# Patient Record
Sex: FEMALE | Race: BLACK OR AFRICAN AMERICAN | NOT HISPANIC OR LATINO
[De-identification: names, ages, dates, MRNs, and addresses within clinical notes are randomized per-mention and may not be internally consistent; named-entity substitution may affect disease eponyms.]

---

## 2017-02-14 ENCOUNTER — APPOINTMENT (OUTPATIENT)
Dept: CARDIOLOGY | Facility: CLINIC | Age: 42
End: 2017-02-14

## 2017-02-14 VITALS
BODY MASS INDEX: 41.98 KG/M2 | WEIGHT: 277 LBS | HEIGHT: 68 IN | SYSTOLIC BLOOD PRESSURE: 148 MMHG | DIASTOLIC BLOOD PRESSURE: 80 MMHG

## 2017-02-14 DIAGNOSIS — Z82.49 FAMILY HISTORY OF ISCHEMIC HEART DISEASE AND OTHER DISEASES OF THE CIRCULATORY SYSTEM: ICD-10-CM

## 2017-02-14 DIAGNOSIS — O24.410 GESTATIONAL DIABETES MELLITUS IN PREGNANCY, DIET CONTROLLED: ICD-10-CM

## 2017-02-15 PROBLEM — Z82.49 FAMILY HISTORY OF CARDIAC DISORDER: Status: ACTIVE | Noted: 2017-02-15

## 2017-02-15 PROBLEM — Z82.49 FAMILY HISTORY OF HYPERTENSION: Status: ACTIVE | Noted: 2017-02-15

## 2017-03-02 ENCOUNTER — OUTPATIENT (OUTPATIENT)
Dept: OUTPATIENT SERVICES | Facility: HOSPITAL | Age: 42
LOS: 1 days | Discharge: HOME | End: 2017-03-02

## 2017-03-02 ENCOUNTER — APPOINTMENT (OUTPATIENT)
Dept: OBGYN | Facility: CLINIC | Age: 42
End: 2017-03-02

## 2017-03-02 DIAGNOSIS — Z86.79 PERSONAL HISTORY OF OTHER DISEASES OF THE CIRCULATORY SYSTEM: ICD-10-CM

## 2017-03-02 DIAGNOSIS — Z86.32 PERSONAL HISTORY OF GESTATIONAL DIABETES: ICD-10-CM

## 2017-03-02 DIAGNOSIS — D44.6 NEOPLASM OF UNCERTAIN BEHAVIOR OF CAROTID BODY: ICD-10-CM

## 2017-03-02 RX ORDER — CHLORHEXIDINE GLUCONATE 4 %
1000 LIQUID (ML) TOPICAL
Refills: 0 | Status: ACTIVE | COMMUNITY

## 2017-03-02 RX ORDER — MULTIVITAMIN
TABLET ORAL
Refills: 0 | Status: ACTIVE | COMMUNITY

## 2017-03-02 RX ORDER — ADHESIVE TAPE 3"X 2.3 YD
50 MCG TAPE, NON-MEDICATED TOPICAL
Refills: 0 | Status: ACTIVE | COMMUNITY

## 2017-03-07 ENCOUNTER — OUTPATIENT (OUTPATIENT)
Dept: OUTPATIENT SERVICES | Facility: HOSPITAL | Age: 42
LOS: 1 days | Discharge: HOME | End: 2017-03-07

## 2017-03-07 ENCOUNTER — RESULT REVIEW (OUTPATIENT)
Age: 42
End: 2017-03-07

## 2017-03-10 ENCOUNTER — RESULT REVIEW (OUTPATIENT)
Age: 42
End: 2017-03-10

## 2017-03-13 LAB — HPV E6+E7 MRNA CVX QL NAA+PROBE: NOT DETECTED

## 2017-03-16 ENCOUNTER — APPOINTMENT (OUTPATIENT)
Dept: SURGERY | Facility: CLINIC | Age: 42
End: 2017-03-16

## 2017-03-21 ENCOUNTER — APPOINTMENT (OUTPATIENT)
Dept: CARDIOLOGY | Facility: CLINIC | Age: 42
End: 2017-03-21

## 2017-03-28 ENCOUNTER — APPOINTMENT (OUTPATIENT)
Dept: CARDIOLOGY | Facility: CLINIC | Age: 42
End: 2017-03-28

## 2017-03-28 VITALS — BODY MASS INDEX: 43.58 KG/M2 | SYSTOLIC BLOOD PRESSURE: 132 MMHG | WEIGHT: 270 LBS | DIASTOLIC BLOOD PRESSURE: 86 MMHG

## 2017-04-05 ENCOUNTER — RECORD ABSTRACTING (OUTPATIENT)
Age: 42
End: 2017-04-05

## 2017-04-05 DIAGNOSIS — Z80.2 FAMILY HISTORY OF MALIGNANT NEOPLASM OF OTHER RESPIRATORY AND INTRATHORACIC ORGANS: ICD-10-CM

## 2017-04-05 DIAGNOSIS — Z87.39 PERSONAL HISTORY OF OTHER DISEASES OF THE MUSCULOSKELETAL SYSTEM AND CONNECTIVE TISSUE: ICD-10-CM

## 2017-04-19 ENCOUNTER — TRANSCRIPTION ENCOUNTER (OUTPATIENT)
Age: 42
End: 2017-04-19

## 2017-04-27 ENCOUNTER — APPOINTMENT (OUTPATIENT)
Dept: SURGERY | Facility: CLINIC | Age: 42
End: 2017-04-27

## 2017-04-27 VITALS — WEIGHT: 275 LBS | HEIGHT: 66 IN | BODY MASS INDEX: 44.2 KG/M2

## 2017-06-01 ENCOUNTER — APPOINTMENT (OUTPATIENT)
Dept: SURGERY | Facility: CLINIC | Age: 42
End: 2017-06-01

## 2017-06-21 ENCOUNTER — APPOINTMENT (OUTPATIENT)
Dept: SURGERY | Facility: CLINIC | Age: 42
End: 2017-06-21

## 2017-06-21 VITALS — HEIGHT: 66 IN | WEIGHT: 277 LBS | BODY MASS INDEX: 44.52 KG/M2

## 2017-06-27 DIAGNOSIS — R16.2 HEPATOMEGALY WITH SPLENOMEGALY, NOT ELSEWHERE CLASSIFIED: ICD-10-CM

## 2017-08-03 ENCOUNTER — APPOINTMENT (OUTPATIENT)
Dept: SURGERY | Facility: CLINIC | Age: 42
End: 2017-08-03
Payer: COMMERCIAL

## 2017-08-03 VITALS — HEIGHT: 66 IN | WEIGHT: 277 LBS | BODY MASS INDEX: 44.52 KG/M2

## 2017-08-03 PROCEDURE — 99212 OFFICE O/P EST SF 10 MIN: CPT

## 2017-09-08 ENCOUNTER — OTHER (OUTPATIENT)
Age: 42
End: 2017-09-08

## 2017-09-26 ENCOUNTER — OUTPATIENT (OUTPATIENT)
Dept: OUTPATIENT SERVICES | Facility: HOSPITAL | Age: 42
LOS: 1 days | Discharge: HOME | End: 2017-09-26

## 2017-09-26 DIAGNOSIS — E66.01 MORBID (SEVERE) OBESITY DUE TO EXCESS CALORIES: ICD-10-CM

## 2017-09-26 DIAGNOSIS — K56.609 UNSPECIFIED INTESTINAL OBSTRUCTION, UNSPECIFIED AS TO PARTIAL VERSUS COMPLETE OBSTRUCTION: ICD-10-CM

## 2017-09-26 DIAGNOSIS — Z01.818 ENCOUNTER FOR OTHER PREPROCEDURAL EXAMINATION: ICD-10-CM

## 2017-09-26 DIAGNOSIS — G47.30 SLEEP APNEA, UNSPECIFIED: ICD-10-CM

## 2017-09-26 DIAGNOSIS — Z98.84 BARIATRIC SURGERY STATUS: ICD-10-CM

## 2017-10-04 ENCOUNTER — APPOINTMENT (OUTPATIENT)
Dept: SURGERY | Facility: CLINIC | Age: 42
End: 2017-10-04
Payer: COMMERCIAL

## 2017-10-04 VITALS
BODY MASS INDEX: 44.13 KG/M2 | WEIGHT: 274.6 LBS | HEIGHT: 66 IN | DIASTOLIC BLOOD PRESSURE: 78 MMHG | SYSTOLIC BLOOD PRESSURE: 136 MMHG

## 2017-10-04 DIAGNOSIS — K42.9 UMBILICAL HERNIA W/OUT OBSTRUCTION OR GANGRENE: ICD-10-CM

## 2017-10-04 DIAGNOSIS — Z86.19 PERSONAL HISTORY OF OTHER INFECTIOUS AND PARASITIC DISEASES: ICD-10-CM

## 2017-10-04 DIAGNOSIS — Z87.19 PERSONAL HISTORY OF OTHER DISEASES OF THE DIGESTIVE SYSTEM: ICD-10-CM

## 2017-10-04 PROCEDURE — 99215 OFFICE O/P EST HI 40 MIN: CPT

## 2017-10-09 PROBLEM — K42.9 UMBILICAL HERNIA WITHOUT OBSTRUCTION AND WITHOUT GANGRENE: Status: RESOLVED | Noted: 2017-03-02 | Resolved: 2017-10-09

## 2017-10-10 ENCOUNTER — INPATIENT (INPATIENT)
Facility: HOSPITAL | Age: 42
LOS: 1 days | Discharge: HOME | End: 2017-10-12
Attending: SURGERY

## 2017-10-10 DIAGNOSIS — K56.609 UNSPECIFIED INTESTINAL OBSTRUCTION, UNSPECIFIED AS TO PARTIAL VERSUS COMPLETE OBSTRUCTION: ICD-10-CM

## 2017-10-10 DIAGNOSIS — Z98.84 BARIATRIC SURGERY STATUS: ICD-10-CM

## 2017-10-10 DIAGNOSIS — G47.30 SLEEP APNEA, UNSPECIFIED: ICD-10-CM

## 2017-10-13 ENCOUNTER — OTHER (OUTPATIENT)
Age: 42
End: 2017-10-13

## 2017-10-17 DIAGNOSIS — E66.01 MORBID (SEVERE) OBESITY DUE TO EXCESS CALORIES: ICD-10-CM

## 2017-10-17 DIAGNOSIS — K21.9 GASTRO-ESOPHAGEAL REFLUX DISEASE WITHOUT ESOPHAGITIS: ICD-10-CM

## 2017-10-17 DIAGNOSIS — G47.33 OBSTRUCTIVE SLEEP APNEA (ADULT) (PEDIATRIC): ICD-10-CM

## 2017-10-17 DIAGNOSIS — Z72.0 TOBACCO USE: ICD-10-CM

## 2017-10-17 DIAGNOSIS — M19.90 UNSPECIFIED OSTEOARTHRITIS, UNSPECIFIED SITE: ICD-10-CM

## 2017-10-17 DIAGNOSIS — K44.9 DIAPHRAGMATIC HERNIA WITHOUT OBSTRUCTION OR GANGRENE: ICD-10-CM

## 2017-10-17 DIAGNOSIS — D64.9 ANEMIA, UNSPECIFIED: ICD-10-CM

## 2017-10-18 ENCOUNTER — INPATIENT (INPATIENT)
Facility: HOSPITAL | Age: 42
LOS: 34 days | Discharge: NURSING, REHAB OR LONG-TERM CARE | End: 2017-11-22
Attending: SURGERY

## 2017-10-18 ENCOUNTER — APPOINTMENT (OUTPATIENT)
Dept: SURGERY | Facility: CLINIC | Age: 42
End: 2017-10-18

## 2017-10-18 DIAGNOSIS — K56.609 UNSPECIFIED INTESTINAL OBSTRUCTION, UNSPECIFIED AS TO PARTIAL VERSUS COMPLETE OBSTRUCTION: ICD-10-CM

## 2017-10-18 DIAGNOSIS — Z98.84 BARIATRIC SURGERY STATUS: ICD-10-CM

## 2017-10-18 DIAGNOSIS — G47.30 SLEEP APNEA, UNSPECIFIED: ICD-10-CM

## 2017-10-20 ENCOUNTER — APPOINTMENT (OUTPATIENT)
Dept: SURGERY | Facility: CLINIC | Age: 42
End: 2017-10-20

## 2017-11-22 ENCOUNTER — INPATIENT (INPATIENT)
Facility: HOSPITAL | Age: 42
LOS: 15 days | Discharge: HOME HEALTH | End: 2017-12-08
Attending: PHYSICAL MEDICINE & REHABILITATION

## 2017-11-22 DIAGNOSIS — Z98.84 BARIATRIC SURGERY STATUS: ICD-10-CM

## 2017-11-22 DIAGNOSIS — G47.30 SLEEP APNEA, UNSPECIFIED: ICD-10-CM

## 2017-11-22 DIAGNOSIS — K56.609 UNSPECIFIED INTESTINAL OBSTRUCTION, UNSPECIFIED AS TO PARTIAL VERSUS COMPLETE OBSTRUCTION: ICD-10-CM

## 2017-11-27 ENCOUNTER — TRANSCRIPTION ENCOUNTER (OUTPATIENT)
Age: 42
End: 2017-11-27

## 2017-11-28 DIAGNOSIS — M19.90 UNSPECIFIED OSTEOARTHRITIS, UNSPECIFIED SITE: ICD-10-CM

## 2017-11-28 DIAGNOSIS — G47.33 OBSTRUCTIVE SLEEP APNEA (ADULT) (PEDIATRIC): ICD-10-CM

## 2017-11-28 DIAGNOSIS — J96.01 ACUTE RESPIRATORY FAILURE WITH HYPOXIA: ICD-10-CM

## 2017-11-28 DIAGNOSIS — R65.21 SEVERE SEPSIS WITH SEPTIC SHOCK: ICD-10-CM

## 2017-11-28 DIAGNOSIS — N17.0 ACUTE KIDNEY FAILURE WITH TUBULAR NECROSIS: ICD-10-CM

## 2017-11-28 DIAGNOSIS — E87.6 HYPOKALEMIA: ICD-10-CM

## 2017-11-28 DIAGNOSIS — K56.50 INTESTINAL ADHESIONS [BANDS], UNSPECIFIED AS TO PARTIAL VERSUS COMPLETE OBSTRUCTION: ICD-10-CM

## 2017-11-28 DIAGNOSIS — K21.9 GASTRO-ESOPHAGEAL REFLUX DISEASE WITHOUT ESOPHAGITIS: ICD-10-CM

## 2017-11-28 DIAGNOSIS — E87.2 ACIDOSIS: ICD-10-CM

## 2017-11-28 DIAGNOSIS — I21.A1 MYOCARDIAL INFARCTION TYPE 2: ICD-10-CM

## 2017-11-28 DIAGNOSIS — M62.82 RHABDOMYOLYSIS: ICD-10-CM

## 2017-11-28 DIAGNOSIS — I48.91 UNSPECIFIED ATRIAL FIBRILLATION: ICD-10-CM

## 2017-11-28 DIAGNOSIS — E87.5 HYPERKALEMIA: ICD-10-CM

## 2017-11-28 DIAGNOSIS — I96 GANGRENE, NOT ELSEWHERE CLASSIFIED: ICD-10-CM

## 2017-11-28 DIAGNOSIS — N17.9 ACUTE KIDNEY FAILURE, UNSPECIFIED: ICD-10-CM

## 2017-11-28 DIAGNOSIS — K95.89 OTHER COMPLICATIONS OF OTHER BARIATRIC PROCEDURE: ICD-10-CM

## 2017-11-28 DIAGNOSIS — D64.9 ANEMIA, UNSPECIFIED: ICD-10-CM

## 2017-11-28 DIAGNOSIS — I42.9 CARDIOMYOPATHY, UNSPECIFIED: ICD-10-CM

## 2017-11-28 DIAGNOSIS — D69.6 THROMBOCYTOPENIA, UNSPECIFIED: ICD-10-CM

## 2017-11-28 DIAGNOSIS — R57.0 CARDIOGENIC SHOCK: ICD-10-CM

## 2017-11-28 DIAGNOSIS — R00.1 BRADYCARDIA, UNSPECIFIED: ICD-10-CM
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
## 2017-11-28 DIAGNOSIS — E66.01 MORBID (SEVERE) OBESITY DUE TO EXCESS CALORIES: ICD-10-CM

## 2017-11-28 DIAGNOSIS — K44.9 DIAPHRAGMATIC HERNIA WITHOUT OBSTRUCTION OR GANGRENE: ICD-10-CM

## 2017-11-28 DIAGNOSIS — E83.39 OTHER DISORDERS OF PHOSPHORUS METABOLISM: ICD-10-CM

## 2017-11-28 DIAGNOSIS — J69.0 PNEUMONITIS DUE TO INHALATION OF FOOD AND VOMIT: ICD-10-CM

## 2017-11-28 DIAGNOSIS — A41.9 SEPSIS, UNSPECIFIED ORGANISM: ICD-10-CM

## 2017-11-28 DIAGNOSIS — R00.0 TACHYCARDIA, UNSPECIFIED: ICD-10-CM

## 2017-11-30 ENCOUNTER — APPOINTMENT (OUTPATIENT)
Dept: PLASTIC SURGERY | Facility: AMBULATORY SURGERY CENTER | Age: 42
End: 2017-11-30

## 2017-12-03 ENCOUNTER — MOBILE ON CALL (OUTPATIENT)
Age: 42
End: 2017-12-03

## 2017-12-11 ENCOUNTER — OTHER (OUTPATIENT)
Age: 42
End: 2017-12-11

## 2017-12-12 ENCOUNTER — APPOINTMENT (OUTPATIENT)
Dept: PLASTIC SURGERY | Facility: CLINIC | Age: 42
End: 2017-12-12
Payer: COMMERCIAL

## 2017-12-12 VITALS — BODY MASS INDEX: 32.58 KG/M2 | HEIGHT: 68 IN | WEIGHT: 215 LBS

## 2017-12-12 PROCEDURE — 99212 OFFICE O/P EST SF 10 MIN: CPT

## 2017-12-13 DIAGNOSIS — R53.81 OTHER MALAISE: ICD-10-CM

## 2017-12-13 DIAGNOSIS — Z98.84 BARIATRIC SURGERY STATUS: ICD-10-CM

## 2017-12-13 DIAGNOSIS — D64.9 ANEMIA, UNSPECIFIED: ICD-10-CM

## 2017-12-13 DIAGNOSIS — R13.10 DYSPHAGIA, UNSPECIFIED: ICD-10-CM

## 2017-12-13 DIAGNOSIS — M19.90 UNSPECIFIED OSTEOARTHRITIS, UNSPECIFIED SITE: ICD-10-CM

## 2017-12-13 DIAGNOSIS — I96 GANGRENE, NOT ELSEWHERE CLASSIFIED: ICD-10-CM

## 2017-12-13 DIAGNOSIS — B96.20 UNSPECIFIED ESCHERICHIA COLI [E. COLI] AS THE CAUSE OF DISEASES CLASSIFIED ELSEWHERE: ICD-10-CM

## 2017-12-13 DIAGNOSIS — Z48.815 ENCOUNTER FOR SURGICAL AFTERCARE FOLLOWING SURGERY ON THE DIGESTIVE SYSTEM: ICD-10-CM

## 2017-12-13 DIAGNOSIS — G47.33 OBSTRUCTIVE SLEEP APNEA (ADULT) (PEDIATRIC): ICD-10-CM

## 2017-12-13 DIAGNOSIS — E66.9 OBESITY, UNSPECIFIED: ICD-10-CM

## 2017-12-13 DIAGNOSIS — I10 ESSENTIAL (PRIMARY) HYPERTENSION: ICD-10-CM

## 2017-12-13 DIAGNOSIS — E55.9 VITAMIN D DEFICIENCY, UNSPECIFIED: ICD-10-CM

## 2017-12-13 DIAGNOSIS — E66.01 MORBID (SEVERE) OBESITY DUE TO EXCESS CALORIES: ICD-10-CM

## 2017-12-13 DIAGNOSIS — B49 UNSPECIFIED MYCOSIS: ICD-10-CM

## 2017-12-13 DIAGNOSIS — K21.9 GASTRO-ESOPHAGEAL REFLUX DISEASE WITHOUT ESOPHAGITIS: ICD-10-CM

## 2017-12-20 ENCOUNTER — OUTPATIENT (OUTPATIENT)
Dept: OUTPATIENT SERVICES | Facility: HOSPITAL | Age: 42
LOS: 1 days | Discharge: HOME | End: 2017-12-20

## 2017-12-20 ENCOUNTER — APPOINTMENT (OUTPATIENT)
Dept: PLASTIC SURGERY | Facility: CLINIC | Age: 42
End: 2017-12-20
Payer: COMMERCIAL

## 2017-12-20 DIAGNOSIS — K56.609 UNSPECIFIED INTESTINAL OBSTRUCTION, UNSPECIFIED AS TO PARTIAL VERSUS COMPLETE OBSTRUCTION: ICD-10-CM

## 2017-12-20 DIAGNOSIS — Z98.84 BARIATRIC SURGERY STATUS: ICD-10-CM

## 2017-12-20 DIAGNOSIS — I96 GANGRENE, NOT ELSEWHERE CLASSIFIED: ICD-10-CM

## 2017-12-20 DIAGNOSIS — G47.30 SLEEP APNEA, UNSPECIFIED: ICD-10-CM

## 2017-12-20 PROCEDURE — 99212 OFFICE O/P EST SF 10 MIN: CPT

## 2017-12-22 ENCOUNTER — APPOINTMENT (OUTPATIENT)
Dept: PLASTIC SURGERY | Facility: CLINIC | Age: 42
End: 2017-12-22
Payer: COMMERCIAL

## 2017-12-22 ENCOUNTER — APPOINTMENT (OUTPATIENT)
Dept: SURGERY | Facility: CLINIC | Age: 42
End: 2017-12-22

## 2017-12-22 PROCEDURE — 99212 OFFICE O/P EST SF 10 MIN: CPT

## 2017-12-27 ENCOUNTER — OTHER (OUTPATIENT)
Age: 42
End: 2017-12-27

## 2017-12-29 ENCOUNTER — APPOINTMENT (OUTPATIENT)
Dept: PLASTIC SURGERY | Facility: CLINIC | Age: 42
End: 2017-12-29
Payer: COMMERCIAL

## 2017-12-29 PROCEDURE — 99212 OFFICE O/P EST SF 10 MIN: CPT | Mod: 24

## 2018-01-03 ENCOUNTER — OUTPATIENT (OUTPATIENT)
Dept: OUTPATIENT SERVICES | Facility: HOSPITAL | Age: 43
LOS: 1 days | Discharge: HOME | End: 2018-01-03

## 2018-01-03 DIAGNOSIS — K56.609 UNSPECIFIED INTESTINAL OBSTRUCTION, UNSPECIFIED AS TO PARTIAL VERSUS COMPLETE OBSTRUCTION: ICD-10-CM

## 2018-01-03 DIAGNOSIS — Z01.818 ENCOUNTER FOR OTHER PREPROCEDURAL EXAMINATION: ICD-10-CM

## 2018-01-03 DIAGNOSIS — Z98.84 BARIATRIC SURGERY STATUS: ICD-10-CM

## 2018-01-03 DIAGNOSIS — G47.30 SLEEP APNEA, UNSPECIFIED: ICD-10-CM

## 2018-01-05 ENCOUNTER — APPOINTMENT (OUTPATIENT)
Dept: SURGERY | Facility: CLINIC | Age: 43
End: 2018-01-05

## 2018-01-05 DIAGNOSIS — I10 ESSENTIAL (PRIMARY) HYPERTENSION: ICD-10-CM

## 2018-01-05 DIAGNOSIS — G47.30 SLEEP APNEA, UNSPECIFIED: ICD-10-CM

## 2018-01-05 DIAGNOSIS — E66.9 OBESITY, UNSPECIFIED: ICD-10-CM

## 2018-01-09 ENCOUNTER — APPOINTMENT (OUTPATIENT)
Dept: PLASTIC SURGERY | Facility: CLINIC | Age: 43
End: 2018-01-09
Payer: COMMERCIAL

## 2018-01-09 PROCEDURE — 99212 OFFICE O/P EST SF 10 MIN: CPT

## 2018-01-09 RX ORDER — AMOXICILLIN AND CLAVULANATE POTASSIUM 875; 125 MG/1; MG/1
875-125 TABLET, COATED ORAL
Qty: 14 | Refills: 0 | Status: DISCONTINUED | COMMUNITY
Start: 2017-12-20 | End: 2018-01-09

## 2018-01-16 ENCOUNTER — APPOINTMENT (OUTPATIENT)
Dept: CARDIOLOGY | Facility: CLINIC | Age: 43
End: 2018-01-16

## 2018-01-16 VITALS
HEART RATE: 116 BPM | DIASTOLIC BLOOD PRESSURE: 90 MMHG | WEIGHT: 218 LBS | SYSTOLIC BLOOD PRESSURE: 130 MMHG | HEIGHT: 68 IN | BODY MASS INDEX: 33.04 KG/M2

## 2018-01-16 RX ORDER — HYDROCODONE BITARTRATE AND ACETAMINOPHEN 5; 325 MG/1; MG/1
5-325 TABLET ORAL
Qty: 15 | Refills: 0 | Status: DISCONTINUED | COMMUNITY
Start: 2017-10-04 | End: 2018-01-16

## 2018-01-16 RX ORDER — MAGNESIUM OXIDE 500 MG
500 TABLET ORAL
Refills: 0 | Status: DISCONTINUED | COMMUNITY
End: 2018-01-16

## 2018-01-16 RX ORDER — DICYCLOMINE HYDROCHLORIDE 10 MG/1
10 CAPSULE ORAL
Refills: 0 | Status: DISCONTINUED | COMMUNITY
End: 2018-01-16

## 2018-01-16 RX ORDER — ZOLPIDEM TARTRATE 5 MG/1
TABLET, FILM COATED ORAL
Refills: 0 | Status: DISCONTINUED | COMMUNITY
End: 2018-01-16

## 2018-01-17 ENCOUNTER — RX RENEWAL (OUTPATIENT)
Age: 43
End: 2018-01-17

## 2018-01-17 ENCOUNTER — APPOINTMENT (OUTPATIENT)
Dept: PLASTIC SURGERY | Facility: AMBULATORY SURGERY CENTER | Age: 43
End: 2018-01-17
Payer: COMMERCIAL

## 2018-01-17 ENCOUNTER — OUTPATIENT (OUTPATIENT)
Dept: OUTPATIENT SERVICES | Facility: HOSPITAL | Age: 43
LOS: 1 days | Discharge: HOME | End: 2018-01-17

## 2018-01-17 ENCOUNTER — MEDICATION RENEWAL (OUTPATIENT)
Age: 43
End: 2018-01-17

## 2018-01-17 DIAGNOSIS — K56.609 UNSPECIFIED INTESTINAL OBSTRUCTION, UNSPECIFIED AS TO PARTIAL VERSUS COMPLETE OBSTRUCTION: ICD-10-CM

## 2018-01-17 DIAGNOSIS — Z98.84 BARIATRIC SURGERY STATUS: ICD-10-CM

## 2018-01-17 DIAGNOSIS — G47.30 SLEEP APNEA, UNSPECIFIED: ICD-10-CM

## 2018-01-17 PROCEDURE — 25920 AMPUTATE HAND AT WRIST: CPT | Mod: LT

## 2018-01-17 PROCEDURE — 26952 AMPUTATION OF FINGER/THUMB: CPT | Mod: 59,F9

## 2018-01-18 ENCOUNTER — MESSAGE (OUTPATIENT)
Age: 43
End: 2018-01-18

## 2018-01-23 ENCOUNTER — APPOINTMENT (OUTPATIENT)
Dept: PLASTIC SURGERY | Facility: CLINIC | Age: 43
End: 2018-01-23
Payer: COMMERCIAL

## 2018-01-23 PROCEDURE — 99024 POSTOP FOLLOW-UP VISIT: CPT

## 2018-01-25 DIAGNOSIS — I96 GANGRENE, NOT ELSEWHERE CLASSIFIED: ICD-10-CM

## 2018-01-25 DIAGNOSIS — I99.8 OTHER DISORDER OF CIRCULATORY SYSTEM: ICD-10-CM

## 2018-01-30 ENCOUNTER — APPOINTMENT (OUTPATIENT)
Dept: PLASTIC SURGERY | Facility: CLINIC | Age: 43
End: 2018-01-30
Payer: COMMERCIAL

## 2018-01-30 PROCEDURE — 99024 POSTOP FOLLOW-UP VISIT: CPT

## 2018-02-05 ENCOUNTER — RX RENEWAL (OUTPATIENT)
Age: 43
End: 2018-02-05

## 2018-02-05 ENCOUNTER — OTHER (OUTPATIENT)
Age: 43
End: 2018-02-05

## 2018-02-06 ENCOUNTER — APPOINTMENT (OUTPATIENT)
Dept: PLASTIC SURGERY | Facility: CLINIC | Age: 43
End: 2018-02-06
Payer: COMMERCIAL

## 2018-02-06 PROCEDURE — 99024 POSTOP FOLLOW-UP VISIT: CPT

## 2018-02-15 ENCOUNTER — INPATIENT (INPATIENT)
Facility: HOSPITAL | Age: 43
LOS: 22 days | Discharge: HOME HEALTH | End: 2018-03-10
Attending: HOSPITALIST

## 2018-02-15 VITALS
TEMPERATURE: 97 F | HEART RATE: 101 BPM | SYSTOLIC BLOOD PRESSURE: 130 MMHG | DIASTOLIC BLOOD PRESSURE: 80 MMHG | RESPIRATION RATE: 20 BRPM

## 2018-02-15 DIAGNOSIS — Z98.84 BARIATRIC SURGERY STATUS: Chronic | ICD-10-CM

## 2018-02-15 DIAGNOSIS — Z98.890 OTHER SPECIFIED POSTPROCEDURAL STATES: Chronic | ICD-10-CM

## 2018-02-15 DIAGNOSIS — S68.119A COMPLETE TRAUMATIC METACARPOPHALANGEAL AMPUTATION OF UNSPECIFIED FINGER, INITIAL ENCOUNTER: Chronic | ICD-10-CM

## 2018-02-15 LAB
ALBUMIN SERPL ELPH-MCNC: 3.1 G/DL — SIGNIFICANT CHANGE UP (ref 3–5.5)
ALP SERPL-CCNC: 46 U/L — SIGNIFICANT CHANGE UP (ref 30–115)
ALT FLD-CCNC: 14 U/L — SIGNIFICANT CHANGE UP (ref 0–41)
ANION GAP SERPL CALC-SCNC: 9 MMOL/L — SIGNIFICANT CHANGE UP (ref 7–14)
APTT BLD: 27.9 SEC — SIGNIFICANT CHANGE UP (ref 27–39.2)
AST SERPL-CCNC: 15 U/L — SIGNIFICANT CHANGE UP (ref 0–41)
BASOPHILS # BLD AUTO: 0.03 K/UL — SIGNIFICANT CHANGE UP (ref 0–0.2)
BASOPHILS NFR BLD AUTO: 0.3 % — SIGNIFICANT CHANGE UP (ref 0–1)
BILIRUB SERPL-MCNC: 0.5 MG/DL — SIGNIFICANT CHANGE UP (ref 0.2–1.2)
BLD GP AB SCN SERPL QL: SIGNIFICANT CHANGE UP
BUN SERPL-MCNC: 8 MG/DL — LOW (ref 10–20)
CALCIUM SERPL-MCNC: 9.2 MG/DL — SIGNIFICANT CHANGE UP (ref 8.5–10.1)
CHLORIDE SERPL-SCNC: 103 MMOL/L — SIGNIFICANT CHANGE UP (ref 98–110)
CO2 SERPL-SCNC: 23 MMOL/L — SIGNIFICANT CHANGE UP (ref 17–32)
CREAT SERPL-MCNC: 1 MG/DL — SIGNIFICANT CHANGE UP (ref 0.7–1.5)
EOSINOPHIL # BLD AUTO: 0.25 K/UL — SIGNIFICANT CHANGE UP (ref 0–0.7)
EOSINOPHIL NFR BLD AUTO: 2.8 % — SIGNIFICANT CHANGE UP (ref 0–8)
GLUCOSE SERPL-MCNC: 138 MG/DL — HIGH (ref 70–110)
HCT VFR BLD CALC: 36 % — LOW (ref 37–47)
HGB BLD-MCNC: 11.3 G/DL — LOW (ref 14–18)
IMM GRANULOCYTES NFR BLD AUTO: 0.3 % — SIGNIFICANT CHANGE UP (ref 0.1–0.3)
INR BLD: 1.09 RATIO — SIGNIFICANT CHANGE UP (ref 0.65–1.3)
LYMPHOCYTES # BLD AUTO: 1.68 K/UL — SIGNIFICANT CHANGE UP (ref 1.2–3.4)
LYMPHOCYTES # BLD AUTO: 19 % — LOW (ref 20.5–51.1)
MCHC RBC-ENTMCNC: 25.3 PG — LOW (ref 27–31)
MCHC RBC-ENTMCNC: 31.4 G/DL — LOW (ref 32–37)
MCV RBC AUTO: 80.5 FL — LOW (ref 81–91)
MONOCYTES # BLD AUTO: 0.5 K/UL — SIGNIFICANT CHANGE UP (ref 0.1–0.6)
MONOCYTES NFR BLD AUTO: 5.6 % — SIGNIFICANT CHANGE UP (ref 1.7–9.3)
NEUTROPHILS # BLD AUTO: 6.36 K/UL — SIGNIFICANT CHANGE UP (ref 1.4–6.5)
NEUTROPHILS NFR BLD AUTO: 72 % — SIGNIFICANT CHANGE UP (ref 42.2–75.2)
NRBC # BLD: 0 /100 WBCS — SIGNIFICANT CHANGE UP (ref 0–0)
PLATELET # BLD AUTO: 334 K/UL — SIGNIFICANT CHANGE UP (ref 130–400)
POTASSIUM SERPL-MCNC: 4.5 MMOL/L — SIGNIFICANT CHANGE UP (ref 3.5–5)
POTASSIUM SERPL-SCNC: 4.5 MMOL/L — SIGNIFICANT CHANGE UP (ref 3.5–5)
PROT SERPL-MCNC: 6.4 G/DL — SIGNIFICANT CHANGE UP (ref 6–8)
PROTHROM AB SERPL-ACNC: 11.8 SEC — SIGNIFICANT CHANGE UP (ref 9.95–12.87)
RBC # BLD: 4.47 M/UL — SIGNIFICANT CHANGE UP (ref 4.2–5.4)
RBC # FLD: 14.3 % — SIGNIFICANT CHANGE UP (ref 11.5–14.5)
SODIUM SERPL-SCNC: 135 MMOL/L — SIGNIFICANT CHANGE UP (ref 135–146)
TYPE + AB SCN PNL BLD: SIGNIFICANT CHANGE UP
WBC # BLD: 8.85 K/UL — SIGNIFICANT CHANGE UP (ref 4.8–10.8)
WBC # FLD AUTO: 8.85 K/UL — SIGNIFICANT CHANGE UP (ref 4.8–10.8)

## 2018-02-15 RX ORDER — GABAPENTIN 400 MG/1
300 CAPSULE ORAL DAILY
Qty: 0 | Refills: 0 | Status: DISCONTINUED | OUTPATIENT
Start: 2018-02-15 | End: 2018-02-16

## 2018-02-15 RX ORDER — ENOXAPARIN SODIUM 100 MG/ML
40 INJECTION SUBCUTANEOUS EVERY 24 HOURS
Qty: 0 | Refills: 0 | Status: DISCONTINUED | OUTPATIENT
Start: 2018-02-15 | End: 2018-02-16

## 2018-02-15 RX ORDER — PREGABALIN 225 MG/1
100 CAPSULE ORAL DAILY
Qty: 0 | Refills: 0 | Status: DISCONTINUED | OUTPATIENT
Start: 2018-02-15 | End: 2018-02-16

## 2018-02-15 NOTE — ED ADULT NURSE NOTE - GENITOURINARY WDL
Pt advised my office last that he is transfering his care to [facility]. Will RF for 1 month only   Bladder non-tender and non-distended. Urine clear yellow.

## 2018-02-15 NOTE — ED PROVIDER NOTE - CONSTITUTIONAL, MLM
normal... Well appearing, well nourished, awake, alert, oriented to person, place, time/situation, in no apparent distress.

## 2018-02-15 NOTE — H&P ADULT - NSHPPHYSICALEXAM_GEN_ALL_CORE
T(F): 97.8 (02-15-18 @ 13:34), Max: 97.8 (02-15-18 @ 13:34)  HR: 98 (02-15-18 @ 13:34) (98 - 101)  BP: 137/78 (02-15-18 @ 13:34) (130/80 - 141/78)  RR: 18 (02-15-18 @ 13:34) (18 - 20)  SpO2: 98% (02-15-18 @ 13:34) (98% - 98%)    PHYSICAL EXAM:    GENERAL: NAD, well-developed  HEAD:  Atraumatic, Normocephalic  EYES: EOMI, PERRLA, conjunctiva and sclera clear  NECK: Supple, No JVD  CHEST/LUNG: wheezes b/l lung fields  HEART: Regular rate and rhythm; No murmurs, rubs, or gallops  ABDOMEN: Soft, Nontender, Nondistended; Bowel sounds present  EXTREMITIES:  2+ Peripheral Pulses, b/l finger amputations, and feet wrapped in kerlex with foot ulcers  PSYCH: AAOx3  NEUROLOGY: non-focal  SKIN: As above

## 2018-02-15 NOTE — ED ADULT NURSE NOTE - OBJECTIVE STATEMENT
As per patient she is scheduled for surgery tomorrow. Pt states she had gastric bypass Oct 2017 with reaction to vasopressors. Pt now presents to ER with bilateral gangrene to bilateral feet. Pt has amputated left hand and right hand amputated fingers prior. Pt alert and orientedx3, VSS and afebrile. Safety maintained and hourly rounding performed. Will continue with plan of care.

## 2018-02-15 NOTE — H&P ADULT - HISTORY OF PRESENT ILLNESS
43 YO F with PMH of HTN, Obesity s/p gastric bypass surgery complicated by septic shock and vasopressor induced necrosis of fingers and toes presents to the ER for admission for b/l foot amputation. As per the patient her b/l feet ulcers are being taken care of by Podiatry by wound care. She was told by the Podiatrist to go the ER for the amputation. Pt. already has amputations of fingers of both hands.   At baseline pt. reports that she can walk without any exertional chest pain/sob, but it is limited by the pain upon walking with foot ulcers. Pt. denies any fevers, chills, nausea, vomiting, chest pain,   lagunas, palpitations, headaches. 41 YO F with PMH of HTN, Obesity s/p gastric bypass surgery complicated by septic shock, ARDS, SBO, ATN with CVVH, stress cardiomyopathy and vasopressor induced necrosis of fingers and toes in October 2017 presents to the ER for admission for b/l foot amputation. As per the patient her b/l feet ulcers are being taken care of by Podiatry by wound care. She was told by the Podiatrist to go the ER for the amputation. Pt. already has amputations of fingers of both hands.   At baseline pt. reports that she can walk without any exertional chest pain/sob, but it is limited by the pain upon walking with foot ulcers. Pt. denies any fevers, chills, nausea, vomiting, chest pain,   lagunas, palpitations, headaches.

## 2018-02-15 NOTE — ED ADULT NURSE NOTE - PMH
Cardiomyopathy    Gangrene of finger of left hand    Gangrene of finger of right hand    Gangrene of lower extremity    HTN (hypertension)    Osteoarthritis    Sleep apnea

## 2018-02-15 NOTE — ED PROVIDER NOTE - OBJECTIVE STATEMENT
43 yo F with hx of HTN, gastric bypass, s/p bilateral hand phalanges amputation, presents for evaluation of pre-op/medical clearance for bilateral foot amputation secondary to bilateral foot gangrene. NO fever, no chills, no chest pain, no abdominal pain, no headache, no nausea, no vomiting, no diarrhea. Patient was sent from podiatry for pre-op labs and medical admission. Patient has both feet wrapped with xeroform. Patient denies any other symptoms.

## 2018-02-15 NOTE — H&P ADULT - ASSESSMENT
41 YO F with PMH of HTN, Obesity s/p gastric bypass surgery complicated by septic shock and vasopressor induced necrosis of fingers and toes presents to the ER for admission for b/l foot amputation.    A & P:    # B/L Feet gangrene/ulcers:   - plan for OR as per Podiatry Tomorrow (as per ER)  - Type and Screen, Cross match,   - Keep NPO past midnight  - No current signs of infection  - Podiatry f/u    # Pre-op Risk Assessment:  - c/w Toprol, hold Lovenox am dose (ppx)  - according to Revised Cardiac Risk Index pt. is at low risk for intermediate risk surgery    # HTN:  - monitor BP, c/w Toprol    # DVT ppx:  - Lovenox  - hold lovenox tonight 41 YO F with PMH of HTN, PANDA, Obesity s/p gastric bypass surgery complicated by septic shock, ARDS, SBO, ATN with CVVH, stress cardiomyopathy and vasopressor induced necrosis of fingers and toes in October 2017 presents to the ER for admission for b/l foot amputation.    A & P:    # B/L Feet gangrene/ulcers:   - plan for OR as per Podiatry Tomorrow (as per ER)  - Type and Screen, Cross match,   - Keep NPO past midnight  - No current signs of infection  - Podiatry f/u    # Pre-op Risk Assessment:  - c/w Toprol, hold Lovenox am dose (ppx)  - according to Revised Cardiac Risk Index pt. is at low risk for intermediate risk surgery  - h/o Stress cardiomyopathy due to sepsis, ARDS [facility]    # HTN:  - monitor BP, c/w Toprol    # DVT ppx:  - Lovenox  - hold lovenox tonight

## 2018-02-15 NOTE — H&P ADULT - PMH
Cardiomyopathy    Gangrene of finger of left hand    Gangrene of finger of right hand    Gangrene of lower extremity    HTN (hypertension)    Osteoarthritis    Sleep apnea Cardiomyopathy    Gangrene of finger of left hand    Gangrene of finger of right hand    Gangrene of lower extremity    HTN (hypertension)    Osteoarthritis    Sleep apnea    Small bowel obstruction    Takotsubo cardiomyopathy

## 2018-02-15 NOTE — H&P ADULT - ATTENDING COMMENTS
41yo F with Past Medical History of HTN, PANDA, Obesity status post gastric bypass surgery complicated by septic shock/ATN with CVVH/ARDS, SBO, and stress cardiomyopathy and vasopressor induced necrosis of fingers and toes in October 2017 presents to the ER for admission for bilateral foot amputation.  1) Status post septic shock with necrosis of the fingers/toes: status post bilateral TMA.  Pt appears hypothermic following surgery.  Apply warming blanket to the patient.  Following podiatry for continued wound care  2) HTN: continue Toprol XL  3) Obstructive Sleep Apnea: continue BIPAP at night  4) GI/DVT prophylaxis

## 2018-02-15 NOTE — PROGRESS NOTE ADULT - SUBJECTIVE AND OBJECTIVE BOX
Podiatry Pre-Operative Note   JIMMY CR is a 42y year old Female patient presenting to the operating room for surgical management of the bilateral  feet. The patient has failed all conservative measures and requires surgical intervention at this time.    PAST MEDICAL & SURGICAL HISTORY:  Takotsubo cardiomyopathy  Small bowel obstruction  Osteoarthritis  Cardiomyopathy  Sleep apnea  HTN (hypertension)  Gangrene of lower extremity  Gangrene of finger of right hand  Gangrene of finger of left hand  H/O exploratory laparotomy  Amputation finger  History of cholecystectomy  H/O gastric bypass    Medications:   cyanocobalamin 100 MICROGram(s) Oral daily  enoxaparin Injectable 40 milliGRAM(s) SubCutaneous every 24 hours  gabapentin 300 milliGRAM(s) Oral daily    Allergies:  No Known Allergies    Consent [Pending]    H&P [Done]    Medical Clearance [Requested]    EKG [Done]  < from: 12 Lead ECG (02.15.18 @ 10:30) >  Ventricular Rate 88 BPM  Atrial Rate 88 BPM  P-R Interval 160 ms  QRS Duration 76 ms  Q-T Interval 338 ms  QTC Calculation(Bezet) 408 ms  P Axis 44 degrees  R Axis 29 degrees  T Axis -1 degrees  Diagnosis Line Normal sinus rhythm  Nonspecific T wave abnormality  Abnormal ECG  Confirmed by Obed Obando ([id number]) on 2/15/2018 1:58:21 PM  < end of copied text >    CXR [Done]  < from: Xray Chest 1 View AP/PA (02.15.18 @ 13:02) >  EXAM:  XR CHEST FRONTAL 1V        PROCEDURE DATE:  02/15/2018    INTERPRETATION:  Clinical History / Reason for exam: Medical evaluation  Comparison : Chest radiograph 1/3/2018.  Technique/Positioning: Satisfactory.  Findings:  Support devices: None.  Cardiac/mediastinum/hilum: Unremarkable.  Lung parenchyma/Pleura: Within normal limits.  Skeleton/soft tissues: Unremarkable.  Impression:    No radiographic evidence of acute cardiopulmonary disease.  MONSE SMITH M.D., ATTENDING RADIOLOGIST  This document has been electronically signed. Feb 15 2018  3:20PM  < end of copied text >    UCG [Ordered]    T&S [Done]  Type + Screen (02.15.18 @ 11:51)  Type + Screen: AB POS                        11.3   8.85  )-----------( 334      ( 15 Feb 2018 10:20 )             36.0     02-15    135  |  103  |  8<L>  ----------------------------<  138<H>  4.5   |  23  |  1.0    Ca    9.2      15 Feb 2018 10:20    TPro  6.4  /  Alb  3.1  /  TBili  0.5  /  DBili  x   /  AST  15  /  ALT  14  /  AlkPhos  46  02-15    PT/INR - ( 15 Feb 2018 10:20 )   PT: 11.80 sec;   INR: 1.09 ratio         PTT - ( 15 Feb 2018 10:20 )  PTT:27.9 sec    CAPILLARY BLOOD GLUCOSE    T(C): 37.1 (02-15-18 @ 16:30), Max: 37.1 (02-15-18 @ 16:30)  HR: 99 (02-15-18 @ 16:30) (98 - 101)  BP: 100/55 (02-15-18 @ 16:30) (100/55 - 141/78)  RR: 18 (02-15-18 @ 16:30) (18 - 20)  SpO2: 98% (02-15-18 @ 13:34) (98% - 98%)    Surgeon: Dr. Mohinder Salazar DPM  Assistant (s): [facility] Residents  Pre Operative Diagnosis: Bilateral feet dry gangrene   Planned Procedure: Bilateral proximal transmetatarsal amputations    The patient has been educated on the above procedure, with all risks and benefits described. No guarantees were given or implied for the aforementioned procedure.  The above assistant(s) have introduced themselves to the patient. The patient consents to their participation in the procedure listed above.     Patient requires medical stratification for operative procedure.

## 2018-02-15 NOTE — CONSULT NOTE ADULT - ASSESSMENT
Assesment:  1. Dry gangrene B/L feet to the level of the metatarsal bases    Plan:  Chart reviewed and Patient evaluated  pt scheduled for sx with  (Director of Service) @ 3pm on 2/16/18 for B/L proximal TMA's  ID/Vasc consults requested  MEDICAL CLEARANCE w/ stratification requested  Pt will be NPO @ midnight  B/L FXR ordered

## 2018-02-15 NOTE — ED PROVIDER NOTE - MUSCULOSKELETAL NEGATIVE STATEMENT, MLM
+ bilateral foot gangrene, + bilateral hands s/p phalangeal amputation, no back pain, no gout, no musculoskeletal pain, no neck pain, and no weakness.

## 2018-02-15 NOTE — H&P ADULT - PSH
Amputation finger    H/O gastric bypass    History of cholecystectomy Amputation finger    H/O exploratory laparotomy    H/O gastric bypass    History of cholecystectomy

## 2018-02-15 NOTE — ED PROVIDER NOTE - PROGRESS NOTE DETAILS
Spoke to Dr. Aguilar, Podiatry, aware of patient in the ED, will come evaluate patient Per pre-admission note patient is to be on bed rest with full weight bed, vascular surgical consult is Dr. Taveras, diet is regular, no IV abx at this time,  PVR was done prior to admission. Podiatry spectra [id number], Tx plan preop medical clearance with surgical debridement of necrotic tissue. I personally evaluated the patient. I reviewed the Resident’s note (as assigned above), and agree with the findings and plan except as documented in my note.   41 y/o F s/p recent gastric bypass, shortly afterwards she had complications leading to her requiring amputation of several digits, and now requiring amputation of several toes due to gangrene due to vascular insufficiency. Sent in by podiatry for amputations. Labs sent, will admit.

## 2018-02-15 NOTE — ED PROVIDER NOTE - MUSCULOSKELETAL, MLM
full ROM, bilateral UE s/p phalangeal amputation, + distal pulses, bilateral LE full ROM, + PT and DP, gangrenous bilateral foot full ROM, bilateral UE s/p phalangeal amputation, + distal pulses, bilateral LE full ROM, + PT and DP, dry gangrenous necrotic bilateral feet

## 2018-02-15 NOTE — H&P ADULT - NSHPLABSRESULTS_GEN_ALL_CORE
11.3   8.85  )-----------( 334      ( 15 Feb 2018 10:20 )             36.0       02-15    135  |  103  |  8<L>  ----------------------------<  138<H>  4.5   |  23  |  1.0    Ca    9.2      15 Feb 2018 10:20    TPro  6.4  /  Alb  3.1  /  TBili  0.5  /  DBili  x   /  AST  15  /  ALT  14  /  AlkPhos  46  02-15            PT/INR - ( 15 Feb 2018 10:20 )   PT: 11.80 sec;   INR: 1.09 ratio         PTT - ( 15 Feb 2018 10:20 )  PTT:27.9 sec

## 2018-02-16 ENCOUNTER — RESULT REVIEW (OUTPATIENT)
Age: 43
End: 2018-02-16

## 2018-02-16 LAB
ALBUMIN SERPL ELPH-MCNC: 2.9 G/DL — LOW (ref 3–5.5)
ALP SERPL-CCNC: 41 U/L — SIGNIFICANT CHANGE UP (ref 30–115)
ALT FLD-CCNC: 8 U/L — SIGNIFICANT CHANGE UP (ref 0–41)
ANION GAP SERPL CALC-SCNC: 7 MMOL/L — SIGNIFICANT CHANGE UP (ref 7–14)
AST SERPL-CCNC: 16 U/L — SIGNIFICANT CHANGE UP (ref 0–41)
BILIRUB SERPL-MCNC: 0.4 MG/DL — SIGNIFICANT CHANGE UP (ref 0.2–1.2)
BUN SERPL-MCNC: 7 MG/DL — LOW (ref 10–20)
CALCIUM SERPL-MCNC: 9 MG/DL — SIGNIFICANT CHANGE UP (ref 8.5–10.1)
CHLORIDE SERPL-SCNC: 108 MMOL/L — SIGNIFICANT CHANGE UP (ref 98–110)
CO2 SERPL-SCNC: 23 MMOL/L — SIGNIFICANT CHANGE UP (ref 17–32)
CREAT SERPL-MCNC: 1.1 MG/DL — SIGNIFICANT CHANGE UP (ref 0.7–1.5)
GLUCOSE SERPL-MCNC: 79 MG/DL — SIGNIFICANT CHANGE UP (ref 70–110)
HCG UR QL: NEGATIVE — SIGNIFICANT CHANGE UP
HCT VFR BLD CALC: 33.9 % — LOW (ref 37–47)
HCT VFR BLD CALC: 35 % — LOW (ref 37–47)
HGB BLD-MCNC: 10.4 G/DL — LOW (ref 14–18)
HGB BLD-MCNC: 11 G/DL — LOW (ref 14–18)
MCHC RBC-ENTMCNC: 25.1 PG — LOW (ref 27–31)
MCHC RBC-ENTMCNC: 25.1 PG — LOW (ref 27–31)
MCHC RBC-ENTMCNC: 30.7 G/DL — LOW (ref 32–37)
MCHC RBC-ENTMCNC: 31.4 G/DL — LOW (ref 32–37)
MCV RBC AUTO: 79.7 FL — LOW (ref 81–91)
MCV RBC AUTO: 81.7 FL — SIGNIFICANT CHANGE UP (ref 81–91)
NRBC # BLD: 0 /100 WBCS — SIGNIFICANT CHANGE UP (ref 0–0)
NRBC # BLD: 0 /100 WBCS — SIGNIFICANT CHANGE UP (ref 0–0)
PLATELET # BLD AUTO: 312 K/UL — SIGNIFICANT CHANGE UP (ref 130–400)
PLATELET # BLD AUTO: 326 K/UL — SIGNIFICANT CHANGE UP (ref 130–400)
POTASSIUM SERPL-MCNC: 4.1 MMOL/L — SIGNIFICANT CHANGE UP (ref 3.5–5)
POTASSIUM SERPL-SCNC: 4.1 MMOL/L — SIGNIFICANT CHANGE UP (ref 3.5–5)
PROT SERPL-MCNC: 5.8 G/DL — LOW (ref 6–8)
RBC # BLD: 4.15 M/UL — LOW (ref 4.2–5.4)
RBC # BLD: 4.39 M/UL — SIGNIFICANT CHANGE UP (ref 4.2–5.4)
RBC # FLD: 14.2 % — SIGNIFICANT CHANGE UP (ref 11.5–14.5)
RBC # FLD: 14.3 % — SIGNIFICANT CHANGE UP (ref 11.5–14.5)
SODIUM SERPL-SCNC: 138 MMOL/L — SIGNIFICANT CHANGE UP (ref 135–146)
WBC # BLD: 10.3 K/UL — SIGNIFICANT CHANGE UP (ref 4.8–10.8)
WBC # BLD: 7.18 K/UL — SIGNIFICANT CHANGE UP (ref 4.8–10.8)
WBC # FLD AUTO: 10.3 K/UL — SIGNIFICANT CHANGE UP (ref 4.8–10.8)
WBC # FLD AUTO: 7.18 K/UL — SIGNIFICANT CHANGE UP (ref 4.8–10.8)

## 2018-02-16 RX ORDER — SODIUM CHLORIDE 9 MG/ML
1000 INJECTION, SOLUTION INTRAVENOUS
Qty: 0 | Refills: 0 | Status: DISCONTINUED | OUTPATIENT
Start: 2018-02-16 | End: 2018-02-16

## 2018-02-16 RX ORDER — ENOXAPARIN SODIUM 100 MG/ML
40 INJECTION SUBCUTANEOUS EVERY 24 HOURS
Qty: 0 | Refills: 0 | Status: DISCONTINUED | OUTPATIENT
Start: 2018-02-16 | End: 2018-02-21

## 2018-02-16 RX ORDER — OXYCODONE HYDROCHLORIDE 5 MG/1
10 TABLET ORAL EVERY 4 HOURS
Qty: 0 | Refills: 0 | Status: DISCONTINUED | OUTPATIENT
Start: 2018-02-16 | End: 2018-02-21

## 2018-02-16 RX ORDER — GABAPENTIN 400 MG/1
300 CAPSULE ORAL DAILY
Qty: 0 | Refills: 0 | Status: DISCONTINUED | OUTPATIENT
Start: 2018-02-16 | End: 2018-02-19

## 2018-02-16 RX ORDER — MORPHINE SULFATE 50 MG/1
2 CAPSULE, EXTENDED RELEASE ORAL EVERY 6 HOURS
Qty: 0 | Refills: 0 | Status: DISCONTINUED | OUTPATIENT
Start: 2018-02-16 | End: 2018-02-16

## 2018-02-16 RX ORDER — SODIUM HYPOCHLORITE 0.125 %
1 SOLUTION, NON-ORAL MISCELLANEOUS THREE TIMES A DAY
Qty: 0 | Refills: 0 | Status: DISCONTINUED | OUTPATIENT
Start: 2018-02-16 | End: 2018-02-21

## 2018-02-16 RX ORDER — ACETAMINOPHEN 500 MG
650 TABLET ORAL EVERY 6 HOURS
Qty: 0 | Refills: 0 | Status: DISCONTINUED | OUTPATIENT
Start: 2018-02-16 | End: 2018-02-21

## 2018-02-16 RX ORDER — MORPHINE SULFATE 50 MG/1
4 CAPSULE, EXTENDED RELEASE ORAL EVERY 4 HOURS
Qty: 0 | Refills: 0 | Status: DISCONTINUED | OUTPATIENT
Start: 2018-02-16 | End: 2018-02-21

## 2018-02-16 RX ORDER — PREGABALIN 225 MG/1
100 CAPSULE ORAL DAILY
Qty: 0 | Refills: 0 | Status: DISCONTINUED | OUTPATIENT
Start: 2018-02-16 | End: 2018-02-18

## 2018-02-16 RX ORDER — ONDANSETRON 8 MG/1
4 TABLET, FILM COATED ORAL ONCE
Qty: 0 | Refills: 0 | Status: COMPLETED | OUTPATIENT
Start: 2018-02-16 | End: 2018-02-16

## 2018-02-16 RX ADMIN — SODIUM CHLORIDE 100 MILLILITER(S): 9 INJECTION, SOLUTION INTRAVENOUS at 18:28

## 2018-02-16 RX ADMIN — OXYCODONE HYDROCHLORIDE 10 MILLIGRAM(S): 5 TABLET ORAL at 21:00

## 2018-02-16 RX ADMIN — MORPHINE SULFATE 4 MILLIGRAM(S): 50 CAPSULE, EXTENDED RELEASE ORAL at 23:27

## 2018-02-16 RX ADMIN — MORPHINE SULFATE 2 MILLIGRAM(S): 50 CAPSULE, EXTENDED RELEASE ORAL at 19:50

## 2018-02-16 NOTE — PROGRESS NOTE ADULT - SUBJECTIVE AND OBJECTIVE BOX
SUBJECTIVE:    Patient is a 42y old Female who presents with a chief complaint of b/l foot pain, gangrene (15 Feb 2018 14:24).    Currently admitted to medicine with the primary diagnosis of bilateral foot gangrene scheduled for bilateral proximal transmetatarsal amputation.     Today is hospital day 1d. This morning she is resting comfortably in bed and reports no new issues or overnight events. Pt denies any chest pain, SOB, nausea, vomiting, fevers, or chills.     PAST MEDICAL & SURGICAL HISTORY  Takotsubo cardiomyopathy  Small bowel obstruction  Osteoarthritis  Cardiomyopathy  Sleep apnea  HTN (hypertension)  Gangrene of lower extremity  Gangrene of finger of right hand  Gangrene of finger of left hand  H/O exploratory laparotomy  Amputation finger  History of cholecystectomy  H/O gastric bypass    SOCIAL HISTORY:  Negative for smoking/alcohol/drug use.     ALLERGIES:  No Known Allergies    MEDICATIONS:  STANDING MEDICATIONS  cyanocobalamin 100 MICROGram(s) Oral daily  enoxaparin Injectable 40 milliGRAM(s) SubCutaneous every 24 hours  gabapentin 300 milliGRAM(s) Oral daily    PRN MEDICATIONS    VITALS:   T(F): 97.1  HR: 88  BP: 125/59  RR: 18  SpO2: 98%    LABS:                        11.0   7.18  )-----------( 312      ( 16 Feb 2018 07:18 )             35.0     02-16    138  |  108  |  7<L>  ----------------------------<  79  4.1   |  23  |  1.1    Ca    9.0      16 Feb 2018 07:18    TPro  5.8<L>  /  Alb  2.9<L>  /  TBili  0.4  /  DBili  x   /  AST  16  /  ALT  8   /  AlkPhos  41  02-16    PT/INR - ( 15 Feb 2018 10:20 )   PT: 11.80 sec;   INR: 1.09 ratio         PTT - ( 15 Feb 2018 10:20 )  PTT:27.9 sec      RADIOLOGY:    Xray Foot (02/15) - There is dorsal soft tissue density with edematous changes. No evidence of  radiopaque foreign body. No acute osseous destructive findings. Mild hallux  valgus deformity.     PHYSICAL EXAM:  GEN: No acute distress  LUNGS: Clear to auscultation bilaterally   HEART: S1/S2 present. RRR.   ABD: Soft, non-tender, non-distended. Bowel sounds present  EXT: Bilateral hand digit amputation.   NEURO: AAOX3

## 2018-02-16 NOTE — DIETITIAN INITIAL EVALUATION ADULT. - ENERGY NEEDS
1661-1827kcal (MSJ x 1.0-1.1)  protein 65-79g (1-1.2g/kg IBW)  fluid 1661-1827ml or per LIP 1655-1820kcal (MSJ x 1.0-1.1)  protein 65-79g (1-1.2g/kg IBW)  fluid 1655-1820ml or per LIP

## 2018-02-16 NOTE — PROGRESS NOTE ADULT - SUBJECTIVE AND OBJECTIVE BOX
Podiatry Pre-Op Note Addendum    Clearance from H&P "Revised Cardiac Risk Index pt. is at low risk for intermediate risk surgery."    Pregnancy Profile, Urine (02.16.18 @ 04:24)  Pregnancy Profile, Urine: Negative: There is potential for a false-negative result for very early pregnancies  or with gestational age 5-7 weeks or above. The quantitative measurement  of serum hCG provides the most sensitive and accurate assessment of  pregnancy status.    Pt to OR with Podiatry today for: Bilateral proximal transmetatarsal amputations.

## 2018-02-16 NOTE — DIETITIAN INITIAL EVALUATION ADULT. - OTHER INFO
p/w b/l foot pain, gangrene, b/l proximal transmetatarsal amputation pending p/w b/l foot pain, gangrene, b/l proximal transmetatarsal amputation pending. Gastric bypass in 10/2017.

## 2018-02-16 NOTE — PROGRESS NOTE ADULT - ASSESSMENT
Assessment:   43 YO F with PMH of HTN, PANDA, Obesity s/p gastric bypass surgery complicated by septic shock, ARDS, SBO, ATN with CVVH, stress cardiomyopathy and vasopressor induced necrosis of fingers and toes in October 2017 presents to the ER for admission for b/l foot amputation.    1.  B/L Feet gangrene/ulcers:    - Pt scheduled for bilateral proximal metatarsal amputation.    - Pain control post sx.    - Podiatry f/u.       2. HTN:  - monitor BP, c/w Toprol    3. DVT ppx:  - Sq Lovenox post surgery.

## 2018-02-16 NOTE — CHART NOTE - NSCHARTNOTEFT_GEN_A_CORE
PACU ANESTHESIA ADMISSION NOTE      Procedure:   Post op diagnosis:      ___x_  Intubated  TV:______       Rate: ______      FiO2: ______    _x___  Patent Airway    _x___  Full return of protective reflexes    _x___  Full recovery from anesthesia / back to baseline status    Vitals:  T(C): 36.2 (02-16-18 @ 11:28), Max: 37.3 (02-15-18 @ 19:36)  HR: 88 (02-16-18 @ 11:28) (88 - 107)  BP: 125/59 (02-16-18 @ 11:28) (108/64 - 125/59)  RR: 18 (02-16-18 @ 11:28) (18 - 18)  SpO2: --    Mental Status:  _x___ Awake   _____ Alert   _____ Drowsy   _____ Sedated    Nausea/Vomiting:  _x___  NO       ______Yes,   See Post - Op Orders         Pain Scale (0-10):  __0___    Treatment: _x___ None    ____ See Post - Op/PCA Orders    Post - Operative Fluids:   __x__ Oral   ____ See Post - Op Orders    Plan: Discharge:   ___Home       ___x__Floor     _____Critical Care    _____  Other:_________________    Comments:  No anesthesia issues or complications noted.  Discharge when criteria met.

## 2018-02-16 NOTE — BRIEF OPERATIVE NOTE - PROCEDURE
<<-----Click on this checkbox to enter Procedure Amputation of foot or hand  02/16/2018    Active  VERA

## 2018-02-16 NOTE — PROGRESS NOTE ADULT - SUBJECTIVE AND OBJECTIVE BOX
POST OP Check    JIMMY CR  [id number]                          10.4   10.30 )-----------( 326      ( 16 Feb 2018 19:07 )             33.9     02-16    138  |  108  |  7<L>  ----------------------------<  79  4.1   |  23  |  1.1    Ca    9.0      16 Feb 2018 07:18    TPro  5.8<L>  /  Alb  2.9<L>  /  TBili  0.4  /  DBili  x   /  AST  16  /  ALT  8   /  AlkPhos  41  02-16    Vital Signs Last 24 Hrs  T(C): 36.8 (16 Feb 2018 19:30), Max: 36.8 (16 Feb 2018 19:30)  T(F): 98.2 (16 Feb 2018 19:30), Max: 98.2 (16 Feb 2018 19:30)  HR: 102 (16 Feb 2018 19:30) (71 - 102)  BP: 138/88 (16 Feb 2018 19:30) (94/55 - 150/92)  BP(mean): --  RR: 19 (16 Feb 2018 19:30) (12 - 20)  SpO2: 99% (16 Feb 2018 18:46) (96% - 100%)    Patient seen at bedside NAD, AAOx3/resting comfortably with minimal pain. Patient accompanied by  at bedside. Patient tolerated procedure well without incident.   Patient denies nausea, vomiting, chills, fever, SOB. But patient did relates that she had some nausea in the PACU with dry heaving but has since resolved.    Procedure: Bilateral proximal TMA  Surgeon: Dr. Marie LITTLE Focused:  No strikethrough is appreciated on surgical dressings.  Dressings were dry, clean, and intact.  No neuromuscular deficits appreciated.

## 2018-02-17 RX ORDER — GABAPENTIN 400 MG/1
300 CAPSULE ORAL ONCE
Qty: 0 | Refills: 0 | Status: COMPLETED | OUTPATIENT
Start: 2018-02-17 | End: 2018-02-17

## 2018-02-17 RX ORDER — METOPROLOL TARTRATE 50 MG
50 TABLET ORAL AT BEDTIME
Qty: 0 | Refills: 0 | Status: DISCONTINUED | OUTPATIENT
Start: 2018-02-17 | End: 2018-02-21

## 2018-02-17 RX ORDER — LIDOCAINE 4 G/100G
1 CREAM TOPICAL DAILY
Qty: 0 | Refills: 0 | Status: DISCONTINUED | OUTPATIENT
Start: 2018-02-17 | End: 2018-02-21

## 2018-02-17 RX ADMIN — Medication 1 APPLICATION(S): at 09:34

## 2018-02-17 RX ADMIN — MORPHINE SULFATE 4 MILLIGRAM(S): 50 CAPSULE, EXTENDED RELEASE ORAL at 07:56

## 2018-02-17 RX ADMIN — OXYCODONE HYDROCHLORIDE 10 MILLIGRAM(S): 5 TABLET ORAL at 06:36

## 2018-02-17 RX ADMIN — MORPHINE SULFATE 4 MILLIGRAM(S): 50 CAPSULE, EXTENDED RELEASE ORAL at 22:40

## 2018-02-17 RX ADMIN — GABAPENTIN 300 MILLIGRAM(S): 400 CAPSULE ORAL at 22:43

## 2018-02-17 RX ADMIN — OXYCODONE HYDROCHLORIDE 10 MILLIGRAM(S): 5 TABLET ORAL at 02:40

## 2018-02-17 RX ADMIN — LIDOCAINE 1 APPLICATION(S): 4 CREAM TOPICAL at 10:38

## 2018-02-17 RX ADMIN — Medication 50 MILLIGRAM(S): at 23:22

## 2018-02-17 RX ADMIN — GABAPENTIN 300 MILLIGRAM(S): 400 CAPSULE ORAL at 12:22

## 2018-02-17 RX ADMIN — MORPHINE SULFATE 4 MILLIGRAM(S): 50 CAPSULE, EXTENDED RELEASE ORAL at 08:45

## 2018-02-17 RX ADMIN — MORPHINE SULFATE 4 MILLIGRAM(S): 50 CAPSULE, EXTENDED RELEASE ORAL at 23:00

## 2018-02-17 RX ADMIN — MORPHINE SULFATE 4 MILLIGRAM(S): 50 CAPSULE, EXTENDED RELEASE ORAL at 06:36

## 2018-02-17 RX ADMIN — MORPHINE SULFATE 2 MILLIGRAM(S): 50 CAPSULE, EXTENDED RELEASE ORAL at 06:36

## 2018-02-17 RX ADMIN — MORPHINE SULFATE 4 MILLIGRAM(S): 50 CAPSULE, EXTENDED RELEASE ORAL at 04:28

## 2018-02-17 RX ADMIN — MORPHINE SULFATE 4 MILLIGRAM(S): 50 CAPSULE, EXTENDED RELEASE ORAL at 06:35

## 2018-02-17 NOTE — PROGRESS NOTE ADULT - ASSESSMENT
Assessment:   41 YO F with PMH of HTN, PANDA, Obesity s/p gastric bypass surgery complicated by septic shock, ARDS, SBO, ATN with CVVH, stress cardiomyopathy and vasopressor induced necrosis of fingers and toes in October 2017 presents to the ER for admission for b/l foot amputation.    1.  B/L Feet gangrene/ulcers:    - s/p bilateral proximal metatarsal amputation. POD#1.    - c/w current pain management regiment.    - Podiatry f/u.       2. HTN:  - monitor BP, c/w Toprol    3. DVT ppx:  - Sq Lovenox    Full Code  Dispo - home

## 2018-02-17 NOTE — PROGRESS NOTE ADULT - SUBJECTIVE AND OBJECTIVE BOX
SUBJECTIVE:    Patient is a 42y old Female who presents with a chief complaint of b/l foot pain, gangrene (15 Feb 2018 14:24)    Currently admitted to medicine with the primary diagnosis of Gangrene of foot s/p bilateral proxima transmetatarsal amputation, POD#1.      Today is hospital day 2d. This morning she is resting comfortably in bed and reports no new issues or overnight events.     PAST MEDICAL & SURGICAL HISTORY  Takotsubo cardiomyopathy  Small bowel obstruction  Osteoarthritis  Cardiomyopathy  Sleep apnea  HTN (hypertension)  Gangrene of lower extremity  Gangrene of finger of right hand  Gangrene of finger of left hand  H/O exploratory laparotomy  Amputation finger  History of cholecystectomy  H/O gastric bypass    SOCIAL HISTORY:  Negative for smoking/alcohol/drug use.     ALLERGIES:  No Known Allergies    MEDICATIONS:  STANDING MEDICATIONS  cyanocobalamin 100 MICROGram(s) Oral daily  Dakins Solution - 1/4 Strength 1 Application(s) Topical three times a day  enoxaparin Injectable 40 milliGRAM(s) SubCutaneous every 24 hours  gabapentin 300 milliGRAM(s) Oral daily  lidocaine 2% Gel 1 Application(s) Topical daily  ondansetron Injectable 4 milliGRAM(s) IV Push once    PRN MEDICATIONS  acetaminophen   Tablet. 650 milliGRAM(s) Oral every 6 hours PRN  morphine  - Injectable 4 milliGRAM(s) IV Push every 4 hours PRN  oxyCODONE    IR 10 milliGRAM(s) Oral every 4 hours PRN    VITALS:   T(F): 96.8  HR: 92  BP: 111/70  RR: 18  SpO2: 99%    LABS:                        10.4   10.30 )-----------( 326      ( 16 Feb 2018 19:07 )             33.9     02-16    138  |  108  |  7<L>  ----------------------------<  79  4.1   |  23  |  1.1    Ca    9.0      16 Feb 2018 07:18    TPro  5.8<L>  /  Alb  2.9<L>  /  TBili  0.4  /  DBili  x   /  AST  16  /  ALT  8   /  AlkPhos  41  02-16        RADIOLOGY:    Xray Foot (02/15) - There is dorsal soft tissue density with edematous changes. No evidence of  radiopaque foreign body. No acute osseous destructive findings. Mild hallux  valgus deformity.     PHYSICAL EXAM:  GEN: No acute distress  LUNGS: Clear to auscultation bilaterally   HEART: S1/S2 present. RRR.   ABD: Soft, non-tender, non-distended. Bowel sounds present  EXT: Bilateral proximal metatarsal amputation. Dressing is clean, dry, and intact.   NEURO: AAOX3

## 2018-02-17 NOTE — PROGRESS NOTE ADULT - ASSESSMENT
43yo F with Past Medical History of HTN, PANDA, Obesity status post gastric bypass surgery complicated by septic shock/ATN with CVVH/ARDS, SBO, and stress cardiomyopathy and vasopressor induced necrosis of fingers and toes in October 2017 presents to [facility] for bilateral foot amputation.    1) Status post septic shock with necrosis of the fingers/toes: status post bilateral TMA.  Temperatures normalized following surgery.  Her case was discussed with Podiatry-the pt may need an additional debridement prior to discharge.  Continue Oxycodone for pain, and Morphine IV for breakthrough pain  2) HTN: continue Toprol XL  3) Obstructive Sleep Apnea: BIPAP at night  4) GI/DVT prophylaxis.

## 2018-02-17 NOTE — PROGRESS NOTE ADULT - SUBJECTIVE AND OBJECTIVE BOX
JIMMY CR [id number]    Hospitalist Note  41yo F with Past Medical History of HTN, PANDA, Obesity status post gastric bypass surgery complicated by septic shock/ATN with CVVH/ARDS, SBO, and stress cardiomyopathy and vasopressor induced necrosis of fingers and toes in October 2017 presents to [facility] for bilateral foot amputation.      Overnight events/Updates: no new complaints    Vital Signs Last 24 Hrs  T(C): 36.1 (17 Feb 2018 07:52), Max: 36.8 (16 Feb 2018 19:30)  T(F): 96.9 (17 Feb 2018 07:52), Max: 98.2 (16 Feb 2018 19:30)  HR: 95 (17 Feb 2018 07:52) (71 - 109)  BP: 114/64 (17 Feb 2018 07:52) (94/55 - 150/92)  BP(mean): --  RR: 18 (17 Feb 2018 07:52) (12 - 20)  SpO2: 99% (16 Feb 2018 18:46) (96% - 100%)    Physical Examination:  General: AAO x 3  HEENT: PERRLA, EOMI  CV= S1 & S2 appreciated  Lungs=CTA BL  Abdominal Examination= + BS  Extremity Examination= No fingers.  Dressings to the BL LE    ROS: No chest pain, no shortness of breath.  All other systems reviewed and are within normal limits except for the complaints in the HPI.    MEDICATIONS  (STANDING):  cyanocobalamin 100 MICROGram(s) Oral daily  Dakins Solution - 1/4 Strength 1 Application(s) Topical three times a day  enoxaparin Injectable 40 milliGRAM(s) SubCutaneous every 24 hours  gabapentin 300 milliGRAM(s) Oral daily  lidocaine 2% Gel 1 Application(s) Topical daily  ondansetron Injectable 4 milliGRAM(s) IV Push once    MEDICATIONS  (PRN):  acetaminophen   Tablet. 650 milliGRAM(s) Oral every 6 hours PRN Mild Pain (1 - 3)  morphine  - Injectable 4 milliGRAM(s) IV Push every 4 hours PRN Severe Pain (7 - 10)  oxyCODONE    IR 10 milliGRAM(s) Oral every 4 hours PRN Moderate Pain (4 - 6)                            10.4   10.30 )-----------( 326      ( 16 Feb 2018 19:07 )             33.9     02-16    138  |  108  |  7<L>  ----------------------------<  79  4.1   |  23  |  1.1    Ca    9.0      16 Feb 2018 07:18    TPro  5.8<L>  /  Alb  2.9<L>  /  TBili  0.4  /  DBili  x   /  AST  16  /  ALT  8   /  AlkPhos  41  02-16      Case discussed with housesta & family  SANJAY Tyson [id number]

## 2018-02-17 NOTE — PROGRESS NOTE ADULT - SUBJECTIVE AND OBJECTIVE BOX
10.4   10.30 )-----------( 326      ( 16 Feb 2018 19:07 )             33.9   ICU Vital Signs Last 24 Hrs  T(C): 36.1 (17 Feb 2018 07:52), Max: 36.8 (16 Feb 2018 19:30)  T(F): 96.9 (17 Feb 2018 07:52), Max: 98.2 (16 Feb 2018 19:30)  HR: 95 (17 Feb 2018 07:52) (71 - 109)  BP: 114/64 (17 Feb 2018 07:52) (94/55 - 150/92)  BP(mean): --  ABP: --  ABP(mean): --  RR: 18 (17 Feb 2018 07:52) (12 - 20)  SpO2: 99% (16 Feb 2018 18:46) (96% - 100%)  patient is post op day one S/P bilateral proximal TMA, alert pain controlled with morphine.Dressings are dry and intact to both feet. surgical sites bilateral evaluated free of gangrenous tissue exposed metatarsals noted bilateral,no signs of infection noted bilateral.bilateral redressing done bedside using lidocaine gel and hydrogel with dakins solution and kerlix. patient informed of the procedure and plans for staging future debridement and plastic surgical consultation for definitive closure. plan for bilateral debridement and conturing TMA this week.

## 2018-02-18 RX ORDER — LIDOCAINE HCL 20 MG/ML
40 VIAL (ML) INJECTION ONCE
Qty: 0 | Refills: 0 | Status: COMPLETED | OUTPATIENT
Start: 2018-02-18 | End: 2018-02-18

## 2018-02-18 RX ORDER — LIDOCAINE HCL 20 MG/ML
20 VIAL (ML) INJECTION ONCE
Qty: 0 | Refills: 0 | Status: COMPLETED | OUTPATIENT
Start: 2018-02-18 | End: 2018-02-18

## 2018-02-18 RX ORDER — LIDOCAINE 4 G/100G
1 CREAM TOPICAL ONCE
Qty: 0 | Refills: 0 | Status: COMPLETED | OUTPATIENT
Start: 2018-02-18 | End: 2018-02-18

## 2018-02-18 RX ORDER — LIDOCAINE HCL 20 MG/ML
20 VIAL (ML) INJECTION DAILY
Qty: 0 | Refills: 0 | Status: DISCONTINUED | OUTPATIENT
Start: 2018-02-18 | End: 2018-02-18

## 2018-02-18 RX ORDER — PREGABALIN 225 MG/1
1000 CAPSULE ORAL ONCE
Qty: 0 | Refills: 0 | Status: COMPLETED | OUTPATIENT
Start: 2018-02-18 | End: 2018-02-18

## 2018-02-18 RX ADMIN — OXYCODONE HYDROCHLORIDE 10 MILLIGRAM(S): 5 TABLET ORAL at 11:43

## 2018-02-18 RX ADMIN — MORPHINE SULFATE 4 MILLIGRAM(S): 50 CAPSULE, EXTENDED RELEASE ORAL at 21:51

## 2018-02-18 RX ADMIN — OXYCODONE HYDROCHLORIDE 10 MILLIGRAM(S): 5 TABLET ORAL at 05:43

## 2018-02-18 RX ADMIN — OXYCODONE HYDROCHLORIDE 10 MILLIGRAM(S): 5 TABLET ORAL at 14:41

## 2018-02-18 RX ADMIN — OXYCODONE HYDROCHLORIDE 10 MILLIGRAM(S): 5 TABLET ORAL at 06:15

## 2018-02-18 RX ADMIN — ENOXAPARIN SODIUM 40 MILLIGRAM(S): 100 INJECTION SUBCUTANEOUS at 13:26

## 2018-02-18 RX ADMIN — Medication 20 MILLILITER(S): at 08:28

## 2018-02-18 RX ADMIN — MORPHINE SULFATE 4 MILLIGRAM(S): 50 CAPSULE, EXTENDED RELEASE ORAL at 10:25

## 2018-02-18 RX ADMIN — MORPHINE SULFATE 4 MILLIGRAM(S): 50 CAPSULE, EXTENDED RELEASE ORAL at 08:27

## 2018-02-18 RX ADMIN — Medication 1 APPLICATION(S): at 13:23

## 2018-02-18 RX ADMIN — Medication 40 MILLILITER(S): at 13:22

## 2018-02-18 RX ADMIN — MORPHINE SULFATE 4 MILLIGRAM(S): 50 CAPSULE, EXTENDED RELEASE ORAL at 22:04

## 2018-02-18 RX ADMIN — LIDOCAINE 1 APPLICATION(S): 4 CREAM TOPICAL at 13:21

## 2018-02-18 RX ADMIN — LIDOCAINE 1 APPLICATION(S): 4 CREAM TOPICAL at 11:38

## 2018-02-18 RX ADMIN — Medication 50 MILLIGRAM(S): at 21:53

## 2018-02-18 NOTE — PROGRESS NOTE ADULT - SUBJECTIVE AND OBJECTIVE BOX
JIMMY CR [id number]    Hospitalist Note  41yo F with Past Medical History of HTN, PANDA, Obesity status post gastric bypass surgery complicated by septic shock/ATN with CVVH/ARDS, SBO, and stress cardiomyopathy and vasopressor induced necrosis of fingers and toes in October 2017 presents to [facility] for bilateral foot amputation.    Overnight events/Updates: stats post bilateral foot amputations.  The pt complains of significant discomfort following dressing changes this morning.  Podiatry has tentatively planned wound flap with vascular surgery for later this week.    Vital Signs Last 24 Hrs  T(C): 36.6 (18 Feb 2018 07:39), Max: 37.2 (17 Feb 2018 16:00)  T(F): 97.9 (18 Feb 2018 07:39), Max: 99 (17 Feb 2018 16:00)  HR: 97 (18 Feb 2018 07:39) (92 - 97)  BP: 110/63 (18 Feb 2018 07:39) (110/63 - 112/64)  BP(mean): --  RR: 18 (18 Feb 2018 07:39) (18 - 18)  SpO2: --    Physical Examination:  General: AAO x 3  HEENT: PERRLA, EOMI  CV= S1 & S2 appreciated  Lungs=CTA BL  Abdominal Examination= + BS, Soft NT/ND  Extremity Examination= dressing to the bilateral feet  amputation of digits to the BL UE    ROS: persistent pain.  No CP or SOB  All other systems reviewed and are within normal limits except for the complaints in the HPI.    MEDICATIONS  (STANDING):  cyanocobalamin 100 MICROGram(s) Oral daily  Dakins Solution - 1/4 Strength 1 Application(s) Topical three times a day  enoxaparin Injectable 40 milliGRAM(s) SubCutaneous every 24 hours  gabapentin 300 milliGRAM(s) Oral daily  lidocaine 2% Gel 1 Application(s) Topical daily  metoprolol succinate ER 50 milliGRAM(s) Oral at bedtime  ondansetron Injectable 4 milliGRAM(s) IV Push once    MEDICATIONS  (PRN):  acetaminophen   Tablet. 650 milliGRAM(s) Oral every 6 hours PRN Mild Pain (1 - 3)  morphine  - Injectable 4 milliGRAM(s) IV Push every 4 hours PRN Severe Pain (7 - 10)  oxyCODONE    IR 10 milliGRAM(s) Oral every 4 hours PRN Moderate Pain (4 - 6)                            10.4   10.30 )-----------( 326      ( 16 Feb 2018 19:07 )             33.9             Case discussed with housestaff & family  SANJAY Tyson [id number]

## 2018-02-18 NOTE — PROGRESS NOTE ADULT - SUBJECTIVE AND OBJECTIVE BOX
10.4   10.30 )-----------( 326      ( 16 Feb 2018 19:07 )             33.9   Vital Signs Last 24 Hrs  T(C): 36.6 (18 Feb 2018 07:39), Max: 37.2 (17 Feb 2018 16:00)  T(F): 97.9 (18 Feb 2018 07:39), Max: 99 (17 Feb 2018 16:00)  HR: 97 (18 Feb 2018 07:39) (92 - 97)  BP: 110/63 (18 Feb 2018 07:39) (110/63 - 112/64)  BP(mean): --  RR: 18 (18 Feb 2018 07:39) (18 - 18)  SpO2: --patient is postop day two S/P proximal TMA bilateral,pain controlled with morphine and topical lidocaine gel with dressing changes currently Q24.Surgical sites remain free of gangrene exposed mets noted ,there are no signs of local infection noted bilateral.plan for serial debridement bilateral this week and vascular surgical follow up ,continue nonweight bearing status with bedside commode.Patient may transfer to [facility] with assisstance.

## 2018-02-18 NOTE — PROGRESS NOTE ADULT - ASSESSMENT
43yo F with Past Medical History of HTN, PANDA, Obesity status post gastric bypass surgery complicated by septic shock/ATN with CVVH/ARDS, SBO, and stress cardiomyopathy and vasopressor induced necrosis of fingers and toes in October 2017 presents to [facility] for bilateral foot amputation.    1) Status post septic shock with necrosis of the fingers/toes: status post bilateral TMA.  Case discussed with podiatry.  Pt planned for serial debridement with creation of flap for later this week.  Follow-up with Vascular Sx.  Continue Oxycodone for pain, and Morphine IV for breakthrough pain   2) HTN: continue Toprol XL  3) Obstructive Sleep Apnea: BIPAP at night  4) GI/DVT prophylaxis.

## 2018-02-19 RX ORDER — LIDOCAINE HCL 20 MG/ML
40 VIAL (ML) INJECTION ONCE
Qty: 0 | Refills: 0 | Status: DISCONTINUED | OUTPATIENT
Start: 2018-02-19 | End: 2018-02-21

## 2018-02-19 RX ORDER — GABAPENTIN 400 MG/1
300 CAPSULE ORAL AT BEDTIME
Qty: 0 | Refills: 0 | Status: DISCONTINUED | OUTPATIENT
Start: 2018-02-19 | End: 2018-02-21

## 2018-02-19 RX ORDER — LIDOCAINE 4 G/100G
1 CREAM TOPICAL DAILY
Qty: 0 | Refills: 0 | Status: DISCONTINUED | OUTPATIENT
Start: 2018-02-19 | End: 2018-02-21

## 2018-02-19 RX ADMIN — MORPHINE SULFATE 4 MILLIGRAM(S): 50 CAPSULE, EXTENDED RELEASE ORAL at 06:16

## 2018-02-19 RX ADMIN — Medication 50 MILLIGRAM(S): at 22:32

## 2018-02-19 RX ADMIN — MORPHINE SULFATE 4 MILLIGRAM(S): 50 CAPSULE, EXTENDED RELEASE ORAL at 22:33

## 2018-02-19 RX ADMIN — LIDOCAINE 1 APPLICATION(S): 4 CREAM TOPICAL at 13:09

## 2018-02-19 RX ADMIN — ENOXAPARIN SODIUM 40 MILLIGRAM(S): 100 INJECTION SUBCUTANEOUS at 13:15

## 2018-02-19 RX ADMIN — MORPHINE SULFATE 4 MILLIGRAM(S): 50 CAPSULE, EXTENDED RELEASE ORAL at 22:48

## 2018-02-19 RX ADMIN — MORPHINE SULFATE 4 MILLIGRAM(S): 50 CAPSULE, EXTENDED RELEASE ORAL at 06:14

## 2018-02-19 RX ADMIN — Medication 1 APPLICATION(S): at 13:09

## 2018-02-19 RX ADMIN — LIDOCAINE 1 APPLICATION(S): 4 CREAM TOPICAL at 13:10

## 2018-02-19 RX ADMIN — GABAPENTIN 300 MILLIGRAM(S): 400 CAPSULE ORAL at 22:33

## 2018-02-19 NOTE — PROGRESS NOTE ADULT - SUBJECTIVE AND OBJECTIVE BOX
JIMMY CR [id number]    Hospitalist Note  43yo F with Past Medical History of HTN, PANDA, Obesity status post gastric bypass surgery complicated by septic shock/ATN with CVVH/ARDS, SBO, and stress cardiomyopathy and vasopressor induced necrosis of fingers and toes in October 2017 presents to [facility] for bilateral foot amputation.    Overnight events/Updates: stats post bilateral foot amputations.  Her pain control has improved since surgery.  The pt is scheduled to undergo repeat debridement, tentatively scheduled for 2/21/18.    Vital Signs Last 24 Hrs  T(C): 36.4 (19 Feb 2018 07:53), Max: 37.6 (18 Feb 2018 16:00)  T(F): 97.5 (19 Feb 2018 07:53), Max: 99.6 (18 Feb 2018 16:00)  HR: 88 (19 Feb 2018 07:53) (88 - 99)  BP: 125/77 (19 Feb 2018 07:53) (114/72 - 125/77)  BP(mean): --  RR: 18 (19 Feb 2018 07:53) (18 - 18)  SpO2: --    Physical Examination:  General: AAO x 3  HEENT: PERRLA, EOMI  CV= S1 & S2 appreciated  Lungs=CTA BL  Abdominal Examination= + BS, Soft NT/ND  Extremity Examination= dressing to the bilateral feet  amputation of digits to the BL UE    ROS: improved pain control.  No CP or SOB  All other systems reviewed and are within normal limits except for the complaints in the HPI.    MEDICATIONS  (STANDING):  Dakins Solution - 1/4 Strength 1 Application(s) Topical three times a day  enoxaparin Injectable 40 milliGRAM(s) SubCutaneous every 24 hours  gabapentin 300 milliGRAM(s) Oral daily  lidocaine 1% Injectable 40 milliLiter(s) Local Injection once  lidocaine 2% Gel 1 Application(s) Topical daily  lidocaine 2% Gel 1 Application(s) Topical daily  metoprolol succinate ER 50 milliGRAM(s) Oral at bedtime  ondansetron Injectable 4 milliGRAM(s) IV Push once    MEDICATIONS  (PRN):  acetaminophen   Tablet. 650 milliGRAM(s) Oral every 6 hours PRN Mild Pain (1 - 3)  morphine  - Injectable 4 milliGRAM(s) IV Push every 4 hours PRN Severe Pain (7 - 10)  oxyCODONE    IR 10 milliGRAM(s) Oral every 4 hours PRN Moderate Pain (4 - 6)                Case discussed with housestakennedy & family  SANJAY Tyson [id number]

## 2018-02-19 NOTE — PROGRESS NOTE ADULT - ASSESSMENT
Assessment:   43 YO F with PMH of HTN, PANDA, Obesity s/p gastric bypass surgery complicated by septic shock, ARDS, SBO, ATN with CVVH, stress cardiomyopathy and vasopressor induced necrosis of fingers and toes in October 2017 presented to the ER for admission for b/l foot amputation.    1.  B/L Feet gangrene/ulcers:    - s/p bilateral proximal metatarsal amputation. POD#3.    - c/w current pain management regimen   - Podiatry f/u, recs reviewed for possible intervention 2/21      2. HTN:  - monitor BP, c/w Toprol    3. DVT ppx:  - Sq Lovenox    Full Code  Dispo - home

## 2018-02-19 NOTE — PROGRESS NOTE ADULT - ASSESSMENT
43yo F with Past Medical History of HTN, PANDA, Obesity status post gastric bypass surgery complicated by septic shock/ATN with CVVH/ARDS, SBO, and stress cardiomyopathy and vasopressor induced necrosis of fingers and toes in October 2017 presents to [facility] for bilateral foot amputation.    1) Status post septic shock with necrosis of the fingers/toes: status post bilateral TMA.  Case discussed with podiatry.  Repeat debridement scheduled for 2/21/18.  Continue Oxycodone for pain, and Morphine IV with dressing changes.  2) HTN: continue Toprol XL  3) Obstructive Sleep Apnea: BIPAP at night  4) GI/DVT prophylaxis.

## 2018-02-19 NOTE — PROGRESS NOTE ADULT - SUBJECTIVE AND OBJECTIVE BOX
Vital Signs Last 24 Hrs  T(C): 37 (19 Feb 2018 00:00), Max: 37.6 (18 Feb 2018 16:00)  T(F): 98.6 (19 Feb 2018 00:00), Max: 99.6 (18 Feb 2018 16:00)  HR: 92 (19 Feb 2018 00:00) (92 - 99)  BP: 114/72 (19 Feb 2018 00:00) (110/63 - 116/69)  BP(mean): --  RR: 18 (19 Feb 2018 00:00) (18 - 18)  SpO2: --postop evaluation S/P bilateral TMA. Patient tolerating dressing changes better today,surgical sites bilateral show granulating tissue exposed metatarsals . plan for excisional debridement and remodeling of amp sites bilateral this week.

## 2018-02-19 NOTE — PROGRESS NOTE ADULT - SUBJECTIVE AND OBJECTIVE BOX
SUBJECTIVE:    Patient is a 42y old  Female who presents with a chief complaint of b/l foot pain, gangrene (15 Feb 2018 14:24)    Currently admitted to medicine with the primary diagnosis of Gangrene of foot     Today is hospital day 4. This morning she is resting comfortably in bed and reports no new issues or overnight events. She is s/p b/l foot amputation with podiatry, and is planned for possible further surgical intervention on 2/21.    PAST MEDICAL & SURGICAL HISTORY  PAST MEDICAL & SURGICAL HISTORY:  Takotsubo cardiomyopathy  Small bowel obstruction  Osteoarthritis  Cardiomyopathy  Sleep apnea  HTN (hypertension)  Gangrene of lower extremity  Gangrene of finger of right hand  Gangrene of finger of left hand  H/O exploratory laparotomy  Amputation finger  History of cholecystectomy  H/O gastric bypass    SOCIAL HISTORY:    ALLERGIES:  No Known Allergies    MEDICATIONS:  STANDING MEDICATIONS  Dakins Solution - 1/4 Strength 1 Application(s) Topical three times a day  enoxaparin Injectable 40 milliGRAM(s) SubCutaneous every 24 hours  gabapentin 300 milliGRAM(s) Oral daily  lidocaine 1% Injectable 40 milliLiter(s) Local Injection once  lidocaine 2% Gel 1 Application(s) Topical daily  lidocaine 2% Gel 1 Application(s) Topical daily  metoprolol succinate ER 50 milliGRAM(s) Oral at bedtime  ondansetron Injectable 4 milliGRAM(s) IV Push once    PRN MEDICATIONS  acetaminophen   Tablet. 650 milliGRAM(s) Oral every 6 hours PRN  morphine  - Injectable 4 milliGRAM(s) IV Push every 4 hours PRN  oxyCODONE    IR 10 milliGRAM(s) Oral every 4 hours PRN    VITALS:   T(F): 98.7  HR: 88  BP: 105/55  RR: 18  SpO2: --    LABS:        RADIOLOGY:    PHYSICAL EXAM:  GEN: No acute distress  HEENT:   LUNGS: Clear to auscultation bilaterally   HEART: S1/S2 present. RRR.   ABD: Soft, non-tender, non-distended. Bowel sounds present  EXT: dressings b/l LE intact  NEURO: awake, alert, appears at baseline

## 2018-02-20 LAB
ANION GAP SERPL CALC-SCNC: 7 MMOL/L — SIGNIFICANT CHANGE UP (ref 7–14)
APTT BLD: 26.3 SEC — LOW (ref 27–39.2)
BLD GP AB SCN SERPL QL: SIGNIFICANT CHANGE UP
BUN SERPL-MCNC: 6 MG/DL — LOW (ref 10–20)
CALCIUM SERPL-MCNC: 9 MG/DL — SIGNIFICANT CHANGE UP (ref 8.5–10.1)
CHLORIDE SERPL-SCNC: 103 MMOL/L — SIGNIFICANT CHANGE UP (ref 98–110)
CO2 SERPL-SCNC: 28 MMOL/L — SIGNIFICANT CHANGE UP (ref 17–32)
CREAT SERPL-MCNC: 0.9 MG/DL — SIGNIFICANT CHANGE UP (ref 0.7–1.5)
GLUCOSE SERPL-MCNC: 88 MG/DL — SIGNIFICANT CHANGE UP (ref 70–110)
HCT VFR BLD CALC: 29.5 % — LOW (ref 37–47)
HGB BLD-MCNC: 9.4 G/DL — LOW (ref 14–18)
INR BLD: 1.15 RATIO — SIGNIFICANT CHANGE UP (ref 0.65–1.3)
MCHC RBC-ENTMCNC: 25.4 PG — LOW (ref 27–31)
MCHC RBC-ENTMCNC: 31.9 G/DL — LOW (ref 32–37)
MCV RBC AUTO: 79.7 FL — LOW (ref 81–91)
NRBC # BLD: 0 /100 WBCS — SIGNIFICANT CHANGE UP (ref 0–0)
PLATELET # BLD AUTO: 278 K/UL — SIGNIFICANT CHANGE UP (ref 130–400)
POTASSIUM SERPL-MCNC: 4.1 MMOL/L — SIGNIFICANT CHANGE UP (ref 3.5–5)
POTASSIUM SERPL-SCNC: 4.1 MMOL/L — SIGNIFICANT CHANGE UP (ref 3.5–5)
PROTHROM AB SERPL-ACNC: 12.5 SEC — SIGNIFICANT CHANGE UP (ref 9.95–12.87)
RBC # BLD: 3.7 M/UL — LOW (ref 4.2–5.4)
RBC # FLD: 13.8 % — SIGNIFICANT CHANGE UP (ref 11.5–14.5)
SODIUM SERPL-SCNC: 138 MMOL/L — SIGNIFICANT CHANGE UP (ref 135–146)
TYPE + AB SCN PNL BLD: SIGNIFICANT CHANGE UP
WBC # BLD: 7.33 K/UL — SIGNIFICANT CHANGE UP (ref 4.8–10.8)
WBC # FLD AUTO: 7.33 K/UL — SIGNIFICANT CHANGE UP (ref 4.8–10.8)

## 2018-02-20 RX ORDER — POLYETHYLENE GLYCOL 3350 17 G/17G
17 POWDER, FOR SOLUTION ORAL ONCE
Qty: 0 | Refills: 0 | Status: COMPLETED | OUTPATIENT
Start: 2018-02-20 | End: 2018-02-20

## 2018-02-20 RX ADMIN — MORPHINE SULFATE 4 MILLIGRAM(S): 50 CAPSULE, EXTENDED RELEASE ORAL at 14:26

## 2018-02-20 RX ADMIN — MORPHINE SULFATE 4 MILLIGRAM(S): 50 CAPSULE, EXTENDED RELEASE ORAL at 15:43

## 2018-02-20 RX ADMIN — Medication 50 MILLIGRAM(S): at 22:17

## 2018-02-20 RX ADMIN — MORPHINE SULFATE 4 MILLIGRAM(S): 50 CAPSULE, EXTENDED RELEASE ORAL at 06:10

## 2018-02-20 RX ADMIN — MORPHINE SULFATE 4 MILLIGRAM(S): 50 CAPSULE, EXTENDED RELEASE ORAL at 07:00

## 2018-02-20 RX ADMIN — POLYETHYLENE GLYCOL 3350 17 GRAM(S): 17 POWDER, FOR SOLUTION ORAL at 17:58

## 2018-02-20 RX ADMIN — GABAPENTIN 300 MILLIGRAM(S): 400 CAPSULE ORAL at 22:17

## 2018-02-20 RX ADMIN — ENOXAPARIN SODIUM 40 MILLIGRAM(S): 100 INJECTION SUBCUTANEOUS at 14:27

## 2018-02-20 RX ADMIN — MORPHINE SULFATE 4 MILLIGRAM(S): 50 CAPSULE, EXTENDED RELEASE ORAL at 22:17

## 2018-02-20 RX ADMIN — MORPHINE SULFATE 4 MILLIGRAM(S): 50 CAPSULE, EXTENDED RELEASE ORAL at 22:45

## 2018-02-20 RX ADMIN — Medication 1 APPLICATION(S): at 07:45

## 2018-02-20 NOTE — PROGRESS NOTE ADULT - SUBJECTIVE AND OBJECTIVE BOX
JIMMY CR    Patient is a 42y old  Female who presents with a chief complaint of b/l foot pain, gangrene (15 Feb 2018 14:24)    HPI: 41 YO F with PMH of HTN, Obesity s/p gastric bypass surgery complicated by septic shock, ARDS, SBO, ATN with CVVH, stress cardiomyopathy and vasopressor induced necrosis of fingers and toes in October 2017 presents to the ER for admission for b/l foot amputation. As per the patient her b/l feet ulcers are being taken care of by Podiatry by wound care. She was told by the Podiatrist to go the ER for the amputation. Pt. already has amputations of fingers of both hands.   S/p b/l foot amputation.    INTERVAL HPI/OVERNIGHT EVENTS: no events, pt states pain is tolerable. Did not have BM since last Friday.    ROS: All ROS negative    PHYSICAL EXAM:  T(C): 36.5, Max: 36.5 (02-20-18 @ 07:22)  HR: 97 (87 - 97)  BP: 108/70 (108/70 - 124/80)  RR: 18 (16 - 18)  SpO2: --    GENERAL: NAD  NECK: Supple, No JVD  CHEST/LUNG: No rales, rhonchi, wheezing  HEART: Regular rate and rhythm; No murmurs  GI/ABDOMEN: Soft, Nontender, Nondistended; Bowel sounds present  EXTREMITIES: S/p b/l hand amputation. Dressings on foot b/l clean, no signs of infection. No calf tenderness b/l.  SKIN: No rashes or lesions  NERVOUS SYSTEM:  Alert & Oriented X3, no focal deficit     Consultant(s) Notes Reviewed by me.   LABS:                        9.4    7.33  )-----------( 278      ( 20 Feb 2018 05:48 )             29.5     02-20    138  |  103  |  6<L>  ----------------------------<  88  4.1   |  28  |  0.9    Ca    9.0      20 Feb 2018 05:48      PT/INR - ( 20 Feb 2018 05:48 )   PT: 12.50 sec;   INR: 1.15 ratio         PTT - ( 20 Feb 2018 05:48 )  PTT:26.3 sec    MEDICATIONS  (STANDING):  Dakins Solution - 1/4 Strength 1 Application(s) Topical three times a day  enoxaparin Injectable 40 milliGRAM(s) SubCutaneous every 24 hours  gabapentin 300 milliGRAM(s) Oral at bedtime  lidocaine 1% Injectable 40 milliLiter(s) Local Injection once  lidocaine 2% Gel 1 Application(s) Topical daily  lidocaine 2% Gel 1 Application(s) Topical daily  metoprolol succinate ER 50 milliGRAM(s) Oral at bedtime  ondansetron Injectable 4 milliGRAM(s) IV Push once    MEDICATIONS  (PRN):  acetaminophen   Tablet. 650 milliGRAM(s) Oral every 6 hours PRN Mild Pain (1 - 3)  morphine  - Injectable 4 milliGRAM(s) IV Push every 4 hours PRN Severe Pain (7 - 10)  oxyCODONE    IR 10 milliGRAM(s) Oral every 4 hours PRN Moderate Pain (4 - 6)

## 2018-02-20 NOTE — PROGRESS NOTE ADULT - ASSESSMENT
Assessment:   43 YO F with PMH of HTN, PANDA, Obesity s/p gastric bypass surgery complicated by septic shock, ARDS, SBO, ATN with CVVH, stress cardiomyopathy and vasopressor induced necrosis of fingers and toes in October 2017 presented to the ER for admission for b/l foot amputation.    1.  B/L Feet gangrene/ulcers:    - s/p bilateral proximal metatarsal amputation. POD#4.    - c/w current pain management regimen   - Podiatry f/u, recs reviewed for possible intervention 2/21      2. HTN:  - monitor BP, c/w Toprol    3. DVT ppx:  - Sq Lovenox    Full Code  Dispo - home

## 2018-02-20 NOTE — PRE-ANESTHESIA EVALUATION ADULT - NSPROPOSEDPROCEDFT_GEN_ALL_CORE
Bilateral Proximal Transmetatarsal Amputation
amputation
excisional debridement of soft tissue and bone using versajet, bilateral feet
Bilateral Transmetatarsal Amputations

## 2018-02-20 NOTE — PROGRESS NOTE ADULT - SUBJECTIVE AND OBJECTIVE BOX
SUBJECTIVE:    Patient is a 42y old  Female who presents with a chief complaint of b/l foot pain, gangrene (15 Feb 2018 14:24)    Currently admitted to medicine with the primary diagnosis of Gangrene of foot     Today is hospital day 4. This morning she is resting comfortably in bed and reports no new issues or overnight events. She is s/p b/l foot amputation with podiatry, and is planned for possible further surgical intervention on 2/21 for debridement.    PAST MEDICAL & SURGICAL HISTORY  PAST MEDICAL & SURGICAL HISTORY:  Takotsubo cardiomyopathy  Small bowel obstruction  Osteoarthritis  Cardiomyopathy  Sleep apnea  HTN (hypertension)  Gangrene of lower extremity  Gangrene of finger of right hand  Gangrene of finger of left hand  H/O exploratory laparotomy  Amputation finger  History of cholecystectomy  H/O gastric bypass    SOCIAL HISTORY:    ALLERGIES:  No Known Allergies    MEDICATIONS:  STANDING MEDICATIONS  Dakins Solution - 1/4 Strength 1 Application(s) Topical three times a day  enoxaparin Injectable 40 milliGRAM(s) SubCutaneous every 24 hours  gabapentin 300 milliGRAM(s) Oral daily  lidocaine 1% Injectable 40 milliLiter(s) Local Injection once  lidocaine 2% Gel 1 Application(s) Topical daily  lidocaine 2% Gel 1 Application(s) Topical daily  metoprolol succinate ER 50 milliGRAM(s) Oral at bedtime  ondansetron Injectable 4 milliGRAM(s) IV Push once    PRN MEDICATIONS  acetaminophen   Tablet. 650 milliGRAM(s) Oral every 6 hours PRN  morphine  - Injectable 4 milliGRAM(s) IV Push every 4 hours PRN  oxyCODONE    IR 10 milliGRAM(s) Oral every 4 hours PRN    VITALS:   Vital Signs Last 24 Hrs  T(C): 37.1 (20 Feb 2018 17:45), Max: 37.1 (20 Feb 2018 16:00)  T(F): 98.8 (20 Feb 2018 16:00), Max: 98.8 (20 Feb 2018 16:00)  HR: 97 (20 Feb 2018 17:45) (87 - 97)  BP: 115/86 (20 Feb 2018 17:45) (108/70 - 124/80)  BP(mean): --  RR: 18 (20 Feb 2018 17:45) (16 - 18)  SpO2: --    LABS:  02-20    138  |  103  |  6<L>  ----------------------------<  88  4.1   |  28  |  0.9    Ca    9.0      20 Feb 2018 05:48       9.4    7.33  )-----------( 278      ( 20 Feb 2018 05:48 )             29.5       RADIOLOGY:                         PHYSICAL EXAM:  GEN: No acute distress  HEENT: NC/AT  LUNGS: Clear to auscultation bilaterally   HEART: S1/S2 present. RRR.   ABD: Soft, non-tender, non-distended. Bowel sounds present, obese abdomen  EXT: dressings b/l LE intact  NEURO: awake, alert, appears at baseline

## 2018-02-20 NOTE — CHART NOTE - NSCHARTNOTEFT_GEN_A_CORE
Registered Dietitian Follow-Up     Patient Profile Reviewed                           Yes [x]   No []     Nutrition History Previously Obtained        Yes [x]  No []       Pertinent Subjective Information: Pt. reports tolerating regular diet well. S/p bariatric surgery- gastric bypass (10/2017) Pt. reports eating small meals but very frequently. She's only able to finish ~25% of trays at once, but saves food for later and snacks on it throughout the day. Pt. reports not being able to drink any protein supplements (d/t GI discomfort they cause) but makes sure to eat enough protein.       Pertinent Medical Interventions: s/p bilateral proximal metatarsal amputation, podiatry following- debridement pending (scheduled for 2/21)     Diet order: Regular. Low sodium.     Anthropometrics:  - Ht.  - Wt. 98.1 kg bed scale weight not tared taken by RD on 2/20.  - %wt change  - BMI  - IBW     Pertinent Lab Data: (2/20) RBC 3.7, Hg 9.4, Hct 29.5, BUN 6     Pertinent Meds: Zofran, Metoprolol     Physical Findings:  - Appearance: alert & oriented   - GI function: denies symptoms  - Tubes:  - Oral/Mouth cavity: denies symptoms  - Skin: surgical incision (guerda 19)     Nutrition Requirements  Weight Used:     Estimated Energy Needs    Continue [x]  Adjust [] 1655-1820kcal (MSJ x 1.0-1.1)  Adjusted Energy Recommendations:   kcal/day        Estimated Protein Needs    Continue [x]  Adjust [] protein 65-79g (1-1.2g/kg IBW)  Adjusted Protein Recommendations:   gm/day        Estimated Fluid Needs        Continue [x]  Adjust [] 1655-1820ml or per LIP  Adjusted Fluid Recommendations:   mL/day     Nutrient Intake: at baseline        [] Previous Nutrition Diagnosis: Inadequate oral intake            [] Ongoing          [x] Resolved  pt. eating at baseline    [x] No active nutrition diagnosis identified at this time     Nutrition Diagnostic #1  Problem:  Etiology:  Statement:     Nutrition Diagnostic #2  Problem:  Etiology:  Statement:     Nutrition Intervention      Goal/Expected Outcome:     Indicator/Monitoring:

## 2018-02-20 NOTE — PROGRESS NOTE ADULT - ASSESSMENT
A/p:  1. B/L foot gangrene after vasopressors.  - s/p TMA  - will follow up with podiatry for further plan for debridement and closure, tentatively tomorrow  - not on ABx  - dressing change by podiatry  - pain controlled with Morphine and Lidocaine    2. Acute blood loss anemia post op.  - Continue to trend HB  - Iron supplement  - will send anemia w/u    3. HTN  - cont home meds    4. PANDA - cont CPAP    5. constipation  - will give miralax  - bedside commode    Prophylactic measures  DVT ppx with Lovenox 40 mg QD   GI ppx not indicated  Heart healthy diet  Full code    Dispo - pending clinical course

## 2018-02-20 NOTE — PROGRESS NOTE ADULT - SUBJECTIVE AND OBJECTIVE BOX
patient evaluated bedside with resident for bilateral dressing change.patient tolerated dressing changes with pain meds.surgical sites show fibrotic tissue and exposed bone requiring remodeling. dressing changed bilateral,consent for procedure read and signed all questions answered. plan for OR debridement bilateral tomorrow 2/21/18.                      9.4    7.33  )-----------( 278      ( 20 Feb 2018 05:48 )             29.5   Vital Signs Last 24 Hrs  T(C): 37.1 (20 Feb 2018 17:45), Max: 37.1 (20 Feb 2018 16:00)  T(F): 98.8 (20 Feb 2018 16:00), Max: 98.8 (20 Feb 2018 16:00)  HR: 97 (20 Feb 2018 17:45) (87 - 97)  BP: 115/86 (20 Feb 2018 17:45) (108/70 - 124/80)  BP(mean): --  RR: 18 (20 Feb 2018 17:45) (16 - 18)  SpO2: --

## 2018-02-21 ENCOUNTER — RESULT REVIEW (OUTPATIENT)
Age: 43
End: 2018-02-21

## 2018-02-21 LAB
ANION GAP SERPL CALC-SCNC: 6 MMOL/L — LOW (ref 7–14)
BUN SERPL-MCNC: 9 MG/DL — LOW (ref 10–20)
CALCIUM SERPL-MCNC: 8.8 MG/DL — SIGNIFICANT CHANGE UP (ref 8.5–10.1)
CHLORIDE SERPL-SCNC: 102 MMOL/L — SIGNIFICANT CHANGE UP (ref 98–110)
CO2 SERPL-SCNC: 28 MMOL/L — SIGNIFICANT CHANGE UP (ref 17–32)
CREAT SERPL-MCNC: 0.9 MG/DL — SIGNIFICANT CHANGE UP (ref 0.7–1.5)
GLUCOSE SERPL-MCNC: 80 MG/DL — SIGNIFICANT CHANGE UP (ref 70–110)
HCT VFR BLD CALC: 31.3 % — LOW (ref 37–47)
HGB BLD-MCNC: 9.8 G/DL — LOW (ref 14–18)
MCHC RBC-ENTMCNC: 25.2 PG — LOW (ref 27–31)
MCHC RBC-ENTMCNC: 31.3 G/DL — LOW (ref 32–37)
MCV RBC AUTO: 80.5 FL — LOW (ref 81–91)
NRBC # BLD: 0 /100 WBCS — SIGNIFICANT CHANGE UP (ref 0–0)
PLATELET # BLD AUTO: 310 K/UL — SIGNIFICANT CHANGE UP (ref 130–400)
POTASSIUM SERPL-MCNC: 4.4 MMOL/L — SIGNIFICANT CHANGE UP (ref 3.5–5)
POTASSIUM SERPL-SCNC: 4.4 MMOL/L — SIGNIFICANT CHANGE UP (ref 3.5–5)
RBC # BLD: 3.89 M/UL — LOW (ref 4.2–5.4)
RBC # FLD: 13.8 % — SIGNIFICANT CHANGE UP (ref 11.5–14.5)
SODIUM SERPL-SCNC: 136 MMOL/L — SIGNIFICANT CHANGE UP (ref 135–146)
WBC # BLD: 7.81 K/UL — SIGNIFICANT CHANGE UP (ref 4.8–10.8)
WBC # FLD AUTO: 7.81 K/UL — SIGNIFICANT CHANGE UP (ref 4.8–10.8)

## 2018-02-21 RX ORDER — LIDOCAINE 4 G/100G
1 CREAM TOPICAL DAILY
Qty: 0 | Refills: 0 | Status: DISCONTINUED | OUTPATIENT
Start: 2018-02-21 | End: 2018-03-10

## 2018-02-21 RX ORDER — MORPHINE SULFATE 50 MG/1
4 CAPSULE, EXTENDED RELEASE ORAL EVERY 4 HOURS
Qty: 0 | Refills: 0 | Status: DISCONTINUED | OUTPATIENT
Start: 2018-02-21 | End: 2018-02-28

## 2018-02-21 RX ORDER — METOPROLOL TARTRATE 50 MG
50 TABLET ORAL AT BEDTIME
Qty: 0 | Refills: 0 | Status: DISCONTINUED | OUTPATIENT
Start: 2018-02-21 | End: 2018-03-10

## 2018-02-21 RX ORDER — ACETAMINOPHEN 500 MG
650 TABLET ORAL EVERY 6 HOURS
Qty: 0 | Refills: 0 | Status: DISCONTINUED | OUTPATIENT
Start: 2018-02-21 | End: 2018-02-23

## 2018-02-21 RX ORDER — GABAPENTIN 400 MG/1
300 CAPSULE ORAL AT BEDTIME
Qty: 0 | Refills: 0 | Status: DISCONTINUED | OUTPATIENT
Start: 2018-02-21 | End: 2018-03-10

## 2018-02-21 RX ORDER — LIDOCAINE HCL 20 MG/ML
40 VIAL (ML) INJECTION ONCE
Qty: 0 | Refills: 0 | Status: DISCONTINUED | OUTPATIENT
Start: 2018-02-21 | End: 2018-03-10

## 2018-02-21 RX ORDER — ENOXAPARIN SODIUM 100 MG/ML
40 INJECTION SUBCUTANEOUS EVERY 24 HOURS
Qty: 0 | Refills: 0 | Status: DISCONTINUED | OUTPATIENT
Start: 2018-02-21 | End: 2018-03-10

## 2018-02-21 RX ORDER — SODIUM CHLORIDE 9 MG/ML
1000 INJECTION, SOLUTION INTRAVENOUS
Qty: 0 | Refills: 0 | Status: DISCONTINUED | OUTPATIENT
Start: 2018-02-21 | End: 2018-02-22

## 2018-02-21 RX ORDER — HYDROMORPHONE HYDROCHLORIDE 2 MG/ML
0.5 INJECTION INTRAMUSCULAR; INTRAVENOUS; SUBCUTANEOUS
Qty: 0 | Refills: 0 | Status: DISCONTINUED | OUTPATIENT
Start: 2018-02-21 | End: 2018-02-22

## 2018-02-21 RX ORDER — ONDANSETRON 8 MG/1
4 TABLET, FILM COATED ORAL ONCE
Qty: 0 | Refills: 0 | Status: DISCONTINUED | OUTPATIENT
Start: 2018-02-21 | End: 2018-02-22

## 2018-02-21 RX ORDER — OXYCODONE HYDROCHLORIDE 5 MG/1
10 TABLET ORAL EVERY 4 HOURS
Qty: 0 | Refills: 0 | Status: DISCONTINUED | OUTPATIENT
Start: 2018-02-21 | End: 2018-02-23

## 2018-02-21 RX ADMIN — MORPHINE SULFATE 4 MILLIGRAM(S): 50 CAPSULE, EXTENDED RELEASE ORAL at 21:18

## 2018-02-21 RX ADMIN — GABAPENTIN 300 MILLIGRAM(S): 400 CAPSULE ORAL at 21:18

## 2018-02-21 RX ADMIN — OXYCODONE HYDROCHLORIDE 10 MILLIGRAM(S): 5 TABLET ORAL at 11:40

## 2018-02-21 RX ADMIN — MORPHINE SULFATE 4 MILLIGRAM(S): 50 CAPSULE, EXTENDED RELEASE ORAL at 22:48

## 2018-02-21 RX ADMIN — HYDROMORPHONE HYDROCHLORIDE 0.5 MILLIGRAM(S): 2 INJECTION INTRAMUSCULAR; INTRAVENOUS; SUBCUTANEOUS at 19:14

## 2018-02-21 RX ADMIN — Medication 50 MILLIGRAM(S): at 21:18

## 2018-02-21 RX ADMIN — OXYCODONE HYDROCHLORIDE 10 MILLIGRAM(S): 5 TABLET ORAL at 23:07

## 2018-02-21 RX ADMIN — SODIUM CHLORIDE 75 MILLILITER(S): 9 INJECTION, SOLUTION INTRAVENOUS at 18:08

## 2018-02-21 RX ADMIN — HYDROMORPHONE HYDROCHLORIDE 0.5 MILLIGRAM(S): 2 INJECTION INTRAMUSCULAR; INTRAVENOUS; SUBCUTANEOUS at 18:37

## 2018-02-21 RX ADMIN — OXYCODONE HYDROCHLORIDE 10 MILLIGRAM(S): 5 TABLET ORAL at 12:30

## 2018-02-21 NOTE — BRIEF OPERATIVE NOTE - PROCEDURE
<<-----Click on this checkbox to enter Procedure Debridement, bone, foot  02/21/2018  revisional debridement of open TMA bilateral bone and soft tissue.  Active  Mohinder Salazar

## 2018-02-21 NOTE — PROGRESS NOTE ADULT - ASSESSMENT
A/p:  1. B/L foot gangrene after vasopressors.  - s/p TMA, pt is going for debridement today by podiatry  - will f/u with podiatry if will need plastic consult  - not on ABx  - dressing change by podiatry  - pain controlled with Morphine and Lidocaine    2. Acute blood loss anemia post op.  - Continue to trend HB, for now stable  - Iron supplement    3. HTN  - cont home meds    4. PANDA - cont CPAP    5. constipation  - continue miralax  - bedside commode    Prophylactic measures  DVT ppx with Lovenox 40 mg QD   GI ppx not indicated  Heart healthy diet  Full code    Dispo - pending clinical course

## 2018-02-21 NOTE — BRIEF OPERATIVE NOTE - OPERATION/FINDINGS
nonviable necrotic tissue and bone
consistent with exposed bone requiring rmodeling of metatarsal parabola bilateral,soft tissue debridement using versajet and application of NPWT vac intraoperatively bilateral.
consistent with gangrene demarcated to level of tarsometatarsal area bilateral, extending into heel right foot.

## 2018-02-21 NOTE — BRIEF OPERATIVE NOTE - PROCEDURE
<<-----Click on this checkbox to enter Procedure Debridement, bone, foot  02/21/2018  revisional debridement of open TMA bilateral bone and soft tissue.  Active  JSOTTILE

## 2018-02-21 NOTE — PROGRESS NOTE ADULT - SUBJECTIVE AND OBJECTIVE BOX
JIMMY CR    Patient is a 42y old  Female who presents with a chief complaint of b/l foot pain, gangrene (15 Feb 2018 14:24)    HPI:  HPI: 43 YO F with PMH of HTN, Obesity s/p gastric bypass surgery complicated by septic shock, ARDS, SBO, ATN with CVVH, stress cardiomyopathy and vasopressor induced necrosis of fingers and toes in October 2017 presents to the ER for admission for b/l foot amputation. As per the patient her b/l feet ulcers are being taken care of by Podiatry by wound care. She was told by the Podiatrist to go the ER for the amputation. Pt already has amputations of fingers of both hands (prior admissions).   S/p b/l foot TMA on this admission.      INTERVAL HPI/OVERNIGHT EVENTS: no events, pain is controlled on current regimen. Had small BM yesterday. No new complaints    ROS: All ROS negative except pain at postop site    PHYSICAL EXAM:  T(C): 36.4, Max: 37.1 (02-20-18 @ 16:00)  HR: 88 (88 - 990)  BP: 119/79 (115/86 - 119/79)  RR: 17 (17 - 18)  SpO2: 95% (95% - 95%)    GENERAL: NAD  NECK: Supple, No JVD  CHEST/LUNG: No rales, rhonchi, wheezing  HEART: Regular rate and rhythm; No murmurs  GI/ABDOMEN: Soft, Nontender, Nondistended; Bowel sounds present  EXTREMITIES: S/p b/l hand amputation. Dressings on foot b/l clean, no signs of infection. No calf tenderness b/l.  SKIN: No rashes or lesions  NERVOUS SYSTEM:  Alert & Oriented X3, no focal deficit     LABS:                        9.8    7.81  )-----------( 310      ( 21 Feb 2018 05:13 )             31.3     02-21    136  |  102  |  9<L>  ----------------------------<  80  4.4   |  28  |  0.9    Ca    8.8      21 Feb 2018 05:13    MEDICATIONS  (STANDING):  Dakins Solution - 1/4 Strength 1 Application(s) Topical three times a day  enoxaparin Injectable 40 milliGRAM(s) SubCutaneous every 24 hours  gabapentin 300 milliGRAM(s) Oral at bedtime  lidocaine 1% Injectable 40 milliLiter(s) Local Injection once  lidocaine 2% Gel 1 Application(s) Topical daily  lidocaine 2% Gel 1 Application(s) Topical daily  metoprolol succinate ER 50 milliGRAM(s) Oral at bedtime    MEDICATIONS  (PRN):  acetaminophen   Tablet. 650 milliGRAM(s) Oral every 6 hours PRN Mild Pain (1 - 3)  morphine  - Injectable 4 milliGRAM(s) IV Push every 4 hours PRN Severe Pain (7 - 10)  oxyCODONE    IR 10 milliGRAM(s) Oral every 4 hours PRN Moderate Pain (4 - 6)

## 2018-02-21 NOTE — CHART NOTE - NSCHARTNOTEFT_GEN_A_CORE
PACU ANESTHESIA ADMISSION NOTE      Procedure: Debridement, bone, foot: revisional debridement of open TMA bilateral bone and soft tissue.  Amputation of foot or hand    Post op diagnosis:  Gangrene of foot      ____  Intubated  TV:______       Rate: ______      FiO2: ______    _x___  Patent Airway    _x___  Full return of protective reflexes    __  Full recovery from anesthesia / back to baseline status    Vitals:  T:97.6  HR: 102  BP: 122/78  RR: 13  SpO2:100%    Mental Status:  _x___ Awake   _____ Alert   _____ Drowsy   _____ Sedated    Nausea/Vomiting:  _x___  NO       ______Yes,   See Post - Op Orders         Pain Scale (0-10):  __0___    Treatment: _x___ None    ____ See Post - Op/PCA Orders    Post - Operative Fluids:   __x__ Oral   ____ See Post - Op Orders    Plan: Discharge:   ___Home       ___x__Floor     _____Critical Care    _____  Other:_________________    Comments: Spontaneously breathing with O2 at 3LM via Nasal Cannular  No anesthesia issues or complications noted.  Discharge to floor when criteria met.

## 2018-02-21 NOTE — PRE-ANESTHESIA EVALUATION ADULT - NSATTENDATTESTRD_GEN_ALL_CORE
The patient has been re-examined and I agree with the above assessment or I updated with my findings.

## 2018-02-21 NOTE — PROGRESS NOTE ADULT - SUBJECTIVE AND OBJECTIVE BOX
SUBJECTIVE:    Patient is a 42y old  Female who presents with a chief complaint of b/l foot pain, gangrene (15 Feb 2018 14:24)    Currently admitted to medicine with the primary diagnosis of Gangrene of foot     Today is hospital day 5. This morning she is resting comfortably in bed and reports no new issues or overnight events. She is s/p b/l foot amputation with podiatry, and is planned for debridement of b/l foot amputations today with podiatry.    PAST MEDICAL & SURGICAL HISTORY  PAST MEDICAL & SURGICAL HISTORY:  Takotsubo cardiomyopathy  Small bowel obstruction  Osteoarthritis  Cardiomyopathy  Sleep apnea  HTN (hypertension)  Gangrene of lower extremity  Gangrene of finger of right hand  Gangrene of finger of left hand  H/O exploratory laparotomy  Amputation finger  History of cholecystectomy  H/O gastric bypass    SOCIAL HISTORY:    ALLERGIES:  No Known Allergies    MEDICATIONS:  STANDING MEDICATIONS  Dakins Solution - 1/4 Strength 1 Application(s) Topical three times a day  enoxaparin Injectable 40 milliGRAM(s) SubCutaneous every 24 hours  gabapentin 300 milliGRAM(s) Oral daily  lidocaine 1% Injectable 40 milliLiter(s) Local Injection once  lidocaine 2% Gel 1 Application(s) Topical daily  lidocaine 2% Gel 1 Application(s) Topical daily  metoprolol succinate ER 50 milliGRAM(s) Oral at bedtime  ondansetron Injectable 4 milliGRAM(s) IV Push once    PRN MEDICATIONS  acetaminophen   Tablet. 650 milliGRAM(s) Oral every 6 hours PRN  morphine  - Injectable 4 milliGRAM(s) IV Push every 4 hours PRN  oxyCODONE    IR 10 milliGRAM(s) Oral every 4 hours PRN    VITALS:   Vital Signs Last 24 Hrs  T(C): 36.4 (21 Feb 2018 10:29), Max: 37.1 (20 Feb 2018 17:45)  T(F): 97.5 (21 Feb 2018 10:29), Max: 97.5 (21 Feb 2018 08:33)  HR: 88 (21 Feb 2018 10:29) (88 - 990)  BP: 119/79 (21 Feb 2018 10:29) (115/86 - 119/79)  BP(mean): --  RR: 17 (21 Feb 2018 10:29) (17 - 18)  SpO2: 95% (21 Feb 2018 10:29) (95% - 95%)    LABS:  02-21    136  |  102  |  9<L>  ----------------------------<  80  4.4   |  28  |  0.9    Ca    8.8      21 Feb 2018 05:13                          9.8    7.81  )-----------( 310      ( 21 Feb 2018 05:13 )             31.3         RADIOLOGY:                         PHYSICAL EXAM:  GEN: No acute distress  HEENT: NC/AT  LUNGS: Clear to auscultation bilaterally   HEART: S1/S2 present. RRR.   ABD: Soft, non-tender, non-distended. Bowel sounds present, obese abdomen  EXT: dressings b/l LE intact  NEURO: awake, alert, appears at baseline

## 2018-02-21 NOTE — PROGRESS NOTE ADULT - SUBJECTIVE AND OBJECTIVE BOX
Patient is a 42y old  Female who presents with a chief complaint of b/l foot pain, gangrene (15 Feb 2018 14:24)      INTERVAL HPI/OVERNIGHT EVENTS:   Pt is scheduled for Excisional debridement of soft tissue and bone using Versajet bilateral feet with Dr. Salazar at 4:00pm on 2/21/18. Patient is aware of procedure and is NPO since midnight.    MEDICATIONS  (STANDING):  Dakins Solution - 1/4 Strength 1 Application(s) Topical three times a day  enoxaparin Injectable 40 milliGRAM(s) SubCutaneous every 24 hours  gabapentin 300 milliGRAM(s) Oral at bedtime  lidocaine 1% Injectable 40 milliLiter(s) Local Injection once  lidocaine 2% Gel 1 Application(s) Topical daily  lidocaine 2% Gel 1 Application(s) Topical daily  metoprolol succinate ER 50 milliGRAM(s) Oral at bedtime  ondansetron Injectable 4 milliGRAM(s) IV Push once    MEDICATIONS  (PRN):  acetaminophen   Tablet. 650 milliGRAM(s) Oral every 6 hours PRN Mild Pain (1 - 3)  morphine  - Injectable 4 milliGRAM(s) IV Push every 4 hours PRN Severe Pain (7 - 10)  oxyCODONE    IR 10 milliGRAM(s) Oral every 4 hours PRN Moderate Pain (4 - 6)    GANGRENE OF FOOT  ^GANGRENE OF FOOT  No h/o HF  Unknown h/o HF  H/o or current diagnosis of HF- ACEI/ARB contraindication unknown  No pertinent family history in first degree relatives  Handoff  MEWS Score  Takotsubo cardiomyopathy  Small bowel obstruction  Osteoarthritis  Cardiomyopathy  Sleep apnea  HTN (hypertension)  Gangrene of lower extremity  Gangrene of finger of right hand  Gangrene of finger of left hand  Gangrene of foot  Gangrene of foot  Gangrene of foot  Amputation of foot or hand  H/O exploratory laparotomy  Amputation finger  History of cholecystectomy  H/O gastric bypass  B/L FOOT PAIN/ DR SALAZAR REF/C ESBL-EC 2017 URINE  67    Allergies    No Known Allergies    Intolerances        Vital Signs Last 24 Hrs  T(C): 37.1 (20 Feb 2018 17:45), Max: 37.1 (20 Feb 2018 16:00)  T(F): 98.8 (20 Feb 2018 16:00), Max: 98.8 (20 Feb 2018 16:00)  HR: 97 (20 Feb 2018 17:45) (97 - 97)  BP: 115/86 (20 Feb 2018 17:45) (108/70 - 115/86)  BP(mean): --  RR: 18 (20 Feb 2018 17:45) (18 - 18)  SpO2: --    LABS:                        9.4    7.33  )-----------( 278      ( 20 Feb 2018 05:48 )             29.5     02-20    138  |  103  |  6<L>  ----------------------------<  88  4.1   |  28  |  0.9    Ca    9.0      20 Feb 2018 05:48      PT/INR - ( 20 Feb 2018 05:48 )   PT: 12.50 sec;   INR: 1.15 ratio         PTT - ( 20 Feb 2018 05:48 )  PTT:26.3 sec  CAPILLARY BLOOD GLUCOSE          Type and screen: Done  Upreg: Negative       Plan:   To OR today at 4pm with Dr. Salazar for  Excisional debridement of soft tissue and bone using Versajet bilateral feet  CXR and EKG available  NPO @ [facility]  Low Medical clearance since documented in chart.  Consent signed and in chart.  Procedure was explained to patient in detail. All alternatives, risks and complications were discussed. All questions answered.

## 2018-02-21 NOTE — PROGRESS NOTE ADULT - ASSESSMENT
Assessment:   41 YO F with PMH of HTN, PANDA, Obesity s/p gastric bypass surgery complicated by septic shock, ARDS, SBO, ATN with CVVH, stress cardiomyopathy and vasopressor induced necrosis of fingers and toes in October 2017 presented to the ER for admission for b/l foot amputation.    1.  B/L Feet gangrene/ulcers:    - s/p bilateral proximal metatarsal amputation. POD#5.    - c/w current pain management regimen   - Podiatry f/u post procedure for further intervention, if warranted      2. HTN:  - monitor BP, c/w Toprol    3. DVT ppx:  - Sq Lovenox    Full Code  Dispo - home

## 2018-02-21 NOTE — PRE-ANESTHESIA EVALUATION ADULT - NSDENTALSD_ENT_ALL_CORE
appears normal and intact

## 2018-02-21 NOTE — PRE-ANESTHESIA EVALUATION ADULT - NSANTHRISKNONERD_GEN_ALL_CORE
No risk alerts present

## 2018-02-21 NOTE — PRE-ANESTHESIA EVALUATION ADULT - NSANTHADDINFOFT_GEN_ALL_CORE
Patient don't want spinal anesthesia and don't want to be intubated. likes to have IV sedation and LA. Discussed about possibility of GA with intubation if needed. Agreed to start with Iv sedation

## 2018-02-22 LAB — SURGICAL PATHOLOGY STUDY: SIGNIFICANT CHANGE UP

## 2018-02-22 RX ORDER — HYDROMORPHONE HYDROCHLORIDE 2 MG/ML
0.5 INJECTION INTRAMUSCULAR; INTRAVENOUS; SUBCUTANEOUS ONCE
Qty: 0 | Refills: 0 | Status: DISCONTINUED | OUTPATIENT
Start: 2018-02-23 | End: 2018-02-23

## 2018-02-22 RX ORDER — SENNA PLUS 8.6 MG/1
5 TABLET ORAL THREE TIMES A DAY
Qty: 0 | Refills: 0 | Status: DISCONTINUED | OUTPATIENT
Start: 2018-02-22 | End: 2018-03-10

## 2018-02-22 RX ORDER — LIDOCAINE 4 G/100G
1 CREAM TOPICAL ONCE
Qty: 0 | Refills: 0 | Status: DISCONTINUED | OUTPATIENT
Start: 2018-02-22 | End: 2018-03-10

## 2018-02-22 RX ORDER — LIDOCAINE HCL 20 MG/ML
40 VIAL (ML) INJECTION ONCE
Qty: 0 | Refills: 0 | Status: DISCONTINUED | OUTPATIENT
Start: 2018-02-22 | End: 2018-03-10

## 2018-02-22 RX ADMIN — Medication 50 MILLIGRAM(S): at 21:52

## 2018-02-22 RX ADMIN — HYDROMORPHONE HYDROCHLORIDE 0.5 MILLIGRAM(S): 2 INJECTION INTRAMUSCULAR; INTRAVENOUS; SUBCUTANEOUS at 00:35

## 2018-02-22 RX ADMIN — MORPHINE SULFATE 4 MILLIGRAM(S): 50 CAPSULE, EXTENDED RELEASE ORAL at 11:25

## 2018-02-22 RX ADMIN — OXYCODONE HYDROCHLORIDE 10 MILLIGRAM(S): 5 TABLET ORAL at 00:30

## 2018-02-22 RX ADMIN — ENOXAPARIN SODIUM 40 MILLIGRAM(S): 100 INJECTION SUBCUTANEOUS at 07:00

## 2018-02-22 RX ADMIN — MORPHINE SULFATE 4 MILLIGRAM(S): 50 CAPSULE, EXTENDED RELEASE ORAL at 23:47

## 2018-02-22 RX ADMIN — OXYCODONE HYDROCHLORIDE 10 MILLIGRAM(S): 5 TABLET ORAL at 11:17

## 2018-02-22 RX ADMIN — OXYCODONE HYDROCHLORIDE 10 MILLIGRAM(S): 5 TABLET ORAL at 10:00

## 2018-02-22 RX ADMIN — MORPHINE SULFATE 4 MILLIGRAM(S): 50 CAPSULE, EXTENDED RELEASE ORAL at 18:18

## 2018-02-22 RX ADMIN — MORPHINE SULFATE 4 MILLIGRAM(S): 50 CAPSULE, EXTENDED RELEASE ORAL at 06:39

## 2018-02-22 RX ADMIN — OXYCODONE HYDROCHLORIDE 10 MILLIGRAM(S): 5 TABLET ORAL at 20:39

## 2018-02-22 RX ADMIN — MORPHINE SULFATE 4 MILLIGRAM(S): 50 CAPSULE, EXTENDED RELEASE ORAL at 01:43

## 2018-02-22 RX ADMIN — MORPHINE SULFATE 4 MILLIGRAM(S): 50 CAPSULE, EXTENDED RELEASE ORAL at 16:05

## 2018-02-22 RX ADMIN — MORPHINE SULFATE 4 MILLIGRAM(S): 50 CAPSULE, EXTENDED RELEASE ORAL at 06:53

## 2018-02-22 RX ADMIN — MORPHINE SULFATE 4 MILLIGRAM(S): 50 CAPSULE, EXTENDED RELEASE ORAL at 14:26

## 2018-02-22 RX ADMIN — HYDROMORPHONE HYDROCHLORIDE 0.5 MILLIGRAM(S): 2 INJECTION INTRAMUSCULAR; INTRAVENOUS; SUBCUTANEOUS at 01:00

## 2018-02-22 RX ADMIN — OXYCODONE HYDROCHLORIDE 10 MILLIGRAM(S): 5 TABLET ORAL at 04:20

## 2018-02-22 RX ADMIN — GABAPENTIN 300 MILLIGRAM(S): 400 CAPSULE ORAL at 21:52

## 2018-02-22 RX ADMIN — OXYCODONE HYDROCHLORIDE 10 MILLIGRAM(S): 5 TABLET ORAL at 21:00

## 2018-02-22 RX ADMIN — OXYCODONE HYDROCHLORIDE 10 MILLIGRAM(S): 5 TABLET ORAL at 16:56

## 2018-02-22 RX ADMIN — OXYCODONE HYDROCHLORIDE 10 MILLIGRAM(S): 5 TABLET ORAL at 16:02

## 2018-02-22 RX ADMIN — OXYCODONE HYDROCHLORIDE 10 MILLIGRAM(S): 5 TABLET ORAL at 03:52

## 2018-02-22 RX ADMIN — MORPHINE SULFATE 4 MILLIGRAM(S): 50 CAPSULE, EXTENDED RELEASE ORAL at 13:27

## 2018-02-22 RX ADMIN — MORPHINE SULFATE 4 MILLIGRAM(S): 50 CAPSULE, EXTENDED RELEASE ORAL at 22:24

## 2018-02-22 NOTE — PROGRESS NOTE ADULT - ASSESSMENT
A/p:  1. B/L foot gangrene after vasopressors.  - s/p TMA, debridement done yesterday by podiatry  - wound vac change by podiatry tomorrow  - will f/u with podiatry if will need plastic consult  - not on ABx  - pain controlled with Morphine and Lidocaine  - f/u post op Cx    2. Acute blood loss anemia post op.  - Continue to trend HB, for now stable  - Iron supplement    3. HTN  - cont home meds    4. PANDA - cont CPAP    5. constipation  - continue miralax  - bedside commode    Prophylactic measures  DVT ppx with Lovenox 40 mg QD   GI ppx not indicated  Heart healthy diet  Full code    Dispo - pending clinical course

## 2018-02-22 NOTE — PROGRESS NOTE ADULT - ASSESSMENT
Assessment:   43 YO F with PMH of HTN, PANDA, Obesity s/p gastric bypass surgery complicated by septic shock, ARDS, SBO, ATN with CVVH, stress cardiomyopathy and vasopressor induced necrosis of fingers and toes in October 2017 presented to the ER for admission for b/l foot amputation.    1.  B/L Feet gangrene/ulcers:    - s/p bilateral proximal metatarsal amputation. POD#6.    - s/p debridement on 2/21   - c/w current pain management regimen   - Podiatry f/u for further intervention       2. HTN:  - monitor BP, c/w Toprol    3. DVT ppx:  - Sq Lovenox    Full Code  Dispo - home

## 2018-02-22 NOTE — PROGRESS NOTE ADULT - SUBJECTIVE AND OBJECTIVE BOX
9.8    7.81  )-----------( 310      ( 21 Feb 2018 05:13 )             31.3   Vital Signs Last 24 Hrs  T(C): 35.9 (22 Feb 2018 00:18), Max: 36.4 (21 Feb 2018 08:33)  T(F): 96.7 (22 Feb 2018 00:18), Max: 97.6 (21 Feb 2018 18:08)  HR: 74 (22 Feb 2018 00:18) (74 - 106)  BP: 100/58 (22 Feb 2018 00:18) (100/58 - 133/76)  BP(mean): --  RR: 16 (22 Feb 2018 00:18) (10 - 22)  SpO2: 98% (21 Feb 2018 20:45) (95% - 100%)patient evaluated bedside this AM ,in no distress. she is postop day #1,for proximal foot debridement of bone and soft tissue to level of midfoot. currently VAC in place running at 125mm/hg continuous. plan for VAC change tomorrow 2/23/18.

## 2018-02-22 NOTE — PROGRESS NOTE ADULT - SUBJECTIVE AND OBJECTIVE BOX
SUBJECTIVE:    Patient is a 42y old  Female who presents with a chief complaint of b/l foot pain, gangrene (15 Feb 2018 14:24)    Currently admitted to medicine with the primary diagnosis of Gangrene of foot     Today is hospital day 6. This morning she is in severe pain that responds to pain medication. She is s/p b/l foot amputation with podiatry, debridement of tissue on 2/21.    PAST MEDICAL & SURGICAL HISTORY  PAST MEDICAL & SURGICAL HISTORY:  Takotsubo cardiomyopathy  Small bowel obstruction  Osteoarthritis  Cardiomyopathy  Sleep apnea  HTN (hypertension)  Gangrene of lower extremity  Gangrene of finger of right hand  Gangrene of finger of left hand  H/O exploratory laparotomy  Amputation finger  History of cholecystectomy  H/O gastric bypass    SOCIAL HISTORY:    ALLERGIES:  No Known Allergies    MEDICATIONS:  STANDING MEDICATIONS  Dakins Solution - 1/4 Strength 1 Application(s) Topical three times a day  enoxaparin Injectable 40 milliGRAM(s) SubCutaneous every 24 hours  gabapentin 300 milliGRAM(s) Oral daily  lidocaine 1% Injectable 40 milliLiter(s) Local Injection once  lidocaine 2% Gel 1 Application(s) Topical daily  lidocaine 2% Gel 1 Application(s) Topical daily  metoprolol succinate ER 50 milliGRAM(s) Oral at bedtime  ondansetron Injectable 4 milliGRAM(s) IV Push once    PRN MEDICATIONS  acetaminophen   Tablet. 650 milliGRAM(s) Oral every 6 hours PRN  morphine  - Injectable 4 milliGRAM(s) IV Push every 4 hours PRN  oxyCODONE    IR 10 milliGRAM(s) Oral every 4 hours PRN    VITALS:   Vital Signs Last 24 Hrs  T(C): 36.4 (21 Feb 2018 10:29), Max: 37.1 (20 Feb 2018 17:45)  T(F): 97.5 (21 Feb 2018 10:29), Max: 97.5 (21 Feb 2018 08:33)  HR: 88 (21 Feb 2018 10:29) (88 - 990)  BP: 119/79 (21 Feb 2018 10:29) (115/86 - 119/79)  BP(mean): --  RR: 17 (21 Feb 2018 10:29) (17 - 18)  SpO2: 95% (21 Feb 2018 10:29) (95% - 95%)    LABS:  02-21    136  |  102  |  9<L>  ----------------------------<  80  4.4   |  28  |  0.9    Ca    8.8      21 Feb 2018 05:13                          9.8    7.81  )-----------( 310      ( 21 Feb 2018 05:13 )             31.3       RADIOLOGY:                         PHYSICAL EXAM:  GEN: mildly distressed due to pain  HEENT: NC/AT  LUNGS: Clear to auscultation bilaterally   HEART: S1/S2 present. RRR.   ABD: Soft, non-tender, non-distended. Bowel sounds present, obese abdomen  EXT: wound vacs in place as per podiatry  NEURO: awake, alert, appears at baseline

## 2018-02-22 NOTE — PROGRESS NOTE ADULT - SUBJECTIVE AND OBJECTIVE BOX
JIMMY CR    Patient is a 42y old  Female who presents with a chief complaint of b/l foot pain, gangrene (15 Feb 2018 14:24)    HPI:  HPI: 43 YO F with PMH of HTN, Obesity s/p gastric bypass surgery complicated by septic shock, ARDS, SBO, ATN with CVVH, stress cardiomyopathy and vasopressor induced necrosis of fingers and toes in October 2017 presents to the ER for admission for b/l foot amputation. As per the patient her b/l feet ulcers are being taken care of by Podiatry by wound care. She was told by the Podiatrist to go the ER for the amputation. Pt already has amputations of fingers of both hands (prior admissions).   S/p b/l foot TMA on this admission.    INTERVAL HPI/OVERNIGHT EVENTS: Pt had debridement of bone and soft tissue b/l feet by podiatry yesterday, tolerated procedure well. Pain is tolerable on pain meds. no other complaints. Did not have BM today.    PHYSICAL EXAM:  T(C): 36, Max: 36.9 (02-22-18 @ 04:18)  HR: 66 (66 - 106)  BP: 100/59 (100/58 - 133/76)  RR: 18 (10 - 22)  SpO2: 98% (95% - 100%)    GENERAL: NAD  NECK: Supple, No JVD  CHEST/LUNG: No rales, rhonchi, wheezing  HEART: Regular rate and rhythm; No murmurs  GI/ABDOMEN: Soft, Nontender, Nondistended; Bowel sounds present  EXTREMITIES: S/p b/l hand amputation. Dressings on foot b/l clean, wound vac in place b/l, no signs of infection of adjacent area . No calf tenderness b/l.  SKIN: No rashes or lesions  NERVOUS SYSTEM:  Alert & Oriented X3, no focal deficit     Consultant(s) Notes Reviewed by me.   Care Discussed with Consultants/Other Providers     LABS:   no labs today.    MEDICATIONS  (STANDING):  enoxaparin Injectable 40 milliGRAM(s) SubCutaneous every 24 hours  gabapentin 300 milliGRAM(s) Oral at bedtime  lidocaine 1% Injectable 40 milliLiter(s) Local Injection once  lidocaine 2% Gel 1 Application(s) Topical daily  metoprolol succinate ER 50 milliGRAM(s) Oral at bedtime    MEDICATIONS  (PRN):  acetaminophen   Tablet. 650 milliGRAM(s) Oral every 6 hours PRN Mild Pain (1 - 3)  morphine  - Injectable 4 milliGRAM(s) IV Push every 4 hours PRN Severe Pain (7 - 10)  oxyCODONE    IR 10 milliGRAM(s) Oral every 4 hours PRN Moderate Pain (4 - 6)

## 2018-02-22 NOTE — PROGRESS NOTE ADULT - SUBJECTIVE AND OBJECTIVE BOX
PROGRESS NOTE   Patient is a 42y old  Female who presents with a chief complaint of b/l foot pain, gangrene (15 Feb 2018 14:24)      HPI:  41 YO F with PMH of HTN, Obesity s/p gastric bypass surgery complicated by septic shock, ARDS, SBO, ATN with CVVH, stress cardiomyopathy and vasopressor induced necrosis of fingers and toes in October 2017 presents to the ER for admission for b/l foot amputation. As per the patient her b/l feet ulcers are being taken care of by Podiatry by wound care. She was told by the Podiatrist to go the ER for the amputation. Pt. already has amputations of fingers of both hands.   At baseline pt. reports that she can walk without any exertional chest pain/sob, but it is limited by the pain upon walking with foot ulcers. Pt. denies any fevers, chills, nausea, vomiting, chest pain,   lagunas, palpitations, headaches. (15 Feb 2018 14:24)      Vital Signs Last 24 Hrs  T(C): 36.2 (22 Feb 2018 16:00), Max: 36.9 (22 Feb 2018 04:18)  T(F): 97.1 (22 Feb 2018 16:00), Max: 98.4 (22 Feb 2018 04:18)  HR: 99 (22 Feb 2018 16:00) (66 - 105)  BP: 114/76 (22 Feb 2018 16:00) (100/58 - 118/77)  BP(mean): --  RR: 16 (22 Feb 2018 16:00) (14 - 18)  SpO2: 98% (21 Feb 2018 20:45) (98% - 98%)                          9.8    7.81  )-----------( 310      ( 21 Feb 2018 05:13 )             31.3               02-21    136  |  102  |  9<L>  ----------------------------<  80  4.4   |  28  |  0.9    Ca    8.8      21 Feb 2018 05:13      Pt wound vac hold seal @ 125mmHg continuous. Please continue 4mg of morphine q4h PRN and oxycodone 10mg q4h PRN for breakthrough pain.  Recommend Pain Mgmt consult for pain control and discussed with Medicine resident.  Pt to receive 0.5mg of Dilaudid instead of 4mg of morphine at 6am tomorrow (2/23/18) for bandage change and has been verbally discussed with nursing staff and written in orders.

## 2018-02-23 LAB
ANION GAP SERPL CALC-SCNC: 3 MMOL/L — LOW (ref 7–14)
BUN SERPL-MCNC: 8 MG/DL — LOW (ref 10–20)
CALCIUM SERPL-MCNC: 8.5 MG/DL — SIGNIFICANT CHANGE UP (ref 8.5–10.1)
CHLORIDE SERPL-SCNC: 101 MMOL/L — SIGNIFICANT CHANGE UP (ref 98–110)
CO2 SERPL-SCNC: 34 MMOL/L — HIGH (ref 17–32)
CREAT SERPL-MCNC: 1.1 MG/DL — SIGNIFICANT CHANGE UP (ref 0.7–1.5)
GLUCOSE SERPL-MCNC: 100 MG/DL — SIGNIFICANT CHANGE UP (ref 70–110)
POTASSIUM SERPL-MCNC: 4.3 MMOL/L — SIGNIFICANT CHANGE UP (ref 3.5–5)
POTASSIUM SERPL-SCNC: 4.3 MMOL/L — SIGNIFICANT CHANGE UP (ref 3.5–5)
SODIUM SERPL-SCNC: 138 MMOL/L — SIGNIFICANT CHANGE UP (ref 135–146)

## 2018-02-23 RX ORDER — ACETAMINOPHEN 500 MG
650 TABLET ORAL EVERY 6 HOURS
Qty: 0 | Refills: 0 | Status: DISCONTINUED | OUTPATIENT
Start: 2018-02-23 | End: 2018-03-10

## 2018-02-23 RX ORDER — MORPHINE SULFATE 50 MG/1
15 CAPSULE, EXTENDED RELEASE ORAL EVERY 12 HOURS
Qty: 0 | Refills: 0 | Status: DISCONTINUED | OUTPATIENT
Start: 2018-02-23 | End: 2018-02-24

## 2018-02-23 RX ADMIN — MORPHINE SULFATE 4 MILLIGRAM(S): 50 CAPSULE, EXTENDED RELEASE ORAL at 22:52

## 2018-02-23 RX ADMIN — MORPHINE SULFATE 4 MILLIGRAM(S): 50 CAPSULE, EXTENDED RELEASE ORAL at 18:56

## 2018-02-23 RX ADMIN — Medication 50 MILLIGRAM(S): at 21:35

## 2018-02-23 RX ADMIN — MORPHINE SULFATE 4 MILLIGRAM(S): 50 CAPSULE, EXTENDED RELEASE ORAL at 12:48

## 2018-02-23 RX ADMIN — MORPHINE SULFATE 15 MILLIGRAM(S): 50 CAPSULE, EXTENDED RELEASE ORAL at 22:05

## 2018-02-23 RX ADMIN — MORPHINE SULFATE 4 MILLIGRAM(S): 50 CAPSULE, EXTENDED RELEASE ORAL at 18:38

## 2018-02-23 RX ADMIN — OXYCODONE HYDROCHLORIDE 10 MILLIGRAM(S): 5 TABLET ORAL at 13:29

## 2018-02-23 RX ADMIN — LIDOCAINE 1 APPLICATION(S): 4 CREAM TOPICAL at 12:48

## 2018-02-23 RX ADMIN — OXYCODONE HYDROCHLORIDE 10 MILLIGRAM(S): 5 TABLET ORAL at 12:50

## 2018-02-23 RX ADMIN — MORPHINE SULFATE 4 MILLIGRAM(S): 50 CAPSULE, EXTENDED RELEASE ORAL at 03:07

## 2018-02-23 RX ADMIN — MORPHINE SULFATE 4 MILLIGRAM(S): 50 CAPSULE, EXTENDED RELEASE ORAL at 14:45

## 2018-02-23 RX ADMIN — HYDROMORPHONE HYDROCHLORIDE 0.5 MILLIGRAM(S): 2 INJECTION INTRAMUSCULAR; INTRAVENOUS; SUBCUTANEOUS at 06:03

## 2018-02-23 RX ADMIN — HYDROMORPHONE HYDROCHLORIDE 0.5 MILLIGRAM(S): 2 INJECTION INTRAMUSCULAR; INTRAVENOUS; SUBCUTANEOUS at 07:00

## 2018-02-23 RX ADMIN — MORPHINE SULFATE 15 MILLIGRAM(S): 50 CAPSULE, EXTENDED RELEASE ORAL at 21:37

## 2018-02-23 RX ADMIN — SENNA PLUS 5 MILLILITER(S): 8.6 TABLET ORAL at 19:00

## 2018-02-23 RX ADMIN — Medication 650 MILLIGRAM(S): at 16:08

## 2018-02-23 RX ADMIN — MORPHINE SULFATE 4 MILLIGRAM(S): 50 CAPSULE, EXTENDED RELEASE ORAL at 09:30

## 2018-02-23 RX ADMIN — ENOXAPARIN SODIUM 40 MILLIGRAM(S): 100 INJECTION SUBCUTANEOUS at 06:03

## 2018-02-23 RX ADMIN — MORPHINE SULFATE 4 MILLIGRAM(S): 50 CAPSULE, EXTENDED RELEASE ORAL at 04:56

## 2018-02-23 RX ADMIN — GABAPENTIN 300 MILLIGRAM(S): 400 CAPSULE ORAL at 21:35

## 2018-02-23 RX ADMIN — MORPHINE SULFATE 4 MILLIGRAM(S): 50 CAPSULE, EXTENDED RELEASE ORAL at 23:30

## 2018-02-23 RX ADMIN — MORPHINE SULFATE 4 MILLIGRAM(S): 50 CAPSULE, EXTENDED RELEASE ORAL at 16:00

## 2018-02-23 RX ADMIN — OXYCODONE HYDROCHLORIDE 10 MILLIGRAM(S): 5 TABLET ORAL at 01:44

## 2018-02-23 NOTE — PROGRESS NOTE ADULT - SUBJECTIVE AND OBJECTIVE BOX
JIMMY CR    Patient is a 42y old  Female who presents with a chief complaint of b/l foot pain, gangrene (15 Feb 2018 14:24)    HPI:  HPI: 41 YO F with PMH of HTN, Obesity s/p gastric bypass surgery complicated by septic shock, ARDS, SBO, ATN with CVVH, stress cardiomyopathy and vasopressor induced necrosis of fingers and toes in October 2017 presents to the ER for admission for b/l foot amputation. As per the patient her b/l feet ulcers are being taken care of by Podiatry by wound care. She was told by the Podiatrist to go the ER for the amputation. Pt already has amputations of fingers of both hands (prior admissions).   S/p b/l foot TMA on this admission. Pt had debridement of bone and soft tissue b/l feet by podiatry on 02/21.    INTERVAL HPI/OVERNIGHT EVENTS: no events, pain is controlled on current pain meds except the time the dressing changed    PHYSICAL EXAM:  T(C): 38, Max: 38 (02-23-18 @ 09:31)  HR: 111 (95 - 111)  BP: 125/71 (114/76 - 125/71)  RR: 16 (16 - 16)  SpO2: --    GENERAL: NAD  NECK: Supple, No JVD  CHEST/LUNG: No rales, rhonchi, wheezing  HEART: Regular rate and rhythm; No murmurs  GI/ABDOMEN: Soft, Nontender, Nondistended; Bowel sounds present  EXTREMITIES: S/p b/l hand amputation. Dressings on foot b/l clean, wound vac in place b/l, no signs of infection of adjacent area . No calf tenderness b/l.  SKIN: No rashes or lesions  NERVOUS SYSTEM:  Alert & Oriented X3, no focal deficit     LABS: no new labs today    MEDICATIONS  (STANDING):  enoxaparin Injectable 40 milliGRAM(s) SubCutaneous every 24 hours  gabapentin 300 milliGRAM(s) Oral at bedtime  lidocaine 1% Injectable 40 milliLiter(s) Local Injection once  lidocaine 1% Injectable 40 milliLiter(s) Local Injection once  lidocaine 2% Gel 1 Application(s) Topical daily  lidocaine 2% Gel 1 Application(s) Topical once  metoprolol succinate ER 50 milliGRAM(s) Oral at bedtime    MEDICATIONS  (PRN):  acetaminophen   Tablet. 650 milliGRAM(s) Oral every 6 hours PRN Mild Pain (1 - 3)  morphine  - Injectable 4 milliGRAM(s) IV Push every 4 hours PRN Severe Pain (7 - 10)  oxyCODONE    IR 10 milliGRAM(s) Oral every 4 hours PRN Moderate Pain (4 - 6)  senna Syrup 5 milliLiter(s) Oral three times a day PRN Constipation

## 2018-02-23 NOTE — PROGRESS NOTE ADULT - ASSESSMENT
Assessment:   43 YO F with PMH of HTN, PANDA, Obesity s/p gastric bypass surgery complicated by septic shock, ARDS, SBO, ATN with CVVH, stress cardiomyopathy and vasopressor induced necrosis of fingers and toes in October 2017 presented to the ER for admission for b/l foot amputation.    1.  B/L Feet gangrene/ulcers:    - s/p bilateral proximal metatarsal amputation. POD#7.    - s/p debridement on 2/21   - c/w current pain management regimen of ms contin BID and morphine prn for breakthrough   - will consider pain consult if pain still not well controlled on new regimen in AM   - Podiatry f/u for further intervention       2. HTN:  - monitor BP, c/w Toprol    3. DVT ppx:  - Sq Lovenox    Full Code  Dispo - home

## 2018-02-23 NOTE — PROGRESS NOTE ADULT - SUBJECTIVE AND OBJECTIVE BOX
SUBJECTIVE:    Patient is a 42y old  Female who presents with a chief complaint of b/l foot pain, gangrene (15 Feb 2018 14:24)    Currently admitted to medicine with the primary diagnosis of Gangrene of foot     Today is hospital day 7. This morning she is in severe pain that responds to pain medication. She is s/p b/l foot amputation with podiatry, debridement of tissue on 2/21. She was uncomfortable with pain overnight, but is now better controlled after wound vac was changed by podiatry team.    PAST MEDICAL & SURGICAL HISTORY  PAST MEDICAL & SURGICAL HISTORY:  Takotsubo cardiomyopathy  Small bowel obstruction  Osteoarthritis  Cardiomyopathy  Sleep apnea  HTN (hypertension)  Gangrene of lower extremity  Gangrene of finger of right hand  Gangrene of finger of left hand  H/O exploratory laparotomy  Amputation finger  History of cholecystectomy  H/O gastric bypass    SOCIAL HISTORY:    ALLERGIES:  No Known Allergies    MEDICATIONS:  STANDING MEDICATIONS  Dakins Solution - 1/4 Strength 1 Application(s) Topical three times a day  enoxaparin Injectable 40 milliGRAM(s) SubCutaneous every 24 hours  gabapentin 300 milliGRAM(s) Oral daily  lidocaine 1% Injectable 40 milliLiter(s) Local Injection once  lidocaine 2% Gel 1 Application(s) Topical daily  lidocaine 2% Gel 1 Application(s) Topical daily  metoprolol succinate ER 50 milliGRAM(s) Oral at bedtime  ondansetron Injectable 4 milliGRAM(s) IV Push once    PRN MEDICATIONS  acetaminophen   Tablet. 650 milliGRAM(s) Oral every 6 hours PRN  morphine  - Injectable 4 milliGRAM(s) IV Push every 4 hours PRN  ms contin 15mg Oral twice daily    VITALS:   Vital Signs Last 24 Hrs  T(C): 38.4 (23 Feb 2018 16:54), Max: 38.7 (23 Feb 2018 16:18)  T(F): 101.1 (23 Feb 2018 16:54), Max: 101.7 (23 Feb 2018 16:18)  HR: 117 (23 Feb 2018 16:18) (95 - 117)  BP: 112/60 (23 Feb 2018 16:18) (107/55 - 125/71)  BP(mean): --  RR: 16 (23 Feb 2018 15:28) (16 - 16)  SpO2: --    LABS:        RADIOLOGY:                         PHYSICAL EXAM:  GEN: mildly distressed due to pain, better than yesterday  HEENT: NC/AT  LUNGS: Clear to auscultation bilaterally   HEART: S1/S2 present. RRR.   ABD: Soft, non-tender, non-distended. Bowel sounds present, obese abdomen  EXT: wound vacs in place as per podiatry, changed this AM  NEURO: awake, alert, appears at baseline

## 2018-02-23 NOTE — PROGRESS NOTE ADULT - ASSESSMENT
A/p:  1. B/L foot gangrene after vasopressors.  - wound vac change by podiatry  - will f/u with podiatry if will need plastic consult  - not on ABx  - pain controlled with Morphine and Lidocaine  - f/u post op Cx, pending    2. Acute blood loss anemia post op.  - Continue to trend HB,  - Iron supplement    3. HTN  - cont home meds    4. PANDA - cont CPAP    5. constipation  - continue miralax  - bedside commode    Prophylactic measures  DVT ppx with Lovenox 40 mg QD   GI ppx not indicated  Heart healthy diet  Full code    Dispo - pending clinical course A/p:  1. B/L foot gangrene after vasopressors.  - wound vac change by podiatry  - will f/u with podiatry if will need plastic consult  - not on ABx  - will add Ms Contin 15 mg BID, continue Morphine 4 mg Q4hrs PRN and topical Lidocaine  - f/u post op Cx, pending    2. Acute blood loss anemia post op.  - Continue to trend HB,  - Iron supplement    3. HTN  - cont home meds    4. PANDA - cont CPAP    5. constipation  - continue miralax  - bedside commode    Prophylactic measures  DVT ppx with Lovenox 40 mg QD   GI ppx not indicated  Heart healthy diet  Full code    Dispo - pending clinical course

## 2018-02-24 LAB
ANION GAP SERPL CALC-SCNC: 6 MMOL/L — LOW (ref 7–14)
APPEARANCE UR: CLEAR — SIGNIFICANT CHANGE UP
BILIRUB UR-MCNC: NEGATIVE — SIGNIFICANT CHANGE UP
BUN SERPL-MCNC: 8 MG/DL — LOW (ref 10–20)
CALCIUM SERPL-MCNC: 8.8 MG/DL — SIGNIFICANT CHANGE UP (ref 8.5–10.1)
CHLORIDE SERPL-SCNC: 102 MMOL/L — SIGNIFICANT CHANGE UP (ref 98–110)
CO2 SERPL-SCNC: 29 MMOL/L — SIGNIFICANT CHANGE UP (ref 17–32)
COLOR SPEC: YELLOW — SIGNIFICANT CHANGE UP
CREAT SERPL-MCNC: 1 MG/DL — SIGNIFICANT CHANGE UP (ref 0.7–1.5)
DIFF PNL FLD: NEGATIVE — SIGNIFICANT CHANGE UP
GLUCOSE SERPL-MCNC: 94 MG/DL — SIGNIFICANT CHANGE UP (ref 70–110)
GLUCOSE UR QL: NEGATIVE MG/DL — SIGNIFICANT CHANGE UP
HCT VFR BLD CALC: 22.9 % — LOW (ref 37–47)
HGB BLD-MCNC: 7.3 G/DL — CRITICAL LOW (ref 14–18)
KETONES UR-MCNC: NEGATIVE — SIGNIFICANT CHANGE UP
LEUKOCYTE ESTERASE UR-ACNC: NEGATIVE — SIGNIFICANT CHANGE UP
MCHC RBC-ENTMCNC: 25.5 PG — LOW (ref 27–31)
MCHC RBC-ENTMCNC: 31.9 G/DL — LOW (ref 32–37)
MCV RBC AUTO: 80.1 FL — LOW (ref 81–91)
NITRITE UR-MCNC: NEGATIVE — SIGNIFICANT CHANGE UP
NRBC # BLD: 0 /100 WBCS — SIGNIFICANT CHANGE UP (ref 0–0)
PH UR: 6.5 — SIGNIFICANT CHANGE UP (ref 5–8)
PLATELET # BLD AUTO: 319 K/UL — SIGNIFICANT CHANGE UP (ref 130–400)
POTASSIUM SERPL-MCNC: 4.2 MMOL/L — SIGNIFICANT CHANGE UP (ref 3.5–5)
POTASSIUM SERPL-SCNC: 4.2 MMOL/L — SIGNIFICANT CHANGE UP (ref 3.5–5)
PROT UR-MCNC: NEGATIVE MG/DL — SIGNIFICANT CHANGE UP
RBC # BLD: 2.86 M/UL — LOW (ref 4.2–5.4)
RBC # FLD: 13.8 % — SIGNIFICANT CHANGE UP (ref 11.5–14.5)
SODIUM SERPL-SCNC: 137 MMOL/L — SIGNIFICANT CHANGE UP (ref 135–146)
SP GR SPEC: 1.02 — SIGNIFICANT CHANGE UP (ref 1.01–1.03)
UROBILINOGEN FLD QL: 1 MG/DL (ref 0.2–0.2)
WBC # BLD: 11.29 K/UL — HIGH (ref 4.8–10.8)
WBC # FLD AUTO: 11.29 K/UL — HIGH (ref 4.8–10.8)

## 2018-02-24 RX ORDER — MINERAL OIL
133 OIL (ML) MISCELLANEOUS ONCE
Qty: 0 | Refills: 0 | Status: COMPLETED | OUTPATIENT
Start: 2018-02-24 | End: 2018-02-24

## 2018-02-24 RX ORDER — OXYCODONE AND ACETAMINOPHEN 5; 325 MG/1; MG/1
2 TABLET ORAL EVERY 6 HOURS
Qty: 0 | Refills: 0 | Status: DISCONTINUED | OUTPATIENT
Start: 2018-02-24 | End: 2018-03-03

## 2018-02-24 RX ORDER — AMPICILLIN SODIUM AND SULBACTAM SODIUM 250; 125 MG/ML; MG/ML
1.5 INJECTION, POWDER, FOR SUSPENSION INTRAMUSCULAR; INTRAVENOUS ONCE
Qty: 0 | Refills: 0 | Status: COMPLETED | OUTPATIENT
Start: 2018-02-24 | End: 2018-02-24

## 2018-02-24 RX ORDER — AMPICILLIN SODIUM AND SULBACTAM SODIUM 250; 125 MG/ML; MG/ML
1.5 INJECTION, POWDER, FOR SUSPENSION INTRAMUSCULAR; INTRAVENOUS EVERY 6 HOURS
Qty: 0 | Refills: 0 | Status: DISCONTINUED | OUTPATIENT
Start: 2018-02-24 | End: 2018-02-27

## 2018-02-24 RX ORDER — AMPICILLIN SODIUM AND SULBACTAM SODIUM 250; 125 MG/ML; MG/ML
INJECTION, POWDER, FOR SUSPENSION INTRAMUSCULAR; INTRAVENOUS
Qty: 0 | Refills: 0 | Status: DISCONTINUED | OUTPATIENT
Start: 2018-02-24 | End: 2018-02-27

## 2018-02-24 RX ADMIN — Medication 650 MILLIGRAM(S): at 14:59

## 2018-02-24 RX ADMIN — AMPICILLIN SODIUM AND SULBACTAM SODIUM 100 GRAM(S): 250; 125 INJECTION, POWDER, FOR SUSPENSION INTRAMUSCULAR; INTRAVENOUS at 15:28

## 2018-02-24 RX ADMIN — ENOXAPARIN SODIUM 40 MILLIGRAM(S): 100 INJECTION SUBCUTANEOUS at 11:09

## 2018-02-24 RX ADMIN — MORPHINE SULFATE 4 MILLIGRAM(S): 50 CAPSULE, EXTENDED RELEASE ORAL at 13:12

## 2018-02-24 RX ADMIN — Medication 650 MILLIGRAM(S): at 02:27

## 2018-02-24 RX ADMIN — MORPHINE SULFATE 4 MILLIGRAM(S): 50 CAPSULE, EXTENDED RELEASE ORAL at 11:56

## 2018-02-24 RX ADMIN — MORPHINE SULFATE 4 MILLIGRAM(S): 50 CAPSULE, EXTENDED RELEASE ORAL at 03:44

## 2018-02-24 RX ADMIN — MORPHINE SULFATE 4 MILLIGRAM(S): 50 CAPSULE, EXTENDED RELEASE ORAL at 09:51

## 2018-02-24 RX ADMIN — OXYCODONE AND ACETAMINOPHEN 2 TABLET(S): 5; 325 TABLET ORAL at 19:44

## 2018-02-24 RX ADMIN — MORPHINE SULFATE 4 MILLIGRAM(S): 50 CAPSULE, EXTENDED RELEASE ORAL at 07:49

## 2018-02-24 RX ADMIN — MORPHINE SULFATE 4 MILLIGRAM(S): 50 CAPSULE, EXTENDED RELEASE ORAL at 16:04

## 2018-02-24 RX ADMIN — GABAPENTIN 300 MILLIGRAM(S): 400 CAPSULE ORAL at 21:28

## 2018-02-24 RX ADMIN — Medication 50 MILLIGRAM(S): at 21:28

## 2018-02-24 RX ADMIN — AMPICILLIN SODIUM AND SULBACTAM SODIUM 100 GRAM(S): 250; 125 INJECTION, POWDER, FOR SUSPENSION INTRAMUSCULAR; INTRAVENOUS at 17:53

## 2018-02-24 RX ADMIN — MORPHINE SULFATE 4 MILLIGRAM(S): 50 CAPSULE, EXTENDED RELEASE ORAL at 16:46

## 2018-02-24 RX ADMIN — OXYCODONE AND ACETAMINOPHEN 2 TABLET(S): 5; 325 TABLET ORAL at 20:20

## 2018-02-24 NOTE — PROGRESS NOTE ADULT - SUBJECTIVE AND OBJECTIVE BOX
SUBJECTIVE:    Patient is a 42y old  Female who presents with a chief complaint of b/l foot pain, gangrene (15 Feb 2018 14:24)    Currently admitted to medicine with the primary diagnosis of Gangrene of foot     Today is hospital day 8. She is s/p b/l foot amputation with podiatry, debridement of tissue on 2/21. Her pain is better controlled today. She is planned for next wound vac change on 2/26 per podiatry recs.    PAST MEDICAL & SURGICAL HISTORY  PAST MEDICAL & SURGICAL HISTORY:  Takotsubo cardiomyopathy  Small bowel obstruction  Osteoarthritis  Cardiomyopathy  Sleep apnea  HTN (hypertension)  Gangrene of lower extremity  Gangrene of finger of right hand  Gangrene of finger of left hand  H/O exploratory laparotomy  Amputation finger  History of cholecystectomy  H/O gastric bypass    SOCIAL HISTORY:    ALLERGIES:  No Known Allergies    MEDICATIONS:  STANDING MEDICATIONS  Dakins Solution - 1/4 Strength 1 Application(s) Topical three times a day  enoxaparin Injectable 40 milliGRAM(s) SubCutaneous every 24 hours  gabapentin 300 milliGRAM(s) Oral daily  lidocaine 1% Injectable 40 milliLiter(s) Local Injection once  lidocaine 2% Gel 1 Application(s) Topical daily  lidocaine 2% Gel 1 Application(s) Topical daily  metoprolol succinate ER 50 milliGRAM(s) Oral at bedtime  ondansetron Injectable 4 milliGRAM(s) IV Push once    PRN MEDICATIONS  acetaminophen   Tablet. 650 milliGRAM(s) Oral every 6 hours PRN  morphine  - Injectable 4 milliGRAM(s) IV Push every 4 hours PRN  ms contin 15mg Oral twice daily    VITALS:   Vital Signs Last 24 Hrs  T(C): 36.9 (24 Feb 2018 07:55), Max: 38.7 (23 Feb 2018 16:18)  T(F): 98.5 (24 Feb 2018 07:55), Max: 101.7 (23 Feb 2018 16:18)  HR: 84 (24 Feb 2018 07:55) (84 - 117)  BP: 103/56 (24 Feb 2018 07:55) (103/56 - 115/63)  BP(mean): --  RR: 16 (24 Feb 2018 07:55) (16 - 16)  SpO2: --  LABS:  02-24    137  |  102  |  8<L>  ----------------------------<  94  4.2   |  29  |  1.0    Ca    8.8      24 Feb 2018 07:39                7.3    11.29 )-----------( 319      ( 24 Feb 2018 07:39 )             22.9       RADIOLOGY:                                    PHYSICAL EXAM:  GEN: no acute distress  HEENT: NC/AT  LUNGS: Clear to auscultation bilaterally   HEART: S1/S2 present. RRR.   ABD: Soft, non-tender, non-distended. Bowel sounds present, obese abdomen  EXT: wound vacs in place as per podiatry, stable  NEURO: awake, alert, appears at baseline

## 2018-02-24 NOTE — PROGRESS NOTE ADULT - ASSESSMENT
Assessment:   43 YO F with PMH of HTN, PANDA, Obesity s/p gastric bypass surgery complicated by septic shock, ARDS, SBO, ATN with CVVH, stress cardiomyopathy and vasopressor induced necrosis of fingers and toes in October 2017 presented to the ER for admission for b/l foot amputation.    1.  B/L Feet gangrene/ulcers:    - s/p bilateral proximal metatarsal amputation. POD#8.    - s/p debridement on 2/21   - c/w current pain management regimen of ms contin BID and morphine prn for breakthrough   - will consider pain consult if pain not improving with current measures   - Podiatry f/u for further intervention       2. HTN:  - monitor BP, c/w Toprol    3. DVT ppx:  - Sq Lovenox    Full Code  Dispo - home

## 2018-02-24 NOTE — PROGRESS NOTE ADULT - ATTENDING COMMENTS
43 YO F with PMH of HTN, PANDA, Obesity s/p gastric bypass surgery complicated by septic shock, ARDS, SBO, ATN with CVVH, stress cardiomyopathy and vasopressor induced necrosis of fingers and toes in October 2017 presented to the ER for admission for b/l foot amputation.    Pt keep spiking fever.    WBC Count: 11.29 K/uL (02-24)  WBC Count: 7.81 K/uL (02-21)  WBC Count: 7.33 K/uL (02-20)    Culture - Surgical Swab (02.22.18 @ 09:44)    Specimen Source: .Surgical Swab left foot    Culture Results:   Numerous Escherichia coli  Numerous Staphylococcus aureus  Moderate Enterococcus faecalis  Numerous Streptococcus agalactiae (Group B)      A/p:  1. Fever  - will send UA, BCx  - will start on Unasyn for now  - f/u final Cx    2. B/L foot gangrene after vasopressors.  - wound vac change by podiatry  - will f/u with podiatry if will need plastic consult  - not on ABx  - continue Ms Contin 15 mg BID, continue Morphine 4 mg Q4hrs PRN and topical Lidocaine    2. Acute blood loss anemia post op.  - Continue to trend HB,  - Iron supplement    3. HTN  - cont home meds    4. PANDA - cont CPAP    5. constipation  - continue miralax  - bedside commode    Prophylactic measures  DVT ppx with Lovenox 40 mg QD   GI ppx not indicated  Heart healthy diet  Full code    Dispo - pending clinical course

## 2018-02-24 NOTE — PROVIDER CONTACT NOTE (OTHER) - SITUATION
MD kirkland aware of pt heartrate 111 over night, pt trending elevated heartrate. Pt not symptomatic at this time. No intervention ordered. Will continue monitoring

## 2018-02-24 NOTE — PROGRESS NOTE ADULT - SUBJECTIVE AND OBJECTIVE BOX
PROGRESS NOTE   Patient is a 42y old  Female who presents with a chief complaint of b/l foot pain, gangrene (15 Feb 2018 14:24)      HPI:  43 YO F with PMH of HTN, Obesity s/p gastric bypass surgery complicated by septic shock, ARDS, SBO, ATN with CVVH, stress cardiomyopathy and vasopressor induced necrosis of fingers and toes in October 2017 presents to the ER for admission for b/l foot amputation. As per the patient her b/l feet ulcers are being taken care of by Podiatry by wound care. She was told by the Podiatrist to go the ER for the amputation. Pt. already has amputations of fingers of both hands.   At baseline pt. reports that she can walk without any exertional chest pain/sob, but it is limited by the pain upon walking with foot ulcers. Pt. denies any fevers, chills, nausea, vomiting, chest pain,   lagunas, palpitations, headaches. (15 Feb 2018 14:24)      Vital Signs Last 24 Hrs  T(C): 36.9 (24 Feb 2018 07:55), Max: 38.7 (23 Feb 2018 16:18)  T(F): 98.5 (24 Feb 2018 07:55), Max: 101.7 (23 Feb 2018 16:18)  HR: 84 (24 Feb 2018 07:55) (84 - 117)  BP: 103/56 (24 Feb 2018 07:55) (103/56 - 115/63)  BP(mean): --  RR: 16 (24 Feb 2018 07:55) (16 - 16)  SpO2: --                          7.3    11.29 )-----------( 319      ( 24 Feb 2018 07:39 )             22.9               02-24    137  |  102  |  8<L>  ----------------------------<  94  4.2   |  29  |  1.0    Ca    8.8      24 Feb 2018 07:39        PHYSICAL EXAM  GEN: JIMMY CR is a pleasant well-nourished, well developed 42y Female in no acute distress, alert awake, and oriented to person, place and time.     Patient wound vac holding seal at 125mmHg continuous.  Please recall podiatry for any problems pertaining to wound vac  Next wound vac dressing change: 2/26/18

## 2018-02-25 LAB
ANION GAP SERPL CALC-SCNC: 5 MMOL/L — LOW (ref 7–14)
BUN SERPL-MCNC: 9 MG/DL — LOW (ref 10–20)
CALCIUM SERPL-MCNC: 8.7 MG/DL — SIGNIFICANT CHANGE UP (ref 8.5–10.1)
CHLORIDE SERPL-SCNC: 100 MMOL/L — SIGNIFICANT CHANGE UP (ref 98–110)
CO2 SERPL-SCNC: 30 MMOL/L — SIGNIFICANT CHANGE UP (ref 17–32)
CREAT SERPL-MCNC: 1 MG/DL — SIGNIFICANT CHANGE UP (ref 0.7–1.5)
GLUCOSE SERPL-MCNC: 95 MG/DL — SIGNIFICANT CHANGE UP (ref 70–110)
HCT VFR BLD CALC: 24.2 % — LOW (ref 37–47)
HGB BLD-MCNC: 7.7 G/DL — LOW (ref 14–18)
MCHC RBC-ENTMCNC: 25.2 PG — LOW (ref 27–31)
MCHC RBC-ENTMCNC: 31.8 G/DL — LOW (ref 32–37)
MCV RBC AUTO: 79.3 FL — LOW (ref 81–91)
NRBC # BLD: 0 /100 WBCS — SIGNIFICANT CHANGE UP (ref 0–0)
PLATELET # BLD AUTO: 323 K/UL — SIGNIFICANT CHANGE UP (ref 130–400)
POTASSIUM SERPL-MCNC: 4 MMOL/L — SIGNIFICANT CHANGE UP (ref 3.5–5)
POTASSIUM SERPL-SCNC: 4 MMOL/L — SIGNIFICANT CHANGE UP (ref 3.5–5)
RBC # BLD: 3.05 M/UL — LOW (ref 4.2–5.4)
RBC # FLD: 13.7 % — SIGNIFICANT CHANGE UP (ref 11.5–14.5)
SODIUM SERPL-SCNC: 135 MMOL/L — SIGNIFICANT CHANGE UP (ref 135–146)
WBC # BLD: 9.53 K/UL — SIGNIFICANT CHANGE UP (ref 4.8–10.8)
WBC # FLD AUTO: 9.53 K/UL — SIGNIFICANT CHANGE UP (ref 4.8–10.8)

## 2018-02-25 RX ORDER — POLYETHYLENE GLYCOL 3350 17 G/17G
17 POWDER, FOR SOLUTION ORAL THREE TIMES A DAY
Qty: 0 | Refills: 0 | Status: COMPLETED | OUTPATIENT
Start: 2018-02-25 | End: 2018-02-25

## 2018-02-25 RX ORDER — LIDOCAINE 4 G/100G
1 CREAM TOPICAL ONCE
Qty: 0 | Refills: 0 | Status: DISCONTINUED | OUTPATIENT
Start: 2018-02-25 | End: 2018-03-10

## 2018-02-25 RX ORDER — LIDOCAINE HCL 20 MG/ML
60 VIAL (ML) INJECTION ONCE
Qty: 0 | Refills: 0 | Status: DISCONTINUED | OUTPATIENT
Start: 2018-02-25 | End: 2018-03-10

## 2018-02-25 RX ADMIN — AMPICILLIN SODIUM AND SULBACTAM SODIUM 100 GRAM(S): 250; 125 INJECTION, POWDER, FOR SUSPENSION INTRAMUSCULAR; INTRAVENOUS at 06:36

## 2018-02-25 RX ADMIN — Medication 10 MILLIGRAM(S): at 11:06

## 2018-02-25 RX ADMIN — OXYCODONE AND ACETAMINOPHEN 2 TABLET(S): 5; 325 TABLET ORAL at 01:00

## 2018-02-25 RX ADMIN — GABAPENTIN 300 MILLIGRAM(S): 400 CAPSULE ORAL at 22:06

## 2018-02-25 RX ADMIN — OXYCODONE AND ACETAMINOPHEN 2 TABLET(S): 5; 325 TABLET ORAL at 06:37

## 2018-02-25 RX ADMIN — OXYCODONE AND ACETAMINOPHEN 2 TABLET(S): 5; 325 TABLET ORAL at 08:36

## 2018-02-25 RX ADMIN — OXYCODONE AND ACETAMINOPHEN 2 TABLET(S): 5; 325 TABLET ORAL at 12:11

## 2018-02-25 RX ADMIN — Medication 133 MILLILITER(S): at 06:43

## 2018-02-25 RX ADMIN — OXYCODONE AND ACETAMINOPHEN 2 TABLET(S): 5; 325 TABLET ORAL at 13:04

## 2018-02-25 RX ADMIN — ENOXAPARIN SODIUM 40 MILLIGRAM(S): 100 INJECTION SUBCUTANEOUS at 08:41

## 2018-02-25 RX ADMIN — Medication 50 MILLIGRAM(S): at 22:06

## 2018-02-25 RX ADMIN — MORPHINE SULFATE 4 MILLIGRAM(S): 50 CAPSULE, EXTENDED RELEASE ORAL at 03:40

## 2018-02-25 RX ADMIN — OXYCODONE AND ACETAMINOPHEN 2 TABLET(S): 5; 325 TABLET ORAL at 18:05

## 2018-02-25 RX ADMIN — OXYCODONE AND ACETAMINOPHEN 2 TABLET(S): 5; 325 TABLET ORAL at 00:19

## 2018-02-25 RX ADMIN — AMPICILLIN SODIUM AND SULBACTAM SODIUM 100 GRAM(S): 250; 125 INJECTION, POWDER, FOR SUSPENSION INTRAMUSCULAR; INTRAVENOUS at 18:04

## 2018-02-25 RX ADMIN — AMPICILLIN SODIUM AND SULBACTAM SODIUM 100 GRAM(S): 250; 125 INJECTION, POWDER, FOR SUSPENSION INTRAMUSCULAR; INTRAVENOUS at 00:18

## 2018-02-25 RX ADMIN — AMPICILLIN SODIUM AND SULBACTAM SODIUM 100 GRAM(S): 250; 125 INJECTION, POWDER, FOR SUSPENSION INTRAMUSCULAR; INTRAVENOUS at 12:10

## 2018-02-25 RX ADMIN — OXYCODONE AND ACETAMINOPHEN 2 TABLET(S): 5; 325 TABLET ORAL at 19:27

## 2018-02-25 RX ADMIN — MORPHINE SULFATE 4 MILLIGRAM(S): 50 CAPSULE, EXTENDED RELEASE ORAL at 03:55

## 2018-02-25 NOTE — PROGRESS NOTE ADULT - SUBJECTIVE AND OBJECTIVE BOX
PROGRESS NOTE   Patient is a 42y old  Female who presents with a chief complaint of b/l foot pain, gangrene (15 Feb 2018 14:24)      HPI:  43 YO F with PMH of HTN, Obesity s/p gastric bypass surgery complicated by septic shock, ARDS, SBO, ATN with CVVH, stress cardiomyopathy and vasopressor induced necrosis of fingers and toes in October 2017 presents to the ER for admission for b/l foot amputation. As per the patient her b/l feet ulcers are being taken care of by Podiatry by wound care. She was told by the Podiatrist to go the ER for the amputation. Pt. already has amputations of fingers of both hands.   At baseline pt. reports that she can walk without any exertional chest pain/sob, but it is limited by the pain upon walking with foot ulcers. Pt. denies any fevers, chills, nausea, vomiting, chest pain,   lagunas, palpitations, headaches. (15 Feb 2018 14:24)      Vital Signs Last 24 Hrs  T(C): 37 (24 Feb 2018 16:00), Max: 37 (24 Feb 2018 16:00)  T(F): 98.6 (24 Feb 2018 16:00), Max: 98.6 (24 Feb 2018 16:00)  HR: 104 (24 Feb 2018 16:00) (84 - 104)  BP: 103/63 (24 Feb 2018 16:00) (103/56 - 103/63)  BP(mean): --  RR: 16 (24 Feb 2018 16:00) (16 - 16)  SpO2: --                          7.3    11.29 )-----------( 319      ( 24 Feb 2018 07:39 )             22.9               02-24    137  |  102  |  8<L>  ----------------------------<  94  4.2   |  29  |  1.0    Ca    8.8      24 Feb 2018 07:39       Patient wound vac holding seal at 125mmHg continuous with little to no leak.   Please recall podiatry for any problems pertaining to wound vac  Next wound vac dressing change: 2/28/2018

## 2018-02-25 NOTE — PROGRESS NOTE ADULT - ASSESSMENT
A/p:  1. Fever, improved  - f/u final BCx, Wound Cx  - continue Unasyn for now    2. B/L foot gangrene after vasopressors.  - wound vac change by podiatry  - will f/u with podiatry if will need plastic consult  - not on ABx  - continue Percocet 2 tab Q 6 hrs and Morphine 4 mg Q4hrs PRN and topical Lidocaine    2. Acute blood loss anemia post op.  - Continue to trend HB,  - Iron supplement    3. HTN  - cont home meds    4. PANDA - cont CPAP    5. constipation  - enema and Dulcolax suppository     Prophylactic measures  DVT ppx with Lovenox 40 mg QD   GI ppx not indicated  Heart healthy diet  Full code    Dispo - pending clinical course .

## 2018-02-25 NOTE — PROGRESS NOTE ADULT - SUBJECTIVE AND OBJECTIVE BOX
JIMMY CR    43 YO F with PMH of HTN, PANDA, Obesity s/p gastric bypass surgery complicated by septic shock, ARDS, SBO, ATN with CVVH, stress cardiomyopathy and vasopressor induced necrosis of fingers and toes in 2017 presented to the ER for admission for b/l foot amputation.  S/p TMA and debridement.   Pt was spiking fever started on ABx on .   Afebrile > 24 hrs  NO new complaints, pain better controlled on Percocet with Morphine, still did not have BM, but refused Laxatives before.    PHYSICAL EXAM:  T(C): 36.4, Max: 37 (- @ 00:00)  HR: 89 (89 - 115)  BP: 101/59 (101/59 - 121/60)  RR: 18 (18 - 18)  SpO2: --    GENERAL: NAD  NECK: Supple, No JVD  CHEST/LUNG: No rales, rhonchi, wheezing  HEART: Regular rate and rhythm; No murmurs  GI/ABDOMEN: Soft, Nontender, Nondistended; Bowel sounds present  EXTREMITIES: s/p b/l hands amputation, feet dressing clean, wound vac in place. No calf tenderness b/l.  SKIN: No rashes or lesions  NERVOUS SYSTEM:  Alert & Oriented X3, no focal deficit     LABS:                        7.7    9.53  )-----------( 323      ( 2018 08:31 )             24.2         135  |  100  |  9<L>  ----------------------------<  95  4.0   |  30  |  1.0    Ca    8.7      2018 08:31    Urinalysis Basic - ( 2018 20:03 )    Color: Yellow / Appearance: Clear / S.025 / pH: x  Gluc: x / Ketone: Negative  / Bili: Negative / Urobili: 1.0 mg/dL   Blood: x / Protein: Negative mg/dL / Nitrite: Negative   Leuk Esterase: Negative / RBC: x / WBC x   Sq Epi: x / Non Sq Epi: x / Bacteria: x    MEDICATIONS  (STANDING):  ampicillin/sulbactam  IVPB      ampicillin/sulbactam  IVPB 1.5 Gram(s) IV Intermittent every 6 hours  bisacodyl Suppository 10 milliGRAM(s) Rectal daily  enoxaparin Injectable 40 milliGRAM(s) SubCutaneous every 24 hours  gabapentin 300 milliGRAM(s) Oral at bedtime  lidocaine 1% Injectable 40 milliLiter(s) Local Injection once  lidocaine 1% Injectable 40 milliLiter(s) Local Injection once  lidocaine 2% Gel 1 Application(s) Topical daily  lidocaine 2% Gel 1 Application(s) Topical once  metoprolol succinate ER 50 milliGRAM(s) Oral at bedtime  oxyCODONE    5 mG/acetaminophen 325 mG 2 Tablet(s) Oral every 6 hours  polyethylene glycol 3350 17 Gram(s) Oral three times a day    MEDICATIONS  (PRN):  acetaminophen   Tablet 650 milliGRAM(s) Oral every 6 hours PRN For Temp greater than 38.5 C (101.3 F)  morphine  - Injectable 4 milliGRAM(s) IV Push every 4 hours PRN Severe Pain (7 - 10)  senna Syrup 5 milliLiter(s) Oral three times a day PRN Constipation

## 2018-02-26 DIAGNOSIS — I10 ESSENTIAL (PRIMARY) HYPERTENSION: ICD-10-CM

## 2018-02-26 DIAGNOSIS — I96 GANGRENE, NOT ELSEWHERE CLASSIFIED: ICD-10-CM

## 2018-02-26 RX ORDER — LIDOCAINE HCL 20 MG/ML
40 VIAL (ML) INJECTION ONCE
Qty: 0 | Refills: 0 | Status: DISCONTINUED | OUTPATIENT
Start: 2018-02-26 | End: 2018-03-10

## 2018-02-26 RX ORDER — HYDROMORPHONE HYDROCHLORIDE 2 MG/ML
1 INJECTION INTRAMUSCULAR; INTRAVENOUS; SUBCUTANEOUS ONCE
Qty: 0 | Refills: 0 | Status: DISCONTINUED | OUTPATIENT
Start: 2018-02-26 | End: 2018-02-26

## 2018-02-26 RX ADMIN — AMPICILLIN SODIUM AND SULBACTAM SODIUM 100 GRAM(S): 250; 125 INJECTION, POWDER, FOR SUSPENSION INTRAMUSCULAR; INTRAVENOUS at 23:09

## 2018-02-26 RX ADMIN — AMPICILLIN SODIUM AND SULBACTAM SODIUM 100 GRAM(S): 250; 125 INJECTION, POWDER, FOR SUSPENSION INTRAMUSCULAR; INTRAVENOUS at 18:24

## 2018-02-26 RX ADMIN — MORPHINE SULFATE 4 MILLIGRAM(S): 50 CAPSULE, EXTENDED RELEASE ORAL at 16:16

## 2018-02-26 RX ADMIN — OXYCODONE AND ACETAMINOPHEN 2 TABLET(S): 5; 325 TABLET ORAL at 12:23

## 2018-02-26 RX ADMIN — OXYCODONE AND ACETAMINOPHEN 2 TABLET(S): 5; 325 TABLET ORAL at 23:08

## 2018-02-26 RX ADMIN — GABAPENTIN 300 MILLIGRAM(S): 400 CAPSULE ORAL at 21:40

## 2018-02-26 RX ADMIN — OXYCODONE AND ACETAMINOPHEN 2 TABLET(S): 5; 325 TABLET ORAL at 06:04

## 2018-02-26 RX ADMIN — MORPHINE SULFATE 4 MILLIGRAM(S): 50 CAPSULE, EXTENDED RELEASE ORAL at 06:19

## 2018-02-26 RX ADMIN — OXYCODONE AND ACETAMINOPHEN 2 TABLET(S): 5; 325 TABLET ORAL at 18:24

## 2018-02-26 RX ADMIN — MORPHINE SULFATE 4 MILLIGRAM(S): 50 CAPSULE, EXTENDED RELEASE ORAL at 16:31

## 2018-02-26 RX ADMIN — AMPICILLIN SODIUM AND SULBACTAM SODIUM 100 GRAM(S): 250; 125 INJECTION, POWDER, FOR SUSPENSION INTRAMUSCULAR; INTRAVENOUS at 11:53

## 2018-02-26 RX ADMIN — MORPHINE SULFATE 4 MILLIGRAM(S): 50 CAPSULE, EXTENDED RELEASE ORAL at 05:30

## 2018-02-26 RX ADMIN — AMPICILLIN SODIUM AND SULBACTAM SODIUM 100 GRAM(S): 250; 125 INJECTION, POWDER, FOR SUSPENSION INTRAMUSCULAR; INTRAVENOUS at 06:06

## 2018-02-26 RX ADMIN — OXYCODONE AND ACETAMINOPHEN 2 TABLET(S): 5; 325 TABLET ORAL at 18:54

## 2018-02-26 RX ADMIN — ENOXAPARIN SODIUM 40 MILLIGRAM(S): 100 INJECTION SUBCUTANEOUS at 06:06

## 2018-02-26 RX ADMIN — Medication 50 MILLIGRAM(S): at 21:40

## 2018-02-26 RX ADMIN — OXYCODONE AND ACETAMINOPHEN 2 TABLET(S): 5; 325 TABLET ORAL at 00:38

## 2018-02-26 RX ADMIN — OXYCODONE AND ACETAMINOPHEN 2 TABLET(S): 5; 325 TABLET ORAL at 11:53

## 2018-02-26 RX ADMIN — OXYCODONE AND ACETAMINOPHEN 2 TABLET(S): 5; 325 TABLET ORAL at 00:49

## 2018-02-26 RX ADMIN — AMPICILLIN SODIUM AND SULBACTAM SODIUM 100 GRAM(S): 250; 125 INJECTION, POWDER, FOR SUSPENSION INTRAMUSCULAR; INTRAVENOUS at 00:38

## 2018-02-26 NOTE — PROGRESS NOTE ADULT - SUBJECTIVE AND OBJECTIVE BOX
PODIATRY PROGRESS NOTE   [id number] JIMMY CR is a pleasant well-nourished, well developed 42y Female in no acute distress, alert awake, and oriented to person, place and time.   Patient is a 42y old  Female who presents with a chief complaint of b/l foot pain, gangrene (15 Feb 2018 14:24)    HPI:  43 YO F with PMH of HTN, Obesity s/p gastric bypass surgery complicated by septic shock, ARDS, SBO, ATN with CVVH, stress cardiomyopathy and vasopressor induced necrosis of fingers and toes in October 2017 presents to the ER for admission for b/l foot amputation. As per the patient her b/l feet ulcers are being taken care of by Podiatry by wound care. She was told by the Podiatrist to go the ER for the amputation. Pt. already has amputations of fingers of both hands.   At baseline pt. reports that she can walk without any exertional chest pain/sob, but it is limited by the pain upon walking with foot ulcers. Pt. denies any fevers, chills, nausea, vomiting, chest pain,   lagunas, palpitations, headaches. (15 Feb 2018 14:24)    Patient is being followed by Podiatry Service for management of: bilateral open choparts amputation sites.    Vital Signs Last 24 Hrs  T(C): 36.8 (26 Feb 2018 07:40), Max: 37.1 (26 Feb 2018 00:00)  T(F): 98.2 (26 Feb 2018 07:40), Max: 98.7 (26 Feb 2018 00:00)  HR: 96 (26 Feb 2018 07:40) (88 - 96)  BP: 113/67 (26 Feb 2018 07:40) (101/59 - 113/67)  BP(mean): --  RR: 18 (26 Feb 2018 07:40) (18 - 18)  SpO2: --                    7.7    9.53  )-----------( 323      ( 25 Feb 2018 08:31 )             24.2           02-25  135  |  100  |  9<L>  ----------------------------<  95  4.0   |  30  |  1.0    Ca    8.7      25 Feb 2018 08:31    A:  Bilateral chopart amputation sites open with out clinical signs of infection.     P:  Wound Care Orders: Lidocaine jelly soaked adaptic/ wound gel/ Dakins/DSD/Kerlix  Podiatry to follow up in q24h for continued evaluation and management.  Requesting Plastics consult for evaluation of soft tissue coverage.     Attending updated and added to note for review.   02-26-18 @ 09:35

## 2018-02-26 NOTE — PROGRESS NOTE ADULT - SUBJECTIVE AND OBJECTIVE BOX
PODIATRY PROGRESS NOTE   [id number] JIMMY CR is a pleasant well-nourished, well developed 42y Female in no acute distress, alert awake, and oriented to person, place and time.   Patient is a 42y old  Female who presents with a chief complaint of b/l foot pain, gangrene (15 Feb 2018 14:24)    HPI:  43 YO F with PMH of HTN, Obesity s/p gastric bypass surgery complicated by septic shock, ARDS, SBO, ATN with CVVH, stress cardiomyopathy and vasopressor induced necrosis of fingers and toes in October 2017 presents to the ER for admission for b/l foot amputation. As per the patient her b/l feet ulcers are being taken care of by Podiatry by wound care. She was told by the Podiatrist to go the ER for the amputation. Pt. already has amputations of fingers of both hands.   At baseline pt. reports that she can walk without any exertional chest pain/sob, but it is limited by the pain upon walking with foot ulcers. Pt. denies any fevers, chills, nausea, vomiting, chest pain,   lagunas, palpitations, headaches. (15 Feb 2018 14:24)    Patient is being followed by Podiatry Service for management of: Bilateral chopart amputations.    Utility of visit was an inter-disciplinary clinical evaluation of the bilateral chopart amputation sites with Dr. Mohinder Salazar and Dr. Parag Lind IV. During this visit extensive discussion regarding the future surgical course for remodeling and eventual soft tissue coverage was discussed with the teams and the patient.     Vital Signs Last 24 Hrs  T(C): 36.8 (26 Feb 2018 15:45), Max: 37.1 (26 Feb 2018 00:00)  T(F): 98.3 (26 Feb 2018 15:45), Max: 98.7 (26 Feb 2018 00:00)  HR: 86 (26 Feb 2018 15:45) (86 - 96)  BP: 103/64 (26 Feb 2018 15:45) (103/64 - 113/67)  BP(mean): --  RR: 18 (26 Feb 2018 15:45) (18 - 18)  SpO2: --                      7.7    9.53  )-----------( 323      ( 25 Feb 2018 08:31 )             24.2                 02-25  135  |  100  |  9<L>  ----------------------------<  95  4.0   |  30  |  1.0    Ca    8.7      25 Feb 2018 08:31    A:  Bilateral Chopart Amputation without clinical signs of infection     P:  Wound Care Orders: Wound VAC applied -125mm Hg to bilateral Chopart Amputation sites without complaint or complication.  Wound VAC to be taken down 2/28/2018 for evaluation.   Dr. Mohinder Salazar and Dr. Parag Lind IV were present for the bedside evaluation of the bilateral chopart sites.   All questions were answered to patients satisfaction that this conversation was preliminary meeting to determine the appropriate course of treatment.   A definitive meeting will occur at a later date during this admission to determine the course of treatment which is dependant upon the clinical progression of the bilateral chopart sites.    Podiatry to follow up in q24h for continued evaluation and management.    Attendings updated and added to note for their review.   02-26-18 @ 17:31

## 2018-02-26 NOTE — PROGRESS NOTE ADULT - ATTENDING COMMENTS
bedside evaluation of patient with dr Lind,discussed plans for future management. agree with Npwt vac application bilateral. will continue to monitor closely.

## 2018-02-26 NOTE — PROGRESS NOTE ADULT - ATTENDING COMMENTS
Evaluated patient bedside with resident,awaiting plastic surgical evaluation. will discuss with plastics. dressing applied bilateral. adaptic and Dakins wet to dry.

## 2018-02-26 NOTE — CONSULT NOTE ADULT - ATTENDING COMMENTS
Patient seen/examined with Podiatry team and Dr Carmen.  Highly complex lower extremity reconstructive situation.  Pt with proximal TMA of both feet--exposed cuneiform and cuboid on right and exposed 1st metatarsal on left.  More substantial soft tissue defect on plantar surface of right foot.  I will discuss complex case w Nella Hernández and Nitin.  Potential family meeting with  in 48 hours.  Definitive plan pending--options include bilateral BKA as well as a variety of free flap options.
evaluated patient at bedside ,patient is preop for tomorrow,awaiting medical clearance,preop BHCG,and type and screen. consent to be read and signed.

## 2018-02-26 NOTE — PROGRESS NOTE ADULT - ATTENDING COMMENTS
43 YO F with PMH of HTN, PANDA, Obesity s/p gastric bypass surgery complicated by septic shock, ARDS, SBO, ATN with CVVH, stress cardiomyopathy and vasopressor induced necrosis of fingers and toes in October 2017 presented to the ER for admission for b/l foot amputation.  S/p TMA and debridement.  Pt was spiking fever started on ABx on 02/24.   Afebrile > 48 hrs  NO new complaints, pain controlled on Percocet with Morphine. Pt had BM yesterday.     Vitals stable, afebrile, not tachycardic.    GENERAL: NAD  NECK: Supple, No JVD  CHEST/LUNG: No rales, rhonchi, wheezing  HEART: Regular rate and rhythm; No murmurs  GI/ABDOMEN: Soft, Nontender, Nondistended; Bowel sounds present  EXTREMITIES: s/p b/l hands amputation, feet dressing clean, wound vac in place. No calf tenderness b/l.  SKIN: No rashes or lesions  NERVOUS SYSTEM:  Alert & Oriented X3, no focal deficit     A/p:  1. Fever, improved  - Wound Cx positive for ESBL E. Coli, MSSA, E. faecalis, as per podiatry wound margins looks noninfected, but given Fever and Leucocytosis will continue ABx for now.    2. B/L foot gangrene after vasopressors.  - wound vac change by podiatry  - plastic consult  - continue Percocet 2 tab Q 6 hrs and Morphine 4 mg Q4hrs PRN and topical Lidocaine    2. Acute blood loss anemia post op.  - stable Hb  - Iron supplement    3. HTN  - cont home meds    4. PANDA - cont CPAP    5. constipation  - resolved  - enema and Dulcolax suppository PRN    Prophylactic measures  DVT ppx with Lovenox 40 mg QD   GI ppx not indicated  Heart healthy diet  Full code    Dispo - pending clinical course .

## 2018-02-26 NOTE — CONSULT NOTE ADULT - EXTREMITIES COMMENTS
Highly complex lower extremity reconstructive situation.  Pt with proximal TMA of both feet--exposed cuneiform and cuboid on right and exposed 1st metatarsal on left.  More substantial soft tissue defect on plantar surface of right foot.

## 2018-02-26 NOTE — PROGRESS NOTE ADULT - SUBJECTIVE AND OBJECTIVE BOX
41 YO F with PMH of HTN, PANDA, Obesity s/p gastric bypass surgery complicated by septic shock, ARDS, SBO, ATN with CVVH, stress cardiomyopathy and vasopressor induced necrosis of fingers and toes in 2017 presented to the ER for admission for b/l foot amputation.  S/p TMA and debridement.   Pt was spiking fever started on ABx on .   Afebrile > 24 hrs        PAST MEDICAL & SURGICAL HISTORY:  Takotsubo cardiomyopathy  Small bowel obstruction  Osteoarthritis  Cardiomyopathy  Sleep apnea  HTN (hypertension)  Gangrene of lower extremity  Gangrene of finger of right hand  Gangrene of finger of left hand  H/O exploratory laparotomy  Amputation finger  History of cholecystectomy  H/O gastric bypass      MEDICATIONS  (STANDING):  ampicillin/sulbactam  IVPB      ampicillin/sulbactam  IVPB 1.5 Gram(s) IV Intermittent every 6 hours  bisacodyl Suppository 10 milliGRAM(s) Rectal daily  enoxaparin Injectable 40 milliGRAM(s) SubCutaneous every 24 hours  gabapentin 300 milliGRAM(s) Oral at bedtime  lidocaine 1% Injectable 40 milliLiter(s) Local Injection once  lidocaine 1% Injectable 40 milliLiter(s) Local Injection once  lidocaine 1% Injectable 60 milliLiter(s) Local Injection once  lidocaine 2% Gel 1 Application(s) Topical once  lidocaine 2% Gel 1 Application(s) Topical once  lidocaine 2% Gel 1 Application(s) Topical daily  lidocaine 2% Gel 1 Application(s) Topical once  metoprolol succinate ER 50 milliGRAM(s) Oral at bedtime  oxyCODONE    5 mG/acetaminophen 325 mG 2 Tablet(s) Oral every 6 hours    MEDICATIONS  (PRN):  acetaminophen   Tablet 650 milliGRAM(s) Oral every 6 hours PRN For Temp greater than 38.5 C (101.3 F)  morphine  - Injectable 4 milliGRAM(s) IV Push every 4 hours PRN Severe Pain (7 - 10)  senna Syrup 5 milliLiter(s) Oral three times a day PRN Constipation      Overnight events:    Vital Signs Last 24 Hrs  T(C): 36.8 (2018 07:40), Max: 37.1 (2018 00:00)  T(F): 98.2 (2018 07:40), Max: 98.7 (2018 00:00)  HR: 96 (2018 07:40) (88 - 96)  BP: 113/67 (2018 07:40) (101/59 - 113/67)  BP(mean): --  RR: 18 (2018 07:40) (18 - 18)  SpO2: --  CAPILLARY BLOOD GLUCOSE        I&O's Summary    2018 07:01  -  2018 07:00  --------------------------------------------------------  IN: 390 mL / OUT: 800 mL / NET: -410 mL        Physical Exam:    -     General :     -      HEENT:    -      Cardiac:    -      Pulm:    -      GI:    -      Musculoskeletal:    -      Neuro:        Labs:                        7.7    9.53  )-----------( 323      ( 2018 08:31 )             24.2             02-    135  |  100  |  9<L>  ----------------------------<  95  4.0   |  30  |  1.0    Ca    8.7      2018 08:31                      Urinalysis Basic - ( 2018 20:03 )    Color: Yellow / Appearance: Clear / S.025 / pH: x  Gluc: x / Ketone: Negative  / Bili: Negative / Urobili: 1.0 mg/dL   Blood: x / Protein: Negative mg/dL / Nitrite: Negative   Leuk Esterase: Negative / RBC: x / WBC x   Sq Epi: x / Non Sq Epi: x / Bacteria: x        Culture - Blood (collected 2018 17:49)  Source: .Blood None  Preliminary Report (2018 04:00):    No growth to date.        Imaging:    ECG:    T(C): 36.8 (18 @ 07:40), Max: 37.1 (18 @ 00:00)  HR: 96 (18 @ 07:40) (88 - 96)  BP: 113/67 (18 @ 07:40) (101/59 - 113/67)  RR: 18 (18 @ 07:40) (18 - 18)  SpO2: --

## 2018-02-27 LAB
ANION GAP SERPL CALC-SCNC: 7 MMOL/L — SIGNIFICANT CHANGE UP (ref 7–14)
BASOPHILS # BLD AUTO: 0.03 K/UL — SIGNIFICANT CHANGE UP (ref 0–0.2)
BASOPHILS NFR BLD AUTO: 0.4 % — SIGNIFICANT CHANGE UP (ref 0–1)
BUN SERPL-MCNC: 7 MG/DL — LOW (ref 10–20)
CALCIUM SERPL-MCNC: 9 MG/DL — SIGNIFICANT CHANGE UP (ref 8.5–10.1)
CHLORIDE SERPL-SCNC: 102 MMOL/L — SIGNIFICANT CHANGE UP (ref 98–110)
CO2 SERPL-SCNC: 28 MMOL/L — SIGNIFICANT CHANGE UP (ref 17–32)
CREAT SERPL-MCNC: 1 MG/DL — SIGNIFICANT CHANGE UP (ref 0.7–1.5)
EOSINOPHIL # BLD AUTO: 0.41 K/UL — SIGNIFICANT CHANGE UP (ref 0–0.7)
EOSINOPHIL NFR BLD AUTO: 5.1 % — SIGNIFICANT CHANGE UP (ref 0–8)
GLUCOSE SERPL-MCNC: 90 MG/DL — SIGNIFICANT CHANGE UP (ref 70–110)
HCT VFR BLD CALC: 23.9 % — LOW (ref 37–47)
HGB BLD-MCNC: 7.4 G/DL — CRITICAL LOW (ref 14–18)
IMM GRANULOCYTES NFR BLD AUTO: 1 % — HIGH (ref 0.1–0.3)
LYMPHOCYTES # BLD AUTO: 2.01 K/UL — SIGNIFICANT CHANGE UP (ref 1.2–3.4)
LYMPHOCYTES # BLD AUTO: 24.8 % — SIGNIFICANT CHANGE UP (ref 20.5–51.1)
MCHC RBC-ENTMCNC: 24.5 PG — LOW (ref 27–31)
MCHC RBC-ENTMCNC: 31 G/DL — LOW (ref 32–37)
MCV RBC AUTO: 79.1 FL — LOW (ref 81–91)
MONOCYTES # BLD AUTO: 0.64 K/UL — HIGH (ref 0.1–0.6)
MONOCYTES NFR BLD AUTO: 7.9 % — SIGNIFICANT CHANGE UP (ref 1.7–9.3)
NEUTROPHILS # BLD AUTO: 4.94 K/UL — SIGNIFICANT CHANGE UP (ref 1.4–6.5)
NEUTROPHILS NFR BLD AUTO: 60.8 % — SIGNIFICANT CHANGE UP (ref 42.2–75.2)
NRBC # BLD: 0 /100 WBCS — SIGNIFICANT CHANGE UP (ref 0–0)
PLATELET # BLD AUTO: 406 K/UL — HIGH (ref 130–400)
POTASSIUM SERPL-MCNC: 4.3 MMOL/L — SIGNIFICANT CHANGE UP (ref 3.5–5)
POTASSIUM SERPL-SCNC: 4.3 MMOL/L — SIGNIFICANT CHANGE UP (ref 3.5–5)
RBC # BLD: 3.02 M/UL — LOW (ref 4.2–5.4)
RBC # FLD: 13.6 % — SIGNIFICANT CHANGE UP (ref 11.5–14.5)
SODIUM SERPL-SCNC: 137 MMOL/L — SIGNIFICANT CHANGE UP (ref 135–146)
TSH SERPL-MCNC: 0.55 UIU/ML — SIGNIFICANT CHANGE UP (ref 0.27–4.2)
WBC # BLD: 8.11 K/UL — SIGNIFICANT CHANGE UP (ref 4.8–10.8)
WBC # FLD AUTO: 8.11 K/UL — SIGNIFICANT CHANGE UP (ref 4.8–10.8)

## 2018-02-27 RX ORDER — LIDOCAINE HCL 20 MG/ML
60 VIAL (ML) INJECTION ONCE
Qty: 0 | Refills: 0 | Status: DISCONTINUED | OUTPATIENT
Start: 2018-02-27 | End: 2018-03-10

## 2018-02-27 RX ORDER — LIDOCAINE 4 G/100G
60 CREAM TOPICAL ONCE
Qty: 0 | Refills: 0 | Status: DISCONTINUED | OUTPATIENT
Start: 2018-02-27 | End: 2018-03-10

## 2018-02-27 RX ADMIN — OXYCODONE AND ACETAMINOPHEN 2 TABLET(S): 5; 325 TABLET ORAL at 23:40

## 2018-02-27 RX ADMIN — Medication 50 MILLIGRAM(S): at 21:52

## 2018-02-27 RX ADMIN — OXYCODONE AND ACETAMINOPHEN 2 TABLET(S): 5; 325 TABLET ORAL at 04:48

## 2018-02-27 RX ADMIN — OXYCODONE AND ACETAMINOPHEN 2 TABLET(S): 5; 325 TABLET ORAL at 05:24

## 2018-02-27 RX ADMIN — SENNA PLUS 5 MILLILITER(S): 8.6 TABLET ORAL at 15:39

## 2018-02-27 RX ADMIN — MORPHINE SULFATE 4 MILLIGRAM(S): 50 CAPSULE, EXTENDED RELEASE ORAL at 03:20

## 2018-02-27 RX ADMIN — MORPHINE SULFATE 4 MILLIGRAM(S): 50 CAPSULE, EXTENDED RELEASE ORAL at 02:49

## 2018-02-27 RX ADMIN — OXYCODONE AND ACETAMINOPHEN 2 TABLET(S): 5; 325 TABLET ORAL at 05:25

## 2018-02-27 RX ADMIN — GABAPENTIN 300 MILLIGRAM(S): 400 CAPSULE ORAL at 21:52

## 2018-02-27 RX ADMIN — OXYCODONE AND ACETAMINOPHEN 2 TABLET(S): 5; 325 TABLET ORAL at 12:15

## 2018-02-27 RX ADMIN — OXYCODONE AND ACETAMINOPHEN 2 TABLET(S): 5; 325 TABLET ORAL at 17:43

## 2018-02-27 RX ADMIN — OXYCODONE AND ACETAMINOPHEN 2 TABLET(S): 5; 325 TABLET ORAL at 12:45

## 2018-02-27 RX ADMIN — Medication 1 TABLET(S): at 17:48

## 2018-02-27 RX ADMIN — AMPICILLIN SODIUM AND SULBACTAM SODIUM 100 GRAM(S): 250; 125 INJECTION, POWDER, FOR SUSPENSION INTRAMUSCULAR; INTRAVENOUS at 05:24

## 2018-02-27 RX ADMIN — OXYCODONE AND ACETAMINOPHEN 2 TABLET(S): 5; 325 TABLET ORAL at 18:13

## 2018-02-27 RX ADMIN — ENOXAPARIN SODIUM 40 MILLIGRAM(S): 100 INJECTION SUBCUTANEOUS at 08:03

## 2018-02-27 NOTE — PROGRESS NOTE ADULT - SUBJECTIVE AND OBJECTIVE BOX
41 YO F with PMH of HTN, PANDA, Obesity s/p gastric bypass surgery complicated by septic shock, ARDS, SBO, ATN with CVVH, stress cardiomyopathy and vasopressor induced necrosis of fingers and toes in 2017 presented to the ER for admission for b/l foot amputation.  S/p TMA and debridement.   Pt was spiking fever started on ABx on .   Afebrile > 24 hrs        PAST MEDICAL & SURGICAL HISTORY:  Takotsubo cardiomyopathy  Small bowel obstruction  Osteoarthritis  Cardiomyopathy  Sleep apnea  HTN (hypertension)  Gangrene of lower extremity  Gangrene of finger of right hand  Gangrene of finger of left hand  H/O exploratory laparotomy  Amputation finger  History of cholecystectomy  H/O gastric bypass      MEDICATIONS  (STANDING):  ampicillin/sulbactam  IVPB      ampicillin/sulbactam  IVPB 1.5 Gram(s) IV Intermittent every 6 hours  bisacodyl Suppository 10 milliGRAM(s) Rectal daily  enoxaparin Injectable 40 milliGRAM(s) SubCutaneous every 24 hours  gabapentin 300 milliGRAM(s) Oral at bedtime  lidocaine 1% Injectable 40 milliLiter(s) Local Injection once  lidocaine 1% Injectable 40 milliLiter(s) Local Injection once  lidocaine 1% Injectable 60 milliLiter(s) Local Injection once  lidocaine 2% Gel 1 Application(s) Topical once  lidocaine 2% Gel 1 Application(s) Topical once  lidocaine 2% Gel 1 Application(s) Topical daily  lidocaine 2% Gel 1 Application(s) Topical once  metoprolol succinate ER 50 milliGRAM(s) Oral at bedtime  oxyCODONE    5 mG/acetaminophen 325 mG 2 Tablet(s) Oral every 6 hours    MEDICATIONS  (PRN):  acetaminophen   Tablet 650 milliGRAM(s) Oral every 6 hours PRN For Temp greater than 38.5 C (101.3 F)  morphine  - Injectable 4 milliGRAM(s) IV Push every 4 hours PRN Severe Pain (7 - 10)  senna Syrup 5 milliLiter(s) Oral three times a day PRN Constipation      Overnight events:    Vital Signs Last 24 Hrs  T(C): 36.8 (2018 07:40), Max: 37.1 (2018 00:00)  T(F): 98.2 (2018 07:40), Max: 98.7 (2018 00:00)  HR: 96 (2018 07:40) (88 - 96)  BP: 113/67 (2018 07:40) (101/59 - 113/67)  BP(mean): --  RR: 18 (2018 07:40) (18 - 18)  SpO2: --  CAPILLARY BLOOD GLUCOSE        I&O's Summary    2018 07:01  -  2018 07:00  --------------------------------------------------------  IN: 390 mL / OUT: 800 mL / NET: -410 mL        Physical Exam:    -     General :     -      HEENT:    -      Cardiac:    -      Pulm:    -      GI:    -      Musculoskeletal:    -      Neuro:        Labs:                        7.7    9.53  )-----------( 323      ( 2018 08:31 )             24.2             02-    135  |  100  |  9<L>  ----------------------------<  95  4.0   |  30  |  1.0    Ca    8.7      2018 08:31                      Urinalysis Basic - ( 2018 20:03 )    Color: Yellow / Appearance: Clear / S.025 / pH: x  Gluc: x / Ketone: Negative  / Bili: Negative / Urobili: 1.0 mg/dL   Blood: x / Protein: Negative mg/dL / Nitrite: Negative   Leuk Esterase: Negative / RBC: x / WBC x   Sq Epi: x / Non Sq Epi: x / Bacteria: x        Culture - Blood (collected 2018 17:49)  Source: .Blood None  Preliminary Report (2018 04:00):    No growth to date.        Imaging:    ECG:    T(C): 36.8 (18 @ 07:40), Max: 37.1 (18 @ 00:00)  HR: 96 (18 @ 07:40) (88 - 96)  BP: 113/67 (18 @ 07:40) (101/59 - 113/67)  RR: 18 (18 @ 07:40) (18 - 18)  SpO2: -- 43 YO F with PMH of HTN, PANDA, Obesity s/p gastric bypass surgery complicated by septic shock, ARDS, SBO, ATN with CVVH, stress cardiomyopathy and vasopressor induced necrosis of fingers and toes in 2017 presented to the ER for admission for b/l foot amputation.  S/p TMA and debridement.   Pt was spiking fever started on ABx on .   Afebrile > 24 hrs        PAST MEDICAL & SURGICAL HISTORY:  Takotsubo cardiomyopathy  Small bowel obstruction  Osteoarthritis  Cardiomyopathy  Sleep apnea  HTN (hypertension)  Gangrene of lower extremity  Gangrene of finger of right hand  Gangrene of finger of left hand  H/O exploratory laparotomy  Amputation finger  History of cholecystectomy  H/O gastric bypass      MEDICATIONS  (STANDING):  ampicillin/sulbactam  IVPB      ampicillin/sulbactam  IVPB 1.5 Gram(s) IV Intermittent every 6 hours  bisacodyl Suppository 10 milliGRAM(s) Rectal daily  enoxaparin Injectable 40 milliGRAM(s) SubCutaneous every 24 hours  gabapentin 300 milliGRAM(s) Oral at bedtime  lidocaine 1% Injectable 40 milliLiter(s) Local Injection once  lidocaine 1% Injectable 40 milliLiter(s) Local Injection once  lidocaine 1% Injectable 60 milliLiter(s) Local Injection once  lidocaine 2% Gel 1 Application(s) Topical once  lidocaine 2% Gel 1 Application(s) Topical once  lidocaine 2% Gel 1 Application(s) Topical daily  lidocaine 2% Gel 1 Application(s) Topical once  metoprolol succinate ER 50 milliGRAM(s) Oral at bedtime  oxyCODONE    5 mG/acetaminophen 325 mG 2 Tablet(s) Oral every 6 hours    MEDICATIONS  (PRN):  acetaminophen   Tablet 650 milliGRAM(s) Oral every 6 hours PRN For Temp greater than 38.5 C (101.3 F)  morphine  - Injectable 4 milliGRAM(s) IV Push every 4 hours PRN Severe Pain (7 - 10)  senna Syrup 5 milliLiter(s) Oral three times a day PRN Constipation      Overnight events:    Vital Signs Last 24 Hrs  T(C): 36.8 (2018 07:40), Max: 37.1 (2018 00:00)  T(F): 98.2 (2018 07:40), Max: 98.7 (2018 00:00)  HR: 96 (2018 07:40) (88 - 96)  BP: 113/67 (2018 07:40) (101/59 - 113/67)  BP(mean): --  RR: 18 (2018 07:40) (18 - 18)  SpO2: --  CAPILLARY BLOOD GLUCOSE        I&O's Summary    2018 07:01  -  2018 07:00  --------------------------------------------------------  IN: 390 mL / OUT: 800 mL / NET: -410 mL        Physical Exam:    -     General : NAD    -      HEENT: NECK SOFT NO MASS     -      Cardiac: S1 S2 NO MURMUR     -      Pulm: GOOD AIR ENTRY     -      GI: ABD SOFT NON TENDER     -      Musculoskeletal: wnl     -      Neuro: aaox3         Labs:                        7.7    9.53  )-----------( 323      ( 2018 08:31 )             24.2             02-    135  |  100  |  9<L>  ----------------------------<  95  4.0   |  30  |  1.0    Ca    8.7      2018 08:31                      Urinalysis Basic - ( 2018 20:03 )    Color: Yellow / Appearance: Clear / S.025 / pH: x  Gluc: x / Ketone: Negative  / Bili: Negative / Urobili: 1.0 mg/dL   Blood: x / Protein: Negative mg/dL / Nitrite: Negative   Leuk Esterase: Negative / RBC: x / WBC x   Sq Epi: x / Non Sq Epi: x / Bacteria: x        Culture - Blood (collected 2018 17:49)  Source: .Blood None  Preliminary Report (2018 04:00):    No growth to date.        Imaging:    ECG:    T(C): 36.8 (18 @ 07:40), Max: 37.1 (18 @ 00:00)  HR: 96 (18 @ 07:40) (88 - 96)  BP: 113/67 (18 @ 07:40) (101/59 - 113/67)  RR: 18 (18 @ 07:40) ( - )  SpO2: --

## 2018-02-27 NOTE — PROGRESS NOTE ADULT - ASSESSMENT
43 YO F with PMH of HTN, PANDA, Obesity s/p gastric bypass surgery complicated by septic shock, ARDS, SBO, ATN with CVVH, stress cardiomyopathy and vasopressor induced necrosis of fingers and toes in October 2017 presented to the ER for admission for b/l foot amputation.  S/p TMA and debridement.   Pt was spiking fever started on ABx on 02/24.   Afebrile > 24 hrs 43 YO F with PMH of HTN, PANDA, Obesity s/p gastric bypass surgery complicated by septic shock, ARDS, SBO, ATN with CVVH, stress cardiomyopathy and vasopressor induced necrosis of fingers and toes in October 2017 presented to the ER for admission for b/l foot amputation.  S/p TMA and debridement.  Pt was spiking fever started on ABx on 02/24.   Afebrile > 24 hrs

## 2018-02-27 NOTE — CHART NOTE - NSCHARTNOTEFT_GEN_A_CORE
Registered Dietitian Follow-Up     Patient Profile Reviewed                           Yes [x]   No []     Nutrition History Previously Obtained        Yes [x]  No []       Pertinent Subjective Information: Pt. reports decreased PO intake over past week. Pt. with h/o bariatric surgery-eating small frequent meals at baseline. pt. previously on DASH/TLC diet (found it very unappetizing) , requested diet change to regular-honored.       Pertinent Medical Interventions: s/p b/l TMA, podiatry following, plastic surgery following- highly complex lower extremity reconstructive situation, on abx-afebrile >24hrs, vac removed, constipation-resolving (mineral oil enema 2/24),      Diet order: regular (25-75% documented in EMR)      Anthropometrics:  - Ht.  - Wt. no new weights documented   - %wt change  - BMI  - IBW     Pertinent Lab Data: (2/27) RBC 3.02, Hg 7.4, Hct 23.9, BUN 7     Pertinent Meds: Dulcolax, Senna      Physical Findings:  - Appearance: alert & oriented  - GI function: constipation, last BM 2/25  - Tubes:  - Oral/Mouth cavity: reports difficulty swallowing (sensation of "something stuck down in her esophagus") started today   - Skin: surgical incision (BS 18)     Nutrition Requirements  Weight Used:     Estimated Energy Needs    Continue [x]  Adjust [] 1655-1820kcal  Adjusted Energy Recommendations:   kcal/day        Estimated Protein Needs    Continue [x]  Adjust [] 65-79g  Adjusted Protein Recommendations:   gm/day        Estimated Fluid Needs        Continue [x]  Adjust [] 1655-1820ml  Adjusted Fluid Recommendations:   mL/day     Nutrient Intake: current diet meets estimated needs            Nutrition Diagnostic #1  Problem:  Inadequate oral intake  Etiology: decreased appetite, not fond of hospital food provisions   Statement: pt. reports of decreased PO intake as compared to baseline (after bariatric surgery)       Nutrition Intervention: meals and snacks, medical food supplement, coordination of care      Rec: Ensure clear BID, order MVI, consider SLP eval     Goal/Expected Outcome: In 3 days pt. to increase intake to her baseline      Indicator/Monitoring: energy intake, body composition, nutrition focused physical findings

## 2018-02-27 NOTE — PROGRESS NOTE ADULT - ATTENDING COMMENTS
43 YO F with PMH of HTN, PANDA, Obesity s/p gastric bypass surgery complicated by septic shock, ARDS, SBO, ATN with CVVH, stress cardiomyopathy and vasopressor induced necrosis of fingers and toes in October 2017 presented to the ER for admission for b/l foot amputation.  S/p TMA and debridement.    NO new complaints, pain controlled on Percocet with Morphine. Pt had BM.      Vitals stable, afebrile, not tachycardic.    GENERAL: NAD  NECK: Supple, No JVD  CHEST/LUNG: No rales, rhonchi, wheezing  HEART: Regular rate and rhythm; No murmurs  GI/ABDOMEN: Soft, Nontender, Nondistended; Bowel sounds present  EXTREMITIES: s/p b/l hands amputation, feet dressing clean, wound vac in place. No calf tenderness b/l.  SKIN: No rashes or lesions  NERVOUS SYSTEM:  Alert & Oriented X3, no focal deficit     A/p:  1. Fever, improved  - Wound Cx positive for ESBL E. Coli, MSSA, E. faecalis, as per podiatry wound margins looks noninfected, WBC normal, afebrile, will dc ABx.     2. B/L foot gangrene after vasopressors.  - as per plastic - complex case, wound vac replaced yesterday, decision flap vs BKA not made yet, will follow up with pod and plastic  - continue Percocet 2 tab Q 6 hrs and Morphine 4 mg Q4hrs PRN and topical Lidocaine    2. Acute blood loss anemia post op.  - stable Hb  - Iron supplement    3. HTN  - cont home meds    4. PANDA - cont CPAP    5. constipation  - resolved  - bowel regimen continue    Prophylactic measures  DVT ppx with Lovenox 40 mg QD   GI ppx not indicated  Heart healthy diet  Full code    Dispo - pending clinical course . 43 YO F with PMH of HTN, PANDA, Obesity s/p gastric bypass surgery complicated by septic shock, ARDS, SBO, ATN with CVVH, stress cardiomyopathy and vasopressor induced necrosis of fingers and toes in October 2017 presented to the ER for admission for b/l foot amputation.  S/p TMA and debridement.    NO new complaints, pain controlled on Percocet with Morphine. Pt had BM.      Vitals stable, afebrile, not tachycardic.    GENERAL: NAD  NECK: Supple, No JVD  CHEST/LUNG: No rales, rhonchi, wheezing  HEART: Regular rate and rhythm; No murmurs  GI/ABDOMEN: Soft, Nontender, Nondistended; Bowel sounds present  EXTREMITIES: s/p b/l hands amputation, feet dressing clean, wound vac in place. No calf tenderness b/l.  SKIN: No rashes or lesions  NERVOUS SYSTEM:  Alert & Oriented X3, no focal deficit     A/p:  1. Fever, improved  - Wound Cx positive for ESBL E. Coli, MSSA, E. faecalis, as per podiatry wound margins looks noninfected, WBC normal, afebrile, will dc ABx.     2. B/L foot gangrene after vasopressors.  - as per plastic - complex case, wound vac replaced yesterday, decision flap vs BKA not made yet, will follow up with pod and plastic  - continue Percocet 2 tab Q 6 hrs and Morphine 4 mg Q4hrs PRN and topical Lidocaine    2. Acute anemia, multifactorial likely  - will send anemia work up.   - Hb dropped significantly, but stable  - Iron supplement    3. HTN  - cont home meds    4. PANDA - cont CPAP    5. constipation  - resolved  - bowel regimen continue    Prophylactic measures  DVT ppx with Lovenox 40 mg QD   GI ppx not indicated  Heart healthy diet  Full code    Dispo - pending clinical course .

## 2018-02-28 DIAGNOSIS — Z01.419 ENCOUNTER FOR GYNECOLOGICAL EXAMINATION (GENERAL) (ROUTINE) WITHOUT ABNORMAL FINDINGS: ICD-10-CM

## 2018-02-28 LAB
ANION GAP SERPL CALC-SCNC: 5 MMOL/L — LOW (ref 7–14)
BASOPHILS # BLD AUTO: 0.04 K/UL — SIGNIFICANT CHANGE UP (ref 0–0.2)
BASOPHILS NFR BLD AUTO: 0.4 % — SIGNIFICANT CHANGE UP (ref 0–1)
BUN SERPL-MCNC: 6 MG/DL — LOW (ref 10–20)
CALCIUM SERPL-MCNC: 9.1 MG/DL — SIGNIFICANT CHANGE UP (ref 8.5–10.1)
CHLORIDE SERPL-SCNC: 101 MMOL/L — SIGNIFICANT CHANGE UP (ref 98–110)
CO2 SERPL-SCNC: 28 MMOL/L — SIGNIFICANT CHANGE UP (ref 17–32)
CREAT SERPL-MCNC: 0.9 MG/DL — SIGNIFICANT CHANGE UP (ref 0.7–1.5)
EOSINOPHIL # BLD AUTO: 0.37 K/UL — SIGNIFICANT CHANGE UP (ref 0–0.7)
EOSINOPHIL NFR BLD AUTO: 4.1 % — SIGNIFICANT CHANGE UP (ref 0–8)
GLUCOSE SERPL-MCNC: 93 MG/DL — SIGNIFICANT CHANGE UP (ref 70–110)
HCT VFR BLD CALC: 27.3 % — LOW (ref 37–47)
HGB BLD-MCNC: 8.5 G/DL — LOW (ref 14–18)
IMM GRANULOCYTES NFR BLD AUTO: 1.3 % — HIGH (ref 0.1–0.3)
IRON SATN MFR SERPL: 22 UG/DL — LOW (ref 35–150)
IRON SATN MFR SERPL: 8 % — LOW (ref 15–50)
LYMPHOCYTES # BLD AUTO: 2.12 K/UL — SIGNIFICANT CHANGE UP (ref 1.2–3.4)
LYMPHOCYTES # BLD AUTO: 23.5 % — SIGNIFICANT CHANGE UP (ref 20.5–51.1)
MCHC RBC-ENTMCNC: 24.5 PG — LOW (ref 27–31)
MCHC RBC-ENTMCNC: 31.1 G/DL — LOW (ref 32–37)
MCV RBC AUTO: 78.7 FL — LOW (ref 81–91)
MONOCYTES # BLD AUTO: 0.58 K/UL — SIGNIFICANT CHANGE UP (ref 0.1–0.6)
MONOCYTES NFR BLD AUTO: 6.4 % — SIGNIFICANT CHANGE UP (ref 1.7–9.3)
NEUTROPHILS # BLD AUTO: 5.78 K/UL — SIGNIFICANT CHANGE UP (ref 1.4–6.5)
NEUTROPHILS NFR BLD AUTO: 64.3 % — SIGNIFICANT CHANGE UP (ref 42.2–75.2)
NRBC # BLD: 0 /100 WBCS — SIGNIFICANT CHANGE UP (ref 0–0)
PLATELET # BLD AUTO: 469 K/UL — HIGH (ref 130–400)
POTASSIUM SERPL-MCNC: 4.2 MMOL/L — SIGNIFICANT CHANGE UP (ref 3.5–5)
POTASSIUM SERPL-SCNC: 4.2 MMOL/L — SIGNIFICANT CHANGE UP (ref 3.5–5)
RBC # BLD: 3.47 M/UL — LOW (ref 4.2–5.4)
RBC # BLD: 3.47 M/UL — LOW (ref 4.2–5.4)
RBC # FLD: 13.7 % — SIGNIFICANT CHANGE UP (ref 11.5–14.5)
RETICS #: 77.7 K/UL — SIGNIFICANT CHANGE UP (ref 25–125)
RETICS/RBC NFR: 2.2 % — HIGH (ref 0.5–1.5)
SODIUM SERPL-SCNC: 134 MMOL/L — LOW (ref 135–146)
SURGICAL PATHOLOGY STUDY: SIGNIFICANT CHANGE UP
TIBC SERPL-MCNC: 306 UG/ML — SIGNIFICANT CHANGE UP (ref 260–445)
WBC # BLD: 9.01 K/UL — SIGNIFICANT CHANGE UP (ref 4.8–10.8)
WBC # FLD AUTO: 9.01 K/UL — SIGNIFICANT CHANGE UP (ref 4.8–10.8)

## 2018-02-28 RX ORDER — FERROUS SULFATE 325(65) MG
325 TABLET ORAL
Qty: 0 | Refills: 0 | Status: DISCONTINUED | OUTPATIENT
Start: 2018-02-28 | End: 2018-03-10

## 2018-02-28 RX ADMIN — OXYCODONE AND ACETAMINOPHEN 2 TABLET(S): 5; 325 TABLET ORAL at 07:21

## 2018-02-28 RX ADMIN — Medication 325 MILLIGRAM(S): at 15:20

## 2018-02-28 RX ADMIN — SENNA PLUS 5 MILLILITER(S): 8.6 TABLET ORAL at 15:22

## 2018-02-28 RX ADMIN — Medication 1 TABLET(S): at 15:20

## 2018-02-28 RX ADMIN — OXYCODONE AND ACETAMINOPHEN 2 TABLET(S): 5; 325 TABLET ORAL at 04:59

## 2018-02-28 RX ADMIN — Medication 325 MILLIGRAM(S): at 21:03

## 2018-02-28 RX ADMIN — OXYCODONE AND ACETAMINOPHEN 2 TABLET(S): 5; 325 TABLET ORAL at 04:25

## 2018-02-28 RX ADMIN — MORPHINE SULFATE 4 MILLIGRAM(S): 50 CAPSULE, EXTENDED RELEASE ORAL at 06:02

## 2018-02-28 RX ADMIN — OXYCODONE AND ACETAMINOPHEN 2 TABLET(S): 5; 325 TABLET ORAL at 18:46

## 2018-02-28 RX ADMIN — ENOXAPARIN SODIUM 40 MILLIGRAM(S): 100 INJECTION SUBCUTANEOUS at 12:18

## 2018-02-28 RX ADMIN — OXYCODONE AND ACETAMINOPHEN 2 TABLET(S): 5; 325 TABLET ORAL at 12:58

## 2018-02-28 RX ADMIN — MORPHINE SULFATE 4 MILLIGRAM(S): 50 CAPSULE, EXTENDED RELEASE ORAL at 07:21

## 2018-02-28 RX ADMIN — Medication 50 MILLIGRAM(S): at 21:02

## 2018-02-28 RX ADMIN — OXYCODONE AND ACETAMINOPHEN 2 TABLET(S): 5; 325 TABLET ORAL at 12:17

## 2018-02-28 RX ADMIN — OXYCODONE AND ACETAMINOPHEN 2 TABLET(S): 5; 325 TABLET ORAL at 18:04

## 2018-02-28 RX ADMIN — GABAPENTIN 300 MILLIGRAM(S): 400 CAPSULE ORAL at 21:02

## 2018-02-28 NOTE — PROGRESS NOTE ADULT - SUBJECTIVE AND OBJECTIVE BOX
Podiatry Follow Up:  Patient discussed with Attending Dr. Mohinder Salazar.  Podiatry team to remove NPWT Wound VAC on 3/2/2018 on AM rounds.   Please hold AM pain medication for 3/1/2018 as NPWT Wound VAC will not be removed at that time.   Podiatry to follow up q24h

## 2018-02-28 NOTE — PROGRESS NOTE ADULT - ASSESSMENT
41 YO F with PMH of HTN, PANDA, Obesity s/p gastric bypass surgery complicated by septic shock, ARDS, SBO, ATN with CVVH, stress cardiomyopathy and vasopressor induced necrosis of fingers and toes in October 2017 presented to the ER for admission for b/l foot amputation.  S/p TMA and debridement.  Pt was spiking fever started on ABx on 02/24.   Afebrile > 24 hrs

## 2018-02-28 NOTE — PROGRESS NOTE ADULT - ATTENDING COMMENTS
I had a family meeting this morning from approx 7:40am to 8:00am with Dr. Carmen, the patient and her .  We broadly discussed two treatment pathways and the risks and time course expected to be associated with each.    Regarding soft tissue coverage of the remaining tissue in the lower extremities, I spoke to the challenge given the very proximal nature of the TMA ([place] level) and the requirement of free flap coverage for each foot.  We discussed the risk of failure, need for possible emergency postop surgery for salvage, prolonged bedrest postoperatively for leg elevation, as well as the risk for bleeding, infection, flap failure, and possible need for more proximal amputation.  Dr. Valdez addressed the limited functional nature of her remaining soft tissue of her feet with ambulation and the likely lifelong requirement for walker/cane.  we also discussed prolonged anesthesia time (greater than ten hours) and the risks associated therein.    Regarding the pathway of proceeding to a more proximal level of amputation (bilateral BKA), Dr. Carmen and I discussed the expected time course to treatment completion,  expected ability for eventual ambulation without cane or walker.  We discsused the shorter recoverh time and shorter anesthetic time, with possibility of doing case under spinal.    The goal of this morning's meeting was to broadly and comprehensively describe the two different treatment options.    All the patient's and patient's 's questions were answered.  In my opinion at this point the patient will likely have a better long-term functional outcome with bilateral BKA and prostheses.    Will discuss again with patient (+/- ) in 24-48 hours.    Next VAC dressing change in 48 hours.

## 2018-02-28 NOTE — PROGRESS NOTE ADULT - ASSESSMENT
1. B/L foot gangrene, s/p TMA and soft tissue and bone debridement.  - BKA vs. flap, will follow podiatry and plastic plan.   - continue Percocet 2 tab Q 6 hrs and Morphine 4 mg Q4hrs PRN and topical Lidocaine    2. Acute anemia, multifactorial, ZAHIRA, hypoproductive BM, RPI 0.7, very low  - will increase iron supplement.     3. HTN  - cont home meds    4. PANDA - cont CPAP    Prophylactic measures  DVT ppx with Lovenox 40 mg QD   GI ppx not indicated  Regular diet  Full code    Dispo - pending clinical course.

## 2018-02-28 NOTE — PROGRESS NOTE ADULT - SUBJECTIVE AND OBJECTIVE BOX
41 YO F with PMH of HTN, PANDA, Obesity s/p gastric bypass surgery complicated by septic shock, ARDS, SBO, ATN with CVVH, stress cardiomyopathy and vasopressor induced necrosis of fingers and toes in October 2017 presented to the ER for admission for b/l foot amputation.  S/p TMA and debridement on this admission.    Currently wound vac in place.  Plastic and podiatry discussed the options (BKA vs wound coverage with a flap) with the pt and her .     No new complaints today. Pain controlled.    T(C): 37.2 (02-28-18 @ 00:00), Max: 37.2 (02-28-18 @ 00:00)  HR: 91 (02-28-18 @ 00:00) (91 - 92)  BP: 119/72 (02-28-18 @ 00:00) (119/72 - 127/75)  RR: 18 (02-28-18 @ 00:00) (18 - 18)    GENERAL: NAD  NECK: Supple, No JVD  CHEST/LUNG: No rales, rhonchi, wheezing  HEART: Regular rate and rhythm; No murmurs  GI/ABDOMEN: Soft, Nontender, Nondistended; Bowel sounds present  EXTREMITIES: s/p b/l hands amputation, feet dressing clean, wound vac in place. No calf tenderness b/l.  SKIN: No rashes or lesions  NERVOUS SYSTEM:  Alert & Oriented X3, no focal deficit                           8.5    9.01  )-----------( 469      ( 28 Feb 2018 08:15 )             27.3   02-28    134<L>  |  101  |  6<L>  ----------------------------<  93  4.2   |  28  |  0.9    Ca    9.1      28 Feb 2018 08:15      Reticulocyte Count in AM (02.28.18 @ 08:15)    RBC Count: 3.47 M/uL    Reticulocyte Percent: 2.2 %    Absolute Reticulocytes: 77.7 K/uL    Iron with Total Binding Capacity in AM (02.28.18 @ 08:15)    % Saturation, Iron: 8 %    Iron - Total Binding Capacity.: 306 ug/mL    Iron Total, Serum: 22 ug/dL

## 2018-02-28 NOTE — PROGRESS NOTE ADULT - SUBJECTIVE AND OBJECTIVE BOX
43 YO F with PMH of HTN, PANDA, Obesity s/p gastric bypass surgery complicated by septic shock, ARDS, SBO, ATN with CVVH, stress cardiomyopathy and vasopressor induced necrosis of fingers and toes in 2017 presented to the ER for admission for b/l foot amputation.  S/p TMA and debridement.   Pt was spiking fever started on ABx on .   Afebrile > 24 hrs  Case and long term plan of care discussed between family, plastic surgery and podiatry. BKA suggested and discussed. will touch base with patients family in 24-48 hours.       PAST MEDICAL & SURGICAL HISTORY:  Takotsubo cardiomyopathy  Small bowel obstruction  Osteoarthritis  Cardiomyopathy  Sleep apnea  HTN (hypertension)  Gangrene of lower extremity  Gangrene of finger of right hand  Gangrene of finger of left hand  H/O exploratory laparotomy  Amputation finger  History of cholecystectomy  H/O gastric bypass      MEDICATIONS  (STANDING):  ampicillin/sulbactam  IVPB      ampicillin/sulbactam  IVPB 1.5 Gram(s) IV Intermittent every 6 hours  bisacodyl Suppository 10 milliGRAM(s) Rectal daily  enoxaparin Injectable 40 milliGRAM(s) SubCutaneous every 24 hours  gabapentin 300 milliGRAM(s) Oral at bedtime  lidocaine 1% Injectable 40 milliLiter(s) Local Injection once  lidocaine 1% Injectable 40 milliLiter(s) Local Injection once  lidocaine 1% Injectable 60 milliLiter(s) Local Injection once  lidocaine 2% Gel 1 Application(s) Topical once  lidocaine 2% Gel 1 Application(s) Topical once  lidocaine 2% Gel 1 Application(s) Topical daily  lidocaine 2% Gel 1 Application(s) Topical once  metoprolol succinate ER 50 milliGRAM(s) Oral at bedtime  oxyCODONE    5 mG/acetaminophen 325 mG 2 Tablet(s) Oral every 6 hours    MEDICATIONS  (PRN):  acetaminophen   Tablet 650 milliGRAM(s) Oral every 6 hours PRN For Temp greater than 38.5 C (101.3 F)  morphine  - Injectable 4 milliGRAM(s) IV Push every 4 hours PRN Severe Pain (7 - 10)  senna Syrup 5 milliLiter(s) Oral three times a day PRN Constipation      Overnight events:    Vital Signs Last 24 Hrs  T(C): 37.2 (2018 00:00), Max: 37.2 (2018 00:00)  T(F): 98.9 (2018 00:00), Max: 98.9 (2018 00:00)  HR: 91 (2018 00:00) (91 - 92)  BP: 119/72 (2018 00:00) (119/72 - 127/75)  BP(mean): --  RR: 18 (2018 00:00) (18 - 18)  SpO2: --        I&O's Summary    2018 07:01  -  2018 07:00  --------------------------------------------------------  IN: 390 mL / OUT: 800 mL / NET: -410 mL        Physical Exam:    -     General : NAD    -      HEENT: NECK SOFT NO MASS     -      Cardiac: S1 S2 NO MURMUR     -      Pulm: GOOD AIR ENTRY     -      GI: ABD SOFT NON TENDER     -      Musculoskeletal: wnl    -      Neuro: aaox3         Labs:                          8.5    9.01  )-----------( 469      ( 2018 08:15 )             27.3     02-28    134<L>  |  101  |  6<L>  ----------------------------<  93  4.2   |  28  |  0.9    Ca    9.1      2018 08:15                            7.7    9.53  )-----------( 323      ( 2018 08:31 )             24.2             02-25    135  |  100  |  9<L>  ----------------------------<  95  4.0   |  30  |  1.0    Ca    8.7      2018 08:31                      Urinalysis Basic - ( 2018 20:03 )    Color: Yellow / Appearance: Clear / S.025 / pH: x  Gluc: x / Ketone: Negative  / Bili: Negative / Urobili: 1.0 mg/dL   Blood: x / Protein: Negative mg/dL / Nitrite: Negative   Leuk Esterase: Negative / RBC: x / WBC x   Sq Epi: x / Non Sq Epi: x / Bacteria: x        Culture - Blood (collected 2018 17:49)  Source: .Blood None  Preliminary Report (2018 04:00):    No growth to date.        Imaging:    ECG:    T(C): 36.8 (18 @ 07:40), Max: 37.1 (18 @ 00:00)  HR: 96 (18 @ 07:40) (88 - 96)  BP: 113/67 (18 @ 07:40) (101/59 - 113/67)  RR: 18 (18 @ 07:40) (18 - 18)  SpO2: --

## 2018-02-28 NOTE — PROGRESS NOTE ADULT - SUBJECTIVE AND OBJECTIVE BOX
7.4    8.11  )-----------( 406      ( 27 Feb 2018 05:55 )             23.9   Vital Signs Last 24 Hrs  T(C): 37.2 (28 Feb 2018 00:00), Max: 37.2 (28 Feb 2018 00:00)  T(F): 98.9 (28 Feb 2018 00:00), Max: 98.9 (28 Feb 2018 00:00)  HR: 91 (28 Feb 2018 00:00) (91 - 92)  BP: 119/72 (28 Feb 2018 00:00) (119/72 - 127/75)  BP(mean): --  RR: 18 (28 Feb 2018 00:00) (18 - 18)  SpO2: -- Patient evaluated bedside present patient's  and dr gray. Discussed all options to family for future surgery and discussed possible out comes including complications and quality of life and ambulating stability. patient and  will deliberate  and then will decide on plan for future surgical management. next VAC change scheduled for 3/2/18.

## 2018-03-01 LAB
ANION GAP SERPL CALC-SCNC: 7 MMOL/L — SIGNIFICANT CHANGE UP (ref 7–14)
BASOPHILS # BLD AUTO: 0.04 K/UL — SIGNIFICANT CHANGE UP (ref 0–0.2)
BASOPHILS NFR BLD AUTO: 0.4 % — SIGNIFICANT CHANGE UP (ref 0–1)
BUN SERPL-MCNC: 8 MG/DL — LOW (ref 10–20)
CALCIUM SERPL-MCNC: 9.3 MG/DL — SIGNIFICANT CHANGE UP (ref 8.5–10.1)
CHLORIDE SERPL-SCNC: 103 MMOL/L — SIGNIFICANT CHANGE UP (ref 98–110)
CO2 SERPL-SCNC: 29 MMOL/L — SIGNIFICANT CHANGE UP (ref 17–32)
CREAT SERPL-MCNC: 1.1 MG/DL — SIGNIFICANT CHANGE UP (ref 0.7–1.5)
EOSINOPHIL # BLD AUTO: 0.37 K/UL — SIGNIFICANT CHANGE UP (ref 0–0.7)
EOSINOPHIL NFR BLD AUTO: 3.4 % — SIGNIFICANT CHANGE UP (ref 0–8)
FERRITIN SERPL-MCNC: 89 NG/ML — SIGNIFICANT CHANGE UP (ref 15–150)
GLUCOSE SERPL-MCNC: 101 MG/DL — SIGNIFICANT CHANGE UP (ref 70–110)
HCT VFR BLD CALC: 24.7 % — LOW (ref 37–47)
HGB BLD-MCNC: 7.7 G/DL — LOW (ref 14–18)
IMM GRANULOCYTES NFR BLD AUTO: 1.5 % — HIGH (ref 0.1–0.3)
LYMPHOCYTES # BLD AUTO: 2.21 K/UL — SIGNIFICANT CHANGE UP (ref 1.2–3.4)
LYMPHOCYTES # BLD AUTO: 20.6 % — SIGNIFICANT CHANGE UP (ref 20.5–51.1)
MCHC RBC-ENTMCNC: 24.5 PG — LOW (ref 27–31)
MCHC RBC-ENTMCNC: 31.2 G/DL — LOW (ref 32–37)
MCV RBC AUTO: 78.7 FL — LOW (ref 81–91)
MONOCYTES # BLD AUTO: 0.74 K/UL — HIGH (ref 0.1–0.6)
MONOCYTES NFR BLD AUTO: 6.9 % — SIGNIFICANT CHANGE UP (ref 1.7–9.3)
NEUTROPHILS # BLD AUTO: 7.21 K/UL — HIGH (ref 1.4–6.5)
NEUTROPHILS NFR BLD AUTO: 67.2 % — SIGNIFICANT CHANGE UP (ref 42.2–75.2)
NRBC # BLD: 0 /100 WBCS — SIGNIFICANT CHANGE UP (ref 0–0)
PLATELET # BLD AUTO: 449 K/UL — HIGH (ref 130–400)
POTASSIUM SERPL-MCNC: 4.1 MMOL/L — SIGNIFICANT CHANGE UP (ref 3.5–5)
POTASSIUM SERPL-SCNC: 4.1 MMOL/L — SIGNIFICANT CHANGE UP (ref 3.5–5)
RBC # BLD: 3.14 M/UL — LOW (ref 4.2–5.4)
RBC # FLD: 13.9 % — SIGNIFICANT CHANGE UP (ref 11.5–14.5)
SODIUM SERPL-SCNC: 139 MMOL/L — SIGNIFICANT CHANGE UP (ref 135–146)
VIT B12 SERPL-MCNC: 977 PG/ML — SIGNIFICANT CHANGE UP (ref 232–1245)
WBC # BLD: 10.73 K/UL — SIGNIFICANT CHANGE UP (ref 4.8–10.8)
WBC # FLD AUTO: 10.73 K/UL — SIGNIFICANT CHANGE UP (ref 4.8–10.8)

## 2018-03-01 RX ADMIN — Medication 325 MILLIGRAM(S): at 12:06

## 2018-03-01 RX ADMIN — ENOXAPARIN SODIUM 40 MILLIGRAM(S): 100 INJECTION SUBCUTANEOUS at 05:29

## 2018-03-01 RX ADMIN — OXYCODONE AND ACETAMINOPHEN 2 TABLET(S): 5; 325 TABLET ORAL at 13:37

## 2018-03-01 RX ADMIN — OXYCODONE AND ACETAMINOPHEN 2 TABLET(S): 5; 325 TABLET ORAL at 00:35

## 2018-03-01 RX ADMIN — OXYCODONE AND ACETAMINOPHEN 2 TABLET(S): 5; 325 TABLET ORAL at 05:29

## 2018-03-01 RX ADMIN — Medication 325 MILLIGRAM(S): at 10:43

## 2018-03-01 RX ADMIN — Medication 10 MILLIGRAM(S): at 12:06

## 2018-03-01 RX ADMIN — Medication 50 MILLIGRAM(S): at 23:09

## 2018-03-01 RX ADMIN — OXYCODONE AND ACETAMINOPHEN 2 TABLET(S): 5; 325 TABLET ORAL at 12:06

## 2018-03-01 RX ADMIN — OXYCODONE AND ACETAMINOPHEN 2 TABLET(S): 5; 325 TABLET ORAL at 23:08

## 2018-03-01 RX ADMIN — OXYCODONE AND ACETAMINOPHEN 2 TABLET(S): 5; 325 TABLET ORAL at 18:20

## 2018-03-01 RX ADMIN — Medication 1 TABLET(S): at 12:05

## 2018-03-01 RX ADMIN — Medication 325 MILLIGRAM(S): at 18:20

## 2018-03-01 RX ADMIN — GABAPENTIN 300 MILLIGRAM(S): 400 CAPSULE ORAL at 23:09

## 2018-03-01 NOTE — PROGRESS NOTE ADULT - ASSESSMENT
43 YO F with PMH of HTN, PANDA, Obesity s/p gastric bypass surgery complicated by septic shock, ARDS, SBO, ATN with CVVH, stress cardiomyopathy and vasopressor induced necrosis of fingers and toes in October 2017 presented to the ER for admission for b/l foot amputation.  S/p TMA and debridement.  Pt was spiking fever started on ABx on 02/24.   Afebrile > 24 hrs

## 2018-03-01 NOTE — PROGRESS NOTE ADULT - PROBLEM SELECTOR PLAN 2
wound vac removed by podiatry.   Patients  to have meeting with Plastics within 48 hours to discuss plan of care and options.   Continue antibiotic regimen.
wound vac removed by podiatry. follow with Plastics for reconsult as to plan of what needs to be done next.   Continue antibiotic regimen.
Case and long term plan of care discussed between family, plastic surgery and podiatry. ANDRIY suggested and discussed. will touch base with patients family in 24-48 hours.
Case and long term plan of care discussed between family, plastic surgery and podiatry. BKA suggested and discussed. will touch base with patients family in 24-48 hours.  Podiatry to change wound vac tomorrow.   Prosthetic team to visit patient tomorrow and discuss case with patient.

## 2018-03-01 NOTE — PROGRESS NOTE ADULT - SUBJECTIVE AND OBJECTIVE BOX
Podiatry Follow Up  Pt seen bedside NAD. Discussed with patient VAC change and wound evaluation will occur 3/2/2018 on AM rounds.  Pt has standing pain medication orders for 6:00AM for pain control.  NPWT VAC running at -125mm Hg continuous with little to no leak.     A:  Bilateral Chopart Amputation with NPWT in place to bilateral sites    P:  Pt to be seen and assessed beside on 3/2/2018 AM rounds with Attending Dr. Mohinder Salazar and Podiatry resident.   Podiatry to follow up q24h

## 2018-03-01 NOTE — PROGRESS NOTE ADULT - SUBJECTIVE AND OBJECTIVE BOX
Patient is a 42y old  Female who presents with a chief complaint of b/l foot pain, gangrene (15 Feb 2018 14:24)      PAST MEDICAL & SURGICAL HISTORY:  Takotsubo cardiomyopathy  Small bowel obstruction  Osteoarthritis  Cardiomyopathy  Sleep apnea  HTN (hypertension)  Gangrene of lower extremity  Gangrene of finger of right hand  Gangrene of finger of left hand  H/O exploratory laparotomy  Amputation finger  History of cholecystectomy  H/O gastric bypass      MEDICATIONS  (STANDING):  bisacodyl Suppository 10 milliGRAM(s) Rectal daily  enoxaparin Injectable 40 milliGRAM(s) SubCutaneous every 24 hours  ferrous    sulfate 325 milliGRAM(s) Oral three times a day with meals  gabapentin 300 milliGRAM(s) Oral at bedtime  lidocaine 1% Injectable 60 milliLiter(s) Local Injection once  lidocaine 1% Injectable 60 milliLiter(s) Local Injection once  lidocaine 1% Injectable 40 milliLiter(s) Local Injection once  lidocaine 1% Injectable 40 milliLiter(s) Local Injection once  lidocaine 2% Gel 1 Application(s) Topical daily  lidocaine 2% Gel 1 Application(s) Topical once  lidocaine 2% Gel 1 Application(s) Topical once  lidocaine 2% Gel 1 Application(s) Topical once  lidocaine 2% Injectable 40 milliLiter(s) Local Injection once  lidocaine 2% Viscous 60 milliLiter(s) Mucosal once  metoprolol succinate ER 50 milliGRAM(s) Oral at bedtime  multivitamin 1 Tablet(s) Oral daily  oxyCODONE    5 mG/acetaminophen 325 mG 2 Tablet(s) Oral every 6 hours    MEDICATIONS  (PRN):  acetaminophen   Tablet 650 milliGRAM(s) Oral every 6 hours PRN For Temp greater than 38.5 C (101.3 F)  senna Syrup 5 milliLiter(s) Oral three times a day PRN Constipation      Overnight events:    Vital Signs Last 24 Hrs  T(C): 36.7 (01 Mar 2018 07:36), Max: 37.2 (28 Feb 2018 23:52)  T(F): 98 (01 Mar 2018 07:36), Max: 98.9 (28 Feb 2018 23:52)  HR: 86 (01 Mar 2018 07:36) (86 - 105)  BP: 105/60 (01 Mar 2018 07:36) (105/60 - 116/75)  BP(mean): --  RR: 18 (01 Mar 2018 07:36) (18 - 18)  SpO2: --  CAPILLARY BLOOD GLUCOSE        I&O's Summary    28 Feb 2018 07:01  -  01 Mar 2018 07:00  --------------------------------------------------------  IN: 300 mL / OUT: 700 mL / NET: -400 mL        Physical Exam:    -     General : NAD     -      HEENT: NECK SOFT NO MASS     -      Cardiac: S1S2 NO MURMUR     -      Pulm: GOOD BILATERAL AIR ENTRY     -      GI: ABD SOFT NON TENDER     -      Musculoskeletal:    -      Neuro: AAOX3         Labs:                        7.7    10.73 )-----------( 449      ( 01 Mar 2018 07:51 )             24.7             03-01    139  |  103  |  8<L>  ----------------------------<  101  4.1   |  29  |  1.1    Ca    9.3      01 Mar 2018 07:51                            Imaging:    ECG:    T(C): 36.7 (03-01-18 @ 07:36), Max: 37.2 (02-28-18 @ 23:52)  HR: 86 (03-01-18 @ 07:36) (86 - 105)  BP: 105/60 (03-01-18 @ 07:36) (105/60 - 116/75)  RR: 18 (03-01-18 @ 07:36) (18 - 18)  SpO2: -- Patient is a 42y old  Female who presents with a chief complaint of b/l foot pain, gangrene (15 Feb 2018 14:24)      PAST MEDICAL & SURGICAL HISTORY:  Takotsubo cardiomyopathy  Small bowel obstruction  Osteoarthritis  Cardiomyopathy  Sleep apnea  HTN (hypertension)  Gangrene of lower extremity  Gangrene of finger of right hand  Gangrene of finger of left hand  H/O exploratory laparotomy  Amputation finger  History of cholecystectomy  H/O gastric bypass      MEDICATIONS  (STANDING):  bisacodyl Suppository 10 milliGRAM(s) Rectal daily  enoxaparin Injectable 40 milliGRAM(s) SubCutaneous every 24 hours  ferrous    sulfate 325 milliGRAM(s) Oral three times a day with meals  gabapentin 300 milliGRAM(s) Oral at bedtime  lidocaine 1% Injectable 60 milliLiter(s) Local Injection once  lidocaine 1% Injectable 60 milliLiter(s) Local Injection once  lidocaine 1% Injectable 40 milliLiter(s) Local Injection once  lidocaine 1% Injectable 40 milliLiter(s) Local Injection once  lidocaine 2% Gel 1 Application(s) Topical daily  lidocaine 2% Gel 1 Application(s) Topical once  lidocaine 2% Gel 1 Application(s) Topical once  lidocaine 2% Gel 1 Application(s) Topical once  lidocaine 2% Injectable 40 milliLiter(s) Local Injection once  lidocaine 2% Viscous 60 milliLiter(s) Mucosal once  metoprolol succinate ER 50 milliGRAM(s) Oral at bedtime  multivitamin 1 Tablet(s) Oral daily  oxyCODONE    5 mG/acetaminophen 325 mG 2 Tablet(s) Oral every 6 hours    MEDICATIONS  (PRN):  acetaminophen   Tablet 650 milliGRAM(s) Oral every 6 hours PRN For Temp greater than 38.5 C (101.3 F)  senna Syrup 5 milliLiter(s) Oral three times a day PRN Constipation      Overnight events:    Vital Signs Last 24 Hrs  T(C): 36.7 (01 Mar 2018 07:36), Max: 37.2 (28 Feb 2018 23:52)  T(F): 98 (01 Mar 2018 07:36), Max: 98.9 (28 Feb 2018 23:52)  HR: 86 (01 Mar 2018 07:36) (86 - 105)  BP: 105/60 (01 Mar 2018 07:36) (105/60 - 116/75)  BP(mean): --  RR: 18 (01 Mar 2018 07:36) (18 - 18)  SpO2: --  CAPILLARY BLOOD GLUCOSE        I&O's Summary    28 Feb 2018 07:01  -  01 Mar 2018 07:00  --------------------------------------------------------  IN: 300 mL / OUT: 700 mL / NET: -400 mL        Physical Exam:    -     General : NAD     -      HEENT: NECK SOFT NO MASS     -      Cardiac: S1S2 NO MURMUR     -      Pulm: GOOD BILATERAL AIR ENTRY     -      GI: ABD SOFT NON TENDER     -      Musculoskeletal: bilateral lower extremity wound vac.     -      Neuro: AAOX3         Labs:                        7.7    10.73 )-----------( 449      ( 01 Mar 2018 07:51 )             24.7             03-01    139  |  103  |  8<L>  ----------------------------<  101  4.1   |  29  |  1.1    Ca    9.3      01 Mar 2018 07:51                            Imaging:    ECG:    T(C): 36.7 (03-01-18 @ 07:36), Max: 37.2 (02-28-18 @ 23:52)  HR: 86 (03-01-18 @ 07:36) (86 - 105)  BP: 105/60 (03-01-18 @ 07:36) (105/60 - 116/75)  RR: 18 (03-01-18 @ 07:36) (18 - 18)  SpO2: --

## 2018-03-01 NOTE — PROGRESS NOTE ADULT - SUBJECTIVE AND OBJECTIVE BOX
After the family meeting yesterday Mr Chow called my to discuss getting more information re lower extremity prosthetics.  I discussed with [facility] Prosthetist (Warren Diane) and put him in touch w Mr Chow.  My understanding is that Mr Chow and Mr Weaver are going to coordinate a visit to the hospital with Pascale to educate her and discuss lower extremity prosthetics today or tomorrow.  Mr and Mrs Chow are still thinking about which way they wish to proceed next.  In my opinion they will have a better long term functional outcome and less morbidity with bilateral BKA and prosthetics.

## 2018-03-01 NOTE — PROGRESS NOTE ADULT - PROBLEM SELECTOR PROBLEM 2
Gangrene of lower extremity

## 2018-03-01 NOTE — PROGRESS NOTE ADULT - PROBLEM SELECTOR PLAN 1
stable at this time. follow with Rx regimen and diet modification.

## 2018-03-01 NOTE — PROGRESS NOTE ADULT - ATTENDING COMMENTS
1. B/L foot gangrene, s/p TMA and soft tissue and bone debridement.  - BKA vs. flap, will follow podiatry and plastic plan.   - continue Percocet 2 tab Q 6 hrs and Morphine 4 mg Q4hrs PRN and topical Lidocaine    2. Acute anemia, multifactorial, ZAHIRA, hypo productive anemia, RPI 0.7, very low  - continue iron supplement.     3. HTN  - cont home meds    4. PANDA - cont CPAP    Prophylactic measures  DVT ppx with Lovenox 40 mg QD   GI ppx not indicated  Regular diet  Full code    Dispo - pending clinical course.

## 2018-03-02 LAB
ANION GAP SERPL CALC-SCNC: 5 MMOL/L — LOW (ref 7–14)
BASOPHILS # BLD AUTO: 0.04 K/UL — SIGNIFICANT CHANGE UP (ref 0–0.2)
BASOPHILS NFR BLD AUTO: 0.4 % — SIGNIFICANT CHANGE UP (ref 0–1)
BUN SERPL-MCNC: 9 MG/DL — LOW (ref 10–20)
CALCIUM SERPL-MCNC: 9 MG/DL — SIGNIFICANT CHANGE UP (ref 8.5–10.1)
CHLORIDE SERPL-SCNC: 104 MMOL/L — SIGNIFICANT CHANGE UP (ref 98–110)
CO2 SERPL-SCNC: 26 MMOL/L — SIGNIFICANT CHANGE UP (ref 17–32)
CREAT SERPL-MCNC: 1.1 MG/DL — SIGNIFICANT CHANGE UP (ref 0.7–1.5)
CULTURE RESULTS: SIGNIFICANT CHANGE UP
EOSINOPHIL # BLD AUTO: 0.31 K/UL — SIGNIFICANT CHANGE UP (ref 0–0.7)
EOSINOPHIL NFR BLD AUTO: 3.3 % — SIGNIFICANT CHANGE UP (ref 0–8)
GLUCOSE SERPL-MCNC: 92 MG/DL — SIGNIFICANT CHANGE UP (ref 70–110)
HCT VFR BLD CALC: 26.6 % — LOW (ref 37–47)
HGB BLD-MCNC: 8.2 G/DL — LOW (ref 12–16)
IMM GRANULOCYTES NFR BLD AUTO: 1.4 % — HIGH (ref 0.1–0.3)
LYMPHOCYTES # BLD AUTO: 2.21 K/UL — SIGNIFICANT CHANGE UP (ref 1.2–3.4)
LYMPHOCYTES # BLD AUTO: 23.7 % — SIGNIFICANT CHANGE UP (ref 20.5–51.1)
MCHC RBC-ENTMCNC: 24.2 PG — LOW (ref 27–31)
MCHC RBC-ENTMCNC: 30.8 G/DL — LOW (ref 32–37)
MCV RBC AUTO: 78.5 FL — LOW (ref 81–99)
MONOCYTES # BLD AUTO: 0.71 K/UL — HIGH (ref 0.1–0.6)
MONOCYTES NFR BLD AUTO: 7.6 % — SIGNIFICANT CHANGE UP (ref 1.7–9.3)
NEUTROPHILS # BLD AUTO: 5.94 K/UL — SIGNIFICANT CHANGE UP (ref 1.4–6.5)
NEUTROPHILS NFR BLD AUTO: 63.6 % — SIGNIFICANT CHANGE UP (ref 42.2–75.2)
NRBC # BLD: 0 /100 WBCS — SIGNIFICANT CHANGE UP (ref 0–0)
PLATELET # BLD AUTO: 506 K/UL — HIGH (ref 130–400)
POTASSIUM SERPL-MCNC: 3.9 MMOL/L — SIGNIFICANT CHANGE UP (ref 3.5–5)
POTASSIUM SERPL-SCNC: 3.9 MMOL/L — SIGNIFICANT CHANGE UP (ref 3.5–5)
RBC # BLD: 3.39 M/UL — LOW (ref 4.2–5.4)
RBC # FLD: 14.1 % — SIGNIFICANT CHANGE UP (ref 11.5–14.5)
SODIUM SERPL-SCNC: 135 MMOL/L — SIGNIFICANT CHANGE UP (ref 135–146)
SPECIMEN SOURCE: SIGNIFICANT CHANGE UP
WBC # BLD: 9.34 K/UL — SIGNIFICANT CHANGE UP (ref 4.8–10.8)
WBC # FLD AUTO: 9.34 K/UL — SIGNIFICANT CHANGE UP (ref 4.8–10.8)

## 2018-03-02 RX ORDER — HYDROMORPHONE HYDROCHLORIDE 2 MG/ML
1 INJECTION INTRAMUSCULAR; INTRAVENOUS; SUBCUTANEOUS ONCE
Qty: 0 | Refills: 0 | Status: DISCONTINUED | OUTPATIENT
Start: 2018-03-02 | End: 2018-03-02

## 2018-03-02 RX ORDER — MORPHINE SULFATE 50 MG/1
4 CAPSULE, EXTENDED RELEASE ORAL EVERY 4 HOURS
Qty: 0 | Refills: 0 | Status: DISCONTINUED | OUTPATIENT
Start: 2018-03-02 | End: 2018-03-09

## 2018-03-02 RX ORDER — MORPHINE SULFATE 50 MG/1
4 CAPSULE, EXTENDED RELEASE ORAL ONCE
Qty: 0 | Refills: 0 | Status: DISCONTINUED | OUTPATIENT
Start: 2018-03-02 | End: 2018-03-02

## 2018-03-02 RX ADMIN — LIDOCAINE 1 APPLICATION(S): 4 CREAM TOPICAL at 14:07

## 2018-03-02 RX ADMIN — OXYCODONE AND ACETAMINOPHEN 2 TABLET(S): 5; 325 TABLET ORAL at 14:01

## 2018-03-02 RX ADMIN — MORPHINE SULFATE 4 MILLIGRAM(S): 50 CAPSULE, EXTENDED RELEASE ORAL at 06:20

## 2018-03-02 RX ADMIN — OXYCODONE AND ACETAMINOPHEN 2 TABLET(S): 5; 325 TABLET ORAL at 17:30

## 2018-03-02 RX ADMIN — Medication 10 MILLIGRAM(S): at 14:05

## 2018-03-02 RX ADMIN — Medication 325 MILLIGRAM(S): at 17:29

## 2018-03-02 RX ADMIN — MORPHINE SULFATE 4 MILLIGRAM(S): 50 CAPSULE, EXTENDED RELEASE ORAL at 14:13

## 2018-03-02 RX ADMIN — Medication 50 MILLIGRAM(S): at 23:16

## 2018-03-02 RX ADMIN — MORPHINE SULFATE 4 MILLIGRAM(S): 50 CAPSULE, EXTENDED RELEASE ORAL at 07:50

## 2018-03-02 RX ADMIN — Medication 325 MILLIGRAM(S): at 14:06

## 2018-03-02 RX ADMIN — OXYCODONE AND ACETAMINOPHEN 2 TABLET(S): 5; 325 TABLET ORAL at 17:29

## 2018-03-02 RX ADMIN — OXYCODONE AND ACETAMINOPHEN 2 TABLET(S): 5; 325 TABLET ORAL at 23:16

## 2018-03-02 RX ADMIN — GABAPENTIN 300 MILLIGRAM(S): 400 CAPSULE ORAL at 23:16

## 2018-03-02 RX ADMIN — ENOXAPARIN SODIUM 40 MILLIGRAM(S): 100 INJECTION SUBCUTANEOUS at 06:20

## 2018-03-02 RX ADMIN — OXYCODONE AND ACETAMINOPHEN 2 TABLET(S): 5; 325 TABLET ORAL at 05:04

## 2018-03-02 RX ADMIN — Medication 325 MILLIGRAM(S): at 08:08

## 2018-03-02 RX ADMIN — MORPHINE SULFATE 4 MILLIGRAM(S): 50 CAPSULE, EXTENDED RELEASE ORAL at 11:41

## 2018-03-02 RX ADMIN — Medication 1 TABLET(S): at 14:06

## 2018-03-02 NOTE — PROGRESS NOTE ADULT - ATTENDING COMMENTS
Together with the Podiatry team (Dr. Salazar), I examined bilateral foot open wounds.    Healthy bed of granulation tissue present on each foot.  No gross areas necrotic tissue  Right foot: Exposed cuneiforms on R foot, open heel wound with exposed soft tissue evolving viability  Left foot: Exposed MT transected bone    Recommendation  -continue NPWT bilateral open foot wounds; dressing per Podiatry  -patient and  pending discussion with Prosthetist  -patient still thinking about her surgical options  -plan d/w Podiatry team

## 2018-03-02 NOTE — PROGRESS NOTE ADULT - SUBJECTIVE AND OBJECTIVE BOX
Podiatry Follow up  Pt seen bedside for inter-disciplinary meeting to discuss coordination of care.  Dr. Alana Newman and Dr. Mohinder Salazar present to assess and discuss with patient.     A:  Bilateral chopart amputation site with exposed bone    P:  Pt requested hiatus from VAC therapy for 1 day due to discomfort.   Maintain dressing: Xeroform/DSD/Dakins/ABD/Kerlix q24h CDI  Podiatry to follow up q24h on AM rounds 3/3/2018 with Dr. Mohinder Salazar ~8:00/9:00AM  Please administer pain medication 7:30AM for pain control during evaluation and application of Wound VAC.   Podiatry to apply wound VAC on 3/3/2018 and maintain until Monday 3/5/2018

## 2018-03-02 NOTE — CHART NOTE - NSCHARTNOTEFT_GEN_A_CORE
Registered Dietitian Follow-Up     Patient Profile Reviewed                           Yes [x]   No []     Nutrition History Previously Obtained        Yes [x]  No []       Pertinent Subjective Information: Pt. reports variable PO intake, but  less than her baseline after bariatric surgery, still not fond of hospital food provisions, admits to  bringing food from home occasionally. Pt. tried Ensure clear and didn't like it, willing to try Ensure enlive- provided one today. C/o sensation of "something stuck down in her esophagus" at previous follow up- now resolved.      Pertinent Medical Interventions: reviewing options for prosthetics after b/l foot amputation, acute blood loss anemia-stable Hg, iron supplement, HTN- on home meds, Constipation- enema and Dulcolax suppository PRN, fever improved     Diet order: regular     Anthropometrics:  - Ht.  - Wt. no new weights documented   - %wt change  - BMI  - IBW     Pertinent Lab Data: (3/2) RBC 3.39, Hg 8.2, Hct 26.6, BUN 9      Pertinent Meds: Senna, Dulcolax, MVI, Ferrous sulfate     Physical Findings:  - Appearance: alert & oriented   - GI function: denies symptoms  - Tubes:  - Oral/Mouth cavity: denies symptoms   - Skin: wound (BS18)     Nutrition Requirements  Weight Used:     Estimated Energy Needs    Continue [x]  Adjust [] 1655-1820kcal  Adjusted Energy Recommendations:   kcal/day        Estimated Protein Needs    Continue [x]  Adjust [] 65-79g  Adjusted Protein Recommendations:   gm/day        Estimated Fluid Needs        Continue [x]  Adjust [] 1655-1820ml   Adjusted Fluid Recommendations:   mL/day     Nutrient Intake: variable- 10-50%         [] Previous Nutrition Diagnosis: Inadequate oral intake            [x] Ongoing          [] Resolved       Nutrition Intervention: meals and snacks, medical food supplement      Rec: Ensure enlive (vanilla) BID     Goal/Expected Outcome: In 4 days pt. to increase intake to baseline, 50-75% supplement intake       Indicator/Monitoring: energy intake, body composition, nutrition focused physical findings.

## 2018-03-02 NOTE — PROGRESS NOTE ADULT - ATTENDING COMMENTS
Photos reviewed of bilateral foot wounds from this mornings dressing change.  Pt did not wish to have wounds evaluated by plastic surgery this morning at the time of rounds.  She agrees to have VAC replaced by Podiatry later in day.  I think this is appropriate.  Still awaiting feedback from patient and family re meeting with prosthetician.

## 2018-03-02 NOTE — PROGRESS NOTE ADULT - ASSESSMENT
41yo F s/p gastric bypass complicated by SBO and septic shock/ARDS and vasopressor induced LE necrosis presents with worsening necrosis of bilateral LE    1. Fever, improved  - Wound Cx positive for ESBL E. Coli, MSSA, E. faecalis, as per podiatry wound margins looks noninfected, pt not on abx    2. B/L foot gangrene after vasopressors.  - wound vac change by podiatry  - pt is having a meeting with podiatry and plastics and prosthetician today to decide on next course and plan  - continue Percocet 2 tab Q 6 hrs and Morphine 4 mg Q4hrs PRN and topical Lidocaine    2. Acute blood loss anemia post op.  - stable Hb  - Iron supplement    3. HTN  - cont home meds    4. PANDA - cont CPAP    5. constipation  - resolved  - enema and Dulcolax suppository PRN

## 2018-03-02 NOTE — PROGRESS NOTE ADULT - SUBJECTIVE AND OBJECTIVE BOX
Podiatry Follow Up    Pt seen bedside with Podiatry service and Attending Dr. Mohinder Salazar.   Pt was accompanied by spouse during evaluation and discussion.   Pt endorses pain during Wound VAC doffing. Pt denies F/N/V/C/D/SOB.     A:  Bilateral Chopart amputation sites with granular tissue and exposed bones     P:  Wound care: Lidocaine jelly/Wound Gel/Xeroform/Dakins DSD/Kerlix  Awaiting Plastics team follow up for plan.  Podiatry to follow up q24h   Attending updated.

## 2018-03-02 NOTE — PROGRESS NOTE ADULT - SUBJECTIVE AND OBJECTIVE BOX
SUBJECTIVE:    Patient is a 42y old Female who presents with a chief complaint of b/l foot pain, gangrene (15 Feb 2018 14:24)    Currently admitted to medicine with the primary diagnosis of Gangrene of foot     Today is hospital day 15d. This morning she is resting comfortably in bed and reports no new issues or overnight events.     PAST MEDICAL & SURGICAL HISTORY  Takotsubo cardiomyopathy  Small bowel obstruction  Osteoarthritis  Cardiomyopathy  Sleep apnea  HTN (hypertension)  Gangrene of lower extremity  Gangrene of finger of right hand  Gangrene of finger of left hand  H/O exploratory laparotomy  Amputation finger  History of cholecystectomy  H/O gastric bypass    SOCIAL HISTORY:    ALLERGIES:  No Known Allergies    MEDICATIONS:  STANDING MEDICATIONS  bisacodyl Suppository 10 milliGRAM(s) Rectal daily  enoxaparin Injectable 40 milliGRAM(s) SubCutaneous every 24 hours  ferrous    sulfate 325 milliGRAM(s) Oral three times a day with meals  gabapentin 300 milliGRAM(s) Oral at bedtime  lidocaine 1% Injectable 60 milliLiter(s) Local Injection once  lidocaine 1% Injectable 60 milliLiter(s) Local Injection once  lidocaine 1% Injectable 40 milliLiter(s) Local Injection once  lidocaine 1% Injectable 40 milliLiter(s) Local Injection once  lidocaine 2% Gel 1 Application(s) Topical daily  lidocaine 2% Gel 1 Application(s) Topical once  lidocaine 2% Gel 1 Application(s) Topical once  lidocaine 2% Gel 1 Application(s) Topical once  lidocaine 2% Injectable 40 milliLiter(s) Local Injection once  lidocaine 2% Viscous 60 milliLiter(s) Mucosal once  metoprolol succinate ER 50 milliGRAM(s) Oral at bedtime  multivitamin 1 Tablet(s) Oral daily  oxyCODONE    5 mG/acetaminophen 325 mG 2 Tablet(s) Oral every 6 hours    PRN MEDICATIONS  acetaminophen   Tablet 650 milliGRAM(s) Oral every 6 hours PRN  senna Syrup 5 milliLiter(s) Oral three times a day PRN    VITALS:   T(F): 98.2  HR: 100  BP: 116/74  RR: 18  SpO2: --    LABS:                        8.2    9.34  )-----------( 506      ( 02 Mar 2018 07:07 )             26.6     03-02    135  |  104  |  9<L>  ----------------------------<  92  3.9   |  26  |  1.1    Ca    9.0      02 Mar 2018 07:07      RADIOLOGY:    < from: Xray Foot AP + Lateral + Oblique, Bilat (02.21.18 @ 22:39) >  Right:    Interval resection of the metatarsal bases and the medial cuneiform, with   postsurgical changes including soft tissue swelling and postsurgical   periosteal reaction. Wound VAC is noted at the dorsal foot region.    Left:    Interval resection of the first through the fourth metatarsal bases with   residual small fifth metatarsal base don't, with postsurgical changes   including soft tissue swelling and a wound VAC at the stump region.    < end of copied text >      PHYSICAL EXAM:  GEN: No acute distress  LUNGS: Clear to auscultation bilaterally   HEART: S1/S2 present. RRR.   ABD: Soft, non-tender, non-distended. Bowel sounds present  EXT: s/p bilateral upper extremity digits amputation, bilateral LE wounds wrapped  NEURO: AAOX3    HOME MEDICATIONS:  Home Medications:  gabapentin 300 mg oral capsule (02-15-18)  Metoprolol Succinate ER 50 mg oral tablet, extended release (02-15-18)  Multiple Vitamins oral tablet (02-15-18)  Vitamin B-12 100 mcg oral tablet (02-15-18)  Vitamin D3 50,000 intl units oral capsule (02-15-18)

## 2018-03-02 NOTE — PROGRESS NOTE ADULT - ATTENDING COMMENTS
Patient seen and examined independently. I agree with the resident's note, physical exam, and plan except as below.  # s/p pressor necrosis since last October  # wound vac on LE taken out - will be re-applied in AM   # pain control prior to wound vac adjustment  # awaiting on plan from plastics/podiatry  clinically stable

## 2018-03-03 LAB
ANION GAP SERPL CALC-SCNC: 7 MMOL/L — SIGNIFICANT CHANGE UP (ref 7–14)
APTT BLD: 28.4 SEC — SIGNIFICANT CHANGE UP (ref 27–39.2)
BASOPHILS # BLD AUTO: 0.03 K/UL — SIGNIFICANT CHANGE UP (ref 0–0.2)
BASOPHILS NFR BLD AUTO: 0.3 % — SIGNIFICANT CHANGE UP (ref 0–1)
BLD GP AB SCN SERPL QL: SIGNIFICANT CHANGE UP
BUN SERPL-MCNC: 8 MG/DL — LOW (ref 10–20)
CALCIUM SERPL-MCNC: 9 MG/DL — SIGNIFICANT CHANGE UP (ref 8.5–10.1)
CHLORIDE SERPL-SCNC: 103 MMOL/L — SIGNIFICANT CHANGE UP (ref 98–110)
CO2 SERPL-SCNC: 26 MMOL/L — SIGNIFICANT CHANGE UP (ref 17–32)
CREAT SERPL-MCNC: 1 MG/DL — SIGNIFICANT CHANGE UP (ref 0.7–1.5)
EOSINOPHIL # BLD AUTO: 0.37 K/UL — SIGNIFICANT CHANGE UP (ref 0–0.7)
EOSINOPHIL NFR BLD AUTO: 3.9 % — SIGNIFICANT CHANGE UP (ref 0–8)
GLUCOSE SERPL-MCNC: 81 MG/DL — SIGNIFICANT CHANGE UP (ref 70–110)
HCT VFR BLD CALC: 25.4 % — LOW (ref 37–47)
HGB BLD-MCNC: 8 G/DL — LOW (ref 12–16)
IMM GRANULOCYTES NFR BLD AUTO: 1.5 % — HIGH (ref 0.1–0.3)
INR BLD: 1.16 RATIO — SIGNIFICANT CHANGE UP (ref 0.65–1.3)
LYMPHOCYTES # BLD AUTO: 2.02 K/UL — SIGNIFICANT CHANGE UP (ref 1.2–3.4)
LYMPHOCYTES # BLD AUTO: 21.3 % — SIGNIFICANT CHANGE UP (ref 20.5–51.1)
MCHC RBC-ENTMCNC: 24.9 PG — LOW (ref 27–31)
MCHC RBC-ENTMCNC: 31.5 G/DL — LOW (ref 32–37)
MCV RBC AUTO: 79.1 FL — LOW (ref 81–99)
MONOCYTES # BLD AUTO: 0.7 K/UL — HIGH (ref 0.1–0.6)
MONOCYTES NFR BLD AUTO: 7.4 % — SIGNIFICANT CHANGE UP (ref 1.7–9.3)
NEUTROPHILS # BLD AUTO: 6.24 K/UL — SIGNIFICANT CHANGE UP (ref 1.4–6.5)
NEUTROPHILS NFR BLD AUTO: 65.6 % — SIGNIFICANT CHANGE UP (ref 42.2–75.2)
NRBC # BLD: 0 /100 WBCS — SIGNIFICANT CHANGE UP (ref 0–0)
PLATELET # BLD AUTO: 475 K/UL — HIGH (ref 130–400)
POTASSIUM SERPL-MCNC: 4.2 MMOL/L — SIGNIFICANT CHANGE UP (ref 3.5–5)
POTASSIUM SERPL-SCNC: 4.2 MMOL/L — SIGNIFICANT CHANGE UP (ref 3.5–5)
PROTHROM AB SERPL-ACNC: 12.6 SEC — SIGNIFICANT CHANGE UP (ref 9.95–12.87)
RBC # BLD: 3.21 M/UL — LOW (ref 4.2–5.4)
RBC # FLD: 14.2 % — SIGNIFICANT CHANGE UP (ref 11.5–14.5)
SODIUM SERPL-SCNC: 136 MMOL/L — SIGNIFICANT CHANGE UP (ref 135–146)
TYPE + AB SCN PNL BLD: SIGNIFICANT CHANGE UP
WBC # BLD: 9.5 K/UL — SIGNIFICANT CHANGE UP (ref 4.8–10.8)
WBC # FLD AUTO: 9.5 K/UL — SIGNIFICANT CHANGE UP (ref 4.8–10.8)

## 2018-03-03 RX ORDER — OXYCODONE AND ACETAMINOPHEN 5; 325 MG/1; MG/1
2 TABLET ORAL EVERY 6 HOURS
Qty: 0 | Refills: 0 | Status: DISCONTINUED | OUTPATIENT
Start: 2018-03-03 | End: 2018-03-10

## 2018-03-03 RX ADMIN — MORPHINE SULFATE 4 MILLIGRAM(S): 50 CAPSULE, EXTENDED RELEASE ORAL at 15:52

## 2018-03-03 RX ADMIN — MORPHINE SULFATE 4 MILLIGRAM(S): 50 CAPSULE, EXTENDED RELEASE ORAL at 16:10

## 2018-03-03 RX ADMIN — ENOXAPARIN SODIUM 40 MILLIGRAM(S): 100 INJECTION SUBCUTANEOUS at 07:37

## 2018-03-03 RX ADMIN — OXYCODONE AND ACETAMINOPHEN 2 TABLET(S): 5; 325 TABLET ORAL at 17:55

## 2018-03-03 RX ADMIN — OXYCODONE AND ACETAMINOPHEN 2 TABLET(S): 5; 325 TABLET ORAL at 18:25

## 2018-03-03 RX ADMIN — OXYCODONE AND ACETAMINOPHEN 2 TABLET(S): 5; 325 TABLET ORAL at 12:15

## 2018-03-03 RX ADMIN — MORPHINE SULFATE 4 MILLIGRAM(S): 50 CAPSULE, EXTENDED RELEASE ORAL at 07:38

## 2018-03-03 RX ADMIN — Medication 325 MILLIGRAM(S): at 11:45

## 2018-03-03 RX ADMIN — MORPHINE SULFATE 4 MILLIGRAM(S): 50 CAPSULE, EXTENDED RELEASE ORAL at 20:37

## 2018-03-03 RX ADMIN — OXYCODONE AND ACETAMINOPHEN 2 TABLET(S): 5; 325 TABLET ORAL at 23:19

## 2018-03-03 RX ADMIN — MORPHINE SULFATE 4 MILLIGRAM(S): 50 CAPSULE, EXTENDED RELEASE ORAL at 21:07

## 2018-03-03 RX ADMIN — OXYCODONE AND ACETAMINOPHEN 2 TABLET(S): 5; 325 TABLET ORAL at 05:35

## 2018-03-03 RX ADMIN — MORPHINE SULFATE 4 MILLIGRAM(S): 50 CAPSULE, EXTENDED RELEASE ORAL at 07:55

## 2018-03-03 RX ADMIN — Medication 325 MILLIGRAM(S): at 17:55

## 2018-03-03 RX ADMIN — Medication 325 MILLIGRAM(S): at 07:38

## 2018-03-03 RX ADMIN — OXYCODONE AND ACETAMINOPHEN 2 TABLET(S): 5; 325 TABLET ORAL at 11:45

## 2018-03-03 RX ADMIN — Medication 50 MILLIGRAM(S): at 22:55

## 2018-03-03 RX ADMIN — LIDOCAINE 1 APPLICATION(S): 4 CREAM TOPICAL at 11:44

## 2018-03-03 RX ADMIN — Medication 1 TABLET(S): at 11:45

## 2018-03-03 RX ADMIN — GABAPENTIN 300 MILLIGRAM(S): 400 CAPSULE ORAL at 22:55

## 2018-03-03 RX ADMIN — SENNA PLUS 5 MILLILITER(S): 8.6 TABLET ORAL at 12:03

## 2018-03-03 NOTE — PROGRESS NOTE ADULT - ASSESSMENT
43yo F with Past Medical History of HTN, PANDA, Obesity status post gastric bypass surgery complicated by septic shock/ATN with CVVH/ARDS, SBO, and stress cardiomyopathy and vasopressor induced necrosis of fingers and toes in October 2017 presents to [facility] for bilateral foot amputation.    1) Status post septic shock with necrosis of the fingers/toes: status post bilateral forefoot amputations.  Podiatry and Plastic Surgery recommendations were reviewed.  Continue NPWT to the LE.  Continue Oxycodone for pain, and Morphine IV with dressing changes.  2) HTN: continue Toprol XL  3) Obstructive Sleep Apnea: BIPAP at night  4) GI/DVT prophylaxis.

## 2018-03-03 NOTE — PROGRESS NOTE ADULT - SUBJECTIVE AND OBJECTIVE BOX
JIMMY CR [id number]    Hospitalist Note  43yo F with Past Medical History of HTN, PANDA, Obesity status post gastric bypass surgery complicated by septic shock/ATN with CVVH/ARDS, SBO, and stress cardiomyopathy and vasopressor induced necrosis of fingers and toes in October 2017 presents to [facility] for bilateral foot amputation.    Overnight events/Updates: stats post bilateral forefoot amputations.  Podiatry applied bilateral NPWT to the feet this morning.  We are currently awaiting treatment decision by podiatry and plastic surgery to guide further management.     Vital Signs Last 24 Hrs  T(C): 37.1 (03 Mar 2018 07:38), Max: 37.1 (03 Mar 2018 07:38)  T(F): 98.7 (03 Mar 2018 07:38), Max: 98.7 (03 Mar 2018 07:38)  HR: 84 (03 Mar 2018 07:38) (84 - 106)  BP: 124/75 (03 Mar 2018 07:38) (115/77 - 124/75)  BP(mean): --  RR: 18 (03 Mar 2018 07:38) (18 - 18)  SpO2: --    Physical Examination:  General: AAO x 3  HEENT: PERRLA, EOMI  CV= S1 & S2 appreciated  Lungs=CTA BL  Abdominal Examination= + BS, Soft, NT/ND  Extremity Examination= bilateral vac dressings. Bilateral finger amputations.    ROS: No chest pain, no shortness of breath.  All other systems reviewed and are within normal limits except for the complaints in the HPI.    MEDICATIONS  (STANDING):  bisacodyl Suppository 10 milliGRAM(s) Rectal daily  enoxaparin Injectable 40 milliGRAM(s) SubCutaneous every 24 hours  ferrous    sulfate 325 milliGRAM(s) Oral three times a day with meals  gabapentin 300 milliGRAM(s) Oral at bedtime  lidocaine 1% Injectable 60 milliLiter(s) Local Injection once  lidocaine 1% Injectable 60 milliLiter(s) Local Injection once  lidocaine 1% Injectable 40 milliLiter(s) Local Injection once  lidocaine 1% Injectable 40 milliLiter(s) Local Injection once  lidocaine 2% Gel 1 Application(s) Topical daily  lidocaine 2% Gel 1 Application(s) Topical once  lidocaine 2% Gel 1 Application(s) Topical once  lidocaine 2% Gel 1 Application(s) Topical once  lidocaine 2% Injectable 40 milliLiter(s) Local Injection once  lidocaine 2% Viscous 60 milliLiter(s) Mucosal once  metoprolol succinate ER 50 milliGRAM(s) Oral at bedtime  multivitamin 1 Tablet(s) Oral daily  oxyCODONE    5 mG/acetaminophen 325 mG 2 Tablet(s) Oral every 6 hours    MEDICATIONS  (PRN):  acetaminophen   Tablet 650 milliGRAM(s) Oral every 6 hours PRN For Temp greater than 38.5 C (101.3 F)  morphine  - Injectable 4 milliGRAM(s) IV Push every 4 hours PRN Severe Pain (7 - 10)  senna Syrup 5 milliLiter(s) Oral three times a day PRN Constipation                            8.0    9.50  )-----------( 475      ( 03 Mar 2018 06:35 )             25.4     03-03    136  |  103  |  8<L>  ----------------------------<  81  4.2   |  26  |  1.0    Ca    9.0      03 Mar 2018 06:35        Case discussed with housestaff & family  SANJAY Tyson [phone]

## 2018-03-03 NOTE — PROGRESS NOTE ADULT - SUBJECTIVE AND OBJECTIVE BOX
Patient evaluated bedside this AM,patient medicated for re-application of NPWT VAC. vac applied bilateral to run at 125mm/hg continuous. will leave on for 72hrs.will discuss future plans for surgical intervention this week with team and family.                      8.0    9.50  )-----------( 475      ( 03 Mar 2018 06:35 )             25.4   Vital Signs Last 24 Hrs  T(C): 36.3 (03 Mar 2018 00:08), Max: 36.6 (02 Mar 2018 16:00)  T(F): 97.3 (03 Mar 2018 00:08), Max: 97.9 (02 Mar 2018 16:00)  HR: 106 (03 Mar 2018 00:08) (98 - 106)  BP: 119/80 (03 Mar 2018 00:08) (115/77 - 119/80)  BP(mean): --  RR: 18 (03 Mar 2018 00:08) (18 - 18)  SpO2: --

## 2018-03-04 LAB
ANION GAP SERPL CALC-SCNC: 9 MMOL/L — SIGNIFICANT CHANGE UP (ref 7–14)
BASOPHILS # BLD AUTO: 0.04 K/UL — SIGNIFICANT CHANGE UP (ref 0–0.2)
BASOPHILS NFR BLD AUTO: 0.5 % — SIGNIFICANT CHANGE UP (ref 0–1)
BUN SERPL-MCNC: 9 MG/DL — LOW (ref 10–20)
CALCIUM SERPL-MCNC: 9.4 MG/DL — SIGNIFICANT CHANGE UP (ref 8.5–10.1)
CHLORIDE SERPL-SCNC: 104 MMOL/L — SIGNIFICANT CHANGE UP (ref 98–110)
CO2 SERPL-SCNC: 26 MMOL/L — SIGNIFICANT CHANGE UP (ref 17–32)
CREAT SERPL-MCNC: 1 MG/DL — SIGNIFICANT CHANGE UP (ref 0.7–1.5)
EOSINOPHIL # BLD AUTO: 0.4 K/UL — SIGNIFICANT CHANGE UP (ref 0–0.7)
EOSINOPHIL NFR BLD AUTO: 4.5 % — SIGNIFICANT CHANGE UP (ref 0–8)
GLUCOSE SERPL-MCNC: 94 MG/DL — SIGNIFICANT CHANGE UP (ref 70–110)
HCT VFR BLD CALC: 26.4 % — LOW (ref 37–47)
HGB BLD-MCNC: 8.1 G/DL — LOW (ref 12–16)
IMM GRANULOCYTES NFR BLD AUTO: 1.7 % — HIGH (ref 0.1–0.3)
LYMPHOCYTES # BLD AUTO: 2.03 K/UL — SIGNIFICANT CHANGE UP (ref 1.2–3.4)
LYMPHOCYTES # BLD AUTO: 22.9 % — SIGNIFICANT CHANGE UP (ref 20.5–51.1)
MCHC RBC-ENTMCNC: 24.3 PG — LOW (ref 27–31)
MCHC RBC-ENTMCNC: 30.7 G/DL — LOW (ref 32–37)
MCV RBC AUTO: 79 FL — LOW (ref 81–99)
MONOCYTES # BLD AUTO: 0.73 K/UL — HIGH (ref 0.1–0.6)
MONOCYTES NFR BLD AUTO: 8.2 % — SIGNIFICANT CHANGE UP (ref 1.7–9.3)
NEUTROPHILS # BLD AUTO: 5.51 K/UL — SIGNIFICANT CHANGE UP (ref 1.4–6.5)
NEUTROPHILS NFR BLD AUTO: 62.2 % — SIGNIFICANT CHANGE UP (ref 42.2–75.2)
NRBC # BLD: 0 /100 WBCS — SIGNIFICANT CHANGE UP (ref 0–0)
PLATELET # BLD AUTO: 486 K/UL — HIGH (ref 130–400)
POTASSIUM SERPL-MCNC: 4.1 MMOL/L — SIGNIFICANT CHANGE UP (ref 3.5–5)
POTASSIUM SERPL-SCNC: 4.1 MMOL/L — SIGNIFICANT CHANGE UP (ref 3.5–5)
RBC # BLD: 3.34 M/UL — LOW (ref 4.2–5.4)
RBC # FLD: 14.5 % — SIGNIFICANT CHANGE UP (ref 11.5–14.5)
SODIUM SERPL-SCNC: 139 MMOL/L — SIGNIFICANT CHANGE UP (ref 135–146)
WBC # BLD: 8.86 K/UL — SIGNIFICANT CHANGE UP (ref 4.8–10.8)
WBC # FLD AUTO: 8.86 K/UL — SIGNIFICANT CHANGE UP (ref 4.8–10.8)

## 2018-03-04 RX ORDER — LIDOCAINE HCL 20 MG/ML
60 VIAL (ML) INJECTION ONCE
Qty: 0 | Refills: 0 | Status: DISCONTINUED | OUTPATIENT
Start: 2018-03-04 | End: 2018-03-10

## 2018-03-04 RX ORDER — LIDOCAINE 4 G/100G
1 CREAM TOPICAL ONCE
Qty: 0 | Refills: 0 | Status: DISCONTINUED | OUTPATIENT
Start: 2018-03-04 | End: 2018-03-10

## 2018-03-04 RX ORDER — MINERAL OIL
133 OIL (ML) MISCELLANEOUS ONCE
Qty: 0 | Refills: 0 | Status: COMPLETED | OUTPATIENT
Start: 2018-03-04 | End: 2018-03-04

## 2018-03-04 RX ORDER — LIDOCAINE 4 G/100G
200 CREAM TOPICAL ONCE
Qty: 0 | Refills: 0 | Status: DISCONTINUED | OUTPATIENT
Start: 2018-03-04 | End: 2018-03-10

## 2018-03-04 RX ADMIN — OXYCODONE AND ACETAMINOPHEN 2 TABLET(S): 5; 325 TABLET ORAL at 19:01

## 2018-03-04 RX ADMIN — OXYCODONE AND ACETAMINOPHEN 2 TABLET(S): 5; 325 TABLET ORAL at 07:26

## 2018-03-04 RX ADMIN — GABAPENTIN 300 MILLIGRAM(S): 400 CAPSULE ORAL at 21:58

## 2018-03-04 RX ADMIN — Medication 325 MILLIGRAM(S): at 09:20

## 2018-03-04 RX ADMIN — Medication 1 TABLET(S): at 11:46

## 2018-03-04 RX ADMIN — OXYCODONE AND ACETAMINOPHEN 2 TABLET(S): 5; 325 TABLET ORAL at 06:31

## 2018-03-04 RX ADMIN — Medication 50 MILLIGRAM(S): at 21:58

## 2018-03-04 RX ADMIN — Medication 325 MILLIGRAM(S): at 18:31

## 2018-03-04 RX ADMIN — ENOXAPARIN SODIUM 40 MILLIGRAM(S): 100 INJECTION SUBCUTANEOUS at 11:46

## 2018-03-04 RX ADMIN — OXYCODONE AND ACETAMINOPHEN 2 TABLET(S): 5; 325 TABLET ORAL at 06:34

## 2018-03-04 RX ADMIN — Medication 325 MILLIGRAM(S): at 12:44

## 2018-03-04 RX ADMIN — OXYCODONE AND ACETAMINOPHEN 2 TABLET(S): 5; 325 TABLET ORAL at 18:31

## 2018-03-04 NOTE — PROGRESS NOTE ADULT - ASSESSMENT
43yo F s/p gastric bypass complicated by SBO and septic shock/ARDS and vasopressor induced LE necrosis presents with worsening necrosis of bilateral LE    1. B/L foot gangrene after vasopressors.  - wound vac change by podiatry  - awaiting plan by podiatry  - continue Percocet 2 tab Q 6 hrs and Morphine 4 mg Q4hrs PRN and topical Lidocaine    2. Acute blood loss anemia post op.  - stable Hb  - Iron supplement    3. HTN  - cont home meds    4. PANDA - cont CPAP    5. constipation  - resolved  - enema and Dulcolax suppository PRN

## 2018-03-04 NOTE — PROGRESS NOTE ADULT - SUBJECTIVE AND OBJECTIVE BOX
Podiatry Follow Up  Pt seen bedside NAD AAOx3 VSS. Pt denies F/N/V/C/D/SOB. Pt states VAC has had no alarms since placement 3/3/2018 by Podiatry team.   VAC running at -125mm Hg with little to no leak. ~50 ccs of sero-sanguinous discharge in cannister.     Vital Signs Last 24 Hrs  T(C): 36.3 (04 Mar 2018 00:00), Max: 36.7 (03 Mar 2018 16:00)  T(F): 97.4 (04 Mar 2018 00:00), Max: 98 (03 Mar 2018 16:00)  HR: 92 (04 Mar 2018 00:00) (92 - 104)  BP: 111/69 (04 Mar 2018 00:00) (110/74 - 111/69)  RR: 18 (04 Mar 2018 00:00) (18 - 18)                            8.0    9.50  )-----------( 475      ( 03 Mar 2018 06:35 )             25.4       A:  Bilateral -125mm Hg VAC dressings intact     P:  Podiatry to round q24h to evaluate progression.   Maintain VAC @ -125 mm Hg

## 2018-03-04 NOTE — PROGRESS NOTE ADULT - SUBJECTIVE AND OBJECTIVE BOX
JIMMY CR [id number]    Hospitalist Note  41yo F with Past Medical History of HTN, PANDA, Obesity status post gastric bypass surgery complicated by septic shock/ATN with CVVH/ARDS, SBO, and stress cardiomyopathy and vasopressor induced necrosis of fingers and toes in October 2017 presents to [facility] for bilateral foot amputation.    Overnight events/Updates: stats post bilateral forefoot amputations.  Podiatry recommended continuing bilateral NPWT.  Pending podiatry and plastic sx decision re: primary closure.    Vital Signs Last 24 Hrs  T(C): 36.7 (04 Mar 2018 07:30), Max: 36.7 (03 Mar 2018 16:00)  T(F): 98.1 (04 Mar 2018 07:30), Max: 98.1 (04 Mar 2018 07:30)  HR: 78 (04 Mar 2018 07:30) (78 - 104)  BP: 118/70 (04 Mar 2018 07:30) (110/74 - 118/70)  BP(mean): --  RR: 18 (04 Mar 2018 07:30) (18 - 18)  SpO2: --    Physical Examination:  General: AAO x 3  HEENT: PERRLA, EOMI  CV= S1 & S2 appreciated  Lungs=CTA BL  Abdominal Examination= + BS, Soft, NT/ND  Extremity Examination= bilateral vac dressings. Bilateral finger amputations.    ROS: No chest pain, no shortness of breath.  All other systems reviewed and are within normal limits except for the complaints in the HPI.    MEDICATIONS  (STANDING):  bisacodyl Suppository 10 milliGRAM(s) Rectal daily  enoxaparin Injectable 40 milliGRAM(s) SubCutaneous every 24 hours  ferrous    sulfate 325 milliGRAM(s) Oral three times a day with meals  gabapentin 300 milliGRAM(s) Oral at bedtime  lidocaine 1% Injectable 60 milliLiter(s) Local Injection once  lidocaine 1% Injectable 60 milliLiter(s) Local Injection once  lidocaine 1% Injectable 40 milliLiter(s) Local Injection once  lidocaine 1% Injectable 40 milliLiter(s) Local Injection once  lidocaine 2% Gel 1 Application(s) Topical daily  lidocaine 2% Gel 1 Application(s) Topical once  lidocaine 2% Gel 1 Application(s) Topical once  lidocaine 2% Gel 1 Application(s) Topical once  lidocaine 2% Gel 1 Application(s) Topical once  lidocaine 2% Injectable 60 milliLiter(s) Local Injection once  lidocaine 2% Injectable 40 milliLiter(s) Local Injection once  lidocaine 2% Viscous 60 milliLiter(s) Mucosal once  lidocaine 2% Viscous 200 milliLiter(s) Mucosal once  metoprolol succinate ER 50 milliGRAM(s) Oral at bedtime  multivitamin 1 Tablet(s) Oral daily    MEDICATIONS  (PRN):  acetaminophen   Tablet 650 milliGRAM(s) Oral every 6 hours PRN For Temp greater than 38.5 C (101.3 F)  morphine  - Injectable 4 milliGRAM(s) IV Push every 4 hours PRN Severe Pain (7 - 10)  oxyCODONE    5 mG/acetaminophen 325 mG 2 Tablet(s) Oral every 6 hours PRN Moderate Pain (4 - 6)  senna Syrup 5 milliLiter(s) Oral three times a day PRN Constipation                            8.1    8.86  )-----------( 486      ( 04 Mar 2018 07:33 )             26.4     03-04    139  |  104  |  9<L>  ----------------------------<  94  4.1   |  26  |  1.0    Ca    9.4      04 Mar 2018 07:33        Case discussed with housestaff & family  SANJAY Tyson [phone]

## 2018-03-04 NOTE — PROGRESS NOTE ADULT - SUBJECTIVE AND OBJECTIVE BOX
SUBJECTIVE:    Patient is a 42y old Female who presents with a chief complaint of b/l foot pain, gangrene (15 Feb 2018 14:24)    Currently admitted to medicine with the primary diagnosis of Gangrene of foot     Today is hospital day 17d. This morning she is resting comfortably in bed and reports no new issues or overnight events.     PAST MEDICAL & SURGICAL HISTORY  Takotsubo cardiomyopathy  Small bowel obstruction  Osteoarthritis  Cardiomyopathy  Sleep apnea  HTN (hypertension)  Gangrene of lower extremity  Gangrene of finger of right hand  Gangrene of finger of left hand  H/O exploratory laparotomy  Amputation finger  History of cholecystectomy  H/O gastric bypass    SOCIAL HISTORY:    ALLERGIES:  No Known Allergies    MEDICATIONS:  STANDING MEDICATIONS  bisacodyl Suppository 10 milliGRAM(s) Rectal daily  enoxaparin Injectable 40 milliGRAM(s) SubCutaneous every 24 hours  ferrous    sulfate 325 milliGRAM(s) Oral three times a day with meals  gabapentin 300 milliGRAM(s) Oral at bedtime  lidocaine 1% Injectable 60 milliLiter(s) Local Injection once  lidocaine 1% Injectable 60 milliLiter(s) Local Injection once  lidocaine 1% Injectable 40 milliLiter(s) Local Injection once  lidocaine 1% Injectable 40 milliLiter(s) Local Injection once  lidocaine 2% Gel 1 Application(s) Topical daily  lidocaine 2% Gel 1 Application(s) Topical once  lidocaine 2% Gel 1 Application(s) Topical once  lidocaine 2% Gel 1 Application(s) Topical once  lidocaine 2% Gel 1 Application(s) Topical once  lidocaine 2% Injectable 60 milliLiter(s) Local Injection once  lidocaine 2% Injectable 40 milliLiter(s) Local Injection once  lidocaine 2% Viscous 60 milliLiter(s) Mucosal once  lidocaine 2% Viscous 200 milliLiter(s) Mucosal once  metoprolol succinate ER 50 milliGRAM(s) Oral at bedtime  multivitamin 1 Tablet(s) Oral daily    PRN MEDICATIONS  acetaminophen   Tablet 650 milliGRAM(s) Oral every 6 hours PRN  morphine  - Injectable 4 milliGRAM(s) IV Push every 4 hours PRN  oxyCODONE    5 mG/acetaminophen 325 mG 2 Tablet(s) Oral every 6 hours PRN  senna Syrup 5 milliLiter(s) Oral three times a day PRN    VITALS:   T(F): 98.1  HR: 78  BP: 118/70  RR: 18  SpO2: --    LABS:                        8.1    8.86  )-----------( 486      ( 04 Mar 2018 07:33 )             26.4     03-04    139  |  104  |  9<L>  ----------------------------<  94  4.1   |  26  |  1.0    Ca    9.4      04 Mar 2018 07:33      PT/INR - ( 03 Mar 2018 06:35 )   PT: 12.60 sec;   INR: 1.16 ratio         PTT - ( 03 Mar 2018 06:35 )  PTT:28.4 sec      PHYSICAL EXAM:  GEN: No acute distress  LUNGS: Clear to auscultation bilaterally   HEART: S1/S2 present. RRR.   ABD: Soft, non-tender, non-distended. Bowel sounds present  EXT: wound vac on b/l lower extremity, did not examine 2/2 pt request 2/2 pain  NEURO: AAOX3    HOME MEDICATIONS:  Home Medications:  gabapentin 300 mg oral capsule (02-15-18)  Metoprolol Succinate ER 50 mg oral tablet, extended release (02-15-18)  Multiple Vitamins oral tablet (02-15-18)  Vitamin B-12 100 mcg oral tablet (02-15-18)  Vitamin D3 50,000 intl units oral capsule (02-15-18)

## 2018-03-04 NOTE — PROGRESS NOTE ADULT - ASSESSMENT
41yo F with Past Medical History of HTN, PANDA, Obesity status post gastric bypass surgery complicated by septic shock/ATN with CVVH/ARDS, SBO, and stress cardiomyopathy and vasopressor induced necrosis of fingers and toes in October 2017 presents to [facility] for bilateral foot amputation.    1) Status post septic shock with necrosis of the fingers/toes: status post bilateral forefoot amputations.   Continue NPWT to the LE as per podiatry. Continue Oxycodone for pain, and Morphine IV with dressing changes.  Awaiting Plastic Sx and Podiatry to determine plan for wound closure.  2) HTN: continue Toprol XL  3) Obstructive Sleep Apnea: BIPAP at night  4) GI/DVT prophylaxis.

## 2018-03-05 LAB
ANION GAP SERPL CALC-SCNC: 8 MMOL/L — SIGNIFICANT CHANGE UP (ref 7–14)
BASOPHILS # BLD AUTO: 0.05 K/UL — SIGNIFICANT CHANGE UP (ref 0–0.2)
BASOPHILS NFR BLD AUTO: 0.6 % — SIGNIFICANT CHANGE UP (ref 0–1)
BUN SERPL-MCNC: 6 MG/DL — LOW (ref 10–20)
CALCIUM SERPL-MCNC: 9.2 MG/DL — SIGNIFICANT CHANGE UP (ref 8.5–10.1)
CHLORIDE SERPL-SCNC: 104 MMOL/L — SIGNIFICANT CHANGE UP (ref 98–110)
CO2 SERPL-SCNC: 27 MMOL/L — SIGNIFICANT CHANGE UP (ref 17–32)
CREAT SERPL-MCNC: 1 MG/DL — SIGNIFICANT CHANGE UP (ref 0.7–1.5)
EOSINOPHIL # BLD AUTO: 0.35 K/UL — SIGNIFICANT CHANGE UP (ref 0–0.7)
EOSINOPHIL NFR BLD AUTO: 4.3 % — SIGNIFICANT CHANGE UP (ref 0–8)
GLUCOSE SERPL-MCNC: 83 MG/DL — SIGNIFICANT CHANGE UP (ref 70–110)
HCT VFR BLD CALC: 26.4 % — LOW (ref 37–47)
HGB BLD-MCNC: 8.3 G/DL — LOW (ref 12–16)
IMM GRANULOCYTES NFR BLD AUTO: 1.2 % — HIGH (ref 0.1–0.3)
LYMPHOCYTES # BLD AUTO: 1.91 K/UL — SIGNIFICANT CHANGE UP (ref 1.2–3.4)
LYMPHOCYTES # BLD AUTO: 23.3 % — SIGNIFICANT CHANGE UP (ref 20.5–51.1)
MCHC RBC-ENTMCNC: 24.6 PG — LOW (ref 27–31)
MCHC RBC-ENTMCNC: 31.4 G/DL — LOW (ref 32–37)
MCV RBC AUTO: 78.1 FL — LOW (ref 81–99)
MONOCYTES # BLD AUTO: 0.64 K/UL — HIGH (ref 0.1–0.6)
MONOCYTES NFR BLD AUTO: 7.8 % — SIGNIFICANT CHANGE UP (ref 1.7–9.3)
NEUTROPHILS # BLD AUTO: 5.15 K/UL — SIGNIFICANT CHANGE UP (ref 1.4–6.5)
NEUTROPHILS NFR BLD AUTO: 62.8 % — SIGNIFICANT CHANGE UP (ref 42.2–75.2)
NRBC # BLD: 0 /100 WBCS — SIGNIFICANT CHANGE UP (ref 0–0)
PLATELET # BLD AUTO: 499 K/UL — HIGH (ref 130–400)
POTASSIUM SERPL-MCNC: 4.3 MMOL/L — SIGNIFICANT CHANGE UP (ref 3.5–5)
POTASSIUM SERPL-SCNC: 4.3 MMOL/L — SIGNIFICANT CHANGE UP (ref 3.5–5)
RBC # BLD: 3.38 M/UL — LOW (ref 4.2–5.4)
RBC # FLD: 14.4 % — SIGNIFICANT CHANGE UP (ref 11.5–14.5)
SODIUM SERPL-SCNC: 139 MMOL/L — SIGNIFICANT CHANGE UP (ref 135–146)
WBC # BLD: 8.2 K/UL — SIGNIFICANT CHANGE UP (ref 4.8–10.8)
WBC # FLD AUTO: 8.2 K/UL — SIGNIFICANT CHANGE UP (ref 4.8–10.8)

## 2018-03-05 RX ADMIN — Medication 325 MILLIGRAM(S): at 10:46

## 2018-03-05 RX ADMIN — ENOXAPARIN SODIUM 40 MILLIGRAM(S): 100 INJECTION SUBCUTANEOUS at 13:25

## 2018-03-05 RX ADMIN — LIDOCAINE 1 APPLICATION(S): 4 CREAM TOPICAL at 11:52

## 2018-03-05 RX ADMIN — Medication 325 MILLIGRAM(S): at 15:12

## 2018-03-05 RX ADMIN — Medication 1 TABLET(S): at 13:25

## 2018-03-05 RX ADMIN — OXYCODONE AND ACETAMINOPHEN 2 TABLET(S): 5; 325 TABLET ORAL at 07:52

## 2018-03-05 RX ADMIN — Medication 325 MILLIGRAM(S): at 13:25

## 2018-03-05 RX ADMIN — MORPHINE SULFATE 4 MILLIGRAM(S): 50 CAPSULE, EXTENDED RELEASE ORAL at 19:49

## 2018-03-05 RX ADMIN — Medication 50 MILLIGRAM(S): at 21:46

## 2018-03-05 RX ADMIN — MORPHINE SULFATE 4 MILLIGRAM(S): 50 CAPSULE, EXTENDED RELEASE ORAL at 19:28

## 2018-03-05 RX ADMIN — GABAPENTIN 300 MILLIGRAM(S): 400 CAPSULE ORAL at 21:46

## 2018-03-05 RX ADMIN — OXYCODONE AND ACETAMINOPHEN 2 TABLET(S): 5; 325 TABLET ORAL at 10:46

## 2018-03-05 NOTE — CHART NOTE - NSCHARTNOTEFT_GEN_A_CORE
Registered Dietitian Follow-Up     Patient Profile Reviewed                           Yes [x]   No []     Nutrition History Previously Obtained        Yes [x]  No []       Pertinent Subjective Information: Pt. reports no changes since last follow up, still not very fond of hospital food- reports  bringing meals occasionally- able to finish home cooked foods. Pt. tried Ensure enlive and tolerated it much better than Ensure clear. Intake remains variable, still not back to baseline, possibly related to food selection/personal preferences.      Pertinent Medical Interventions: s/p bilateral foot amputation, podiatry- continuing bilateral NPWT- awaiting further plan, acute blood loss anemia- stable Hb 8.1- didn't receive blood, asymptomatic, iron supplement, constipation- resolved- enema suppository      Diet order: regular     Anthropometrics:  - Ht.  - Wt. no new weights documented   - %wt change  - BMI  - IBW     Pertinent Lab Data: (3/5) RBC 3.38, Hg 8.3, Hct 26.4, BUN 6     Pertinent Meds: Ferrous sulfate, MVI, Senna      Physical Findings:  - Appearance: alert & oriented   - GI function: denies symptoms, last BM 3/4  - Tubes:  - Oral/Mouth cavity: denies symptoms   - Skin: intact (BS 18)     Nutrition Requirements  Weight Used:     Estimated Energy Needs    Continue [x]  Adjust [] 1655-1820kcal   Adjusted Energy Recommendations:   kcal/day        Estimated Protein Needs    Continue [x]  Adjust [] 65-79g  Adjusted Protein Recommendations:   gm/day        Estimated Fluid Needs        Continue [x]  Adjust [] 1655-1820ml  Adjusted Fluid Recommendations:   mL/day     Nutrient Intake: remains variable, up to 50%         [] Previous Nutrition Diagnosis: Inadequate oral intake            [x] Ongoing          [] Resolved    Ensure enlive recommended at previous follow up has not been ordered- will contact the resident to order the supplement      Nutrition Intervention: meals and snacks, medical food supplement      Goal/Expected Outcome: in 3 days pt. to consume >50% at meal times, 100% supplement     Indicator/Monitoring: energy intake, body composition, nutrition focused physical findings

## 2018-03-05 NOTE — PROGRESS NOTE ADULT - SUBJECTIVE AND OBJECTIVE BOX
Podiatry follow up    Called by SUNNY due to wound VAC container @ 350ml.  Risk of losing seal on wound VAC when changing canister.   Due to this will leave VAC intact @ -125 mmHg.  Podiatry to breakdown VAC on WEDNESDAY AM rounds and eval.    A:  Wound VAC running with little to no leak.   350ml fluid in VAC container    P:  VAC to be taken down 3/7/2018 with podiatry team.  Please pre-medicate patient 6:00AM 3/7/2018 in anticipation fo wound evaluation.

## 2018-03-05 NOTE — PROGRESS NOTE ADULT - ASSESSMENT
43yo F s/p gastric bypass complicated by SBO and septic shock/ARDS and vasopressor induced LE necrosis presents with worsening necrosis of bilateral LE    1. B/L foot gangrene 2/2 vasopressor toxicity  - pain control adequate  - wound vac change by podiatry  - awaiting plan by podiatry  - continue Percocet 2 tab Q 6 hrs and Morphine 4 mg Q4hrs PRN and topical Lidocaine    2. Acute blood loss anemia post op.  - stable Hb 8.1, did not receive blood  - asymptomatic  - trend CBC  - Iron supplement    3. HTN  - BP wnl, not on meds currently    4. PANDA - cont CPAP    5. constipation  - resolved  - enema and Dulcolax suppository PRN 41yo F s/p gastric bypass complicated by SBO and septic shock/ARDS and vasopressor induced LE necrosis presents with worsening necrosis of bilateral LE s/p amputation below metatarsals    1. B/L foot gangrene 2/2 vasopressor toxicity  - pain control adequate  - wound vac change by podiatry  - plan by podiatry is to keep dressing today, change it tomorrow, remove vac tuesday or wednesday  - pln from plastic surgery is to do tissue flap graft vs BKS, still being considered by pt and her family, awaiting their agreement to a plan.  - continue Percocet 2 tab Q 6 hrs and Morphine 4 mg Q4hrs PRN and topical Lidocaine    2. Acute blood loss anemia post op.  - stable Hb 8.1, did not receive blood  - asymptomatic  - trend CBC  - Iron supplement    3. HTN  - BP wnl, not on meds currently    4. PANDA - cont CPAP    5. constipation  - resolved  - enema and Dulcolax suppository PRN

## 2018-03-05 NOTE — PROGRESS NOTE ADULT - SUBJECTIVE AND OBJECTIVE BOX
Podiatry follow up:  Pt seen bedside NAD. Wound VAC running -125 mm Hg continuous. Dressings CDI.     A:  Bilateral Chopart amputations with Wound VAC running at -125mm Hg    P:  Maintain VAC @ -125 mm Hg  Podiatry to f/u q24h to evaluate.  VAC to be taken down Tuesday or Wednesday.

## 2018-03-05 NOTE — PROGRESS NOTE ADULT - SUBJECTIVE AND OBJECTIVE BOX
SUBJECTIVE:    Patient is a 42y old Female who presents with a chief complaint of b/l foot pain, gangrene (15 Feb 2018 14:24)    Currently admitted to medicine with the primary diagnosis of gangrene of bilateral feet.    41yo F with Past Medical History of HTN, PANDA, Obesity status post gastric bypass surgery complicated by septic shock/ATN with CVVH/ARDS, SBO, and stress cardiomyopathy and vasopressor induced necrosis of fingers and toes in October 2017 presents to [facility] for bilateral foot amputation.    Stats post bilateral forefoot amputations.  Podiatry recommended continuing bilateral NPWT. She is on VAC at 125 mmHg, will keep until tuesday or wednesday  Today is hospital day 18d. This morning she is resting comfortably in bed and reports no new issues or overnight events.     PAST MEDICAL & SURGICAL HISTORY  Takotsubo cardiomyopathy  Small bowel obstruction  Osteoarthritis  Cardiomyopathy  Sleep apnea  HTN (hypertension)  Gangrene of lower extremity  Gangrene of finger of right hand  Gangrene of finger of left hand  H/O exploratory laparotomy  Amputation finger  History of cholecystectomy  H/O gastric bypass    SOCIAL HISTORY:  Negative for smoking/alcohol/drug use.     ALLERGIES:  No Known Allergies    MEDICATIONS:  STANDING MEDICATIONS  bisacodyl Suppository 10 milliGRAM(s) Rectal daily  enoxaparin Injectable 40 milliGRAM(s) SubCutaneous every 24 hours  ferrous    sulfate 325 milliGRAM(s) Oral three times a day with meals  gabapentin 300 milliGRAM(s) Oral at bedtime  lidocaine 1% Injectable 60 milliLiter(s) Local Injection once  lidocaine 1% Injectable 60 milliLiter(s) Local Injection once  lidocaine 1% Injectable 40 milliLiter(s) Local Injection once  lidocaine 1% Injectable 40 milliLiter(s) Local Injection once  lidocaine 2% Gel 1 Application(s) Topical daily  lidocaine 2% Gel 1 Application(s) Topical once  lidocaine 2% Gel 1 Application(s) Topical once  lidocaine 2% Gel 1 Application(s) Topical once  lidocaine 2% Gel 1 Application(s) Topical once  lidocaine 2% Injectable 60 milliLiter(s) Local Injection once  lidocaine 2% Injectable 40 milliLiter(s) Local Injection once  lidocaine 2% Viscous 60 milliLiter(s) Mucosal once  lidocaine 2% Viscous 200 milliLiter(s) Mucosal once  metoprolol succinate ER 50 milliGRAM(s) Oral at bedtime  multivitamin 1 Tablet(s) Oral daily    PRN MEDICATIONS  acetaminophen   Tablet 650 milliGRAM(s) Oral every 6 hours PRN  morphine  - Injectable 4 milliGRAM(s) IV Push every 4 hours PRN  oxyCODONE    5 mG/acetaminophen 325 mG 2 Tablet(s) Oral every 6 hours PRN  senna Syrup 5 milliLiter(s) Oral three times a day PRN    VITALS:   T(F): 98  HR: 92  BP: 124/76  RR: 18  SpO2: --    LABS:                        8.1    8.86  )-----------( 486      ( 04 Mar 2018 07:33 )             26.4     03-04    139  |  104  |  9<L>  ----------------------------<  94  4.1   |  26  |  1.0    Ca    9.4      04 Mar 2018 07:33          Hemoglobin Trend:  Hemoglobin: 8.1 g/dL (03-04 @ 07:33)  Hemoglobin: 8.0 g/dL (03-03 @ 06:35)            RADIOLOGY:    PHYSICAL EXAM:  GEN: No acute distress  LUNGS: Clear to auscultation bilaterally   HEART: S1/S2 present. RRR.   ABD: Soft, non-tender, non-distended. Bowel sounds present  EXT: wound vac and dressing CDI  NEURO: AAOX3 SUBJECTIVE:    Patient is a 42y old Female who presents with a chief complaint of b/l foot pain, gangrene (15 Feb 2018 14:24)    Currently admitted to medicine with the primary diagnosis of gangrene of bilateral feet.    41yo F with Past Medical History of HTN, PANDA, Obesity status post gastric bypass surgery complicated by septic shock/ATN with CVVH/ARDS, SBO, and stress cardiomyopathy and vasopressor induced necrosis of fingers and toes in October 2017 presents to [facility] for bilateral foot amputation.    Stats post bilateral forefoot amputations.  Podiatry recommended continuing bilateral NPWT. She is on VAC at 125 mmHg, will keep until tuesday or wednesday  Today is hospital day 18d. This morning she is resting comfortably in bed and reports no new issues or overnight events.     PAST MEDICAL & SURGICAL HISTORY  Takotsubo cardiomyopathy  Small bowel obstruction  Osteoarthritis  Cardiomyopathy  Sleep apnea  HTN (hypertension)  Gangrene of lower extremity  Gangrene of finger of right hand  Gangrene of finger of left hand  H/O exploratory laparotomy  Amputation finger  History of cholecystectomy  H/O gastric bypass    SOCIAL HISTORY:  Negative for smoking/alcohol/drug use.     ALLERGIES:  No Known Allergies    MEDICATIONS:  STANDING MEDICATIONS  bisacodyl Suppository 10 milliGRAM(s) Rectal daily  enoxaparin Injectable 40 milliGRAM(s) SubCutaneous every 24 hours  ferrous    sulfate 325 milliGRAM(s) Oral three times a day with meals  gabapentin 300 milliGRAM(s) Oral at bedtime  lidocaine 1% Injectable 60 milliLiter(s) Local Injection once  lidocaine 1% Injectable 60 milliLiter(s) Local Injection once  lidocaine 1% Injectable 40 milliLiter(s) Local Injection once  lidocaine 1% Injectable 40 milliLiter(s) Local Injection once  lidocaine 2% Gel 1 Application(s) Topical daily  lidocaine 2% Gel 1 Application(s) Topical once  lidocaine 2% Gel 1 Application(s) Topical once  lidocaine 2% Gel 1 Application(s) Topical once  lidocaine 2% Gel 1 Application(s) Topical once  lidocaine 2% Injectable 60 milliLiter(s) Local Injection once  lidocaine 2% Injectable 40 milliLiter(s) Local Injection once  lidocaine 2% Viscous 60 milliLiter(s) Mucosal once  lidocaine 2% Viscous 200 milliLiter(s) Mucosal once  metoprolol succinate ER 50 milliGRAM(s) Oral at bedtime  multivitamin 1 Tablet(s) Oral daily    PRN MEDICATIONS  acetaminophen   Tablet 650 milliGRAM(s) Oral every 6 hours PRN  morphine  - Injectable 4 milliGRAM(s) IV Push every 4 hours PRN  oxyCODONE    5 mG/acetaminophen 325 mG 2 Tablet(s) Oral every 6 hours PRN  senna Syrup 5 milliLiter(s) Oral three times a day PRN    VITALS:   T(F): 98  HR: 92  BP: 124/76  RR: 18  SpO2: --    LABS:                        8.1    8.86  )-----------( 486      ( 04 Mar 2018 07:33 )             26.4     03-04    139  |  104  |  9<L>  ----------------------------<  94  4.1   |  26  |  1.0    Ca    9.4      04 Mar 2018 07:33          Hemoglobin Trend:  Hemoglobin: 8.1 g/dL (03-04 @ 07:33)  Hemoglobin: 8.0 g/dL (03-03 @ 06:35)            RADIOLOGY:    PHYSICAL EXAM:  GEN: No acute distress  LUNGS: Clear to auscultation bilaterally   HEART: S1/S2 present. RRR.   ABD: Soft, non-tender, non-distended. Bowel sounds present  EXT: wound vac and dressing CDI, bilateral LL below tarsal amputation, UL digits amputated.  NEURO: AAOX3

## 2018-03-06 LAB
ANION GAP SERPL CALC-SCNC: 6 MMOL/L — LOW (ref 7–14)
BASOPHILS # BLD AUTO: 0.05 K/UL — SIGNIFICANT CHANGE UP (ref 0–0.2)
BASOPHILS NFR BLD AUTO: 0.5 % — SIGNIFICANT CHANGE UP (ref 0–1)
BUN SERPL-MCNC: 8 MG/DL — LOW (ref 10–20)
CALCIUM SERPL-MCNC: 9.5 MG/DL — SIGNIFICANT CHANGE UP (ref 8.5–10.1)
CHLORIDE SERPL-SCNC: 106 MMOL/L — SIGNIFICANT CHANGE UP (ref 98–110)
CO2 SERPL-SCNC: 27 MMOL/L — SIGNIFICANT CHANGE UP (ref 17–32)
CREAT SERPL-MCNC: 1 MG/DL — SIGNIFICANT CHANGE UP (ref 0.7–1.5)
EOSINOPHIL # BLD AUTO: 0.25 K/UL — SIGNIFICANT CHANGE UP (ref 0–0.7)
EOSINOPHIL NFR BLD AUTO: 2.7 % — SIGNIFICANT CHANGE UP (ref 0–8)
GLUCOSE SERPL-MCNC: 95 MG/DL — SIGNIFICANT CHANGE UP (ref 70–110)
HCT VFR BLD CALC: 28.1 % — LOW (ref 37–47)
HGB BLD-MCNC: 8.6 G/DL — LOW (ref 12–16)
IMM GRANULOCYTES NFR BLD AUTO: 1.1 % — HIGH (ref 0.1–0.3)
LYMPHOCYTES # BLD AUTO: 2.3 K/UL — SIGNIFICANT CHANGE UP (ref 1.2–3.4)
LYMPHOCYTES # BLD AUTO: 25.2 % — SIGNIFICANT CHANGE UP (ref 20.5–51.1)
MAGNESIUM SERPL-MCNC: 1.9 MG/DL — SIGNIFICANT CHANGE UP (ref 1.8–2.4)
MCHC RBC-ENTMCNC: 23.9 PG — LOW (ref 27–31)
MCHC RBC-ENTMCNC: 30.6 G/DL — LOW (ref 32–37)
MCV RBC AUTO: 78.1 FL — LOW (ref 81–99)
MONOCYTES # BLD AUTO: 0.72 K/UL — HIGH (ref 0.1–0.6)
MONOCYTES NFR BLD AUTO: 7.9 % — SIGNIFICANT CHANGE UP (ref 1.7–9.3)
NEUTROPHILS # BLD AUTO: 5.69 K/UL — SIGNIFICANT CHANGE UP (ref 1.4–6.5)
NEUTROPHILS NFR BLD AUTO: 62.6 % — SIGNIFICANT CHANGE UP (ref 42.2–75.2)
NRBC # BLD: 0 /100 WBCS — SIGNIFICANT CHANGE UP (ref 0–0)
PLATELET # BLD AUTO: 508 K/UL — HIGH (ref 130–400)
POTASSIUM SERPL-MCNC: 4.1 MMOL/L — SIGNIFICANT CHANGE UP (ref 3.5–5)
POTASSIUM SERPL-SCNC: 4.1 MMOL/L — SIGNIFICANT CHANGE UP (ref 3.5–5)
RBC # BLD: 3.6 M/UL — LOW (ref 4.2–5.4)
RBC # FLD: 14.6 % — HIGH (ref 11.5–14.5)
SODIUM SERPL-SCNC: 139 MMOL/L — SIGNIFICANT CHANGE UP (ref 135–146)
WBC # BLD: 9.11 K/UL — SIGNIFICANT CHANGE UP (ref 4.8–10.8)
WBC # FLD AUTO: 9.11 K/UL — SIGNIFICANT CHANGE UP (ref 4.8–10.8)

## 2018-03-06 RX ADMIN — Medication 1 TABLET(S): at 11:32

## 2018-03-06 RX ADMIN — ENOXAPARIN SODIUM 40 MILLIGRAM(S): 100 INJECTION SUBCUTANEOUS at 11:31

## 2018-03-06 RX ADMIN — Medication 325 MILLIGRAM(S): at 11:29

## 2018-03-06 RX ADMIN — Medication 325 MILLIGRAM(S): at 17:31

## 2018-03-06 RX ADMIN — Medication 50 MILLIGRAM(S): at 21:35

## 2018-03-06 RX ADMIN — OXYCODONE AND ACETAMINOPHEN 2 TABLET(S): 5; 325 TABLET ORAL at 21:35

## 2018-03-06 RX ADMIN — Medication 325 MILLIGRAM(S): at 11:30

## 2018-03-06 RX ADMIN — GABAPENTIN 300 MILLIGRAM(S): 400 CAPSULE ORAL at 21:35

## 2018-03-06 NOTE — PROGRESS NOTE ADULT - SUBJECTIVE AND OBJECTIVE BOX
PROGRESS NOTE   Patient is a 42y old  Female who presents with a chief complaint of b/l foot pain, gangrene (15 Feb 2018 14:24)      HPI:  41 YO F with PMH of HTN, Obesity s/p gastric bypass surgery complicated by septic shock, ARDS, SBO, ATN with CVVH, stress cardiomyopathy and vasopressor induced necrosis of fingers and toes in October 2017 presents to the ER for admission for b/l foot amputation. As per the patient her b/l feet ulcers are being taken care of by Podiatry by wound care. She was told by the Podiatrist to go the ER for the amputation. Pt. already has amputations of fingers of both hands.   At baseline pt. reports that she can walk without any exertional chest pain/sob, but it is limited by the pain upon walking with foot ulcers. Pt. denies any fevers, chills, nausea, vomiting, chest pain,   lagunas, palpitations, headaches. (15 Feb 2018 14:24)      Vital Signs Last 24 Hrs  T(C): 37.2 (06 Mar 2018 00:00), Max: 37.3 (05 Mar 2018 16:00)  T(F): 98.9 (06 Mar 2018 00:00), Max: 99.2 (05 Mar 2018 16:00)  HR: 107 (06 Mar 2018 00:00) (85 - 107)  BP: 124/82 (05 Mar 2018 16:00) (124/82 - 128/78)  BP(mean): --  RR: 18 (06 Mar 2018 00:00) (18 - 19)  SpO2: --                          8.3    8.20  )-----------( 499      ( 05 Mar 2018 05:57 )             26.4               03-05    139  |  104  |  6<L>  ----------------------------<  83  4.3   |  27  |  1.0    Ca    9.2      05 Mar 2018 05:57    PLAN:   1. Wound vac canister had approximately 400mL of serous drainage which was malodorous.   2. Wound vac canister was changed. After new canister was installed, vac was still running @ 125mmHg w/ no leaks @ this time  3. Wound vac will be changed on 3/7/18

## 2018-03-06 NOTE — PROGRESS NOTE ADULT - ASSESSMENT
43yo F s/p gastric bypass complicated by SBO and septic shock/ARDS and vasopressor induced LE necrosis presents with worsening necrosis of bilateral LE s/p amputation below metatarsals    1. B/L foot gangrene 2/2 vasopressor toxicity  - pain control adequate  - Podiatry team changed VAC canister yesterday. Wound vac will be changed on 3/7/18  - pln from plastic surgery is to do tissue flap graft vs BKS, still being considered by pt and her family, awaiting their agreement to a plan.  - continue Percocet 2 tab Q 6 hrs and Morphine 4 mg Q4hrs PRN and topical Lidocaine    2. Acute blood loss anemia post op.  - stable  - asymptomatic  - trend CBC  - Iron supplement    3. HTN  - BP wnl, not on meds currently    4. PANDA   - cont CPAP    5. constipation  - resolved  - enema and Dulcolax suppository PRN

## 2018-03-06 NOTE — PROGRESS NOTE ADULT - ASSESSMENT
41yo F s/p gastric bypass complicated by SBO and septic shock/ARDS and vasopressor induced LE necrosis presents with worsening necrosis of bilateral LE s/p amputation below metatarsals    1. B/L foot gangrene 2/2 vasopressor toxicity  - pain control adequate  - wound vac cnister removed by podiatry, dressing change lolis change by podiatry  - plan from plastic surgery is to do tissue flap graft vs BKS, still being considered by pt and her family, awaiting their agreement to a plan, contacted Dr Faustin today for f/u from plastic surgery.  - continue Percocet 2 tab Q 6 hrs and Morphine 4 mg Q4hrs PRN and topical Lidocaine    2. Acute blood loss anemia post op.  - stable Hb 8.6, did not receive blood  - asymptomatic  - trend CBC  - Iron supplements    #. HTN  - BP wnl, not on meds currently    4. PANDA - cont CPAP    5. constipation  - resolved  - enema and Dulcolax suppository PRN

## 2018-03-06 NOTE — PROGRESS NOTE ADULT - SUBJECTIVE AND OBJECTIVE BOX
SUBJECTIVE:    Patient is a 42y old Female who presents with a chief complaint of b/l foot pain, gangrene (15 Feb 2018 14:24)    Currently admitted to medicine with the primary diagnosis of Gangrene of bilateral feett.     Today is hospital day 19d. This morning she is resting comfortably in bed and reports no new issues or overnight events.     PAST MEDICAL & SURGICAL HISTORY  Takotsubo cardiomyopathy  Small bowel obstruction  Osteoarthritis  Cardiomyopathy  Sleep apnea  HTN (hypertension)  Gangrene of lower extremity  Gangrene of finger of right hand  Gangrene of finger of left hand  H/O exploratory laparotomy  Amputation finger  History of cholecystectomy  H/O gastric bypass    SOCIAL HISTORY:  Negative for smoking/alcohol/drug use.     ALLERGIES:  No Known Allergies    MEDICATIONS:  STANDING MEDICATIONS  bisacodyl Suppository 10 milliGRAM(s) Rectal daily  enoxaparin Injectable 40 milliGRAM(s) SubCutaneous every 24 hours  ferrous    sulfate 325 milliGRAM(s) Oral three times a day with meals  gabapentin 300 milliGRAM(s) Oral at bedtime  lidocaine 1% Injectable 60 milliLiter(s) Local Injection once  lidocaine 1% Injectable 60 milliLiter(s) Local Injection once  lidocaine 1% Injectable 40 milliLiter(s) Local Injection once  lidocaine 1% Injectable 40 milliLiter(s) Local Injection once  lidocaine 2% Gel 1 Application(s) Topical daily  lidocaine 2% Gel 1 Application(s) Topical once  lidocaine 2% Gel 1 Application(s) Topical once  lidocaine 2% Gel 1 Application(s) Topical once  lidocaine 2% Gel 1 Application(s) Topical once  lidocaine 2% Injectable 60 milliLiter(s) Local Injection once  lidocaine 2% Injectable 40 milliLiter(s) Local Injection once  lidocaine 2% Viscous 60 milliLiter(s) Mucosal once  lidocaine 2% Viscous 200 milliLiter(s) Mucosal once  metoprolol succinate ER 50 milliGRAM(s) Oral at bedtime  multivitamin 1 Tablet(s) Oral daily    PRN MEDICATIONS  acetaminophen   Tablet 650 milliGRAM(s) Oral every 6 hours PRN  morphine  - Injectable 4 milliGRAM(s) IV Push every 4 hours PRN  oxyCODONE    5 mG/acetaminophen 325 mG 2 Tablet(s) Oral every 6 hours PRN  senna Syrup 5 milliLiter(s) Oral three times a day PRN    VITALS:   T(F): 98.2  HR: 67  BP: 150/79  RR: 18  SpO2: --    LABS:                        8.6    9.11  )-----------( 508      ( 06 Mar 2018 05:51 )             28.1     03-06    139  |  106  |  8<L>  ----------------------------<  95  4.1   |  27  |  1.0    Ca    9.5      06 Mar 2018 05:51  Mg     1.9     03-06                    Hemoglobin Trend:  Hemoglobin: 8.6 g/dL (03-06 @ 05:51)  Hemoglobin: 8.3 g/dL (03-05 @ 05:57)  Hemoglobin: 8.1 g/dL (03-04 @ 07:33)    WBC Count: 9.11 K/uL (03-06 @ 05:51)  WBC Count: 8.20 K/uL (03-05 @ 05:57)  WBC Count: 8.86 K/uL (03-04 @ 07:33)  WBC Count: 9.50 K/uL (03-03 @ 06:35)  WBC Count: 9.34 K/uL (03-02 @ 07:07)      RADIOLOGY:  RADIOLOGY RESULTS:          PHYSICAL EXAM:  GEN: not in acute distress  LUNGS: CTA BL  HEART: RS1S2 no murmur   ABD: soft non tender  EXT: bilateral LL in vac dressing, cdi  NEURO: AAOx3

## 2018-03-06 NOTE — PROGRESS NOTE ADULT - SUBJECTIVE AND OBJECTIVE BOX
Podiatry follow up  Pt seen bedside c/o malodor in room due to full canister of VAC machine. During evening Podiatry team changed VAC canister.   Pt denies F/N/V/C/D/SOB.     A:  Wound VAC at -125mm Hg   ~20 cc drainage in canister    P:  VAC break down 3/7/2018 AM rounds   Please administer pain medication 6AM to facilitate wound evaluation.  Podiatry to follow up

## 2018-03-06 NOTE — PROGRESS NOTE ADULT - SUBJECTIVE AND OBJECTIVE BOX
SUBJECTIVE:    Patient is a 42y old Female who presents with a chief complaint of b/l foot pain, gangrene (15 Feb 2018 14:24)  Currently admitted to medicine with the primary diagnosis of gangrene of bilateral feet.  S/p  bilateral forefoot amputations.   This morning she is resting comfortably in bed and reports no new issues or overnight events.     PAST MEDICAL & SURGICAL HISTORY  Takotsubo cardiomyopathy  Small bowel obstruction  Osteoarthritis  Cardiomyopathy  Sleep apnea  HTN (hypertension)  Gangrene of lower extremity  Gangrene of finger of right hand  Gangrene of finger of left hand  H/O exploratory laparotomy  Amputation finger  History of cholecystectomy  H/O gastric bypass    SOCIAL HISTORY:  Negative for smoking/alcohol/drug use.     ALLERGIES:  No Known Allergies    MEDICATIONS:  MEDICATIONS  (STANDING):  bisacodyl Suppository 10 milliGRAM(s) Rectal daily  enoxaparin Injectable 40 milliGRAM(s) SubCutaneous every 24 hours  ferrous    sulfate 325 milliGRAM(s) Oral three times a day with meals  gabapentin 300 milliGRAM(s) Oral at bedtime  lidocaine 1% Injectable 60 milliLiter(s) Local Injection once  lidocaine 1% Injectable 60 milliLiter(s) Local Injection once  lidocaine 1% Injectable 40 milliLiter(s) Local Injection once  lidocaine 1% Injectable 40 milliLiter(s) Local Injection once  lidocaine 2% Gel 1 Application(s) Topical daily  lidocaine 2% Gel 1 Application(s) Topical once  lidocaine 2% Gel 1 Application(s) Topical once  lidocaine 2% Gel 1 Application(s) Topical once  lidocaine 2% Gel 1 Application(s) Topical once  lidocaine 2% Injectable 60 milliLiter(s) Local Injection once  lidocaine 2% Injectable 40 milliLiter(s) Local Injection once  lidocaine 2% Viscous 60 milliLiter(s) Mucosal once  lidocaine 2% Viscous 200 milliLiter(s) Mucosal once  metoprolol succinate ER 50 milliGRAM(s) Oral at bedtime  multivitamin 1 Tablet(s) Oral daily    MEDICATIONS  (PRN):  acetaminophen   Tablet 650 milliGRAM(s) Oral every 6 hours PRN For Temp greater than 38.5 C (101.3 F)  morphine  - Injectable 4 milliGRAM(s) IV Push every 4 hours PRN Severe Pain (7 - 10)  oxyCODONE    5 mG/acetaminophen 325 mG 2 Tablet(s) Oral every 6 hours PRN Moderate Pain (4 - 6)  senna Syrup 5 milliLiter(s) Oral three times a day PRN Constipation    VITALS:   Vital Signs Last 24 Hrs  T(C): 36.8  T(F): 98.2  HR: 67  BP: 150/79  BP(mean): --  RR: 18  SpO2: --  LABS:                                     8.6<L>  9.11  )-----------( 508<H>    ( 06 Mar 2018 05:51 )             28.1<L>                        8.3<L>  8.20  )-----------( 499<H>    ( 05 Mar 2018 05:57 )             26.4<L>  03-06    139  |  106  |  8<L>  ----------------------------<  95  4.1   |  27  |  1.0  03-05    139  |  104  |  6<L>  ----------------------------<  83  4.3   |  27  |  1.0    Ca    9.5      06 Mar 2018 05:51  Ca    9.2      05 Mar 2018 05:57  Mg     1.9     03-06            PHYSICAL EXAM:  GEN: No acute distress  LUNGS: Clear to auscultation bilaterally   HEART: S1/S2 present. RRR.   ABD: Soft, non-tender, non-distended. Bowel sounds present  EXT: wound vac and dressing CDI, bilateral LL below tarsal amputation, UL digits amputated.  NEURO: AAOX3

## 2018-03-07 LAB
ANION GAP SERPL CALC-SCNC: 3 MMOL/L — LOW (ref 7–14)
BASOPHILS # BLD AUTO: 0.03 K/UL — SIGNIFICANT CHANGE UP (ref 0–0.2)
BASOPHILS NFR BLD AUTO: 0.4 % — SIGNIFICANT CHANGE UP (ref 0–1)
BUN SERPL-MCNC: 7 MG/DL — LOW (ref 10–20)
CALCIUM SERPL-MCNC: 9.4 MG/DL — SIGNIFICANT CHANGE UP (ref 8.5–10.1)
CHLORIDE SERPL-SCNC: 105 MMOL/L — SIGNIFICANT CHANGE UP (ref 98–110)
CO2 SERPL-SCNC: 28 MMOL/L — SIGNIFICANT CHANGE UP (ref 17–32)
CREAT SERPL-MCNC: 1 MG/DL — SIGNIFICANT CHANGE UP (ref 0.7–1.5)
EOSINOPHIL # BLD AUTO: 0.22 K/UL — SIGNIFICANT CHANGE UP (ref 0–0.7)
EOSINOPHIL NFR BLD AUTO: 2.8 % — SIGNIFICANT CHANGE UP (ref 0–8)
GLUCOSE SERPL-MCNC: 93 MG/DL — SIGNIFICANT CHANGE UP (ref 70–110)
HCT VFR BLD CALC: 27.2 % — LOW (ref 37–47)
HGB BLD-MCNC: 8.5 G/DL — LOW (ref 12–16)
IMM GRANULOCYTES NFR BLD AUTO: 0.8 % — HIGH (ref 0.1–0.3)
LYMPHOCYTES # BLD AUTO: 1.98 K/UL — SIGNIFICANT CHANGE UP (ref 1.2–3.4)
LYMPHOCYTES # BLD AUTO: 24.8 % — SIGNIFICANT CHANGE UP (ref 20.5–51.1)
MCHC RBC-ENTMCNC: 24.4 PG — LOW (ref 27–31)
MCHC RBC-ENTMCNC: 31.3 G/DL — LOW (ref 32–37)
MCV RBC AUTO: 77.9 FL — LOW (ref 81–99)
MONOCYTES # BLD AUTO: 0.55 K/UL — SIGNIFICANT CHANGE UP (ref 0.1–0.6)
MONOCYTES NFR BLD AUTO: 6.9 % — SIGNIFICANT CHANGE UP (ref 1.7–9.3)
NEUTROPHILS # BLD AUTO: 5.14 K/UL — SIGNIFICANT CHANGE UP (ref 1.4–6.5)
NEUTROPHILS NFR BLD AUTO: 64.3 % — SIGNIFICANT CHANGE UP (ref 42.2–75.2)
NRBC # BLD: 0 /100 WBCS — SIGNIFICANT CHANGE UP (ref 0–0)
PLATELET # BLD AUTO: 474 K/UL — HIGH (ref 130–400)
POTASSIUM SERPL-MCNC: 4.1 MMOL/L — SIGNIFICANT CHANGE UP (ref 3.5–5)
POTASSIUM SERPL-SCNC: 4.1 MMOL/L — SIGNIFICANT CHANGE UP (ref 3.5–5)
RBC # BLD: 3.49 M/UL — LOW (ref 4.2–5.4)
RBC # FLD: 14.6 % — HIGH (ref 11.5–14.5)
SODIUM SERPL-SCNC: 136 MMOL/L — SIGNIFICANT CHANGE UP (ref 135–146)
WBC # BLD: 7.98 K/UL — SIGNIFICANT CHANGE UP (ref 4.8–10.8)
WBC # FLD AUTO: 7.98 K/UL — SIGNIFICANT CHANGE UP (ref 4.8–10.8)

## 2018-03-07 RX ORDER — LIDOCAINE 4 G/100G
200 CREAM TOPICAL ONCE
Qty: 0 | Refills: 0 | Status: COMPLETED | OUTPATIENT
Start: 2018-03-07 | End: 2018-03-07

## 2018-03-07 RX ORDER — LIDOCAINE HCL 20 MG/ML
30 VIAL (ML) INJECTION ONCE
Qty: 0 | Refills: 0 | Status: DISCONTINUED | OUTPATIENT
Start: 2018-03-07 | End: 2018-03-10

## 2018-03-07 RX ORDER — HYDROMORPHONE HYDROCHLORIDE 2 MG/ML
1 INJECTION INTRAMUSCULAR; INTRAVENOUS; SUBCUTANEOUS ONCE
Qty: 0 | Refills: 0 | Status: DISCONTINUED | OUTPATIENT
Start: 2018-03-08 | End: 2018-03-08

## 2018-03-07 RX ORDER — BUPIVACAINE HCL/PF 7.5 MG/ML
30 VIAL (ML) INJECTION ONCE
Qty: 0 | Refills: 0 | Status: DISCONTINUED | OUTPATIENT
Start: 2018-03-07 | End: 2018-03-10

## 2018-03-07 RX ORDER — LIDOCAINE 4 G/100G
100 CREAM TOPICAL ONCE
Qty: 0 | Refills: 0 | Status: DISCONTINUED | OUTPATIENT
Start: 2018-03-07 | End: 2018-03-10

## 2018-03-07 RX ORDER — LIDOCAINE 4 G/100G
1 CREAM TOPICAL ONCE
Qty: 0 | Refills: 0 | Status: DISCONTINUED | OUTPATIENT
Start: 2018-03-07 | End: 2018-03-10

## 2018-03-07 RX ADMIN — OXYCODONE AND ACETAMINOPHEN 2 TABLET(S): 5; 325 TABLET ORAL at 05:03

## 2018-03-07 RX ADMIN — Medication 1 TABLET(S): at 11:59

## 2018-03-07 RX ADMIN — MORPHINE SULFATE 4 MILLIGRAM(S): 50 CAPSULE, EXTENDED RELEASE ORAL at 06:00

## 2018-03-07 RX ADMIN — OXYCODONE AND ACETAMINOPHEN 2 TABLET(S): 5; 325 TABLET ORAL at 20:05

## 2018-03-07 RX ADMIN — Medication 325 MILLIGRAM(S): at 09:50

## 2018-03-07 RX ADMIN — ENOXAPARIN SODIUM 40 MILLIGRAM(S): 100 INJECTION SUBCUTANEOUS at 12:00

## 2018-03-07 RX ADMIN — GABAPENTIN 300 MILLIGRAM(S): 400 CAPSULE ORAL at 20:05

## 2018-03-07 RX ADMIN — Medication 325 MILLIGRAM(S): at 11:59

## 2018-03-07 RX ADMIN — Medication 50 MILLIGRAM(S): at 20:05

## 2018-03-07 RX ADMIN — Medication 325 MILLIGRAM(S): at 18:11

## 2018-03-07 NOTE — PROGRESS NOTE ADULT - SUBJECTIVE AND OBJECTIVE BOX
SUBJECTIVE:    Patient is a 42y old Female who presents with a chief complaint of b/l foot pain, gangrene (15 Feb 2018 14:24)    Currently admitted to medicine with the primary diagnosis of Gangrene of bilateral feet.     Today is hospital day 20d. This morning she is resting comfortably in bed and reports no new issues or overnight events.     PAST MEDICAL & SURGICAL HISTORY  Takotsubo cardiomyopathy  Small bowel obstruction  Osteoarthritis  Cardiomyopathy  Sleep apnea  HTN (hypertension)  Gangrene of lower extremity  Gangrene of finger of right hand  Gangrene of finger of left hand  H/O exploratory laparotomy  Amputation finger  History of cholecystectomy  H/O gastric bypass  hx of SBO    SOCIAL HISTORY:  Negative for smoking/alcohol/drug use.     ALLERGIES:  No Known Allergies    MEDICATIONS:  STANDING MEDICATIONS  bisacodyl Suppository 10 milliGRAM(s) Rectal daily  enoxaparin Injectable 40 milliGRAM(s) SubCutaneous every 24 hours  ferrous    sulfate 325 milliGRAM(s) Oral three times a day with meals  gabapentin 300 milliGRAM(s) Oral at bedtime  lidocaine 1% Injectable 60 milliLiter(s) Local Injection once  lidocaine 1% Injectable 60 milliLiter(s) Local Injection once  lidocaine 1% Injectable 40 milliLiter(s) Local Injection once  lidocaine 1% Injectable 40 milliLiter(s) Local Injection once  lidocaine 2% Gel 1 Application(s) Topical daily  lidocaine 2% Gel 1 Application(s) Topical once  lidocaine 2% Gel 1 Application(s) Topical once  lidocaine 2% Gel 1 Application(s) Topical once  lidocaine 2% Gel 1 Application(s) Topical once  lidocaine 2% Injectable 60 milliLiter(s) Local Injection once  lidocaine 2% Injectable 40 milliLiter(s) Local Injection once  lidocaine 2% Viscous 60 milliLiter(s) Mucosal once  lidocaine 2% Viscous 200 milliLiter(s) Mucosal once  metoprolol succinate ER 50 milliGRAM(s) Oral at bedtime  multivitamin 1 Tablet(s) Oral daily    PRN MEDICATIONS  acetaminophen   Tablet 650 milliGRAM(s) Oral every 6 hours PRN  morphine  - Injectable 4 milliGRAM(s) IV Push every 4 hours PRN  oxyCODONE    5 mG/acetaminophen 325 mG 2 Tablet(s) Oral every 6 hours PRN  senna Syrup 5 milliLiter(s) Oral three times a day PRN    VITALS:   T(F): 97.2  HR: 97  BP: 120/75  RR: 18  SpO2: --    LABS:                        8.5    7.98  )-----------( 474      ( 07 Mar 2018 08:17 )             27.2     03-07    136  |  105  |  7<L>  ----------------------------<  93  4.1   |  28  |  1.0    Ca    9.4      07 Mar 2018 08:17  Mg     1.9     03-06                    Hemoglobin Trend:  Hemoglobin: 8.5 g/dL (03-07 @ 08:17)  Hemoglobin: 8.6 g/dL (03-06 @ 05:51)  Hemoglobin: 8.3 g/dL (03-05 @ 05:57)    WBC Count: 7.98 K/uL (03-07 @ 08:17)  WBC Count: 9.11 K/uL (03-06 @ 05:51)  WBC Count: 8.20 K/uL (03-05 @ 05:57)  WBC Count: 8.86 K/uL (03-04 @ 07:33)  WBC Count: 9.50 K/uL (03-03 @ 06:35)      RADIOLOGY:  RADIOLOGY RESULTS:          PHYSICAL EXAM:  GEN: In pain from vac dressing change  LUNGS: CTABL  HEART: rs1s2  ABD: soft non tender  EXT: bilateral foot stumps in dressing  NEURO: AAO3 SUBJECTIVE:    Patient is a 42y old Female who presents with a chief complaint of b/l foot pain, gangrene (15 Feb 2018 14:24)    Currently admitted to medicine with the primary diagnosis of Gangrene of bilateral feet.  S/p  bilateral forefoot amputations.   Today is hospital day 20d. This morning she is resting comfortably in bed and reports no new issues or overnight events.     PAST MEDICAL & SURGICAL HISTORY  Takotsubo cardiomyopathy  Small bowel obstruction  Osteoarthritis  Cardiomyopathy  Sleep apnea  HTN (hypertension)  Gangrene of lower extremity  Gangrene of finger of right hand  Gangrene of finger of left hand  H/O exploratory laparotomy  Amputation finger  History of cholecystectomy  H/O gastric bypass  hx of SBO    SOCIAL HISTORY:  Negative for smoking/alcohol/drug use.     ALLERGIES:  No Known Allergies    MEDICATIONS:  STANDING MEDICATIONS  bisacodyl Suppository 10 milliGRAM(s) Rectal daily  enoxaparin Injectable 40 milliGRAM(s) SubCutaneous every 24 hours  ferrous    sulfate 325 milliGRAM(s) Oral three times a day with meals  gabapentin 300 milliGRAM(s) Oral at bedtime  lidocaine 1% Injectable 60 milliLiter(s) Local Injection once  lidocaine 1% Injectable 60 milliLiter(s) Local Injection once  lidocaine 1% Injectable 40 milliLiter(s) Local Injection once  lidocaine 1% Injectable 40 milliLiter(s) Local Injection once  lidocaine 2% Gel 1 Application(s) Topical daily  lidocaine 2% Gel 1 Application(s) Topical once  lidocaine 2% Gel 1 Application(s) Topical once  lidocaine 2% Gel 1 Application(s) Topical once  lidocaine 2% Gel 1 Application(s) Topical once  lidocaine 2% Injectable 60 milliLiter(s) Local Injection once  lidocaine 2% Injectable 40 milliLiter(s) Local Injection once  lidocaine 2% Viscous 60 milliLiter(s) Mucosal once  lidocaine 2% Viscous 200 milliLiter(s) Mucosal once  metoprolol succinate ER 50 milliGRAM(s) Oral at bedtime  multivitamin 1 Tablet(s) Oral daily    PRN MEDICATIONS  acetaminophen   Tablet 650 milliGRAM(s) Oral every 6 hours PRN  morphine  - Injectable 4 milliGRAM(s) IV Push every 4 hours PRN  oxyCODONE    5 mG/acetaminophen 325 mG 2 Tablet(s) Oral every 6 hours PRN  senna Syrup 5 milliLiter(s) Oral three times a day PRN    VITALS:   T(F): 97.2  HR: 97  BP: 120/75  RR: 18  SpO2: --    LABS:                        8.5    7.98  )-----------( 474      ( 07 Mar 2018 08:17 )             27.2     03-07    136  |  105  |  7<L>  ----------------------------<  93  4.1   |  28  |  1.0    Ca    9.4      07 Mar 2018 08:17  Mg     1.9     03-06                    Hemoglobin Trend:  Hemoglobin: 8.5 g/dL (03-07 @ 08:17)  Hemoglobin: 8.6 g/dL (03-06 @ 05:51)  Hemoglobin: 8.3 g/dL (03-05 @ 05:57)    WBC Count: 7.98 K/uL (03-07 @ 08:17)  WBC Count: 9.11 K/uL (03-06 @ 05:51)  WBC Count: 8.20 K/uL (03-05 @ 05:57)  WBC Count: 8.86 K/uL (03-04 @ 07:33)  WBC Count: 9.50 K/uL (03-03 @ 06:35)      RADIOLOGY:  RADIOLOGY RESULTS:          PHYSICAL EXAM:  GEN: In pain from vac dressing change  LUNGS: CTABL  HEART: rs1s2  ABD: soft non tender  EXT: bilateral foot stumps in dressing  NEURO: AAO3

## 2018-03-07 NOTE — CONSULT NOTE ADULT - CONSULT REASON
Bilateral complex foot wounds
bilateral feet gangrenes, pre-op vascular evaluation
dry gangrene B/L feet
Dressing change pain

## 2018-03-07 NOTE — PROGRESS NOTE ADULT - SUBJECTIVE AND OBJECTIVE BOX
Podiatry Follow Up:  Pt seen bedside with Attending Dr. Mohinder Salazar.   Wound VAC removed with great discomfort to patient at this time.   Hemostasis obtained with compression.     A:  Bilateral choparts amputation site open wit granulation tissue and exposed bone    P:  Maintain dressing CDI.   Wound care orders: Adaptic topical lidocaine jelly Dakins DSD Kerlix ACE q24h.  To discuss with Primary medical team the possibility of an Exparel popliteal block for pain management during VAC dressing changes.   Podiatry to follow up q24h

## 2018-03-07 NOTE — PROGRESS NOTE ADULT - ASSESSMENT
· Assessment		  41yo F s/p gastric bypass complicated by SBO and septic shock/ARDS and vasopressor induced LE necrosis presents with worsening necrosis of bilateral LE s/p amputation below metatarsals    1. B/L foot gangrene 2/2 vasopressor toxicity  - pain control adequate  - wound vac canister removed by podiatry, dressing change done today by podiatry, it was very painful so podiatry recommended local popliteal block daily with dressing changes, out consult for pain management to do Exparel (local liposomal bupivacaine).  - plan from plastic surgery is to do tissue flap graft vs BKS, still being considered by pt and her family, awaiting their agreement to a plan.  - continue Percocet 2 tab Q 6 hrs and Morphine 4 mg Q4hrs PRN and topical Lidocaine    2. Acute blood loss anemia post op.  - stable Hb 8.5, did not receive blood  - asymptomatic  - trend CBC  - Iron supplements    #. HTN  - BP wnl, not on meds currently    4. PANDA - cont CPAP    5. constipation  - resolved  - enema and Dulcolax suppository PRN · Assessment		  41yo F s/p gastric bypass complicated by SBO and septic shock/ARDS and vasopressor induced LE necrosis presents with worsening necrosis of bilateral LE s/p amputation below metatarsals    1. B/L foot gangrene 2/2 vasopressor toxicity  - pain control adequate  - wound vac canister removed by podiatry, dressing change done today by podiatry, it was very painful so podiatry recommended local popliteal block daily with dressing changes, consult for pain management to do Exparel (local liposomal bupivacaine).  - plan from plastic surgery is to do tissue flap graft vs BKS, still being considered by pt and her family, awaiting their agreement to a plan.  - continue Percocet 2 tab Q 6 hrs and Morphine 4 mg Q4hrs PRN and topical Lidocaine    2. Acute blood loss anemia post op.  - stable Hb 8.5, did not receive blood  - asymptomatic  - trend CBC  - Iron supplements    #. HTN  - BP wnl, not on meds currently    4. PANDA - cont CPAP    5. constipation  - resolved  - enema and Dulcolax suppository PRN

## 2018-03-07 NOTE — PROGRESS NOTE ADULT - ATTENDING COMMENTS
I visited with Mrs Chow today and had a conversation about her lower extremity wounds and our eventual plan.  She informed me that she had met with the LE prosthetist over the weekend.  At this junction, she is very much interested in pursuing LE salvage and to avoid BKA procedures.  Given her response to negative pressure therapy (quite good) I suggested she would maximally benefit from continued twice weekly VAC dressing changes prior to any definitive decision regarding her reconstruction.   have had several discussion with Dr Carmen and Dr. Knapp today re how to achieve this goal.    Depending on her response to negative pressure therapy there maybe a change in her reconstruction plan (which currently is likely bilateral free flaps).  We will have to consider the stability of the remaining osseus structures as a significant issue in considering our plan.  Explained to pt--preliminary questions answered.

## 2018-03-07 NOTE — CONSULT NOTE ADULT - SUBJECTIVE AND OBJECTIVE BOX
Chief Complaint:    HPI:  43 YO F with PMH of HTN, Obesity s/p gastric bypass surgery complicated by septic shock, ARDS, SBO, ATN with CVVH, stress cardiomyopathy and vasopressor induced necrosis of fingers and toes in October 2017 presents to the ER for admission for b/l foot amputation. Patient now with severe pain during dressing changes not controlled with oral/IV pain medications.      PAST MEDICAL & SURGICAL HISTORY:  Takotsubo cardiomyopathy  Small bowel obstruction  Osteoarthritis  Cardiomyopathy  Sleep apnea  HTN (hypertension)  Gangrene of lower extremity  Gangrene of finger of right hand  Gangrene of finger of left hand  H/O exploratory laparotomy  Amputation finger  History of cholecystectomy  H/O gastric bypass      FAMILY HISTORY:  No pertinent family history in first degree relatives      SOCIAL HISTORY:  [x] Denies Smoking, Alcohol, or Drug Use    Allergies    No Known Allergies    Intolerances        PAIN MEDICATIONS:  acetaminophen   Tablet 650 milliGRAM(s) Oral every 6 hours PRN  gabapentin 300 milliGRAM(s) Oral at bedtime  morphine  - Injectable 4 milliGRAM(s) IV Push every 4 hours PRN  oxyCODONE    5 mG/acetaminophen 325 mG 2 Tablet(s) Oral every 6 hours PRN    Heme:  enoxaparin Injectable 40 milliGRAM(s) SubCutaneous every 24 hours    Antibiotics:    Cardiovascular:  metoprolol succinate ER 50 milliGRAM(s) Oral at bedtime    GI:  bisacodyl Suppository 10 milliGRAM(s) Rectal daily  senna Syrup 5 milliLiter(s) Oral three times a day PRN    Endocrine:    All Other Medications:  BUpivacaine 0.5% Injectable 30 milliLiter(s) Local Injection once  ferrous    sulfate 325 milliGRAM(s) Oral three times a day with meals  lidocaine 1% Injectable 60 milliLiter(s) Local Injection once  lidocaine 1% Injectable 60 milliLiter(s) Local Injection once  lidocaine 1% Injectable 40 milliLiter(s) Local Injection once  lidocaine 1% Injectable 40 milliLiter(s) Local Injection once  lidocaine 2% Gel 1 Application(s) Topical daily  lidocaine 2% Gel 1 Application(s) Topical once  lidocaine 2% Gel 1 Application(s) Topical once  lidocaine 2% Gel 1 Application(s) Topical once  lidocaine 2% Gel 1 Application(s) Topical once  lidocaine 2% Gel 1 Application(s) Topical once  lidocaine 2% Injectable 60 milliLiter(s) Local Injection once  lidocaine 2% Injectable 30 milliLiter(s) Local Injection once  lidocaine 2% Injectable 40 milliLiter(s) Local Injection once  lidocaine 2% Viscous 60 milliLiter(s) Mucosal once  lidocaine 2% Viscous 100 milliLiter(s) Swish and Spit once  lidocaine 2% Viscous 200 milliLiter(s) Mucosal once  multivitamin 1 Tablet(s) Oral daily      REVIEW OF SYSTEMS:    CONSTITUTIONAL: No fever, weight loss, or fatigue  EYES: No eye pain, visual disturbances, or discharge  ENMT:  No difficulty hearing, tinnitus, vertigo; No sinus or throat pain  NECK: No pain or stiffness  BREASTS: No pain, masses, or nipple discharge  RESPIRATORY: No cough, wheezing, chills or hemoptysis; No shortness of breath  CARDIOVASCULAR: No chest pain, palpitations, dizziness, or leg swelling  GASTROINTESTINAL: No abdominal or epigastric pain. No nausea, vomiting, or hematemesis; No diarrhea or constipation. No melena or hematochezia.  GENITOURINARY: No dysuria, frequency, hematuria, or incontinence  NEUROLOGICAL: No headaches, memory loss, loss of strength, numbness, or tremors  SKIN: No itching, burning, rashes, or lesions   LYMPH NODES: No enlarged glands  ENDOCRINE: No heat or cold intolerance; No hair loss  MUSCULOSKELETAL: As per HPI  PSYCHIATRIC: No depression, anxiety, mood swings, or difficulty sleeping  HEME/LYMPH: No easy bruising, or bleeding gums  ALLERY AND IMMUNOLOGIC: No hives or eczema      Vital Signs Last 24 Hrs  T(C): 36.2 (07 Mar 2018 07:44), Max: 36.8 (06 Mar 2018 15:56)  T(F): 97.2 (07 Mar 2018 07:44), Max: 98.2 (06 Mar 2018 15:56)  HR: 97 (07 Mar 2018 07:44) (67 - 97)  BP: 120/75 (07 Mar 2018 07:44) (113/70 - 150/79)  BP(mean): --  RR: 18 (07 Mar 2018 07:44) (18 - 18)  SpO2: --    PAIN SCORE:   0      SCALE USED: (1-10 VNRS)             PHYSICAL EXAM:    GENERAL: NAD, well-groomed, well-developed  HEAD:  Atraumatic, Normocephalic  EYES: EOMI, PERRLA, conjunctiva and sclera clear  ENMT: No tonsillar erythema, exudates, or enlargement; Moist mucous membranes, Good dentition, No lesions  NECK: Supple, No JVD, Normal thyroid  NERVOUS SYSTEM:  Alert & Oriented X3, Good concentration; Motor Strength 5/5 B/L upper and lower extremities; DTRs 2+ intact and symmetric  CHEST/LUNG: Clear to percussion bilaterally; No rales, rhonchi, wheezing, or rubs  HEART: Regular rate and rhythm; No murmurs, rubs, or gallops  ABDOMEN: Soft, Nontender, Nondistended; Bowel sounds present  EXTREMITIES:  Bilateral upper and lower amputations.  Lower extremities dressed  LYMPH: No lymphadenopathy noted  SKIN: No rashes or lesions        LABS:                          8.5    7.98  )-----------( 474      ( 07 Mar 2018 08:17 )             27.2     03-07    136  |  105  |  7<L>  ----------------------------<  93  4.1   |  28  |  1.0    Ca    9.4      07 Mar 2018 08:17  Mg     1.9     03-06            RADIOLOGY:    Drug Screen:            [ ]  [facility]  Reviewed and Copied to Chart
S : 42y year old Female seen at bedside B/L dry gangrene     Chief Complaint : Patient is a 42y old  Female who presents with a chief complaint of b/l foot pain, gangrene (15 Feb 2018 14:24)    HPI : HPI:  43 YO F with PMH of HTN, Obesity s/p gastric bypass surgery complicated by septic shock, ARDS, SBO, ATN with CVVH, stress cardiomyopathy and vasopressor induced necrosis of fingers and toes in October 2017 presents to the ER for admission for b/l foot amputation. As per the patient her b/l feet ulcers are being taken care of by Podiatry by wound care. She was told by the Podiatrist to go the ER for the amputation. Pt. already has amputations of fingers of both hands.   At baseline pt. reports that she can walk without any exertional chest pain/sob, but it is limited by the pain upon walking with foot ulcers. Pt. denies any fevers, chills, nausea, vomiting, chest pain,   lagunas, palpitations, headaches. (15 Feb 2018 14:24)      Patient admits to  (-) Fevers, (-) Chills, (-) Nausea, (-) Vomiting, (-) Shortness of Breath      PMH: Takotsubo cardiomyopathy  Small bowel obstruction  Osteoarthritis  Cardiomyopathy  Sleep apnea  HTN (hypertension)  Gangrene of lower extremity  Gangrene of finger of right hand  Gangrene of finger of left hand    PSH:H/O exploratory laparotomy  Amputation finger  History of cholecystectomy  H/O gastric bypass      Allergies:No Known Allergies      Labs:                          11.3   8.85  )-----------( 334      ( 15 Feb 2018 10:20 )             36.0     WBC Trend  8.85 Date (02-15 @ 10:20)      Chem  02-15    135  |  103  |  8<L>  ----------------------------<  138<H>  4.5   |  23  |  1.0    Ca    9.2      15 Feb 2018 10:20    TPro  6.4  /  Alb  3.1  /  TBili  0.5  /  DBili  x   /  AST  15  /  ALT  14  /  AlkPhos  46  02-15          T(F): 97.8 (02-15-18 @ 13:34), Max: 97.8 (02-15-18 @ 13:34)  HR: 98 (02-15-18 @ 13:34) (98 - 101)  BP: 137/78 (02-15-18 @ 13:34) (130/80 - 141/78)  RR: 18 (02-15-18 @ 13:34) (18 - 20)  SpO2: 98% (02-15-18 @ 13:34) (98% - 98%)  Wt(kg): --    O:   Vasc: DP/PT pulses non-palpable B/L   Neuro: sensation diminished B/L  Derm: dry gangrene to the level of the metatarsal bases 1-5 B/L  Msk: muscular strength 3/5 in all respective quadrants B/L
VASCULAR SURGERY CONSULT NOTE    HPI:  Mrs. Chow is a 43 yo female with significant PMHx for morbid obesity, elected for bariatric Elin-en-Y gastric bypass 10/10/17, then re-admitted 10/17/17 due to complications of SBO and aspiration, which led to sepsis and hypotension needing intubation, admission to ICU, vasopressors, and then emergent ex-lap 10/19/17. At that time pt remained intubated for a long time, readmitted to ICU for ARDS and STEMI, DELMIS/ATN and CVVH, leading to digital ischemia and gangrene. Pt underwent excisional debridments and eventual hand phalangeal amputations. She now presents for bilateral foot amputations due to debilitating gangrenes.        PAST MEDICAL & SURGICAL HISTORY:  Osteoarthritis  Cardiomyopathy  Sleep apnea  HTN (hypertension)  Gangrene of lower extremity  Gangrene of finger of right hand  Gangrene of finger of left hand  History of cholecystectomy  H/O gastric bypass    No Known Allergies    Home Medications:  gabapentin 300 mg oral capsule: 1 cap(s) orally 3 times a day (15 Feb 2018 11:44)  Metoprolol Succinate ER 50 mg oral tablet, extended release: 1 tab(s) orally once a day (15 Feb 2018 11:44)  Multiple Vitamins oral tablet: 1 tab(s) orally once a day (15 Feb 2018 11:44)  Protonix 40 mg oral delayed release tablet: 1 tab(s) orally once a day (15 Feb 2018 11:44)  Vitamin B-12 100 mcg oral tablet: 1 tab(s) orally once a day (15 Feb 2018 11:44)  Vitamin D3 50,000 intl units oral capsule: 1 cap(s) orally once a month (15 Feb 2018 11:44)      REVIEW OF SYSTEMS:  GENERAL:                                         negative  SKIN/BREAST:                                  negative  OPTHALMOLOGIC:                          negative  ENMT:                                               negative  RESPIRATORY AND THORAX:        negative  CARDIOVASCULAR:                         negative  GASTROINTESTINAL:                       negative  MUSCULOSKELETAL:                       negative  NEUROLOGIC:                                   negative  PSYCHIATRIC:                                    negative  HEMATOLOGY/LYMPHATICS:         negative  ENDOCRINE:                                     negative  ALLERGIC/IMMUNOLOGIC:            negative      PHYSICAL EXAM  Vital Signs Last 24 Hrs  T(C): 36.6 (15 Feb 2018 13:34), Max: 36.6 (15 Feb 2018 13:34)  T(F): 97.8 (15 Feb 2018 13:34), Max: 97.8 (15 Feb 2018 13:34)  HR: 98 (15 Feb 2018 13:34) (98 - 101)  BP: 137/78 (15 Feb 2018 13:34) (130/80 - 141/78)  BP(mean): --  RR: 18 (15 Feb 2018 13:34) (18 - 20)  SpO2: 98% (15 Feb 2018 13:34) (98% - 98%)    Appearance: Normal	  HEENT:   Normal oral mucosa, PERRL, EOMI	  Neck: Supple,- JVD   Cardiovascular: Normal S1 S2, No JVD, No murmurs,   Respiratory: Lungs clear to auscultation/Decreased Breath Sounds/No Rales, Rhonchi, Wheezing	  Gastrointestinal:  Soft, Non-tender, + BS	  Skin: No rashes, No ecchymoses, No cyanosis  Extremities: : + bilateral foot gangrene, + bilateral hands s/p phalangeal amputation  Vascular: Peripheral pulses dopplerable  Neurologic: Non-focal  Psychiatry: A & O x 3, Mood & affect appropriate        MEDICATIONS:   MEDICATIONS  (STANDING):    MEDICATIONS  (PRN):      LAB/STUDIES:                        11.3   8.85  )-----------( 334      ( 15 Feb 2018 10:20 )             36.0     02-15    135  |  103  |  8<L>  ----------------------------<  138<H>  4.5   |  23  |  1.0    Ca    9.2      15 Feb 2018 10:20    TPro  6.4  /  Alb  3.1  /  TBili  0.5  /  DBili  x   /  AST  15  /  ALT  14  /  AlkPhos  46  02-15    PT/INR - ( 15 Feb 2018 10:20 )   PT: 11.80 sec;   INR: 1.09 ratio         PTT - ( 15 Feb 2018 10:20 )  PTT:27.9 sec  LIVER FUNCTIONS - ( 15 Feb 2018 10:20 )  Alb: 3.1 g/dL / Pro: 6.4 g/dL / ALK PHOS: 46 U/L / ALT: 14 U/L / AST: 15 U/L / GGT: x             IMAGING:  Art dplx: 10/23/17: normal flow    ASSESSMENT/PLAN:  Mrs. Chow is a 43 yo female with significant PMHx for morbid obesity, elected for bariatric Elin-en-Y gastric bypass 10/10/17, then re-admitted 10/17/17 due to complications of SBO and aspiration, which led to sepsis and hypotension needing intubation, admission to ICU, vasopressors, and then emergent ex-lap 10/19/17. At that time pt remained intubated for a long time, readmitted to ICU for ARDS and STEMI, DELMIS/ATN and CVVH, leading to digital ischemia and gangrene. Pt underwent excisional debridments and eventual hand phalangeal amputations. She now presents for bilateral foot amputations due to debilitating gangrenes.    - will d/w Dr. Taveras        SPECTRA: [id number]
41 y/o female with a complicated post op complication after laparoscopic surgery that develeped aspiration pneumomia and septic shock and required large doses of pressors to maintain BP. Now admitted for local wound care after bilateral TMA. Patient also has a hx of amputation of digits of the hand 3-4 weeks ago.     PHmx: obesity sbo gangrene all 4 extremitis sleep apnea, htn  PSHx: amputation digits, RNYGB exploratory laparoscopy b/l TMA    T(C): 36.8 (18 @ 15:45), Max: 37.1 (18 @ 00:00)  HR: 86 (18 @ 15:45) (86 - 96)  BP: 103/64 (18 @ 15:45) (103/64 - 113/67)  RR: 18 (18 @ 15:45) (18 - 18)  SpO2: --    ( @ 08:31)                      7.7  9.53 )-----------( 323                 24.2    Neutrophils = -- (--%)  Lymphocytes = -- (--%)  Eosinophils = -- (--%)  Basophils = -- (--%)  Monocytes = -- (--%)  Bands = --%        135  |  100  |  9<L>  ----------------------------<  95  4.0   |  30  |  1.0    Ca    8.7      2018 08:31            RVP:          Tox:         Urinalysis Basic - ( 2018 20:03 )    Color: Yellow / Appearance: Clear / S.025 / pH: x  Gluc: x / Ketone: Negative  / Bili: Negative / Urobili: 1.0 mg/dL   Blood: x / Protein: Negative mg/dL / Nitrite: Negative   Leuk Esterase: Negative / RBC: x / WBC x   Sq Epi: x / Non Sq Epi: x / Bacteria: x

## 2018-03-07 NOTE — CONSULT NOTE ADULT - ASSESSMENT
43 y/o female with bilateral lower extremity pain worst with dressing changes    1.  Serial Popiteal Blocks are not feasible.  Would recommend ankle blocks at least 30 minutes prior to dressing change with a combo of 2%lido and 0.5bupi.  Please dose medication to weight.   Can consider doing alternate day dressing changes to allow for more volume to each leg.    2.  Consider Ketamine and/or Dilaudid as alternative to morphine prior to dressing change.

## 2018-03-07 NOTE — PROGRESS NOTE ADULT - ATTENDING COMMENTS
Patient evaluated bedside this AM for VAC removal with residents . Gabapentin added to meds for pain management. plan to discuss with plastic surgical team for future surgical plan. Patient has been evaluated by prosthetist. dressing applied as above will follow progress.

## 2018-03-08 LAB
ANION GAP SERPL CALC-SCNC: 8 MMOL/L — SIGNIFICANT CHANGE UP (ref 7–14)
BASOPHILS # BLD AUTO: 0.02 K/UL — SIGNIFICANT CHANGE UP (ref 0–0.2)
BASOPHILS NFR BLD AUTO: 0.2 % — SIGNIFICANT CHANGE UP (ref 0–1)
BUN SERPL-MCNC: 8 MG/DL — LOW (ref 10–20)
CALCIUM SERPL-MCNC: 9.7 MG/DL — SIGNIFICANT CHANGE UP (ref 8.5–10.1)
CHLORIDE SERPL-SCNC: 105 MMOL/L — SIGNIFICANT CHANGE UP (ref 98–110)
CO2 SERPL-SCNC: 27 MMOL/L — SIGNIFICANT CHANGE UP (ref 17–32)
CREAT SERPL-MCNC: 1.1 MG/DL — SIGNIFICANT CHANGE UP (ref 0.7–1.5)
EOSINOPHIL # BLD AUTO: 0.22 K/UL — SIGNIFICANT CHANGE UP (ref 0–0.7)
EOSINOPHIL NFR BLD AUTO: 2.2 % — SIGNIFICANT CHANGE UP (ref 0–8)
GLUCOSE SERPL-MCNC: 112 MG/DL — HIGH (ref 70–110)
HCT VFR BLD CALC: 29.1 % — LOW (ref 37–47)
HGB BLD-MCNC: 9 G/DL — LOW (ref 12–16)
IMM GRANULOCYTES NFR BLD AUTO: 0.7 % — HIGH (ref 0.1–0.3)
LYMPHOCYTES # BLD AUTO: 1.93 K/UL — SIGNIFICANT CHANGE UP (ref 1.2–3.4)
LYMPHOCYTES # BLD AUTO: 18.9 % — LOW (ref 20.5–51.1)
MAGNESIUM SERPL-MCNC: 1.8 MG/DL — SIGNIFICANT CHANGE UP (ref 1.8–2.4)
MCHC RBC-ENTMCNC: 24.3 PG — LOW (ref 27–31)
MCHC RBC-ENTMCNC: 30.9 G/DL — LOW (ref 32–37)
MCV RBC AUTO: 78.6 FL — LOW (ref 81–99)
MONOCYTES # BLD AUTO: 0.8 K/UL — HIGH (ref 0.1–0.6)
MONOCYTES NFR BLD AUTO: 7.8 % — SIGNIFICANT CHANGE UP (ref 1.7–9.3)
NEUTROPHILS # BLD AUTO: 7.17 K/UL — HIGH (ref 1.4–6.5)
NEUTROPHILS NFR BLD AUTO: 70.2 % — SIGNIFICANT CHANGE UP (ref 42.2–75.2)
NRBC # BLD: 0 /100 WBCS — SIGNIFICANT CHANGE UP (ref 0–0)
PLATELET # BLD AUTO: 537 K/UL — HIGH (ref 130–400)
POTASSIUM SERPL-MCNC: 4.2 MMOL/L — SIGNIFICANT CHANGE UP (ref 3.5–5)
POTASSIUM SERPL-SCNC: 4.2 MMOL/L — SIGNIFICANT CHANGE UP (ref 3.5–5)
RBC # BLD: 3.7 M/UL — LOW (ref 4.2–5.4)
RBC # FLD: 14.6 % — HIGH (ref 11.5–14.5)
SODIUM SERPL-SCNC: 140 MMOL/L — SIGNIFICANT CHANGE UP (ref 135–146)
WBC # BLD: 10.21 K/UL — SIGNIFICANT CHANGE UP (ref 4.8–10.8)
WBC # FLD AUTO: 10.21 K/UL — SIGNIFICANT CHANGE UP (ref 4.8–10.8)

## 2018-03-08 RX ORDER — MORPHINE SULFATE 50 MG/1
15 CAPSULE, EXTENDED RELEASE ORAL ONCE
Qty: 0 | Refills: 0 | Status: DISCONTINUED | OUTPATIENT
Start: 2018-03-08 | End: 2018-03-08

## 2018-03-08 RX ADMIN — SENNA PLUS 5 MILLILITER(S): 8.6 TABLET ORAL at 17:03

## 2018-03-08 RX ADMIN — GABAPENTIN 300 MILLIGRAM(S): 400 CAPSULE ORAL at 22:39

## 2018-03-08 RX ADMIN — Medication 50 MILLIGRAM(S): at 22:39

## 2018-03-08 RX ADMIN — OXYCODONE AND ACETAMINOPHEN 2 TABLET(S): 5; 325 TABLET ORAL at 05:42

## 2018-03-08 RX ADMIN — MORPHINE SULFATE 4 MILLIGRAM(S): 50 CAPSULE, EXTENDED RELEASE ORAL at 06:01

## 2018-03-08 RX ADMIN — Medication 1 TABLET(S): at 13:28

## 2018-03-08 RX ADMIN — MORPHINE SULFATE 4 MILLIGRAM(S): 50 CAPSULE, EXTENDED RELEASE ORAL at 19:51

## 2018-03-08 RX ADMIN — Medication 325 MILLIGRAM(S): at 13:30

## 2018-03-08 RX ADMIN — OXYCODONE AND ACETAMINOPHEN 2 TABLET(S): 5; 325 TABLET ORAL at 23:00

## 2018-03-08 RX ADMIN — ENOXAPARIN SODIUM 40 MILLIGRAM(S): 100 INJECTION SUBCUTANEOUS at 13:29

## 2018-03-08 RX ADMIN — OXYCODONE AND ACETAMINOPHEN 2 TABLET(S): 5; 325 TABLET ORAL at 22:39

## 2018-03-08 RX ADMIN — Medication 325 MILLIGRAM(S): at 17:06

## 2018-03-08 RX ADMIN — Medication 325 MILLIGRAM(S): at 10:04

## 2018-03-08 NOTE — CHART NOTE - NSCHARTNOTEFT_GEN_A_CORE
Registered Dietitian Follow-Up (limited)    Pt. dozing off during visit, didn't want to be disturbed. Will attempt to see pt. tomorrow 3/9.

## 2018-03-08 NOTE — PROGRESS NOTE ADULT - ASSESSMENT
· Assessment		  43yo F s/p gastric bypass complicated by SBO and septic shock/ARDS and vasopressor induced LE necrosis presents with worsening necrosis of bilateral LE s/p amputation below metatarsals    1. B/L foot gangrene 2/2 vasopressor toxicity  - pain control adequate  - wound vac  - pt was seen by plastic surgery- Pt interested in pursuing LE salvage and to avoid BKA procedures. They suggest, she would maximally benefit from continued twice weekly VAC dressing changes prior to any definitive decision regarding her reconstruction.  - continue  pain meds.    2. Acute blood loss anemia post op.  - stable   - asymptomatic  - trend CBC  - Iron supplements    #. HTN  - stable  - ct metoprolol    4. PANDA   - cont CPAP    5. constipation  - resolved  - enema and Dulcolax suppository PRN

## 2018-03-08 NOTE — PROGRESS NOTE ADULT - SUBJECTIVE AND OBJECTIVE BOX
SUBJECTIVE:    Patient is a 42y old Female who presents with a chief complaint of b/l foot pain, gangrene (15 Feb 2018 14:24)  Currently admitted to medicine with the primary diagnosis of Gangrene of bilateral feet.  S/p  bilateral forefoot amputations.   This morning she is resting comfortably in bed and reports no new issues or overnight events.     PAST MEDICAL & SURGICAL HISTORY  Takotsubo cardiomyopathy  Small bowel obstruction  Osteoarthritis  Cardiomyopathy  Sleep apnea  HTN (hypertension)  Gangrene of lower extremity  Gangrene of finger of right hand  Gangrene of finger of left hand  H/O exploratory laparotomy  Amputation finger  History of cholecystectomy  H/O gastric bypass  hx of SBO    SOCIAL HISTORY:  Negative for smoking/alcohol/drug use.     ALLERGIES:  No Known Allergies    MEDICATIONS:  MEDICATIONS  (STANDING):  bisacodyl Suppository 10 milliGRAM(s) Rectal daily  BUpivacaine 0.5% Injectable 30 milliLiter(s) Local Injection once  enoxaparin Injectable 40 milliGRAM(s) SubCutaneous every 24 hours  ferrous    sulfate 325 milliGRAM(s) Oral three times a day with meals  gabapentin 300 milliGRAM(s) Oral at bedtime  lidocaine 1% Injectable 60 milliLiter(s) Local Injection once  lidocaine 1% Injectable 60 milliLiter(s) Local Injection once  lidocaine 1% Injectable 40 milliLiter(s) Local Injection once  lidocaine 1% Injectable 40 milliLiter(s) Local Injection once  lidocaine 2% Gel 1 Application(s) Topical daily  lidocaine 2% Gel 1 Application(s) Topical once  lidocaine 2% Gel 1 Application(s) Topical once  lidocaine 2% Gel 1 Application(s) Topical once  lidocaine 2% Gel 1 Application(s) Topical once  lidocaine 2% Gel 1 Application(s) Topical once  lidocaine 2% Injectable 60 milliLiter(s) Local Injection once  lidocaine 2% Injectable 30 milliLiter(s) Local Injection once  lidocaine 2% Injectable 40 milliLiter(s) Local Injection once  lidocaine 2% Viscous 60 milliLiter(s) Mucosal once  lidocaine 2% Viscous 100 milliLiter(s) Swish and Spit once  lidocaine 2% Viscous 200 milliLiter(s) Mucosal once  metoprolol succinate ER 50 milliGRAM(s) Oral at bedtime  multivitamin 1 Tablet(s) Oral daily    MEDICATIONS  (PRN):  acetaminophen   Tablet 650 milliGRAM(s) Oral every 6 hours PRN For Temp greater than 38.5 C (101.3 F)  morphine  - Injectable 4 milliGRAM(s) IV Push every 4 hours PRN Severe Pain (7 - 10)  oxyCODONE    5 mG/acetaminophen 325 mG 2 Tablet(s) Oral every 6 hours PRN Moderate Pain (4 - 6)  senna Syrup 5 milliLiter(s) Oral three times a day PRN Constipation    VITALS:   Vital Signs Last 24 Hrs  T(C): 36.3  T(F): 97.3  HR: 97  BP: 101/62  BP(mean): --  RR: 18  SpO2: --    PHYSICAL EXAM:  GEN: Awake alert, not in distress  LUNGS: Clear  HEART: S1S2+, no murmur  ABD: soft non tender  EXT:+ bilateral hands s/p phalangeal amputation. bilateral foot stumps in dressing  NEURO: AAO3    LABS:                                    9.0<L>  10.21 )-----------( 537<H>    ( 08 Mar 2018 07:15 )             29.1<L>                        8.5<L>  7.98  )-----------( 474<H>    ( 07 Mar 2018 08:17 )             27.2<L>  03-08    140  |  105  |  8<L>  ----------------------------<  112<H>  4.2   |  27  |  1.1  03-07    136  |  105  |  7<L>  ----------------------------<  93  4.1   |  28  |  1.0    Ca    9.7      08 Mar 2018 07:15  Ca    9.4      07 Mar 2018 08:17  Mg     1.8     03-08

## 2018-03-08 NOTE — PROGRESS NOTE ADULT - SUBJECTIVE AND OBJECTIVE BOX
SUBJECTIVE:    Patient is a 42y old Female who presents with a chief complaint of b/l foot pain, gangrene (15 Feb 2018 14:24)    Currently admitted to medicine with the primary diagnosis of Gangrene of foot     Today is hospital day 21d.     PAST MEDICAL & SURGICAL HISTORY  Takotsubo cardiomyopathy  Small bowel obstruction  Osteoarthritis  Cardiomyopathy  Sleep apnea  HTN (hypertension)  Gangrene of lower extremity  Gangrene of finger of right hand  Gangrene of finger of left hand  H/O exploratory laparotomy  Amputation finger  History of cholecystectomy  H/O gastric bypass    SOCIAL HISTORY:  Negative for smoking/alcohol/drug use.     ALLERGIES:  No Known Allergies    MEDICATIONS:  STANDING MEDICATIONS  bisacodyl Suppository 10 milliGRAM(s) Rectal daily  BUpivacaine 0.5% Injectable 30 milliLiter(s) Local Injection once  enoxaparin Injectable 40 milliGRAM(s) SubCutaneous every 24 hours  ferrous    sulfate 325 milliGRAM(s) Oral three times a day with meals  gabapentin 300 milliGRAM(s) Oral at bedtime  lidocaine 1% Injectable 60 milliLiter(s) Local Injection once  lidocaine 1% Injectable 60 milliLiter(s) Local Injection once  lidocaine 1% Injectable 40 milliLiter(s) Local Injection once  lidocaine 1% Injectable 40 milliLiter(s) Local Injection once  lidocaine 2% Gel 1 Application(s) Topical daily  lidocaine 2% Gel 1 Application(s) Topical once  lidocaine 2% Gel 1 Application(s) Topical once  lidocaine 2% Gel 1 Application(s) Topical once  lidocaine 2% Gel 1 Application(s) Topical once  lidocaine 2% Gel 1 Application(s) Topical once  lidocaine 2% Injectable 60 milliLiter(s) Local Injection once  lidocaine 2% Injectable 30 milliLiter(s) Local Injection once  lidocaine 2% Injectable 40 milliLiter(s) Local Injection once  lidocaine 2% Viscous 60 milliLiter(s) Mucosal once  lidocaine 2% Viscous 100 milliLiter(s) Swish and Spit once  lidocaine 2% Viscous 200 milliLiter(s) Mucosal once  metoprolol succinate ER 50 milliGRAM(s) Oral at bedtime  multivitamin 1 Tablet(s) Oral daily    PRN MEDICATIONS  acetaminophen   Tablet 650 milliGRAM(s) Oral every 6 hours PRN  morphine  - Injectable 4 milliGRAM(s) IV Push every 4 hours PRN  oxyCODONE    5 mG/acetaminophen 325 mG 2 Tablet(s) Oral every 6 hours PRN  senna Syrup 5 milliLiter(s) Oral three times a day PRN    VITALS:   T(F): 98.6  HR: 97  BP: 114/71  RR: 16  SpO2: --    LABS:                        9.0    10.21 )-----------( 537      ( 08 Mar 2018 07:15 )             29.1     03-08    140  |  105  |  8<L>  ----------------------------<  112<H>  4.2   |  27  |  1.1    Ca    9.7      08 Mar 2018 07:15  Mg     1.8     03-08                    Hemoglobin Trend:  Hemoglobin: 9.0 g/dL (03-08 @ 07:15)  Hemoglobin: 8.5 g/dL (03-07 @ 08:17)  Hemoglobin: 8.6 g/dL (03-06 @ 05:51)    WBC Count: 10.21 K/uL (03-08 @ 07:15)  WBC Count: 7.98 K/uL (03-07 @ 08:17)  WBC Count: 9.11 K/uL (03-06 @ 05:51)  WBC Count: 8.20 K/uL (03-05 @ 05:57)  WBC Count: 8.86 K/uL (03-04 @ 07:33)      RADIOLOGY:  RADIOLOGY RESULTS:          PHYSICAL EXAM:  GEN: not in distress  LUNGS: CTABL  HEART: rs1s2	  ABD: soft non tender not distended  EXT: bilateral LL in dressing, CDI  NEURO: AAO3

## 2018-03-08 NOTE — PROGRESS NOTE ADULT - ATTENDING COMMENTS
patient evaluated bedside with resident.Awaiting IV access for pain medication prior to dressing change. bilateral dressing changes to be done after IV access is achieved and medication given for pain.

## 2018-03-08 NOTE — PROGRESS NOTE ADULT - SUBJECTIVE AND OBJECTIVE BOX
PODIATRY PROGRESS NOTE    Patient seen at bedside. Patient refused bandage change at this time and that she would prefer to do the change later in the day. Plan to do bandage change around 5:00pm.

## 2018-03-08 NOTE — PROGRESS NOTE ADULT - ASSESSMENT
43yo F s/p gastric bypass complicated by SBO and septic shock/ARDS and vasopressor induced LE necrosis presents with worsening necrosis of bilateral LE s/p amputation below metatarsals    1. B/L foot gangrene 2/2 vasopressor toxicity  - pain control adequate  - plan is to get once weekly dressing with interim continuous bedside vac, dressing to be done in ambulatory clinic under sedation because of very painful nature of dressing change, then free tissue flap by plastic surgery  - coordinated with case management to provide bedside vac.  - f/u with podiatry and plastic as outpatient  - continue Percocet 2 tab Q 6 hrs and Morphine 4 mg Q4hrs PRN and topical Lidocaine    2. Acute blood loss anemia post op.  - stable Hb 9, did not receive blood  - asymptomatic  - trend CBC  - Iron supplements    #. HTN  - BP wnl, not on meds currently    4. PANDA - cont CPAP    5. constipation  - resolved  - enema and Dulcolax suppository PRN

## 2018-03-09 PROBLEM — I96 GANGRENE, NOT ELSEWHERE CLASSIFIED: Chronic | Status: ACTIVE | Noted: 2018-02-15

## 2018-03-09 PROBLEM — M19.90 UNSPECIFIED OSTEOARTHRITIS, UNSPECIFIED SITE: Chronic | Status: ACTIVE | Noted: 2018-02-15

## 2018-03-09 LAB
ANION GAP SERPL CALC-SCNC: 8 MMOL/L — SIGNIFICANT CHANGE UP (ref 7–14)
BASOPHILS # BLD AUTO: 0.04 K/UL — SIGNIFICANT CHANGE UP (ref 0–0.2)
BASOPHILS NFR BLD AUTO: 0.5 % — SIGNIFICANT CHANGE UP (ref 0–1)
BUN SERPL-MCNC: 8 MG/DL — LOW (ref 10–20)
CALCIUM SERPL-MCNC: 9.5 MG/DL — SIGNIFICANT CHANGE UP (ref 8.5–10.1)
CHLORIDE SERPL-SCNC: 104 MMOL/L — SIGNIFICANT CHANGE UP (ref 98–110)
CO2 SERPL-SCNC: 27 MMOL/L — SIGNIFICANT CHANGE UP (ref 17–32)
CREAT SERPL-MCNC: 1 MG/DL — SIGNIFICANT CHANGE UP (ref 0.7–1.5)
EOSINOPHIL # BLD AUTO: 0.3 K/UL — SIGNIFICANT CHANGE UP (ref 0–0.7)
EOSINOPHIL NFR BLD AUTO: 4.1 % — SIGNIFICANT CHANGE UP (ref 0–8)
GLUCOSE SERPL-MCNC: 88 MG/DL — SIGNIFICANT CHANGE UP (ref 70–110)
HCT VFR BLD CALC: 27 % — LOW (ref 37–47)
HGB BLD-MCNC: 8.3 G/DL — LOW (ref 12–16)
IMM GRANULOCYTES NFR BLD AUTO: 0.7 % — HIGH (ref 0.1–0.3)
LYMPHOCYTES # BLD AUTO: 2.4 K/UL — SIGNIFICANT CHANGE UP (ref 1.2–3.4)
LYMPHOCYTES # BLD AUTO: 32.6 % — SIGNIFICANT CHANGE UP (ref 20.5–51.1)
MCHC RBC-ENTMCNC: 24.1 PG — LOW (ref 27–31)
MCHC RBC-ENTMCNC: 30.7 G/DL — LOW (ref 32–37)
MCV RBC AUTO: 78.3 FL — LOW (ref 81–99)
MONOCYTES # BLD AUTO: 0.75 K/UL — HIGH (ref 0.1–0.6)
MONOCYTES NFR BLD AUTO: 10.2 % — HIGH (ref 1.7–9.3)
NEUTROPHILS # BLD AUTO: 3.83 K/UL — SIGNIFICANT CHANGE UP (ref 1.4–6.5)
NEUTROPHILS NFR BLD AUTO: 51.9 % — SIGNIFICANT CHANGE UP (ref 42.2–75.2)
NRBC # BLD: 0 /100 WBCS — SIGNIFICANT CHANGE UP (ref 0–0)
PLATELET # BLD AUTO: 442 K/UL — HIGH (ref 130–400)
POTASSIUM SERPL-MCNC: 4.2 MMOL/L — SIGNIFICANT CHANGE UP (ref 3.5–5)
POTASSIUM SERPL-SCNC: 4.2 MMOL/L — SIGNIFICANT CHANGE UP (ref 3.5–5)
RBC # BLD: 3.45 M/UL — LOW (ref 4.2–5.4)
RBC # FLD: 14.6 % — HIGH (ref 11.5–14.5)
SODIUM SERPL-SCNC: 139 MMOL/L — SIGNIFICANT CHANGE UP (ref 135–146)
WBC # BLD: 7.37 K/UL — SIGNIFICANT CHANGE UP (ref 4.8–10.8)
WBC # FLD AUTO: 7.37 K/UL — SIGNIFICANT CHANGE UP (ref 4.8–10.8)

## 2018-03-09 RX ADMIN — MORPHINE SULFATE 4 MILLIGRAM(S): 50 CAPSULE, EXTENDED RELEASE ORAL at 21:02

## 2018-03-09 RX ADMIN — Medication 50 MILLIGRAM(S): at 21:02

## 2018-03-09 RX ADMIN — Medication 325 MILLIGRAM(S): at 16:01

## 2018-03-09 RX ADMIN — Medication 1 TABLET(S): at 11:38

## 2018-03-09 RX ADMIN — Medication 325 MILLIGRAM(S): at 11:38

## 2018-03-09 RX ADMIN — ENOXAPARIN SODIUM 40 MILLIGRAM(S): 100 INJECTION SUBCUTANEOUS at 11:38

## 2018-03-09 RX ADMIN — Medication 325 MILLIGRAM(S): at 10:03

## 2018-03-09 RX ADMIN — GABAPENTIN 300 MILLIGRAM(S): 400 CAPSULE ORAL at 21:02

## 2018-03-09 RX ADMIN — OXYCODONE AND ACETAMINOPHEN 2 TABLET(S): 5; 325 TABLET ORAL at 14:40

## 2018-03-09 NOTE — PROGRESS NOTE ADULT - ASSESSMENT
· Assessment		  43yo F s/p gastric bypass complicated by SBO and septic shock/ARDS and vasopressor induced LE necrosis presents with worsening necrosis of bilateral LE s/p amputation below metatarsals    1. B/L foot gangrene 2/2 vasopressor toxicity  - pain control adequate  - wound vac  - pt was seen by plastic surgery- Pt interested in pursuing LE salvage and to avoid BKA procedures. They suggest, she would maximally benefit from continued twice weekly VAC dressing changes prior to any definitive decision regarding her reconstruction.  - continue  pain meds.  -awaiting on Podiatry to complete wound vac forms and for approval of home vac and outpt vac dressing changes - will need pain control prior to changes   follow up with CM team for dispo    2. Acute blood loss anemia post op.  - stable   - asymptomatic  - trend CBC  - Iron supplements    #. HTN  - stable  - ct metoprolol    4. PANDA   - cont CPAP    5. constipation  - resolved  - enema and Dulcolax suppository PRN

## 2018-03-09 NOTE — PROGRESS NOTE ADULT - SUBJECTIVE AND OBJECTIVE BOX
Plan Update    Pt seen bedside with Director Dr. Mohinder Salazar.   Home Wound VAC papers filled out and signed.     Tentative plan is weekly VAC dressing changes as O/P.  Plan for Podiatry 1st and 3rd Thursdays at [facility].  Plan for Plastics 2nd and 4th weeks at [facility].  Pending insurance authorization for this plan.     Pt requires PAST appointment as O/P when discharge to facilitate the [facility] Wound VAC changes.    P:   Maintain dressing CDI.   Podiatry to apply home wound VAC when available.  Once VAC applied patient is stable from Podiatry standpoint for discharge and follow up O/P.  PAST appointment needed for patient for the [facility] dressing changes.   Podiatry to follow up on PM rounds.

## 2018-03-09 NOTE — CHART NOTE - NSCHARTNOTEFT_GEN_A_CORE
Registered Dietitian Follow-Up     Patient Profile Reviewed                           Yes [x]   No []     Nutrition History Previously Obtained        Yes [x]  No []       Pertinent Subjective Information: Pt. reports variable PO intake (mostly 25%, occasionally 50%), food brought from home more often. Pt tried Ensure enlive and willing to drink one daily- order still pending, provided Beneprotein for pt. to try today, will contact the resident to order supplements.      Pertinent Medical Interventions: s/p bariatric surgery 10/2017, gangrene of lower extremity- s/p b/l foot amputations, pain control adequate, pending approval for home VAC changes, acute blood loss anemia- asymptomatic, stable, HTN stable, constipation- enema and dulcolax suppository      Diet order: regular      Anthropometrics:  - Ht.  - Wt. no new weights documented   - %wt change  - BMI  - IBW     Pertinent Lab Data: (3/9) RBC 3.45, Hg 8.3, Hct 27.0, BUN 8      Pertinent Meds: Ferrous sulfate, MVI, Senna      Physical Findings:  - Appearance: alert & oriented  - GI function: constipation, last BM 3/2  - Tubes:  - Oral/Mouth cavity: denies symptoms   - Skin: scar, surgical incision (BS 19)     Nutrition Requirements  Weight Used:     Estimated Energy Needs    Continue [x]  Adjust [] 1655-1820kcal  Adjusted Energy Recommendations:   kcal/day        Estimated Protein Needs    Continue [x]  Adjust [] 65-79g  Adjusted Protein Recommendations:   gm/day        Estimated Fluid Needs        Continue [x]  Adjust [] 1ml/kcal  Adjusted Fluid Recommendations:   mL/day     Nutrient Intake: variable, mostly 25-50%         [] Previous Nutrition Diagnosis: Inadequate oral intake            [x] Ongoing          [] Resolved    Supplement order still pending- will contact the resident to order supplement.    Rec: continue current diet: regular, order Ensure enlive daily, beneprotein BID       Nutrition Intervention: meals and snacks, medical food supplement       Goal/Expected Outcome: In 4 days pt. to consume >50% PO and supplement      Indicator/Monitoring: energy intake, body composition, nutrition focused physical findings

## 2018-03-09 NOTE — PROGRESS NOTE ADULT - NSHPATTENDINGPLANDISCUSS_GEN_ALL_CORE
residents, nursing, , patient
podiatry team
House staff
Podiatry team; PRS-Dr. Lind
patient and resident and .
patient and residents.
residents, nursing, , patient
podiatry team
House staff
patient and residents.
residents, nursing, , patient

## 2018-03-09 NOTE — PROGRESS NOTE ADULT - SUBJECTIVE AND OBJECTIVE BOX
PROGRESS NOTE   Patient is a 42y old  Female who presents with a chief complaint of b/l foot pain, gangrene (15 Feb 2018 14:24)      HPI:  43 YO F with PMH of HTN, Obesity s/p gastric bypass surgery complicated by septic shock, ARDS, SBO, ATN with CVVH, stress cardiomyopathy and vasopressor induced necrosis of fingers and toes in October 2017 presents to the ER for admission for b/l foot amputation. As per the patient her b/l feet ulcers are being taken care of by Podiatry by wound care. She was told by the Podiatrist to go the ER for the amputation. Pt. already has amputations of fingers of both hands.   At baseline pt. reports that she can walk without any exertional chest pain/sob, but it is limited by the pain upon walking with foot ulcers. Pt. denies any fevers, chills, nausea, vomiting, chest pain,   lagunas, palpitations, headaches. (15 Feb 2018 14:24)      Vital Signs Last 24 Hrs  T(C): 36.4 (09 Mar 2018 00:00), Max: 37.2 (08 Mar 2018 22:43)  T(F): 97.6 (09 Mar 2018 00:00), Max: 98.9 (08 Mar 2018 22:43)  HR: 99 (09 Mar 2018 00:00) (97 - 103)  BP: 95/65 (09 Mar 2018 00:00) (95/65 - 114/71)  BP(mean): --  RR: 18 (09 Mar 2018 00:00) (16 - 18)  SpO2: --                          9.0    10.21 )-----------( 537      ( 08 Mar 2018 07:15 )             29.1               03-08    140  |  105  |  8<L>  ----------------------------<  112<H>  4.2   |  27  |  1.1    Ca    9.7      08 Mar 2018 07:15  Mg     1.8     03-08      Assessment:  1. s/p B/L [place] open 2/21/18   2. wound bases are 95% granular & 5% fibrotic    Plan:  1. Pt given 4mg morphine prior to dressing almeida  2. Dressing changed using Adaptic/Dakins/MTD/Kerlix/ACE  3. Attending () will decide on wound care as pt may be d/c home in the next 7 days  4. Plan to be discussed w/ all attendings

## 2018-03-09 NOTE — PROGRESS NOTE ADULT - ATTENDING COMMENTS
evaluation of patient bedside dressing intact bilateral foot.plan to apply NPWT VAC for home use,then patient to be D/C and will plan to perform VAC changes at [facility] weekly under IV sedation. Patient and support staff informed of future plan,vascular surgery agrees with plan and follow up care. NPWT VAC papers signed .

## 2018-03-09 NOTE — PROGRESS NOTE ADULT - SUBJECTIVE AND OBJECTIVE BOX
SUBJECTIVE:    Patient is a 42y old Female who presents with a chief complaint of b/l foot pain, gangrene (15 Feb 2018 14:24)    Currently admitted to medicine with the primary diagnosis of Gangrene of foot     Today is hospital day 22d.     PAST MEDICAL & SURGICAL HISTORY  Takotsubo cardiomyopathy  Small bowel obstruction  Osteoarthritis  Cardiomyopathy  Sleep apnea  HTN (hypertension)  Gangrene of lower extremity  Gangrene of finger of right hand  Gangrene of finger of left hand  H/O exploratory laparotomy  Amputation finger  History of cholecystectomy  H/O gastric bypass    SOCIAL HISTORY:  Negative for smoking/alcohol/drug use.     ALLERGIES:  No Known Allergies    MEDICATIONS:  STANDING MEDICATIONS  bisacodyl Suppository 10 milliGRAM(s) Rectal daily  BUpivacaine 0.5% Injectable 30 milliLiter(s) Local Injection once  enoxaparin Injectable 40 milliGRAM(s) SubCutaneous every 24 hours  ferrous    sulfate 325 milliGRAM(s) Oral three times a day with meals  gabapentin 300 milliGRAM(s) Oral at bedtime  lidocaine 1% Injectable 60 milliLiter(s) Local Injection once  lidocaine 1% Injectable 60 milliLiter(s) Local Injection once  lidocaine 1% Injectable 40 milliLiter(s) Local Injection once  lidocaine 1% Injectable 40 milliLiter(s) Local Injection once  lidocaine 2% Gel 1 Application(s) Topical daily  lidocaine 2% Gel 1 Application(s) Topical once  lidocaine 2% Gel 1 Application(s) Topical once  lidocaine 2% Gel 1 Application(s) Topical once  lidocaine 2% Gel 1 Application(s) Topical once  lidocaine 2% Gel 1 Application(s) Topical once  lidocaine 2% Injectable 60 milliLiter(s) Local Injection once  lidocaine 2% Injectable 30 milliLiter(s) Local Injection once  lidocaine 2% Injectable 40 milliLiter(s) Local Injection once  lidocaine 2% Viscous 60 milliLiter(s) Mucosal once  lidocaine 2% Viscous 100 milliLiter(s) Swish and Spit once  lidocaine 2% Viscous 200 milliLiter(s) Mucosal once  metoprolol succinate ER 50 milliGRAM(s) Oral at bedtime  multivitamin 1 Tablet(s) Oral daily    PRN MEDICATIONS  acetaminophen   Tablet 650 milliGRAM(s) Oral every 6 hours PRN  morphine  - Injectable 4 milliGRAM(s) IV Push every 4 hours PRN  oxyCODONE    5 mG/acetaminophen 325 mG 2 Tablet(s) Oral every 6 hours PRN  senna Syrup 5 milliLiter(s) Oral three times a day PRN    VITALS:   T(F): 97.4  HR: 99  BP: 120/56  RR: 18  SpO2: --    LABS:                        8.3    7.37  )-----------( 442      ( 09 Mar 2018 04:28 )             27.0     03-09    139  |  104  |  8<L>  ----------------------------<  88  4.2   |  27  |  1.0    Ca    9.5      09 Mar 2018 04:28  Mg     1.8     03-08                    Hemoglobin Trend:  Hemoglobin: 8.3 g/dL (03-09 @ 04:28)  Hemoglobin: 9.0 g/dL (03-08 @ 07:15)  Hemoglobin: 8.5 g/dL (03-07 @ 08:17)    WBC Count: 7.37 K/uL (03-09 @ 04:28)  WBC Count: 10.21 K/uL (03-08 @ 07:15)  WBC Count: 7.98 K/uL (03-07 @ 08:17)  WBC Count: 9.11 K/uL (03-06 @ 05:51)  WBC Count: 8.20 K/uL (03-05 @ 05:57)      RADIOLOGY:  RADIOLOGY RESULTS:          PHYSICAL EXAM:  GEN: not in distress  LUNGS: CTABL  HEART: rs1s2	  ABD: soft non tender not distended  EXT: bilateral LL in dressing, CDI  NEURO: AAO3

## 2018-03-09 NOTE — PROGRESS NOTE ADULT - ASSESSMENT
43yo F s/p gastric bypass complicated by SBO and septic shock/ARDS and vasopressor induced LE necrosis presents with worsening necrosis of bilateral LE s/p amputation below metatarsals    1. B/L foot gangrene 2/2 vasopressor toxicity  - pain control adequate  - plan is to get once weekly dressing with interim continuous bedside vac, dressing to be done in ambulatory clinic under sedation because of very painful nature of dressing change, then free tissue flap by plastic surgery, scripts sent for bedside vac, pending OR to place them when they arrive  - coordinated with case management to provide bedside vac.  - f/u with podiatry and plastic as outpatient  - continue Percocet 2 tab Q 6 hrs and Morphine 4 mg Q4hrs PRN and topical Lidocaine    2. Acute blood loss anemia post op.  - stable Hb 8.3, did not receive blood  - asymptomatic  - trend CBC  - Iron supplements    #. HTN  - BP wnl, not on meds currently    4. PANDA - cont CPAP    5. constipation  - resolved  - enema and Dulcolax suppository PRN

## 2018-03-09 NOTE — PROGRESS NOTE ADULT - SUBJECTIVE AND OBJECTIVE BOX
HOSPITALIST ATTENDING NOTE    JIMMY CR  42y Female  [id number]    INTERVAL HPI/OVERNIGHT EVENTS: no complaints, no pain when vac is not being changed    T(C): 36.2 (03-09-18 @ 08:00), Max: 37.2 (03-08-18 @ 22:43)  HR: 79 (03-09-18 @ 08:00) (79 - 103)  BP: 112/61 (03-09-18 @ 08:00) (95/65 - 114/71)  RR: 18 (03-09-18 @ 08:00) (16 - 18)  SpO2: --  Wt(kg): --            Consultant(s) Notes Reviewed:  [x ] YES  [ ] NO  Care Discussed with Consultants/Other Providers/ Housestaff [ x] YES  [ ] NO      PHYSICAL EXAM:  GEN: not in distress  LUNGS: CTABL  HEART: s1s2	reg rr  ABD: soft non tender not distended  EXT: bilateral LL in dressing, CDI multiple amputations  NEURO: AAO3    LABS:                        8.3    7.37  )-----------( 442      ( 09 Mar 2018 04:28 )             27.0     03-09    139  |  104  |  8<L>  ----------------------------<  88  4.2   |  27  |  1.0    Ca    9.5      09 Mar 2018 04:28  Mg     1.8     03-08            RADIOLOGY & ADDITIONAL TESTS:    Imaging or report Personally Reviewed:  [ ] YES  [ ] NO    Case discussed with resident    Care discussed with pt/family      HEALTH ISSUES - PROBLEM Dx:  Gangrene of lower extremity: Gangrene of lower extremity  HTN (hypertension): HTN (hypertension)

## 2018-03-10 ENCOUNTER — TRANSCRIPTION ENCOUNTER (OUTPATIENT)
Age: 43
End: 2018-03-10

## 2018-03-10 VITALS
RESPIRATION RATE: 20 BRPM | SYSTOLIC BLOOD PRESSURE: 106 MMHG | DIASTOLIC BLOOD PRESSURE: 63 MMHG | HEART RATE: 95 BPM | TEMPERATURE: 97 F

## 2018-03-10 LAB
ANION GAP SERPL CALC-SCNC: 8 MMOL/L — SIGNIFICANT CHANGE UP (ref 7–14)
BASOPHILS # BLD AUTO: 0.03 K/UL — SIGNIFICANT CHANGE UP (ref 0–0.2)
BASOPHILS NFR BLD AUTO: 0.3 % — SIGNIFICANT CHANGE UP (ref 0–1)
BUN SERPL-MCNC: 10 MG/DL — SIGNIFICANT CHANGE UP (ref 10–20)
CALCIUM SERPL-MCNC: 9.5 MG/DL — SIGNIFICANT CHANGE UP (ref 8.5–10.1)
CHLORIDE SERPL-SCNC: 105 MMOL/L — SIGNIFICANT CHANGE UP (ref 98–110)
CO2 SERPL-SCNC: 26 MMOL/L — SIGNIFICANT CHANGE UP (ref 17–32)
CREAT SERPL-MCNC: 1 MG/DL — SIGNIFICANT CHANGE UP (ref 0.7–1.5)
EOSINOPHIL # BLD AUTO: 0.29 K/UL — SIGNIFICANT CHANGE UP (ref 0–0.7)
EOSINOPHIL NFR BLD AUTO: 2.9 % — SIGNIFICANT CHANGE UP (ref 0–8)
GLUCOSE SERPL-MCNC: 95 MG/DL — SIGNIFICANT CHANGE UP (ref 70–110)
HCT VFR BLD CALC: 28.7 % — LOW (ref 37–47)
HGB BLD-MCNC: 8.7 G/DL — LOW (ref 12–16)
IMM GRANULOCYTES NFR BLD AUTO: 0.5 % — HIGH (ref 0.1–0.3)
LYMPHOCYTES # BLD AUTO: 2.64 K/UL — SIGNIFICANT CHANGE UP (ref 1.2–3.4)
LYMPHOCYTES # BLD AUTO: 26 % — SIGNIFICANT CHANGE UP (ref 20.5–51.1)
MCHC RBC-ENTMCNC: 23.8 PG — LOW (ref 27–31)
MCHC RBC-ENTMCNC: 30.3 G/DL — LOW (ref 32–37)
MCV RBC AUTO: 78.6 FL — LOW (ref 81–99)
MONOCYTES # BLD AUTO: 0.71 K/UL — HIGH (ref 0.1–0.6)
MONOCYTES NFR BLD AUTO: 7 % — SIGNIFICANT CHANGE UP (ref 1.7–9.3)
NEUTROPHILS # BLD AUTO: 6.45 K/UL — SIGNIFICANT CHANGE UP (ref 1.4–6.5)
NEUTROPHILS NFR BLD AUTO: 63.3 % — SIGNIFICANT CHANGE UP (ref 42.2–75.2)
NRBC # BLD: 0 /100 WBCS — SIGNIFICANT CHANGE UP (ref 0–0)
PLATELET # BLD AUTO: 488 K/UL — HIGH (ref 130–400)
POTASSIUM SERPL-MCNC: 4.2 MMOL/L — SIGNIFICANT CHANGE UP (ref 3.5–5)
POTASSIUM SERPL-SCNC: 4.2 MMOL/L — SIGNIFICANT CHANGE UP (ref 3.5–5)
RBC # BLD: 3.65 M/UL — LOW (ref 4.2–5.4)
RBC # FLD: 14.5 % — SIGNIFICANT CHANGE UP (ref 11.5–14.5)
SODIUM SERPL-SCNC: 139 MMOL/L — SIGNIFICANT CHANGE UP (ref 135–146)
WBC # BLD: 10.17 K/UL — SIGNIFICANT CHANGE UP (ref 4.8–10.8)
WBC # FLD AUTO: 10.17 K/UL — SIGNIFICANT CHANGE UP (ref 4.8–10.8)

## 2018-03-10 RX ORDER — SENNA PLUS 8.6 MG/1
5 TABLET ORAL
Qty: 0 | Refills: 0 | COMMUNITY
Start: 2018-03-10

## 2018-03-10 RX ORDER — ACETAMINOPHEN 500 MG
2 TABLET ORAL
Qty: 0 | Refills: 0 | COMMUNITY
Start: 2018-03-10

## 2018-03-10 RX ORDER — FERROUS SULFATE 325(65) MG
1 TABLET ORAL
Qty: 90 | Refills: 0 | OUTPATIENT
Start: 2018-03-10 | End: 2018-04-08

## 2018-03-10 RX ORDER — TRAMADOL HYDROCHLORIDE 50 MG/1
50 TABLET ORAL ONCE
Qty: 0 | Refills: 0 | Status: DISCONTINUED | OUTPATIENT
Start: 2018-03-10 | End: 2018-03-10

## 2018-03-10 RX ADMIN — TRAMADOL HYDROCHLORIDE 50 MILLIGRAM(S): 50 TABLET ORAL at 10:12

## 2018-03-10 RX ADMIN — OXYCODONE AND ACETAMINOPHEN 2 TABLET(S): 5; 325 TABLET ORAL at 05:53

## 2018-03-10 RX ADMIN — TRAMADOL HYDROCHLORIDE 50 MILLIGRAM(S): 50 TABLET ORAL at 10:11

## 2018-03-10 RX ADMIN — OXYCODONE AND ACETAMINOPHEN 2 TABLET(S): 5; 325 TABLET ORAL at 00:56

## 2018-03-10 RX ADMIN — Medication 325 MILLIGRAM(S): at 08:44

## 2018-03-10 NOTE — PROGRESS NOTE ADULT - SUBJECTIVE AND OBJECTIVE BOX
HOSPITALIST ATTENDING NOTE    JIMMY CR  42y Female  [id number]    INTERVAL HPI/OVERNIGHT EVENTS: no complaints - home vac setup    T(C): 36.1 (03-10-18 @ 08:00), Max: 36.4 (03-10-18 @ 00:00)  HR: 95 (03-10-18 @ 08:00) (95 - 116)  BP: 106/63 (03-10-18 @ 08:00) (106/63 - 126/79)  RR: 20 (03-10-18 @ 08:00) (18 - 20)  SpO2: --  Wt(kg): --    03-09-18 @ 07:01  -  03-10-18 @ 07:00  --------------------------------------------------------  IN: 0 mL / OUT: 325 mL / NET: -325 mL      Consultant(s) Notes Reviewed:  [x ] YES  [ ] NO  Care Discussed with Consultants/Other Providers/ Housestaff [ x] YES  [ ] NO    LABS:  PHYSICAL EXAM:  GEN: not in distress  LUNGS: CTABL  HEART: s1s2	reg rr  ABD: soft non tender not distended  EXT: bilateral LL in dressing, CDI multiple amputations vac in place  NEURO: AAO3                        8.7    10.17 )-----------( 488      ( 10 Mar 2018 05:51 )             28.7     03-10    139  |  105  |  10  ----------------------------<  95  4.2   |  26  |  1.0    Ca    9.5      10 Mar 2018 05:51            RADIOLOGY & ADDITIONAL TESTS:    Imaging or report Personally Reviewed:  [ ] YES  [ ] NO    Case discussed with resident    Care discussed with pt/family      HEALTH ISSUES - PROBLEM Dx:  Gangrene of lower extremity: Gangrene of lower extremity  HTN (hypertension): HTN (hypertension)

## 2018-03-10 NOTE — DISCHARGE NOTE ADULT - PROVIDER TOKENS
TOKELHAM:'[id number]' TOKEN:'[id number]',TOKEN:'[id number]',TOKEN:'[id number]'

## 2018-03-10 NOTE — DISCHARGE NOTE ADULT - CARE PROVIDERS DIRECT ADDRESSES
,DirectAddress_Unknown ,DirectAddress_Unknown,[email or web address],DirectAddress_Unknown

## 2018-03-10 NOTE — PROGRESS NOTE ADULT - PROVIDER SPECIALTY LIST ADULT
Hospitalist
Internal Medicine
Plastic Surgery
Podiatry
Hospitalist
Internal Medicine
Internal Medicine
Podiatry
Internal Medicine
Internal Medicine
Hospitalist
Internal Medicine
Internal Medicine

## 2018-03-10 NOTE — DISCHARGE NOTE ADULT - SECONDARY DIAGNOSIS.
Hypertension, unspecified type Iron deficiency anemia secondary to inadequate dietary iron intake Stress-induced cardiomyopathy

## 2018-03-10 NOTE — DISCHARGE NOTE ADULT - PLAN OF CARE
surgical therapy VAC changes as scheduled with podiatry, plastic surgery, possible free tissue flap after VAC treatment medical therapy c/w home meds f/u with PMD c/w iron supplement, f.u with PMD c/w BB, f/u with PMD

## 2018-03-10 NOTE — DISCHARGE NOTE ADULT - HOSPITAL COURSE
43 YO F with PMH of HTN, PANDA, Obesity s/p gastric bypass surgery complicated by septic shock, ARDS, SBO, ATN with CVVH, stress cardiomyopathy and vasopressor induced necrosis of fingers and toes in October 2017 presented to the ER for admission for b/l foot amputation. S/p TMA and debridement. Pt was spiking fever started on ABx on 02/24. Afebrile > 48 hrs so atbx stopped. Pt had VAC placement post op to speed up healing, which showed good granulation tissue formation, so will have a period of OP VAC dressing before reconstructive flaps can be placed on the surgical site.

## 2018-03-10 NOTE — DISCHARGE NOTE ADULT - PATIENT PORTAL LINK FT
You can access the [facility] Patient Portal, offered by [facility], by registering with the following website: [email or web address]

## 2018-03-10 NOTE — DISCHARGE NOTE ADULT - MEDICATION SUMMARY - MEDICATIONS TO TAKE
I will START or STAY ON the medications listed below when I get home from the hospital:    acetaminophen 325 mg oral tablet  -- 2 tab(s) by mouth every 6 hours, As needed, For Temp greater than 38.5 C (101.3 F)  -- Indication: For pain    oxyCODONE-acetaminophen 5 mg-325 mg oral tablet  -- 2 tab(s) by mouth every 6 hours, As needed, Moderate Pain (4 - 6)  -- Indication: For Stump pain    gabapentin 300 mg oral capsule  -- 1 cap(s) by mouth once a day  -- Indication: For Stump pain    Metoprolol Succinate ER 50 mg oral tablet, extended release  -- 1 tab(s) by mouth once a day  -- Indication: For Heart failure    FeroSul 325 mg (65 mg elemental iron) oral tablet  -- 1 tab(s) by mouth 3 times a day  -- Indication: For Anemia    bisacodyl 10 mg rectal suppository  -- 1 suppository(ies) rectally once a day, As Needed  -- Indication: For Constipation    senna 8.8 mg/5 mL oral syrup  -- 5 milliliter(s) by mouth 3 times a day, As needed, Constipation  -- Indication: For Constipation    Multiple Vitamins oral tablet  -- 1 tab(s) by mouth once a day  -- Indication: For Supplement    Vitamin B-12 100 mcg oral tablet  -- 1 tab(s) by mouth once a day  -- Indication: For Supplement    Vitamin D3 50,000 intl units oral capsule  -- 1 cap(s) by mouth once a month  -- Indication: For Supplement

## 2018-03-10 NOTE — DISCHARGE NOTE ADULT - CARE PROVIDER_API CALL
Parag Lind), Plastic Surgery; Surgery of the Hand  [address] [place] [address]  [address] [place] [address]  Phone: [phone]  Fax: [phone] Parag Lind), Plastic Surgery; Surgery of the Hand  [address]  [address]  [place] [address]  Phone: [phone]  Fax: [phone]    Mohinder Salazar (ROCIO), Podiatric Medicine and Surgery  [address] [place] [address]  Phone: [phone]  Fax: [phone]    Kenisha Childs), Internal Medicine  [address] [place] [address]  Phone: [phone]  Fax: [phone]

## 2018-03-10 NOTE — PROGRESS NOTE ADULT - ASSESSMENT
· Assessment		  43yo F s/p gastric bypass complicated by SBO and septic shock/ARDS and vasopressor induced LE necrosis presents with worsening necrosis of bilateral LE s/p amputation below metatarsals    1. B/L foot gangrene 2/2 vasopressor toxicity  - pain control adequate -rx for percocet 2tabs q8 prn #60 given  - wound vac  - pt was seen by plastic surgery- Pt interested in pursuing LE salvage and to avoid BKA procedures. They suggest, she would maximally benefit from continued twice weekly VAC dressing changes prior to any definitive decision regarding her reconstruction.  - continue  pain meds.  -pt to follow at ambulatory center with podiatry dr jaeger and plastics dr gray for further management  follow up with CM team for dispo    2. Acute blood loss anemia post op.  - stable   - asymptomatic  - trend CBC  - Iron supplements    #. HTN  - stable  - ct metoprolol    4. PANDA   - cont CPAP    5. constipation  - resolved  - enema and Dulcolax suppository PRN    stable for dc with home care services - pt counselled, meds reviewed, time spent 35 mins

## 2018-03-10 NOTE — DISCHARGE NOTE ADULT - CARE PLAN
Principal Discharge DX:	Gangrene of lower extremity  Goal:	surgical therapy  Assessment and plan of treatment:	VAC changes as scheduled with podiatry, plastic surgery, possible free tissue flap after VAC treatment  Secondary Diagnosis:	Hypertension, unspecified type  Goal:	medical therapy  Assessment and plan of treatment:	c/w home meds f/u with PMD  Secondary Diagnosis:	Iron deficiency anemia secondary to inadequate dietary iron intake  Goal:	medical therapy  Assessment and plan of treatment:	c/w iron supplement, f.u with PMD  Secondary Diagnosis:	Stress-induced cardiomyopathy  Goal:	medical therapy  Assessment and plan of treatment:	c/w BB, f/u with PMD

## 2018-03-12 ENCOUNTER — OUTPATIENT (OUTPATIENT)
Dept: OUTPATIENT SERVICES | Facility: HOSPITAL | Age: 43
LOS: 1 days | Discharge: HOME | End: 2018-03-12

## 2018-03-12 VITALS
OXYGEN SATURATION: 100 % | DIASTOLIC BLOOD PRESSURE: 75 MMHG | HEART RATE: 108 BPM | TEMPERATURE: 99 F | HEIGHT: 68 IN | RESPIRATION RATE: 16 BRPM | SYSTOLIC BLOOD PRESSURE: 119 MMHG | WEIGHT: 207.23 LBS
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
## 2018-03-12 DIAGNOSIS — Z01.818 ENCOUNTER FOR OTHER PREPROCEDURAL EXAMINATION: ICD-10-CM

## 2018-03-12 DIAGNOSIS — I96 GANGRENE, NOT ELSEWHERE CLASSIFIED: ICD-10-CM

## 2018-03-12 DIAGNOSIS — S68.119A COMPLETE TRAUMATIC METACARPOPHALANGEAL AMPUTATION OF UNSPECIFIED FINGER, INITIAL ENCOUNTER: Chronic | ICD-10-CM

## 2018-03-12 DIAGNOSIS — Z98.84 BARIATRIC SURGERY STATUS: Chronic | ICD-10-CM

## 2018-03-12 DIAGNOSIS — L97.519 NON-PRESSURE CHRONIC ULCER OF OTHER PART OF RIGHT FOOT WITH UNSPECIFIED SEVERITY: ICD-10-CM

## 2018-03-12 DIAGNOSIS — Z98.890 OTHER SPECIFIED POSTPROCEDURAL STATES: Chronic | ICD-10-CM

## 2018-03-12 DIAGNOSIS — M19.90 UNSPECIFIED OSTEOARTHRITIS, UNSPECIFIED SITE: ICD-10-CM

## 2018-03-12 DIAGNOSIS — D62 ACUTE POSTHEMORRHAGIC ANEMIA: ICD-10-CM

## 2018-03-12 DIAGNOSIS — L97.529 NON-PRESSURE CHRONIC ULCER OF OTHER PART OF LEFT FOOT WITH UNSPECIFIED SEVERITY: ICD-10-CM

## 2018-03-12 DIAGNOSIS — R50.9 FEVER, UNSPECIFIED: ICD-10-CM

## 2018-03-12 DIAGNOSIS — E66.9 OBESITY, UNSPECIFIED: ICD-10-CM

## 2018-03-12 DIAGNOSIS — K59.00 CONSTIPATION, UNSPECIFIED: ICD-10-CM

## 2018-03-12 DIAGNOSIS — I10 ESSENTIAL (PRIMARY) HYPERTENSION: ICD-10-CM

## 2018-03-12 DIAGNOSIS — G47.33 OBSTRUCTIVE SLEEP APNEA (ADULT) (PEDIATRIC): ICD-10-CM

## 2018-03-12 DIAGNOSIS — Z90.49 ACQUIRED ABSENCE OF OTHER SPECIFIED PARTS OF DIGESTIVE TRACT: ICD-10-CM

## 2018-03-12 DIAGNOSIS — I25.2 OLD MYOCARDIAL INFARCTION: ICD-10-CM

## 2018-03-12 DIAGNOSIS — Z98.84 BARIATRIC SURGERY STATUS: ICD-10-CM

## 2018-03-12 DIAGNOSIS — T38.895A ADVERSE EFFECT OF OTHER HORMONES AND SYNTHETIC SUBSTITUTES, INITIAL ENCOUNTER: ICD-10-CM

## 2018-03-12 DIAGNOSIS — Z89.022 ACQUIRED ABSENCE OF LEFT FINGER(S): ICD-10-CM

## 2018-03-12 DIAGNOSIS — Z86.19 PERSONAL HISTORY OF OTHER INFECTIOUS AND PARASITIC DISEASES: ICD-10-CM

## 2018-03-12 DIAGNOSIS — I51.81 TAKOTSUBO SYNDROME: ICD-10-CM

## 2018-03-12 DIAGNOSIS — Z89.021 ACQUIRED ABSENCE OF RIGHT FINGER(S): ICD-10-CM

## 2018-03-12 LAB
HCT VFR BLD CALC: 30.1 % — LOW (ref 37–47)
HGB BLD-MCNC: 9.1 G/DL — LOW (ref 12–16)
MCHC RBC-ENTMCNC: 23.5 PG — LOW (ref 27–31)
MCHC RBC-ENTMCNC: 30.2 G/DL — LOW (ref 32–37)
MCV RBC AUTO: 77.6 FL — LOW (ref 81–99)
NRBC # BLD: 0 /100 WBCS — SIGNIFICANT CHANGE UP (ref 0–0)
PLATELET # BLD AUTO: 526 K/UL — HIGH (ref 130–400)
RBC # BLD: 3.88 M/UL — LOW (ref 4.2–5.4)
RBC # FLD: 14.6 % — HIGH (ref 11.5–14.5)
WBC # BLD: 7.95 K/UL — SIGNIFICANT CHANGE UP (ref 4.8–10.8)
WBC # FLD AUTO: 7.95 K/UL — SIGNIFICANT CHANGE UP (ref 4.8–10.8)

## 2018-03-12 NOTE — H&P PST ADULT - EXTREMITIES COMMENTS
DSD pressure dressings with vac both lower extremity stumps  amputation of left hand and fingers right hand

## 2018-03-12 NOTE — H&P PST ADULT - FAMILY HISTORY
Mother  Still living? Yes, Estimated age: Age Unknown  Family history of cancer, Age at diagnosis: Age Unknown     Father  Still living? Yes, Estimated age: Age Unknown  Family history of heart disease, Age at diagnosis: Age Unknown

## 2018-03-12 NOTE — H&P PST ADULT - ATTENDING COMMENTS
Patient states no changes since last h&p.  Patient denies any changes in Medical condition other than having to have vac. changes and have been started on Amoxil oral a few days ago.

## 2018-03-12 NOTE — H&P PST ADULT - PMH
ARDS survivor    Cardiomyopathy    Gangrene of finger of left hand    Gangrene of finger of right hand    Gangrene of lower extremity    HTN (hypertension)    Osteoarthritis    Sepsis    Sleep apnea    Small bowel obstruction    Takotsubo cardiomyopathy

## 2018-03-12 NOTE — H&P PST ADULT - BMI (KG/M2)
Refill Authorization Note     is requesting a refill authorization.    Brief assessment and rational for refill: APPROVE: prr  Name of medication: LEVOTHYROXINE 0.088MG (88MCG) TAB  How patient will take medication: t1t po daily   Amount/Quantity of medication ordered: 90d            Refills Authorized: Yes  If authorized number of refills: 2        Medication Therapy Plan: Last tsh wnl.  approve 9 mo   Comments:   Lab Results   Component Value Date    TSH 1.180 08/04/2017      
31.5

## 2018-03-12 NOTE — H&P PST ADULT - REASON FOR ADMISSION
"dr. Valdez and Dr. Lind are going to change the vac dressing every week for four weeks" "Dr. Valdez and Dr. Lind are going to change the vac dressing every week for four weeks"

## 2018-03-12 NOTE — H&P PST ADULT - HISTORY OF PRESENT ILLNESS
PT CURRENTLY DENIES CHEST PAIN, PALPITATIONS, DYSURIA RECENT ILLNESS  PT IS WHEELCHAIR BOUND   DSD PULL VAC ON BILATERAL LOWER LIMBS    ECHO DONE 10/28/2017 ON CHART PT CURRENTLY DENIES CHEST PAIN, PALPITATIONS, DYSURIA RECENT ILLNESS  PT IS WHEELCHAIR BOUND   DSD PULL VAC ON BILATERAL LOWER LIMBS    ECHO DONE 10/28/2017 ON CHART    AS PER THE PT, THIS IS A COMPLETE MEDICAL AND SURGICAL HX, INCLUDING MEDICATIONS PRESCRIBED AND OVER THE COUNTER

## 2018-03-13 DIAGNOSIS — D50.8 OTHER IRON DEFICIENCY ANEMIAS: ICD-10-CM

## 2018-03-13 DIAGNOSIS — M87.9 OSTEONECROSIS, UNSPECIFIED: ICD-10-CM

## 2018-03-15 ENCOUNTER — OUTPATIENT (OUTPATIENT)
Dept: OUTPATIENT SERVICES | Facility: HOSPITAL | Age: 43
LOS: 1 days | Discharge: HOME | End: 2018-03-15

## 2018-03-15 VITALS
WEIGHT: 207.23 LBS | OXYGEN SATURATION: 96 % | HEART RATE: 76 BPM | SYSTOLIC BLOOD PRESSURE: 127 MMHG | HEIGHT: 68 IN | TEMPERATURE: 99 F | DIASTOLIC BLOOD PRESSURE: 90 MMHG | RESPIRATION RATE: 20 BRPM

## 2018-03-15 VITALS
DIASTOLIC BLOOD PRESSURE: 75 MMHG | SYSTOLIC BLOOD PRESSURE: 123 MMHG | OXYGEN SATURATION: 98 % | RESPIRATION RATE: 16 BRPM | HEART RATE: 116 BPM | TEMPERATURE: 98 F

## 2018-03-15 DIAGNOSIS — Z98.84 BARIATRIC SURGERY STATUS: Chronic | ICD-10-CM

## 2018-03-15 DIAGNOSIS — Z98.890 OTHER SPECIFIED POSTPROCEDURAL STATES: Chronic | ICD-10-CM

## 2018-03-15 DIAGNOSIS — S68.119A COMPLETE TRAUMATIC METACARPOPHALANGEAL AMPUTATION OF UNSPECIFIED FINGER, INITIAL ENCOUNTER: Chronic | ICD-10-CM

## 2018-03-15 RX ORDER — OXYCODONE AND ACETAMINOPHEN 5; 325 MG/1; MG/1
1 TABLET ORAL ONCE
Qty: 0 | Refills: 0 | Status: DISCONTINUED | OUTPATIENT
Start: 2018-03-15 | End: 2018-03-15

## 2018-03-15 RX ORDER — SODIUM CHLORIDE 9 MG/ML
1000 INJECTION, SOLUTION INTRAVENOUS
Qty: 0 | Refills: 0 | Status: DISCONTINUED | OUTPATIENT
Start: 2018-03-15 | End: 2018-03-30

## 2018-03-15 RX ORDER — ONDANSETRON 8 MG/1
4 TABLET, FILM COATED ORAL ONCE
Qty: 0 | Refills: 0 | Status: COMPLETED | OUTPATIENT
Start: 2018-03-15 | End: 2018-03-15

## 2018-03-15 RX ORDER — MORPHINE SULFATE 50 MG/1
2 CAPSULE, EXTENDED RELEASE ORAL
Qty: 0 | Refills: 0 | Status: DISCONTINUED | OUTPATIENT
Start: 2018-03-15 | End: 2018-03-15

## 2018-03-15 RX ORDER — PANTOPRAZOLE SODIUM 20 MG/1
1 TABLET, DELAYED RELEASE ORAL
Qty: 0 | Refills: 0 | COMMUNITY

## 2018-03-15 RX ORDER — GABAPENTIN 400 MG/1
1 CAPSULE ORAL
Qty: 0 | Refills: 0 | COMMUNITY

## 2018-03-15 RX ORDER — KETOROLAC TROMETHAMINE 30 MG/ML
30 SYRINGE (ML) INJECTION ONCE
Qty: 0 | Refills: 0 | Status: DISCONTINUED | OUTPATIENT
Start: 2018-03-15 | End: 2018-03-15

## 2018-03-15 RX ADMIN — MORPHINE SULFATE 2 MILLIGRAM(S): 50 CAPSULE, EXTENDED RELEASE ORAL at 11:15

## 2018-03-15 RX ADMIN — ONDANSETRON 4 MILLIGRAM(S): 8 TABLET, FILM COATED ORAL at 11:19

## 2018-03-15 RX ADMIN — SODIUM CHLORIDE 100 MILLILITER(S): 9 INJECTION, SOLUTION INTRAVENOUS at 10:40

## 2018-03-15 RX ADMIN — MORPHINE SULFATE 2 MILLIGRAM(S): 50 CAPSULE, EXTENDED RELEASE ORAL at 10:35

## 2018-03-15 RX ADMIN — OXYCODONE AND ACETAMINOPHEN 1 TABLET(S): 5; 325 TABLET ORAL at 11:49

## 2018-03-15 RX ADMIN — OXYCODONE AND ACETAMINOPHEN 1 TABLET(S): 5; 325 TABLET ORAL at 12:07

## 2018-03-15 NOTE — BRIEF OPERATIVE NOTE - POST-OP DX
Gangrene of left foot  03/15/2018    Mohinder Brown  Gangrene of right foot  03/15/2018  bilateral open TMA  Active  Mohinder Salazar

## 2018-03-15 NOTE — BRIEF OPERATIVE NOTE - PROCEDURE
<<-----Click on this checkbox to enter Procedure Debridement and dressing of wound  03/15/2018  excisional debridement of bone and soft tissue bilateral foot with NPWT VAC placement.  Active  JSOTTILE

## 2018-03-15 NOTE — ASU DISCHARGE PLAN (ADULT/PEDIATRIC). - NOTIFY
Numbness, color, or temperature change to extremity/Bleeding that does not stop/Pain not relieved by Medications/Fever greater than 101

## 2018-03-15 NOTE — BRIEF OPERATIVE NOTE - PRE-OP DX
Gangrene of left foot  03/15/2018  bilateral gangrene ,open TMA amputation  Active  Mohinder Salazar  Gangrene of right foot  03/15/2018  bilateral open TMA  Active  Mohinder Salazar

## 2018-03-15 NOTE — ASU DISCHARGE PLAN (ADULT/PEDIATRIC). - ASU FOLLOWUP
[facility]:  Ambulatory Surgery [facility]... [facility]:  [place] for Ambulatory Surgery...

## 2018-03-15 NOTE — CHART NOTE - NSCHARTNOTEFT_GEN_A_CORE
PACU ANESTHESIA ADMISSION NOTE      Procedure: Debridement and dressing of wound: excisional debridement of bone and soft tissue bilateral foot with NPWT VAC placement.    Post op diagnosis:  Gangrene of right foot  Gangrene of left foot      ____  Intubated  TV:______       Rate: ______      FiO2: ______    _x___  Patent Airway    _x___  Full return of protective reflexes    _x___  Full recovery from anesthesia / back to baseline status    Vitals:  BP:              122/72   HR: 97  RR: 17  Temp:  O2Sat:  98    Mental Status:  _x___ Awake   _____ Alert   _____ Drowsy   _____ Sedated    Nausea/Vomiting:  _x___  NO       ______Yes,   See Post - Op Orders         Pain Scale (0-10):  __0___    Treatment: _x___ None    ____ See Post - Op/PCA Orders    Post - Operative Fluids:   __x__ Oral   ____ See Post - Op Orders    Plan: Discharge:   _x___Home       _____Floor     _____Critical Care    _____  Other:_________________    Comments:  Intraoperative course uneventful.  No anesthesia issues or complications noted.  Discharge when criteria met.

## 2018-03-20 ENCOUNTER — APPOINTMENT (OUTPATIENT)
Dept: PLASTIC SURGERY | Facility: CLINIC | Age: 43
End: 2018-03-20

## 2018-03-23 ENCOUNTER — RESULT REVIEW (OUTPATIENT)
Age: 43
End: 2018-03-23

## 2018-03-23 ENCOUNTER — OUTPATIENT (OUTPATIENT)
Dept: OUTPATIENT SERVICES | Facility: HOSPITAL | Age: 43
LOS: 1 days | Discharge: HOME | End: 2018-03-23

## 2018-03-23 VITALS — HEART RATE: 85 BPM | DIASTOLIC BLOOD PRESSURE: 68 MMHG | RESPIRATION RATE: 15 BRPM | SYSTOLIC BLOOD PRESSURE: 115 MMHG

## 2018-03-23 VITALS
HEIGHT: 68 IN | SYSTOLIC BLOOD PRESSURE: 123 MMHG | RESPIRATION RATE: 18 BRPM | HEART RATE: 96 BPM | WEIGHT: 207.23 LBS | TEMPERATURE: 98 F | DIASTOLIC BLOOD PRESSURE: 83 MMHG

## 2018-03-23 DIAGNOSIS — S68.119A COMPLETE TRAUMATIC METACARPOPHALANGEAL AMPUTATION OF UNSPECIFIED FINGER, INITIAL ENCOUNTER: Chronic | ICD-10-CM

## 2018-03-23 DIAGNOSIS — Z98.890 OTHER SPECIFIED POSTPROCEDURAL STATES: Chronic | ICD-10-CM

## 2018-03-23 DIAGNOSIS — Z89.431 ACQUIRED ABSENCE OF RIGHT FOOT: ICD-10-CM

## 2018-03-23 DIAGNOSIS — Z89.432 ACQUIRED ABSENCE OF LEFT FOOT: ICD-10-CM

## 2018-03-23 DIAGNOSIS — Z98.84 BARIATRIC SURGERY STATUS: Chronic | ICD-10-CM

## 2018-03-23 DIAGNOSIS — I96 GANGRENE, NOT ELSEWHERE CLASSIFIED: ICD-10-CM

## 2018-03-23 RX ORDER — MORPHINE SULFATE 50 MG/1
2 CAPSULE, EXTENDED RELEASE ORAL
Qty: 0 | Refills: 0 | Status: DISCONTINUED | OUTPATIENT
Start: 2018-03-23 | End: 2018-03-23

## 2018-03-23 RX ORDER — OXYCODONE AND ACETAMINOPHEN 5; 325 MG/1; MG/1
1 TABLET ORAL EVERY 4 HOURS
Qty: 0 | Refills: 0 | Status: DISCONTINUED | OUTPATIENT
Start: 2018-03-23 | End: 2018-03-23

## 2018-03-23 RX ORDER — MORPHINE SULFATE 50 MG/1
1 CAPSULE, EXTENDED RELEASE ORAL
Qty: 0 | Refills: 0 | Status: DISCONTINUED | OUTPATIENT
Start: 2018-03-23 | End: 2018-03-23

## 2018-03-23 RX ORDER — SODIUM CHLORIDE 9 MG/ML
1000 INJECTION, SOLUTION INTRAVENOUS
Qty: 0 | Refills: 0 | Status: DISCONTINUED | OUTPATIENT
Start: 2018-03-23 | End: 2018-04-07

## 2018-03-23 RX ADMIN — OXYCODONE AND ACETAMINOPHEN 1 TABLET(S): 5; 325 TABLET ORAL at 14:30

## 2018-03-23 RX ADMIN — MORPHINE SULFATE 2 MILLIGRAM(S): 50 CAPSULE, EXTENDED RELEASE ORAL at 13:23

## 2018-03-23 RX ADMIN — SODIUM CHLORIDE 75 MILLILITER(S): 9 INJECTION, SOLUTION INTRAVENOUS at 15:18

## 2018-03-23 RX ADMIN — MORPHINE SULFATE 2 MILLIGRAM(S): 50 CAPSULE, EXTENDED RELEASE ORAL at 13:03

## 2018-03-23 NOTE — BRIEF OPERATIVE NOTE - PROCEDURE
<<-----Click on this checkbox to enter Procedure Debridement of amputation site (stump/flap)  03/23/2018  debridement of soft tissue and bone left foot and soft tissue right foot using versajet and NPWT vac placement bilateral  Active  JSOTTILE

## 2018-03-23 NOTE — ASU DISCHARGE PLAN (ADULT/PEDIATRIC). - NOTIFY
Unable to Urinate/Fever greater than 101/Inability to Tolerate Liquids or Foods/Pain not relieved by Medications/Persistent Nausea and Vomiting/Numbness, tingling/Bleeding that does not stop/Swelling that continues

## 2018-03-23 NOTE — BRIEF OPERATIVE NOTE - PROCEDURE
<<-----Click on this checkbox to enter Procedure Debridement  03/23/2018  Excisional debridement of soft tissue and bone bilateral feet  Active  TCONTRERAS

## 2018-03-27 ENCOUNTER — INPATIENT (INPATIENT)
Facility: HOSPITAL | Age: 43
LOS: 8 days | Discharge: HOME HEALTH | End: 2018-04-05
Attending: INTERNAL MEDICINE

## 2018-03-27 VITALS
SYSTOLIC BLOOD PRESSURE: 132 MMHG | RESPIRATION RATE: 20 BRPM | TEMPERATURE: 98 F | DIASTOLIC BLOOD PRESSURE: 83 MMHG | HEART RATE: 78 BPM

## 2018-03-27 DIAGNOSIS — S68.119A COMPLETE TRAUMATIC METACARPOPHALANGEAL AMPUTATION OF UNSPECIFIED FINGER, INITIAL ENCOUNTER: Chronic | ICD-10-CM

## 2018-03-27 DIAGNOSIS — I96 GANGRENE, NOT ELSEWHERE CLASSIFIED: ICD-10-CM

## 2018-03-27 DIAGNOSIS — I10 ESSENTIAL (PRIMARY) HYPERTENSION: ICD-10-CM

## 2018-03-27 DIAGNOSIS — Z98.84 BARIATRIC SURGERY STATUS: Chronic | ICD-10-CM

## 2018-03-27 DIAGNOSIS — Z98.890 OTHER SPECIFIED POSTPROCEDURAL STATES: Chronic | ICD-10-CM

## 2018-03-27 DIAGNOSIS — Z89.9 ACQUIRED ABSENCE OF LIMB, UNSPECIFIED: Chronic | ICD-10-CM

## 2018-03-27 LAB
ALBUMIN SERPL ELPH-MCNC: 3.4 G/DL — LOW (ref 3.5–5.2)
ALP SERPL-CCNC: 68 U/L — SIGNIFICANT CHANGE UP (ref 30–115)
ALT FLD-CCNC: 11 U/L — SIGNIFICANT CHANGE UP (ref 0–41)
ANION GAP SERPL CALC-SCNC: 12 MMOL/L — SIGNIFICANT CHANGE UP (ref 7–14)
APTT BLD: 21.8 SEC — CRITICAL LOW (ref 27–39.2)
AST SERPL-CCNC: 31 U/L — SIGNIFICANT CHANGE UP (ref 0–41)
BASOPHILS # BLD AUTO: 0.05 K/UL — SIGNIFICANT CHANGE UP (ref 0–0.2)
BASOPHILS NFR BLD AUTO: 0.5 % — SIGNIFICANT CHANGE UP (ref 0–1)
BILIRUB SERPL-MCNC: 0.3 MG/DL — SIGNIFICANT CHANGE UP (ref 0.2–1.2)
BLD GP AB SCN SERPL QL: SIGNIFICANT CHANGE UP
BUN SERPL-MCNC: 8 MG/DL — LOW (ref 10–20)
CALCIUM SERPL-MCNC: 9 MG/DL — SIGNIFICANT CHANGE UP (ref 8.5–10.1)
CHLORIDE SERPL-SCNC: 101 MMOL/L — SIGNIFICANT CHANGE UP (ref 98–110)
CO2 SERPL-SCNC: 26 MMOL/L — SIGNIFICANT CHANGE UP (ref 17–32)
CREAT SERPL-MCNC: 0.9 MG/DL — SIGNIFICANT CHANGE UP (ref 0.7–1.5)
EOSINOPHIL # BLD AUTO: 0.26 K/UL — SIGNIFICANT CHANGE UP (ref 0–0.7)
EOSINOPHIL NFR BLD AUTO: 2.4 % — SIGNIFICANT CHANGE UP (ref 0–8)
GLUCOSE SERPL-MCNC: 98 MG/DL — SIGNIFICANT CHANGE UP (ref 70–99)
HCT VFR BLD CALC: 24.5 % — LOW (ref 37–47)
HGB BLD-MCNC: 7.4 G/DL — CRITICAL LOW (ref 12–16)
IMM GRANULOCYTES NFR BLD AUTO: 8 % — HIGH (ref 0.1–0.3)
INR BLD: 1.07 RATIO — SIGNIFICANT CHANGE UP (ref 0.65–1.3)
LYMPHOCYTES # BLD AUTO: 2.27 K/UL — SIGNIFICANT CHANGE UP (ref 1.2–3.4)
LYMPHOCYTES # BLD AUTO: 20.8 % — SIGNIFICANT CHANGE UP (ref 20.5–51.1)
MCHC RBC-ENTMCNC: 23.1 PG — LOW (ref 27–31)
MCHC RBC-ENTMCNC: 30.2 G/DL — LOW (ref 32–37)
MCV RBC AUTO: 76.3 FL — LOW (ref 81–99)
MONOCYTES # BLD AUTO: 0.9 K/UL — HIGH (ref 0.1–0.6)
MONOCYTES NFR BLD AUTO: 8.2 % — SIGNIFICANT CHANGE UP (ref 1.7–9.3)
NEUTROPHILS # BLD AUTO: 6.57 K/UL — HIGH (ref 1.4–6.5)
NEUTROPHILS NFR BLD AUTO: 60.1 % — SIGNIFICANT CHANGE UP (ref 42.2–75.2)
NRBC # BLD: 2 /100 WBCS — HIGH (ref 0–0)
PLATELET # BLD AUTO: 519 K/UL — HIGH (ref 130–400)
POTASSIUM SERPL-MCNC: 5.3 MMOL/L — HIGH (ref 3.5–5)
POTASSIUM SERPL-SCNC: 5.3 MMOL/L — HIGH (ref 3.5–5)
PROT SERPL-MCNC: 6.9 G/DL — SIGNIFICANT CHANGE UP (ref 6–8)
PROTHROM AB SERPL-ACNC: 11.6 SEC — SIGNIFICANT CHANGE UP (ref 9.95–12.87)
RBC # BLD: 3.21 M/UL — LOW (ref 4.2–5.4)
RBC # FLD: 15.3 % — HIGH (ref 11.5–14.5)
SODIUM SERPL-SCNC: 139 MMOL/L — SIGNIFICANT CHANGE UP (ref 135–146)
SURGICAL PATHOLOGY STUDY: SIGNIFICANT CHANGE UP
TYPE + AB SCN PNL BLD: SIGNIFICANT CHANGE UP
WBC # BLD: 10.92 K/UL — HIGH (ref 4.8–10.8)
WBC # FLD AUTO: 10.92 K/UL — HIGH (ref 4.8–10.8)

## 2018-03-27 RX ORDER — FUROSEMIDE 40 MG
40 TABLET ORAL ONCE
Qty: 0 | Refills: 0 | Status: COMPLETED | OUTPATIENT
Start: 2018-03-27 | End: 2018-03-27

## 2018-03-27 RX ORDER — PREGABALIN 225 MG/1
100 CAPSULE ORAL DAILY
Qty: 0 | Refills: 0 | Status: DISCONTINUED | OUTPATIENT
Start: 2018-03-27 | End: 2018-03-27

## 2018-03-27 RX ORDER — ONDANSETRON 8 MG/1
4 TABLET, FILM COATED ORAL EVERY 6 HOURS
Qty: 0 | Refills: 0 | Status: DISCONTINUED | OUTPATIENT
Start: 2018-03-27 | End: 2018-03-28

## 2018-03-27 RX ORDER — METOPROLOL TARTRATE 50 MG
50 TABLET ORAL DAILY
Qty: 0 | Refills: 0 | Status: DISCONTINUED | OUTPATIENT
Start: 2018-03-27 | End: 2018-03-28

## 2018-03-27 RX ORDER — SENNA PLUS 8.6 MG/1
1 TABLET ORAL
Qty: 0 | Refills: 0 | Status: DISCONTINUED | OUTPATIENT
Start: 2018-03-27 | End: 2018-03-28

## 2018-03-27 RX ORDER — GABAPENTIN 400 MG/1
300 CAPSULE ORAL DAILY
Qty: 0 | Refills: 0 | Status: DISCONTINUED | OUTPATIENT
Start: 2018-03-27 | End: 2018-03-28

## 2018-03-27 RX ORDER — FERROUS SULFATE 325(65) MG
325 TABLET ORAL THREE TIMES A DAY
Qty: 0 | Refills: 0 | Status: DISCONTINUED | OUTPATIENT
Start: 2018-03-27 | End: 2018-03-28

## 2018-03-27 RX ORDER — LIDOCAINE HCL 20 MG/ML
90 VIAL (ML) INJECTION ONCE
Qty: 0 | Refills: 0 | Status: COMPLETED | OUTPATIENT
Start: 2018-03-27 | End: 2018-03-27

## 2018-03-27 RX ORDER — LIDOCAINE 4 G/100G
100 CREAM TOPICAL ONCE
Qty: 0 | Refills: 0 | Status: COMPLETED | OUTPATIENT
Start: 2018-03-27 | End: 2018-03-27

## 2018-03-27 RX ORDER — MORPHINE SULFATE 50 MG/1
6 CAPSULE, EXTENDED RELEASE ORAL ONCE
Qty: 0 | Refills: 0 | Status: DISCONTINUED | OUTPATIENT
Start: 2018-03-27 | End: 2018-03-27

## 2018-03-27 RX ORDER — ACETAMINOPHEN 500 MG
650 TABLET ORAL EVERY 6 HOURS
Qty: 0 | Refills: 0 | Status: DISCONTINUED | OUTPATIENT
Start: 2018-03-27 | End: 2018-03-28

## 2018-03-27 RX ORDER — CHOLECALCIFEROL (VITAMIN D3) 125 MCG
1000 CAPSULE ORAL DAILY
Qty: 0 | Refills: 0 | Status: DISCONTINUED | OUTPATIENT
Start: 2018-03-27 | End: 2018-03-28

## 2018-03-27 RX ORDER — HEPARIN SODIUM 5000 [USP'U]/ML
5000 INJECTION INTRAVENOUS; SUBCUTANEOUS EVERY 8 HOURS
Qty: 0 | Refills: 0 | Status: DISCONTINUED | OUTPATIENT
Start: 2018-03-27 | End: 2018-03-28

## 2018-03-27 RX ORDER — MORPHINE SULFATE 50 MG/1
2 CAPSULE, EXTENDED RELEASE ORAL EVERY 6 HOURS
Qty: 0 | Refills: 0 | Status: DISCONTINUED | OUTPATIENT
Start: 2018-03-27 | End: 2018-03-28

## 2018-03-27 RX ORDER — OXYCODONE AND ACETAMINOPHEN 5; 325 MG/1; MG/1
2 TABLET ORAL EVERY 6 HOURS
Qty: 0 | Refills: 0 | Status: DISCONTINUED | OUTPATIENT
Start: 2018-03-27 | End: 2018-03-28

## 2018-03-27 RX ADMIN — Medication 1000 UNIT(S): at 19:27

## 2018-03-27 RX ADMIN — Medication 40 MILLIGRAM(S): at 23:04

## 2018-03-27 RX ADMIN — MORPHINE SULFATE 6 MILLIGRAM(S): 50 CAPSULE, EXTENDED RELEASE ORAL at 11:42

## 2018-03-27 RX ADMIN — HEPARIN SODIUM 5000 UNIT(S): 5000 INJECTION INTRAVENOUS; SUBCUTANEOUS at 23:04

## 2018-03-27 RX ADMIN — LIDOCAINE 100 MILLILITER(S): 4 CREAM TOPICAL at 11:59

## 2018-03-27 RX ADMIN — Medication 325 MILLIGRAM(S): at 23:04

## 2018-03-27 RX ADMIN — Medication 90 MILLILITER(S): at 11:59

## 2018-03-27 RX ADMIN — GABAPENTIN 300 MILLIGRAM(S): 400 CAPSULE ORAL at 19:27

## 2018-03-27 RX ADMIN — Medication 50 MILLIGRAM(S): at 19:27

## 2018-03-27 NOTE — H&P ADULT - ASSESSMENT
42yF w/ amputation of left hand, right hand digits, and bilateral toes as a complication of vasopressor support from prior post-op complication sent to [facility] ED by her podiatrist Marie due to clogged wound vacs for her bilateral toe amputation wounds. Pt found to have microcytic anemia (bLine 8.5, now 7.4) on admission CBC.    #Microcytic Anemia  -c/w FeSO4  -monitor CBC  -3/27: tx 2U pRBC    #Thrombocytosis  -monitor  -bLine 500k     #Toe Amputation Wounds  -f/u pods    #Dispo: Home  #Code: Full  #Diet: Low Salt  #DVTPpx: heparin SQ  #GIPpx: not indicated  #Activity: as tolerated 42yF w/ cardiomyopathy, amputation of left hand, right hand digits, and bilateral toes as a complication of vasopressor support from prior post-op complication sent to [facility] ED by her podiatrist Marie due to clogged wound vacs for her bilateral toe amputation wounds. Pt found to have microcytic anemia (bLine 8.5, now 7.4) on admission CBC.    #Microcytic Anemia  -c/w FeSO4, home vitamin supplements, bowel regimen  -monitor CBC  -3/27: tx 2U pRBC    #Thrombocytosis  -monitor  -bLine 500k     #Toe Amputation Wounds  -c/w pain control (percocet, morphine IV, gabapentin)  -f/u pods    #CHF  -c/w metoprolol    #Dispo: Home  #Code: Full  #Diet: Low Salt  #DVTPpx: heparin SQ  #GIPpx: not indicated  #Activity: as tolerated 42yF w/ cardiomyopathy, amputation of left hand, right hand digits, and bilateral toes as a complication of vasopressor support from prior post-op complication sent to [facility] ED by her podiatrist Marie due to clogged wound vacs for her bilateral toe amputation wounds. Pt found to have microcytic anemia (bLine 8.5, now 7.4) on admission CBC.    #Microcytic Anemia  -c/w FeSO4, home vitamin supplements, bowel regimen  -monitor CBC  -3/27: tx 2U pRBC    #Thrombocytosis  -monitor  -bLine 400-500k (1/2018)    #Toe Amputation Wounds  -c/w pain control (percocet, morphine IV, gabapentin)  -f/u pods    #CHF  -c/w metoprolol    #Dispo: Home  #Code: Full  #Diet: Low Salt  #DVTPpx: heparin SQ  #GIPpx: not indicated  #Activity: OOBC 42yF w/ Takotsubo cardiomyopathy, OA, amputation of left hand, right hand digits, and bilateral toes as a complication of vasopressor support from prior post-op complication sent to [facility] ED by her podiatrist Marie due to clogged wound vacs for her bilateral toe amputation wounds. Pt found to have microcytic anemia (bLine 8.5, now 7.4) on admission CBC.    #Microcytic Anemia  -c/w FeSO4, home vitamin supplements, bowel regimen  -monitor CBC  -3/27: tx 2U pRBC    #Thrombocytosis  -monitor  -bLine 400-500k (1/2018)    #Toe Amputation Wounds  -c/w pain control (percocet, morphine IV, gabapentin)  -f/u pods    #CHF  -c/w metoprolol    #Dispo: Home  #Code: Full  #Diet: Low Salt  #DVTPpx: heparin SQ  #GIPpx: not indicated  #Activity: OOBC 42yF w/ Takotsubo cardiomyopathy, OA, amputation of left hand, right hand digits, and bilateral toes as a complication of vasopressor support from prior post-op complication sent to [facility] ED by her podiatrist Marie due to clogged wound vacs for her bilateral toe amputation wounds. Pt found to have microcytic anemia (bLine 8.5, now 7.4) on admission CBC.    #Microcytic Anemia  -c/w FeSO4, home vitamin supplements, bowel regimen  -monitor CBC  -3/27: tx 3U pRBC by pods, give furosemide IVP w/ pRBC 2/2 Takotsubo cardiomyopathy    #Toe Amputation Wounds  -c/w pain control (percocet, morphine IV, gabapentin)  -f/u pods    #Takotsuba Cardiomyopathy  -c/w metoprolol  -consider TTE if pt will be hospitalized for period of time (none found in EMR)    #Thrombocytosis  -monitor  -bLine 400-500k (1/2018)    #Dispo: Home  #Code: Full  #Diet: Low Salt  #DVTPpx: heparin SQ  #GIPpx: not indicated  #Activity: OOBC

## 2018-03-27 NOTE — ED PROVIDER NOTE - NS ED ROS FT
General: no fevers, chills, nausea, vomiting  Eyes:  No visual changes  ENMT:  No hearing changes  Cardiac:  No chest pain, SOB or edema  Respiratory:  No cough or respiratory distress. No history of asthma  GI:  No nausea, vomiting, or abdominal pain.  :  No dysuria, frequency or burning.  MS:  No back pain. +allodynia to le wounds  Neuro:  No headache or weakness.  No LOC.  Skin:  No skin rash.   Endocrine: No history of thyroid disease or diabetes.

## 2018-03-27 NOTE — ED PROVIDER NOTE - PROGRESS NOTE DETAILS
pt evaluated, podiatry aware podiatry at bedside, says they will call with recs once discussing with team members lab results discussed w patient, patient consented to blood transfusion spoke to dr. beaulieu, hospitalist, aware of admission

## 2018-03-27 NOTE — H&P ADULT - NSHPLABSRESULTS_GEN_ALL_CORE
7.4    10.92 )-----------( 519      ( 27 Mar 2018 10:49 )             24.5     CBC Full  -  ( 27 Mar 2018 10:49 )  WBC Count : 10.92 K/uL  Hemoglobin : 7.4 g/dL  Hematocrit : 24.5 %  Platelet Count - Automated : 519 K/uL  Mean Cell Volume : 76.3 fL  Mean Cell Hemoglobin : 23.1 pg  Mean Cell Hemoglobin Concentration : 30.2 g/dL  Auto Neutrophil # : 6.57 K/uL  Auto Lymphocyte # : 2.27 K/uL  Auto Monocyte # : 0.90 K/uL  Auto Eosinophil # : 0.26 K/uL  Auto Basophil # : 0.05 K/uL  Auto Neutrophil % : 60.1 %  Auto Lymphocyte % : 20.8 %  Auto Monocyte % : 8.2 %  Auto Eosinophil % : 2.4 %  Auto Basophil % : 0.5 %    03-27    139  |  101  |  8<L>  ----------------------------<  98  5.3<H>   |  26  |  0.9    Ca    9.0      27 Mar 2018 10:49    TPro  6.9  /  Alb  3.4<L>  /  TBili  0.3  /  DBili  x   /  AST  31  /  ALT  11  /  AlkPhos  68  03-27    PT/INR - ( 27 Mar 2018 10:49 )   PT: 11.60 sec;   INR: 1.07 ratio    PTT - ( 27 Mar 2018 10:49 )  PTT:21.8 sec

## 2018-03-27 NOTE — ED PROVIDER NOTE - OBJECTIVE STATEMENT
41yo F pmhx distal extremity necrosis requiring amputations about 1.5 months ago currently with b/l le wound vacs in place presents with CC of wound vac malfunction. pt states that the wound vac reports that it is clogged, states she tried home trouble shooting with company prior to speaking to dr dove her podiatrist, who directed her to the ED. pt reports that the wounds are painful to the touch at baseline, but denies any changes to the wound specifically in regards to temperature and pain. pt denies fevers, chills, nausea, vomiting. pt states that she feels at her baseline.

## 2018-03-27 NOTE — ED ADULT NURSE NOTE - OBJECTIVE STATEMENT
patient here for b/l wound check. patient has foul swelling wounds to b/l feet . wound vacs stopped functioning yesterday - patient here for wound vac change . a febrile at home

## 2018-03-27 NOTE — CONSULT NOTE ADULT - SUBJECTIVE AND OBJECTIVE BOX
PROGRESS NOTE   Patient is a 42y old  Female who presents with a chief complaint of Bilateral foot wound Vac dressing,anemia need Blood transfusion (27 Mar 2018 16:11)      HPI:  42yF w/ Takotsubo cardiomyopathy (chart review), OA (chart review), amputation of left hand, right hand digits, and bilateral toes sent to [facility] ED by her podiatrist Marie due to clogged wound vacs for her bilateral toe amputation wounds, which were left open to allow granulation tissue to form before closing by Dr. Salazar. The amputations were complications of a Elin-en-y the patient underwent in 10/2017. The patient experienced a GI blockage after the procedure and developed aspiration pneumonia after an operative exploration for the blockage. The pneumonia resulted in an ICU admission and long period of vasopressor support due to multiorgan failure. The patient developed ischemia of her bilateral hands and feet during that admission which resulted in gangrene of the fingers of her right hand, mid left hand, and all of her toes. The patient then underwent the aforementioned amputation of her hands in 1/2018 by Dr. Sorto and of her feet in 2/2018 by Dr. Salazar. The patient states she has weekly wound vac changes which require hospitalization for pain control.    Patient herself states that she has "some kind of cardiomyopathy" because of her prior hospitalization due to the operation.    The patient denies fever, chills, cough, dysuria, pyuria, increased urinary frequency, pain, dyspnea. The patient explicitly states she has no complaints except for her clogged wound vacs.    The patient had a prior PMH significant for GERD, obesity, and sleep apnea, but these are no longer active issues for her after the Elin-en-y.    PMD Gerald Craven genSurg [place]  Pharm:  Woodrow Garzae (27 Mar 2018 14:41)      Vital Signs Last 24 Hrs  T(C): 36.2 (27 Mar 2018 16:11), Max: 36.8 (27 Mar 2018 15:03)  T(F): 97.2 (27 Mar 2018 16:11), Max: 98.2 (27 Mar 2018 15:03)  HR: 101 (27 Mar 2018 15:03) (78 - 101)  BP: 127/74 (27 Mar 2018 16:11) (126/82 - 132/83)  BP(mean): --  RR: 18 (27 Mar 2018 16:11) (18 - 20)  SpO2: 100% (27 Mar 2018 15:03) (100% - 100%)                          7.4    10.92 )-----------( 519      ( 27 Mar 2018 10:49 )             24.5               03-27    139  |  101  |  8<L>  ----------------------------<  98  5.3<H>   |  26  |  0.9    Ca    9.0      27 Mar 2018 10:49    TPro  6.9  /  Alb  3.4<L>  /  TBili  0.3  /  DBili  x   /  AST  31  /  ALT  11  /  AlkPhos  68  03-27      LE Focused PE    Vasc:  - DP/PT pulses non-palpable, due to proximal TMA  - skin temp, warm to warm, proximal to distal B/L    Neuro:  - WNL B/L    Derm:  - Proximal open TMA's Otherwise, no lesions appreciated. Wound base granular w/ no clinical signs of infection.     MSK:  - muscle strength 3/5 all quadrants     Assessment:  - s/p Proximal TMA's B/L  - Wound base 100% granular w/ no clinical signs of infection. - purulence/ erythema, + malodor    Plan:  - pt evaluated @ bedside   - B/L ankle blocks performed using 10cc 1% plain lidocaine.   - Dressed w/ WTD/ACE  - Requesting MEDICAL CLEARANCE w/ stratification  - Pt going to OR on 3/28/18 @ 7:30am for application of NPWT to B/L feet  - Findings discussed w/ attending   - Podiatry will f/u PROGRESS NOTE   Patient is a 42y old  Female who presents with a chief complaint of Bilateral foot wound Vac dressing,anemia need Blood transfusion (27 Mar 2018 16:11)      HPI:  42yF w/ Takotsubo cardiomyopathy (chart review), OA (chart review), amputation of left hand, right hand digits, and bilateral toes sent to [facility] ED by her podiatrist Marie due to clogged wound vacs for her bilateral toe amputation wounds, which were left open to allow granulation tissue to form before closing by Dr. Salazar. The amputations were complications of a Elin-en-y the patient underwent in 10/2017. The patient experienced a GI blockage after the procedure and developed aspiration pneumonia after an operative exploration for the blockage. The pneumonia resulted in an ICU admission and long period of vasopressor support due to multiorgan failure. The patient developed ischemia of her bilateral hands and feet during that admission which resulted in gangrene of the fingers of her right hand, mid left hand, and all of her toes. The patient then underwent the aforementioned amputation of her hands in 1/2018 by Dr. Sorto and of her feet in 2/2018 by Dr. Salazar. The patient states she has weekly wound vac changes which require hospitalization for pain control.    Patient herself states that she has "some kind of cardiomyopathy" because of her prior hospitalization due to the operation.    The patient denies fever, chills, cough, dysuria, pyuria, increased urinary frequency, pain, dyspnea. The patient explicitly states she has no complaints except for her clogged wound vacs.    The patient had a prior PMH significant for GERD, obesity, and sleep apnea, but these are no longer active issues for her after the Elin-en-y.    PMD Gerald Craven genSurg [place]  Pharm:  Woodrow Garzae (27 Mar 2018 14:41)      Vital Signs Last 24 Hrs  T(C): 36.2 (27 Mar 2018 16:11), Max: 36.8 (27 Mar 2018 15:03)  T(F): 97.2 (27 Mar 2018 16:11), Max: 98.2 (27 Mar 2018 15:03)  HR: 101 (27 Mar 2018 15:03) (78 - 101)  BP: 127/74 (27 Mar 2018 16:11) (126/82 - 132/83)  BP(mean): --  RR: 18 (27 Mar 2018 16:11) (18 - 20)  SpO2: 100% (27 Mar 2018 15:03) (100% - 100%)                          7.4    10.92 )-----------( 519      ( 27 Mar 2018 10:49 )             24.5               03-27    139  |  101  |  8<L>  ----------------------------<  98  5.3<H>   |  26  |  0.9    Ca    9.0      27 Mar 2018 10:49    TPro  6.9  /  Alb  3.4<L>  /  TBili  0.3  /  DBili  x   /  AST  31  /  ALT  11  /  AlkPhos  68  03-27      LE Focused PE    Vasc:  - DP/PT pulses non-palpable, due to proximal TMA  - skin temp, warm to warm, proximal to distal B/L    Neuro:  - WNL B/L    Derm:  - Proximal open TMA's Otherwise, no lesions appreciated. Wound base granular w/ no clinical signs of infection.     MSK:  - muscle strength 3/5 all quadrants     Assessment:  - s/p Proximal TMA's B/L  - Wound base 100% granular w/ no clinical signs of infection. - purulence/ erythema, + malodor    Plan:  - pt evaluated @ bedside   - B/L ankle blocks performed using 10cc 1% plain lidocaine prior to dressing change due to pain  - Dressed w/ WTD/ACE  - Requesting MEDICAL CLEARANCE w/ stratification  - Pt going to OR on 3/28/18 @ 7:30am for application of NPWT to B/L feet  - Findings discussed w/ attending   - Podiatry will f/u

## 2018-03-27 NOTE — H&P ADULT - NSHPPHYSICALEXAM_GEN_ALL_CORE
Vital Signs Last 24 Hrs  T(C): 36.4 (27 Mar 2018 09:52), Max: 36.4 (27 Mar 2018 09:52)  T(F): 97.5 (27 Mar 2018 09:52), Max: 97.5 (27 Mar 2018 09:52)  HR: 78 (27 Mar 2018 09:52) (78 - 78)  BP: 132/83 (27 Mar 2018 09:52) (132/83 - 132/83)  BP(mean): --  RR: 20 (27 Mar 2018 09:52) (20 - 20)  SpO2: --    Gen: NAD, pleasant, cooperative  HEENT: NCAT, EOMI  Cards: RRR. S1-S2 present. No G/M/R.  Resp: CTAB  Abd: S, NT, ND, +BS. No pain on palpation  Ext: Healed amputation stumps of all digits of right hand and of the mid left hand. Bandaged bilateral feet. 2+ radial and posterior pulses b/l. No C/E/E.

## 2018-03-27 NOTE — H&P ADULT - ATTENDING COMMENTS
Patient seen and examined at the bed side with residents, nursing and . not in distress  multiple toes and fingers amputated.   Foul smell from the wounds at the legs.   Has a wound vac present.   Podiatry following. need further surgical procedures and management as per the podiatry.   ID to adjust antibiotics.   Follow up blood cultures.

## 2018-03-27 NOTE — H&P ADULT - HISTORY OF PRESENT ILLNESS
42yF w/ amputation of left hand, right hand digits, and bilateral toes sent to [facility] ED by her podiatrist Marie due to clogged wound vacs for her bilateral toe amputation wounds, which were left open to allow granulation tissue to form before closing by Dr. Salazar. The amputations were complications of a Elin-en-y the patient underwent in 10/2017. The patient experienced a GI blockage after the procedure and developed aspiration pneumonia after an operative exploration for the blockage. The pneumonia resulted in an ICU admission and long period of vasopressor support due to multiorgan failure. The patient developed ischemia of her bilateral hands and feet during that admission which resulted in gangrene of the fingers of her right hand, mid left hand, and all of her toes. The patient then underwent the aforementioned amputation of her hands in 1/2018 by Dr. Sorto and of her feet in 2/2018 by Dr. Salazar. The patient states she has weekly wound vac changes which require hospitalization for pain control.    The patient denies fever, chills, cough, dysuria, pyuria, increased urinary frequency, pain, dyspnea. The patient explicitly states she has no complaints except for her clogged wound vacs.    The patient had a prior PMH significant for GERD, obesity, and sleep apnea, but these are no longer active issues for her after the Elin-en-y.    PMD Gerald Craven genSurg [facility]  Pharm: [address] 42yF w/ Takotsubo cardiomyopathy, OA, amputation of left hand, right hand digits, and bilateral toes sent to [facility] ED by her podiatrist Marie due to clogged wound vacs for her bilateral toe amputation wounds, which were left open to allow granulation tissue to form before closing by Dr. aSlazar. The amputations were complications of a Elin-en-y the patient underwent in 10/2017. The patient experienced a GI blockage after the procedure and developed aspiration pneumonia after an operative exploration for the blockage. The pneumonia resulted in an ICU admission and long period of vasopressor support due to multiorgan failure. The patient developed ischemia of her bilateral hands and feet during that admission which resulted in gangrene of the fingers of her right hand, mid left hand, and all of her toes. The patient then underwent the aforementioned amputation of her hands in 1/2018 by Dr. Sorto and of her feet in 2/2018 by Dr. Salazar. The patient states she has weekly wound vac changes which require hospitalization for pain control.    The patient denies fever, chills, cough, dysuria, pyuria, increased urinary frequency, pain, dyspnea. The patient explicitly states she has no complaints except for her clogged wound vacs.    The patient had a prior PMH significant for GERD, obesity, and sleep apnea, but these are no longer active issues for her after the Elin-en-y.    PMD Gerald Craven genSurg [place]  Pharm: [address] 42yF w/ Takotsubo cardiomyopathy (chart review), OA (chart review), amputation of left hand, right hand digits, and bilateral toes sent to [facility] ED by her podiatrist Marie due to clogged wound vacs for her bilateral toe amputation wounds, which were left open to allow granulation tissue to form before closing by Dr. Salazar. The amputations were complications of a Elin-en-y the patient underwent in 10/2017. The patient experienced a GI blockage after the procedure and developed aspiration pneumonia after an operative exploration for the blockage. The pneumonia resulted in an ICU admission and long period of vasopressor support due to multiorgan failure. The patient developed ischemia of her bilateral hands and feet during that admission which resulted in gangrene of the fingers of her right hand, mid left hand, and all of her toes. The patient then underwent the aforementioned amputation of her hands in 1/2018 by Dr. Sorto and of her feet in 2/2018 by Dr. Salazar. The patient states she has weekly wound vac changes which require hospitalization for pain control.    The patient denies fever, chills, cough, dysuria, pyuria, increased urinary frequency, pain, dyspnea. The patient explicitly states she has no complaints except for her clogged wound vacs.    The patient had a prior PMH significant for GERD, obesity, and sleep apnea, but these are no longer active issues for her after the Elin-en-y.    PMD Gerald Craven genSurg [place]  Pharm: [address] 42yF w/ Takotsubo cardiomyopathy (chart review), OA (chart review), amputation of left hand, right hand digits, and bilateral toes sent to [facility] ED by her podiatrist Marie due to clogged wound vacs for her bilateral toe amputation wounds, which were left open to allow granulation tissue to form before closing by Dr. Salazar. The amputations were complications of a Elin-en-y the patient underwent in 10/2017. The patient experienced a GI blockage after the procedure and developed aspiration pneumonia after an operative exploration for the blockage. The pneumonia resulted in an ICU admission and long period of vasopressor support due to multiorgan failure. The patient developed ischemia of her bilateral hands and feet during that admission which resulted in gangrene of the fingers of her right hand, mid left hand, and all of her toes. The patient then underwent the aforementioned amputation of her hands in 1/2018 by Dr. Sorto and of her feet in 2/2018 by Dr. Salazar. The patient states she has weekly wound vac changes which require hospitalization for pain control.    Patient herself states that she has "some kind of cardiomyopathy" because of her prior hospitalization due to the operation.    The patient denies fever, chills, cough, dysuria, pyuria, increased urinary frequency, pain, dyspnea. The patient explicitly states she has no complaints except for her clogged wound vacs.    The patient had a prior PMH significant for GERD, obesity, and sleep apnea, but these are no longer active issues for her after the Elin-en-y.    PMD Kenisha Childs, Maikel Freeman  Pharm: [address]

## 2018-03-27 NOTE — PROGRESS NOTE ADULT - PROBLEM SELECTOR PLAN 1
Patient to go to OR in am for NPWT application to bilateral feet  Patient will need medical clearance for procedure  Will continue to follow

## 2018-03-27 NOTE — ED PROVIDER NOTE - PHYSICAL EXAMINATION
CONSTITUTIONAL: Well-developed; well-nourished; in no acute distress.   SKIN: warm, dry  HEAD: Normocephalic; atraumatic  EYES: PERRL, EOMI, no conjunctival erythema  ENT: No nasal discharge; airway clear  NECK: Supple; non tender  CARD: +S1, S2, no murmurs, gallops, or rubs. Regular rate and rhythm.   RESP: No wheezes, rales or rhonchi.  ABD: soft ntnd  EXT: distal extremity amputations, LEs bandaged. per podiatry, pt has open amputations with wound vacs in place. No clubbing, cyanosis or edema.   NEURO: Alert, oriented, grossly unremarkable  PSYCH: Cooperative, appropriate.

## 2018-03-27 NOTE — PROGRESS NOTE ADULT - SUBJECTIVE AND OBJECTIVE BOX
PROGRESS NOTE   Patient is a 42y old  Female who presents with a chief complaint of Bilateral foot wound Vac dressing,anemia need Blood transfusion (27 Mar 2018 16:11)    Patient will need medical clearance with stratification for procedure tomorrow morning.    HPI:  42yF w/ Takotsubo cardiomyopathy (chart review), OA (chart review), amputation of left hand, right hand digits, and bilateral toes sent to [facility] ED by her podiatrist Marie due to clogged wound vacs for her bilateral toe amputation wounds, which were left open to allow granulation tissue to form before closing by Dr. Salazar. The amputations were complications of a Elin-en-y the patient underwent in 10/2017. The patient experienced a GI blockage after the procedure and developed aspiration pneumonia after an operative exploration for the blockage. The pneumonia resulted in an ICU admission and long period of vasopressor support due to multiorgan failure. The patient developed ischemia of her bilateral hands and feet during that admission which resulted in gangrene of the fingers of her right hand, mid left hand, and all of her toes. The patient then underwent the aforementioned amputation of her hands in 1/2018 by Dr. Sorto and of her feet in 2/2018 by Dr. Salazar. The patient states she has weekly wound vac changes which require hospitalization for pain control.    Patient herself states that she has "some kind of cardiomyopathy" because of her prior hospitalization due to the operation.    The patient denies fever, chills, cough, dysuria, pyuria, increased urinary frequency, pain, dyspnea. The patient explicitly states she has no complaints except for her clogged wound vacs.    The patient had a prior PMH significant for GERD, obesity, and sleep apnea, but these are no longer active issues for her after the Elin-en-y.    PMD Gerald Craven genSurg [facility]  Pharm:  [address] (27 Mar 2018 14:41)      Vital Signs Last 24 Hrs  T(C): 36.2 (27 Mar 2018 16:11), Max: 36.8 (27 Mar 2018 15:03)  T(F): 97.2 (27 Mar 2018 16:11), Max: 98.2 (27 Mar 2018 15:03)  HR: 101 (27 Mar 2018 15:03) (78 - 101)  BP: 127/74 (27 Mar 2018 16:11) (126/82 - 132/83)  BP(mean): --  RR: 18 (27 Mar 2018 16:11) (18 - 20)  SpO2: 100% (27 Mar 2018 15:03) (100% - 100%)                          7.4    10.92 )-----------( 519      ( 27 Mar 2018 10:49 )             24.5               03-27    139  |  101  |  8<L>  ----------------------------<  98  5.3<H>   |  26  |  0.9    Ca    9.0      27 Mar 2018 10:49    TPro  6.9  /  Alb  3.4<L>  /  TBili  0.3  /  DBili  x   /  AST  31  /  ALT  11  /  AlkPhos  68  03-27

## 2018-03-27 NOTE — H&P ADULT - PSH
Amputation finger    H/O exploratory laparotomy    H/O gastric bypass    History of cholecystectomy    S/P amputation  b/l toes

## 2018-03-28 ENCOUNTER — APPOINTMENT (OUTPATIENT)
Dept: PLASTIC SURGERY | Facility: HOSPITAL | Age: 43
End: 2018-03-28

## 2018-03-28 DIAGNOSIS — S81.809A UNSPECIFIED OPEN WOUND, UNSPECIFIED LOWER LEG, INITIAL ENCOUNTER: ICD-10-CM

## 2018-03-28 LAB
ANION GAP SERPL CALC-SCNC: 14 MMOL/L — SIGNIFICANT CHANGE UP (ref 7–14)
BASOPHILS # BLD AUTO: 0.04 K/UL — SIGNIFICANT CHANGE UP (ref 0–0.2)
BASOPHILS NFR BLD AUTO: 0.5 % — SIGNIFICANT CHANGE UP (ref 0–1)
BUN SERPL-MCNC: 9 MG/DL — LOW (ref 10–20)
CALCIUM SERPL-MCNC: 8.7 MG/DL — SIGNIFICANT CHANGE UP (ref 8.5–10.1)
CHLORIDE SERPL-SCNC: 105 MMOL/L — SIGNIFICANT CHANGE UP (ref 98–110)
CO2 SERPL-SCNC: 27 MMOL/L — SIGNIFICANT CHANGE UP (ref 17–32)
CREAT SERPL-MCNC: 0.9 MG/DL — SIGNIFICANT CHANGE UP (ref 0.7–1.5)
EOSINOPHIL # BLD AUTO: 0.26 K/UL — SIGNIFICANT CHANGE UP (ref 0–0.7)
EOSINOPHIL NFR BLD AUTO: 3.4 % — SIGNIFICANT CHANGE UP (ref 0–8)
GLUCOSE SERPL-MCNC: 96 MG/DL — SIGNIFICANT CHANGE UP (ref 70–99)
HCG UR QL: NEGATIVE — SIGNIFICANT CHANGE UP
HCT VFR BLD CALC: 30.4 % — LOW (ref 37–47)
HGB BLD-MCNC: 9.7 G/DL — LOW (ref 12–16)
IMM GRANULOCYTES NFR BLD AUTO: 3.9 % — HIGH (ref 0.1–0.3)
LYMPHOCYTES # BLD AUTO: 1.81 K/UL — SIGNIFICANT CHANGE UP (ref 1.2–3.4)
LYMPHOCYTES # BLD AUTO: 23.7 % — SIGNIFICANT CHANGE UP (ref 20.5–51.1)
MCHC RBC-ENTMCNC: 24.7 PG — LOW (ref 27–31)
MCHC RBC-ENTMCNC: 31.9 G/DL — LOW (ref 32–37)
MCV RBC AUTO: 77.6 FL — LOW (ref 81–99)
MONOCYTES # BLD AUTO: 0.83 K/UL — HIGH (ref 0.1–0.6)
MONOCYTES NFR BLD AUTO: 10.9 % — HIGH (ref 1.7–9.3)
NEUTROPHILS # BLD AUTO: 4.4 K/UL — SIGNIFICANT CHANGE UP (ref 1.4–6.5)
NEUTROPHILS NFR BLD AUTO: 57.6 % — SIGNIFICANT CHANGE UP (ref 42.2–75.2)
NRBC # BLD: 1 /100 WBCS — HIGH (ref 0–0)
PLATELET # BLD AUTO: 415 K/UL — HIGH (ref 130–400)
POTASSIUM SERPL-MCNC: 4.1 MMOL/L — SIGNIFICANT CHANGE UP (ref 3.5–5)
POTASSIUM SERPL-SCNC: 4.1 MMOL/L — SIGNIFICANT CHANGE UP (ref 3.5–5)
RBC # BLD: 3.92 M/UL — LOW (ref 4.2–5.4)
RBC # FLD: 15.9 % — HIGH (ref 11.5–14.5)
SODIUM SERPL-SCNC: 146 MMOL/L — SIGNIFICANT CHANGE UP (ref 135–146)
WBC # BLD: 7.64 K/UL — SIGNIFICANT CHANGE UP (ref 4.8–10.8)
WBC # FLD AUTO: 7.64 K/UL — SIGNIFICANT CHANGE UP (ref 4.8–10.8)

## 2018-03-28 RX ORDER — ONDANSETRON 8 MG/1
4 TABLET, FILM COATED ORAL ONCE
Qty: 0 | Refills: 0 | Status: DISCONTINUED | OUTPATIENT
Start: 2018-03-28 | End: 2018-03-28

## 2018-03-28 RX ORDER — HEPARIN SODIUM 5000 [USP'U]/ML
5000 INJECTION INTRAVENOUS; SUBCUTANEOUS EVERY 8 HOURS
Qty: 0 | Refills: 0 | Status: DISCONTINUED | OUTPATIENT
Start: 2018-03-28 | End: 2018-04-02

## 2018-03-28 RX ORDER — MORPHINE SULFATE 50 MG/1
6 CAPSULE, EXTENDED RELEASE ORAL ONCE
Qty: 0 | Refills: 0 | Status: DISCONTINUED | OUTPATIENT
Start: 2018-03-28 | End: 2018-03-28

## 2018-03-28 RX ORDER — METOPROLOL TARTRATE 50 MG
50 TABLET ORAL DAILY
Qty: 0 | Refills: 0 | Status: DISCONTINUED | OUTPATIENT
Start: 2018-03-28 | End: 2018-04-02

## 2018-03-28 RX ORDER — MEPERIDINE HYDROCHLORIDE 50 MG/ML
12.5 INJECTION INTRAMUSCULAR; INTRAVENOUS; SUBCUTANEOUS
Qty: 0 | Refills: 0 | Status: DISCONTINUED | OUTPATIENT
Start: 2018-03-28 | End: 2018-03-28

## 2018-03-28 RX ORDER — MORPHINE SULFATE 50 MG/1
4 CAPSULE, EXTENDED RELEASE ORAL ONCE
Qty: 0 | Refills: 0 | Status: DISCONTINUED | OUTPATIENT
Start: 2018-03-28 | End: 2018-03-28

## 2018-03-28 RX ORDER — FERROUS SULFATE 325(65) MG
325 TABLET ORAL THREE TIMES A DAY
Qty: 0 | Refills: 0 | Status: DISCONTINUED | OUTPATIENT
Start: 2018-03-28 | End: 2018-04-02

## 2018-03-28 RX ORDER — MORPHINE SULFATE 50 MG/1
4 CAPSULE, EXTENDED RELEASE ORAL
Qty: 0 | Refills: 0 | Status: DISCONTINUED | OUTPATIENT
Start: 2018-03-28 | End: 2018-03-28

## 2018-03-28 RX ORDER — MORPHINE SULFATE 50 MG/1
2 CAPSULE, EXTENDED RELEASE ORAL EVERY 6 HOURS
Qty: 0 | Refills: 0 | Status: DISCONTINUED | OUTPATIENT
Start: 2018-03-28 | End: 2018-04-02

## 2018-03-28 RX ORDER — SODIUM CHLORIDE 9 MG/ML
1000 INJECTION, SOLUTION INTRAVENOUS
Qty: 0 | Refills: 0 | Status: DISCONTINUED | OUTPATIENT
Start: 2018-03-28 | End: 2018-03-28

## 2018-03-28 RX ORDER — SENNA PLUS 8.6 MG/1
1 TABLET ORAL
Qty: 0 | Refills: 0 | Status: DISCONTINUED | OUTPATIENT
Start: 2018-03-28 | End: 2018-04-02

## 2018-03-28 RX ORDER — ONDANSETRON 8 MG/1
4 TABLET, FILM COATED ORAL EVERY 6 HOURS
Qty: 0 | Refills: 0 | Status: DISCONTINUED | OUTPATIENT
Start: 2018-03-28 | End: 2018-04-02

## 2018-03-28 RX ORDER — ACETAMINOPHEN 500 MG
650 TABLET ORAL EVERY 6 HOURS
Qty: 0 | Refills: 0 | Status: DISCONTINUED | OUTPATIENT
Start: 2018-03-28 | End: 2018-04-02

## 2018-03-28 RX ORDER — OXYCODONE AND ACETAMINOPHEN 5; 325 MG/1; MG/1
2 TABLET ORAL EVERY 6 HOURS
Qty: 0 | Refills: 0 | Status: DISCONTINUED | OUTPATIENT
Start: 2018-03-28 | End: 2018-04-02

## 2018-03-28 RX ORDER — CHOLECALCIFEROL (VITAMIN D3) 125 MCG
1000 CAPSULE ORAL DAILY
Qty: 0 | Refills: 0 | Status: DISCONTINUED | OUTPATIENT
Start: 2018-03-28 | End: 2018-04-02

## 2018-03-28 RX ORDER — GABAPENTIN 400 MG/1
300 CAPSULE ORAL DAILY
Qty: 0 | Refills: 0 | Status: DISCONTINUED | OUTPATIENT
Start: 2018-03-28 | End: 2018-04-02

## 2018-03-28 RX ADMIN — Medication 50 MILLIGRAM(S): at 21:03

## 2018-03-28 RX ADMIN — MORPHINE SULFATE 4 MILLIGRAM(S): 50 CAPSULE, EXTENDED RELEASE ORAL at 22:44

## 2018-03-28 RX ADMIN — Medication 325 MILLIGRAM(S): at 21:02

## 2018-03-28 RX ADMIN — Medication 1000 UNIT(S): at 16:23

## 2018-03-28 RX ADMIN — MORPHINE SULFATE 2 MILLIGRAM(S): 50 CAPSULE, EXTENDED RELEASE ORAL at 21:02

## 2018-03-28 RX ADMIN — Medication 1 TABLET(S): at 11:13

## 2018-03-28 RX ADMIN — HEPARIN SODIUM 5000 UNIT(S): 5000 INJECTION INTRAVENOUS; SUBCUTANEOUS at 21:02

## 2018-03-28 RX ADMIN — HEPARIN SODIUM 5000 UNIT(S): 5000 INJECTION INTRAVENOUS; SUBCUTANEOUS at 13:07

## 2018-03-28 RX ADMIN — MORPHINE SULFATE 6 MILLIGRAM(S): 50 CAPSULE, EXTENDED RELEASE ORAL at 16:13

## 2018-03-28 RX ADMIN — SODIUM CHLORIDE 120 MILLILITER(S): 9 INJECTION, SOLUTION INTRAVENOUS at 09:33

## 2018-03-28 RX ADMIN — Medication 325 MILLIGRAM(S): at 13:07

## 2018-03-28 NOTE — PROGRESS NOTE ADULT - SUBJECTIVE AND OBJECTIVE BOX
Patient is a 42y old  Female who presents with a chief complaint of Bilateral foot wound Vac dressing,anemia need Blood transfusion (27 Mar 2018 16:11)      INTERVAL HPI/OVERNIGHT EVENTS:   Pt is scheduled for NPWT application with Dr. Salazar at 7:30am. Patient is aware of procedure and is NPO since midnight.    MEDICATIONS  (STANDING):  cholecalciferol 1000 Unit(s) Oral daily  ferrous    sulfate 325 milliGRAM(s) Oral three times a day  gabapentin 300 milliGRAM(s) Oral daily  heparin  Injectable 5000 Unit(s) SubCutaneous every 8 hours  metoprolol succinate ER 50 milliGRAM(s) Oral daily  multivitamin 1 Tablet(s) Oral daily    MEDICATIONS  (PRN):  acetaminophen   Tablet 650 milliGRAM(s) Oral every 6 hours PRN For Temp greater than 38 C (100.4 F)  bisacodyl Suppository 10 milliGRAM(s) Rectal daily PRN Constipation  morphine  - Injectable 2 milliGRAM(s) IV Push every 6 hours PRN Severe Pain (7 - 10)  ondansetron Injectable 4 milliGRAM(s) IV Push every 6 hours PRN Nausea and/or Vomiting  oxyCODONE    5 mG/acetaminophen 325 mG 2 Tablet(s) Oral every 6 hours PRN Moderate Pain (4 - 6)  senna 1 Tablet(s) Oral two times a day PRN Constipation    ANEMIA WOUNDS MULTIPLE OPEN LOWER EXTREMITY  ^ANEMIA WOUNDS MULTIPLE OPEN LOWER EXTREMITY  H/o or current diagnosis of HF- ACEI/ARB contraindication unknown  H/o or current diagnosis of HF- Contraindication to ACEI/ARBs  H/o or current diagnosis of HF- ACEI/ARB contraindication unknown  Family history of heart disease (Father)  Family history of cancer (Mother)  No pertinent family history in first degree relatives  Handoff  MEWS Score  ARDS survivor  Sepsis  Takotsubo cardiomyopathy  Small bowel obstruction  Osteoarthritis  Cardiomyopathy  Sleep apnea  HTN (hypertension)  Gangrene of lower extremity  Gangrene of finger of right hand  Gangrene of finger of left hand  Anemia  S/P amputation  H/O exploratory laparotomy  Amputation finger  History of cholecystectomy  H/O gastric bypass  WOUND CHECK SENT BY DR SALAZAR (ISO C [facility]-EC 2018 FOOT)  WOUND CHECK SENT BY DR SALAZAR  16  Wounds, multiple open, lower extremity    Allergies    No Known Allergies    Intolerances        Vital Signs Last 24 Hrs  T(C): 36.8 (27 Mar 2018 23:49), Max: 36.8 (27 Mar 2018 15:03)  T(F): 98.2 (27 Mar 2018 23:49), Max: 98.2 (27 Mar 2018 15:03)  HR: 86 (27 Mar 2018 23:49) (78 - 101)  BP: 122/81 (27 Mar 2018 23:49) (122/81 - 132/83)  BP(mean): --  RR: 18 (27 Mar 2018 16:11) (18 - 20)  SpO2: 100% (27 Mar 2018 23:49) (100% - 100%)    LABS:                        7.4    10.92 )-----------( 519      ( 27 Mar 2018 10:49 )             24.5     03-27    139  |  101  |  8<L>  ----------------------------<  98  5.3<H>   |  26  |  0.9    Ca    9.0      27 Mar 2018 10:49    TPro  6.9  /  Alb  3.4<L>  /  TBili  0.3  /  DBili  x   /  AST  31  /  ALT  11  /  AlkPhos  68  03-27    PT/INR - ( 27 Mar 2018 10:49 )   PT: 11.60 sec;   INR: 1.07 ratio         PTT - ( 27 Mar 2018 10:49 )  PTT:21.8 sec  CAPILLARY BLOOD GLUCOSE          Type and screen: Done      Plan:   To OR today at 7:30am with Dr. Salazar for NPWT application b/l feet.   CXR and EKG available, New CXR ordered  NPO @ [facility]  Medical Clearance with stratification still needed  Procedure was explained to patient in detail. All alternatives, risks and complications were discussed. All questions answered.

## 2018-03-28 NOTE — PROGRESS NOTE ADULT - SUBJECTIVE AND OBJECTIVE BOX
SUBJECTIVE:    Patient is a 42y old  Female who presents with a chief complaint of Bilateral foot wound Vac dressing,anemia need Blood transfusion (27 Mar 2018 16:11)    Currently admitted to medicine with the primary diagnosis of Anemia     Today is hospital day 1d. not in distress  multiple toes and fingers amputated.   Foul smell from the wounds at the legs.   Has a wound vac present.     PAST MEDICAL & SURGICAL HISTORY  PAST MEDICAL & SURGICAL HISTORY:  ARDS survivor  Sepsis  Takotsubo cardiomyopathy  Small bowel obstruction  Osteoarthritis  Cardiomyopathy  Sleep apnea  HTN (hypertension)  Gangrene of lower extremity  Gangrene of finger of right hand  Gangrene of finger of left hand  S/P amputation: b/l toes  H/O exploratory laparotomy  Amputation finger  History of cholecystectomy  H/O gastric bypass    SOCIAL HISTORY:    ALLERGIES:  No Known Allergies    MEDICATIONS:  STANDING MEDICATIONS  cholecalciferol 1000 Unit(s) Oral daily  ferrous    sulfate 325 milliGRAM(s) Oral three times a day  gabapentin 300 milliGRAM(s) Oral daily  heparin  Injectable 5000 Unit(s) SubCutaneous every 8 hours  metoprolol succinate ER 50 milliGRAM(s) Oral daily  multivitamin 1 Tablet(s) Oral daily    PRN MEDICATIONS  acetaminophen   Tablet 650 milliGRAM(s) Oral every 6 hours PRN  bisacodyl Suppository 10 milliGRAM(s) Rectal daily PRN  morphine  - Injectable 2 milliGRAM(s) IV Push every 6 hours PRN  ondansetron Injectable 4 milliGRAM(s) IV Push every 6 hours PRN  oxyCODONE    5 mG/acetaminophen 325 mG 2 Tablet(s) Oral every 6 hours PRN  senna 1 Tablet(s) Oral two times a day PRN    VITALS:   T(F): 99.6  HR: 89  BP: 112/74  RR: 18  SpO2: 100%    LABS:                        9.7    7.64  )-----------( 415      ( 28 Mar 2018 06:41 )             30.4     03-28    146  |  105  |  9<L>  ----------------------------<  96  4.1   |  27  |  0.9    Ca    8.7      28 Mar 2018 06:41    TPro  6.9  /  Alb  3.4<L>  /  TBili  0.3  /  DBili  x   /  AST  31  /  ALT  11  /  AlkPhos  68  03-27    PT/INR - ( 27 Mar 2018 10:49 )   PT: 11.60 sec;   INR: 1.07 ratio         PTT - ( 27 Mar 2018 10:49 )  PTT:21.8 sec              RADIOLOGY:    PHYSICAL EXAM:  HEENT: NCAT, EOMI  	Cards: RRR. S1-S2 present. No G/M/R.  	Resp: CTAB  	Abd: S, NT, ND, +BS. No pain on palpation  Ext: Healed amputation stumps of all digits of right hand and of the mid left hand. Bandaged bilateral feet. 2+ radial and posterior pulses b/l. No C/E/E.

## 2018-03-29 LAB
HCT VFR BLD CALC: 34 % — LOW (ref 37–47)
HGB BLD-MCNC: 10.4 G/DL — LOW (ref 12–16)
MCHC RBC-ENTMCNC: 24.1 PG — LOW (ref 27–31)
MCHC RBC-ENTMCNC: 30.6 G/DL — LOW (ref 32–37)
MCV RBC AUTO: 78.7 FL — LOW (ref 81–99)
NRBC # BLD: 0 /100 WBCS — SIGNIFICANT CHANGE UP (ref 0–0)
PLATELET # BLD AUTO: 451 K/UL — HIGH (ref 130–400)
RBC # BLD: 4.32 M/UL — SIGNIFICANT CHANGE UP (ref 4.2–5.4)
RBC # FLD: 16.4 % — HIGH (ref 11.5–14.5)
WBC # BLD: 7.6 K/UL — SIGNIFICANT CHANGE UP (ref 4.8–10.8)
WBC # FLD AUTO: 7.6 K/UL — SIGNIFICANT CHANGE UP (ref 4.8–10.8)

## 2018-03-29 RX ORDER — MORPHINE SULFATE 50 MG/1
4 CAPSULE, EXTENDED RELEASE ORAL EVERY 6 HOURS
Qty: 0 | Refills: 0 | Status: DISCONTINUED | OUTPATIENT
Start: 2018-03-29 | End: 2018-04-01

## 2018-03-29 RX ADMIN — Medication 1 TABLET(S): at 11:03

## 2018-03-29 RX ADMIN — Medication 325 MILLIGRAM(S): at 21:33

## 2018-03-29 RX ADMIN — MORPHINE SULFATE 4 MILLIGRAM(S): 50 CAPSULE, EXTENDED RELEASE ORAL at 16:03

## 2018-03-29 RX ADMIN — MORPHINE SULFATE 4 MILLIGRAM(S): 50 CAPSULE, EXTENDED RELEASE ORAL at 05:28

## 2018-03-29 RX ADMIN — MORPHINE SULFATE 4 MILLIGRAM(S): 50 CAPSULE, EXTENDED RELEASE ORAL at 16:01

## 2018-03-29 RX ADMIN — HEPARIN SODIUM 5000 UNIT(S): 5000 INJECTION INTRAVENOUS; SUBCUTANEOUS at 13:12

## 2018-03-29 RX ADMIN — Medication 1000 UNIT(S): at 11:03

## 2018-03-29 RX ADMIN — HEPARIN SODIUM 5000 UNIT(S): 5000 INJECTION INTRAVENOUS; SUBCUTANEOUS at 21:33

## 2018-03-29 RX ADMIN — Medication 325 MILLIGRAM(S): at 13:12

## 2018-03-29 RX ADMIN — Medication 50 MILLIGRAM(S): at 23:03

## 2018-03-29 RX ADMIN — Medication 325 MILLIGRAM(S): at 05:50

## 2018-03-29 RX ADMIN — HEPARIN SODIUM 5000 UNIT(S): 5000 INJECTION INTRAVENOUS; SUBCUTANEOUS at 05:50

## 2018-03-29 RX ADMIN — MORPHINE SULFATE 4 MILLIGRAM(S): 50 CAPSULE, EXTENDED RELEASE ORAL at 05:23

## 2018-03-29 NOTE — PROGRESS NOTE ADULT - SUBJECTIVE AND OBJECTIVE BOX
SUBJECTIVE:    Patient is a 42y old  Female who presents with a chief complaint of Bilateral foot wound Vac dressing,anemia need Blood transfusion (27 Mar 2018 16:11)    Currently admitted to medicine with the primary diagnosis of Anemia     Today is hospital day 2d. This morning she is resting comfortably in bed and reports no new issues or overnight events.     PAST MEDICAL & SURGICAL HISTORY  PAST MEDICAL & SURGICAL HISTORY:  ARDS survivor  Sepsis  Takotsubo cardiomyopathy  Small bowel obstruction  Osteoarthritis  Cardiomyopathy  Sleep apnea  HTN (hypertension)  Gangrene of lower extremity  Gangrene of finger of right hand  Gangrene of finger of left hand  S/P amputation: b/l toes  H/O exploratory laparotomy  Amputation finger  History of cholecystectomy  H/O gastric bypass    SOCIAL HISTORY:    ALLERGIES:  No Known Allergies    MEDICATIONS:  STANDING MEDICATIONS  cholecalciferol 1000 Unit(s) Oral daily  ferrous    sulfate 325 milliGRAM(s) Oral three times a day  gabapentin 300 milliGRAM(s) Oral daily  heparin  Injectable 5000 Unit(s) SubCutaneous every 8 hours  metoprolol succinate ER 50 milliGRAM(s) Oral daily  multivitamin 1 Tablet(s) Oral daily    PRN MEDICATIONS  acetaminophen   Tablet 650 milliGRAM(s) Oral every 6 hours PRN  bisacodyl Suppository 10 milliGRAM(s) Rectal daily PRN  morphine  - Injectable 2 milliGRAM(s) IV Push every 6 hours PRN  morphine  - Injectable 4 milliGRAM(s) IV Push every 6 hours PRN  ondansetron Injectable 4 milliGRAM(s) IV Push every 6 hours PRN  oxyCODONE    5 mG/acetaminophen 325 mG 2 Tablet(s) Oral every 6 hours PRN  senna 1 Tablet(s) Oral two times a day PRN    VITALS:   T(F): 98.6  HR: 67  BP: 100/61  RR: 18  SpO2: --    LABS:                        10.4   7.60  )-----------( 451      ( 29 Mar 2018 08:27 )             34.0     03-28    146  |  105  |  9<L>  ----------------------------<  96  4.1   |  27  |  0.9    Ca    8.7      28 Mar 2018 06:41                    RADIOLOGY:    PHYSICAL EXAM:  HEENT: NCAT, EOMI  	Cards: RRR. S1-S2 present. No G/M/R.  	Resp: CTAB  	Abd: S, NT, ND, +BS. No pain on palpation  Ext: Healed amputation stumps of all digits of right hand and of the mid left hand. Bandaged bilateral feet. 2+ radial and posterior pulses b/l. No C/E/E.

## 2018-03-29 NOTE — PROGRESS NOTE ADULT - SUBJECTIVE AND OBJECTIVE BOX
SUBJECTIVE:    Patient is a 42y old  Female who presents with a chief complaint of Bilateral foot wound Vac dressing,anemia need Blood transfusion (27 Mar 2018 16:11)    Currently admitted to medicine with the primary diagnosis of Anemia     Today is hospital day 2d. This morning she is resting comfortably in bed and reports no new issues or overnight events.     PAST MEDICAL & SURGICAL HISTORY  PAST MEDICAL & SURGICAL HISTORY:  ARDS survivor  Sepsis  Takotsubo cardiomyopathy  Small bowel obstruction  Osteoarthritis  Cardiomyopathy  Sleep apnea  HTN (hypertension)  Gangrene of lower extremity  Gangrene of finger of right hand  Gangrene of finger of left hand  S/P amputation: b/l toes  H/O exploratory laparotomy  Amputation finger  History of cholecystectomy  H/O gastric bypass    SOCIAL HISTORY:    ALLERGIES:  No Known Allergies    MEDICATIONS:  STANDING MEDICATIONS  cholecalciferol 1000 Unit(s) Oral daily  ferrous    sulfate 325 milliGRAM(s) Oral three times a day  gabapentin 300 milliGRAM(s) Oral daily  heparin  Injectable 5000 Unit(s) SubCutaneous every 8 hours  metoprolol succinate ER 50 milliGRAM(s) Oral daily  multivitamin 1 Tablet(s) Oral daily    PRN MEDICATIONS  acetaminophen   Tablet 650 milliGRAM(s) Oral every 6 hours PRN  bisacodyl Suppository 10 milliGRAM(s) Rectal daily PRN  morphine  - Injectable 2 milliGRAM(s) IV Push every 6 hours PRN  morphine  - Injectable 4 milliGRAM(s) IV Push every 6 hours PRN  ondansetron Injectable 4 milliGRAM(s) IV Push every 6 hours PRN  oxyCODONE    5 mG/acetaminophen 325 mG 2 Tablet(s) Oral every 6 hours PRN  senna 1 Tablet(s) Oral two times a day PRN    VITALS:   T(F): 98.6  HR: 67  BP: 100/61  RR: 18  SpO2: --    LABS:                        10.4   7.60  )-----------( 451      ( 29 Mar 2018 08:27 )             34.0     03-28    146  |  105  |  9<L>  ----------------------------<  96  4.1   |  27  |  0.9    Ca    8.7      28 Mar 2018 06:41                    RADIOLOGY:    PHYSICAL EXAM:  HEENT: NCAT, EOMI  	Cards: RRR. S1-S2 present. No G/M/R.  	Resp: CTAB  	Abd: S, NT, ND, +BS. No pain on palpation  Ext: Healed amputation stumps of all digits of right hand and of the mid left hand. Bandaged bilateral feet. 2+ radial and posterior pulses b/l. No C/E/E. SUBJECTIVE:    Patient is a 42y old  Female who presents with a chief complaint of Bilateral foot wound Vac dressing,anemia need Blood transfusion (27 Mar 2018 16:11)    Currently admitted to medicine with the primary diagnosis of Anemia     Today is hospital day 2d. This morning she is resting comfortably in bed and reports no new issues or overnight events.     PAST MEDICAL & SURGICAL HISTORY  PAST MEDICAL & SURGICAL HISTORY:  ARDS survivor  Sepsis  Takotsubo cardiomyopathy  Small bowel obstruction  Osteoarthritis  Cardiomyopathy  Sleep apnea  HTN (hypertension)  Gangrene of lower extremity  Gangrene of finger of right hand  Gangrene of finger of left hand  S/P amputation: b/l toes  H/O exploratory laparotomy  Amputation finger  History of cholecystectomy  H/O gastric bypass    SOCIAL HISTORY: mostly bed bound    ALLERGIES:  No Known Allergies    MEDICATIONS:  STANDING MEDICATIONS  cholecalciferol 1000 Unit(s) Oral daily  ferrous    sulfate 325 milliGRAM(s) Oral three times a day  gabapentin 300 milliGRAM(s) Oral daily  heparin  Injectable 5000 Unit(s) SubCutaneous every 8 hours  metoprolol succinate ER 50 milliGRAM(s) Oral daily  multivitamin 1 Tablet(s) Oral daily    PRN MEDICATIONS  acetaminophen   Tablet 650 milliGRAM(s) Oral every 6 hours PRN  bisacodyl Suppository 10 milliGRAM(s) Rectal daily PRN  morphine  - Injectable 2 milliGRAM(s) IV Push every 6 hours PRN  morphine  - Injectable 4 milliGRAM(s) IV Push every 6 hours PRN  ondansetron Injectable 4 milliGRAM(s) IV Push every 6 hours PRN  oxyCODONE    5 mG/acetaminophen 325 mG 2 Tablet(s) Oral every 6 hours PRN  senna 1 Tablet(s) Oral two times a day PRN    VITALS:   T(F): 98.6  HR: 67  BP: 100/61  RR: 18  SpO2: --    LABS:                        10.4   7.60  )-----------( 451      ( 29 Mar 2018 08:27 )             34.0     03-28    146  |  105  |  9<L>  ----------------------------<  96  4.1   |  27  |  0.9    Ca    8.7      28 Mar 2018 06:41      PHYSICAL EXAM:  HEENT: NCAT, EOMI  	Cards: RRR. S1-S2 present. No G/M/R.  	Resp: CTAB  	Abd: S, NT, ND, +BS. No pain on palpation  Ext: Healed amputation stumps of all digits of right hand and of the mid left hand. Bandaged bilateral feet. 2+ radial and posterior pulses b/l. No C/E/E.

## 2018-03-29 NOTE — PROGRESS NOTE ADULT - SUBJECTIVE AND OBJECTIVE BOX
PROGRESS NOTE - WOUND VAC CHECK  Patient is a 42y old  Female who presents with a chief complaint of Bilateral foot wound Vac dressing,anemia need Blood transfusion (27 Mar 2018 16:11)      HPI:  42yF w/ Takotsubo cardiomyopathy (chart review), OA (chart review), amputation of left hand, right hand digits, and bilateral toes sent to [facility] ED by her podiatrist Marie due to clogged wound vacs for her bilateral toe amputation wounds, which were left open to allow granulation tissue to form before closing by Dr. Salazar. The amputations were complications of a Elin-en-y the patient underwent in 10/2017. The patient experienced a GI blockage after the procedure and developed aspiration pneumonia after an operative exploration for the blockage. The pneumonia resulted in an ICU admission and long period of vasopressor support due to multiorgan failure. The patient developed ischemia of her bilateral hands and feet during that admission which resulted in gangrene of the fingers of her right hand, mid left hand, and all of her toes. The patient then underwent the aforementioned amputation of her hands in 1/2018 by Dr. Sorto and of her feet in 2/2018 by Dr. Salazar. The patient states she has weekly wound vac changes which require hospitalization for pain control.    Patient herself states that she has "some kind of cardiomyopathy" because of her prior hospitalization due to the operation.    The patient denies fever, chills, cough, dysuria, pyuria, increased urinary frequency, pain, dyspnea. The patient explicitly states she has no complaints except for her clogged wound vacs.    The patient had a prior PMH significant for GERD, obesity, and sleep apnea, but these are no longer active issues for her after the Elin-en-y.    PMD Kenisha Childs, Maikel Freeman  Pharm:  [address] (27 Mar 2018 14:41)      Vital Signs Last 24 Hrs  T(C): 36.8 (29 Mar 2018 00:03), Max: 37.6 (28 Mar 2018 16:06)  T(F): 98.3 (29 Mar 2018 00:03), Max: 99.6 (28 Mar 2018 16:06)  HR: 88 (29 Mar 2018 00:03) (70 - 93)  BP: 93/55 (29 Mar 2018 00:03) (93/55 - 120/86)  BP(mean): --  RR: 18 (29 Mar 2018 00:03) (11 - 23)  SpO2: 100% (28 Mar 2018 10:18) (100% - 100%)                          9.7    7.64  )-----------( 415      ( 28 Mar 2018 06:41 )             30.4               03-28    146  |  105  |  9<L>  ----------------------------<  96  4.1   |  27  |  0.9    Ca    8.7      28 Mar 2018 06:41    TPro  6.9  /  Alb  3.4<L>  /  TBili  0.3  /  DBili  x   /  AST  31  /  ALT  11  /  AlkPhos  68  03-27      PHYSICAL EXAM  GEN: JIMMY CR is a pleasant well-nourished, well developed 42y Female in no acute distress, resting.     Patient wound vac holding seal at 125mmHg continuous.  Please recall podiatry for any problems pertaining to wound vac    Plan: Will continue to follow          second home vac ordered         Will f/u with attending on further recommendations

## 2018-03-29 NOTE — CONSULT NOTE ADULT - ASSESSMENT
Wounds colonized with multiple pathogens without evidence of infection.  Maintain off antibiotics. Recall prn please.

## 2018-03-29 NOTE — CONSULT NOTE ADULT - SUBJECTIVE AND OBJECTIVE BOX
JIMMY CR  42y, Female  Allergy: No Known Allergies      HPI:  42yF w/ Takotsubo cardiomyopathy (chart review), OA (chart review), amputation of left hand, right hand digits, and bilateral toes sent to [facility] ED by her podiatrist Marie due to clogged wound vacs for her bilateral toe amputation wounds, which were left open to allow granulation tissue to form before closing by Dr. Salazar. The amputations were complications of a Elin-en-y the patient underwent in 10/2017. The patient experienced a GI blockage after the procedure and developed aspiration pneumonia after an operative exploration for the blockage. The pneumonia resulted in an ICU admission and long period of vasopressor support due to multiorgan failure. The patient developed ischemia of her bilateral hands and feet during that admission which resulted in gangrene of the fingers of her right hand, mid left hand, and all of her toes. The patient then underwent the aforementioned amputation of her hands in 1/2018 by Dr. Sorto and of her feet in 2/2018 by Dr. Salazar. The patient states she has weekly wound vac changes which require hospitalization for pain control.    Patient herself states that she has "some kind of cardiomyopathy" because of her prior hospitalization due to the operation.    The patient denies fever, chills, cough, dysuria, pyuria, increased urinary frequency, pain, dyspnea. The patient explicitly states she has no complaints except for her clogged wound vacs.    The patient had a prior PMH significant for GERD, obesity, and sleep apnea, but these are no longer active issues for her after the Elin-en-y.    PMD Kenisha Childs, Maikel Freeman  Pharm:  [address] (27 Mar 2018 14:41)    FAMILY HISTORY:  Family history of heart disease (Father)  Family history of cancer (Mother): HODKINS LYMPHOMA    PAST MEDICAL & SURGICAL HISTORY:  ARDS survivor  Sepsis  Takotsubo cardiomyopathy  Small bowel obstruction  Osteoarthritis  Cardiomyopathy  Sleep apnea  HTN (hypertension)  Gangrene of lower extremity  Gangrene of finger of right hand  Gangrene of finger of left hand  S/P amputation: b/l toes  H/O exploratory laparotomy  Amputation finger  History of cholecystectomy  H/O gastric bypass        VITALS:  T(F): 98.6, Max: 99.6 (03-28-18 @ 16:06)  HR: 67  BP: 100/61  RR: 18Vital Signs Last 24 Hrs  T(C): 37 (29 Mar 2018 08:21), Max: 37.6 (28 Mar 2018 16:06)  T(F): 98.6 (29 Mar 2018 08:21), Max: 99.6 (28 Mar 2018 16:06)  HR: 67 (29 Mar 2018 08:21) (67 - 89)  BP: 100/61 (29 Mar 2018 08:21) (93/55 - 112/74)  BP(mean): --  RR: 18 (29 Mar 2018 08:21) (18 - 18)  SpO2: --    TESTS & MEASUREMENTS:                        10.4   7.60  )-----------( 451      ( 29 Mar 2018 08:27 )             34.0     03-28    146  |  105  |  9<L>  ----------------------------<  96  4.1   |  27  |  0.9    Ca    8.7      28 Mar 2018 06:41                RADIOLOGY & ADDITIONAL TESTS:    ANTIBIOTICS:

## 2018-03-30 RX ADMIN — HEPARIN SODIUM 5000 UNIT(S): 5000 INJECTION INTRAVENOUS; SUBCUTANEOUS at 21:49

## 2018-03-30 RX ADMIN — MORPHINE SULFATE 4 MILLIGRAM(S): 50 CAPSULE, EXTENDED RELEASE ORAL at 21:35

## 2018-03-30 RX ADMIN — Medication 325 MILLIGRAM(S): at 13:53

## 2018-03-30 RX ADMIN — OXYCODONE AND ACETAMINOPHEN 2 TABLET(S): 5; 325 TABLET ORAL at 22:30

## 2018-03-30 RX ADMIN — Medication 325 MILLIGRAM(S): at 21:49

## 2018-03-30 RX ADMIN — HEPARIN SODIUM 5000 UNIT(S): 5000 INJECTION INTRAVENOUS; SUBCUTANEOUS at 13:53

## 2018-03-30 RX ADMIN — OXYCODONE AND ACETAMINOPHEN 2 TABLET(S): 5; 325 TABLET ORAL at 21:49

## 2018-03-30 RX ADMIN — HEPARIN SODIUM 5000 UNIT(S): 5000 INJECTION INTRAVENOUS; SUBCUTANEOUS at 05:56

## 2018-03-30 RX ADMIN — MORPHINE SULFATE 4 MILLIGRAM(S): 50 CAPSULE, EXTENDED RELEASE ORAL at 21:02

## 2018-03-30 RX ADMIN — MORPHINE SULFATE 4 MILLIGRAM(S): 50 CAPSULE, EXTENDED RELEASE ORAL at 05:56

## 2018-03-30 RX ADMIN — Medication 1000 UNIT(S): at 12:25

## 2018-03-30 RX ADMIN — Medication 1 TABLET(S): at 12:25

## 2018-03-30 RX ADMIN — Medication 325 MILLIGRAM(S): at 05:56

## 2018-03-30 NOTE — PROGRESS NOTE ADULT - SUBJECTIVE AND OBJECTIVE BOX
patient seen and examined independently on morning rounds, chart reviewed and discussed with the medicine resident and on interdisciplinary rounds       Patient is a 42y old  Female who presents with a chief complaint of Bilateral foot wound Vac dressing,anemia need Blood transfusion (27 Mar 2018 16:11)    HPI:  42yF w/ Takotsubo cardiomyopathy (chart review), OA (chart review), amputation of left hand, right hand digits, and bilateral toes sent to [facility] ED by her podiatrist Marie due to clogged wound vacs for her bilateral toe amputation wounds, which were left open to allow granulation tissue to form before closing by Dr. Salazar. The amputations were complications of a Elin-en-y the patient underwent in 10/2017. The patient experienced a GI blockage after the procedure and developed aspiration pneumonia after an operative exploration for the blockage. The pneumonia resulted in an ICU admission and long period of vasopressor support due to multiorgan failure. The patient developed ischemia of her bilateral hands and feet during that admission which resulted in gangrene of the fingers of her right hand, mid left hand, and all of her toes. The patient then underwent the aforementioned amputation of her hands in 1/2018 by Dr. Sorto and of her feet in 2/2018 by Dr. Salazar. The patient states she has weekly wound vac changes which require hospitalization for pain control.    Patient herself states that she has "some kind of cardiomyopathy" because of her prior hospitalization due to the operation.    The patient denies fever, chills, cough, dysuria, pyuria, increased urinary frequency, pain, dyspnea. The patient explicitly states she has no complaints except for her clogged wound vacs.    The patient had a prior PMH significant for GERD, obesity, and sleep apnea, but these are no longer active issues for her after the Elin-en-y.    PMD Gerald Craven [facility]Surg [facility]  Pharm:  [address] (27 Mar 2018 14:41)    PAST MEDICAL & SURGICAL HISTORY:  ARDS survivor  Sepsis  Takotsubo cardiomyopathy  Small bowel obstruction  Osteoarthritis  Cardiomyopathy  Sleep apnea  HTN (hypertension)  Gangrene of lower extremity  Gangrene of finger of right hand  Gangrene of finger of left hand  S/P amputation: b/l toes  H/O exploratory laparotomy  Amputation finger  History of cholecystectomy  H/O gastric bypass      Vital Signs Last 24 Hrs  T(C): 36.9 (30 Mar 2018 08:08), Max: 36.9 (29 Mar 2018 23:22)  T(F): 98.4 (30 Mar 2018 08:08), Max: 98.5 (29 Mar 2018 23:22)  HR: 94 (30 Mar 2018 08:08) (80 - 99)  BP: 109/61 (30 Mar 2018 08:08) (109/61 - 118/70)  BP(mean): --  RR: 20 (30 Mar 2018 08:08) (19 - 20)  SpO2: 98% (29 Mar 2018 23:22) (98% - 98%)             PE:  GEN-NAD, AAOx3  CHEST- Clear to auscultation bilaterally, fair air entry  CVS- +s1/s2 RRR no murmurs  ABD- soft NT ND +bs  EXT- bilateral healed stumps from finger amputations-  and foot stumps wrapped with +wound vac   SKIN- no rashes                10.4   7.60  )-----------( 451      ( 29 Mar 2018 08:27 )             34.0       MEDICATIONS  (STANDING):  cholecalciferol 1000 Unit(s) Oral daily  ferrous    sulfate 325 milliGRAM(s) Oral three times a day  gabapentin 300 milliGRAM(s) Oral daily  heparin  Injectable 5000 Unit(s) SubCutaneous every 8 hours  metoprolol succinate ER 50 milliGRAM(s) Oral daily  multivitamin 1 Tablet(s) Oral daily

## 2018-03-30 NOTE — PROGRESS NOTE ADULT - SUBJECTIVE AND OBJECTIVE BOX
SUBJECTIVE:    Patient is a 42y old  Female who presents with a chief complaint of Bilateral foot wound Vac dressing,anemia need Blood transfusion (27 Mar 2018 16:11)    Currently admitted to medicine with the primary diagnosis of Anemia     Today is hospital day 3d. This morning she is resting comfortably in bed and reports no new issues or overnight events.     PAST MEDICAL & SURGICAL HISTORY  PAST MEDICAL & SURGICAL HISTORY:  ARDS survivor  Sepsis  Takotsubo cardiomyopathy  Small bowel obstruction  Osteoarthritis  Cardiomyopathy  Sleep apnea  HTN (hypertension)  Gangrene of lower extremity  Gangrene of finger of right hand  Gangrene of finger of left hand  S/P amputation: b/l toes  H/O exploratory laparotomy  Amputation finger  History of cholecystectomy  H/O gastric bypass    SOCIAL HISTORY:    ALLERGIES:  No Known Allergies    MEDICATIONS:  STANDING MEDICATIONS  cholecalciferol 1000 Unit(s) Oral daily  ferrous    sulfate 325 milliGRAM(s) Oral three times a day  gabapentin 300 milliGRAM(s) Oral daily  heparin  Injectable 5000 Unit(s) SubCutaneous every 8 hours  metoprolol succinate ER 50 milliGRAM(s) Oral daily  multivitamin 1 Tablet(s) Oral daily    PRN MEDICATIONS  acetaminophen   Tablet 650 milliGRAM(s) Oral every 6 hours PRN  bisacodyl Suppository 10 milliGRAM(s) Rectal daily PRN  morphine  - Injectable 2 milliGRAM(s) IV Push every 6 hours PRN  morphine  - Injectable 4 milliGRAM(s) IV Push every 6 hours PRN  ondansetron Injectable 4 milliGRAM(s) IV Push every 6 hours PRN  oxyCODONE    5 mG/acetaminophen 325 mG 2 Tablet(s) Oral every 6 hours PRN  senna 1 Tablet(s) Oral two times a day PRN    VITALS:   T(F): 98.4  HR: 94  BP: 109/61  RR: 20  SpO2: 98%    LABS:                        10.4   7.60  )-----------( 451      ( 29 Mar 2018 08:27 )             34.0                         RADIOLOGY:    PHYSICAL EXAM:    HEENT: NCAT, EOMI  	Cards: RRR. S1-S2 present. No G/M/R.  	Resp: CTAB  	Abd: S, NT, ND, +BS. No pain on palpation  Ext: Healed amputation stumps of all digits of right hand and of the mid left hand. Bandaged bilateral feet. 2+ radial and posterior pulses b/l. No C/E/E.

## 2018-03-30 NOTE — PROGRESS NOTE ADULT - SUBJECTIVE AND OBJECTIVE BOX
Mrs young evaluated bedside this AM,in no distress,alert and oriented x3. Npwt Vac in place bilateral foot and running at 125mm/hg continuous. Awaiting the second home Vac ,for bilateral application. Patient was having issues using one home vac and had blockage on a number of occasions. When approved for bilateral vacs patient can be d/c to home.Plan for next VAC change at [facility] as ambulatory procedure on 4/5/18.                      10.4   7.60  )-----------( 451      ( 29 Mar 2018 08:27 )             34.0   Vital Signs Last 24 Hrs  T(C): 36.9 (29 Mar 2018 23:22), Max: 37 (29 Mar 2018 08:21)  T(F): 98.5 (29 Mar 2018 23:22), Max: 98.6 (29 Mar 2018 08:21)  HR: 89 (29 Mar 2018 23:22) (67 - 99)  BP: 113/73 (29 Mar 2018 23:22) (100/61 - 118/70)  BP(mean): --  RR: 20 (29 Mar 2018 23:22) (18 - 20)  SpO2: 98% (29 Mar 2018 23:22) (98% - 98%)

## 2018-03-31 LAB — TYPE + AB SCN PNL BLD: SIGNIFICANT CHANGE UP

## 2018-03-31 RX ADMIN — HEPARIN SODIUM 5000 UNIT(S): 5000 INJECTION INTRAVENOUS; SUBCUTANEOUS at 05:37

## 2018-03-31 RX ADMIN — OXYCODONE AND ACETAMINOPHEN 2 TABLET(S): 5; 325 TABLET ORAL at 05:24

## 2018-03-31 RX ADMIN — Medication 1 TABLET(S): at 11:32

## 2018-03-31 RX ADMIN — MORPHINE SULFATE 4 MILLIGRAM(S): 50 CAPSULE, EXTENDED RELEASE ORAL at 21:22

## 2018-03-31 RX ADMIN — Medication 1000 UNIT(S): at 15:52

## 2018-03-31 RX ADMIN — MORPHINE SULFATE 2 MILLIGRAM(S): 50 CAPSULE, EXTENDED RELEASE ORAL at 08:43

## 2018-03-31 RX ADMIN — Medication 325 MILLIGRAM(S): at 21:23

## 2018-03-31 RX ADMIN — MORPHINE SULFATE 4 MILLIGRAM(S): 50 CAPSULE, EXTENDED RELEASE ORAL at 04:43

## 2018-03-31 RX ADMIN — HEPARIN SODIUM 5000 UNIT(S): 5000 INJECTION INTRAVENOUS; SUBCUTANEOUS at 21:23

## 2018-03-31 RX ADMIN — Medication 50 MILLIGRAM(S): at 18:00

## 2018-03-31 RX ADMIN — Medication 325 MILLIGRAM(S): at 05:37

## 2018-03-31 NOTE — PROGRESS NOTE ADULT - SUBJECTIVE AND OBJECTIVE BOX
patient seen and examined independently on morning rounds, chart reviewed and discussed with the medicine resident and on interdisciplinary rounds       Patient is a 42y old  Female who presents with a chief complaint of Bilateral foot wound Vac dressing,anemia need Blood transfusion (27 Mar 2018 16:11)    HPI:  42yF w/ Takotsubo cardiomyopathy (chart review), OA (chart review), amputation of left hand, right hand digits, and bilateral toes sent to [facility] ED by her podiatrist Marie due to clogged wound vacs for her bilateral toe amputation wounds, which were left open to allow granulation tissue to form before closing by Dr. Salazar. The amputations were complications of a Elin-en-y the patient underwent in 10/2017. The patient experienced a GI blockage after the procedure and developed aspiration pneumonia after an operative exploration for the blockage. The pneumonia resulted in an ICU admission and long period of vasopressor support due to multiorgan failure. The patient developed ischemia of her bilateral hands and feet during that admission which resulted in gangrene of the fingers of her right hand, mid left hand, and all of her toes. The patient then underwent the aforementioned amputation of her hands in 1/2018 by Dr. Sorto and of her feet in 2/2018 by Dr. Salazar. The patient states she has weekly wound vac changes which require hospitalization for pain control.    Patient herself states that she has "some kind of cardiomyopathy" because of her prior hospitalization due to the operation.    The patient denies fever, chills, cough, dysuria, pyuria, increased urinary frequency, pain, dyspnea. The patient explicitly states she has no complaints except for her clogged wound vacs.    The patient had a prior PMH significant for GERD, obesity, and sleep apnea, but these are no longer active issues for her after the Elin-en-y.    PMD Gerald Craven [facility]Surg [place]  Pharm:  [address] (27 Mar 2018 14:41)    PAST MEDICAL & SURGICAL HISTORY:  ARDS survivor  Sepsis  Takotsubo cardiomyopathy  Small bowel obstruction  Osteoarthritis  Cardiomyopathy  Sleep apnea  HTN (hypertension)  Gangrene of lower extremity  Gangrene of finger of right hand  Gangrene of finger of left hand  S/P amputation: b/l toes  H/O exploratory laparotomy  Amputation finger  History of cholecystectomy  H/O gastric bypass    no overnight events- awaiting podiatry modification of wound vac with ? bridge on monday (4/2)             PE:  GEN-NAD, AAOx3  CHEST- Clear to auscultation bilaterally, fair air entry  CVS- +s1/s2 RRR no murmurs  ABD- soft NT ND +bs  EXT- bilateral healed stumps from finger amputations-  and foot stumps wrapped with +wound vac   SKIN- no rashes     no new labs             10.4   7.60  )-----------( 451      ( 29 Mar 2018 08:27 )             34.0       MEDICATIONS  (STANDING):  cholecalciferol 1000 Unit(s) Oral daily  ferrous    sulfate 325 milliGRAM(s) Oral three times a day  gabapentin 300 milliGRAM(s) Oral daily  heparin  Injectable 5000 Unit(s) SubCutaneous every 8 hours  metoprolol succinate ER 50 milliGRAM(s) Oral daily  multivitamin 1 Tablet(s) Oral daily

## 2018-03-31 NOTE — PROGRESS NOTE ADULT - SUBJECTIVE AND OBJECTIVE BOX
patient evaluated bedside this AM. in no apparent distress,concern for home VAC function. [employer] reps present to trouble shoot home VAC problem with intermittent fluctuation and loss of pressure. Plan was discussed and decided to try a bridging technique for the next vac change scheduled for 4/5. Vac is currently running at 125mm/hg with minimal leak noted. Continue pain management by medicine and CBC for AM. Will discuss plans with plastics for future VAC changes,and grafting.Vital Signs Last 24 Hrs  T(C): 36.4 (31 Mar 2018 07:27), Max: 37.1 (30 Mar 2018 15:36)  T(F): 97.6 (31 Mar 2018 07:27), Max: 98.7 (30 Mar 2018 15:36)  HR: 79 (31 Mar 2018 07:27) (79 - 84)  BP: 110/71 (31 Mar 2018 07:27) (110/70 - 114/72)  BP(mean): --  RR: 20 (31 Mar 2018 07:27) (20 - 20)  SpO2: --

## 2018-03-31 NOTE — PROGRESS NOTE ADULT - SUBJECTIVE AND OBJECTIVE BOX
PROGRESS NOTE   Patient is a 42y old  Female who presents with a chief complaint of Bilateral foot wound Vac dressing,anemia need Blood transfusion (27 Mar 2018 16:11)      HPI:  42yF w/ Takotsubo cardiomyopathy (chart review), OA (chart review), amputation of left hand, right hand digits, and bilateral toes sent to [facility] ED by her podiatrist Marie due to clogged wound vacs for her bilateral toe amputation wounds, which were left open to allow granulation tissue to form before closing by Dr. Salazar. The amputations were complications of a Elin-en-y the patient underwent in 10/2017. The patient experienced a GI blockage after the procedure and developed aspiration pneumonia after an operative exploration for the blockage. The pneumonia resulted in an ICU admission and long period of vasopressor support due to multiorgan failure. The patient developed ischemia of her bilateral hands and feet during that admission which resulted in gangrene of the fingers of her right hand, mid left hand, and all of her toes. The patient then underwent the aforementioned amputation of her hands in 1/2018 by Dr. Sorto and of her feet in 2/2018 by Dr. Salazar. The patient states she has weekly wound vac changes which require hospitalization for pain control.    Patient herself states that she has "some kind of cardiomyopathy" because of her prior hospitalization due to the operation.    The patient denies fever, chills, cough, dysuria, pyuria, increased urinary frequency, pain, dyspnea. The patient explicitly states she has no complaints except for her clogged wound vacs.    The patient had a prior PMH significant for GERD, obesity, and sleep apnea, but these are no longer active issues for her after the Elin-en-y.    PMD Gerald Craven [facility]Surg [place]  Pharm:  [address] (27 Mar 2018 14:41)      Vital Signs Last 24 Hrs  T(C): 37.1 (30 Mar 2018 23:21), Max: 37.1 (30 Mar 2018 15:36)  T(F): 98.7 (30 Mar 2018 23:21), Max: 98.7 (30 Mar 2018 15:36)  HR: 84 (30 Mar 2018 23:21) (79 - 94)  BP: 110/70 (30 Mar 2018 23:21) (109/61 - 114/72)  BP(mean): --  RR: 20 (30 Mar 2018 23:21) (20 - 20)  SpO2: --                          10.4   7.60  )-----------( 451      ( 29 Mar 2018 08:27 )             34.0                       PHYSICAL EXAM  GEN: JIMMY CR is a pleasant well-nourished, well developed 42y Female in no acute distress, alert awake, and oriented to person, place and time.     Patient wound vac holding seal at 125mmHg continuous.  Please recall podiatry for any problems pertaining to wound vac

## 2018-04-01 LAB
ANION GAP SERPL CALC-SCNC: 12 MMOL/L — SIGNIFICANT CHANGE UP (ref 7–14)
APTT BLD: 28.8 SEC — SIGNIFICANT CHANGE UP (ref 27–39.2)
BASOPHILS # BLD AUTO: 0.07 K/UL — SIGNIFICANT CHANGE UP (ref 0–0.2)
BASOPHILS NFR BLD AUTO: 0.9 % — SIGNIFICANT CHANGE UP (ref 0–1)
BUN SERPL-MCNC: 11 MG/DL — SIGNIFICANT CHANGE UP (ref 10–20)
CALCIUM SERPL-MCNC: 8.5 MG/DL — SIGNIFICANT CHANGE UP (ref 8.5–10.1)
CHLORIDE SERPL-SCNC: 103 MMOL/L — SIGNIFICANT CHANGE UP (ref 98–110)
CO2 SERPL-SCNC: 25 MMOL/L — SIGNIFICANT CHANGE UP (ref 17–32)
CREAT SERPL-MCNC: 0.9 MG/DL — SIGNIFICANT CHANGE UP (ref 0.7–1.5)
EOSINOPHIL # BLD AUTO: 0.27 K/UL — SIGNIFICANT CHANGE UP (ref 0–0.7)
EOSINOPHIL NFR BLD AUTO: 3.3 % — SIGNIFICANT CHANGE UP (ref 0–8)
GLUCOSE SERPL-MCNC: 80 MG/DL — SIGNIFICANT CHANGE UP (ref 70–99)
HCT VFR BLD CALC: 32.5 % — LOW (ref 37–47)
HGB BLD-MCNC: 9.9 G/DL — LOW (ref 12–16)
IMM GRANULOCYTES NFR BLD AUTO: 0.5 % — HIGH (ref 0.1–0.3)
INR BLD: 1.05 RATIO — SIGNIFICANT CHANGE UP (ref 0.65–1.3)
LYMPHOCYTES # BLD AUTO: 1.95 K/UL — SIGNIFICANT CHANGE UP (ref 1.2–3.4)
LYMPHOCYTES # BLD AUTO: 24.1 % — SIGNIFICANT CHANGE UP (ref 20.5–51.1)
MCHC RBC-ENTMCNC: 24 PG — LOW (ref 27–31)
MCHC RBC-ENTMCNC: 30.5 G/DL — LOW (ref 32–37)
MCV RBC AUTO: 78.9 FL — LOW (ref 81–99)
MONOCYTES # BLD AUTO: 0.6 K/UL — SIGNIFICANT CHANGE UP (ref 0.1–0.6)
MONOCYTES NFR BLD AUTO: 7.4 % — SIGNIFICANT CHANGE UP (ref 1.7–9.3)
NEUTROPHILS # BLD AUTO: 5.17 K/UL — SIGNIFICANT CHANGE UP (ref 1.4–6.5)
NEUTROPHILS NFR BLD AUTO: 63.8 % — SIGNIFICANT CHANGE UP (ref 42.2–75.2)
PLATELET # BLD AUTO: 361 K/UL — SIGNIFICANT CHANGE UP (ref 130–400)
POTASSIUM SERPL-MCNC: 4.4 MMOL/L — SIGNIFICANT CHANGE UP (ref 3.5–5)
POTASSIUM SERPL-SCNC: 4.4 MMOL/L — SIGNIFICANT CHANGE UP (ref 3.5–5)
PROTHROM AB SERPL-ACNC: 11.4 SEC — SIGNIFICANT CHANGE UP (ref 9.95–12.87)
RBC # BLD: 4.12 M/UL — LOW (ref 4.2–5.4)
RBC # FLD: 16.9 % — HIGH (ref 11.5–14.5)
SODIUM SERPL-SCNC: 140 MMOL/L — SIGNIFICANT CHANGE UP (ref 135–146)
WBC # BLD: 8.1 K/UL — SIGNIFICANT CHANGE UP (ref 4.8–10.8)
WBC # FLD AUTO: 8.1 K/UL — SIGNIFICANT CHANGE UP (ref 4.8–10.8)

## 2018-04-01 RX ORDER — MORPHINE SULFATE 50 MG/1
4 CAPSULE, EXTENDED RELEASE ORAL EVERY 4 HOURS
Qty: 0 | Refills: 0 | Status: DISCONTINUED | OUTPATIENT
Start: 2018-04-01 | End: 2018-04-02

## 2018-04-01 RX ADMIN — OXYCODONE AND ACETAMINOPHEN 2 TABLET(S): 5; 325 TABLET ORAL at 10:30

## 2018-04-01 RX ADMIN — HEPARIN SODIUM 5000 UNIT(S): 5000 INJECTION INTRAVENOUS; SUBCUTANEOUS at 21:56

## 2018-04-01 RX ADMIN — Medication 325 MILLIGRAM(S): at 21:56

## 2018-04-01 RX ADMIN — MORPHINE SULFATE 4 MILLIGRAM(S): 50 CAPSULE, EXTENDED RELEASE ORAL at 22:42

## 2018-04-01 RX ADMIN — Medication 1 TABLET(S): at 12:12

## 2018-04-01 RX ADMIN — MORPHINE SULFATE 4 MILLIGRAM(S): 50 CAPSULE, EXTENDED RELEASE ORAL at 17:37

## 2018-04-01 RX ADMIN — HEPARIN SODIUM 5000 UNIT(S): 5000 INJECTION INTRAVENOUS; SUBCUTANEOUS at 06:45

## 2018-04-01 RX ADMIN — OXYCODONE AND ACETAMINOPHEN 2 TABLET(S): 5; 325 TABLET ORAL at 14:37

## 2018-04-01 RX ADMIN — HEPARIN SODIUM 5000 UNIT(S): 5000 INJECTION INTRAVENOUS; SUBCUTANEOUS at 13:57

## 2018-04-01 RX ADMIN — Medication 1000 UNIT(S): at 13:47

## 2018-04-01 RX ADMIN — Medication 325 MILLIGRAM(S): at 13:48

## 2018-04-01 RX ADMIN — MORPHINE SULFATE 2 MILLIGRAM(S): 50 CAPSULE, EXTENDED RELEASE ORAL at 06:48

## 2018-04-01 RX ADMIN — MORPHINE SULFATE 4 MILLIGRAM(S): 50 CAPSULE, EXTENDED RELEASE ORAL at 10:34

## 2018-04-01 RX ADMIN — MORPHINE SULFATE 4 MILLIGRAM(S): 50 CAPSULE, EXTENDED RELEASE ORAL at 09:27

## 2018-04-01 RX ADMIN — MORPHINE SULFATE 4 MILLIGRAM(S): 50 CAPSULE, EXTENDED RELEASE ORAL at 02:47

## 2018-04-01 RX ADMIN — Medication 325 MILLIGRAM(S): at 06:45

## 2018-04-01 NOTE — PROGRESS NOTE ADULT - SUBJECTIVE AND OBJECTIVE BOX
PROGRESS NOTE   Patient is a 42y old  Female who presents with a chief complaint of Bilateral foot wound Vac dressing,anemia need Blood transfusion (27 Mar 2018 16:11). Podiatry evaluated pt to ensure bedside NPWT was functioning properly and to infor pt about sx on 4/2/18      HPI:  42yF w/ Takotsubo cardiomyopathy (chart review), OA (chart review), amputation of left hand, right hand digits, and bilateral toes sent to [facility] ED by her podiatrist Marie due to clogged wound vacs for her bilateral toe amputation wounds, which were left open to allow granulation tissue to form before closing by Dr. Salazar. The amputations were complications of a Elin-en-y the patient underwent in 10/2017. The patient experienced a GI blockage after the procedure and developed aspiration pneumonia after an operative exploration for the blockage. The pneumonia resulted in an ICU admission and long period of vasopressor support due to multiorgan failure. The patient developed ischemia of her bilateral hands and feet during that admission which resulted in gangrene of the fingers of her right hand, mid left hand, and all of her toes. The patient then underwent the aforementioned amputation of her hands in 1/2018 by Dr. Sorto and of her feet in 2/2018 by Dr. Salazar. The patient states she has weekly wound vac changes which require hospitalization for pain control.    Patient herself states that she has "some kind of cardiomyopathy" because of her prior hospitalization due to the operation.    The patient denies fever, chills, cough, dysuria, pyuria, increased urinary frequency, pain, dyspnea. The patient explicitly states she has no complaints except for her clogged wound vacs.    The patient had a prior PMH significant for GERD, obesity, and sleep apnea, but these are no longer active issues for her after the Elin-en-y.    PMD Gerald Craven [facility]Surg [facility]  Pharm:  [address] (27 Mar 2018 14:41)      Vital Signs Last 24 Hrs  T(C): 37 (01 Apr 2018 00:33), Max: 37.2 (31 Mar 2018 15:36)  T(F): 98.6 (01 Apr 2018 00:33), Max: 98.9 (31 Mar 2018 15:36)  HR: 80 (01 Apr 2018 00:33) (70 - 80)  BP: 103/64 (01 Apr 2018 00:33) (103/64 - 119/77)  BP(mean): --  RR: 20 (01 Apr 2018 00:33) (18 - 20)  SpO2: --                          9.9    8.10  )-----------( 361      ( 01 Apr 2018 06:15 )             32.5               04-01    140  |  103  |  11  ----------------------------<  80  4.4   |  25  |  0.9    Ca    8.5      01 Apr 2018 06:15        Assessment:  - 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  - NPWT running @ 125mmHg w/ no signs of leaks @ this time  - NPWT canister contains approx 150mL of sero-sanguinous fluid    Plan:  - pt evaluated @ bedside  - pt scheduled for sx as add on #1 on 4/2/18 for excisional debridement soft tissue and possible bone w/ wound vac application B/L feet w/ versajet  - Questions and concerns about sx were addressed  - Pt NPO @ midnight  - Surgical labs (BMP, CBC, T&S) ordered   - findings discussed w/ attending PROGRESS NOTE   Patient is a 42y old  Female who presents with a chief complaint of Bilateral foot wound Vac dressing,anemia need Blood transfusion (27 Mar 2018 16:11). Podiatry evaluated pt to ensure bedside NPWT was functioning properly and to infor pt about sx on 4/2/18      HPI:  42yF w/ Takotsubo cardiomyopathy (chart review), OA (chart review), amputation of left hand, right hand digits, and bilateral toes sent to [facility] ED by her podiatrist Marie due to clogged wound vacs for her bilateral toe amputation wounds, which were left open to allow granulation tissue to form before closing by Dr. Salazar. The amputations were complications of a Elin-en-y the patient underwent in 10/2017. The patient experienced a GI blockage after the procedure and developed aspiration pneumonia after an operative exploration for the blockage. The pneumonia resulted in an ICU admission and long period of vasopressor support due to multiorgan failure. The patient developed ischemia of her bilateral hands and feet during that admission which resulted in gangrene of the fingers of her right hand, mid left hand, and all of her toes. The patient then underwent the aforementioned amputation of her hands in 1/2018 by Dr. Sorto and of her feet in 2/2018 by Dr. Salazar. The patient states she has weekly wound vac changes which require hospitalization for pain control.    Patient herself states that she has "some kind of cardiomyopathy" because of her prior hospitalization due to the operation.    The patient denies fever, chills, cough, dysuria, pyuria, increased urinary frequency, pain, dyspnea. The patient explicitly states she has no complaints except for her clogged wound vacs.    The patient had a prior PMH significant for GERD, obesity, and sleep apnea, but these are no longer active issues for her after the Elin-en-y.    PMD Gerald Craven [facility]Surg [facility]  Pharm:  [address] (27 Mar 2018 14:41)      Vital Signs Last 24 Hrs  T(C): 37 (01 Apr 2018 00:33), Max: 37.2 (31 Mar 2018 15:36)  T(F): 98.6 (01 Apr 2018 00:33), Max: 98.9 (31 Mar 2018 15:36)  HR: 80 (01 Apr 2018 00:33) (70 - 80)  BP: 103/64 (01 Apr 2018 00:33) (103/64 - 119/77)  BP(mean): --  RR: 20 (01 Apr 2018 00:33) (18 - 20)  SpO2: --                          9.9    8.10  )-----------( 361      ( 01 Apr 2018 06:15 )             32.5               04-01    140  |  103  |  11  ----------------------------<  80  4.4   |  25  |  0.9    Ca    8.5      01 Apr 2018 06:15        Assessment:  - 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

## 2018-04-01 NOTE — PROGRESS NOTE ADULT - SUBJECTIVE AND OBJECTIVE BOX
SUBJECTIVE:    Patient is a 42y old  Female who presents with a chief complaint of Bilateral foot wound Vac dressing,anemia need Blood transfusion (27 Mar 2018 16:11)    Currently admitted to medicine with the primary diagnosis of Anemia     Today is hospital day 5d. This morning she is resting comfortably in bed and reports no new issues or overnight events.     PAST MEDICAL & SURGICAL HISTORY  PAST MEDICAL & SURGICAL HISTORY:  ARDS survivor  Sepsis  Takotsubo cardiomyopathy  Small bowel obstruction  Osteoarthritis  Cardiomyopathy  Sleep apnea  HTN (hypertension)  Gangrene of lower extremity  Gangrene of finger of right hand  Gangrene of finger of left hand  S/P amputation: b/l toes  H/O exploratory laparotomy  Amputation finger  History of cholecystectomy  H/O gastric bypass    SOCIAL HISTORY:    ALLERGIES:  No Known Allergies    MEDICATIONS:  STANDING MEDICATIONS  cholecalciferol 1000 Unit(s) Oral daily  ferrous    sulfate 325 milliGRAM(s) Oral three times a day  gabapentin 300 milliGRAM(s) Oral daily  heparin  Injectable 5000 Unit(s) SubCutaneous every 8 hours  metoprolol succinate ER 50 milliGRAM(s) Oral daily  multivitamin 1 Tablet(s) Oral daily    PRN MEDICATIONS  acetaminophen   Tablet 650 milliGRAM(s) Oral every 6 hours PRN  bisacodyl Suppository 10 milliGRAM(s) Rectal daily PRN  morphine  - Injectable 2 milliGRAM(s) IV Push every 6 hours PRN  morphine  - Injectable 4 milliGRAM(s) IV Push every 6 hours PRN  ondansetron Injectable 4 milliGRAM(s) IV Push every 6 hours PRN  oxyCODONE    5 mG/acetaminophen 325 mG 2 Tablet(s) Oral every 6 hours PRN  senna 1 Tablet(s) Oral two times a day PRN    VITALS:   T(F): 98.6  HR: 80  BP: 103/64  RR: 20  SpO2: --    LABS:                        9.9    8.10  )-----------( 361      ( 01 Apr 2018 06:15 )             32.5     04-01    140  |  103  |  11  ----------------------------<  80  4.4   |  25  |  0.9    Ca    8.5      01 Apr 2018 06:15      PT/INR - ( 01 Apr 2018 06:15 )   PT: 11.40 sec;   INR: 1.05 ratio         PTT - ( 01 Apr 2018 06:15 )  PTT:28.8 sec              RADIOLOGY:    PHYSICAL EXAM:  GEN-NAD, AAOx3  CHEST- Clear to auscultation bilaterally, fair air entry  CVS- +s1/s2 RRR no murmurs  ABD- soft NT ND +bs  EXT- bilateral healed stumps from finger amputations-  and foot stumps wrapped with +wound vac   SKIN- no rashes

## 2018-04-01 NOTE — PROGRESS NOTE ADULT - SUBJECTIVE AND OBJECTIVE BOX
Patient is a 42y old  Female who presents with a chief complaint of Bilateral foot wound Vac dressing,anemia need Blood transfusion (27 Mar 2018 16:11)      INTERVAL HPI/OVERNIGHT EVENTS:   Pt is scheduled for surgery with Dr. Salazar as add on case #1 on 4/2/18. Patient is aware of procedure and is NPO since midnight.    MEDICATIONS  (STANDING):  cholecalciferol 1000 Unit(s) Oral daily  ferrous    sulfate 325 milliGRAM(s) Oral three times a day  gabapentin 300 milliGRAM(s) Oral daily  heparin  Injectable 5000 Unit(s) SubCutaneous every 8 hours  metoprolol succinate ER 50 milliGRAM(s) Oral daily  multivitamin 1 Tablet(s) Oral daily    MEDICATIONS  (PRN):  acetaminophen   Tablet 650 milliGRAM(s) Oral every 6 hours PRN For Temp greater than 38 C (100.4 F)  bisacodyl Suppository 10 milliGRAM(s) Rectal daily PRN Constipation  morphine  - Injectable 2 milliGRAM(s) IV Push every 6 hours PRN Severe Pain (7 - 10)  morphine  - Injectable 4 milliGRAM(s) IV Push every 6 hours PRN Severe Pain (7 - 10)  ondansetron Injectable 4 milliGRAM(s) IV Push every 6 hours PRN Nausea and/or Vomiting  oxyCODONE    5 mG/acetaminophen 325 mG 2 Tablet(s) Oral every 6 hours PRN Moderate Pain (4 - 6)  senna 1 Tablet(s) Oral two times a day PRN Constipation    ANEMIA WOUNDS MULTIPLE OPEN LOWER EXTREMITY  ^WOUND CHECK SENT BY DR SALAZAR (ISO C ES- 2018 FOOT)  H/o or current diagnosis of HF- ACEI/ARB contraindication unknown  H/o or current diagnosis of HF- Contraindication to ACEI/ARBs  H/o or current diagnosis of HF- ACEI/ARB contraindication unknown  Family history of heart disease (Father)  Family history of cancer (Mother)  No pertinent family history in first degree relatives  Handoff  MEWS Score  ARDS survivor  Sepsis  Takotsubo cardiomyopathy  Small bowel obstruction  Osteoarthritis  Cardiomyopathy  Sleep apnea  HTN (hypertension)  Gangrene of lower extremity  Gangrene of finger of right hand  Gangrene of finger of left hand  Anemia  Wounds, multiple open, lower extremity  S/P amputation  H/O exploratory laparotomy  Amputation finger  History of cholecystectomy  H/O gastric bypass  WOUND CHECK SENT BY DR SALAZAR (ISO C ESBI- 2018 FOOT)  WOUND CHECK SENT BY DR SALAZAR  16  Wounds, multiple open, lower extremity    Allergies    No Known Allergies    Intolerances        Vital Signs Last 24 Hrs  T(C): 37 (01 Apr 2018 00:33), Max: 37.2 (31 Mar 2018 15:36)  T(F): 98.6 (01 Apr 2018 00:33), Max: 98.9 (31 Mar 2018 15:36)  HR: 80 (01 Apr 2018 00:33) (70 - 80)  BP: 103/64 (01 Apr 2018 00:33) (103/64 - 119/77)  BP(mean): --  RR: 20 (01 Apr 2018 00:33) (18 - 20)  SpO2: --    LABS:                        9.9    8.10  )-----------( 361      ( 01 Apr 2018 06:15 )             32.5     04-01    140  |  103  |  11  ----------------------------<  80  4.4   |  25  |  0.9    Ca    8.5      01 Apr 2018 06:15      PT/INR - ( 01 Apr 2018 06:15 )   PT: 11.40 sec;   INR: 1.05 ratio         PTT - ( 01 Apr 2018 06:15 )  PTT:28.8 sec  CAPILLARY BLOOD GLUCOSE          Type and screen: Done/pending    RADIOLOGY & ADDITIONAL TESTS:    Plan:   To OR on 4/2/18 as add on #1 with  for excisional debridement of soft tissue and possible bone with wound vac application B/L feet with versajet  CXR on sunrise.  EKG on sunrise.  NPO @ MN  Medical clearance documented in chart.  Consent signed and in chart.  Procedure was explained to patient in detail. All alternatives, risks and complications were discussed. All questions answered.

## 2018-04-02 RX ORDER — ACETAMINOPHEN 500 MG
650 TABLET ORAL EVERY 6 HOURS
Qty: 0 | Refills: 0 | Status: DISCONTINUED | OUTPATIENT
Start: 2018-04-02 | End: 2018-04-05

## 2018-04-02 RX ORDER — MORPHINE SULFATE 50 MG/1
2 CAPSULE, EXTENDED RELEASE ORAL
Qty: 0 | Refills: 0 | Status: DISCONTINUED | OUTPATIENT
Start: 2018-04-02 | End: 2018-04-05

## 2018-04-02 RX ORDER — MORPHINE SULFATE 50 MG/1
4 CAPSULE, EXTENDED RELEASE ORAL EVERY 6 HOURS
Qty: 0 | Refills: 0 | Status: DISCONTINUED | OUTPATIENT
Start: 2018-04-02 | End: 2018-04-03

## 2018-04-02 RX ORDER — SENNA PLUS 8.6 MG/1
1 TABLET ORAL
Qty: 0 | Refills: 0 | Status: DISCONTINUED | OUTPATIENT
Start: 2018-04-02 | End: 2018-04-05

## 2018-04-02 RX ORDER — SODIUM CHLORIDE 9 MG/ML
1000 INJECTION INTRAMUSCULAR; INTRAVENOUS; SUBCUTANEOUS
Qty: 0 | Refills: 0 | Status: DISCONTINUED | OUTPATIENT
Start: 2018-04-02 | End: 2018-04-02

## 2018-04-02 RX ORDER — MORPHINE SULFATE 50 MG/1
4 CAPSULE, EXTENDED RELEASE ORAL
Qty: 0 | Refills: 0 | Status: DISCONTINUED | OUTPATIENT
Start: 2018-04-02 | End: 2018-04-02

## 2018-04-02 RX ORDER — OXYCODONE AND ACETAMINOPHEN 5; 325 MG/1; MG/1
2 TABLET ORAL EVERY 6 HOURS
Qty: 0 | Refills: 0 | Status: DISCONTINUED | OUTPATIENT
Start: 2018-04-02 | End: 2018-04-05

## 2018-04-02 RX ORDER — ONDANSETRON 8 MG/1
4 TABLET, FILM COATED ORAL EVERY 6 HOURS
Qty: 0 | Refills: 0 | Status: DISCONTINUED | OUTPATIENT
Start: 2018-04-02 | End: 2018-04-05

## 2018-04-02 RX ORDER — MORPHINE SULFATE 50 MG/1
4 CAPSULE, EXTENDED RELEASE ORAL EVERY 4 HOURS
Qty: 0 | Refills: 0 | Status: DISCONTINUED | OUTPATIENT
Start: 2018-04-02 | End: 2018-04-05

## 2018-04-02 RX ORDER — GABAPENTIN 400 MG/1
300 CAPSULE ORAL DAILY
Qty: 0 | Refills: 0 | Status: DISCONTINUED | OUTPATIENT
Start: 2018-04-02 | End: 2018-04-05

## 2018-04-02 RX ORDER — CHOLECALCIFEROL (VITAMIN D3) 125 MCG
1000 CAPSULE ORAL DAILY
Qty: 0 | Refills: 0 | Status: DISCONTINUED | OUTPATIENT
Start: 2018-04-02 | End: 2018-04-05

## 2018-04-02 RX ORDER — MORPHINE SULFATE 50 MG/1
2 CAPSULE, EXTENDED RELEASE ORAL EVERY 6 HOURS
Qty: 0 | Refills: 0 | Status: DISCONTINUED | OUTPATIENT
Start: 2018-04-02 | End: 2018-04-03

## 2018-04-02 RX ORDER — MORPHINE SULFATE 50 MG/1
4 CAPSULE, EXTENDED RELEASE ORAL
Qty: 0 | Refills: 0 | Status: DISCONTINUED | OUTPATIENT
Start: 2018-04-02 | End: 2018-04-03

## 2018-04-02 RX ORDER — METOPROLOL TARTRATE 50 MG
50 TABLET ORAL DAILY
Qty: 0 | Refills: 0 | Status: DISCONTINUED | OUTPATIENT
Start: 2018-04-02 | End: 2018-04-05

## 2018-04-02 RX ORDER — FERROUS SULFATE 325(65) MG
325 TABLET ORAL THREE TIMES A DAY
Qty: 0 | Refills: 0 | Status: DISCONTINUED | OUTPATIENT
Start: 2018-04-02 | End: 2018-04-05

## 2018-04-02 RX ADMIN — MORPHINE SULFATE 4 MILLIGRAM(S): 50 CAPSULE, EXTENDED RELEASE ORAL at 22:48

## 2018-04-02 RX ADMIN — MORPHINE SULFATE 4 MILLIGRAM(S): 50 CAPSULE, EXTENDED RELEASE ORAL at 17:24

## 2018-04-02 RX ADMIN — MORPHINE SULFATE 4 MILLIGRAM(S): 50 CAPSULE, EXTENDED RELEASE ORAL at 17:25

## 2018-04-02 RX ADMIN — MORPHINE SULFATE 4 MILLIGRAM(S): 50 CAPSULE, EXTENDED RELEASE ORAL at 14:01

## 2018-04-02 RX ADMIN — MORPHINE SULFATE 4 MILLIGRAM(S): 50 CAPSULE, EXTENDED RELEASE ORAL at 12:55

## 2018-04-02 RX ADMIN — SODIUM CHLORIDE 125 MILLILITER(S): 9 INJECTION INTRAMUSCULAR; INTRAVENOUS; SUBCUTANEOUS at 12:39

## 2018-04-02 RX ADMIN — MORPHINE SULFATE 4 MILLIGRAM(S): 50 CAPSULE, EXTENDED RELEASE ORAL at 14:00

## 2018-04-02 RX ADMIN — MORPHINE SULFATE 4 MILLIGRAM(S): 50 CAPSULE, EXTENDED RELEASE ORAL at 03:51

## 2018-04-02 NOTE — PRE-ANESTHESIA EVALUATION ADULT - MALLAMPATI CLASS
Class I (easy) - visualization of the soft palate, fauces, uvula, and both anterior and posterior pillars
Class II - visualization of the soft palate, fauces, and uvula

## 2018-04-02 NOTE — CHART NOTE - NSCHARTNOTEFT_GEN_A_CORE
PACU ANESTHESIA ADMISSION NOTE      Procedure: Assessment of wound debridement: washout and Npwt vac replaced.    Post op diagnosis:  Gangrene of left foot      ____  Intubated  TV:______       Rate: ______      FiO2: ______    _x___  Patent Airway    _x___  Full return of protective reflexes    _x___  Full recovery from anesthesia / back to baseline status    Vitals:  T(F): 96.8  HR: 85  BP: 123/85  RR: 16  SpO2: 97%    Mental Status:  _x___ Awake   _____ Alert   _____ Drowsy   _____ Sedated    Nausea/Vomiting:  _x___  NO       ______Yes,   See Post - Op Orders         Pain Scale (0-10):  __4___    Treatment: ____ None    __X__ See Post - Op/PCA Orders    Post - Operative Fluids:   __x__ Oral   ____ See Post - Op Orders    Plan: Discharge:   ____Home       __X___Floor     _____Critical Care    _____  Other:_________________    Comments:  No anesthesia issues or complications noted.  Discharge when criteria met.

## 2018-04-02 NOTE — BRIEF OPERATIVE NOTE - PROCEDURE
<<-----Click on this checkbox to enter Procedure Assessment of wound debridement  04/02/2018  washout and Npwt vac replaced.  Active  JSOTTILE

## 2018-04-02 NOTE — PROGRESS NOTE ADULT - SUBJECTIVE AND OBJECTIVE BOX
SUBJECTIVE:    Patient is a 42y old  Female who presents with a chief complaint of Bilateral foot wound Vac dressing,anemia need Blood transfusion (27 Mar 2018 16:11)    Currently admitted to medicine with the primary diagnosis of Anemia     This morning she is resting comfortably in bed and reports no new issues or overnight events but there is foul smell at the foot.     PAST MEDICAL & SURGICAL HISTORY  PAST MEDICAL & SURGICAL HISTORY:  ARDS survivor  Sepsis  Takotsubo cardiomyopathy  Small bowel obstruction  Osteoarthritis  Cardiomyopathy  Sleep apnea  HTN (hypertension)  Gangrene of lower extremity  Gangrene of finger of right hand  Gangrene of finger of left hand  S/P amputation: b/l toes  H/O exploratory laparotomy  Amputation finger  History of cholecystectomy  H/O gastric bypass    SOCIAL HISTORY: Mostly bed bound   ALLERGIES:  No Known Allergies      VITALS:   Vital Signs Last 24 Hrs  T(C): 36.8 (02 Apr 2018 10:04), Max: 37.4 (01 Apr 2018 16:20)  T(F): 98.2 (02 Apr 2018 07:19), Max: 99.4 (01 Apr 2018 16:20)  HR: 80 (02 Apr 2018 10:04) (80 - 89)  BP: 115/69 (02 Apr 2018 10:04) (108/63 - 115/69)    RR: 18 (02 Apr 2018 10:04) (18 - 20)  SpO2: 100% (02 Apr 2018 10:04) (100% - 100%)    LABS:                        9.9    8.10  )-----------( 361      ( 01 Apr 2018 06:15 )             32.5     04-01    140  |  103  |  11  ----------------------------<  80  4.4   |  25  |  0.9    Ca    8.5      01 Apr 2018 06:15      PT/INR - ( 01 Apr 2018 06:15 )   PT: 11.40 sec;   INR: 1.05 ratio                       RADIOLOGY:    PHYSICAL EXAM:  GEN-NAD, AAOx3  CHEST- Clear to auscultation bilaterally, fair air entry  CVS- +s1/s2 RRR no murmurs  ABD- soft NT ND +bs  EXT- bilateral healed stumps from finger amputations-  and foot stumps wrapped with +wound vac   SKIN- no rashes

## 2018-04-02 NOTE — PRE-ANESTHESIA EVALUATION ADULT - NSANTHPEFT_GEN_ALL_CORE
patient assessed and chart reviewed in pre op area prior to coming into OR.  anesthesia plan discussed and all questions answered

## 2018-04-02 NOTE — PROGRESS NOTE ADULT - SUBJECTIVE AND OBJECTIVE BOX
POST OP Check    JIMMY CR  [id number]                          9.9    8.10  )-----------( 361      ( 01 Apr 2018 06:15 )             32.5     04-01    140  |  103  |  11  ----------------------------<  80  4.4   |  25  |  0.9    Ca    8.5      01 Apr 2018 06:15      Vital Signs Last 24 Hrs  T(C): 36.3 (02 Apr 2018 14:23), Max: 37.4 (01 Apr 2018 16:20)  T(F): 97.3 (02 Apr 2018 14:23), Max: 99.4 (01 Apr 2018 16:20)  HR: 84 (02 Apr 2018 14:23) (72 - 89)  BP: 112/71 (02 Apr 2018 14:23) (108/63 - 136/88)  BP(mean): --  RR: 11 (02 Apr 2018 14:23) (10 - 20)  SpO2: 100% (02 Apr 2018 14:23) (100% - 100%)    Patient seen at bedside NAD, AAOx3/resting comfortably with pain controlled. Patient tolerated procedure well without incident.    Procedure: excisional debridement soft tissue w/ NPWT vac application B/L feet  Surgeon: Dr.Sottile LITTLE Focused: Dressings were dry, clean, and intact. Wound vac running @ 125mmHg w/ no leaks @ this time.

## 2018-04-02 NOTE — PRE-ANESTHESIA EVALUATION ADULT - NSPROPOSEDPROCEDFT_GEN_ALL_CORE
bilateral debridement & vac dressing change
excisional debridement of soft tissue and possible bone with wound vac application bilateral feet with versajet

## 2018-04-02 NOTE — PROGRESS NOTE ADULT - SUBJECTIVE AND OBJECTIVE BOX
PROGRESS NOTE   Patient is a 42y old  Female who presents with a chief complaint of Bilateral foot wound Vac dressing,anemia need Blood transfusion (27 Mar 2018 16:11)      HPI:  42yF w/ Takotsubo cardiomyopathy (chart review), OA (chart review), amputation of left hand, right hand digits, and bilateral toes sent to [facility] ED by her podiatrist Marie due to clogged wound vacs for her bilateral toe amputation wounds, which were left open to allow granulation tissue to form before closing by Dr. Salazar. The amputations were complications of a Elin-en-y the patient underwent in 10/2017. The patient experienced a GI blockage after the procedure and developed aspiration pneumonia after an operative exploration for the blockage. The pneumonia resulted in an ICU admission and long period of vasopressor support due to multiorgan failure. The patient developed ischemia of her bilateral hands and feet during that admission which resulted in gangrene of the fingers of her right hand, mid left hand, and all of her toes. The patient then underwent the aforementioned amputation of her hands in 1/2018 by Dr. Sorto and of her feet in 2/2018 by Dr. Salazar. The patient states she has weekly wound vac changes which require hospitalization for pain control.    Patient herself states that she has "some kind of cardiomyopathy" because of her prior hospitalization due to the operation.    The patient denies fever, chills, cough, dysuria, pyuria, increased urinary frequency, pain, dyspnea. The patient explicitly states she has no complaints except for her clogged wound vacs.    The patient had a prior PMH significant for GERD, obesity, and sleep apnea, but these are no longer active issues for her after the Elin-en-y.    PMD Gerald Craven [facility]Surg [facility]  Pharm:  [address] (27 Mar 2018 14:41)      Vital Signs Last 24 Hrs  T(C): 36.9 (02 Apr 2018 00:11), Max: 37.4 (01 Apr 2018 16:20)  T(F): 98.5 (02 Apr 2018 00:11), Max: 99.4 (01 Apr 2018 16:20)  HR: 84 (02 Apr 2018 00:11) (84 - 89)  BP: 111/65 (02 Apr 2018 00:11) (108/63 - 119/85)  BP(mean): --  RR: 20 (02 Apr 2018 00:11) (18 - 20)  SpO2: --                          9.9    8.10  )-----------( 361      ( 01 Apr 2018 06:15 )             32.5               04-01    140  |  103  |  11  ----------------------------<  80  4.4   |  25  |  0.9    Ca    8.5      01 Apr 2018 06:15        PHYSICAL EXAM  GEN: JIMYM CR is a pleasant well-nourished, well developed 42y Female in no acute distress, alert awake, and oriented to person, place and time.     Patient wound vac holding seal at 125mmHg continuous.  Please recall podiatry for any problems pertaining to wound vac  Next wound vac dressing change: 4/2

## 2018-04-02 NOTE — BRIEF OPERATIVE NOTE - PRE-OP DX
Gangrene of left foot  04/02/2018  bilateral foot gangrene ,open amputation bilateral foot.  Active  Mohinder Salazar

## 2018-04-02 NOTE — BRIEF OPERATIVE NOTE - POST-OP DX
Gangrene of left foot  04/02/2018  gangrene ,open amputation bilateral foot.  Active  Mohinder Salazar

## 2018-04-02 NOTE — PROGRESS NOTE ADULT - SUBJECTIVE AND OBJECTIVE BOX
patient evaluated bedside this Am,Npwt vac in place bilateral and running at 12mm/hg continuous. consent read and signed for VAC change today.                      9.9    8.10  )-----------( 361      ( 01 Apr 2018 06:15 )             32.5   Vital Signs Last 24 Hrs  T(C): 36.9 (02 Apr 2018 00:11), Max: 37.4 (01 Apr 2018 16:20)  T(F): 98.5 (02 Apr 2018 00:11), Max: 99.4 (01 Apr 2018 16:20)  HR: 84 (02 Apr 2018 00:11) (84 - 89)  BP: 111/65 (02 Apr 2018 00:11) (108/63 - 119/85)  BP(mean): --  RR: 20 (02 Apr 2018 00:11) (18 - 20)  SpO2: -- NPO past midnight.

## 2018-04-03 RX ADMIN — MORPHINE SULFATE 4 MILLIGRAM(S): 50 CAPSULE, EXTENDED RELEASE ORAL at 04:59

## 2018-04-03 RX ADMIN — Medication 1 TABLET(S): at 11:36

## 2018-04-03 RX ADMIN — Medication 325 MILLIGRAM(S): at 06:25

## 2018-04-03 RX ADMIN — OXYCODONE AND ACETAMINOPHEN 2 TABLET(S): 5; 325 TABLET ORAL at 22:24

## 2018-04-03 RX ADMIN — Medication 50 MILLIGRAM(S): at 17:27

## 2018-04-03 RX ADMIN — Medication 1000 UNIT(S): at 13:04

## 2018-04-03 RX ADMIN — Medication 325 MILLIGRAM(S): at 13:44

## 2018-04-03 NOTE — DIETITIAN INITIAL EVALUATION ADULT. - DIET TYPE
regular/As per EMR and pt reports pt w/ appetite and PO at baseline. Pt reports she is eating 80-90% meals throughout course of day as she cannot tolerate all in one sitting.

## 2018-04-03 NOTE — DIETITIAN INITIAL EVALUATION ADULT. - ENERGY NEEDS
total kcal = 9266-6738 kcal/day (MSJ x 1.0-1.1 given BMI 30); total protein = 64-77 g/day (1.0-1.2 g/kg IBW); total fluid = 1 mL : 1 kcal

## 2018-04-03 NOTE — DIETITIAN INITIAL EVALUATION ADULT. - PHYSICAL APPEARANCE
BMI 29.7; AAO; b/l hand amputations, toe amputations. b/l digit gangrene/ necrosis. b/l lower extremity wound vac./well nourished/overweight

## 2018-04-03 NOTE — PROGRESS NOTE ADULT - SUBJECTIVE AND OBJECTIVE BOX
SUBJECTIVE:    Patient is a 42y old  Female who presents with a chief complaint of Bilateral foot wound Vac dressing,anemia need Blood transfusion (27 Mar 2018 16:11)    Currently admitted to medicine with the primary diagnosis of Anemia     Today is hospital day 7d. This morning she is resting comfortably in bed and reports no new issues or overnight events.     PAST MEDICAL & SURGICAL HISTORY  PAST MEDICAL & SURGICAL HISTORY:  ARDS survivor  Sepsis  Takotsubo cardiomyopathy  Small bowel obstruction  Osteoarthritis  Cardiomyopathy  Sleep apnea  HTN (hypertension)  Gangrene of lower extremity  Gangrene of finger of right hand  Gangrene of finger of left hand  S/P amputation: b/l toes  H/O exploratory laparotomy  Amputation finger  History of cholecystectomy  H/O gastric bypass    SOCIAL HISTORY:    ALLERGIES:  No Known Allergies    MEDICATIONS:  STANDING MEDICATIONS  cholecalciferol 1000 Unit(s) Oral daily  ferrous    sulfate 325 milliGRAM(s) Oral three times a day  gabapentin 300 milliGRAM(s) Oral daily  metoprolol succinate ER 50 milliGRAM(s) Oral daily  morphine  - Injectable 2 milliGRAM(s) IV Push every 10 minutes  multivitamin 1 Tablet(s) Oral daily    PRN MEDICATIONS  acetaminophen   Tablet 650 milliGRAM(s) Oral every 6 hours PRN  bisacodyl Suppository 10 milliGRAM(s) Rectal daily PRN  morphine  - Injectable 4 milliGRAM(s) IV Push every 4 hours PRN  ondansetron Injectable 4 milliGRAM(s) IV Push every 6 hours PRN  oxyCODONE    5 mG/acetaminophen 325 mG 2 Tablet(s) Oral every 6 hours PRN  senna 1 Tablet(s) Oral two times a day PRN    VITALS:   T(F): 97.8  HR: 82  BP: 119/76  RR: 20  SpO2: 98%    LABS:                        RADIOLOGY:    PHYSICAL EXAM:  GEN-NAD, AAOx3  CHEST- Clear to auscultation bilaterally, fair air entry  CVS- +s1/s2 RRR no murmurs  ABD- soft NT ND +bs  EXT- bilateral healed stumps from finger amputations-  and foot stumps wrapped with +wound vac   SKIN- no rashes SUBJECTIVE:    Patient is a 42y old  Female who presents with a chief complaint of Bilateral foot wound Vac dressing,anemia need Blood transfusion (27 Mar 2018 16:11)    Currently admitted to medicine with the primary diagnosis of Anemia     Today is hospital day 7d. This morning she is resting comfortably in bed and reports no new issues or overnight events.     PAST MEDICAL & SURGICAL HISTORY  PAST MEDICAL & SURGICAL HISTORY:  ARDS survivor  Sepsis  Takotsubo cardiomyopathy  Small bowel obstruction  Osteoarthritis  Cardiomyopathy  Sleep apnea  HTN (hypertension)  Gangrene of lower extremity  Gangrene of finger of right hand  Gangrene of finger of left hand  S/P amputation: b/l toes  H/O exploratory laparotomy  Amputation finger  History of cholecystectomy  H/O gastric bypass    SOCIAL HISTORY:    ALLERGIES:  No Known Allergies    MEDICATIONS:  STANDING MEDICATIONS  cholecalciferol 1000 Unit(s) Oral daily  ferrous    sulfate 325 milliGRAM(s) Oral three times a day  gabapentin 300 milliGRAM(s) Oral daily  metoprolol succinate ER 50 milliGRAM(s) Oral daily  morphine  - Injectable 2 milliGRAM(s) IV Push every 10 minutes  multivitamin 1 Tablet(s) Oral daily    PRN MEDICATIONS  acetaminophen   Tablet 650 milliGRAM(s) Oral every 6 hours PRN  bisacodyl Suppository 10 milliGRAM(s) Rectal daily PRN  morphine  - Injectable 4 milliGRAM(s) IV Push every 4 hours PRN  ondansetron Injectable 4 milliGRAM(s) IV Push every 6 hours PRN  oxyCODONE    5 mG/acetaminophen 325 mG 2 Tablet(s) Oral every 6 hours PRN  senna 1 Tablet(s) Oral two times a day PRN    VITALS:   T(F): 97.8  HR: 82  BP: 119/76  RR: 20  SpO2: 98%    PHYSICAL EXAM:  GEN-NAD, AAOx3  CHEST- Clear to auscultation bilaterally, fair air entry  CVS- +s1/s2 RRR no murmurs  ABD- soft NT ND +bs  EXT- bilateral healed stumps from finger amputations-  and foot stumps wrapped with +wound vac   SKIN- no rashes

## 2018-04-03 NOTE — PROGRESS NOTE ADULT - SUBJECTIVE AND OBJECTIVE BOX
PROGRESS NOTE   Patient is a 42y old  Female who presents with a chief complaint of Bilateral foot wound Vac dressing,anemia need Blood transfusion (27 Mar 2018 16:11)      HPI:  42yF w/ Takotsubo cardiomyopathy (chart review), OA (chart review), amputation of left hand, right hand digits, and bilateral toes sent to [facility] ED by her podiatrist Marie due to clogged wound vacs for her bilateral toe amputation wounds, which were left open to allow granulation tissue to form before closing by Dr. Salazar. The amputations were complications of a Elin-en-y the patient underwent in 10/2017. The patient experienced a GI blockage after the procedure and developed aspiration pneumonia after an operative exploration for the blockage. The pneumonia resulted in an ICU admission and long period of vasopressor support due to multiorgan failure. The patient developed ischemia of her bilateral hands and feet during that admission which resulted in gangrene of the fingers of her right hand, mid left hand, and all of her toes. The patient then underwent the aforementioned amputation of her hands in 1/2018 by Dr. Sorto and of her feet in 2/2018 by Dr. Salazar. The patient states she has weekly wound vac changes which require hospitalization for pain control.    Patient herself states that she has "some kind of cardiomyopathy" because of her prior hospitalization due to the operation.    The patient denies fever, chills, cough, dysuria, pyuria, increased urinary frequency, pain, dyspnea. The patient explicitly states she has no complaints except for her clogged wound vacs.    The patient had a prior PMH significant for GERD, obesity, and sleep apnea, but these are no longer active issues for her after the Elin-en-y.    PMD Gerald Craven [facility]  Pharm:  [address] (27 Mar 2018 14:41)      Vital Signs Last 24 Hrs  T(C): 35.8 (02 Apr 2018 23:45), Max: 36.8 (02 Apr 2018 07:19)  T(F): 96.5 (02 Apr 2018 23:45), Max: 98.2 (02 Apr 2018 07:19)  HR: 64 (02 Apr 2018 23:45) (64 - 105)  BP: 97/58 (02 Apr 2018 23:45) (97/58 - 136/88)  BP(mean): --  RR: 20 (02 Apr 2018 23:45) (10 - 20)  SpO2: 98% (02 Apr 2018 23:45) (98% - 100%)                          9.9    8.10  )-----------( 361      ( 01 Apr 2018 06:15 )             32.5               04-01    140  |  103  |  11  ----------------------------<  80  4.4   |  25  |  0.9    Ca    8.5      01 Apr 2018 06:15        PHYSICAL EXAM  GEN: JIMMY CR is a pleasant well-nourished, well developed 42y Female in no acute distress, alert awake, and oriented to person, place and time.     Patient wound vac holding seal at 125mmHg continuous.  Please recall podiatry for any problems pertaining to wound vac

## 2018-04-03 NOTE — DIETITIAN INITIAL EVALUATION ADULT. - FACTORS AFF FOOD INTAKE
persistent constipation/Pt reports at baseline only has BM 1x/week s/p surgery. Pt w/ b/l hand amputations but states she only needs assistance w/ tray setup. persistent constipation/Pt reports at baseline only has BM 1x/week s/p surgery. bowel regimen currently in place. Pt w/ b/l hand amputations but states she only needs assistance w/ tray setup.

## 2018-04-03 NOTE — DIETITIAN INITIAL EVALUATION ADULT. - ORAL INTAKE PTA
Pt w/ hx of jinny en y gastric bypass surgery 10/2017. Reports at baseline she has PO intake 50% as she eats half portions at each meal d/t surgery. Notes she finishes meals slowly throughout day. Reports nutrition supplements give her nausea./good

## 2018-04-04 RX ADMIN — Medication 325 MILLIGRAM(S): at 21:18

## 2018-04-04 RX ADMIN — OXYCODONE AND ACETAMINOPHEN 2 TABLET(S): 5; 325 TABLET ORAL at 07:36

## 2018-04-04 RX ADMIN — Medication 50 MILLIGRAM(S): at 18:01

## 2018-04-04 RX ADMIN — Medication 1000 UNIT(S): at 12:04

## 2018-04-04 RX ADMIN — Medication 325 MILLIGRAM(S): at 13:22

## 2018-04-04 RX ADMIN — Medication 1 TABLET(S): at 12:04

## 2018-04-04 RX ADMIN — Medication 325 MILLIGRAM(S): at 06:36

## 2018-04-04 RX ADMIN — OXYCODONE AND ACETAMINOPHEN 2 TABLET(S): 5; 325 TABLET ORAL at 19:52

## 2018-04-04 NOTE — PROGRESS NOTE ADULT - SUBJECTIVE AND OBJECTIVE BOX
PROGRESS NOTE   Patient is a 42y old  Female who presents with a chief complaint of Bilateral foot wound Vac dressing,anemia need Blood transfusion (27 Mar 2018 16:11)      HPI:  42yF w/ Takotsubo cardiomyopathy (chart review), OA (chart review), amputation of left hand, right hand digits, and bilateral toes sent to [facility] ED by her podiatrist Marie due to clogged wound vacs for her bilateral toe amputation wounds, which were left open to allow granulation tissue to form before closing by Dr. Salazar. The amputations were complications of a Elin-en-y the patient underwent in 10/2017. The patient experienced a GI blockage after the procedure and developed aspiration pneumonia after an operative exploration for the blockage. The pneumonia resulted in an ICU admission and long period of vasopressor support due to multiorgan failure. The patient developed ischemia of her bilateral hands and feet during that admission which resulted in gangrene of the fingers of her right hand, mid left hand, and all of her toes. The patient then underwent the aforementioned amputation of her hands in 1/2018 by Dr. Sorto and of her feet in 2/2018 by Dr. Salazar. The patient states she has weekly wound vac changes which require hospitalization for pain control.    Patient herself states that she has "some kind of cardiomyopathy" because of her prior hospitalization due to the operation.    The patient denies fever, chills, cough, dysuria, pyuria, increased urinary frequency, pain, dyspnea. The patient explicitly states she has no complaints except for her clogged wound vacs.    The patient had a prior PMH significant for GERD, obesity, and sleep apnea, but these are no longer active issues for her after the Elin-en-y.    PMD Gerald Craven [facility]  Pharm:  [address] (27 Mar 2018 14:41)      Vital Signs Last 24 Hrs  T(C): 36.3 (03 Apr 2018 23:54), Max: 36.8 (03 Apr 2018 16:08)  T(F): 97.3 (03 Apr 2018 23:54), Max: 98.2 (03 Apr 2018 16:08)  HR: 92 (03 Apr 2018 23:54) (80 - 92)  BP: 118/69 (03 Apr 2018 23:54) (113/70 - 119/76)  BP(mean): 87 (03 Apr 2018 06:15) (87 - 87)  RR: 20 (03 Apr 2018 23:54) (20 - 20)  SpO2: --                        PHYSICAL EXAM  GEN: JIMMY CR is a pleasant well-nourished, well developed 42y Female in no acute distress, alert awake, and oriented to person, place and time.     Patient wound vac holding seal at 125mmHg continuous.  Please recall podiatry for any problems pertaining to wound vac

## 2018-04-04 NOTE — PROGRESS NOTE ADULT - SUBJECTIVE AND OBJECTIVE BOX
PROGRESS NOTE   Patient is a 42y old  Female who presents with a chief complaint of Bilateral foot wound Vac dressing,anemia need Blood transfusion (27 Mar 2018 16:11)      HPI:  42yF w/ Takotsubo cardiomyopathy (chart review), OA (chart review), amputation of left hand, right hand digits, and bilateral toes sent to [facility] ED by her podiatrist Marie due to clogged wound vacs for her bilateral toe amputation wounds, which were left open to allow granulation tissue to form before closing by Dr. Salazar. The amputations were complications of a Elin-en-y the patient underwent in 10/2017. The patient experienced a GI blockage after the procedure and developed aspiration pneumonia after an operative exploration for the blockage. The pneumonia resulted in an ICU admission and long period of vasopressor support due to multiorgan failure. The patient developed ischemia of her bilateral hands and feet during that admission which resulted in gangrene of the fingers of her right hand, mid left hand, and all of her toes. The patient then underwent the aforementioned amputation of her hands in 1/2018 by Dr. Sorto and of her feet in 2/2018 by Dr. Salazra. The patient states she has weekly wound vac changes which require hospitalization for pain control.    Patient herself states that she has "some kind of cardiomyopathy" because of her prior hospitalization due to the operation.    The patient denies fever, chills, cough, dysuria, pyuria, increased urinary frequency, pain, dyspnea. The patient explicitly states she has no complaints except for her clogged wound vacs.    The patient had a prior PMH significant for GERD, obesity, and sleep apnea, but these are no longer active issues for her after the Elin-en-y.    PMD Gerald Craven [facility]Surg [facility]  Pharm:  [address] (27 Mar 2018 14:41)      Vital Signs Last 24 Hrs  T(C): 36.4 (04 Apr 2018 07:16), Max: 36.8 (03 Apr 2018 16:08)  T(F): 97.6 (04 Apr 2018 07:16), Max: 98.2 (03 Apr 2018 16:08)  HR: 80 (04 Apr 2018 07:16) (80 - 92)  BP: 112/59 (04 Apr 2018 07:16) (112/59 - 119/74)  BP(mean): --  RR: 20 (04 Apr 2018 07:16) (20 - 20)  SpO2: --                        Assessment:  - s/p B/L [place]op[place] open 2/21/2018    Plan:  - pt seen/evaluated @ bedside w/ attending and [employer] representative  - NPWT hospital vac was removed @ bedside  - "Acti" [employer] home vacs were applied to B/L feet  - both vacs running @ 125mmHg w/ no leaks @ this time  - pt is scheduled to be seen by  on 4/12/2018 for further evaluation, which includes vac replacement and/or possible graft application    -

## 2018-04-04 NOTE — PROGRESS NOTE ADULT - ASSESSMENT
a/p:    #bilateral digit gangrene/necrosis after prolonged vasopressor support- s/p amputation of bilateral fingers and toes  -local wound care- podiatry following  -needs additional home wound vac (goes once weekly to ambulatory for dressing change)  -pain control  -local wound care    #anemia- stable  -cont to monitor cbc- transfuse if hb<7    #DVT/GI ppx    awaiting arrangement of 2nd wound vac and then discharge home- likely monday
#bilateral digit gangrene/necrosis after prolonged vasopressor support- s/p amputation of bilateral fingers and toes  -local wound care- podiatry following  -needs additional home wound vac (goes once weekly to ambulatory for dressing change)  -podiatry following---will attempt to do wound vac bridge that patient will be able to use at home with current wound vac---plan for monday to OR with podiatry for dressing change/vac adjustment- if not possible then will need to get 2 vacs for home   -pain control  -local wound care  To OR on 4/2/18 as add on #1 with  for excisional debridement of soft tissue and possible bone with wound vac application B/L feet with versajet      #anemia- stable  -cont to monitor cbc- transfuse if hb<7    #DVT/GI ppx
1. Bilateral digit gangrene/necrosis after prolonged vasopressor support- s/p amputation of bilateral fingers and toes  -local wound care- podiatry following  -needs additional home wound vac (goes once weekly to ambulatory for dressing change)  -pain control  -local wound care  OR to today with Dr. Salazar for debridement and wound vac placement      #anemia- stable  -cont to monitor cbc- transfuse if hb<7    #DVT/GI ppx
1. Bilateral digit gangrene/necrosis after prolonged vasopressor support- s/p amputation of bilateral fingers and toes  -local wound care- podiatry following  -needs additional home wound vac (goes once weekly to ambulatory for dressing change)  -pain control  -local wound care  OR yesterday with Dr. Salazar for debridement and wound vac placement  awaiting 2nd wound vac and then d/c    #anemia- stable    #DVT/GI ppx
1. Bilateral digit gangrene/necrosis after prolonged vasopressor support- s/p amputation of bilateral fingers and toes  -local wound care- podiatry following  -needs additional home wound vac (goes once weekly to ambulatory for dressing change)  -pain control  -local wound care  has 2nd wound vac in place, monitoring for 24hrs and then d/c in the AM  #anemia- stable    #DVT/GI ppx
1. Bilateral digit gangrene/necrosis after prolonged vasopressor support- s/p amputation of bilateral fingers and toes  -local wound care- podiatry following  -needs additional home wound vac (goes once weekly to ambulatory for dressing change)  D/W Podiatry. OR today for further debridement.    -pain control  -local wound care  To OR on 4/2/18 as add on #1 with  for excisional debridement of soft tissue and possible bone with wound vac application B/L feet with versajet      #anemia- stable  -cont to monitor cbc- transfuse if hb<7    #DVT/GI ppx
42yF w/ Takotsubo cardiomyopathy, OA, amputation of left hand, right hand digits, and bilateral toes as a complication of vasopressor support from prior post-op complication sent to [facility] ED by her podiatrist Marie due to clogged wound vacs for her bilateral toe amputation wounds. Pt found to have microcytic anemia (bLine 8.5, now 7.4) on admission CBC.    #Microcytic Anemia  -c/w FeSO4, home vitamin supplements, bowel regimen  -monitor CBC  -3/27: tx 3U pRBC by pods, give furosemide IVP w/ pRBC 2/2 Takotsubo cardiomyopathy    #Toe Amputation Wounds  -c/w pain control (percocet, morphine IV, gabapentin)  -f/u pods    #Tak[place]uba Cardiomyopathy  -c/w metoprolol  -consider TTE if pt will be hospitalized for period of time (none found in EMR)    #Thrombocytosis  -monitor  -bLine 400-500k (1/2018)    #Dispo: Home  #Code: Full  #Diet: Low Salt  #DVTPpx: heparin SQ  #GIPpx: not indicated  #Activity: OOBC
42yF w/ Takotsubo cardiomyopathy, OA, amputation of left hand, right hand digits, and bilateral toes as a complication of vasopressor support from prior post-op complication sent to [facility] ED by her podiatrist Marie due to clogged wound vacs for her bilateral toe amputation wounds. Pt found to have microcytic anemia (bLine 8.5, now 7.4) on admission CBC.    #Microcytic Anemia  -c/w FeSO4, home vitamin supplements, bowel regimen  -monitor CBC-stable  -3/27: tx 3U pRBC by pods, give furosemide IVP w/ pRBC 2/2 Takotsubo cardiomyopathy    #Toe Amputation Wounds  -c/w pain control (percocet, morphine IV, gabapentin)  pending wound vac, no need for abx    #Tak[place]uba Cardiomyopathy  -c/w metoprolol  -consider TTE if pt will be hospitalized for period of time (none found in EMR)    #Thrombocytosis  -monitor  -bLine 400-500k (1/2018)    #Dispo: Home  #Code: Full  #Diet: Low Salt  #DVTPpx: heparin SQ  #GIPpx: not indicated  #Activity: OOBC    d/c when receives home wound vac
42yF w/ Takotsubo cardiomyopathy, OA, amputation of left hand, right hand digits, and bilateral toes as a complication of vasopressor support from prior post-op complication sent to [facility] ED by her podiatrist Marie due to clogged wound vacs for her bilateral toe amputation wounds. Pt found to have microcytic anemia (bLine 8.5, now 7.4) on admission CBC.    #Microcytic Anemia  -c/w FeSO4, home vitamin supplements, bowel regimen  -monitor CBC-stable  -3/27: tx 3U pRBC by pods, give furosemide IVP w/ pRBC 2/2 Takotsubo cardiomyopathy    #Toe Amputation Wounds  -c/w pain control (percocet, morphine IV, gabapentin)  pending wound vac, no need for abx    #Tak[place] Cardiomyopathy  -c/w metoprolol  -consider TTE if pt will be hospitalized for period of time (none found in EMR)    #Thrombocytosis  -monitor  -bLine 400-500k (1/2018)    #Dispo: Home  #Code: Full  #Diet: regular diet  #DVTPpx: heparin SQ  #GIPpx: not indicated  #Activity: OOBC    d/c when receives 2nd home wound vac
42yF w/ Takotsubo cardiomyopathy, OA, amputation of left hand, right hand digits, and bilateral toes as a complication of vasopressor support from prior post-op complication sent to [facility] ED by her podiatrist Marie due to clogged wound vacs for her bilateral toe amputation wounds. Pt found to have microcytic anemia (bLine 8.5, now 7.4) on admission CBC.    #Microcytic Anemia  -c/w FeSO4, home vitamin supplements, bowel regimen  -monitor CBC  -3/27: tx 3U pRBC by pods, give furosemide IVP w/ pRBC 2/2 Takotsubo cardiomyopathy    #Toe Amputation Wounds  -c/w pain control (percocet, morphine IV, gabapentin)  -f/u pods    #Takotsuba Cardiomyopathy  -c/w metoprolol  -consider TTE if pt will be hospitalized for period of time (none found in EMR)    #Thrombocytosis  -monitor  -bLine 400-500k (1/2018)    #Dispo: Home  #Code: Full  #Diet: Low Salt  #DVTPpx: heparin SQ  #GIPpx: not indicated  #Activity: OOBC    ID c/s as per attending to adjust abx
a/p:    #bilateral digit gangrene/necrosis after prolonged vasopressor support- s/p amputation of bilateral fingers and toes  -local wound care- podiatry following  -needs additional home wound vac (goes once weekly to ambulatory for dressing change)  -podiatry following---will attempt to do wound vac bridge that patient will be able to use at home with current wound vac---plan for monday to OR with podiatry for dressing change/vac adjustment- if not possible then will need to get 2 vacs for home   -pain control  -local wound care    #anemia- stable  -cont to monitor cbc- transfuse if hb<7    #DVT/GI ppx    awaiting arrangement of 2nd wound vac vs wound vac bridge and then discharge home- likely monday/tuesday after podiatry changes dressings and puts in new vacs
bilateral chopart's amputation, open

## 2018-04-04 NOTE — PROGRESS NOTE ADULT - ATTENDING COMMENTS
Patient seen and examined at the bed side.   Agree with residents notes. Necessary correction made to the note.  d/w family and nursing. Awaiting OR , scheduled tomorrow for the debridement at the foot
Patient seen and examined at the bed side.   Agree with residents notes. Necessary correction made to the note.  d/w family and nursing. Awaiting OR today.
consent to be read and signed,with all questions addressed.
Patient seen and examined at the bed side along with Residents, Medical students, Nurse and .   Agree with resident findings plan. Necessary correction made.   Necessary consults placed. Medications adjusted.   D/ W other specialty - Podiatry
Patient seen and examined at the bed side in the morning round with resident, nursing and case management.   Agree with above note.   D/W Podiatry and case management.   D/C planning today once the wound vac  available
Patient was seen and examined at the bed side along with nursing,  and residents.   Agree with above note. Necessary correction made.   D/C planing today or tomorrow once patient have the new wound vac.   D/W  and he is in agreement.

## 2018-04-04 NOTE — PROGRESS NOTE ADULT - SUBJECTIVE AND OBJECTIVE BOX
SUBJECTIVE:    Patient is a 42y old  Female who presents with a chief complaint of Bilateral foot wound Vac dressing,anemia need Blood transfusion (27 Mar 2018 16:11)    Currently admitted to medicine with the primary diagnosis of Anemia     Today is hospital day 8d. This morning she is resting comfortably in bed and reports no new issues or overnight events.     PAST MEDICAL & SURGICAL HISTORY  PAST MEDICAL & SURGICAL HISTORY:  ARDS survivor  Sepsis  Takotsubo cardiomyopathy  Small bowel obstruction  Osteoarthritis  Cardiomyopathy  Sleep apnea  HTN (hypertension)  Gangrene of lower extremity  Gangrene of finger of right hand  Gangrene of finger of left hand  S/P amputation: b/l toes  H/O exploratory laparotomy  Amputation finger  History of cholecystectomy  H/O gastric bypass    SOCIAL HISTORY:    ALLERGIES:  No Known Allergies    MEDICATIONS:  STANDING MEDICATIONS  cholecalciferol 1000 Unit(s) Oral daily  ferrous    sulfate 325 milliGRAM(s) Oral three times a day  gabapentin 300 milliGRAM(s) Oral daily  metoprolol succinate ER 50 milliGRAM(s) Oral daily  morphine  - Injectable 2 milliGRAM(s) IV Push every 10 minutes  multivitamin 1 Tablet(s) Oral daily    PRN MEDICATIONS  acetaminophen   Tablet 650 milliGRAM(s) Oral every 6 hours PRN  bisacodyl Suppository 10 milliGRAM(s) Rectal daily PRN  morphine  - Injectable 4 milliGRAM(s) IV Push every 4 hours PRN  ondansetron Injectable 4 milliGRAM(s) IV Push every 6 hours PRN  oxyCODONE    5 mG/acetaminophen 325 mG 2 Tablet(s) Oral every 6 hours PRN  senna 1 Tablet(s) Oral two times a day PRN    VITALS:   T(F): 97.6  HR: 80  BP: 112/59  RR: 20  SpO2: --    LABS:                        RADIOLOGY:    PHYSICAL EXAM:    GEN-NAD, AAOx3  CHEST- Clear to auscultation bilaterally, fair air entry  CVS- +s1/s2 RRR no murmurs  ABD- soft NT ND +bs  EXT- bilateral healed stumps from finger amputations-  and foot stumps wrapped with +wound vac   SKIN- no rashes SUBJECTIVE:    Patient is a 42y old  Female who presents with a chief complaint of Bilateral foot wound Vac dressing,anemia need Blood transfusion (27 Mar 2018 16:11)    Currently admitted to medicine with the primary diagnosis of Anemia     Today is hospital day 8d. This morning she is resting comfortably in bed and reports no new issues or overnight events.     PAST MEDICAL & SURGICAL HISTORY  PAST MEDICAL & SURGICAL HISTORY:  ARDS survivor  Sepsis  Takotsubo cardiomyopathy  Small bowel obstruction  Osteoarthritis  Cardiomyopathy  Sleep apnea  HTN (hypertension)  Gangrene of lower extremity  Gangrene of finger of right hand  Gangrene of finger of left hand  S/P amputation: b/l toes  H/O exploratory laparotomy  Amputation finger  History of cholecystectomy  H/O gastric bypass    SOCIAL HISTORY:    ALLERGIES:  No Known Allergies    MEDICATIONS:  STANDING MEDICATIONS  cholecalciferol 1000 Unit(s) Oral daily  ferrous    sulfate 325 milliGRAM(s) Oral three times a day  gabapentin 300 milliGRAM(s) Oral daily  metoprolol succinate ER 50 milliGRAM(s) Oral daily  morphine  - Injectable 2 milliGRAM(s) IV Push every 10 minutes  multivitamin 1 Tablet(s) Oral daily    PRN MEDICATIONS  acetaminophen   Tablet 650 milliGRAM(s) Oral every 6 hours PRN  bisacodyl Suppository 10 milliGRAM(s) Rectal daily PRN  morphine  - Injectable 4 milliGRAM(s) IV Push every 4 hours PRN  ondansetron Injectable 4 milliGRAM(s) IV Push every 6 hours PRN  oxyCODONE    5 mG/acetaminophen 325 mG 2 Tablet(s) Oral every 6 hours PRN  senna 1 Tablet(s) Oral two times a day PRN    VITALS:   T(F): 97.6  HR: 80  BP: 112/59  RR: 20  SpO2: --        PHYSICAL EXAM:    GEN-NAD, AAOx3  CHEST- Clear to auscultation bilaterally, fair air entry  CVS- +s1/s2 RRR no murmurs  ABD- soft NT ND +bs  EXT- bilateral healed stumps from finger amputations-  and foot stumps wrapped with +wound vac   SKIN- no rashes

## 2018-04-05 ENCOUNTER — TRANSCRIPTION ENCOUNTER (OUTPATIENT)
Age: 43
End: 2018-04-05

## 2018-04-05 VITALS — WEIGHT: 197.31 LBS

## 2018-04-05 RX ORDER — DIPHENHYDRAMINE HCL 50 MG
25 CAPSULE ORAL ONCE
Qty: 0 | Refills: 0 | Status: COMPLETED | OUTPATIENT
Start: 2018-04-05 | End: 2018-04-05

## 2018-04-05 RX ORDER — DIPHENHYDRAMINE HCL 50 MG
25 CAPSULE ORAL ONCE
Qty: 0 | Refills: 0 | Status: DISCONTINUED | OUTPATIENT
Start: 2018-04-05 | End: 2018-04-05

## 2018-04-05 RX ADMIN — OXYCODONE AND ACETAMINOPHEN 2 TABLET(S): 5; 325 TABLET ORAL at 06:33

## 2018-04-05 RX ADMIN — Medication 25 MILLIGRAM(S): at 01:46

## 2018-04-05 RX ADMIN — OXYCODONE AND ACETAMINOPHEN 2 TABLET(S): 5; 325 TABLET ORAL at 08:03

## 2018-04-05 RX ADMIN — Medication 325 MILLIGRAM(S): at 06:33

## 2018-04-05 NOTE — PROGRESS NOTE ADULT - SUBJECTIVE AND OBJECTIVE BOX
PROGRESS NOTE   Patient is a 42y old  Female who presents with a chief complaint of Bilateral foot wound Vac dressing,anemia need Blood transfusion (05 Apr 2018 07:48)      HPI:  42yF w/ Takotsubo cardiomyopathy (chart review), OA (chart review), amputation of left hand, right hand digits, and bilateral toes sent to [facility] ED by her podiatrist Marie due to clogged wound vacs for her bilateral toe amputation wounds, which were left open to allow granulation tissue to form before closing by Dr. Salazar. The amputations were complications of a Elin-en-y the patient underwent in 10/2017. The patient experienced a GI blockage after the procedure and developed aspiration pneumonia after an operative exploration for the blockage. The pneumonia resulted in an ICU admission and long period of vasopressor support due to multiorgan failure. The patient developed ischemia of her bilateral hands and feet during that admission which resulted in gangrene of the fingers of her right hand, mid left hand, and all of her toes. The patient then underwent the aforementioned amputation of her hands in 1/2018 by Dr. Sorto and of her feet in 2/2018 by Dr. Salazar. The patient states she has weekly wound vac changes which require hospitalization for pain control.    Patient herself states that she has "some kind of cardiomyopathy" because of her prior hospitalization due to the operation.    The patient denies fever, chills, cough, dysuria, pyuria, increased urinary frequency, pain, dyspnea. The patient explicitly states she has no complaints except for her clogged wound vacs.    The patient had a prior PMH significant for GERD, obesity, and sleep apnea, but these are no longer active issues for her after the Elin-en-y.    PMD Gerald Craven [facility]urg [place]  Pharm:  [address] (27 Mar 2018 14:41)      Vital Signs Last 24 Hrs  T(C): 36.7 (05 Apr 2018 07:35), Max: 37.1 (04 Apr 2018 15:45)  T(F): 98 (05 Apr 2018 07:35), Max: 98.8 (04 Apr 2018 15:45)  HR: 76 (05 Apr 2018 07:35) (73 - 85)  BP: 109/65 (05 Apr 2018 07:35) (98/56 - 114/71)  BP(mean): --  RR: 18 (05 Apr 2018 07:35) (18 - 18)  SpO2: --                        PHYSICAL EXAM  GEN: JIMMY CR is a pleasant well-nourished, well developed 42y Female in no acute distress, alert awake, and oriented to person, place and time.     Assessment:   - NPWT vac to b/l LE was running @ 125mmHg w/ no leaks @ this time    Plan:  - pt seen/ evaluated @ bedside   - outer layer of vac dressing was removed and bridge was moved approx 6 inches distal from prior site due to pt complaints of skin irritation.   - pt is scheduled to be seen by  on 4/12/18 for vac change and possible application of free flap or graft.   - findings discussed w/attending   - Outer layer of NPWT dressing

## 2018-04-05 NOTE — DISCHARGE NOTE ADULT - CARE PLAN
Principal Discharge DX:	Gangrene of lower extremity  Goal:	prevent complications  Assessment and plan of treatment:	Dr. Salazar provided a lower extremity debridement and should follow-up in the ambulatory setting. Continue with the 2 home wound vacs as per podiatry.

## 2018-04-05 NOTE — DISCHARGE NOTE ADULT - HOSPITAL COURSE
1. Bilateral digit gangrene/necrosis after prolonged vasopressor support- s/p amputation of bilateral fingers and toes  -local wound care- podiatry following  -has two new wound vacs to take home (goes once weekly to ambulatory for dressing change)  Dr. Salazar performed a b/l LE debridement on admission  -pain control  -local wound care  #anemia- stable

## 2018-04-05 NOTE — DISCHARGE NOTE ADULT - PLAN OF CARE
prevent complications Dr. Salazar provided a lower extremity debridement and should follow-up in the ambulatory setting. Continue with the 2 home wound vacs as per podiatry.

## 2018-04-05 NOTE — DISCHARGE NOTE ADULT - MEDICATION SUMMARY - MEDICATIONS TO TAKE
I will START or STAY ON the medications listed below when I get home from the hospital:    oxyCODONE-acetaminophen 5 mg-325 mg oral tablet  -- 2 tab(s) by mouth every 6 hours, As needed, Moderate Pain (4 - 6)  -- Indication: For Wounds, multiple open, lower extremity    gabapentin 300 mg oral capsule  -- 1 cap(s) by mouth once a day  -- Indication: For Wounds, multiple open, lower extremity    Metoprolol Succinate ER 50 mg oral tablet, extended release  -- 1 tab(s) by mouth once a day  -- Indication: For HTN    FeroSul 325 mg (65 mg elemental iron) oral tablet  -- 1 tab(s) by mouth 3 times a day  -- Indication: For Anemia    bisacodyl 10 mg rectal suppository  -- 1 suppository(ies) rectally once a day, As Needed  -- Indication: For constipation    senna 8.8 mg/5 mL oral syrup  -- 5 milliliter(s) by mouth 3 times a day, As needed, Constipation  -- Indication: For constipation    Multiple Vitamins oral tablet  -- 1 tab(s) by mouth once a day  -- Indication: For vitamin    Vitamin B-12 100 mcg oral tablet  -- 1 tab(s) by mouth once a day  -- Indication: For vitamin    Vitamin D3 50,000 intl units oral capsule  -- 1 cap(s) by mouth once a month  NOT TAKING AT PRESENT  -- Indication: For vitamin    Vitamin D3 1000 intl units oral capsule  -- 1 cap(s) by mouth once a day  -- Indication: For vitamin

## 2018-04-05 NOTE — PROGRESS NOTE ADULT - PROVIDER SPECIALTY LIST ADULT
Hospitalist
Internal Medicine
Podiatry
Hospitalist

## 2018-04-05 NOTE — DISCHARGE NOTE ADULT - PATIENT PORTAL LINK FT
You can access the [facility] Portal, offered by [facility], by registering with the following website: [email or web address]

## 2018-04-10 DIAGNOSIS — D50.9 IRON DEFICIENCY ANEMIA, UNSPECIFIED: ICD-10-CM
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
## 2018-04-10 DIAGNOSIS — T87.43 INFECTION OF AMPUTATION STUMP, RIGHT LOWER EXTREMITY: ICD-10-CM

## 2018-04-10 DIAGNOSIS — K95.89 OTHER COMPLICATIONS OF OTHER BARIATRIC PROCEDURE: ICD-10-CM

## 2018-04-10 DIAGNOSIS — I10 ESSENTIAL (PRIMARY) HYPERTENSION: ICD-10-CM

## 2018-04-10 DIAGNOSIS — Z90.89 ACQUIRED ABSENCE OF OTHER ORGANS: ICD-10-CM

## 2018-04-10 DIAGNOSIS — T87.53 NECROSIS OF AMPUTATION STUMP, RIGHT LOWER EXTREMITY: ICD-10-CM

## 2018-04-10 DIAGNOSIS — I51.81 TAKOTSUBO SYNDROME: ICD-10-CM

## 2018-04-10 DIAGNOSIS — Z86.19 PERSONAL HISTORY OF OTHER INFECTIOUS AND PARASITIC DISEASES: ICD-10-CM

## 2018-04-10 DIAGNOSIS — T87.54 NECROSIS OF AMPUTATION STUMP, LEFT LOWER EXTREMITY: ICD-10-CM

## 2018-04-10 DIAGNOSIS — E66.9 OBESITY, UNSPECIFIED: ICD-10-CM

## 2018-04-10 DIAGNOSIS — Z89.021 ACQUIRED ABSENCE OF RIGHT FINGER(S): ICD-10-CM

## 2018-04-10 DIAGNOSIS — Z98.84 BARIATRIC SURGERY STATUS: ICD-10-CM

## 2018-04-10 DIAGNOSIS — D47.3 ESSENTIAL (HEMORRHAGIC) THROMBOCYTHEMIA: ICD-10-CM

## 2018-04-10 DIAGNOSIS — K21.9 GASTRO-ESOPHAGEAL REFLUX DISEASE WITHOUT ESOPHAGITIS: ICD-10-CM

## 2018-04-10 DIAGNOSIS — M87.9 OSTEONECROSIS, UNSPECIFIED: ICD-10-CM

## 2018-04-10 DIAGNOSIS — G47.33 OBSTRUCTIVE SLEEP APNEA (ADULT) (PEDIATRIC): ICD-10-CM

## 2018-04-10 DIAGNOSIS — I96 GANGRENE, NOT ELSEWHERE CLASSIFIED: ICD-10-CM

## 2018-04-10 DIAGNOSIS — Z89.022 ACQUIRED ABSENCE OF LEFT FINGER(S): ICD-10-CM

## 2018-04-10 DIAGNOSIS — T87.44 INFECTION OF AMPUTATION STUMP, LEFT LOWER EXTREMITY: ICD-10-CM
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
## 2018-04-11 ENCOUNTER — APPOINTMENT (OUTPATIENT)
Dept: PLASTIC SURGERY | Facility: AMBULATORY SURGERY CENTER | Age: 43
End: 2018-04-11
Payer: COMMERCIAL

## 2018-04-11 ENCOUNTER — INPATIENT (INPATIENT)
Facility: HOSPITAL | Age: 43
LOS: 11 days | Discharge: HOME HEALTH | End: 2018-04-23
Attending: PLASTIC SURGERY | Admitting: PLASTIC SURGERY

## 2018-04-11 VITALS
RESPIRATION RATE: 15 BRPM | WEIGHT: 207.23 LBS | HEIGHT: 68 IN | SYSTOLIC BLOOD PRESSURE: 141 MMHG | OXYGEN SATURATION: 97 % | DIASTOLIC BLOOD PRESSURE: 85 MMHG | HEART RATE: 76 BPM | TEMPERATURE: 99 F

## 2018-04-11 DIAGNOSIS — Z89.9 ACQUIRED ABSENCE OF LIMB, UNSPECIFIED: Chronic | ICD-10-CM
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
## 2018-04-11 DIAGNOSIS — Z98.84 BARIATRIC SURGERY STATUS: Chronic | ICD-10-CM

## 2018-04-11 DIAGNOSIS — I10 ESSENTIAL (PRIMARY) HYPERTENSION: ICD-10-CM

## 2018-04-11 DIAGNOSIS — Z98.890 OTHER SPECIFIED POSTPROCEDURAL STATES: Chronic | ICD-10-CM

## 2018-04-11 DIAGNOSIS — S68.119A COMPLETE TRAUMATIC METACARPOPHALANGEAL AMPUTATION OF UNSPECIFIED FINGER, INITIAL ENCOUNTER: Chronic | ICD-10-CM

## 2018-04-11 DIAGNOSIS — S81.809A UNSPECIFIED OPEN WOUND, UNSPECIFIED LOWER LEG, INITIAL ENCOUNTER: ICD-10-CM

## 2018-04-11 LAB
ALBUMIN SERPL ELPH-MCNC: 2.9 G/DL — LOW (ref 3.5–5.2)
ALP SERPL-CCNC: 59 U/L — SIGNIFICANT CHANGE UP (ref 30–115)
ALT FLD-CCNC: 8 U/L — SIGNIFICANT CHANGE UP (ref 0–41)
ANION GAP SERPL CALC-SCNC: 10 MMOL/L — SIGNIFICANT CHANGE UP (ref 7–14)
APTT BLD: 28.6 SEC — SIGNIFICANT CHANGE UP (ref 27–39.2)
AST SERPL-CCNC: 14 U/L — SIGNIFICANT CHANGE UP (ref 0–41)
BILIRUB SERPL-MCNC: 0.3 MG/DL — SIGNIFICANT CHANGE UP (ref 0.2–1.2)
BLD GP AB SCN SERPL QL: SIGNIFICANT CHANGE UP
BUN SERPL-MCNC: 12 MG/DL — SIGNIFICANT CHANGE UP (ref 10–20)
CALCIUM SERPL-MCNC: 8.4 MG/DL — LOW (ref 8.5–10.1)
CHLORIDE SERPL-SCNC: 103 MMOL/L — SIGNIFICANT CHANGE UP (ref 98–110)
CO2 SERPL-SCNC: 26 MMOL/L — SIGNIFICANT CHANGE UP (ref 17–32)
CREAT SERPL-MCNC: 1 MG/DL — SIGNIFICANT CHANGE UP (ref 0.7–1.5)
GLUCOSE SERPL-MCNC: 102 MG/DL — HIGH (ref 70–99)
HCT VFR BLD CALC: 32 % — LOW (ref 37–47)
HGB BLD-MCNC: 9.9 G/DL — LOW (ref 12–16)
INR BLD: 1.15 RATIO — SIGNIFICANT CHANGE UP (ref 0.65–1.3)
MAGNESIUM SERPL-MCNC: 1.5 MG/DL — LOW (ref 1.8–2.4)
MCHC RBC-ENTMCNC: 24.4 PG — LOW (ref 27–31)
MCHC RBC-ENTMCNC: 30.9 G/DL — LOW (ref 32–37)
MCV RBC AUTO: 78.8 FL — LOW (ref 81–99)
NRBC # BLD: 0 /100 WBCS — SIGNIFICANT CHANGE UP (ref 0–0)
PLATELET # BLD AUTO: 345 K/UL — SIGNIFICANT CHANGE UP (ref 130–400)
POTASSIUM SERPL-MCNC: 4.5 MMOL/L — SIGNIFICANT CHANGE UP (ref 3.5–5)
POTASSIUM SERPL-SCNC: 4.5 MMOL/L — SIGNIFICANT CHANGE UP (ref 3.5–5)
PROT SERPL-MCNC: 5.5 G/DL — LOW (ref 6–8)
PROTHROM AB SERPL-ACNC: 12.5 SEC — SIGNIFICANT CHANGE UP (ref 9.95–12.87)
RBC # BLD: 4.06 M/UL — LOW (ref 4.2–5.4)
RBC # FLD: 17.1 % — HIGH (ref 11.5–14.5)
SODIUM SERPL-SCNC: 139 MMOL/L — SIGNIFICANT CHANGE UP (ref 135–146)
TYPE + AB SCN PNL BLD: SIGNIFICANT CHANGE UP
WBC # BLD: 6.07 K/UL — SIGNIFICANT CHANGE UP (ref 4.8–10.8)
WBC # FLD AUTO: 6.07 K/UL — SIGNIFICANT CHANGE UP (ref 4.8–10.8)

## 2018-04-11 PROCEDURE — 11042 DBRDMT SUBQ TIS 1ST 20SQCM/<: CPT | Mod: 58,59

## 2018-04-11 PROCEDURE — 15002 WOUND PREP TRK/ARM/LEG: CPT | Mod: 58

## 2018-04-11 RX ORDER — PREGABALIN 225 MG/1
100 CAPSULE ORAL DAILY
Qty: 0 | Refills: 0 | Status: DISCONTINUED | OUTPATIENT
Start: 2018-04-11 | End: 2018-04-17

## 2018-04-11 RX ORDER — MORPHINE SULFATE 50 MG/1
4 CAPSULE, EXTENDED RELEASE ORAL EVERY 12 HOURS
Qty: 0 | Refills: 0 | Status: DISCONTINUED | OUTPATIENT
Start: 2018-04-11 | End: 2018-04-12

## 2018-04-11 RX ORDER — MORPHINE SULFATE 50 MG/1
2 CAPSULE, EXTENDED RELEASE ORAL ONCE
Qty: 0 | Refills: 0 | Status: DISCONTINUED | OUTPATIENT
Start: 2018-04-11 | End: 2018-04-11

## 2018-04-11 RX ORDER — MIDAZOLAM HYDROCHLORIDE 1 MG/ML
2 INJECTION, SOLUTION INTRAMUSCULAR; INTRAVENOUS EVERY 12 HOURS
Qty: 0 | Refills: 0 | Status: DISCONTINUED | OUTPATIENT
Start: 2018-04-11 | End: 2018-04-12

## 2018-04-11 RX ORDER — METOPROLOL TARTRATE 50 MG
50 TABLET ORAL DAILY
Qty: 0 | Refills: 0 | Status: DISCONTINUED | OUTPATIENT
Start: 2018-04-11 | End: 2018-04-13

## 2018-04-11 RX ORDER — SODIUM HYPOCHLORITE 0.125 %
1 SOLUTION, NON-ORAL MISCELLANEOUS
Qty: 0 | Refills: 0 | Status: DISCONTINUED | OUTPATIENT
Start: 2018-04-11 | End: 2018-04-17

## 2018-04-11 RX ORDER — CHOLECALCIFEROL (VITAMIN D3) 125 MCG
1000 CAPSULE ORAL DAILY
Qty: 0 | Refills: 0 | Status: DISCONTINUED | OUTPATIENT
Start: 2018-04-11 | End: 2018-04-11

## 2018-04-11 RX ORDER — SENNA PLUS 8.6 MG/1
2 TABLET ORAL AT BEDTIME
Qty: 0 | Refills: 0 | Status: DISCONTINUED | OUTPATIENT
Start: 2018-04-11 | End: 2018-04-17

## 2018-04-11 RX ORDER — SODIUM CHLORIDE 9 MG/ML
1000 INJECTION, SOLUTION INTRAVENOUS
Qty: 0 | Refills: 0 | Status: DISCONTINUED | OUTPATIENT
Start: 2018-04-11 | End: 2018-04-11

## 2018-04-11 RX ORDER — MORPHINE SULFATE 50 MG/1
2 CAPSULE, EXTENDED RELEASE ORAL
Qty: 0 | Refills: 0 | Status: DISCONTINUED | OUTPATIENT
Start: 2018-04-11 | End: 2018-04-11

## 2018-04-11 RX ORDER — AMPICILLIN SODIUM AND SULBACTAM SODIUM 250; 125 MG/ML; MG/ML
INJECTION, POWDER, FOR SUSPENSION INTRAMUSCULAR; INTRAVENOUS
Qty: 0 | Refills: 0 | Status: DISCONTINUED | OUTPATIENT
Start: 2018-04-11 | End: 2018-04-13

## 2018-04-11 RX ORDER — AMPICILLIN SODIUM AND SULBACTAM SODIUM 250; 125 MG/ML; MG/ML
1.5 INJECTION, POWDER, FOR SUSPENSION INTRAMUSCULAR; INTRAVENOUS EVERY 6 HOURS
Qty: 0 | Refills: 0 | Status: DISCONTINUED | OUTPATIENT
Start: 2018-04-12 | End: 2018-04-13

## 2018-04-11 RX ORDER — HEPARIN SODIUM 5000 [USP'U]/ML
5000 INJECTION INTRAVENOUS; SUBCUTANEOUS EVERY 8 HOURS
Qty: 0 | Refills: 0 | Status: DISCONTINUED | OUTPATIENT
Start: 2018-04-11 | End: 2018-04-17

## 2018-04-11 RX ORDER — METOPROLOL TARTRATE 50 MG
50 TABLET ORAL DAILY
Qty: 0 | Refills: 0 | Status: DISCONTINUED | OUTPATIENT
Start: 2018-04-11 | End: 2018-04-11

## 2018-04-11 RX ORDER — PREGABALIN 225 MG/1
100 CAPSULE ORAL DAILY
Qty: 0 | Refills: 0 | Status: DISCONTINUED | OUTPATIENT
Start: 2018-04-11 | End: 2018-04-11

## 2018-04-11 RX ORDER — METOPROLOL TARTRATE 50 MG
50 TABLET ORAL DAILY
Qty: 0 | Refills: 0 | Status: DISCONTINUED | OUTPATIENT
Start: 2018-04-11 | End: 2018-04-17

## 2018-04-11 RX ORDER — DOCUSATE SODIUM 100 MG
100 CAPSULE ORAL
Qty: 0 | Refills: 0 | Status: DISCONTINUED | OUTPATIENT
Start: 2018-04-11 | End: 2018-04-17

## 2018-04-11 RX ORDER — ONDANSETRON 8 MG/1
4 TABLET, FILM COATED ORAL ONCE
Qty: 0 | Refills: 0 | Status: DISCONTINUED | OUTPATIENT
Start: 2018-04-11 | End: 2018-04-11

## 2018-04-11 RX ORDER — CHOLECALCIFEROL (VITAMIN D3) 125 MCG
1000 CAPSULE ORAL DAILY
Qty: 0 | Refills: 0 | Status: DISCONTINUED | OUTPATIENT
Start: 2018-04-11 | End: 2018-04-17

## 2018-04-11 RX ORDER — AMPICILLIN SODIUM AND SULBACTAM SODIUM 250; 125 MG/ML; MG/ML
1.5 INJECTION, POWDER, FOR SUSPENSION INTRAMUSCULAR; INTRAVENOUS ONCE
Qty: 0 | Refills: 0 | Status: COMPLETED | OUTPATIENT
Start: 2018-04-11 | End: 2018-04-11

## 2018-04-11 RX ORDER — PANTOPRAZOLE SODIUM 20 MG/1
40 TABLET, DELAYED RELEASE ORAL
Qty: 0 | Refills: 0 | Status: DISCONTINUED | OUTPATIENT
Start: 2018-04-11 | End: 2018-04-15

## 2018-04-11 RX ORDER — MORPHINE SULFATE 50 MG/1
2 CAPSULE, EXTENDED RELEASE ORAL EVERY 4 HOURS
Qty: 0 | Refills: 0 | Status: DISCONTINUED | OUTPATIENT
Start: 2018-04-11 | End: 2018-04-12

## 2018-04-11 RX ORDER — GABAPENTIN 400 MG/1
1 CAPSULE ORAL
Qty: 0 | Refills: 0 | COMMUNITY

## 2018-04-11 RX ADMIN — MORPHINE SULFATE 2 MILLIGRAM(S): 50 CAPSULE, EXTENDED RELEASE ORAL at 14:31

## 2018-04-11 RX ADMIN — MORPHINE SULFATE 2 MILLIGRAM(S): 50 CAPSULE, EXTENDED RELEASE ORAL at 18:02

## 2018-04-11 RX ADMIN — MORPHINE SULFATE 2 MILLIGRAM(S): 50 CAPSULE, EXTENDED RELEASE ORAL at 18:45

## 2018-04-11 RX ADMIN — MORPHINE SULFATE 2 MILLIGRAM(S): 50 CAPSULE, EXTENDED RELEASE ORAL at 15:24

## 2018-04-11 RX ADMIN — HEPARIN SODIUM 5000 UNIT(S): 5000 INJECTION INTRAVENOUS; SUBCUTANEOUS at 21:32

## 2018-04-11 RX ADMIN — AMPICILLIN SODIUM AND SULBACTAM SODIUM 100 GRAM(S): 250; 125 INJECTION, POWDER, FOR SUSPENSION INTRAMUSCULAR; INTRAVENOUS at 23:58

## 2018-04-11 NOTE — H&P ADULT - ASSESSMENT
Patient is a 41 yo female with past medical history of htn, obesity s/p gastric bypass surgery in October 2017 complicated by septic shock, ARDS, small bowel obstruction, ATN with CVVH, cardiomyopathy and vasopressor induced necrosis of fingers and toes who is admitted to BURN unit for treatment of bilateral foot wounds.

## 2018-04-11 NOTE — H&P ADULT - HISTORY OF PRESENT ILLNESS
Patient is a 41 yo female with past medical history of htn, obesity s/p gastric bypass surgery in October 2017 complicated by septic shock, ARDS, small bowel obstruction, ATN with CVVH, cardiomyopathy and vasopressor induced necrosis of fingers and toes. In January 2018 patient had bilateral fingers amputated which have healed well. In February 2018 patient had bilateral feet amputated. She was going for weekly VAC changes at ambulatory surgery for about 3 weeks s/p bilateral feet amputations, but was complicated by infection. Patient was taken to the ambulatory surgery on 4/11/18 intended for a VAC change and possible STSG for closure of b/l LE wounds. Upon removing the dressings, it was found that the wounds were not yet ready for definitive reconstruction/closure. The wounds were covered w/ substantial fibrinous exudate and malodorous. The wounds were irrigated/debrided with sharp curettage and pulsavac w/ baci/polymixin irrigation. Further, exposed bones and tendons were visible on the wounds b/l.  Sterile wet dressings were applied. It was felt that the wounds would benefit from further comprehensive local wound care. It was agreed that she should be admitted to the Burn ICU for further wound care. Currently patient is comfortable and not complaining of any pain, nausea, vomiting, diarrhea, chest pain, trouble breathing, fever or chills. Patient is a 43 yo female with past medical history of htn, obesity s/p gastric bypass surgery in October 2017 complicated by septic shock, ARDS, small bowel obstruction, ATN with CVVH, cardiomyopathy and vasopressor induced necrosis of fingers and toes. In January 2018 patient had bilateral fingers amputated which have healed well. In February 2018 patient had bilateral feet amputated. She was going for weekly VAC changes at ambulatory surgery for about 3 weeks s/p bilateral feet amputations, but was complicated by infection. Patient was taken to the ambulatory surgery Today ( 4/11/18) intended for a VAC change and possible STSG for closure of b/l LE wounds with Dr. Jenkins. Upon removing the dressings, it was found that the wounds were not yet ready for definitive reconstruction/closure. The wounds were covered w/ substantial fibrinous exudate and malodorous. The wounds were irrigated/debrided with sharp curettage and pulsavac w/ baci/polymixin irrigation. Further, exposed bones and tendons were visible on the wounds b/l.  Sterile wet dressings were applied. It was felt that the wounds would benefit from further comprehensive local wound care. It was agreed that she should be admitted to the Burn ICU for further wound care. Currently patient is comfortable and not complaining of any pain, nausea, vomiting, diarrhea, chest pain, trouble breathing, fever or chills.

## 2018-04-11 NOTE — BRIEF OPERATIVE NOTE - PROCEDURE
<<-----Click on this checkbox to enter Procedure Change, dressing, VAC device  04/11/2018    Active  [id number]

## 2018-04-11 NOTE — H&P ADULT - NSHPPHYSICALEXAM_GEN_ALL_CORE
PHYSICAL EXAM:      Constitutional: Patient sitting comfortable. In no acute distress.     Eyes: Normal EOM. No erythema, discharge. Patient wearing glasses.     Respiratory: Equal chest rise and fall. Normal breath sounds bilaterally. Patient breathing comfortably on her own.     Cardiovascular: Regular rate and rhythm, no murmurs heard, normal S1 and S2.     Gastrointestinal: No TTP, normal and active bowel sounds in all 4 quadrants.     Extremities:            Upper extremities: All digits amputated and completely healed. Normal ROM otherwise. No erythema, or signs of infection.            Lower extremities: Bilateral feet amputation. Unable to examine, patient is wrapped up s/p OR, will be looked at during next dressing change.

## 2018-04-11 NOTE — H&P ADULT - PROBLEM SELECTOR PLAN 1
- Patient admitted to burn unit for Bilateral wounds of lower extremities s/p amputations  - Local Wound Care  - Plan for debridement and possible skin graft  - IV antibiotics  - IV fluids  - Pain medications  - DVT ppx  - GI ppx  - EKG, ECHO ordered  - urine pregnancy followed by chest xray

## 2018-04-11 NOTE — ASU DISCHARGE PLAN (ADULT/PEDIATRIC). - ITEMS TO FOLLOWUP WITH YOUR PHYSICIAN'S
Please follow up with Dr. Lind within 1-2 weeks after discharge from the hospital. You may call [phone] to schedule an appointment.

## 2018-04-11 NOTE — CHART NOTE - NSCHARTNOTEFT_GEN_A_CORE
Pt was taken to the [facility] on 4/11/18 intended for a VAC change and possible STSG for closure of b/l LE wounds. Upon removing the dressings, it was found that the wounds were not yet ready for definitive reconstruction/closure. The wounds were covered w/ substantial fibrinous exudate and malodorous. The wounds were irrigated/debrided with sharp curettage and pulsavac w/ baci/polymixin irrigation. Further, exposed bones and tendons were visible on the wounds b/l.  Sterile wet dressings were applied. It was felt that the wounds wound benefit from further comprehensive local wound care. As such, after discussing the case with Dr. Artis, it was agreed that she should be admitted to the Burn ICU for further wound care.     Please refer to brief op note/dictated operative report for further details regarding the procedure.

## 2018-04-11 NOTE — ASU DISCHARGE PLAN (ADULT/PEDIATRIC). - SPECIAL INSTRUCTIONS
Resume normal diet. Avoid straining, exercise, or heavy lifting.    Take medications as instructed by prescriptions.    You will be discharged with a wound VAC.     Follow-up with primary care doctor as well.     Call 911 and return to the ED for chest pain, shortness of breath, significant increase in pain, or significant change in color of surgical sites.

## 2018-04-11 NOTE — BRIEF OPERATIVE NOTE - PRE-OP DX
Wound of lower extremity, unspecified laterality, subsequent encounter  04/11/2018    Active  Ubaldo Ty

## 2018-04-12 LAB
ANION GAP SERPL CALC-SCNC: 9 MMOL/L — SIGNIFICANT CHANGE UP (ref 7–14)
BUN SERPL-MCNC: 9 MG/DL — LOW (ref 10–20)
CALCIUM SERPL-MCNC: 8.4 MG/DL — LOW (ref 8.5–10.1)
CHLORIDE SERPL-SCNC: 104 MMOL/L — SIGNIFICANT CHANGE UP (ref 98–110)
CO2 SERPL-SCNC: 28 MMOL/L — SIGNIFICANT CHANGE UP (ref 17–32)
CREAT SERPL-MCNC: 0.9 MG/DL — SIGNIFICANT CHANGE UP (ref 0.7–1.5)
GLUCOSE SERPL-MCNC: 87 MG/DL — SIGNIFICANT CHANGE UP (ref 70–99)
HCT VFR BLD CALC: 33 % — LOW (ref 37–47)
HGB BLD-MCNC: 10.1 G/DL — LOW (ref 12–16)
MAGNESIUM SERPL-MCNC: 1.9 MG/DL — SIGNIFICANT CHANGE UP (ref 1.8–2.4)
MCHC RBC-ENTMCNC: 24.1 PG — LOW (ref 27–31)
MCHC RBC-ENTMCNC: 30.6 G/DL — LOW (ref 32–37)
MCV RBC AUTO: 78.8 FL — LOW (ref 81–99)
NRBC # BLD: 0 /100 WBCS — SIGNIFICANT CHANGE UP (ref 0–0)
PHOSPHATE SERPL-MCNC: 3.7 MG/DL — SIGNIFICANT CHANGE UP (ref 2.1–4.9)
PLATELET # BLD AUTO: 328 K/UL — SIGNIFICANT CHANGE UP (ref 130–400)
POTASSIUM SERPL-MCNC: 4.2 MMOL/L — SIGNIFICANT CHANGE UP (ref 3.5–5)
POTASSIUM SERPL-SCNC: 4.2 MMOL/L — SIGNIFICANT CHANGE UP (ref 3.5–5)
RBC # BLD: 4.19 M/UL — LOW (ref 4.2–5.4)
RBC # FLD: 16.9 % — HIGH (ref 11.5–14.5)
SODIUM SERPL-SCNC: 141 MMOL/L — SIGNIFICANT CHANGE UP (ref 135–146)
WBC # BLD: 6.93 K/UL — SIGNIFICANT CHANGE UP (ref 4.8–10.8)
WBC # FLD AUTO: 6.93 K/UL — SIGNIFICANT CHANGE UP (ref 4.8–10.8)

## 2018-04-12 RX ORDER — MIDAZOLAM HYDROCHLORIDE 1 MG/ML
2 INJECTION, SOLUTION INTRAMUSCULAR; INTRAVENOUS ONCE
Qty: 0 | Refills: 0 | Status: DISCONTINUED | OUTPATIENT
Start: 2018-04-12 | End: 2018-04-12

## 2018-04-12 RX ORDER — MORPHINE SULFATE 50 MG/1
6 CAPSULE, EXTENDED RELEASE ORAL EVERY 12 HOURS
Qty: 0 | Refills: 0 | Status: DISCONTINUED | OUTPATIENT
Start: 2018-04-12 | End: 2018-04-17

## 2018-04-12 RX ORDER — MORPHINE SULFATE 50 MG/1
2 CAPSULE, EXTENDED RELEASE ORAL ONCE
Qty: 0 | Refills: 0 | Status: DISCONTINUED | OUTPATIENT
Start: 2018-04-12 | End: 2018-04-12

## 2018-04-12 RX ORDER — SODIUM HYPOCHLORITE 0.125 %
1 SOLUTION, NON-ORAL MISCELLANEOUS ONCE
Qty: 0 | Refills: 0 | Status: COMPLETED | OUTPATIENT
Start: 2018-04-12 | End: 2018-04-12

## 2018-04-12 RX ORDER — MORPHINE SULFATE 50 MG/1
4 CAPSULE, EXTENDED RELEASE ORAL
Qty: 0 | Refills: 0 | Status: DISCONTINUED | OUTPATIENT
Start: 2018-04-12 | End: 2018-04-17

## 2018-04-12 RX ORDER — HYDROMORPHONE HYDROCHLORIDE 2 MG/ML
4 INJECTION INTRAMUSCULAR; INTRAVENOUS; SUBCUTANEOUS EVERY 6 HOURS
Qty: 0 | Refills: 0 | Status: DISCONTINUED | OUTPATIENT
Start: 2018-04-12 | End: 2018-04-17

## 2018-04-12 RX ORDER — MAGNESIUM SULFATE 500 MG/ML
2 VIAL (ML) INJECTION ONCE
Qty: 0 | Refills: 0 | Status: COMPLETED | OUTPATIENT
Start: 2018-04-12 | End: 2018-04-12

## 2018-04-12 RX ORDER — MIDAZOLAM HYDROCHLORIDE 1 MG/ML
4 INJECTION, SOLUTION INTRAMUSCULAR; INTRAVENOUS EVERY 12 HOURS
Qty: 0 | Refills: 0 | Status: DISCONTINUED | OUTPATIENT
Start: 2018-04-12 | End: 2018-04-17

## 2018-04-12 RX ORDER — IBUPROFEN 200 MG
600 TABLET ORAL EVERY 8 HOURS
Qty: 0 | Refills: 0 | Status: DISCONTINUED | OUTPATIENT
Start: 2018-04-12 | End: 2018-04-17

## 2018-04-12 RX ADMIN — Medication 600 MILLIGRAM(S): at 01:23

## 2018-04-12 RX ADMIN — Medication 1000 UNIT(S): at 11:52

## 2018-04-12 RX ADMIN — HYDROMORPHONE HYDROCHLORIDE 4 MILLIGRAM(S): 2 INJECTION INTRAMUSCULAR; INTRAVENOUS; SUBCUTANEOUS at 20:14

## 2018-04-12 RX ADMIN — Medication 1 APPLICATION(S): at 12:23

## 2018-04-12 RX ADMIN — Medication 600 MILLIGRAM(S): at 00:44

## 2018-04-12 RX ADMIN — MORPHINE SULFATE 4 MILLIGRAM(S): 50 CAPSULE, EXTENDED RELEASE ORAL at 12:30

## 2018-04-12 RX ADMIN — MIDAZOLAM HYDROCHLORIDE 4 MILLIGRAM(S): 1 INJECTION, SOLUTION INTRAMUSCULAR; INTRAVENOUS at 23:18

## 2018-04-12 RX ADMIN — HEPARIN SODIUM 5000 UNIT(S): 5000 INJECTION INTRAVENOUS; SUBCUTANEOUS at 23:16

## 2018-04-12 RX ADMIN — MORPHINE SULFATE 4 MILLIGRAM(S): 50 CAPSULE, EXTENDED RELEASE ORAL at 12:21

## 2018-04-12 RX ADMIN — Medication 50 GRAM(S): at 00:30

## 2018-04-12 RX ADMIN — HYDROMORPHONE HYDROCHLORIDE 4 MILLIGRAM(S): 2 INJECTION INTRAMUSCULAR; INTRAVENOUS; SUBCUTANEOUS at 21:10

## 2018-04-12 RX ADMIN — MORPHINE SULFATE 6 MILLIGRAM(S): 50 CAPSULE, EXTENDED RELEASE ORAL at 23:17

## 2018-04-12 RX ADMIN — MORPHINE SULFATE 2 MILLIGRAM(S): 50 CAPSULE, EXTENDED RELEASE ORAL at 14:00

## 2018-04-12 RX ADMIN — Medication 1 APPLICATION(S): at 13:00

## 2018-04-12 RX ADMIN — AMPICILLIN SODIUM AND SULBACTAM SODIUM 100 GRAM(S): 250; 125 INJECTION, POWDER, FOR SUSPENSION INTRAMUSCULAR; INTRAVENOUS at 11:52

## 2018-04-12 RX ADMIN — MORPHINE SULFATE 2 MILLIGRAM(S): 50 CAPSULE, EXTENDED RELEASE ORAL at 15:42

## 2018-04-12 RX ADMIN — HEPARIN SODIUM 5000 UNIT(S): 5000 INJECTION INTRAVENOUS; SUBCUTANEOUS at 15:42

## 2018-04-12 RX ADMIN — Medication 1 TABLET(S): at 11:52

## 2018-04-12 RX ADMIN — MORPHINE SULFATE 2 MILLIGRAM(S): 50 CAPSULE, EXTENDED RELEASE ORAL at 13:44

## 2018-04-12 RX ADMIN — AMPICILLIN SODIUM AND SULBACTAM SODIUM 100 GRAM(S): 250; 125 INJECTION, POWDER, FOR SUSPENSION INTRAMUSCULAR; INTRAVENOUS at 23:16

## 2018-04-12 RX ADMIN — Medication 100 MILLIGRAM(S): at 17:49

## 2018-04-12 RX ADMIN — MIDAZOLAM HYDROCHLORIDE 2 MILLIGRAM(S): 1 INJECTION, SOLUTION INTRAMUSCULAR; INTRAVENOUS at 13:44

## 2018-04-12 RX ADMIN — Medication 1 APPLICATION(S): at 23:00

## 2018-04-12 RX ADMIN — AMPICILLIN SODIUM AND SULBACTAM SODIUM 100 GRAM(S): 250; 125 INJECTION, POWDER, FOR SUSPENSION INTRAMUSCULAR; INTRAVENOUS at 17:50

## 2018-04-12 RX ADMIN — MORPHINE SULFATE 2 MILLIGRAM(S): 50 CAPSULE, EXTENDED RELEASE ORAL at 16:10

## 2018-04-12 RX ADMIN — AMPICILLIN SODIUM AND SULBACTAM SODIUM 100 GRAM(S): 250; 125 INJECTION, POWDER, FOR SUSPENSION INTRAMUSCULAR; INTRAVENOUS at 05:17

## 2018-04-12 RX ADMIN — Medication 50 MILLIGRAM(S): at 06:48

## 2018-04-12 RX ADMIN — HEPARIN SODIUM 5000 UNIT(S): 5000 INJECTION INTRAVENOUS; SUBCUTANEOUS at 05:16

## 2018-04-12 RX ADMIN — MIDAZOLAM HYDROCHLORIDE 2 MILLIGRAM(S): 1 INJECTION, SOLUTION INTRAMUSCULAR; INTRAVENOUS at 12:22

## 2018-04-12 RX ADMIN — Medication 100 MILLIGRAM(S): at 05:16

## 2018-04-12 RX ADMIN — MORPHINE SULFATE 4 MILLIGRAM(S): 50 CAPSULE, EXTENDED RELEASE ORAL at 17:47

## 2018-04-12 NOTE — PROGRESS NOTE ADULT - SUBJECTIVE AND OBJECTIVE BOX
JIMMY CR  [id number]    Subjective:  Pt is a 43 y/o F with PMH of obesity who underwent Elin-En-Y gastric bypass with Dr. Knapp 10/10/17, followed by [facility] for SBO and subsequent septic shock, from suspected aspiration, requiring extended ICU stay and vasopressors. Pt developed distal ischemia of BL hands and feet requiring amputations. Pt is now 3 months s/p amputation of left hand (to wrist) and right hand (to metacarpals). Pt is also s/p amputation of BL feet by Dr. Salazar, now requiring serial debridements and daily local wound care. Upon examination yesterday, pt was found to have significant malodor of amputation sites and was admitted for management of cellulitis.     Seen and examined bedside. C/o severe pain upon palpation to BL feet stumps. Denies f/c, drainage, SOB, or N/V.    Objective:  T(C): 36 (04-12-18 @ 07:49), Max: 37.2 (04-11-18 @ 23:18)  HR: 75 (04-12-18 @ 07:49) (75 - 98)  BP: 115/76 (04-12-18 @ 07:49) (111/60 - 141/85)  RR: 18 (04-12-18 @ 07:49) (15 - 20)  SpO2: 99% (04-11-18 @ 23:18) (97% - 100%)  Wt(kg): --   04-11    139  |  103  |  12  ----------------------------<  102<H>  4.5   |  26  |  1.0    Ca    8.4<L>      11 Apr 2018 20:34  Mg     1.5     04-11    TPro  5.5<L>  /  Alb  2.9<L>  /  TBili  0.3  /  DBili  x   /  AST  14  /  ALT  8   /  AlkPhos  59  04-11                        9.9    6.07  )-----------( 345      ( 11 Apr 2018 20:34 )             32.0       04-11 @ 07:01  -  04-12 @ 07:00  --------------------------------------------------------  IN: 440 mL / OUT: 675 mL / NET: -235 mL    04-12 @ 07:01  - 04-12 @ 09:59  --------------------------------------------------------  IN: 0 mL / OUT: 200 mL / NET: -200 mL      PHYSICAL EXAM:  Gen: AAOx3, NAD, well-appearing  Ext: BL upper extremities with well-healed amputation sites (left hand to wrist, right to metacarpals), no open wounds, no significant erythema/swelling, +tender to touch  BL lower extremities with bandaged amputation sites, dressings C/D/I, pt currently refusing examination due to pain      MEDICATIONS  (STANDING):  ampicillin/sulbactam  IVPB      ampicillin/sulbactam  IVPB 1.5 Gram(s) IV Intermittent every 6 hours  cholecalciferol 1000 Unit(s) Oral daily  cyanocobalamin 100 MICROGram(s) Oral daily  Dakins Solution - 1/2 Strength 1 Application(s) Topical two times a day  Dakins Solution - 1/4 Strength 1 Application(s) Topical once  docusate sodium 100 milliGRAM(s) Oral two times a day  heparin  Injectable 5000 Unit(s) SubCutaneous every 8 hours  metoprolol succinate ER 50 milliGRAM(s) Oral daily  metoprolol succinate ER 50 milliGRAM(s) Oral daily  multivitamin 1 Tablet(s) Oral daily  pantoprazole    Tablet 40 milliGRAM(s) Oral before breakfast    MEDICATIONS  (PRN):  bisacodyl Suppository 10 milliGRAM(s) Rectal daily PRN Constipation  ibuprofen  Tablet 600 milliGRAM(s) Oral every 8 hours PRN Mild pain  midazolam Injectable 2 milliGRAM(s) IV Push every 12 hours PRN wound care  morphine  - Injectable 2 milliGRAM(s) IV Push every 4 hours PRN Severe Pain (7 - 10)  morphine  - Injectable 4 milliGRAM(s) IV Push every 12 hours PRN Severe Pain (7 - 10)  senna 2 Tablet(s) Oral at bedtime PRN Constipation      Assessment/Plan:  Patient is a 41y/o F with BL feet cellulitic amputation sites  -Continue abx as per primary team  -Daily dressing changes as per Burn Unit, agree with Dakins WTD BID  -Podiatry consult Dr. Salazar  -Agree with Debridement and STSG, potential OR Monday with Dr. Artis 4/16  -ACE wraps or tubitrip to BL hands/forearms for comfort  -Continue current diet, OOB with assistance  Discussed with Dr. Lind, will follow

## 2018-04-12 NOTE — PROGRESS NOTE ADULT - SUBJECTIVE AND OBJECTIVE BOX
Pt stable   C/o pain with increased dressing change frequency. requested increase in dosage of pain medicine ( Morphine )  Vital Signs Last 24 Hrs  T(C): 37.1 (12 Apr 2018 16:19), Max: 37.2 (11 Apr 2018 23:18)  T(F): 98.8 (12 Apr 2018 16:19), Max: 98.9 (11 Apr 2018 23:18)  HR: 78 (12 Apr 2018 16:19) (75 - 93)  BP: 115/66 (12 Apr 2018 16:19) (113/66 - 120/79)  BP(mean): 83 (12 Apr 2018 05:12) (83 - 84)  RR: 18 (12 Apr 2018 16:19) (18 - 19)  SpO2: 100% (12 Apr 2018 13:47) (99% - 100%)                        10.1   6.93  )-----------( 328      ( 12 Apr 2018 17:13 )             33.0   04-12    141  |  104  |  9<L>  ----------------------------<  87  4.2   |  28  |  0.9    EXAM/ WOUNDS: bilateral foot wounds- grossly clean, pink granulation with scattered patchy pale areas.

## 2018-04-12 NOTE — PROGRESS NOTE ADULT - ASSESSMENT
A/P :  Chronic foot wounds S/P ischemia, gangrene and amputations   Continue wound care with Dakin's wet to dry, pain mgmt and VTE prophylaxis  Discussed STSG with patient - Questions addressed

## 2018-04-12 NOTE — PROGRESS NOTE ADULT - SUBJECTIVE AND OBJECTIVE BOX
42y Female PAST MEDICAL & SURGICAL HISTORY:  ARDS survivor  Sepsis  Takotsubo cardiomyopathy  Small bowel obstruction  Osteoarthritis  Cardiomyopathy  Sleep apnea  HTN (hypertension)  Gangrene of lower extremity  Gangrene of finger of right hand  Gangrene of finger of left hand  S/P amputation: b/l toes  H/O exploratory laparotomy  Amputation finger  History of cholecystectomy  H/O gastric bypass      Hospital Day: 2  POD1: irrigation and debridement of wounds    Subjective: patient reports no current pain or complaints, states she is comfortable in bed with no acute issues    Events of the Last 24h: patient taken for irrigation and debridement of wounds, was initially planned for possible skin grafts however it was determined wounds were not ready and that local wound care prior to grafts was necessary so patient was admitted to burn service    Vital Signs Last 24 Hrs  T(C): 36 (2018 07:49), Max: 37.2 (2018 23:18)  T(F): 96.8 (2018 07:49), Max: 98.9 (2018 23:18)  HR: 75 (2018 07:49) (75 - 98)  BP: 115/76 (2018 07:49) (111/60 - 141/85)  BP(mean): 83 (2018 05:12) (83 - 84)  RR: 18 (2018 07:49) (15 - 20)  SpO2: 99% (2018 23:18) (97% - 100%)      I&O's Detail    2018 07:  -  2018 07:00  --------------------------------------------------------  IN:    IV PiggyBack: 150 mL    Oral Fluid: 290 mL  Total IN: 440 mL    OUT:    Voided: 675 mL  Total OUT: 675 mL    Total NET: -235 mL      2018 07:  -  2018 10:13  --------------------------------------------------------  IN:  Total IN: 0 mL    OUT:    Voided: 200 mL  Total OUT: 200 mL    Total NET: -200 mL          PHYSICAL EXAM:    GENERAL: NAD    HEENT: NCAT    CHEST/LUNGS: CTAB    HEART: RRR,  No murmurs, rubs, or gallops    ABDOMEN: SNTND +BS    EXTREMITIES:  all four distal extremities with ace wraps in place, does not appear to be gross drainage through the dressings. as pre primary team are healing appropriately    NEURO: No focal neurological deficits    SKIN: No rashes or lesions    Diet, Consistent Carbohydrate w/Evening Snack (18 @ 06:59)    MEDICATIONS  (STANDING):  ampicillin/sulbactam  IVPB      ampicillin/sulbactam  IVPB 1.5 Gram(s) IV Intermittent every 6 hours  cholecalciferol 1000 Unit(s) Oral daily  cyanocobalamin 100 MICROGram(s) Oral daily  Dakins Solution - 1/2 Strength 1 Application(s) Topical two times a day  Dakins Solution - 1/4 Strength 1 Application(s) Topical once  docusate sodium 100 milliGRAM(s) Oral two times a day  heparin  Injectable 5000 Unit(s) SubCutaneous every 8 hours  metoprolol succinate ER 50 milliGRAM(s) Oral daily  metoprolol succinate ER 50 milliGRAM(s) Oral daily  multivitamin 1 Tablet(s) Oral daily  pantoprazole    Tablet 40 milliGRAM(s) Oral before breakfast    MEDICATIONS  (PRN):  bisacodyl Suppository 10 milliGRAM(s) Rectal daily PRN Constipation  ibuprofen  Tablet 600 milliGRAM(s) Oral every 8 hours PRN Mild pain  midazolam Injectable 2 milliGRAM(s) IV Push every 12 hours PRN wound care  morphine  - Injectable 2 milliGRAM(s) IV Push every 4 hours PRN Severe Pain (7 - 10)  morphine  - Injectable 4 milliGRAM(s) IV Push every 12 hours PRN Severe Pain (7 - 10)  senna 2 Tablet(s) Oral at bedtime PRN Constipation                              9.9    6.07  )-----------( 345      ( 2018 20:34 )             32.0                       139   |  103   |  12                 Ca: 8.4    BMP:   ----------------------------< 102    M.5   (18 @ 20:34)             4.5    |  26    | 1.0                Ph: x        LFT:     TPro: 5.5 / Alb: 2.9 / TBili: 0.3 / DBili: x / AST: 14 / ALT: 8 / AlkPhos: 59   (18 @ 20:34)    LFTs:             5.5  | 0.3  | 14       ------------------[59      ( 2018 20:34 )  2.9  | x    | 8           Coags:     12.50  ----< 1.15    ( 2018 20:34 )     28.6     SPECTRA: [id number]

## 2018-04-12 NOTE — OCCUPATIONAL THERAPY INITIAL EVALUATION ADULT - PLANNED THERAPY INTERVENTIONS, OT EVAL
fine motor coordination training/transfer training/ADL retraining/bed mobility training/strengthening

## 2018-04-12 NOTE — PROGRESS NOTE ADULT - ASSESSMENT
42F s/p debridement and irrigation of LE wounds. patient is currently stable on burn service. recommenced dakins wet to dry dressing changes TID. F/u wound cultures, ID consult for antibiotics and continue current antibiotic regimen until ID recommendations are recieved. Local wound care as per primary team.

## 2018-04-13 LAB
CULTURE RESULTS: SIGNIFICANT CHANGE UP
SPECIMEN SOURCE: SIGNIFICANT CHANGE UP

## 2018-04-13 RX ORDER — HYDROMORPHONE HYDROCHLORIDE 2 MG/ML
4 INJECTION INTRAMUSCULAR; INTRAVENOUS; SUBCUTANEOUS ONCE
Qty: 0 | Refills: 0 | Status: DISCONTINUED | OUTPATIENT
Start: 2018-04-13 | End: 2018-04-13

## 2018-04-13 RX ORDER — PREGABALIN 225 MG/1
1000 CAPSULE ORAL DAILY
Qty: 0 | Refills: 0 | Status: DISCONTINUED | OUTPATIENT
Start: 2018-04-13 | End: 2018-04-15

## 2018-04-13 RX ADMIN — HYDROMORPHONE HYDROCHLORIDE 4 MILLIGRAM(S): 2 INJECTION INTRAMUSCULAR; INTRAVENOUS; SUBCUTANEOUS at 06:51

## 2018-04-13 RX ADMIN — HYDROMORPHONE HYDROCHLORIDE 4 MILLIGRAM(S): 2 INJECTION INTRAMUSCULAR; INTRAVENOUS; SUBCUTANEOUS at 02:48

## 2018-04-13 RX ADMIN — MORPHINE SULFATE 6 MILLIGRAM(S): 50 CAPSULE, EXTENDED RELEASE ORAL at 23:41

## 2018-04-13 RX ADMIN — AMPICILLIN SODIUM AND SULBACTAM SODIUM 100 GRAM(S): 250; 125 INJECTION, POWDER, FOR SUSPENSION INTRAMUSCULAR; INTRAVENOUS at 05:30

## 2018-04-13 RX ADMIN — MIDAZOLAM HYDROCHLORIDE 4 MILLIGRAM(S): 1 INJECTION, SOLUTION INTRAMUSCULAR; INTRAVENOUS at 23:43

## 2018-04-13 RX ADMIN — Medication 1 APPLICATION(S): at 18:19

## 2018-04-13 RX ADMIN — PANTOPRAZOLE SODIUM 40 MILLIGRAM(S): 20 TABLET, DELAYED RELEASE ORAL at 10:47

## 2018-04-13 RX ADMIN — HEPARIN SODIUM 5000 UNIT(S): 5000 INJECTION INTRAVENOUS; SUBCUTANEOUS at 23:40

## 2018-04-13 RX ADMIN — HYDROMORPHONE HYDROCHLORIDE 4 MILLIGRAM(S): 2 INJECTION INTRAMUSCULAR; INTRAVENOUS; SUBCUTANEOUS at 13:32

## 2018-04-13 RX ADMIN — PREGABALIN 100 MICROGRAM(S): 225 CAPSULE ORAL at 13:58

## 2018-04-13 RX ADMIN — HYDROMORPHONE HYDROCHLORIDE 4 MILLIGRAM(S): 2 INJECTION INTRAMUSCULAR; INTRAVENOUS; SUBCUTANEOUS at 14:00

## 2018-04-13 RX ADMIN — HYDROMORPHONE HYDROCHLORIDE 4 MILLIGRAM(S): 2 INJECTION INTRAMUSCULAR; INTRAVENOUS; SUBCUTANEOUS at 02:19

## 2018-04-13 RX ADMIN — HEPARIN SODIUM 5000 UNIT(S): 5000 INJECTION INTRAVENOUS; SUBCUTANEOUS at 13:59

## 2018-04-13 RX ADMIN — Medication 1000 UNIT(S): at 13:31

## 2018-04-13 RX ADMIN — Medication 1 TABLET(S): at 12:39

## 2018-04-13 RX ADMIN — PREGABALIN 1000 MICROGRAM(S): 225 CAPSULE ORAL at 23:41

## 2018-04-13 RX ADMIN — MORPHINE SULFATE 6 MILLIGRAM(S): 50 CAPSULE, EXTENDED RELEASE ORAL at 01:00

## 2018-04-13 RX ADMIN — HEPARIN SODIUM 5000 UNIT(S): 5000 INJECTION INTRAVENOUS; SUBCUTANEOUS at 05:30

## 2018-04-13 RX ADMIN — MORPHINE SULFATE 4 MILLIGRAM(S): 50 CAPSULE, EXTENDED RELEASE ORAL at 06:44

## 2018-04-13 RX ADMIN — Medication 1 APPLICATION(S): at 23:43

## 2018-04-13 NOTE — PROGRESS NOTE ADULT - SUBJECTIVE AND OBJECTIVE BOX
Plastic Surgery Progress Note     SUBJECTIVE:  Doing well. No overnight events. Pain well controlled; tolerating regular diet; says content staying in bed/not interested in getting in a chair.    OBJECTIVE:     ** VITAL SIGNS / I&O's **    Vital Signs Last 24 Hrs  T(C): 36.6 (12 Apr 2018 23:05), Max: 37.1 (12 Apr 2018 16:19)  T(F): 97.9 (12 Apr 2018 23:05), Max: 98.8 (12 Apr 2018 16:19)  HR: 72 (12 Apr 2018 23:05) (72 - 78)  BP: 98/54 (12 Apr 2018 23:05) (98/54 - 115/66)  BP(mean): --  RR: 18 (12 Apr 2018 23:05) (18 - 18)  SpO2: 100% (12 Apr 2018 13:47) (100% - 100%)      12 Apr 2018 07:01  -  13 Apr 2018 07:00  --------------------------------------------------------  IN:    IV PiggyBack: 150 mL    Oral Fluid: 200 mL  Total IN: 350 mL    OUT:    Voided: 950 mL  Total OUT: 950 mL    Total NET: -600 mL          ** PHYSICAL EXAM **    -- CONSTITUTIONAL: AOx3. NAD.   -- EXTREMITIES: b/l LE dressings c/d/i      **MEDS**  ampicillin/sulbactam  IVPB      ampicillin/sulbactam  IVPB 1.5 Gram(s) IV Intermittent every 6 hours  bisacodyl Suppository 10 milliGRAM(s) Rectal daily PRN  cholecalciferol 1000 Unit(s) Oral daily  cyanocobalamin 100 MICROGram(s) Oral daily  Dakins Solution - 1/2 Strength 1 Application(s) Topical two times a day  docusate sodium 100 milliGRAM(s) Oral two times a day  heparin  Injectable 5000 Unit(s) SubCutaneous every 8 hours  HYDROmorphone   Tablet 4 milliGRAM(s) Oral every 6 hours PRN  ibuprofen  Tablet 600 milliGRAM(s) Oral every 8 hours PRN  metoprolol succinate ER 50 milliGRAM(s) Oral daily  midazolam Injectable 4 milliGRAM(s) IV Push every 12 hours PRN  morphine  - Injectable 4 milliGRAM(s) IV Push every 3 hours PRN  morphine  - Injectable 6 milliGRAM(s) IV Push every 12 hours PRN  multivitamin 1 Tablet(s) Oral daily  pantoprazole    Tablet 40 milliGRAM(s) Oral before breakfast  senna 2 Tablet(s) Oral at bedtime PRN      ** LABS **                          10.1   6.93  )-----------( 328      ( 12 Apr 2018 17:13 )             33.0     12 Apr 2018 17:13    141    |  104    |  9      ----------------------------<  87     4.2     |  28     |  0.9      Ca    8.4        12 Apr 2018 17:13  Phos  3.7       12 Apr 2018 17:13  Mg     1.9       12 Apr 2018 17:13    TPro  5.5    /  Alb  2.9    /  TBili  0.3    /  DBili  x      /  AST  14     /  ALT  8      /  AlkPhos  59     11 Apr 2018 20:34    PT/INR - ( 11 Apr 2018 20:34 )   PT: 12.50 sec;   INR: 1.15 ratio         PTT - ( 11 Apr 2018 20:34 )  PTT:28.6 sec  CAPILLARY BLOOD GLUCOSE

## 2018-04-13 NOTE — PROGRESS NOTE ADULT - ASSESSMENT
42f with amputation of b/l hands and feet currently admitted to burn service after washout and debridement by plastics team. B/l upper extremities are well healed . as per primary team lower extremity wounds are healing well and have good granulation tissue. Continue management as per primary team with BID dakins wet to dry dressing, antibiotic management as per ID, f/u wound cultures. Will reevaluate lower extremity wounds today with primary team during dressing change and reevaluate for skin graft in the future 42f with amputation of b/l hands and feet currently admitted to burn service after washout and debridement by plastics team. B/l upper extremities are well healed . as per primary team lower extremity wounds are healing well and have good granulation tissue. Continue management as per primary team with BID dakins wet to dry dressing, antibiotic management as per ID, f/u wound cultures.

## 2018-04-13 NOTE — CONSULT NOTE ADULT - ASSESSMENT
IMPRESSION:  No evidence of superficial or deep seated infection.    RECOMMENDATIONS:  See no need for antibiotics.    Recall prn please.

## 2018-04-13 NOTE — CONSULT NOTE ADULT - ATTENDING COMMENTS
patient well known to podiatry s/p bilateral foot open amputations granulating. plan for twice daily dakins wet to dry and evaluation by burn for skin grafting. continue local wound care.

## 2018-04-13 NOTE — PROGRESS NOTE ADULT - SUBJECTIVE AND OBJECTIVE BOX
Pt stable   No acute events overnight    Vital Signs Last 24 Hrs  T(C): 36.3 (13 Apr 2018 15:33), Max: 37.1 (12 Apr 2018 16:19)  T(F): 97.3 (13 Apr 2018 15:33), Max: 98.8 (12 Apr 2018 16:19)  HR: 74 (13 Apr 2018 15:33) (72 - 78)  BP: 112/78 (13 Apr 2018 15:33) (98/54 - 115/66)  BP(mean): --  RR: 18 (13 Apr 2018 15:33) (18 - 18)  SpO2: --    LABS:                        10.1   6.93  )-----------( 328      ( 12 Apr 2018 17:13 )             33.0     12 Apr 2018 17:13    141    |  104    |  9      ----------------------------<  87     4.2     |  28     |  0.9      Ca    8.4        12 Apr 2018 17:13  Phos  3.7       12 Apr 2018 17:13  Mg     1.9       12 Apr 2018 17:13      PT/INR - ( 11 Apr 2018 20:34 )   PT: 12.50 sec;   INR: 1.15 ratio         PTT - ( 11 Apr 2018 20:34 )  PTT:28.6 sec    EXAM/ WOUNDS: bilateral foot wounds- grossly clean, pink granulation with scattered patchy pale areas.

## 2018-04-13 NOTE — PROCEDURE NOTE - NSPROCDETAILS_GEN_ALL_CORE
supine position/sterile technique, catheter placed/location identified, draped/prepped, sterile technique used/sterile dressing applied

## 2018-04-13 NOTE — CONSULT NOTE ADULT - EXTREMITIES COMMENTS
amputation sites checked with podiatry at bedside.  Sites look remarkably clean. No necrosis/purulence/necrosis/malodor. Surrounding tissue also without evidence of infection.

## 2018-04-13 NOTE — PROGRESS NOTE ADULT - ASSESSMENT
A/P: 43y/o F w/ b/l foot wounds s/p debridement (POD 3)  - IR placement of PICC today  -Continue abx as per primary team  -Daily dressing changes as per Burn Unit, agree with Dakins WTD BID  -Appreciate Podiatry rec's  -Agree with Debridement and STSG, potential OR Monday with Dr. Artis 4/16  -ACE wraps or tubitrip to BL hands/forearms for comfort  -Continue current diet, OOB with assistance

## 2018-04-13 NOTE — CONSULT NOTE ADULT - SUBJECTIVE AND OBJECTIVE BOX
PROGRESS NOTE   Patient is a 42y old  Female who presents with a chief complaint of b/l feet infection; needs more debridement possible skin graft (11 Apr 2018 19:53) . Podiatry consulted for evaluation of B/L choparts amputation.       HPI:  Patient is a 43 yo female with past medical history of htn, obesity s/p gastric bypass surgery in October 2017 complicated by septic shock, ARDS, small bowel obstruction, ATN with CVVH, cardiomyopathy and vasopressor induced necrosis of fingers and toes. In January 2018 patient had bilateral fingers amputated which have healed well. In February 2018 patient had bilateral feet amputated. She was going for weekly VAC changes at ambulatory surgery for about 3 weeks s/p bilateral feet amputations, but was complicated by infection. Patient was taken to the ambulatory surgery Today ( 4/11/18) intended for a VAC change and possible STSG for closure of b/l LE wounds with Dr. Jenkins. Upon removing the dressings, it was found that the wounds were not yet ready for definitive reconstruction/closure. The wounds were covered w/ substantial fibrinous exudate and malodorous. The wounds were irrigated/debrided with sharp curettage and pulsavac w/ baci/polymixin irrigation. Further, exposed bones and tendons were visible on the wounds b/l.  Sterile wet dressings were applied. It was felt that the wounds would benefit from further comprehensive local wound care. It was agreed that she should be admitted to the Burn ICU for further wound care. Currently patient is comfortable and not complaining of any pain, nausea, vomiting, diarrhea, chest pain, trouble breathing, fever or chills. (11 Apr 2018 16:56)      Vital Signs Last 24 Hrs  T(C): 36.6 (12 Apr 2018 23:05), Max: 37.1 (12 Apr 2018 16:19)  T(F): 97.9 (12 Apr 2018 23:05), Max: 98.8 (12 Apr 2018 16:19)  HR: 72 (12 Apr 2018 23:05) (72 - 89)  BP: 98/54 (12 Apr 2018 23:05) (98/54 - 120/79)  BP(mean): 83 (12 Apr 2018 05:12) (83 - 83)  RR: 18 (12 Apr 2018 23:05) (18 - 18)  SpO2: 100% (12 Apr 2018 13:47) (100% - 100%)                          10.1   6.93  )-----------( 328      ( 12 Apr 2018 17:13 )             33.0               04-12    141  |  104  |  9<L>  ----------------------------<  87  4.2   |  28  |  0.9    Ca    8.4<L>      12 Apr 2018 17:13  Phos  3.7     04-12  Mg     1.9     04-12    TPro  5.5<L>  /  Alb  2.9<L>  /  TBili  0.3  /  DBili  x   /  AST  14  /  ALT  8   /  AlkPhos  59  04-11      PHYSICAL EXAM  GEN: JIMMY CR is a pleasant well-nourished, well developed 42y Female in no acute distress, alert awake, and oriented to person, place and time.       LE focused exam    Vasc:  - DP pulses non-palpable B/L (due to amputation). PT pulses 1/4 B/L  - skin temp warm to warm, proximal to distal B/L    Neuro:  - Pt has sensation to wound base and periwound area B/L    Derm:  - B/L choparts amputation open. Granular wound base. - erythema, - edema, - malodor, - purulence  - Rash to distal B/L LE, secondary to skin irritation caused by tegederms     MSK:  - muscular strength 3/5 in all directions B/L    Assessment:  - Chopart's amputation open B/L  - Rash to distal B/L LE    Plan:  - pt seen/evaluated @ bedside  - Dakins WTD/ACE to B/L LE Q12h  - findings discussed w/ attending   - no sx intervention from podiatric standpoint @ this time  - podiatry will continue to monitor

## 2018-04-13 NOTE — CONSULT NOTE ADULT - SUBJECTIVE AND OBJECTIVE BOX
JIMMY CR  42y, Female  Allergy: Percocet 10/325 (Rash)      HPI:  Patient is a 41 yo female with past medical history of htn, obesity s/p gastric bypass surgery in October 2017 complicated by septic shock, ARDS, small bowel obstruction, ATN with CVVH, cardiomyopathy and vasopressor induced necrosis of fingers and toes. In January 2018 patient had bilateral fingers amputated which have healed well. In February 2018 patient had bilateral feet amputated. She was going for weekly VAC changes at ambulatory surgery for about 3 weeks s/p bilateral feet amputations, but was complicated by infection. Patient was taken to the ambulatory surgery Today ( 4/11/18) intended for a VAC change and possible STSG for closure of b/l LE wounds with Dr. Jenkins. Upon removing the dressings, it was found that the wounds were not yet ready for definitive reconstruction/closure. The wounds were covered w/ substantial fibrinous exudate and malodorous. The wounds were irrigated/debrided with sharp curettage and pulsavac w/ baci/polymixin irrigation. Further, exposed bones and tendons were visible on the wounds b/l.  Sterile wet dressings were applied. It was felt that the wounds would benefit from further comprehensive local wound care. It was agreed that she should be admitted to the Burn ICU for further wound care. Currently patient is comfortable and not complaining of any pain, nausea, vomiting, diarrhea, chest pain, trouble breathing, fever or chills. (11 Apr 2018 16:56)    FAMILY HISTORY:  Family history of heart disease (Father)  Family history of cancer (Mother): HODKINS LYMPHOMA    PAST MEDICAL & SURGICAL HISTORY:  ARDS survivor  Sepsis  Takotsubo cardiomyopathy  Small bowel obstruction  Osteoarthritis  Cardiomyopathy  Sleep apnea  HTN (hypertension)  Gangrene of lower extremity  Gangrene of finger of right hand  Gangrene of finger of left hand  S/P amputation: b/l toes  H/O exploratory laparotomy  Amputation finger  History of cholecystectomy  H/O gastric bypass        VITALS:  T(F): 97.9, Max: 98.8 (04-12-18 @ 16:19)  HR: 72  BP: 98/54  RR: 18Vital Signs Last 24 Hrs  T(C): 36.6 (12 Apr 2018 23:05), Max: 37.1 (12 Apr 2018 16:19)  T(F): 97.9 (12 Apr 2018 23:05), Max: 98.8 (12 Apr 2018 16:19)  HR: 72 (12 Apr 2018 23:05) (72 - 87)  BP: 98/54 (12 Apr 2018 23:05) (98/54 - 120/79)  BP(mean): --  RR: 18 (12 Apr 2018 23:05) (18 - 18)  SpO2: 100% (12 Apr 2018 13:47) (100% - 100%)    TESTS & MEASUREMENTS:                        10.1   6.93  )-----------( 328      ( 12 Apr 2018 17:13 )             33.0     04-12    141  |  104  |  9<L>  ----------------------------<  87  4.2   |  28  |  0.9    Ca    8.4<L>      12 Apr 2018 17:13  Phos  3.7     04-12  Mg     1.9     04-12    TPro  5.5<L>  /  Alb  2.9<L>  /  TBili  0.3  /  DBili  x   /  AST  14  /  ALT  8   /  AlkPhos  59  04-11    LIVER FUNCTIONS - ( 11 Apr 2018 20:34 )  Alb: 2.9 g/dL / Pro: 5.5 g/dL / ALK PHOS: 59 U/L / ALT: 8 U/L / AST: 14 U/L / GGT: x             Culture - Infection Control Surveillance (collected 04-11-18 @ 22:38)  Source: .INF Rectal  Preliminary Report (04-12-18 @ 22:16):    Culture in progress            RADIOLOGY & ADDITIONAL TESTS:    ANTIBIOTICS:  ampicillin/sulbactam  IVPB      ampicillin/sulbactam  IVPB 1.5 Gram(s) IV Intermittent every 6 hours

## 2018-04-13 NOTE — PROGRESS NOTE ADULT - ASSESSMENT
A/P :  Chronic foot wounds S/P ischemia, gangrene and amputations   Continue wound care with Dakin's wet to dry, pain mgmt and VTE prophylaxis  DVT/GI Prohylaxis

## 2018-04-13 NOTE — PROGRESS NOTE ADULT - SUBJECTIVE AND OBJECTIVE BOX
42y Female PAST MEDICAL & SURGICAL HISTORY:  ARDS survivor  Sepsis  Takotsubo cardiomyopathy  Small bowel obstruction  Osteoarthritis  Cardiomyopathy  Sleep apnea  HTN (hypertension)  Gangrene of lower extremity  Gangrene of finger of right hand  Gangrene of finger of left hand  S/P amputation: b/l toes  H/O exploratory laparotomy  Amputation finger  History of cholecystectomy  H/O gastric bypass      Hospital Day: 3  Post Operative Day: 2    Procedure: Irrigation and debridement of wounds    Subjective: patient reports no acute complaints, says pain is well controlled after adjustments made by primary team yesterday    Events of the Last 24h: Patients wounds were washed out by burn team, patient seen by podiatry team. pain regimen adjusted improving pain control     Vital Signs Last 24 Hrs  T(C): 36.6 (2018 23:05), Max: 37.1 (2018 16:19)  T(F): 97.9 (2018 23:05), Max: 98.8 (2018 16:19)  HR: 72 (2018 23:05) (72 - 87)  BP: 98/54 (2018 23:05) (98/54 - 120/79)  BP(mean): --  RR: 18 (2018 23:05) (18 - 18)  SpO2: 100% (2018 13:47) (100% - 100%)      I&O's Detail    2018 07:  -  2018 07:00  --------------------------------------------------------  IN:    IV PiggyBack: 150 mL    Oral Fluid: 290 mL  Total IN: 440 mL    OUT:    Voided: 675 mL  Total OUT: 675 mL    Total NET: -235 mL      2018 07:01  -  2018 05:49  --------------------------------------------------------  IN:    IV PiggyBack: 150 mL    Oral Fluid: 200 mL  Total IN: 350 mL    OUT:    Voided: 950 mL  Total OUT: 950 mL    Total NET: -600 mL          PHYSICAL EXAM:    GENERAL: NAD    HEENT: NCAT    CHEST/LUNGS: CTAB    HEART: RRR,  No murmurs, rubs, or gallops    ABDOMEN: SNTND +BS    EXTREMITIES:  b/l hand amputations, well healed, patient reports no complaints. hands currently not wrapped however patient states she would like them rewrapped soon to prevent swelling. B/l foot amputation sites wrapped, as per primary team good granulation tissue, requested not to remove dressings at this time and will reevaluate later with primary team during dressing change    NEURO: No focal neurological deficits    SKIN: No rashes or lesions    Diet, DASH/TLC:   Sodium & Cholesterol Restricted (18 @ 18:38)    MEDICATIONS  (STANDING):  ampicillin/sulbactam  IVPB      ampicillin/sulbactam  IVPB 1.5 Gram(s) IV Intermittent every 6 hours  cholecalciferol 1000 Unit(s) Oral daily  cyanocobalamin 100 MICROGram(s) Oral daily  Dakins Solution - 1/2 Strength 1 Application(s) Topical two times a day  docusate sodium 100 milliGRAM(s) Oral two times a day  heparin  Injectable 5000 Unit(s) SubCutaneous every 8 hours  metoprolol succinate ER 50 milliGRAM(s) Oral daily  multivitamin 1 Tablet(s) Oral daily  pantoprazole    Tablet 40 milliGRAM(s) Oral before breakfast    MEDICATIONS  (PRN):  bisacodyl Suppository 10 milliGRAM(s) Rectal daily PRN Constipation  HYDROmorphone   Tablet 4 milliGRAM(s) Oral every 6 hours PRN Moderate Pain (4 - 6)  ibuprofen  Tablet 600 milliGRAM(s) Oral every 8 hours PRN Mild pain  midazolam Injectable 4 milliGRAM(s) IV Push every 12 hours PRN wound care  morphine  - Injectable 4 milliGRAM(s) IV Push every 3 hours PRN Severe Pain (7 - 10)  morphine  - Injectable 6 milliGRAM(s) IV Push every 12 hours PRN Severe Pain (7 - 10)  senna 2 Tablet(s) Oral at bedtime PRN Constipation                              10.1   6.93  )-----------( 328      ( 2018 17:13 )             33.0                       141   |  104   |  9                  Ca: 8.4    BMP:   ----------------------------< 87     M.9   (18 @ 17:13)             4.2    |  28    | 0.9                Ph: 3.7      LFT:     TPro: 5.5 / Alb: 2.9 / TBili: 0.3 / DBili: x / AST: 14 / ALT: 8 / AlkPhos: 59   (18 @ 20:34)    LFTs:             5.5  | 0.3  | 14       ------------------[59      ( 2018 20:34 )  2.9  | x    | 8           Lipase:x      Amylase:x        Coags:     12.50  ----< 1.15    ( 2018 20:34 )     28.6        Culture - Infection Control Surveillance (collected 2018 22:38)  Source: .INF Rectal  Preliminary Report (2018 22:16):    Culture in progress      SPECTRA: [id number] 42y Female PAST MEDICAL & SURGICAL HISTORY:  ARDS survivor  Sepsis  Takotsubo cardiomyopathy  Small bowel obstruction  Osteoarthritis  Cardiomyopathy  Sleep apnea  HTN (hypertension)  Gangrene of lower extremity  Gangrene of finger of right hand  Gangrene of finger of left hand  S/P amputation: b/l toes  H/O exploratory laparotomy  Amputation finger  History of cholecystectomy  H/O gastric bypass      Hospital Day: 3  Post Operative Day: 2    Procedure: Irrigation and debridement of wounds    Subjective: patient reports no acute complaints, says pain is well controlled after adjustments made by primary team yesterday    Events of the Last 24h: Patients wounds were washed out by burn team, patient seen by podiatry team. pain regimen adjusted improving pain control     Vital Signs Last 24 Hrs  T(C): 36.6 (2018 23:05), Max: 37.1 (2018 16:19)  T(F): 97.9 (2018 23:05), Max: 98.8 (2018 16:19)  HR: 72 (2018 23:05) (72 - 87)  BP: 98/54 (2018 23:05) (98/54 - 120/79)  BP(mean): --  RR: 18 (2018 23:05) (18 - 18)  SpO2: 100% (2018 13:47) (100% - 100%)      I&O's Detail    2018 07:  -  2018 07:00  --------------------------------------------------------  IN:    IV PiggyBack: 150 mL    Oral Fluid: 290 mL  Total IN: 440 mL    OUT:    Voided: 675 mL  Total OUT: 675 mL    Total NET: -235 mL      2018 07:01  -  2018 05:49  --------------------------------------------------------  IN:    IV PiggyBack: 150 mL    Oral Fluid: 200 mL  Total IN: 350 mL    OUT:    Voided: 950 mL  Total OUT: 950 mL    Total NET: -600 mL          PHYSICAL EXAM:    GENERAL: NAD    HEENT: NCAT    CHEST/LUNGS: CTAB    HEART: RRR,  No murmurs, rubs, or gallops    ABDOMEN: SNTND +BS    EXTREMITIES:  b/l hand amputations, well healed, patient reports no complaints. hands currently not wrapped however patient states she would like them rewrapped soon to prevent swelling. B/l foot amputation sites wrapped, upon unwrapping dressing, good granulation seen on bilateral stumps     NEURO: No focal neurological deficits    SKIN: No rashes or lesions    Diet, DASH/TLC:   Sodium & Cholesterol Restricted (18 @ 18:38)    MEDICATIONS  (STANDING):  ampicillin/sulbactam  IVPB      ampicillin/sulbactam  IVPB 1.5 Gram(s) IV Intermittent every 6 hours  cholecalciferol 1000 Unit(s) Oral daily  cyanocobalamin 100 MICROGram(s) Oral daily  Dakins Solution - 1/2 Strength 1 Application(s) Topical two times a day  docusate sodium 100 milliGRAM(s) Oral two times a day  heparin  Injectable 5000 Unit(s) SubCutaneous every 8 hours  metoprolol succinate ER 50 milliGRAM(s) Oral daily  multivitamin 1 Tablet(s) Oral daily  pantoprazole    Tablet 40 milliGRAM(s) Oral before breakfast    MEDICATIONS  (PRN):  bisacodyl Suppository 10 milliGRAM(s) Rectal daily PRN Constipation  HYDROmorphone   Tablet 4 milliGRAM(s) Oral every 6 hours PRN Moderate Pain (4 - 6)  ibuprofen  Tablet 600 milliGRAM(s) Oral every 8 hours PRN Mild pain  midazolam Injectable 4 milliGRAM(s) IV Push every 12 hours PRN wound care  morphine  - Injectable 4 milliGRAM(s) IV Push every 3 hours PRN Severe Pain (7 - 10)  morphine  - Injectable 6 milliGRAM(s) IV Push every 12 hours PRN Severe Pain (7 - 10)  senna 2 Tablet(s) Oral at bedtime PRN Constipation                              10.1   6.93  )-----------( 328      ( 2018 17:13 )             33.0                       141   |  104   |  9                  Ca: 8.4    BMP:   ----------------------------< 87     M.9   (18 @ 17:13)             4.2    |  28    | 0.9                Ph: 3.7      LFT:     TPro: 5.5 / Alb: 2.9 / TBili: 0.3 / DBili: x / AST: 14 / ALT: 8 / AlkPhos: 59   (18 @ 20:34)    LFTs:             5.5  | 0.3  | 14       ------------------[59      ( 2018 20:34 )  2.9  | x    | 8           Lipase:x      Amylase:x        Coags:     12.50  ----< 1.15    ( 2018 20:34 )     28.6        Culture - Infection Control Surveillance (collected 2018 22:38)  Source: .INF Rectal  Preliminary Report (2018 22:16):    Culture in progress      SPECTRA: [id number]

## 2018-04-14 LAB
ALBUMIN SERPL ELPH-MCNC: 2.9 G/DL — LOW (ref 3.5–5.2)
ALP SERPL-CCNC: 60 U/L — SIGNIFICANT CHANGE UP (ref 30–115)
ALT FLD-CCNC: 8 U/L — SIGNIFICANT CHANGE UP (ref 0–41)
ANION GAP SERPL CALC-SCNC: 10 MMOL/L — SIGNIFICANT CHANGE UP (ref 7–14)
AST SERPL-CCNC: 11 U/L — SIGNIFICANT CHANGE UP (ref 0–41)
BILIRUB SERPL-MCNC: <0.2 MG/DL — SIGNIFICANT CHANGE UP (ref 0.2–1.2)
BUN SERPL-MCNC: 8 MG/DL — LOW (ref 10–20)
CALCIUM SERPL-MCNC: 8.6 MG/DL — SIGNIFICANT CHANGE UP (ref 8.5–10.1)
CHLORIDE SERPL-SCNC: 102 MMOL/L — SIGNIFICANT CHANGE UP (ref 98–110)
CO2 SERPL-SCNC: 29 MMOL/L — SIGNIFICANT CHANGE UP (ref 17–32)
CREAT SERPL-MCNC: 1 MG/DL — SIGNIFICANT CHANGE UP (ref 0.7–1.5)
GLUCOSE SERPL-MCNC: 98 MG/DL — SIGNIFICANT CHANGE UP (ref 70–99)
HCT VFR BLD CALC: 31.8 % — LOW (ref 37–47)
HGB BLD-MCNC: 9.8 G/DL — LOW (ref 12–16)
MAGNESIUM SERPL-MCNC: 1.6 MG/DL — LOW (ref 1.8–2.4)
MCHC RBC-ENTMCNC: 24.3 PG — LOW (ref 27–31)
MCHC RBC-ENTMCNC: 30.8 G/DL — LOW (ref 32–37)
MCV RBC AUTO: 78.7 FL — LOW (ref 81–99)
NRBC # BLD: 0 /100 WBCS — SIGNIFICANT CHANGE UP (ref 0–0)
PLATELET # BLD AUTO: 290 K/UL — SIGNIFICANT CHANGE UP (ref 130–400)
POTASSIUM SERPL-MCNC: 4.2 MMOL/L — SIGNIFICANT CHANGE UP (ref 3.5–5)
POTASSIUM SERPL-SCNC: 4.2 MMOL/L — SIGNIFICANT CHANGE UP (ref 3.5–5)
PROT SERPL-MCNC: 5.4 G/DL — LOW (ref 6–8)
RBC # BLD: 4.04 M/UL — LOW (ref 4.2–5.4)
RBC # FLD: 16.7 % — HIGH (ref 11.5–14.5)
SODIUM SERPL-SCNC: 141 MMOL/L — SIGNIFICANT CHANGE UP (ref 135–146)
WBC # BLD: 7.72 K/UL — SIGNIFICANT CHANGE UP (ref 4.8–10.8)
WBC # FLD AUTO: 7.72 K/UL — SIGNIFICANT CHANGE UP (ref 4.8–10.8)

## 2018-04-14 RX ORDER — MAGNESIUM SULFATE 500 MG/ML
2 VIAL (ML) INJECTION ONCE
Qty: 0 | Refills: 0 | Status: COMPLETED | OUTPATIENT
Start: 2018-04-14 | End: 2018-04-15

## 2018-04-14 RX ORDER — LIDOCAINE 4 G/100G
1 CREAM TOPICAL
Qty: 0 | Refills: 0 | Status: DISCONTINUED | OUTPATIENT
Start: 2018-04-14 | End: 2018-04-17

## 2018-04-14 RX ADMIN — HEPARIN SODIUM 5000 UNIT(S): 5000 INJECTION INTRAVENOUS; SUBCUTANEOUS at 06:41

## 2018-04-14 RX ADMIN — Medication 1 TABLET(S): at 11:36

## 2018-04-14 RX ADMIN — MORPHINE SULFATE 6 MILLIGRAM(S): 50 CAPSULE, EXTENDED RELEASE ORAL at 00:15

## 2018-04-14 RX ADMIN — MORPHINE SULFATE 6 MILLIGRAM(S): 50 CAPSULE, EXTENDED RELEASE ORAL at 22:43

## 2018-04-14 RX ADMIN — HYDROMORPHONE HYDROCHLORIDE 4 MILLIGRAM(S): 2 INJECTION INTRAMUSCULAR; INTRAVENOUS; SUBCUTANEOUS at 00:39

## 2018-04-14 RX ADMIN — HYDROMORPHONE HYDROCHLORIDE 4 MILLIGRAM(S): 2 INJECTION INTRAMUSCULAR; INTRAVENOUS; SUBCUTANEOUS at 01:14

## 2018-04-14 RX ADMIN — MORPHINE SULFATE 4 MILLIGRAM(S): 50 CAPSULE, EXTENDED RELEASE ORAL at 14:45

## 2018-04-14 RX ADMIN — PREGABALIN 100 MICROGRAM(S): 225 CAPSULE ORAL at 15:34

## 2018-04-14 RX ADMIN — Medication 50 MILLIGRAM(S): at 06:41

## 2018-04-14 RX ADMIN — HEPARIN SODIUM 5000 UNIT(S): 5000 INJECTION INTRAVENOUS; SUBCUTANEOUS at 15:35

## 2018-04-14 RX ADMIN — HYDROMORPHONE HYDROCHLORIDE 4 MILLIGRAM(S): 2 INJECTION INTRAMUSCULAR; INTRAVENOUS; SUBCUTANEOUS at 10:46

## 2018-04-14 RX ADMIN — MORPHINE SULFATE 4 MILLIGRAM(S): 50 CAPSULE, EXTENDED RELEASE ORAL at 14:20

## 2018-04-14 RX ADMIN — MORPHINE SULFATE 4 MILLIGRAM(S): 50 CAPSULE, EXTENDED RELEASE ORAL at 18:59

## 2018-04-14 RX ADMIN — HYDROMORPHONE HYDROCHLORIDE 4 MILLIGRAM(S): 2 INJECTION INTRAMUSCULAR; INTRAVENOUS; SUBCUTANEOUS at 11:47

## 2018-04-14 RX ADMIN — MIDAZOLAM HYDROCHLORIDE 4 MILLIGRAM(S): 1 INJECTION, SOLUTION INTRAMUSCULAR; INTRAVENOUS at 22:43

## 2018-04-14 RX ADMIN — Medication 1000 UNIT(S): at 15:34

## 2018-04-14 NOTE — PROGRESS NOTE ADULT - ASSESSMENT
A/P: s/p full thickness wounds to B/L Feet TMA    cont IVF  cont wound care  Cont IV antibx  DVT GI Prophylaxis  Pain control  OT/PT  OR Mon

## 2018-04-14 NOTE — PROGRESS NOTE ADULT - SUBJECTIVE AND OBJECTIVE BOX
Patient is a 42y old  Female who presents with a chief complaint of b/l feet infection; needs more debridement possible skin graft (11 Apr 2018 19:53)    No acute events overnight    Vital Signs Last 24 Hrs  T(C): 36.4 (14 Apr 2018 07:32), Max: 36.6 (14 Apr 2018 00:01)  T(F): 97.6 (14 Apr 2018 07:32), Max: 97.8 (14 Apr 2018 00:01)  HR: 70 (14 Apr 2018 07:32) (70 - 78)  BP: 109/72 (14 Apr 2018 07:32) (109/72 - 117/79)  BP(mean): --  RR: 18 (14 Apr 2018 07:32) (18 - 19)  SpO2: 99% (14 Apr 2018 00:01) (99% - 99%)  I&O's Summary    13 Apr 2018 07:01  -  14 Apr 2018 07:00  --------------------------------------------------------  IN: 0 mL / OUT: 525 mL / NET: -525 mL        Meds:  MEDICATIONS  (STANDING):  cholecalciferol 1000 Unit(s) Oral daily  cyanocobalamin 100 MICROGram(s) Oral daily  cyanocobalamin 1000 MICROGram(s) Oral daily  Dakins Solution - 1/2 Strength 1 Application(s) Topical two times a day  docusate sodium 100 milliGRAM(s) Oral two times a day  heparin  Injectable 5000 Unit(s) SubCutaneous every 8 hours  metoprolol succinate ER 50 milliGRAM(s) Oral daily  multivitamin 1 Tablet(s) Oral daily  pantoprazole    Tablet 40 milliGRAM(s) Oral before breakfast    MEDICATIONS  (PRN):  bisacodyl Suppository 10 milliGRAM(s) Rectal daily PRN Constipation  HYDROmorphone   Tablet 4 milliGRAM(s) Oral every 6 hours PRN Moderate Pain (4 - 6)  ibuprofen  Tablet 600 milliGRAM(s) Oral every 8 hours PRN Mild pain  midazolam Injectable 4 milliGRAM(s) IV Push every 12 hours PRN wound care  morphine  - Injectable 4 milliGRAM(s) IV Push every 3 hours PRN Severe Pain (7 - 10)  morphine  - Injectable 6 milliGRAM(s) IV Push every 12 hours PRN Severe Pain (7 - 10)  senna 2 Tablet(s) Oral at bedtime PRN Constipation        Culture - Infection Control Surveillance (collected 12 Apr 2018 17:13)  Source: .INF wound  Preliminary Report (14 Apr 2018 10:55):    No growth    Culture - Infection Control Surveillance (collected 11 Apr 2018 22:38)  Source: .INF Rectal  Final Report (13 Apr 2018 22:11):    No Klebsiella pneumonia (Carbapenem resistant) isolated    No Enterobacter (carbapenem resistance) Isolated    No E.coli (carbapenem resistance) isolated        Labs:                        10.1   6.93  )-----------( 328      ( 12 Apr 2018 17:13 )             33.0     04-12    141  |  104  |  9<L>  ----------------------------<  87  4.2   |  28  |  0.9    Ca    8.4<L>      12 Apr 2018 17:13  Phos  3.7     04-12  Mg     1.9     04-12          PE: AAO x 3  Full htickness wounds to b/l Foot TMA with granulation tissue and small areas of slough  serosang drainage  No swelling, no erythema

## 2018-04-15 LAB
ANION GAP SERPL CALC-SCNC: 8 MMOL/L — SIGNIFICANT CHANGE UP (ref 7–14)
BLD GP AB SCN SERPL QL: SIGNIFICANT CHANGE UP
BUN SERPL-MCNC: 11 MG/DL — SIGNIFICANT CHANGE UP (ref 10–20)
CALCIUM SERPL-MCNC: 8.5 MG/DL — SIGNIFICANT CHANGE UP (ref 8.5–10.1)
CHLORIDE SERPL-SCNC: 102 MMOL/L — SIGNIFICANT CHANGE UP (ref 98–110)
CO2 SERPL-SCNC: 30 MMOL/L — SIGNIFICANT CHANGE UP (ref 17–32)
CREAT SERPL-MCNC: 1 MG/DL — SIGNIFICANT CHANGE UP (ref 0.7–1.5)
CULTURE RESULTS: SIGNIFICANT CHANGE UP
GLUCOSE SERPL-MCNC: 80 MG/DL — SIGNIFICANT CHANGE UP (ref 70–99)
MAGNESIUM SERPL-MCNC: 1.8 MG/DL — SIGNIFICANT CHANGE UP (ref 1.8–2.4)
PHOSPHATE SERPL-MCNC: 3.7 MG/DL — SIGNIFICANT CHANGE UP (ref 2.1–4.9)
POTASSIUM SERPL-MCNC: 4.4 MMOL/L — SIGNIFICANT CHANGE UP (ref 3.5–5)
POTASSIUM SERPL-SCNC: 4.4 MMOL/L — SIGNIFICANT CHANGE UP (ref 3.5–5)
SODIUM SERPL-SCNC: 140 MMOL/L — SIGNIFICANT CHANGE UP (ref 135–146)
SPECIMEN SOURCE: SIGNIFICANT CHANGE UP
TYPE + AB SCN PNL BLD: SIGNIFICANT CHANGE UP

## 2018-04-15 RX ORDER — SODIUM HYPOCHLORITE 0.125 %
1 SOLUTION, NON-ORAL MISCELLANEOUS DAILY
Qty: 0 | Refills: 0 | Status: DISCONTINUED | OUTPATIENT
Start: 2018-04-15 | End: 2018-04-17

## 2018-04-15 RX ORDER — LIDOCAINE 4 G/100G
1 CREAM TOPICAL DAILY
Qty: 0 | Refills: 0 | Status: DISCONTINUED | OUTPATIENT
Start: 2018-04-15 | End: 2018-04-17

## 2018-04-15 RX ORDER — LIDOCAINE 4 G/100G
100 CREAM TOPICAL DAILY
Qty: 0 | Refills: 0 | Status: DISCONTINUED | OUTPATIENT
Start: 2018-04-15 | End: 2018-04-17

## 2018-04-15 RX ORDER — SODIUM HYPOCHLORITE 0.125 %
1 SOLUTION, NON-ORAL MISCELLANEOUS ONCE
Qty: 0 | Refills: 0 | Status: DISCONTINUED | OUTPATIENT
Start: 2018-04-15 | End: 2018-04-15

## 2018-04-15 RX ORDER — MAGNESIUM SULFATE 500 MG/ML
2 VIAL (ML) INJECTION ONCE
Qty: 0 | Refills: 0 | Status: COMPLETED | OUTPATIENT
Start: 2018-04-15 | End: 2018-04-15

## 2018-04-15 RX ORDER — LIDOCAINE HCL 20 MG/ML
30 VIAL (ML) INJECTION ONCE
Qty: 0 | Refills: 0 | Status: COMPLETED | OUTPATIENT
Start: 2018-04-15 | End: 2018-04-15

## 2018-04-15 RX ORDER — SODIUM HYPOCHLORITE 0.125 %
1 SOLUTION, NON-ORAL MISCELLANEOUS DAILY
Qty: 0 | Refills: 0 | Status: DISCONTINUED | OUTPATIENT
Start: 2018-04-15 | End: 2018-04-15

## 2018-04-15 RX ADMIN — HEPARIN SODIUM 5000 UNIT(S): 5000 INJECTION INTRAVENOUS; SUBCUTANEOUS at 13:32

## 2018-04-15 RX ADMIN — Medication 50 GRAM(S): at 00:13

## 2018-04-15 RX ADMIN — MORPHINE SULFATE 4 MILLIGRAM(S): 50 CAPSULE, EXTENDED RELEASE ORAL at 16:30

## 2018-04-15 RX ADMIN — MIDAZOLAM HYDROCHLORIDE 4 MILLIGRAM(S): 1 INJECTION, SOLUTION INTRAMUSCULAR; INTRAVENOUS at 21:57

## 2018-04-15 RX ADMIN — LIDOCAINE 1 APPLICATION(S): 4 CREAM TOPICAL at 00:13

## 2018-04-15 RX ADMIN — Medication 50 GRAM(S): at 23:53

## 2018-04-15 RX ADMIN — MORPHINE SULFATE 6 MILLIGRAM(S): 50 CAPSULE, EXTENDED RELEASE ORAL at 21:57

## 2018-04-15 RX ADMIN — Medication 1 APPLICATION(S): at 08:15

## 2018-04-15 RX ADMIN — MIDAZOLAM HYDROCHLORIDE 4 MILLIGRAM(S): 1 INJECTION, SOLUTION INTRAMUSCULAR; INTRAVENOUS at 07:33

## 2018-04-15 RX ADMIN — HYDROMORPHONE HYDROCHLORIDE 4 MILLIGRAM(S): 2 INJECTION INTRAMUSCULAR; INTRAVENOUS; SUBCUTANEOUS at 08:50

## 2018-04-15 RX ADMIN — LIDOCAINE 100 MILLILITER(S): 4 CREAM TOPICAL at 08:16

## 2018-04-15 RX ADMIN — MORPHINE SULFATE 6 MILLIGRAM(S): 50 CAPSULE, EXTENDED RELEASE ORAL at 07:33

## 2018-04-15 RX ADMIN — LIDOCAINE 1 APPLICATION(S): 4 CREAM TOPICAL at 21:56

## 2018-04-15 RX ADMIN — HYDROMORPHONE HYDROCHLORIDE 4 MILLIGRAM(S): 2 INJECTION INTRAMUSCULAR; INTRAVENOUS; SUBCUTANEOUS at 00:43

## 2018-04-15 RX ADMIN — HYDROMORPHONE HYDROCHLORIDE 4 MILLIGRAM(S): 2 INJECTION INTRAMUSCULAR; INTRAVENOUS; SUBCUTANEOUS at 22:00

## 2018-04-15 RX ADMIN — LIDOCAINE 1 APPLICATION(S): 4 CREAM TOPICAL at 08:23

## 2018-04-15 RX ADMIN — HYDROMORPHONE HYDROCHLORIDE 4 MILLIGRAM(S): 2 INJECTION INTRAMUSCULAR; INTRAVENOUS; SUBCUTANEOUS at 21:31

## 2018-04-15 RX ADMIN — HEPARIN SODIUM 5000 UNIT(S): 5000 INJECTION INTRAVENOUS; SUBCUTANEOUS at 06:21

## 2018-04-15 RX ADMIN — HYDROMORPHONE HYDROCHLORIDE 4 MILLIGRAM(S): 2 INJECTION INTRAMUSCULAR; INTRAVENOUS; SUBCUTANEOUS at 00:14

## 2018-04-15 RX ADMIN — Medication 1000 UNIT(S): at 13:32

## 2018-04-15 RX ADMIN — Medication 1 APPLICATION(S): at 12:14

## 2018-04-15 RX ADMIN — Medication 1 APPLICATION(S): at 00:13

## 2018-04-15 RX ADMIN — Medication 1 APPLICATION(S): at 07:33

## 2018-04-15 RX ADMIN — HEPARIN SODIUM 5000 UNIT(S): 5000 INJECTION INTRAVENOUS; SUBCUTANEOUS at 21:57

## 2018-04-15 RX ADMIN — Medication 100 MILLIGRAM(S): at 17:20

## 2018-04-15 RX ADMIN — PREGABALIN 100 MICROGRAM(S): 225 CAPSULE ORAL at 16:05

## 2018-04-15 RX ADMIN — Medication 1 APPLICATION(S): at 21:56

## 2018-04-15 RX ADMIN — HYDROMORPHONE HYDROCHLORIDE 4 MILLIGRAM(S): 2 INJECTION INTRAMUSCULAR; INTRAVENOUS; SUBCUTANEOUS at 08:18

## 2018-04-15 RX ADMIN — HEPARIN SODIUM 5000 UNIT(S): 5000 INJECTION INTRAVENOUS; SUBCUTANEOUS at 00:13

## 2018-04-15 RX ADMIN — Medication 1 TABLET(S): at 13:32

## 2018-04-15 RX ADMIN — Medication 30 MILLILITER(S): at 08:14

## 2018-04-15 RX ADMIN — MORPHINE SULFATE 4 MILLIGRAM(S): 50 CAPSULE, EXTENDED RELEASE ORAL at 16:12

## 2018-04-15 RX ADMIN — Medication 50 MILLIGRAM(S): at 06:21

## 2018-04-15 NOTE — PROGRESS NOTE ADULT - SUBJECTIVE AND OBJECTIVE BOX
Patient is a 42y old  Female who presents with a chief complaint of b/l feet infection; needs more debridement possible skin graft (11 Apr 2018 19:53)    No acute events overnight. Spoke to pt. She said she could tolerate the wound care much better. Wants to keep twice a day dressing change with Dakins solution    Vital Signs Last 24 Hrs  T(C): 37 (15 Apr 2018 08:00), Max: 37.1 (15 Apr 2018 00:21)  T(F): 98.6 (15 Apr 2018 08:00), Max: 98.7 (15 Apr 2018 00:21)  HR: 80 (15 Apr 2018 08:00) (73 - 92)  BP: 117/76 (15 Apr 2018 08:00) (109/70 - 117/76)  BP(mean): --  RR: 18 (15 Apr 2018 08:00) (18 - 20)  SpO2: 99% (15 Apr 2018 08:00) (99% - 99%)  I&O's Summary    14 Apr 2018 07:01  -  15 Apr 2018 07:00  --------------------------------------------------------  IN: 0 mL / OUT: 625 mL / NET: -625 mL    15 Apr 2018 07:01  -  15 Apr 2018 12:01  --------------------------------------------------------  IN: 0 mL / OUT: 700 mL / NET: -700 mL        Meds:  MEDICATIONS  (STANDING):  cholecalciferol 1000 Unit(s) Oral daily  cyanocobalamin 100 MICROGram(s) Oral daily  cyanocobalamin 1000 MICROGram(s) Oral daily  Dakins Solution - 1/2 Strength 1 Application(s) Topical daily  Dakins Solution - 1/2 Strength 1 Application(s) Topical daily  Dakins Solution - 1/2 Strength 1 Application(s) Topical two times a day  docusate sodium 100 milliGRAM(s) Oral two times a day  heparin  Injectable 5000 Unit(s) SubCutaneous every 8 hours  lidocaine 2% Gel 1 Application(s) Topical two times a day  lidocaine 2% Oral Viscous Solution - Peds 100 milliLiter(s) Swish and Spit daily  metoprolol succinate ER 50 milliGRAM(s) Oral daily  multivitamin 1 Tablet(s) Oral daily    MEDICATIONS  (PRN):  bisacodyl Suppository 10 milliGRAM(s) Rectal daily PRN Constipation  HYDROmorphone   Tablet 4 milliGRAM(s) Oral every 6 hours PRN Moderate Pain (4 - 6)  ibuprofen  Tablet 600 milliGRAM(s) Oral every 8 hours PRN Mild pain  midazolam Injectable 4 milliGRAM(s) IV Push every 12 hours PRN wound care  morphine  - Injectable 4 milliGRAM(s) IV Push every 3 hours PRN Severe Pain (7 - 10)  morphine  - Injectable 6 milliGRAM(s) IV Push every 12 hours PRN Severe Pain (7 - 10)  senna 2 Tablet(s) Oral at bedtime PRN Constipation        Culture - Infection Control Surveillance (collected 12 Apr 2018 17:13)  Source: .INF wound  Final Report (15 Apr 2018 11:17):    No Klebsiella pneumonia (Carbapenem resistant) isolated        Labs:                        9.8    7.72  )-----------( 290      ( 14 Apr 2018 16:40 )             31.8     04-14    141  |  102  |  8<L>  ----------------------------<  98  4.2   |  29  |  1.0    Ca    8.6      14 Apr 2018 16:40  Mg     1.6     04-14    TPro  5.4<L>  /  Alb  2.9<L>  /  TBili  <0.2  /  DBili  x   /  AST  11  /  ALT  8   /  AlkPhos  60  04-14        PE: AAO x 3    Full thickness wounds to b/l TMA with granulation tissue, dressing in place

## 2018-04-15 NOTE — PROGRESS NOTE ADULT - SUBJECTIVE AND OBJECTIVE BOX
PROGRESS NOTE   Patient is a 42y old  Female who presents with a chief complaint of b/l feet infection; needs more debridement possible skin graft (11 Apr 2018 19:53)      HPI:  Patient is a 41 yo female with past medical history of htn, obesity s/p gastric bypass surgery in October 2017 complicated by septic shock, ARDS, small bowel obstruction, ATN with CVVH, cardiomyopathy and vasopressor induced necrosis of fingers and toes. In January 2018 patient had bilateral fingers amputated which have healed well. In February 2018 patient had bilateral feet amputated. She was going for weekly VAC changes at ambulatory surgery for about 3 weeks s/p bilateral feet amputations, but was complicated by infection. Patient was taken to the ambulatory surgery Today ( 4/11/18) intended for a VAC change and possible STSG for closure of b/l LE wounds with Dr. Jenkins. Upon removing the dressings, it was found that the wounds were not yet ready for definitive reconstruction/closure. The wounds were covered w/ substantial fibrinous exudate and malodorous. The wounds were irrigated/debrided with sharp curettage and pulsavac w/ baci/polymixin irrigation. Further, exposed bones and tendons were visible on the wounds b/l.  Sterile wet dressings were applied. It was felt that the wounds would benefit from further comprehensive local wound care. It was agreed that she should be admitted to the Burn ICU for further wound care. Currently patient is comfortable and not complaining of any pain, nausea, vomiting, diarrhea, chest pain, trouble breathing, fever or chills. (11 Apr 2018 16:56)      Vital Signs Last 24 Hrs  T(C): 37.1 (15 Apr 2018 16:11), Max: 37.1 (15 Apr 2018 00:21)  T(F): 98.8 (15 Apr 2018 16:11), Max: 98.8 (15 Apr 2018 16:11)  HR: 84 (15 Apr 2018 16:11) (80 - 84)  BP: 117/84 (15 Apr 2018 16:11) (109/70 - 117/84)  BP(mean): --  RR: 18 (15 Apr 2018 16:11) (18 - 20)  SpO2: 99% (15 Apr 2018 08:00) (99% - 99%)                          9.8    7.72  )-----------( 290      ( 14 Apr 2018 16:40 )             31.8               04-15    140  |  102  |  11  ----------------------------<  80  4.4   |  30  |  1.0    Ca    8.5      15 Apr 2018 16:22  Phos  3.7     04-15  Mg     1.8     04-15    TPro  5.4<L>  /  Alb  2.9<L>  /  TBili  <0.2  /  DBili  x   /  AST  11  /  ALT  8   /  AlkPhos  60  04-14      PHYSICAL EXAM  GEN: JIMMY CR is a pleasant well-nourished, well developed 42y Female in no acute distress, alert awake, and oriented to person, place and time.     Assessment:  - B/L porsha open       Plan:  - pt seen/evaluated @ bedside  - 4mg IV morphine administered prior to dressing change  - 2% topical lidocaine applied to open amputations prior to dressing change  - dressed w/ 0.25% Dakins soaked gauze WTD/Kerlix/ACE  - findings discussed w/attending  - pt is add on case w/  for possible STSG Monday 4/16  - podiatry will continue to monitor

## 2018-04-15 NOTE — PROGRESS NOTE ADULT - ASSESSMENT
A/P: Full thickness ouwnds to b/l TMA    cont wound care  Cont IV antibx  DVT GI Prophylaxis  Pain control  OT/PT

## 2018-04-16 LAB
ANION GAP SERPL CALC-SCNC: 9 MMOL/L — SIGNIFICANT CHANGE UP (ref 7–14)
APTT BLD: 29.9 SEC — SIGNIFICANT CHANGE UP (ref 27–39.2)
BUN SERPL-MCNC: 12 MG/DL — SIGNIFICANT CHANGE UP (ref 10–20)
CALCIUM SERPL-MCNC: 8.6 MG/DL — SIGNIFICANT CHANGE UP (ref 8.5–10.1)
CHLORIDE SERPL-SCNC: 99 MMOL/L — SIGNIFICANT CHANGE UP (ref 98–110)
CO2 SERPL-SCNC: 29 MMOL/L — SIGNIFICANT CHANGE UP (ref 17–32)
CREAT SERPL-MCNC: 0.8 MG/DL — SIGNIFICANT CHANGE UP (ref 0.7–1.5)
GLUCOSE SERPL-MCNC: 95 MG/DL — SIGNIFICANT CHANGE UP (ref 70–99)
HCT VFR BLD CALC: 32.4 % — LOW (ref 37–47)
HGB BLD-MCNC: 10 G/DL — LOW (ref 12–16)
INR BLD: 1.16 RATIO — SIGNIFICANT CHANGE UP (ref 0.65–1.3)
MAGNESIUM SERPL-MCNC: 1.6 MG/DL — LOW (ref 1.8–2.4)
MCHC RBC-ENTMCNC: 24.2 PG — LOW (ref 27–31)
MCHC RBC-ENTMCNC: 30.9 G/DL — LOW (ref 32–37)
MCV RBC AUTO: 78.5 FL — LOW (ref 81–99)
NRBC # BLD: 0 /100 WBCS — SIGNIFICANT CHANGE UP (ref 0–0)
PHOSPHATE SERPL-MCNC: 4.4 MG/DL — SIGNIFICANT CHANGE UP (ref 2.1–4.9)
PLATELET # BLD AUTO: 228 K/UL — SIGNIFICANT CHANGE UP (ref 130–400)
POTASSIUM SERPL-MCNC: 4.5 MMOL/L — SIGNIFICANT CHANGE UP (ref 3.5–5)
POTASSIUM SERPL-SCNC: 4.5 MMOL/L — SIGNIFICANT CHANGE UP (ref 3.5–5)
PROTHROM AB SERPL-ACNC: 12.6 SEC — SIGNIFICANT CHANGE UP (ref 9.95–12.87)
RBC # BLD: 4.13 M/UL — LOW (ref 4.2–5.4)
RBC # FLD: 16.8 % — HIGH (ref 11.5–14.5)
SODIUM SERPL-SCNC: 137 MMOL/L — SIGNIFICANT CHANGE UP (ref 135–146)
WBC # BLD: 12.81 K/UL — HIGH (ref 4.8–10.8)
WBC # FLD AUTO: 12.81 K/UL — HIGH (ref 4.8–10.8)

## 2018-04-16 RX ORDER — SODIUM CHLORIDE 9 MG/ML
1000 INJECTION, SOLUTION INTRAVENOUS
Qty: 0 | Refills: 0 | Status: DISCONTINUED | OUTPATIENT
Start: 2018-04-16 | End: 2018-04-18

## 2018-04-16 RX ORDER — MORPHINE SULFATE 50 MG/1
2 CAPSULE, EXTENDED RELEASE ORAL ONCE
Qty: 0 | Refills: 0 | Status: DISCONTINUED | OUTPATIENT
Start: 2018-04-16 | End: 2018-04-16

## 2018-04-16 RX ADMIN — Medication 100 MILLIGRAM(S): at 06:26

## 2018-04-16 RX ADMIN — Medication 600 MILLIGRAM(S): at 22:19

## 2018-04-16 RX ADMIN — HYDROMORPHONE HYDROCHLORIDE 4 MILLIGRAM(S): 2 INJECTION INTRAMUSCULAR; INTRAVENOUS; SUBCUTANEOUS at 18:30

## 2018-04-16 RX ADMIN — PREGABALIN 100 MICROGRAM(S): 225 CAPSULE ORAL at 16:52

## 2018-04-16 RX ADMIN — HYDROMORPHONE HYDROCHLORIDE 4 MILLIGRAM(S): 2 INJECTION INTRAMUSCULAR; INTRAVENOUS; SUBCUTANEOUS at 23:25

## 2018-04-16 RX ADMIN — MORPHINE SULFATE 4 MILLIGRAM(S): 50 CAPSULE, EXTENDED RELEASE ORAL at 03:48

## 2018-04-16 RX ADMIN — MORPHINE SULFATE 4 MILLIGRAM(S): 50 CAPSULE, EXTENDED RELEASE ORAL at 00:28

## 2018-04-16 RX ADMIN — LIDOCAINE 1 APPLICATION(S): 4 CREAM TOPICAL at 21:15

## 2018-04-16 RX ADMIN — Medication 1 TABLET(S): at 11:38

## 2018-04-16 RX ADMIN — MORPHINE SULFATE 2 MILLIGRAM(S): 50 CAPSULE, EXTENDED RELEASE ORAL at 16:37

## 2018-04-16 RX ADMIN — MORPHINE SULFATE 6 MILLIGRAM(S): 50 CAPSULE, EXTENDED RELEASE ORAL at 21:15

## 2018-04-16 RX ADMIN — MIDAZOLAM HYDROCHLORIDE 4 MILLIGRAM(S): 1 INJECTION, SOLUTION INTRAMUSCULAR; INTRAVENOUS at 11:40

## 2018-04-16 RX ADMIN — MORPHINE SULFATE 4 MILLIGRAM(S): 50 CAPSULE, EXTENDED RELEASE ORAL at 03:49

## 2018-04-16 RX ADMIN — HEPARIN SODIUM 5000 UNIT(S): 5000 INJECTION INTRAVENOUS; SUBCUTANEOUS at 16:55

## 2018-04-16 RX ADMIN — Medication 1000 UNIT(S): at 16:55

## 2018-04-16 RX ADMIN — HYDROMORPHONE HYDROCHLORIDE 4 MILLIGRAM(S): 2 INJECTION INTRAMUSCULAR; INTRAVENOUS; SUBCUTANEOUS at 23:24

## 2018-04-16 RX ADMIN — HYDROMORPHONE HYDROCHLORIDE 4 MILLIGRAM(S): 2 INJECTION INTRAMUSCULAR; INTRAVENOUS; SUBCUTANEOUS at 17:33

## 2018-04-16 RX ADMIN — MIDAZOLAM HYDROCHLORIDE 4 MILLIGRAM(S): 1 INJECTION, SOLUTION INTRAMUSCULAR; INTRAVENOUS at 21:15

## 2018-04-16 RX ADMIN — HYDROMORPHONE HYDROCHLORIDE 4 MILLIGRAM(S): 2 INJECTION INTRAMUSCULAR; INTRAVENOUS; SUBCUTANEOUS at 11:37

## 2018-04-16 RX ADMIN — MORPHINE SULFATE 6 MILLIGRAM(S): 50 CAPSULE, EXTENDED RELEASE ORAL at 19:34

## 2018-04-16 RX ADMIN — Medication 100 MILLIGRAM(S): at 19:19

## 2018-04-16 RX ADMIN — MORPHINE SULFATE 6 MILLIGRAM(S): 50 CAPSULE, EXTENDED RELEASE ORAL at 11:40

## 2018-04-16 RX ADMIN — HEPARIN SODIUM 5000 UNIT(S): 5000 INJECTION INTRAVENOUS; SUBCUTANEOUS at 06:26

## 2018-04-16 RX ADMIN — Medication 600 MILLIGRAM(S): at 22:18

## 2018-04-16 RX ADMIN — HEPARIN SODIUM 5000 UNIT(S): 5000 INJECTION INTRAVENOUS; SUBCUTANEOUS at 22:18

## 2018-04-16 RX ADMIN — LIDOCAINE 1 APPLICATION(S): 4 CREAM TOPICAL at 11:44

## 2018-04-16 RX ADMIN — HYDROMORPHONE HYDROCHLORIDE 4 MILLIGRAM(S): 2 INJECTION INTRAMUSCULAR; INTRAVENOUS; SUBCUTANEOUS at 11:01

## 2018-04-16 RX ADMIN — MORPHINE SULFATE 2 MILLIGRAM(S): 50 CAPSULE, EXTENDED RELEASE ORAL at 16:56

## 2018-04-16 RX ADMIN — MORPHINE SULFATE 4 MILLIGRAM(S): 50 CAPSULE, EXTENDED RELEASE ORAL at 14:45

## 2018-04-16 RX ADMIN — Medication 1 APPLICATION(S): at 11:42

## 2018-04-16 RX ADMIN — LIDOCAINE 100 MILLILITER(S): 4 CREAM TOPICAL at 13:34

## 2018-04-16 NOTE — PROGRESS NOTE ADULT - SUBJECTIVE AND OBJECTIVE BOX
Stable o/n     Vital Signs Last 24 Hrs  T(F): 97.4 (16 Apr 2018 16:14), Max: 97.9 (15 Apr 2018 23:51)  HR: 68 (16 Apr 2018 16:14) (68 - 74)  BP: 100/52 (16 Apr 2018 16:14) (100/52 - 103/60)  RR: 18 (16 Apr 2018 16:14) (18 - 18)  SpO2: 99% (15 Apr 2018 23:51) (99% - 99%)      Pt

## 2018-04-16 NOTE — PROGRESS NOTE ADULT - ASSESSMENT
A/P     Stable clean granulating foot wounds  Continue dressing changes and pain mgmt  OR  [facility]olis ( Tues)  or Wed for STSG planned

## 2018-04-16 NOTE — PROGRESS NOTE ADULT - SUBJECTIVE AND OBJECTIVE BOX
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 In January 2018 patient had bilateral fingers amputated which have healed well. In February 2018 patient had bilateral feet amputated. She was going for weekly VAC changes at ambulatory surgery for about 3 weeks s/p bilateral feet amputations, but was complicated by infection. Patient was taken to the ambulatory surgery Today ( 4/11/18) intended for a VAC change and possible STSG for closure of b/l LE wounds with Dr. Jenkins. Upon removing the dressings, it was found that the wounds were not yet ready for definitive reconstruction/closure. The wounds were covered w/ substantial fibrinous exudate and malodorous. The wounds were irrigated/debrided with sharp curettage and pulsavac w/ baci/polymixin irrigation. Further, exposed bones and tendons were visible on the wounds b/l.  Sterile wet dressings were applied. It was felt that the wounds would benefit from further comprehensive local wound care. It was agreed that she should be admitted to the Burn ICU for further wound care. Currently patient is comfortable and not complaining of any pain, nausea, vomiting, diarrhea, chest pain, trouble breathing, fever or chills. (11 Apr 2018 16:56)      Vital Signs Last 24 Hrs  T(C): 36.4 (16 Apr 2018 08:56), Max: 37.1 (15 Apr 2018 16:11)  T(F): 97.5 (16 Apr 2018 08:56), Max: 98.8 (15 Apr 2018 16:11)  HR: 74 (15 Apr 2018 23:51) (74 - 84)  BP: 103/60 (16 Apr 2018 08:56) (100/59 - 117/84)  BP(mean): --  RR: 18 (16 Apr 2018 08:56) (18 - 18)  SpO2: 99% (15 Apr 2018 23:51) (99% - 99%)                          9.8    7.72  )-----------( 290      ( 14 Apr 2018 16:40 )             31.8               04-15    140  |  102  |  11  ----------------------------<  80  4.4   |  30  |  1.0    Ca    8.5      15 Apr 2018 16:22  Phos  3.7     04-15  Mg     1.8     04-15    TPro  5.4<L>  /  Alb  2.9<L>  /  TBili  <0.2  /  DBili  x   /  AST  11  /  ALT  8   /  AlkPhos  60  04-14      PHYSICAL EXAM  GEN: JIMMY CR is a pleasant well-nourished, well developed 42y Female in no acute distress, alert awake, and oriented to person, place and time.     Assessment:  - B/L [place] amputation. - erythema, - edema, - purulence. No clinical signs of infection @ this time      Plan:  - pt seen/evaluated @ bedside  - topical 1% lidocaine applied to open wounds prior to dressing change  - Dressed w/ 0.25% Dakins soaked gauze/dry 4x4/kerlix/ACE  - Pt is scheduled as add on case dalton w/    - podiatry will continue to monitor

## 2018-04-17 ENCOUNTER — RESULT REVIEW (OUTPATIENT)
Age: 43
End: 2018-04-17

## 2018-04-17 LAB
ANION GAP SERPL CALC-SCNC: 10 MMOL/L — SIGNIFICANT CHANGE UP (ref 7–14)
BUN SERPL-MCNC: 11 MG/DL — SIGNIFICANT CHANGE UP (ref 10–20)
CALCIUM SERPL-MCNC: 8.4 MG/DL — LOW (ref 8.5–10.1)
CHLORIDE SERPL-SCNC: 102 MMOL/L — SIGNIFICANT CHANGE UP (ref 98–110)
CO2 SERPL-SCNC: 28 MMOL/L — SIGNIFICANT CHANGE UP (ref 17–32)
CREAT SERPL-MCNC: 1 MG/DL — SIGNIFICANT CHANGE UP (ref 0.7–1.5)
GLUCOSE SERPL-MCNC: 145 MG/DL — HIGH (ref 70–99)
HCG UR QL: NEGATIVE — SIGNIFICANT CHANGE UP
HCT VFR BLD CALC: 31.9 % — LOW (ref 37–47)
HGB BLD-MCNC: 9.7 G/DL — LOW (ref 12–16)
MAGNESIUM SERPL-MCNC: 1.6 MG/DL — LOW (ref 1.8–2.4)
MCHC RBC-ENTMCNC: 24.1 PG — LOW (ref 27–31)
MCHC RBC-ENTMCNC: 30.4 G/DL — LOW (ref 32–37)
MCV RBC AUTO: 79.4 FL — LOW (ref 81–99)
NRBC # BLD: 0 /100 WBCS — SIGNIFICANT CHANGE UP (ref 0–0)
PHOSPHATE SERPL-MCNC: 4.8 MG/DL — SIGNIFICANT CHANGE UP (ref 2.1–4.9)
PLATELET # BLD AUTO: 248 K/UL — SIGNIFICANT CHANGE UP (ref 130–400)
POTASSIUM SERPL-MCNC: 4.6 MMOL/L — SIGNIFICANT CHANGE UP (ref 3.5–5)
POTASSIUM SERPL-SCNC: 4.6 MMOL/L — SIGNIFICANT CHANGE UP (ref 3.5–5)
RBC # BLD: 4.02 M/UL — LOW (ref 4.2–5.4)
RBC # FLD: 16.9 % — HIGH (ref 11.5–14.5)
SODIUM SERPL-SCNC: 140 MMOL/L — SIGNIFICANT CHANGE UP (ref 135–146)
WBC # BLD: 9.62 K/UL — SIGNIFICANT CHANGE UP (ref 4.8–10.8)
WBC # FLD AUTO: 9.62 K/UL — SIGNIFICANT CHANGE UP (ref 4.8–10.8)

## 2018-04-17 RX ORDER — DIPHENHYDRAMINE HCL 50 MG
25 CAPSULE ORAL ONCE
Qty: 0 | Refills: 0 | Status: COMPLETED | OUTPATIENT
Start: 2018-04-17 | End: 2018-04-17

## 2018-04-17 RX ORDER — SODIUM CHLORIDE 9 MG/ML
1000 INJECTION, SOLUTION INTRAVENOUS
Qty: 0 | Refills: 0 | Status: DISCONTINUED | OUTPATIENT
Start: 2018-04-17 | End: 2018-04-19

## 2018-04-17 RX ORDER — SODIUM CHLORIDE 9 MG/ML
1000 INJECTION INTRAMUSCULAR; INTRAVENOUS; SUBCUTANEOUS
Qty: 0 | Refills: 0 | Status: DISCONTINUED | OUTPATIENT
Start: 2018-04-17 | End: 2018-04-18

## 2018-04-17 RX ORDER — CHOLECALCIFEROL (VITAMIN D3) 125 MCG
1000 CAPSULE ORAL DAILY
Qty: 0 | Refills: 0 | Status: DISCONTINUED | OUTPATIENT
Start: 2018-04-17 | End: 2018-04-23

## 2018-04-17 RX ORDER — HEPARIN SODIUM 5000 [USP'U]/ML
5000 INJECTION INTRAVENOUS; SUBCUTANEOUS EVERY 8 HOURS
Qty: 0 | Refills: 0 | Status: DISCONTINUED | OUTPATIENT
Start: 2018-04-17 | End: 2018-04-23

## 2018-04-17 RX ORDER — MORPHINE SULFATE 50 MG/1
2 CAPSULE, EXTENDED RELEASE ORAL
Qty: 0 | Refills: 0 | Status: DISCONTINUED | OUTPATIENT
Start: 2018-04-17 | End: 2018-04-23

## 2018-04-17 RX ORDER — MAGNESIUM SULFATE 500 MG/ML
2 VIAL (ML) INJECTION ONCE
Qty: 0 | Refills: 0 | Status: COMPLETED | OUTPATIENT
Start: 2018-04-17 | End: 2018-04-18

## 2018-04-17 RX ORDER — PREGABALIN 225 MG/1
100 CAPSULE ORAL DAILY
Qty: 0 | Refills: 0 | Status: DISCONTINUED | OUTPATIENT
Start: 2018-04-17 | End: 2018-04-19

## 2018-04-17 RX ORDER — MORPHINE SULFATE 50 MG/1
4 CAPSULE, EXTENDED RELEASE ORAL
Qty: 0 | Refills: 0 | Status: DISCONTINUED | OUTPATIENT
Start: 2018-04-17 | End: 2018-04-23

## 2018-04-17 RX ORDER — MORPHINE SULFATE 50 MG/1
4 CAPSULE, EXTENDED RELEASE ORAL
Qty: 0 | Refills: 0 | Status: DISCONTINUED | OUTPATIENT
Start: 2018-04-17 | End: 2018-04-17

## 2018-04-17 RX ORDER — MAGNESIUM SULFATE 500 MG/ML
2 VIAL (ML) INJECTION ONCE
Qty: 0 | Refills: 0 | Status: COMPLETED | OUTPATIENT
Start: 2018-04-17 | End: 2018-04-17

## 2018-04-17 RX ORDER — DIPHENHYDRAMINE HCL 50 MG
25 CAPSULE ORAL EVERY 6 HOURS
Qty: 0 | Refills: 0 | Status: DISCONTINUED | OUTPATIENT
Start: 2018-04-17 | End: 2018-04-23

## 2018-04-17 RX ORDER — KETOROLAC TROMETHAMINE 30 MG/ML
15 SYRINGE (ML) INJECTION EVERY 6 HOURS
Qty: 0 | Refills: 0 | Status: DISCONTINUED | OUTPATIENT
Start: 2018-04-17 | End: 2018-04-20

## 2018-04-17 RX ORDER — SENNA PLUS 8.6 MG/1
2 TABLET ORAL AT BEDTIME
Qty: 0 | Refills: 0 | Status: DISCONTINUED | OUTPATIENT
Start: 2018-04-17 | End: 2018-04-18

## 2018-04-17 RX ORDER — METOPROLOL TARTRATE 50 MG
50 TABLET ORAL DAILY
Qty: 0 | Refills: 0 | Status: DISCONTINUED | OUTPATIENT
Start: 2018-04-17 | End: 2018-04-18

## 2018-04-17 RX ORDER — IBUPROFEN 200 MG
600 TABLET ORAL EVERY 8 HOURS
Qty: 0 | Refills: 0 | Status: DISCONTINUED | OUTPATIENT
Start: 2018-04-17 | End: 2018-04-17

## 2018-04-17 RX ORDER — MORPHINE SULFATE 50 MG/1
2 CAPSULE, EXTENDED RELEASE ORAL
Qty: 0 | Refills: 0 | Status: DISCONTINUED | OUTPATIENT
Start: 2018-04-17 | End: 2018-04-18

## 2018-04-17 RX ORDER — ONDANSETRON 8 MG/1
4 TABLET, FILM COATED ORAL ONCE
Qty: 0 | Refills: 0 | Status: DISCONTINUED | OUTPATIENT
Start: 2018-04-17 | End: 2018-04-18

## 2018-04-17 RX ORDER — HYDROMORPHONE HYDROCHLORIDE 2 MG/ML
4 INJECTION INTRAMUSCULAR; INTRAVENOUS; SUBCUTANEOUS EVERY 6 HOURS
Qty: 0 | Refills: 0 | Status: DISCONTINUED | OUTPATIENT
Start: 2018-04-17 | End: 2018-04-23

## 2018-04-17 RX ORDER — DOCUSATE SODIUM 100 MG
100 CAPSULE ORAL
Qty: 0 | Refills: 0 | Status: DISCONTINUED | OUTPATIENT
Start: 2018-04-17 | End: 2018-04-23

## 2018-04-17 RX ADMIN — MORPHINE SULFATE 4 MILLIGRAM(S): 50 CAPSULE, EXTENDED RELEASE ORAL at 06:15

## 2018-04-17 RX ADMIN — SODIUM CHLORIDE 75 MILLILITER(S): 9 INJECTION INTRAMUSCULAR; INTRAVENOUS; SUBCUTANEOUS at 16:22

## 2018-04-17 RX ADMIN — SODIUM CHLORIDE 100 MILLILITER(S): 9 INJECTION, SOLUTION INTRAVENOUS at 18:00

## 2018-04-17 RX ADMIN — MORPHINE SULFATE 4 MILLIGRAM(S): 50 CAPSULE, EXTENDED RELEASE ORAL at 16:55

## 2018-04-17 RX ADMIN — Medication 15 MILLIGRAM(S): at 23:10

## 2018-04-17 RX ADMIN — HEPARIN SODIUM 5000 UNIT(S): 5000 INJECTION INTRAVENOUS; SUBCUTANEOUS at 06:15

## 2018-04-17 RX ADMIN — MORPHINE SULFATE 2 MILLIGRAM(S): 50 CAPSULE, EXTENDED RELEASE ORAL at 18:23

## 2018-04-17 RX ADMIN — MORPHINE SULFATE 4 MILLIGRAM(S): 50 CAPSULE, EXTENDED RELEASE ORAL at 20:14

## 2018-04-17 RX ADMIN — HYDROMORPHONE HYDROCHLORIDE 4 MILLIGRAM(S): 2 INJECTION INTRAMUSCULAR; INTRAVENOUS; SUBCUTANEOUS at 19:01

## 2018-04-17 RX ADMIN — Medication 25 MILLIGRAM(S): at 01:47

## 2018-04-17 RX ADMIN — MORPHINE SULFATE 2 MILLIGRAM(S): 50 CAPSULE, EXTENDED RELEASE ORAL at 18:07

## 2018-04-17 RX ADMIN — MORPHINE SULFATE 4 MILLIGRAM(S): 50 CAPSULE, EXTENDED RELEASE ORAL at 17:33

## 2018-04-17 RX ADMIN — HEPARIN SODIUM 5000 UNIT(S): 5000 INJECTION INTRAVENOUS; SUBCUTANEOUS at 22:08

## 2018-04-17 RX ADMIN — MORPHINE SULFATE 4 MILLIGRAM(S): 50 CAPSULE, EXTENDED RELEASE ORAL at 09:56

## 2018-04-17 RX ADMIN — Medication 50 GRAM(S): at 01:46

## 2018-04-17 RX ADMIN — MORPHINE SULFATE 4 MILLIGRAM(S): 50 CAPSULE, EXTENDED RELEASE ORAL at 17:45

## 2018-04-17 RX ADMIN — SODIUM CHLORIDE 75 MILLILITER(S): 9 INJECTION, SOLUTION INTRAVENOUS at 00:00

## 2018-04-17 RX ADMIN — Medication 15 MILLIGRAM(S): at 22:40

## 2018-04-17 RX ADMIN — Medication 25 MILLIGRAM(S): at 22:16

## 2018-04-17 RX ADMIN — MORPHINE SULFATE 4 MILLIGRAM(S): 50 CAPSULE, EXTENDED RELEASE ORAL at 20:50

## 2018-04-17 RX ADMIN — MORPHINE SULFATE 4 MILLIGRAM(S): 50 CAPSULE, EXTENDED RELEASE ORAL at 16:35

## 2018-04-17 NOTE — PRE-OP CHECKLIST - SELECT TESTS ORDERED
CXR/urine pregnancy/Urinalysis/BMP/CBC/CMP/PT/PTT/Type and Cross/EKG CXR/Type and Cross/Type and Screen/BMP/CBC/urine pregnancy negative on 4/17/18/CMP/PT/PTT/Urinalysis/EKG/INR

## 2018-04-17 NOTE — PROGRESS NOTE ADULT - SUBJECTIVE AND OBJECTIVE BOX
s/p skin graft bilateral feet--> doing well    Vital Signs Last 24 Hrs  T(C): 36.4 (17 Apr 2018 18:30), Max: 37.4 (17 Apr 2018 02:07)  T(F): 97.5 (17 Apr 2018 18:30), Max: 99.3 (17 Apr 2018 02:07)  HR: 99 (17 Apr 2018 18:30) (68 - 110)  BP: 124/79 (17 Apr 2018 18:30) (102/55 - 140/84)  BP(mean): 106 (17 Apr 2018 16:22) (106 - 106)  RR: 21 (17 Apr 2018 18:30) (11 - 22)  SpO2: 97% (17 Apr 2018 18:30) (96% - 100%)    CVP:  T(C): 36.4 (04-17-18 @ 18:30), Max: 37.4 (04-17-18 @ 02:07)  HR: 99 (04-17-18 @ 18:30) (68 - 110)  BP: 124/79 (04-17-18 @ 18:30) (102/55 - 140/84)  RR: 21 (04-17-18 @ 18:30) (11 - 22)  SpO2: 97% (04-17-18 @ 18:30) (96% - 100%)  CVP(mm Hg): --    U.O.:  I&O's Detail    16 Apr 2018 07:01  -  17 Apr 2018 07:00  --------------------------------------------------------  IN:    lactated ringers.: 500 mL    Oral Fluid: 200 mL    Packed Red Blood Cells: 225 mL  Total IN: 925 mL    OUT:    Voided: 1950 mL  Total OUT: 1950 mL    Total NET: -1025 mL      17 Apr 2018 07:01  -  17 Apr 2018 23:25  --------------------------------------------------------  IN:    lactated ringers.: 475 mL    sodium chloride 0.9%.: 115 mL  Total IN: 590 mL    OUT:    Voided: 750 mL  Total OUT: 750 mL    Total NET: -160 mL                        9.7    9.62  )-----------( 248      ( 17 Apr 2018 20:15 )             31.9     04-17    140  |  102  |  11  ----------------------------<  145<H>  4.6   |  28  |  1.0    Ca    8.4<L>      17 Apr 2018 20:15  Phos  4.8     04-17  Mg     1.6     04-17        vac dressing and skin graft bilateral feet

## 2018-04-17 NOTE — BRIEF OPERATIVE NOTE - PROCEDURE
<<-----Click on this checkbox to enter Procedure Skin graft  04/17/2018  sharp excisional debridement and irrigation and split-thickness skin grafts bilateral feet [id number] and [id number], right thigh donor site 90 nsqu inches, wound vacs  Active  [id number]

## 2018-04-17 NOTE — CHART NOTE - NSCHARTNOTEFT_GEN_A_CORE
PACU ANESTHESIA ADMISSION NOTE      Procedure: debridement of bilateral feet, skin graft to bilateral feet donor site right thigh    Post op diagnosis:  Wound of lower extremity, unspecified laterality, subsequent encounter      ____  Intubated  TV:______       Rate: ______      FiO2: ______    _x___  Patent Airway    _x___  Full return of protective reflexes    _x___  Full recovery from anesthesia / back to baseline status    Vitals:  T(F): 98.5  HR: 112  BP: 140/84  RR: 15  SpO2: 100%    Mental Status:  _x___ Awake   _____ Alert   _____ Drowsy   _____ Sedated    Nausea/Vomiting:  _x___  NO       ______Yes,   See Post - Op Orders         Pain Scale (0-10):  __0___    Treatment: _x___ None    ____ See Post - Op/PCA Orders    Post - Operative Fluids:   __x__ Oral   ____ See Post - Op Orders    Plan: Discharge:   ____Home       _____Floor     ___x__Critical Care    _____  Other:_________________    Comments:  No anesthesia issues or complications noted.  Discharge when criteria met.

## 2018-04-17 NOTE — BRIEF OPERATIVE NOTE - POST-OP DX
Wound  04/17/2018  full-thickness wounds bilateral fee post debridement  Active  Sudhir Artis  Wound of lower extremity, unspecified laterality, subsequent encounter  04/11/2018    Active  Ubaldo Ty

## 2018-04-17 NOTE — BRIEF OPERATIVE NOTE - PRE-OP DX
Wound  04/17/2018  full -thickness wounds bilateral feet post debridement  Active  Sudhir Artis  Wound of lower extremity, unspecified laterality, subsequent encounter  04/11/2018    Active  Ubaldo Ty

## 2018-04-18 ENCOUNTER — APPOINTMENT (OUTPATIENT)
Dept: CARDIOLOGY | Facility: CLINIC | Age: 43
End: 2018-04-18

## 2018-04-18 LAB
ANION GAP SERPL CALC-SCNC: 10 MMOL/L — SIGNIFICANT CHANGE UP (ref 7–14)
BUN SERPL-MCNC: 13 MG/DL — SIGNIFICANT CHANGE UP (ref 10–20)
CALCIUM SERPL-MCNC: 8.2 MG/DL — LOW (ref 8.5–10.1)
CHLORIDE SERPL-SCNC: 101 MMOL/L — SIGNIFICANT CHANGE UP (ref 98–110)
CO2 SERPL-SCNC: 26 MMOL/L — SIGNIFICANT CHANGE UP (ref 17–32)
CREAT SERPL-MCNC: 1.1 MG/DL — SIGNIFICANT CHANGE UP (ref 0.7–1.5)
GLUCOSE SERPL-MCNC: 128 MG/DL — HIGH (ref 70–99)
HCT VFR BLD CALC: 30.2 % — LOW (ref 37–47)
HGB BLD-MCNC: 9.1 G/DL — LOW (ref 12–16)
MAGNESIUM SERPL-MCNC: 1.7 MG/DL — LOW (ref 1.8–2.4)
MCHC RBC-ENTMCNC: 23.9 PG — LOW (ref 27–31)
MCHC RBC-ENTMCNC: 30.1 G/DL — LOW (ref 32–37)
MCV RBC AUTO: 79.3 FL — LOW (ref 81–99)
NRBC # BLD: 0 /100 WBCS — SIGNIFICANT CHANGE UP (ref 0–0)
PHOSPHATE SERPL-MCNC: 3.9 MG/DL — SIGNIFICANT CHANGE UP (ref 2.1–4.9)
PLATELET # BLD AUTO: 234 K/UL — SIGNIFICANT CHANGE UP (ref 130–400)
POTASSIUM SERPL-MCNC: 4.4 MMOL/L — SIGNIFICANT CHANGE UP (ref 3.5–5)
POTASSIUM SERPL-SCNC: 4.4 MMOL/L — SIGNIFICANT CHANGE UP (ref 3.5–5)
RBC # BLD: 3.81 M/UL — LOW (ref 4.2–5.4)
RBC # FLD: 16.8 % — HIGH (ref 11.5–14.5)
SODIUM SERPL-SCNC: 137 MMOL/L — SIGNIFICANT CHANGE UP (ref 135–146)
WBC # BLD: 11.2 K/UL — HIGH (ref 4.8–10.8)
WBC # FLD AUTO: 11.2 K/UL — HIGH (ref 4.8–10.8)

## 2018-04-18 RX ORDER — METOPROLOL TARTRATE 50 MG
25 TABLET ORAL DAILY
Qty: 0 | Refills: 0 | Status: DISCONTINUED | OUTPATIENT
Start: 2018-04-18 | End: 2018-04-23

## 2018-04-18 RX ORDER — SENNA PLUS 8.6 MG/1
2 TABLET ORAL AT BEDTIME
Qty: 0 | Refills: 0 | Status: DISCONTINUED | OUTPATIENT
Start: 2018-04-18 | End: 2018-04-23

## 2018-04-18 RX ORDER — MAGNESIUM SULFATE 500 MG/ML
2 VIAL (ML) INJECTION ONCE
Qty: 0 | Refills: 0 | Status: COMPLETED | OUTPATIENT
Start: 2018-04-18 | End: 2018-04-18

## 2018-04-18 RX ORDER — AMPICILLIN SODIUM AND SULBACTAM SODIUM 250; 125 MG/ML; MG/ML
1.5 INJECTION, POWDER, FOR SUSPENSION INTRAMUSCULAR; INTRAVENOUS EVERY 6 HOURS
Qty: 0 | Refills: 0 | Status: DISCONTINUED | OUTPATIENT
Start: 2018-04-18 | End: 2018-04-23

## 2018-04-18 RX ORDER — MORPHINE SULFATE 50 MG/1
2 CAPSULE, EXTENDED RELEASE ORAL
Qty: 0 | Refills: 0 | Status: DISCONTINUED | OUTPATIENT
Start: 2018-04-18 | End: 2018-04-23

## 2018-04-18 RX ADMIN — MORPHINE SULFATE 2 MILLIGRAM(S): 50 CAPSULE, EXTENDED RELEASE ORAL at 12:53

## 2018-04-18 RX ADMIN — Medication 15 MILLIGRAM(S): at 12:50

## 2018-04-18 RX ADMIN — MORPHINE SULFATE 2 MILLIGRAM(S): 50 CAPSULE, EXTENDED RELEASE ORAL at 15:56

## 2018-04-18 RX ADMIN — AMPICILLIN SODIUM AND SULBACTAM SODIUM 100 GRAM(S): 250; 125 INJECTION, POWDER, FOR SUSPENSION INTRAMUSCULAR; INTRAVENOUS at 22:58

## 2018-04-18 RX ADMIN — MORPHINE SULFATE 2 MILLIGRAM(S): 50 CAPSULE, EXTENDED RELEASE ORAL at 10:52

## 2018-04-18 RX ADMIN — Medication 1000 UNIT(S): at 11:54

## 2018-04-18 RX ADMIN — Medication 15 MILLIGRAM(S): at 06:00

## 2018-04-18 RX ADMIN — HEPARIN SODIUM 5000 UNIT(S): 5000 INJECTION INTRAVENOUS; SUBCUTANEOUS at 06:49

## 2018-04-18 RX ADMIN — MORPHINE SULFATE 4 MILLIGRAM(S): 50 CAPSULE, EXTENDED RELEASE ORAL at 22:56

## 2018-04-18 RX ADMIN — Medication 25 MILLIGRAM(S): at 12:11

## 2018-04-18 RX ADMIN — Medication 100 MILLIGRAM(S): at 17:31

## 2018-04-18 RX ADMIN — MORPHINE SULFATE 4 MILLIGRAM(S): 50 CAPSULE, EXTENDED RELEASE ORAL at 22:49

## 2018-04-18 RX ADMIN — Medication 15 MILLIGRAM(S): at 05:24

## 2018-04-18 RX ADMIN — MORPHINE SULFATE 4 MILLIGRAM(S): 50 CAPSULE, EXTENDED RELEASE ORAL at 02:30

## 2018-04-18 RX ADMIN — Medication 25 MILLIGRAM(S): at 04:16

## 2018-04-18 RX ADMIN — AMPICILLIN SODIUM AND SULBACTAM SODIUM 100 GRAM(S): 250; 125 INJECTION, POWDER, FOR SUSPENSION INTRAMUSCULAR; INTRAVENOUS at 07:25

## 2018-04-18 RX ADMIN — AMPICILLIN SODIUM AND SULBACTAM SODIUM 100 GRAM(S): 250; 125 INJECTION, POWDER, FOR SUSPENSION INTRAMUSCULAR; INTRAVENOUS at 17:31

## 2018-04-18 RX ADMIN — HEPARIN SODIUM 5000 UNIT(S): 5000 INJECTION INTRAVENOUS; SUBCUTANEOUS at 22:49

## 2018-04-18 RX ADMIN — Medication 15 MILLIGRAM(S): at 11:56

## 2018-04-18 RX ADMIN — AMPICILLIN SODIUM AND SULBACTAM SODIUM 100 GRAM(S): 250; 125 INJECTION, POWDER, FOR SUSPENSION INTRAMUSCULAR; INTRAVENOUS at 11:56

## 2018-04-18 RX ADMIN — HEPARIN SODIUM 5000 UNIT(S): 5000 INJECTION INTRAVENOUS; SUBCUTANEOUS at 14:00

## 2018-04-18 RX ADMIN — Medication 100 MILLIGRAM(S): at 06:48

## 2018-04-18 RX ADMIN — MORPHINE SULFATE 4 MILLIGRAM(S): 50 CAPSULE, EXTENDED RELEASE ORAL at 06:48

## 2018-04-18 RX ADMIN — MORPHINE SULFATE 4 MILLIGRAM(S): 50 CAPSULE, EXTENDED RELEASE ORAL at 01:56

## 2018-04-18 RX ADMIN — Medication 15 MILLIGRAM(S): at 21:25

## 2018-04-18 RX ADMIN — Medication 1 TABLET(S): at 11:54

## 2018-04-18 RX ADMIN — Medication 25 MILLIGRAM(S): at 22:56

## 2018-04-18 RX ADMIN — Medication 50 GRAM(S): at 01:44

## 2018-04-18 RX ADMIN — MORPHINE SULFATE 4 MILLIGRAM(S): 50 CAPSULE, EXTENDED RELEASE ORAL at 07:20

## 2018-04-18 NOTE — PROGRESS NOTE ADULT - SUBJECTIVE AND OBJECTIVE BOX
POD#1    Vital Signs Last 24 Hrs  T(C): 37.1 (18 Apr 2018 15:54), Max: 37.1 (18 Apr 2018 08:21)  T(F): 98.8 (18 Apr 2018 15:54), Max: 98.8 (18 Apr 2018 15:54)  HR: 93 (18 Apr 2018 15:54) (68 - 110)  BP: 118/74 (18 Apr 2018 15:54) (97/62 - 140/84)  BP(mean): 90 (18 Apr 2018 12:12) (90 - 106)  RR: 18 (18 Apr 2018 15:54) (11 - 22)  SpO2: 100% (18 Apr 2018 12:12) (96% - 100%)    CVP:  T(C): 37.1 (04-18-18 @ 15:54), Max: 37.1 (04-18-18 @ 08:21)  HR: 93 (04-18-18 @ 15:54) (68 - 110)  BP: 118/74 (04-18-18 @ 15:54) (97/62 - 140/84)  RR: 18 (04-18-18 @ 15:54) (11 - 22)  SpO2: 100% (04-18-18 @ 12:12) (96% - 100%)  CVP(mm Hg): --    U.O.:  I&O's Detail    17 Apr 2018 07:01  -  18 Apr 2018 07:00  --------------------------------------------------------  IN:    IV PiggyBack: 100 mL    lactated ringers.: 475 mL    lactated ringers.: 1100 mL    sodium chloride 0.9%: 115 mL  Total IN: 1790 mL    OUT:    Voided: 950 mL  Total OUT: 950 mL    Total NET: 840 mL      18 Apr 2018 07:01  -  18 Apr 2018 16:02  --------------------------------------------------------  IN:  Total IN: 0 mL    OUT:    Voided: 200 mL  Total OUT: 200 mL    Total NET: -200 ml                        9.7    9.62  )-----------( 248      ( 17 Apr 2018 20:15 )             31.9     04-17    140  |  102  |  11  ----------------------------<  145<H>  4.6   |  28  |  1.0    Ca    8.4<L>      17 Apr 2018 20:15  Phos  4.8     04-17  Mg     1.6     04-17        Bilateral feet--> vac dressing in place

## 2018-04-18 NOTE — PROGRESS NOTE ADULT - ASSESSMENT
bilateral feet wounds post partial amputations and POD#1 skin graft and vac--> will change dressing POD#3, iv abx

## 2018-04-19 LAB
ANION GAP SERPL CALC-SCNC: 11 MMOL/L — SIGNIFICANT CHANGE UP (ref 7–14)
BUN SERPL-MCNC: 11 MG/DL — SIGNIFICANT CHANGE UP (ref 10–20)
CALCIUM SERPL-MCNC: 8.4 MG/DL — LOW (ref 8.5–10.1)
CHLORIDE SERPL-SCNC: 102 MMOL/L — SIGNIFICANT CHANGE UP (ref 98–110)
CO2 SERPL-SCNC: 26 MMOL/L — SIGNIFICANT CHANGE UP (ref 17–32)
CREAT SERPL-MCNC: 1 MG/DL — SIGNIFICANT CHANGE UP (ref 0.7–1.5)
GLUCOSE SERPL-MCNC: 81 MG/DL — SIGNIFICANT CHANGE UP (ref 70–99)
HCT VFR BLD CALC: 30.8 % — LOW (ref 37–47)
HGB BLD-MCNC: 9.5 G/DL — LOW (ref 12–16)
MAGNESIUM SERPL-MCNC: 1.9 MG/DL — SIGNIFICANT CHANGE UP (ref 1.8–2.4)
MCHC RBC-ENTMCNC: 24.2 PG — LOW (ref 27–31)
MCHC RBC-ENTMCNC: 30.8 G/DL — LOW (ref 32–37)
MCV RBC AUTO: 78.6 FL — LOW (ref 81–99)
NRBC # BLD: 0 /100 WBCS — SIGNIFICANT CHANGE UP (ref 0–0)
PHOSPHATE SERPL-MCNC: 4 MG/DL — SIGNIFICANT CHANGE UP (ref 2.1–4.9)
PLATELET # BLD AUTO: 263 K/UL — SIGNIFICANT CHANGE UP (ref 130–400)
POTASSIUM SERPL-MCNC: 5.2 MMOL/L — HIGH (ref 3.5–5)
POTASSIUM SERPL-SCNC: 5.2 MMOL/L — HIGH (ref 3.5–5)
RBC # BLD: 3.92 M/UL — LOW (ref 4.2–5.4)
RBC # FLD: 17 % — HIGH (ref 11.5–14.5)
SODIUM SERPL-SCNC: 139 MMOL/L — SIGNIFICANT CHANGE UP (ref 135–146)
SURGICAL PATHOLOGY STUDY: SIGNIFICANT CHANGE UP
TYPE + AB SCN PNL BLD: SIGNIFICANT CHANGE UP
WBC # BLD: 8.15 K/UL — SIGNIFICANT CHANGE UP (ref 4.8–10.8)
WBC # FLD AUTO: 8.15 K/UL — SIGNIFICANT CHANGE UP (ref 4.8–10.8)

## 2018-04-19 RX ORDER — MORPHINE SULFATE 50 MG/1
4 CAPSULE, EXTENDED RELEASE ORAL ONCE
Qty: 0 | Refills: 0 | Status: DISCONTINUED | OUTPATIENT
Start: 2018-04-19 | End: 2018-04-19

## 2018-04-19 RX ORDER — PREGABALIN 225 MG/1
1000 CAPSULE ORAL DAILY
Qty: 0 | Refills: 0 | Status: DISCONTINUED | OUTPATIENT
Start: 2018-04-19 | End: 2018-04-23

## 2018-04-19 RX ADMIN — MORPHINE SULFATE 2 MILLIGRAM(S): 50 CAPSULE, EXTENDED RELEASE ORAL at 23:36

## 2018-04-19 RX ADMIN — MORPHINE SULFATE 4 MILLIGRAM(S): 50 CAPSULE, EXTENDED RELEASE ORAL at 21:30

## 2018-04-19 RX ADMIN — Medication 15 MILLIGRAM(S): at 05:39

## 2018-04-19 RX ADMIN — AMPICILLIN SODIUM AND SULBACTAM SODIUM 100 GRAM(S): 250; 125 INJECTION, POWDER, FOR SUSPENSION INTRAMUSCULAR; INTRAVENOUS at 11:22

## 2018-04-19 RX ADMIN — MORPHINE SULFATE 4 MILLIGRAM(S): 50 CAPSULE, EXTENDED RELEASE ORAL at 09:30

## 2018-04-19 RX ADMIN — MORPHINE SULFATE 4 MILLIGRAM(S): 50 CAPSULE, EXTENDED RELEASE ORAL at 12:35

## 2018-04-19 RX ADMIN — AMPICILLIN SODIUM AND SULBACTAM SODIUM 100 GRAM(S): 250; 125 INJECTION, POWDER, FOR SUSPENSION INTRAMUSCULAR; INTRAVENOUS at 18:46

## 2018-04-19 RX ADMIN — MORPHINE SULFATE 4 MILLIGRAM(S): 50 CAPSULE, EXTENDED RELEASE ORAL at 00:20

## 2018-04-19 RX ADMIN — MORPHINE SULFATE 4 MILLIGRAM(S): 50 CAPSULE, EXTENDED RELEASE ORAL at 06:48

## 2018-04-19 RX ADMIN — MORPHINE SULFATE 4 MILLIGRAM(S): 50 CAPSULE, EXTENDED RELEASE ORAL at 06:00

## 2018-04-19 RX ADMIN — MORPHINE SULFATE 4 MILLIGRAM(S): 50 CAPSULE, EXTENDED RELEASE ORAL at 00:17

## 2018-04-19 RX ADMIN — HEPARIN SODIUM 5000 UNIT(S): 5000 INJECTION INTRAVENOUS; SUBCUTANEOUS at 21:04

## 2018-04-19 RX ADMIN — Medication 25 MILLIGRAM(S): at 18:46

## 2018-04-19 RX ADMIN — Medication 100 MILLIGRAM(S): at 18:46

## 2018-04-19 RX ADMIN — Medication 100 MILLIGRAM(S): at 05:59

## 2018-04-19 RX ADMIN — AMPICILLIN SODIUM AND SULBACTAM SODIUM 100 GRAM(S): 250; 125 INJECTION, POWDER, FOR SUSPENSION INTRAMUSCULAR; INTRAVENOUS at 23:29

## 2018-04-19 RX ADMIN — Medication 1000 UNIT(S): at 14:29

## 2018-04-19 RX ADMIN — Medication 15 MILLIGRAM(S): at 18:55

## 2018-04-19 RX ADMIN — HEPARIN SODIUM 5000 UNIT(S): 5000 INJECTION INTRAVENOUS; SUBCUTANEOUS at 05:59

## 2018-04-19 RX ADMIN — Medication 50 GRAM(S): at 01:20

## 2018-04-19 RX ADMIN — PREGABALIN 1000 MICROGRAM(S): 225 CAPSULE ORAL at 12:36

## 2018-04-19 RX ADMIN — SENNA PLUS 2 TABLET(S): 8.6 TABLET ORAL at 21:04

## 2018-04-19 RX ADMIN — MORPHINE SULFATE 2 MILLIGRAM(S): 50 CAPSULE, EXTENDED RELEASE ORAL at 04:00

## 2018-04-19 RX ADMIN — Medication 15 MILLIGRAM(S): at 17:21

## 2018-04-19 RX ADMIN — AMPICILLIN SODIUM AND SULBACTAM SODIUM 100 GRAM(S): 250; 125 INJECTION, POWDER, FOR SUSPENSION INTRAMUSCULAR; INTRAVENOUS at 05:59

## 2018-04-19 RX ADMIN — Medication 1 TABLET(S): at 11:22

## 2018-04-19 RX ADMIN — MORPHINE SULFATE 4 MILLIGRAM(S): 50 CAPSULE, EXTENDED RELEASE ORAL at 15:57

## 2018-04-19 RX ADMIN — Medication 25 MILLIGRAM(S): at 05:59

## 2018-04-19 RX ADMIN — MORPHINE SULFATE 4 MILLIGRAM(S): 50 CAPSULE, EXTENDED RELEASE ORAL at 09:15

## 2018-04-19 RX ADMIN — MORPHINE SULFATE 4 MILLIGRAM(S): 50 CAPSULE, EXTENDED RELEASE ORAL at 21:02

## 2018-04-19 RX ADMIN — MORPHINE SULFATE 2 MILLIGRAM(S): 50 CAPSULE, EXTENDED RELEASE ORAL at 03:24

## 2018-04-19 RX ADMIN — HEPARIN SODIUM 5000 UNIT(S): 5000 INJECTION INTRAVENOUS; SUBCUTANEOUS at 14:29

## 2018-04-19 RX ADMIN — Medication 15 MILLIGRAM(S): at 04:55

## 2018-04-19 NOTE — DIETITIAN INITIAL EVALUATION ADULT. - DIET TYPE
regular/pt reports fair appetite. Pt consumes 50-75% of meals from hospital trays + meals provided at home. Po intake appears adequate from RD observation.

## 2018-04-19 NOTE — DIETITIAN INITIAL EVALUATION ADULT. - ENERGY NEEDS
Energy: 6621-6379 kcal/day (MSJx1.1-1.2 AF)    Protein: 70-84 g/day (1-1.2 g/kg IBW)    Fluids: Per Burn ICU team

## 2018-04-19 NOTE — DIETITIAN INITIAL EVALUATION ADULT. - INDICATOR
Renal profile, glucose profile, body composition, nutrition focused physical findings, energy intake

## 2018-04-19 NOTE — DIETITIAN INITIAL EVALUATION ADULT. - PHYSICAL APPEARANCE
BMI 31.5. Alert & oriented. B/L foot wounds s/p debridement & STSG. Abd soft, NT. Last BM reportedly 1 week ago (noted 4/12). Pt c/o constipation.

## 2018-04-19 NOTE — DIETITIAN INITIAL EVALUATION ADULT. - SOURCE
patient/pt with good po intake PTP. Follows general diet. NKFA. 2-3 meals/day. No supplements. No history of unintentional wt loss reported.

## 2018-04-19 NOTE — DIETITIAN INITIAL EVALUATION ADULT. - OTHER INFO
Reason for RD assessment: LOS assessment (due 4/18, this RD was not covering unit on this date). Pertinent Medical Information: POD # 2 S/P STSG bilateral feet amputation sites.

## 2018-04-19 NOTE — PROGRESS NOTE ADULT - SUBJECTIVE AND OBJECTIVE BOX
POD # 2 S/P STSG bilateral feet amputation sites.  No complaints.   Denies chills or sore throat   Vital Signs Last 24 Hrs  T(C): 37.1 (19 Apr 2018 07:46), Max: 37.1 (18 Apr 2018 15:54)  T(F): 98.7 (19 Apr 2018 07:46), Max: 98.8 (18 Apr 2018 15:54)  HR: 80 (19 Apr 2018 07:46) (80 - 93)  BP: 113/70 (19 Apr 2018 07:46) (109/61 - 118/74)  RR: 18 (19 Apr 2018 07:46) (18 - 18)    Anya diet  I&O's Summary    18 Apr 2018 07:01  -  19 Apr 2018 07:00  --------------------------------------------------------  IN: 2100 mL / OUT: 1350 mL / NET: 750 mL      EXAM :   Bilateral feet - NPWT in place / progress over STSG sites. No evidence of bleeding  Right thigh donor site dressing in place some leakage

## 2018-04-20 LAB
ANION GAP SERPL CALC-SCNC: 13 MMOL/L — SIGNIFICANT CHANGE UP (ref 7–14)
BUN SERPL-MCNC: 11 MG/DL — SIGNIFICANT CHANGE UP (ref 10–20)
CALCIUM SERPL-MCNC: 8.3 MG/DL — LOW (ref 8.5–10.1)
CHLORIDE SERPL-SCNC: 102 MMOL/L — SIGNIFICANT CHANGE UP (ref 98–110)
CO2 SERPL-SCNC: 26 MMOL/L — SIGNIFICANT CHANGE UP (ref 17–32)
CREAT SERPL-MCNC: 0.8 MG/DL — SIGNIFICANT CHANGE UP (ref 0.7–1.5)
CULTURE RESULTS: SIGNIFICANT CHANGE UP
GLUCOSE SERPL-MCNC: 84 MG/DL — SIGNIFICANT CHANGE UP (ref 70–99)
POTASSIUM SERPL-MCNC: 4.7 MMOL/L — SIGNIFICANT CHANGE UP (ref 3.5–5)
POTASSIUM SERPL-SCNC: 4.7 MMOL/L — SIGNIFICANT CHANGE UP (ref 3.5–5)
SODIUM SERPL-SCNC: 141 MMOL/L — SIGNIFICANT CHANGE UP (ref 135–146)
SPECIMEN SOURCE: SIGNIFICANT CHANGE UP

## 2018-04-20 RX ORDER — MORPHINE SULFATE 50 MG/1
2 CAPSULE, EXTENDED RELEASE ORAL ONCE
Qty: 0 | Refills: 0 | Status: DISCONTINUED | OUTPATIENT
Start: 2018-04-20 | End: 2018-04-20

## 2018-04-20 RX ORDER — MIDAZOLAM HYDROCHLORIDE 1 MG/ML
2 INJECTION, SOLUTION INTRAMUSCULAR; INTRAVENOUS ONCE
Qty: 0 | Refills: 0 | Status: DISCONTINUED | OUTPATIENT
Start: 2018-04-20 | End: 2018-04-20

## 2018-04-20 RX ORDER — MORPHINE SULFATE 50 MG/1
6 CAPSULE, EXTENDED RELEASE ORAL ONCE
Qty: 0 | Refills: 0 | Status: DISCONTINUED | OUTPATIENT
Start: 2018-04-20 | End: 2018-04-20

## 2018-04-20 RX ORDER — MORPHINE SULFATE 50 MG/1
6 CAPSULE, EXTENDED RELEASE ORAL
Qty: 0 | Refills: 0 | Status: DISCONTINUED | OUTPATIENT
Start: 2018-04-20 | End: 2018-04-23

## 2018-04-20 RX ORDER — MINERAL OIL
133 OIL (ML) MISCELLANEOUS ONCE
Qty: 0 | Refills: 0 | Status: DISCONTINUED | OUTPATIENT
Start: 2018-04-20 | End: 2018-04-23

## 2018-04-20 RX ADMIN — HEPARIN SODIUM 5000 UNIT(S): 5000 INJECTION INTRAVENOUS; SUBCUTANEOUS at 21:24

## 2018-04-20 RX ADMIN — MORPHINE SULFATE 4 MILLIGRAM(S): 50 CAPSULE, EXTENDED RELEASE ORAL at 04:42

## 2018-04-20 RX ADMIN — MORPHINE SULFATE 6 MILLIGRAM(S): 50 CAPSULE, EXTENDED RELEASE ORAL at 09:20

## 2018-04-20 RX ADMIN — AMPICILLIN SODIUM AND SULBACTAM SODIUM 100 GRAM(S): 250; 125 INJECTION, POWDER, FOR SUSPENSION INTRAMUSCULAR; INTRAVENOUS at 23:18

## 2018-04-20 RX ADMIN — MORPHINE SULFATE 4 MILLIGRAM(S): 50 CAPSULE, EXTENDED RELEASE ORAL at 00:30

## 2018-04-20 RX ADMIN — MORPHINE SULFATE 4 MILLIGRAM(S): 50 CAPSULE, EXTENDED RELEASE ORAL at 05:15

## 2018-04-20 RX ADMIN — MORPHINE SULFATE 6 MILLIGRAM(S): 50 CAPSULE, EXTENDED RELEASE ORAL at 10:15

## 2018-04-20 RX ADMIN — AMPICILLIN SODIUM AND SULBACTAM SODIUM 100 GRAM(S): 250; 125 INJECTION, POWDER, FOR SUSPENSION INTRAMUSCULAR; INTRAVENOUS at 12:08

## 2018-04-20 RX ADMIN — MORPHINE SULFATE 2 MILLIGRAM(S): 50 CAPSULE, EXTENDED RELEASE ORAL at 10:12

## 2018-04-20 RX ADMIN — HEPARIN SODIUM 5000 UNIT(S): 5000 INJECTION INTRAVENOUS; SUBCUTANEOUS at 13:37

## 2018-04-20 RX ADMIN — Medication 15 MILLIGRAM(S): at 11:04

## 2018-04-20 RX ADMIN — Medication 100 MILLIGRAM(S): at 05:01

## 2018-04-20 RX ADMIN — MORPHINE SULFATE 4 MILLIGRAM(S): 50 CAPSULE, EXTENDED RELEASE ORAL at 01:17

## 2018-04-20 RX ADMIN — MORPHINE SULFATE 2 MILLIGRAM(S): 50 CAPSULE, EXTENDED RELEASE ORAL at 13:33

## 2018-04-20 RX ADMIN — Medication 10 MILLIGRAM(S): at 18:39

## 2018-04-20 RX ADMIN — PREGABALIN 1000 MICROGRAM(S): 225 CAPSULE ORAL at 12:09

## 2018-04-20 RX ADMIN — Medication 25 MILLIGRAM(S): at 05:01

## 2018-04-20 RX ADMIN — MIDAZOLAM HYDROCHLORIDE 2 MILLIGRAM(S): 1 INJECTION, SOLUTION INTRAMUSCULAR; INTRAVENOUS at 09:01

## 2018-04-20 RX ADMIN — MORPHINE SULFATE 4 MILLIGRAM(S): 50 CAPSULE, EXTENDED RELEASE ORAL at 13:33

## 2018-04-20 RX ADMIN — Medication 15 MILLIGRAM(S): at 23:14

## 2018-04-20 RX ADMIN — HEPARIN SODIUM 5000 UNIT(S): 5000 INJECTION INTRAVENOUS; SUBCUTANEOUS at 05:01

## 2018-04-20 RX ADMIN — HYDROMORPHONE HYDROCHLORIDE 4 MILLIGRAM(S): 2 INJECTION INTRAMUSCULAR; INTRAVENOUS; SUBCUTANEOUS at 05:02

## 2018-04-20 RX ADMIN — Medication 1000 UNIT(S): at 12:09

## 2018-04-20 RX ADMIN — Medication 15 MILLIGRAM(S): at 13:33

## 2018-04-20 RX ADMIN — Medication 100 MILLIGRAM(S): at 18:30

## 2018-04-20 RX ADMIN — AMPICILLIN SODIUM AND SULBACTAM SODIUM 100 GRAM(S): 250; 125 INJECTION, POWDER, FOR SUSPENSION INTRAMUSCULAR; INTRAVENOUS at 05:01

## 2018-04-20 RX ADMIN — MORPHINE SULFATE 4 MILLIGRAM(S): 50 CAPSULE, EXTENDED RELEASE ORAL at 22:40

## 2018-04-20 RX ADMIN — Medication 1 TABLET(S): at 12:09

## 2018-04-20 RX ADMIN — MORPHINE SULFATE 4 MILLIGRAM(S): 50 CAPSULE, EXTENDED RELEASE ORAL at 21:14

## 2018-04-20 RX ADMIN — MIDAZOLAM HYDROCHLORIDE 2 MILLIGRAM(S): 1 INJECTION, SOLUTION INTRAMUSCULAR; INTRAVENOUS at 09:20

## 2018-04-20 RX ADMIN — MORPHINE SULFATE 2 MILLIGRAM(S): 50 CAPSULE, EXTENDED RELEASE ORAL at 00:20

## 2018-04-20 RX ADMIN — AMPICILLIN SODIUM AND SULBACTAM SODIUM 100 GRAM(S): 250; 125 INJECTION, POWDER, FOR SUSPENSION INTRAMUSCULAR; INTRAVENOUS at 18:30

## 2018-04-20 RX ADMIN — SENNA PLUS 2 TABLET(S): 8.6 TABLET ORAL at 21:24

## 2018-04-20 NOTE — PROGRESS NOTE ADULT - SUBJECTIVE AND OBJECTIVE BOX
POD#3    Vital Signs Last 24 Hrs  T(C): 36.7 (20 Apr 2018 07:24), Max: 37.1 (19 Apr 2018 15:53)  T(F): 98 (20 Apr 2018 07:24), Max: 98.7 (19 Apr 2018 15:53)  HR: 78 (20 Apr 2018 07:24) (78 - 80)  BP: 110/80 (20 Apr 2018 07:24) (108/73 - 110/80)  BP(mean): --  RR: 18 (20 Apr 2018 07:24) (18 - 19)  SpO2: --    CVP:  T(C): 36.7 (04-20-18 @ 07:24), Max: 37.1 (04-19-18 @ 15:53)  HR: 78 (04-20-18 @ 07:24) (78 - 80)  BP: 110/80 (04-20-18 @ 07:24) (108/73 - 110/80)  RR: 18 (04-20-18 @ 07:24) (18 - 19)  SpO2: --  CVP(mm Hg): --    U.O.:  I&O's Detail    19 Apr 2018 07:01  -  20 Apr 2018 07:00  --------------------------------------------------------  IN:  Total IN: 0 mL    OUT:    Voided: 400 mL  Total OUT: 400 mL    Total NET: -400 mL                               9.5    8.15  )-----------( 263      ( 19 Apr 2018 16:32 )             30.8     04-20    141  |  102  |  11  ----------------------------<  84  4.7   |  26  |  0.8    Ca    8.3<L>      20 Apr 2018 00:34  Phos  4.0     04-19  Mg     1.9     04-19        Large Dressing Change--> good take skin grat bilateral feet

## 2018-04-20 NOTE — PROGRESS NOTE ADULT - ASSESSMENT
full-thickness wound bilateral feet post skin graft--> local wound care, change dressing and remove staples pod#5

## 2018-04-21 RX ORDER — KETOROLAC TROMETHAMINE 30 MG/ML
15 SYRINGE (ML) INJECTION ONCE
Qty: 0 | Refills: 0 | Status: DISCONTINUED | OUTPATIENT
Start: 2018-04-21 | End: 2018-04-21

## 2018-04-21 RX ADMIN — Medication 15 MILLIGRAM(S): at 21:10

## 2018-04-21 RX ADMIN — HEPARIN SODIUM 5000 UNIT(S): 5000 INJECTION INTRAVENOUS; SUBCUTANEOUS at 06:30

## 2018-04-21 RX ADMIN — MORPHINE SULFATE 4 MILLIGRAM(S): 50 CAPSULE, EXTENDED RELEASE ORAL at 17:20

## 2018-04-21 RX ADMIN — Medication 100 MILLIGRAM(S): at 18:03

## 2018-04-21 RX ADMIN — PREGABALIN 1000 MICROGRAM(S): 225 CAPSULE ORAL at 11:54

## 2018-04-21 RX ADMIN — Medication 25 MILLIGRAM(S): at 21:10

## 2018-04-21 RX ADMIN — HEPARIN SODIUM 5000 UNIT(S): 5000 INJECTION INTRAVENOUS; SUBCUTANEOUS at 14:22

## 2018-04-21 RX ADMIN — HEPARIN SODIUM 5000 UNIT(S): 5000 INJECTION INTRAVENOUS; SUBCUTANEOUS at 21:11

## 2018-04-21 RX ADMIN — SENNA PLUS 2 TABLET(S): 8.6 TABLET ORAL at 21:11

## 2018-04-21 RX ADMIN — Medication 1 TABLET(S): at 11:54

## 2018-04-21 RX ADMIN — HYDROMORPHONE HYDROCHLORIDE 4 MILLIGRAM(S): 2 INJECTION INTRAMUSCULAR; INTRAVENOUS; SUBCUTANEOUS at 16:42

## 2018-04-21 RX ADMIN — Medication 25 MILLIGRAM(S): at 06:25

## 2018-04-21 RX ADMIN — MORPHINE SULFATE 4 MILLIGRAM(S): 50 CAPSULE, EXTENDED RELEASE ORAL at 10:21

## 2018-04-21 RX ADMIN — Medication 1000 UNIT(S): at 11:54

## 2018-04-21 RX ADMIN — Medication 100 MILLIGRAM(S): at 06:25

## 2018-04-21 RX ADMIN — AMPICILLIN SODIUM AND SULBACTAM SODIUM 100 GRAM(S): 250; 125 INJECTION, POWDER, FOR SUSPENSION INTRAMUSCULAR; INTRAVENOUS at 18:03

## 2018-04-21 RX ADMIN — MORPHINE SULFATE 4 MILLIGRAM(S): 50 CAPSULE, EXTENDED RELEASE ORAL at 18:13

## 2018-04-21 RX ADMIN — MORPHINE SULFATE 4 MILLIGRAM(S): 50 CAPSULE, EXTENDED RELEASE ORAL at 08:13

## 2018-04-21 RX ADMIN — AMPICILLIN SODIUM AND SULBACTAM SODIUM 100 GRAM(S): 250; 125 INJECTION, POWDER, FOR SUSPENSION INTRAMUSCULAR; INTRAVENOUS at 11:52

## 2018-04-21 RX ADMIN — AMPICILLIN SODIUM AND SULBACTAM SODIUM 100 GRAM(S): 250; 125 INJECTION, POWDER, FOR SUSPENSION INTRAMUSCULAR; INTRAVENOUS at 06:25

## 2018-04-21 RX ADMIN — HYDROMORPHONE HYDROCHLORIDE 4 MILLIGRAM(S): 2 INJECTION INTRAMUSCULAR; INTRAVENOUS; SUBCUTANEOUS at 11:52

## 2018-04-21 RX ADMIN — MORPHINE SULFATE 4 MILLIGRAM(S): 50 CAPSULE, EXTENDED RELEASE ORAL at 14:22

## 2018-04-21 NOTE — PROGRESS NOTE ADULT - SUBJECTIVE AND OBJECTIVE BOX
stable    Vital Signs Last 24 Hrs  T(C): 37 (21 Apr 2018 08:18), Max: 37.1 (20 Apr 2018 23:25)  T(F): 98.6 (21 Apr 2018 08:18), Max: 98.8 (20 Apr 2018 23:25)  HR: 80 (21 Apr 2018 08:18) (80 - 83)  BP: 131/91 (21 Apr 2018 08:59) (112/73 - 154/89)  BP(mean): --  RR: 18 (21 Apr 2018 08:18) (18 - 18)  SpO2: --    CVP:  T(C): 37 (04-21-18 @ 08:18), Max: 37.1 (04-20-18 @ 23:25)  HR: 80 (04-21-18 @ 08:18) (80 - 83)  BP: 131/91 (04-21-18 @ 08:59) (112/73 - 154/89)  RR: 18 (04-21-18 @ 08:18) (18 - 18)  SpO2: --  CVP(mm Hg): --    U.O.:  I&O's Detail    20 Apr 2018 07:01  -  21 Apr 2018 07:00  --------------------------------------------------------  IN:    IV PiggyBack: 50 mL    Oral Fluid: 200 mL  Total IN: 250 mL    OUT:    Voided: 700 mL  Total OUT: 700 mL    Total NET: -450 mL      21 Apr 2018 07:01  -  21 Apr 2018 10:41  --------------------------------------------------------  IN:  Total IN: 0 mL    OUT:    Voided: 200 mL  Total OUT: 200 mL    Total NET: -200 mL                            9.5    8.15  )-----------( 263      ( 19 Apr 2018 16:32 )             30.8     04-20    141  |  102  |  11  ----------------------------<  84  4.7   |  26  |  0.8    Ca    8.3<L>      20 Apr 2018 00:34  Phos  4.0     04-19  Mg     1.9     04-19        bilateral feet--> dressing intact post skin graft

## 2018-04-22 LAB
-  AMPICILLIN: SIGNIFICANT CHANGE UP
-  CIPROFLOXACIN: SIGNIFICANT CHANGE UP
-  ERYTHROMYCIN: SIGNIFICANT CHANGE UP
-  TETRACYCLINE: SIGNIFICANT CHANGE UP
-  VANCOMYCIN: SIGNIFICANT CHANGE UP
ANION GAP SERPL CALC-SCNC: 12 MMOL/L — SIGNIFICANT CHANGE UP (ref 7–14)
BUN SERPL-MCNC: 12 MG/DL — SIGNIFICANT CHANGE UP (ref 10–20)
CALCIUM SERPL-MCNC: 8.6 MG/DL — SIGNIFICANT CHANGE UP (ref 8.5–10.1)
CHLORIDE SERPL-SCNC: 101 MMOL/L — SIGNIFICANT CHANGE UP (ref 98–110)
CO2 SERPL-SCNC: 27 MMOL/L — SIGNIFICANT CHANGE UP (ref 17–32)
CREAT SERPL-MCNC: 1 MG/DL — SIGNIFICANT CHANGE UP (ref 0.7–1.5)
CULTURE RESULTS: SIGNIFICANT CHANGE UP
CULTURE RESULTS: SIGNIFICANT CHANGE UP
GLUCOSE SERPL-MCNC: 97 MG/DL — SIGNIFICANT CHANGE UP (ref 70–99)
HCT VFR BLD CALC: 31 % — LOW (ref 37–47)
HGB BLD-MCNC: 9.6 G/DL — LOW (ref 12–16)
MAGNESIUM SERPL-MCNC: 1.7 MG/DL — LOW (ref 1.8–2.4)
MCHC RBC-ENTMCNC: 24 PG — LOW (ref 27–31)
MCHC RBC-ENTMCNC: 31 G/DL — LOW (ref 32–37)
MCV RBC AUTO: 77.5 FL — LOW (ref 81–99)
METHOD TYPE: SIGNIFICANT CHANGE UP
NRBC # BLD: 0 /100 WBCS — SIGNIFICANT CHANGE UP (ref 0–0)
ORGANISM # SPEC MICROSCOPIC CNT: SIGNIFICANT CHANGE UP
ORGANISM # SPEC MICROSCOPIC CNT: SIGNIFICANT CHANGE UP
PHOSPHATE SERPL-MCNC: 4.1 MG/DL — SIGNIFICANT CHANGE UP (ref 2.1–4.9)
PLATELET # BLD AUTO: 312 K/UL — SIGNIFICANT CHANGE UP (ref 130–400)
POTASSIUM SERPL-MCNC: 4.2 MMOL/L — SIGNIFICANT CHANGE UP (ref 3.5–5)
POTASSIUM SERPL-SCNC: 4.2 MMOL/L — SIGNIFICANT CHANGE UP (ref 3.5–5)
RBC # BLD: 4 M/UL — LOW (ref 4.2–5.4)
RBC # FLD: 16.5 % — HIGH (ref 11.5–14.5)
SODIUM SERPL-SCNC: 140 MMOL/L — SIGNIFICANT CHANGE UP (ref 135–146)
SPECIMEN SOURCE: SIGNIFICANT CHANGE UP
SPECIMEN SOURCE: SIGNIFICANT CHANGE UP
WBC # BLD: 9.41 K/UL — SIGNIFICANT CHANGE UP (ref 4.8–10.8)
WBC # FLD AUTO: 9.41 K/UL — SIGNIFICANT CHANGE UP (ref 4.8–10.8)

## 2018-04-22 RX ORDER — MORPHINE SULFATE 50 MG/1
4 CAPSULE, EXTENDED RELEASE ORAL ONCE
Qty: 0 | Refills: 0 | Status: DISCONTINUED | OUTPATIENT
Start: 2018-04-22 | End: 2018-04-22

## 2018-04-22 RX ORDER — MIDAZOLAM HYDROCHLORIDE 1 MG/ML
2 INJECTION, SOLUTION INTRAMUSCULAR; INTRAVENOUS ONCE
Qty: 0 | Refills: 0 | Status: DISCONTINUED | OUTPATIENT
Start: 2018-04-22 | End: 2018-04-22

## 2018-04-22 RX ORDER — MAGNESIUM SULFATE 500 MG/ML
2 VIAL (ML) INJECTION ONCE
Qty: 0 | Refills: 0 | Status: COMPLETED | OUTPATIENT
Start: 2018-04-22 | End: 2018-04-23

## 2018-04-22 RX ADMIN — MIDAZOLAM HYDROCHLORIDE 2 MILLIGRAM(S): 1 INJECTION, SOLUTION INTRAMUSCULAR; INTRAVENOUS at 18:00

## 2018-04-22 RX ADMIN — MORPHINE SULFATE 2 MILLIGRAM(S): 50 CAPSULE, EXTENDED RELEASE ORAL at 18:30

## 2018-04-22 RX ADMIN — AMPICILLIN SODIUM AND SULBACTAM SODIUM 100 GRAM(S): 250; 125 INJECTION, POWDER, FOR SUSPENSION INTRAMUSCULAR; INTRAVENOUS at 05:13

## 2018-04-22 RX ADMIN — MORPHINE SULFATE 2 MILLIGRAM(S): 50 CAPSULE, EXTENDED RELEASE ORAL at 17:59

## 2018-04-22 RX ADMIN — Medication 1 TABLET(S): at 12:16

## 2018-04-22 RX ADMIN — HYDROMORPHONE HYDROCHLORIDE 4 MILLIGRAM(S): 2 INJECTION INTRAMUSCULAR; INTRAVENOUS; SUBCUTANEOUS at 19:00

## 2018-04-22 RX ADMIN — SENNA PLUS 2 TABLET(S): 8.6 TABLET ORAL at 21:12

## 2018-04-22 RX ADMIN — Medication 100 MILLIGRAM(S): at 05:12

## 2018-04-22 RX ADMIN — MIDAZOLAM HYDROCHLORIDE 2 MILLIGRAM(S): 1 INJECTION, SOLUTION INTRAMUSCULAR; INTRAVENOUS at 17:58

## 2018-04-22 RX ADMIN — HEPARIN SODIUM 5000 UNIT(S): 5000 INJECTION INTRAVENOUS; SUBCUTANEOUS at 13:15

## 2018-04-22 RX ADMIN — AMPICILLIN SODIUM AND SULBACTAM SODIUM 100 GRAM(S): 250; 125 INJECTION, POWDER, FOR SUSPENSION INTRAMUSCULAR; INTRAVENOUS at 19:40

## 2018-04-22 RX ADMIN — AMPICILLIN SODIUM AND SULBACTAM SODIUM 100 GRAM(S): 250; 125 INJECTION, POWDER, FOR SUSPENSION INTRAMUSCULAR; INTRAVENOUS at 12:15

## 2018-04-22 RX ADMIN — HYDROMORPHONE HYDROCHLORIDE 4 MILLIGRAM(S): 2 INJECTION INTRAMUSCULAR; INTRAVENOUS; SUBCUTANEOUS at 18:01

## 2018-04-22 RX ADMIN — MORPHINE SULFATE 2 MILLIGRAM(S): 50 CAPSULE, EXTENDED RELEASE ORAL at 05:13

## 2018-04-22 RX ADMIN — MORPHINE SULFATE 2 MILLIGRAM(S): 50 CAPSULE, EXTENDED RELEASE ORAL at 21:58

## 2018-04-22 RX ADMIN — MORPHINE SULFATE 4 MILLIGRAM(S): 50 CAPSULE, EXTENDED RELEASE ORAL at 18:38

## 2018-04-22 RX ADMIN — MORPHINE SULFATE 2 MILLIGRAM(S): 50 CAPSULE, EXTENDED RELEASE ORAL at 13:35

## 2018-04-22 RX ADMIN — MORPHINE SULFATE 2 MILLIGRAM(S): 50 CAPSULE, EXTENDED RELEASE ORAL at 09:38

## 2018-04-22 RX ADMIN — MORPHINE SULFATE 4 MILLIGRAM(S): 50 CAPSULE, EXTENDED RELEASE ORAL at 05:01

## 2018-04-22 RX ADMIN — MORPHINE SULFATE 2 MILLIGRAM(S): 50 CAPSULE, EXTENDED RELEASE ORAL at 13:15

## 2018-04-22 RX ADMIN — Medication 25 MILLIGRAM(S): at 21:57

## 2018-04-22 RX ADMIN — AMPICILLIN SODIUM AND SULBACTAM SODIUM 100 GRAM(S): 250; 125 INJECTION, POWDER, FOR SUSPENSION INTRAMUSCULAR; INTRAVENOUS at 00:19

## 2018-04-22 RX ADMIN — HEPARIN SODIUM 5000 UNIT(S): 5000 INJECTION INTRAVENOUS; SUBCUTANEOUS at 21:12

## 2018-04-22 RX ADMIN — MORPHINE SULFATE 2 MILLIGRAM(S): 50 CAPSULE, EXTENDED RELEASE ORAL at 00:57

## 2018-04-22 RX ADMIN — MORPHINE SULFATE 2 MILLIGRAM(S): 50 CAPSULE, EXTENDED RELEASE ORAL at 10:00

## 2018-04-22 RX ADMIN — MORPHINE SULFATE 4 MILLIGRAM(S): 50 CAPSULE, EXTENDED RELEASE ORAL at 17:59

## 2018-04-22 RX ADMIN — Medication 1000 UNIT(S): at 12:17

## 2018-04-22 RX ADMIN — HEPARIN SODIUM 5000 UNIT(S): 5000 INJECTION INTRAVENOUS; SUBCUTANEOUS at 05:12

## 2018-04-22 RX ADMIN — MORPHINE SULFATE 2 MILLIGRAM(S): 50 CAPSULE, EXTENDED RELEASE ORAL at 22:30

## 2018-04-22 RX ADMIN — PREGABALIN 1000 MICROGRAM(S): 225 CAPSULE ORAL at 12:16

## 2018-04-22 RX ADMIN — Medication 100 MILLIGRAM(S): at 19:40

## 2018-04-22 RX ADMIN — Medication 25 MILLIGRAM(S): at 05:12

## 2018-04-22 RX ADMIN — Medication 15 MILLIGRAM(S): at 00:19

## 2018-04-22 NOTE — PROGRESS NOTE ADULT - SUBJECTIVE AND OBJECTIVE BOX
POD#5  Vital Signs Last 24 Hrs  T(C): 36.8 (22 Apr 2018 07:58), Max: 36.9 (21 Apr 2018 20:00)  T(F): 98.2 (22 Apr 2018 07:58), Max: 98.4 (21 Apr 2018 20:00)  HR: 76 (22 Apr 2018 07:58) (76 - 88)  BP: 105/70 (22 Apr 2018 07:58) (105/70 - 108/72)  BP(mean): --  RR: 18 (22 Apr 2018 07:58) (16 - 18)  SpO2: --    CVP:  T(C): 36.8 (04-22-18 @ 07:58), Max: 36.9 (04-21-18 @ 20:00)  HR: 76 (04-22-18 @ 07:58) (76 - 88)  BP: 105/70 (04-22-18 @ 07:58) (105/70 - 108/72)  RR: 18 (04-22-18 @ 07:58) (16 - 18)  SpO2: --  CVP(mm Hg): --    U.O.:  I&O's Detail    21 Apr 2018 07:01  -  22 Apr 2018 07:00  --------------------------------------------------------  IN:    IV PiggyBack: 100 mL  Total IN: 100 mL    OUT:    Voided: 750 mL  Total OUT: 750 mL    Total NET: -650 mL      22 Apr 2018 07:01  -  22 Apr 2018 16:47  --------------------------------------------------------  IN:  Total IN: 0 mL    OUT:    Voided: 300 mL  Total OUT: 300 mL    Total NET: -300 ml      Large Dressing Change--> good take skin graft--> area delayed wound healing right foot

## 2018-04-23 ENCOUNTER — TRANSCRIPTION ENCOUNTER (OUTPATIENT)
Age: 43
End: 2018-04-23

## 2018-04-23 VITALS
HEART RATE: 72 BPM | RESPIRATION RATE: 18 BRPM | SYSTOLIC BLOOD PRESSURE: 123 MMHG | DIASTOLIC BLOOD PRESSURE: 83 MMHG | TEMPERATURE: 98 F

## 2018-04-23 RX ORDER — METOPROLOL TARTRATE 50 MG
1 TABLET ORAL
Qty: 0 | Refills: 0 | COMMUNITY

## 2018-04-23 RX ORDER — METOPROLOL TARTRATE 50 MG
1 TABLET ORAL
Qty: 15 | Refills: 0 | OUTPATIENT
Start: 2018-04-23 | End: 2018-05-07

## 2018-04-23 RX ORDER — CHOLECALCIFEROL (VITAMIN D3) 125 MCG
1 CAPSULE ORAL
Qty: 0 | Refills: 0 | COMMUNITY

## 2018-04-23 RX ORDER — PREGABALIN 225 MG/1
1 CAPSULE ORAL
Qty: 0 | Refills: 0 | COMMUNITY

## 2018-04-23 RX ADMIN — Medication 50 GRAM(S): at 04:52

## 2018-04-23 RX ADMIN — Medication 25 MILLIGRAM(S): at 07:11

## 2018-04-23 RX ADMIN — AMPICILLIN SODIUM AND SULBACTAM SODIUM 100 GRAM(S): 250; 125 INJECTION, POWDER, FOR SUSPENSION INTRAMUSCULAR; INTRAVENOUS at 01:19

## 2018-04-23 RX ADMIN — PREGABALIN 1000 MICROGRAM(S): 225 CAPSULE ORAL at 13:07

## 2018-04-23 RX ADMIN — Medication 1000 UNIT(S): at 12:15

## 2018-04-23 RX ADMIN — AMPICILLIN SODIUM AND SULBACTAM SODIUM 100 GRAM(S): 250; 125 INJECTION, POWDER, FOR SUSPENSION INTRAMUSCULAR; INTRAVENOUS at 12:15

## 2018-04-23 RX ADMIN — Medication 1 TABLET(S): at 12:15

## 2018-04-23 RX ADMIN — AMPICILLIN SODIUM AND SULBACTAM SODIUM 100 GRAM(S): 250; 125 INJECTION, POWDER, FOR SUSPENSION INTRAMUSCULAR; INTRAVENOUS at 06:40

## 2018-04-23 RX ADMIN — HEPARIN SODIUM 5000 UNIT(S): 5000 INJECTION INTRAVENOUS; SUBCUTANEOUS at 13:07

## 2018-04-23 RX ADMIN — MORPHINE SULFATE 2 MILLIGRAM(S): 50 CAPSULE, EXTENDED RELEASE ORAL at 01:44

## 2018-04-23 RX ADMIN — HEPARIN SODIUM 5000 UNIT(S): 5000 INJECTION INTRAVENOUS; SUBCUTANEOUS at 07:08

## 2018-04-23 NOTE — DISCHARGE NOTE ADULT - HOSPITAL COURSE
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 Patient was brought to Burn Unit on 4/11/18 from ambulatory surgery where she was having her bilateral lower extremitiy VAC changes, debridement and possible skin graft by Dr. Jenkins but was stopped due to concerns of how the wounds looked. During patient's stay she received IV antibiotics, IV fluids, pain control and local wound care. She also received a PICC line with interventional radiology on 4/13/2018. Chest xray 4/12/2018 was normal. Patient also had an Echocardiogram done on 4/11/2018 showed an EF of 52%, Grade 1 diastolic dysfunction and normal left ventricular function. Patient was consulted by infectious disease and podiatry. Patient was also being followed by plastic surgery. On 4/17/2018 patient underwent bilateral lower extremity skin grafts. Both of the skin grafts took excellently. Patient was stable throughout her stay, without complications. Patient is currently ready for discharge with homecare already in place.

## 2018-04-23 NOTE — DISCHARGE NOTE ADULT - PATIENT PORTAL LINK FT
You can access the [facility] Patient Portal, offered by [facility], by registering with the following website: [email or web address]

## 2018-04-23 NOTE — DISCHARGE NOTE ADULT - CARE PLAN
Principal Discharge DX:	Wound of lower extremity  Goal:	Proper follow up and improved healing.  Assessment and plan of treatment:	- Patient is status post bilateral skin graft to bilateral amputated feet. Skin graft took excellently.  - Patient is stable and will go home with homecare for local wound care.   - Continue local wound care once a day to bilateral lower extremities. Gently wash with soap and nonfragrant soap, apply adaptic dressing to skin grafts once a day. Wrap with Kerlix and ace. Continue with oral pain medication as needed.  - Call Burn Clinic at [phone] to make an appointment for follow up with  or  within 1 week, Located at [address] [place] [address] [address]. Clinic days are Tuesday (2-4p) or Thursday (9a-1p).  Follow up with primary care practitioner, podiatrist (Dr. Carmen) and plastic surgeon (Dr. Doyle) as well.  Secondary Diagnosis:	HTN (hypertension)  Goal:	Blood pressure Control  Assessment and plan of treatment:	- Continue with metoprolol.  - Follow up with cardiologist and primary care practitioner. Principal Discharge DX:	Wound of lower extremity  Goal:	Proper follow up and improved healing.  Assessment and plan of treatment:	- Patient is status post bilateral skin graft to bilateral amputated feet. Skin graft took excellently.  - Patient is stable and will go home with homecare for local wound care.   - Continue local wound care once a day to bilateral lower extremities. Gently wash with soap and nonfragrant soap, apply adaptic dressing to skin grafts once a day. Wrap with Kerlix and ace. Replace duoderm dressing on donor site every 3 days (next one to change on 4/25/2018). Continue with oral pain medication as needed.   - Call Burn Clinic at [phone] to make an appointment for follow up with  or  within 1 week, Located at [address] [place] [address] [address]. Clinic days are Tuesday (2-4p) or Thursday (9a-1p).  Follow up with primary care practitioner, podiatrist (Dr. Carmen) and plastic surgeon (Dr. Doyle) as well.  Secondary Diagnosis:	HTN (hypertension)  Goal:	Blood pressure Control  Assessment and plan of treatment:	- Continue with metoprolol.  - Follow up with cardiologist and primary care practitioner.

## 2018-04-23 NOTE — DISCHARGE NOTE ADULT - INSTRUCTIONS
Patient will go home with home care for local wound care.   Continue local wound care once a day to bilateral lower extremities. Gently wash with soap and nonfragrant soap, apply adaptic dressing to skin grafts once a day. Wrap with Kerlix and ace. Continue with oral pain medication as needed.  - Call Burn Clinic at [phone] to make an appointment for follow up with  or  within 1 week, Located at [address] [place] [address] [address]. Clinic days are Tuesday (2-4p) or Thursday (9a-1p).  Follow up with primary care practitioner, podiatrist (Dr. Carmen) and plastic surgeon (Dr. Doyle) as well. Low salt diet. Patient will go home with home care for local wound care.   Continue local wound care once a day to bilateral lower extremities. Gently wash with soap and nonfragrant soap, apply adaptic dressing to skin grafts once a day. Wrap with Kerlix and ace. Replace duoderm dressing on donor site every 3 days (next one to change on 4/25/2018). Continue with oral pain medication as needed.  - Call Burn Clinic at [phone] to make an appointment for follow up with  or  within 1 week, Located at [address] [place] [address] [address]. Clinic days are Tuesday (2-4p) or Thursday (9a-1p).  Follow up with primary care practitioner, podiatrist (Dr. Carmen) and plastic surgeon (Dr. Doyle) as well.

## 2018-04-23 NOTE — DISCHARGE NOTE ADULT - DURABLE MEDICAL EQUIPMENT AGENCY
EZ SLIDE BOARD ORDERED FROM [facility] SURGICAL  SUPPLIES: Customer [phone]

## 2018-04-23 NOTE — DISCHARGE NOTE ADULT - PLAN OF CARE
Proper follow up and improved healing. - Patient is status post bilateral skin graft to bilateral amputated feet. Skin graft took excellently.  - Patient is stable and will go home with homecare for local wound care.   - Continue local wound care once a day to bilateral lower extremities. Gently wash with soap and nonfragrant soap, apply adaptic dressing to skin grafts once a day. Wrap with Kerlix and ace. Continue with oral pain medication as needed.  - Call Burn Clinic at [phone] to make an appointment for follow up with  or  within 1 week, Located at [address] [place] [address] [address]. Clinic days are Tuesday (2-4p) or Thursday (9a-1p).  Follow up with primary care practitioner, podiatrist (Dr. Carmen) and plastic surgeon (Dr. Doyle) as well. Blood pressure Control - Continue with metoprolol.  - Follow up with cardiologist and primary care practitioner. - Patient is status post bilateral skin graft to bilateral amputated feet. Skin graft took excellently.  - Patient is stable and will go home with homecare for local wound care.   - Continue local wound care once a day to bilateral lower extremities. Gently wash with soap and nonfragrant soap, apply adaptic dressing to skin grafts once a day. Wrap with Kerlix and ace. Replace duoderm dressing on donor site every 3 days (next one to change on 4/25/2018). Continue with oral pain medication as needed.   - Call Burn Clinic at [phone] to make an appointment for follow up with  or  within 1 week, Located at [address] [place] [address] [address]. Clinic days are Tuesday (2-4p) or Thursday (9a-1p).  Follow up with primary care practitioner, podiatrist (Dr. Carmen) and plastic surgeon (Dr. Doyle) as well.

## 2018-04-23 NOTE — DISCHARGE NOTE ADULT - CARE PROVIDER_API CALL
Ananth Beck), Plastic Surgery  [address] [place] [address]  Phone: [phone]  Fax: [phone]    Sudhir Artis), Plastic Surgery  [address] [place] [address]  Phone: [phone]  Fax: [phone]

## 2018-04-23 NOTE — DISCHARGE NOTE ADULT - MEDICATION SUMMARY - MEDICATIONS TO TAKE
I will START or STAY ON the medications listed below when I get home from the hospital:    Vicodin 5 mg-300 mg oral tablet  -- 1 tab(s) by mouth every 6 hours, As Needed -for wound care MDD:4 tabs   -- Caution federal law prohibits the transfer of this drug to any person other  than the person for whom it was prescribed.  Do not drink alcoholic beverages when taking this medication.  May cause drowsiness.  Alcohol may intensify this effect.  Use care when operating dangerous machinery.  This drug may impair the ability to drive or operate machinery.  Use care until you become familiar with its effects.  This product contains acetaminophen.  Do not use  with any other product containing acetaminophen to prevent possible liver damage.  Using more of this medication than prescribed may cause serious breathing problems.    -- Indication: For pain    Metoprolol Succinate ER 25 mg oral tablet, extended release  -- 1 tab(s) by mouth once a day   -- It is very important that you take or use this exactly as directed.  Do not skip doses or discontinue unless directed by your doctor.  May cause drowsiness.  Alcohol may intensify this effect.  Use care when operating dangerous machinery.  Some non-prescription drugs may aggravate your condition.  Read all labels carefully.  If a warning appears, check with your doctor before taking.  Swallow whole.  Do not crush.  Take with food or milk.  This drug may impair the ability to drive or operate machinery.  Use care until you become familiar with its effects.    -- Indication: For HTN (hypertension)

## 2018-04-23 NOTE — DISCHARGE NOTE ADULT - ADDITIONAL INSTRUCTIONS
Patient is stable and will go home with home care for local wound care.   Continue local wound care once a day to bilateral lower extremities. Gently wash with soap and nonfragrant soap, apply adaptic dressing to skin grafts once a day. Wrap with Kerlix and ace. Continue with oral pain medication as needed.  - Call [facility] at [phone] to make an appointment for follow up with  or  within 1 week, Located at [address] [place] [address] [address]. Clinic days are Tuesday (2-4p) or Thursday (9a-1p).  Follow up with primary care practitioner, podiatrist (Dr. Carmen) and plastic surgeon (Dr. Doyle) as well.

## 2018-04-23 NOTE — PROCEDURE NOTE - PROCEDURE
<<-----Click on this checkbox to enter Procedure Removal of infusion device from left upper extremity, external approach  04/23/2018  PICC line removed, pressure applied. No bleeding noted, pressure dressing placed.  Active  [id number]

## 2018-04-23 NOTE — DISCHARGE NOTE ADULT - FINDINGS/TREATMENT
Bilateral lower extremity skin grafts took excellently. Continue with local wound care once a day as discussed and stated above. Follow up with Dr. Artis and Dr. Beck within 1 week. Follow up with  and  as needed.

## 2018-04-23 NOTE — PROGRESS NOTE ADULT - ASSESSMENT
A/P     Excellent skin graft take  Reviewed continuing care and f/u with patient. Questions addressed  SW input re VNS support and home care needs  D/C PICC line  Discharge home today   F/U as outpt next week

## 2018-04-23 NOTE — PROGRESS NOTE ADULT - SUBJECTIVE AND OBJECTIVE BOX
POD # 5  Pt seen and discussed on am rounds  Pt- no complaints  VSS Afebrile                          9.6    9.41  )-----------( 312      ( 22 Apr 2018 18:17 )             31.0       EXAM:  bilateral feet- STSG pink , 100% adherent- dressing  changed  donor site - dressing in place , no bleeding

## 2018-04-26 ENCOUNTER — APPOINTMENT (OUTPATIENT)
Dept: PLASTIC SURGERY | Facility: CLINIC | Age: 43
End: 2018-04-26

## 2018-04-30 DIAGNOSIS — G47.30 SLEEP APNEA, UNSPECIFIED: ICD-10-CM

## 2018-04-30 DIAGNOSIS — S81.809A UNSPECIFIED OPEN WOUND, UNSPECIFIED LOWER LEG, INITIAL ENCOUNTER: ICD-10-CM

## 2018-04-30 DIAGNOSIS — Z89.111 ACQUIRED ABSENCE OF RIGHT HAND: ICD-10-CM

## 2018-04-30 DIAGNOSIS — T87.89 OTHER COMPLICATIONS OF AMPUTATION STUMP: ICD-10-CM

## 2018-04-30 DIAGNOSIS — Z89.421 ACQUIRED ABSENCE OF OTHER RIGHT TOE(S): ICD-10-CM

## 2018-04-30 DIAGNOSIS — Z98.84 BARIATRIC SURGERY STATUS: ICD-10-CM

## 2018-04-30 DIAGNOSIS — M19.90 UNSPECIFIED OSTEOARTHRITIS, UNSPECIFIED SITE: ICD-10-CM

## 2018-04-30 DIAGNOSIS — Z89.112 ACQUIRED ABSENCE OF LEFT HAND: ICD-10-CM

## 2018-04-30 DIAGNOSIS — I10 ESSENTIAL (PRIMARY) HYPERTENSION: ICD-10-CM

## 2018-04-30 DIAGNOSIS — L03.90 CELLULITIS, UNSPECIFIED: ICD-10-CM

## 2018-04-30 DIAGNOSIS — Z89.422 ACQUIRED ABSENCE OF OTHER LEFT TOE(S): ICD-10-CM

## 2018-05-03 ENCOUNTER — OUTPATIENT (OUTPATIENT)
Dept: OUTPATIENT SERVICES | Facility: HOSPITAL | Age: 43
LOS: 1 days | Discharge: HOME | End: 2018-05-03

## 2018-05-03 ENCOUNTER — APPOINTMENT (OUTPATIENT)
Dept: BURN CARE | Facility: CLINIC | Age: 43
End: 2018-05-03

## 2018-05-03 DIAGNOSIS — Z98.890 OTHER SPECIFIED POSTPROCEDURAL STATES: Chronic | ICD-10-CM

## 2018-05-03 DIAGNOSIS — S68.119A COMPLETE TRAUMATIC METACARPOPHALANGEAL AMPUTATION OF UNSPECIFIED FINGER, INITIAL ENCOUNTER: Chronic | ICD-10-CM

## 2018-05-03 DIAGNOSIS — Z89.9 ACQUIRED ABSENCE OF LIMB, UNSPECIFIED: Chronic | ICD-10-CM

## 2018-05-03 DIAGNOSIS — Z98.84 BARIATRIC SURGERY STATUS: Chronic | ICD-10-CM

## 2018-05-10 DIAGNOSIS — S91.301A UNSPECIFIED OPEN WOUND, RIGHT FOOT, INITIAL ENCOUNTER: ICD-10-CM
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
## 2018-05-10 DIAGNOSIS — Z94.5 SKIN TRANSPLANT STATUS: ICD-10-CM

## 2018-05-10 DIAGNOSIS — S91.302A UNSPECIFIED OPEN WOUND, LEFT FOOT, INITIAL ENCOUNTER: ICD-10-CM

## 2018-05-17 ENCOUNTER — OUTPATIENT (OUTPATIENT)
Dept: OUTPATIENT SERVICES | Facility: HOSPITAL | Age: 43
LOS: 1 days | Discharge: HOME | End: 2018-05-17

## 2018-05-17 ENCOUNTER — APPOINTMENT (OUTPATIENT)
Dept: WOUND CARE | Facility: CLINIC | Age: 43
End: 2018-05-17

## 2018-05-17 DIAGNOSIS — Z98.890 OTHER SPECIFIED POSTPROCEDURAL STATES: Chronic | ICD-10-CM

## 2018-05-17 DIAGNOSIS — Z98.84 BARIATRIC SURGERY STATUS: Chronic | ICD-10-CM

## 2018-05-17 DIAGNOSIS — S68.119A COMPLETE TRAUMATIC METACARPOPHALANGEAL AMPUTATION OF UNSPECIFIED FINGER, INITIAL ENCOUNTER: Chronic | ICD-10-CM

## 2018-05-17 DIAGNOSIS — Z89.9 ACQUIRED ABSENCE OF LIMB, UNSPECIFIED: Chronic | ICD-10-CM

## 2018-05-23 DIAGNOSIS — Z94.5 SKIN TRANSPLANT STATUS: ICD-10-CM
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
## 2018-05-23 DIAGNOSIS — S91.302A UNSPECIFIED OPEN WOUND, LEFT FOOT, INITIAL ENCOUNTER: ICD-10-CM

## 2018-05-23 DIAGNOSIS — S91.301A UNSPECIFIED OPEN WOUND, RIGHT FOOT, INITIAL ENCOUNTER: ICD-10-CM

## 2018-05-24 ENCOUNTER — OUTPATIENT (OUTPATIENT)
Dept: OUTPATIENT SERVICES | Facility: HOSPITAL | Age: 43
LOS: 1 days | Discharge: HOME | End: 2018-05-24

## 2018-05-24 ENCOUNTER — APPOINTMENT (OUTPATIENT)
Dept: BURN CARE | Facility: CLINIC | Age: 43
End: 2018-05-24

## 2018-05-24 DIAGNOSIS — S68.119A COMPLETE TRAUMATIC METACARPOPHALANGEAL AMPUTATION OF UNSPECIFIED FINGER, INITIAL ENCOUNTER: Chronic | ICD-10-CM

## 2018-05-24 DIAGNOSIS — Z98.890 OTHER SPECIFIED POSTPROCEDURAL STATES: Chronic | ICD-10-CM

## 2018-05-24 DIAGNOSIS — Z98.84 BARIATRIC SURGERY STATUS: Chronic | ICD-10-CM

## 2018-05-24 DIAGNOSIS — Z89.9 ACQUIRED ABSENCE OF LIMB, UNSPECIFIED: Chronic | ICD-10-CM

## 2018-05-25 ENCOUNTER — OUTPATIENT (OUTPATIENT)
Dept: OUTPATIENT SERVICES | Facility: HOSPITAL | Age: 43
LOS: 1 days | Discharge: HOME | End: 2018-05-25

## 2018-05-25 DIAGNOSIS — Z98.84 BARIATRIC SURGERY STATUS: Chronic | ICD-10-CM

## 2018-05-25 DIAGNOSIS — Z89.9 ACQUIRED ABSENCE OF LIMB, UNSPECIFIED: Chronic | ICD-10-CM

## 2018-05-25 DIAGNOSIS — Z98.890 OTHER SPECIFIED POSTPROCEDURAL STATES: Chronic | ICD-10-CM

## 2018-05-25 DIAGNOSIS — S91.301D UNSPECIFIED OPEN WOUND, RIGHT FOOT, SUBSEQUENT ENCOUNTER: ICD-10-CM

## 2018-05-25 DIAGNOSIS — S91.302D UNSPECIFIED OPEN WOUND, LEFT FOOT, SUBSEQUENT ENCOUNTER: ICD-10-CM

## 2018-05-25 DIAGNOSIS — Z89.431 ACQUIRED ABSENCE OF RIGHT FOOT: ICD-10-CM

## 2018-05-25 DIAGNOSIS — Z89.432 ACQUIRED ABSENCE OF LEFT FOOT: ICD-10-CM

## 2018-05-25 DIAGNOSIS — S68.119A COMPLETE TRAUMATIC METACARPOPHALANGEAL AMPUTATION OF UNSPECIFIED FINGER, INITIAL ENCOUNTER: Chronic | ICD-10-CM

## 2018-06-04 DIAGNOSIS — S91.301A UNSPECIFIED OPEN WOUND, RIGHT FOOT, INITIAL ENCOUNTER: ICD-10-CM

## 2018-06-04 DIAGNOSIS — S91.302A UNSPECIFIED OPEN WOUND, LEFT FOOT, INITIAL ENCOUNTER: ICD-10-CM

## 2018-06-04 DIAGNOSIS — Z94.5 SKIN TRANSPLANT STATUS: ICD-10-CM
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
## 2018-06-05 ENCOUNTER — OUTPATIENT (OUTPATIENT)
Dept: OUTPATIENT SERVICES | Facility: HOSPITAL | Age: 43
LOS: 1 days | Discharge: HOME | End: 2018-06-05

## 2018-06-05 ENCOUNTER — APPOINTMENT (OUTPATIENT)
Dept: PODIATRY | Facility: CLINIC | Age: 43
End: 2018-06-05
Payer: COMMERCIAL

## 2018-06-05 VITALS
HEIGHT: 68 IN | DIASTOLIC BLOOD PRESSURE: 85 MMHG | HEART RATE: 107 BPM | SYSTOLIC BLOOD PRESSURE: 127 MMHG | WEIGHT: 215 LBS | BODY MASS INDEX: 32.58 KG/M2

## 2018-06-05 DIAGNOSIS — Z98.890 OTHER SPECIFIED POSTPROCEDURAL STATES: Chronic | ICD-10-CM

## 2018-06-05 DIAGNOSIS — Z98.84 BARIATRIC SURGERY STATUS: Chronic | ICD-10-CM

## 2018-06-05 DIAGNOSIS — Z89.9 ACQUIRED ABSENCE OF LIMB, UNSPECIFIED: Chronic | ICD-10-CM

## 2018-06-05 DIAGNOSIS — L97.509 NON-PRESSURE CHRONIC ULCER OF OTHER PART OF UNSPECIFIED FOOT WITH UNSPECIFIED SEVERITY: ICD-10-CM

## 2018-06-05 DIAGNOSIS — S68.119A COMPLETE TRAUMATIC METACARPOPHALANGEAL AMPUTATION OF UNSPECIFIED FINGER, INITIAL ENCOUNTER: Chronic | ICD-10-CM

## 2018-06-05 PROCEDURE — 99213 OFFICE O/P EST LOW 20 MIN: CPT

## 2018-06-14 ENCOUNTER — OUTPATIENT (OUTPATIENT)
Dept: OUTPATIENT SERVICES | Facility: HOSPITAL | Age: 43
LOS: 1 days | Discharge: HOME | End: 2018-06-14

## 2018-06-14 ENCOUNTER — APPOINTMENT (OUTPATIENT)
Dept: BURN CARE | Facility: CLINIC | Age: 43
End: 2018-06-14

## 2018-06-14 DIAGNOSIS — Z98.890 OTHER SPECIFIED POSTPROCEDURAL STATES: Chronic | ICD-10-CM

## 2018-06-14 DIAGNOSIS — Z89.9 ACQUIRED ABSENCE OF LIMB, UNSPECIFIED: Chronic | ICD-10-CM

## 2018-06-14 DIAGNOSIS — Z98.84 BARIATRIC SURGERY STATUS: Chronic | ICD-10-CM

## 2018-06-14 DIAGNOSIS — S68.119A COMPLETE TRAUMATIC METACARPOPHALANGEAL AMPUTATION OF UNSPECIFIED FINGER, INITIAL ENCOUNTER: Chronic | ICD-10-CM

## 2018-06-19 ENCOUNTER — APPOINTMENT (OUTPATIENT)
Dept: CARDIOLOGY | Facility: CLINIC | Age: 43
End: 2018-06-19

## 2018-06-19 VITALS — HEART RATE: 85 BPM | HEIGHT: 68 IN | SYSTOLIC BLOOD PRESSURE: 110 MMHG | DIASTOLIC BLOOD PRESSURE: 78 MMHG

## 2018-06-19 RX ORDER — GABAPENTIN 300 MG/1
300 CAPSULE ORAL 3 TIMES DAILY
Qty: 21 | Refills: 0 | Status: DISCONTINUED | COMMUNITY
Start: 2018-01-17 | End: 2018-06-19

## 2018-06-19 RX ORDER — GABAPENTIN 300 MG/1
300 CAPSULE ORAL DAILY
Qty: 30 | Refills: 0 | Status: DISCONTINUED | COMMUNITY
Start: 2018-02-06 | End: 2018-06-19

## 2018-06-19 RX ORDER — PANTOPRAZOLE 40 MG/1
40 TABLET, DELAYED RELEASE ORAL
Qty: 30 | Refills: 1 | Status: DISCONTINUED | COMMUNITY
Start: 2018-02-05 | End: 2018-06-19

## 2018-06-19 RX ORDER — SILVER SULFADIAZINE 10 MG/G
1 CREAM TOPICAL TWICE DAILY
Qty: 400 | Refills: 1 | Status: DISCONTINUED | COMMUNITY
Start: 2018-05-03 | End: 2018-06-19

## 2018-06-20 ENCOUNTER — APPOINTMENT (OUTPATIENT)
Dept: PODIATRY | Facility: CLINIC | Age: 43
End: 2018-06-20
Payer: COMMERCIAL

## 2018-06-20 ENCOUNTER — OUTPATIENT (OUTPATIENT)
Dept: OUTPATIENT SERVICES | Facility: HOSPITAL | Age: 43
LOS: 1 days | Discharge: HOME | End: 2018-06-20

## 2018-06-20 VITALS
DIASTOLIC BLOOD PRESSURE: 81 MMHG | BODY MASS INDEX: 28.79 KG/M2 | SYSTOLIC BLOOD PRESSURE: 123 MMHG | HEIGHT: 68 IN | WEIGHT: 190 LBS | HEART RATE: 89 BPM

## 2018-06-20 DIAGNOSIS — Z98.890 OTHER SPECIFIED POSTPROCEDURAL STATES: Chronic | ICD-10-CM

## 2018-06-20 DIAGNOSIS — S68.119A COMPLETE TRAUMATIC METACARPOPHALANGEAL AMPUTATION OF UNSPECIFIED FINGER, INITIAL ENCOUNTER: Chronic | ICD-10-CM

## 2018-06-20 DIAGNOSIS — Z98.84 BARIATRIC SURGERY STATUS: Chronic | ICD-10-CM

## 2018-06-20 DIAGNOSIS — Z89.9 ACQUIRED ABSENCE OF LIMB, UNSPECIFIED: Chronic | ICD-10-CM

## 2018-06-20 PROCEDURE — 99212 OFFICE O/P EST SF 10 MIN: CPT

## 2018-06-28 DIAGNOSIS — Z94.5 SKIN TRANSPLANT STATUS: ICD-10-CM
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
## 2018-06-28 DIAGNOSIS — L97.519 NON-PRESSURE CHRONIC ULCER OF OTHER PART OF RIGHT FOOT WITH UNSPECIFIED SEVERITY: ICD-10-CM

## 2018-06-28 DIAGNOSIS — L97.529 NON-PRESSURE CHRONIC ULCER OF OTHER PART OF LEFT FOOT WITH UNSPECIFIED SEVERITY: ICD-10-CM

## 2018-06-29 ENCOUNTER — APPOINTMENT (OUTPATIENT)
Dept: PODIATRY | Facility: CLINIC | Age: 43
End: 2018-06-29
Payer: COMMERCIAL

## 2018-07-06 ENCOUNTER — OUTPATIENT (OUTPATIENT)
Dept: OUTPATIENT SERVICES | Facility: HOSPITAL | Age: 43
LOS: 1 days | Discharge: HOME | End: 2018-07-06

## 2018-07-06 DIAGNOSIS — I42.9 CARDIOMYOPATHY, UNSPECIFIED: ICD-10-CM

## 2018-07-06 DIAGNOSIS — Z89.9 ACQUIRED ABSENCE OF LIMB, UNSPECIFIED: Chronic | ICD-10-CM

## 2018-07-06 DIAGNOSIS — S68.119A COMPLETE TRAUMATIC METACARPOPHALANGEAL AMPUTATION OF UNSPECIFIED FINGER, INITIAL ENCOUNTER: Chronic | ICD-10-CM

## 2018-07-06 DIAGNOSIS — Z98.84 BARIATRIC SURGERY STATUS: Chronic | ICD-10-CM

## 2018-07-06 DIAGNOSIS — Z98.890 OTHER SPECIFIED POSTPROCEDURAL STATES: Chronic | ICD-10-CM

## 2018-07-06 DIAGNOSIS — I10 ESSENTIAL (PRIMARY) HYPERTENSION: ICD-10-CM

## 2018-07-17 PROBLEM — I51.81 TAKOTSUBO SYNDROME: Chronic | Status: INACTIVE | Noted: 2018-02-15 | Resolved: 2018-04-15

## 2018-08-03 ENCOUNTER — APPOINTMENT (OUTPATIENT)
Dept: PODIATRY | Facility: CLINIC | Age: 43
End: 2018-08-03
Payer: COMMERCIAL

## 2018-08-03 ENCOUNTER — OUTPATIENT (OUTPATIENT)
Dept: OUTPATIENT SERVICES | Facility: HOSPITAL | Age: 43
LOS: 1 days | Discharge: HOME | End: 2018-08-03

## 2018-08-03 VITALS
RESPIRATION RATE: 16 BRPM | HEIGHT: 68 IN | SYSTOLIC BLOOD PRESSURE: 126 MMHG | BODY MASS INDEX: 27.89 KG/M2 | WEIGHT: 184 LBS | DIASTOLIC BLOOD PRESSURE: 87 MMHG | HEART RATE: 81 BPM

## 2018-08-03 DIAGNOSIS — Z98.84 BARIATRIC SURGERY STATUS: Chronic | ICD-10-CM

## 2018-08-03 DIAGNOSIS — Z98.890 OTHER SPECIFIED POSTPROCEDURAL STATES: Chronic | ICD-10-CM

## 2018-08-03 DIAGNOSIS — Z89.9 ACQUIRED ABSENCE OF LIMB, UNSPECIFIED: Chronic | ICD-10-CM

## 2018-08-03 DIAGNOSIS — S68.119A COMPLETE TRAUMATIC METACARPOPHALANGEAL AMPUTATION OF UNSPECIFIED FINGER, INITIAL ENCOUNTER: Chronic | ICD-10-CM

## 2018-08-03 PROBLEM — Z87.09 PERSONAL HISTORY OF OTHER DISEASES OF THE RESPIRATORY SYSTEM: Chronic | Status: ACTIVE | Noted: 2018-03-12

## 2018-08-03 PROCEDURE — 99213 OFFICE O/P EST LOW 20 MIN: CPT

## 2018-08-06 DIAGNOSIS — S91.309D UNSPECIFIED OPEN WOUND, UNSPECIFIED FOOT, SUBSEQUENT ENCOUNTER: ICD-10-CM

## 2018-08-06 DIAGNOSIS — Z89.432 ACQUIRED ABSENCE OF LEFT FOOT: ICD-10-CM

## 2018-08-06 DIAGNOSIS — Z94.5 SKIN TRANSPLANT STATUS: ICD-10-CM

## 2018-08-06 DIAGNOSIS — Z89.431 ACQUIRED ABSENCE OF RIGHT FOOT: ICD-10-CM

## 2018-08-06 DIAGNOSIS — L97.509 NON-PRESSURE CHRONIC ULCER OF OTHER PART OF UNSPECIFIED FOOT WITH UNSPECIFIED SEVERITY: ICD-10-CM

## 2018-08-09 ENCOUNTER — APPOINTMENT (OUTPATIENT)
Dept: BURN CARE | Facility: CLINIC | Age: 43
End: 2018-08-09

## 2018-08-09 ENCOUNTER — OUTPATIENT (OUTPATIENT)
Dept: OUTPATIENT SERVICES | Facility: HOSPITAL | Age: 43
LOS: 1 days | Discharge: HOME | End: 2018-08-09

## 2018-08-09 DIAGNOSIS — Z89.9 ACQUIRED ABSENCE OF LIMB, UNSPECIFIED: Chronic | ICD-10-CM

## 2018-08-09 DIAGNOSIS — Z98.84 BARIATRIC SURGERY STATUS: Chronic | ICD-10-CM

## 2018-08-09 DIAGNOSIS — Z98.890 OTHER SPECIFIED POSTPROCEDURAL STATES: Chronic | ICD-10-CM

## 2018-08-09 DIAGNOSIS — S68.119A COMPLETE TRAUMATIC METACARPOPHALANGEAL AMPUTATION OF UNSPECIFIED FINGER, INITIAL ENCOUNTER: Chronic | ICD-10-CM
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
## 2018-08-21 DIAGNOSIS — S91.302A UNSPECIFIED OPEN WOUND, LEFT FOOT, INITIAL ENCOUNTER: ICD-10-CM
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
## 2018-08-29 ENCOUNTER — MEDICATION RENEWAL (OUTPATIENT)
Age: 43
End: 2018-08-29

## 2018-08-31 ENCOUNTER — APPOINTMENT (OUTPATIENT)
Dept: SURGERY | Facility: CLINIC | Age: 43
End: 2018-08-31
Payer: COMMERCIAL

## 2018-08-31 VITALS — WEIGHT: 182 LBS | BODY MASS INDEX: 27.58 KG/M2 | HEIGHT: 68 IN

## 2018-08-31 DIAGNOSIS — Z87.898 PERSONAL HISTORY OF OTHER SPECIFIED CONDITIONS: ICD-10-CM

## 2018-08-31 PROCEDURE — 99024 POSTOP FOLLOW-UP VISIT: CPT

## 2018-08-31 PROCEDURE — 99213 OFFICE O/P EST LOW 20 MIN: CPT

## 2018-08-31 RX ORDER — HYDROCODONE BITARTRATE AND ACETAMINOPHEN 5; 300 MG/1; MG/1
5-300 TABLET ORAL
Qty: 50 | Refills: 0 | Status: COMPLETED | COMMUNITY
Start: 2018-05-17 | End: 2018-08-31

## 2018-09-05 ENCOUNTER — APPOINTMENT (OUTPATIENT)
Dept: CARDIOLOGY | Facility: CLINIC | Age: 43
End: 2018-09-05

## 2018-09-05 VITALS
BODY MASS INDEX: 27.58 KG/M2 | DIASTOLIC BLOOD PRESSURE: 82 MMHG | WEIGHT: 182 LBS | SYSTOLIC BLOOD PRESSURE: 118 MMHG | HEIGHT: 68 IN

## 2018-09-07 ENCOUNTER — OUTPATIENT (OUTPATIENT)
Dept: OUTPATIENT SERVICES | Facility: HOSPITAL | Age: 43
LOS: 1 days | Discharge: HOME | End: 2018-09-07

## 2018-09-07 ENCOUNTER — APPOINTMENT (OUTPATIENT)
Dept: PODIATRY | Facility: CLINIC | Age: 43
End: 2018-09-07
Payer: COMMERCIAL

## 2018-09-07 VITALS
BODY MASS INDEX: 27.58 KG/M2 | HEART RATE: 41 BPM | HEIGHT: 68 IN | SYSTOLIC BLOOD PRESSURE: 136 MMHG | WEIGHT: 182 LBS | DIASTOLIC BLOOD PRESSURE: 88 MMHG

## 2018-09-07 DIAGNOSIS — Z98.84 BARIATRIC SURGERY STATUS: Chronic | ICD-10-CM

## 2018-09-07 DIAGNOSIS — Z98.890 OTHER SPECIFIED POSTPROCEDURAL STATES: Chronic | ICD-10-CM

## 2018-09-07 DIAGNOSIS — Z89.9 ACQUIRED ABSENCE OF LIMB, UNSPECIFIED: Chronic | ICD-10-CM

## 2018-09-07 DIAGNOSIS — S68.119A COMPLETE TRAUMATIC METACARPOPHALANGEAL AMPUTATION OF UNSPECIFIED FINGER, INITIAL ENCOUNTER: Chronic | ICD-10-CM

## 2018-09-07 PROCEDURE — 99213 OFFICE O/P EST LOW 20 MIN: CPT

## 2018-09-10 ENCOUNTER — RESULT REVIEW (OUTPATIENT)
Age: 43
End: 2018-09-10

## 2018-09-10 LAB — BACTERIA WND CULT: NORMAL

## 2018-09-13 ENCOUNTER — APPOINTMENT (OUTPATIENT)
Dept: WOUND CARE | Facility: CLINIC | Age: 43
End: 2018-09-13

## 2018-09-13 ENCOUNTER — OUTPATIENT (OUTPATIENT)
Dept: OUTPATIENT SERVICES | Facility: HOSPITAL | Age: 43
LOS: 1 days | Discharge: HOME | End: 2018-09-13
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
## 2018-09-13 DIAGNOSIS — Z89.9 ACQUIRED ABSENCE OF LIMB, UNSPECIFIED: Chronic | ICD-10-CM

## 2018-09-13 DIAGNOSIS — Z98.84 BARIATRIC SURGERY STATUS: Chronic | ICD-10-CM

## 2018-09-13 DIAGNOSIS — Z98.890 OTHER SPECIFIED POSTPROCEDURAL STATES: Chronic | ICD-10-CM
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
## 2018-09-13 DIAGNOSIS — S91.301A UNSPECIFIED OPEN WOUND, RIGHT FOOT, INITIAL ENCOUNTER: ICD-10-CM

## 2018-09-13 DIAGNOSIS — S68.119A COMPLETE TRAUMATIC METACARPOPHALANGEAL AMPUTATION OF UNSPECIFIED FINGER, INITIAL ENCOUNTER: Chronic | ICD-10-CM

## 2018-09-13 DIAGNOSIS — S91.302A UNSPECIFIED OPEN WOUND, LEFT FOOT, INITIAL ENCOUNTER: ICD-10-CM

## 2018-09-27 NOTE — ASU PATIENT PROFILE, ADULT - PMH
ARDS survivor    Cardiomyopathy    Gangrene of finger of left hand    Gangrene of finger of right hand    Gangrene of lower extremity    HTN (hypertension)    Osteoarthritis    Sepsis    Sleep apnea    Small bowel obstruction

## 2018-09-28 ENCOUNTER — INPATIENT (INPATIENT)
Facility: HOSPITAL | Age: 43
LOS: 5 days | Discharge: HOME HEALTH | End: 2018-10-04
Attending: PLASTIC SURGERY | Admitting: PLASTIC SURGERY
Payer: COMMERCIAL

## 2018-09-28 ENCOUNTER — RESULT REVIEW (OUTPATIENT)
Age: 43
End: 2018-09-28

## 2018-09-28 VITALS
DIASTOLIC BLOOD PRESSURE: 91 MMHG | TEMPERATURE: 98 F | RESPIRATION RATE: 12 BRPM | HEIGHT: 68 IN | WEIGHT: 182.1 LBS | SYSTOLIC BLOOD PRESSURE: 140 MMHG | HEART RATE: 79 BPM

## 2018-09-28 DIAGNOSIS — Z98.890 OTHER SPECIFIED POSTPROCEDURAL STATES: Chronic | ICD-10-CM

## 2018-09-28 DIAGNOSIS — S68.119A COMPLETE TRAUMATIC METACARPOPHALANGEAL AMPUTATION OF UNSPECIFIED FINGER, INITIAL ENCOUNTER: Chronic | ICD-10-CM

## 2018-09-28 DIAGNOSIS — Z98.84 BARIATRIC SURGERY STATUS: Chronic | ICD-10-CM

## 2018-09-28 DIAGNOSIS — Z89.9 ACQUIRED ABSENCE OF LIMB, UNSPECIFIED: Chronic | ICD-10-CM

## 2018-09-28 LAB
ANION GAP SERPL CALC-SCNC: 16 MMOL/L — HIGH (ref 7–14)
APTT BLD: 32.7 SEC — SIGNIFICANT CHANGE UP (ref 27–39.2)
BLD GP AB SCN SERPL QL: SIGNIFICANT CHANGE UP
BUN SERPL-MCNC: 12 MG/DL — SIGNIFICANT CHANGE UP (ref 10–20)
CALCIUM SERPL-MCNC: 9.9 MG/DL — SIGNIFICANT CHANGE UP (ref 8.5–10.1)
CHLORIDE SERPL-SCNC: 100 MMOL/L — SIGNIFICANT CHANGE UP (ref 98–110)
CO2 SERPL-SCNC: 21 MMOL/L — SIGNIFICANT CHANGE UP (ref 17–32)
CREAT SERPL-MCNC: 0.8 MG/DL — SIGNIFICANT CHANGE UP (ref 0.7–1.5)
GLUCOSE SERPL-MCNC: 84 MG/DL — SIGNIFICANT CHANGE UP (ref 70–99)
HCT VFR BLD CALC: 40.9 % — SIGNIFICANT CHANGE UP (ref 37–47)
HGB BLD-MCNC: 13 G/DL — SIGNIFICANT CHANGE UP (ref 12–16)
INR BLD: 1.22 RATIO — SIGNIFICANT CHANGE UP (ref 0.65–1.3)
MCHC RBC-ENTMCNC: 25 PG — LOW (ref 27–31)
MCHC RBC-ENTMCNC: 31.8 G/DL — LOW (ref 32–37)
MCV RBC AUTO: 78.8 FL — LOW (ref 81–99)
NRBC # BLD: 0 /100 WBCS — SIGNIFICANT CHANGE UP (ref 0–0)
PLATELET # BLD AUTO: 312 K/UL — SIGNIFICANT CHANGE UP (ref 130–400)
POTASSIUM SERPL-MCNC: 4.6 MMOL/L — SIGNIFICANT CHANGE UP (ref 3.5–5)
POTASSIUM SERPL-SCNC: 4.6 MMOL/L — SIGNIFICANT CHANGE UP (ref 3.5–5)
PROTHROM AB SERPL-ACNC: 13.2 SEC — HIGH (ref 9.95–12.87)
RBC # BLD: 5.19 M/UL — SIGNIFICANT CHANGE UP (ref 4.2–5.4)
RBC # FLD: 15.2 % — HIGH (ref 11.5–14.5)
SODIUM SERPL-SCNC: 137 MMOL/L — SIGNIFICANT CHANGE UP (ref 135–146)
TYPE + AB SCN PNL BLD: SIGNIFICANT CHANGE UP
WBC # BLD: 6.6 K/UL — SIGNIFICANT CHANGE UP (ref 4.8–10.8)
WBC # FLD AUTO: 6.6 K/UL — SIGNIFICANT CHANGE UP (ref 4.8–10.8)

## 2018-09-28 RX ORDER — HYDROMORPHONE HYDROCHLORIDE 2 MG/ML
0.5 INJECTION INTRAMUSCULAR; INTRAVENOUS; SUBCUTANEOUS
Qty: 0 | Refills: 0 | Status: DISCONTINUED | OUTPATIENT
Start: 2018-09-28 | End: 2018-09-28

## 2018-09-28 RX ORDER — ONDANSETRON 8 MG/1
4 TABLET, FILM COATED ORAL ONCE
Qty: 0 | Refills: 0 | Status: DISCONTINUED | OUTPATIENT
Start: 2018-09-28 | End: 2018-09-28

## 2018-09-28 RX ORDER — SODIUM CHLORIDE 9 MG/ML
1000 INJECTION, SOLUTION INTRAVENOUS
Qty: 0 | Refills: 0 | Status: DISCONTINUED | OUTPATIENT
Start: 2018-09-28 | End: 2018-09-28

## 2018-09-28 RX ORDER — HYDROMORPHONE HYDROCHLORIDE 2 MG/ML
1 INJECTION INTRAMUSCULAR; INTRAVENOUS; SUBCUTANEOUS EVERY 4 HOURS
Qty: 0 | Refills: 0 | Status: DISCONTINUED | OUTPATIENT
Start: 2018-09-28 | End: 2018-09-29

## 2018-09-28 RX ORDER — ENOXAPARIN SODIUM 100 MG/ML
40 INJECTION SUBCUTANEOUS DAILY
Qty: 0 | Refills: 0 | Status: DISCONTINUED | OUTPATIENT
Start: 2018-09-28 | End: 2018-10-04

## 2018-09-28 RX ADMIN — HYDROMORPHONE HYDROCHLORIDE 0.5 MILLIGRAM(S): 2 INJECTION INTRAMUSCULAR; INTRAVENOUS; SUBCUTANEOUS at 19:05

## 2018-09-28 RX ADMIN — HYDROMORPHONE HYDROCHLORIDE 0.5 MILLIGRAM(S): 2 INJECTION INTRAMUSCULAR; INTRAVENOUS; SUBCUTANEOUS at 20:10

## 2018-09-28 RX ADMIN — HYDROMORPHONE HYDROCHLORIDE 0.5 MILLIGRAM(S): 2 INJECTION INTRAMUSCULAR; INTRAVENOUS; SUBCUTANEOUS at 19:20

## 2018-09-28 RX ADMIN — HYDROMORPHONE HYDROCHLORIDE 0.5 MILLIGRAM(S): 2 INJECTION INTRAMUSCULAR; INTRAVENOUS; SUBCUTANEOUS at 19:55

## 2018-09-28 RX ADMIN — SODIUM CHLORIDE 100 MILLILITER(S): 9 INJECTION, SOLUTION INTRAVENOUS at 20:26

## 2018-09-28 RX ADMIN — HYDROMORPHONE HYDROCHLORIDE 0.5 MILLIGRAM(S): 2 INJECTION INTRAMUSCULAR; INTRAVENOUS; SUBCUTANEOUS at 21:17

## 2018-09-28 RX ADMIN — HYDROMORPHONE HYDROCHLORIDE 1 MILLIGRAM(S): 2 INJECTION INTRAMUSCULAR; INTRAVENOUS; SUBCUTANEOUS at 22:42

## 2018-09-28 NOTE — H&P ADULT - NSHPREVIEWOFSYSTEMS_GEN_ALL_CORE
REVIEW OF SYSTEMS:    CONSTITUTIONAL: No weakness, fevers or chills  EYES/ENT: No visual changes;  No vertigo or throat pain   NECK: No pain or stiffness  RESPIRATORY: No cough, wheezing, hemoptysis; No shortness of breath  CARDIOVASCULAR: No chest pain or palpitations  GASTROINTESTINAL: No abdominal or epigastric pain. No nausea, vomiting, or hematemesis; No diarrhea or constipation. No melena or hematochezia.  GENITOURINARY: No dysuria, frequency or hematuria  NEUROLOGICAL: No numbness or weakness  SKIN: No itching, burning, rashes, + lesions   All other review of systems is negative unless indicated above.

## 2018-09-28 NOTE — H&P ADULT - HISTORY OF PRESENT ILLNESS
43F w/ hx of gastric bypass on Oct 2017. A week later presented with obstruction and required return to OR a week later and post-op course complicated by ARDS, sepsis requiring pressors. As results of pressors developed upper and lower limbs ischemia requiring amputation. R partial hand and L wrist amputations on January 2018 and b/l forefoot amputation. S/p b/l feet STSG in April of 2018with partial closure. Due to bone prominence and bone overgrowth continues to have open wounds bilaterally. Comes in today for join case with Plastic Surgery and Podiatry for further exploration of b/l feed wounds.     Since last hospitalization last spring has been doing well. Non-ambulatory, WC dependent.  Denies recent sicknesses.

## 2018-09-28 NOTE — H&P ADULT - NSHPPHYSICALEXAM_GEN_ALL_CORE
PHYSICAL EXAM:  GENERAL: NAD, well-developed  HEAD:  Atraumatic, Normocephalic  EYES: EOMI, PERRLA, conjunctiva and sclera clear  NECK: Supple, No JVD  CHEST/LUNG: unlabored breathing on RA  HEART: equal bilateral pulses 2+  ABDOMEN: Soft, Nontender, Nondistended  EXTREMITIES: b/l forefoot amputation w/ dressing. patient deferred examination of the wound; R arm partial-hand amputation well healed; L wrist amputation well healed.   PSYCH: AAOx3  NEUROLOGY: non-focal  SKIN: No rashes or lesions

## 2018-09-28 NOTE — H&P ADULT - ASSESSMENT
43F w/ hx of gastric bypass on Oct 2017, obstruction requiring RTOR post op course complicated by  ARDS, sepsis requiring pressors. As results of pressors developed upper and lower limbs ischemia requiring amputation. R partial hand and L wrist amputations on January 2018 and b/l forefoot amputation. B/l feet s/p STSG in April of 2018with partial closure. Due to bone prominence and bone overgrowth continues to have open wounds bilaterally. Comes in today for join case with Plastic Surgery and Podiatry for further exploration of b/l feed wounds.     - OR today with Plastic and Podiatry Surgery   -         [id number] 43F w/ hx of gastric bypass on Oct 2017, obstruction requiring RTOR post op course complicated by  ARDS, sepsis requiring pressors. As results of pressors developed upper and lower limbs ischemia requiring amputation. R partial hand and L wrist amputations on January 2018 and b/l forefoot amputation. B/l feet s/p STSG in April of 2018with partial closure. Due to bone prominence and bone overgrowth continues to have open wounds bilaterally. Comes in today for join case with Plastic Surgery and Podiatry for further exploration of b/l feed wounds.     - OR today with Plastic and Podiatry Surgery   - Admit to Burn         [id number]

## 2018-09-28 NOTE — CHART NOTE - NSCHARTNOTEFT_GEN_A_CORE
PACU ANESTHESIA ADMISSION NOTE      Procedure: Debridement, wound, foot, with closure: excisional debridement of soft tissue and bone to remodel amputation sites bilateral foot    Post op diagnosis:  Chronic ulcer of left foot limited to breakdown of skin      ____  Intubated  TV:______       Rate: ______      FiO2: ______    _x___  Patent Airway    _x___  Full return of protective reflexes    _x___  Full recovery from anesthesia / back to baseline status    Vitals:  T(C): 36.9  HR: 79  BP: 140/91  RR: 12  SpO2: 98    Mental Status:  _x___ Awake   _____ Alert   _____ Drowsy   _____ Sedated    Nausea/Vomiting:  _x___  NO       ______Yes,   See Post - Op Orders         Pain Scale (0-10):  __0___    Treatment: _x___ None    ____ See Post - Op/PCA Orders    Post - Operative Fluids:   __x__ Oral   ____ See Post - Op Orders    Plan: Discharge:   _x___Home       _____Floor     _____Critical Care    _____  Other:_________________    Comments:  No anesthesia issues or complications noted.  Discharge when criteria met.

## 2018-09-28 NOTE — BRIEF OPERATIVE NOTE - PRE-OP DX
Chronic ulcer of right foot limited to breakdown of skin  09/28/2018  Chronic ulcer left foot,s/p bilateral amputation.  Active  Mohinder Salzaar

## 2018-09-28 NOTE — BRIEF OPERATIVE NOTE - POST-OP DX
Chronic ulcer of left foot limited to breakdown of skin  09/28/2018  chronic ulcer right foot amputation sites bilateral  Active  Mohinder Salazar

## 2018-09-28 NOTE — BRIEF OPERATIVE NOTE - PROCEDURE
<<-----Click on this checkbox to enter Procedure Debridement, wound, foot, with closure  09/28/2018  excisional debridement of soft tissue and bone to remodel amputation sites bilateral foot  Active  [email or web address]

## 2018-09-29 LAB
ANION GAP SERPL CALC-SCNC: 13 MMOL/L — SIGNIFICANT CHANGE UP (ref 7–14)
BUN SERPL-MCNC: 12 MG/DL — SIGNIFICANT CHANGE UP (ref 10–20)
CALCIUM SERPL-MCNC: 8.6 MG/DL — SIGNIFICANT CHANGE UP (ref 8.5–10.1)
CHLORIDE SERPL-SCNC: 100 MMOL/L — SIGNIFICANT CHANGE UP (ref 98–110)
CO2 SERPL-SCNC: 25 MMOL/L — SIGNIFICANT CHANGE UP (ref 17–32)
CREAT SERPL-MCNC: 0.8 MG/DL — SIGNIFICANT CHANGE UP (ref 0.7–1.5)
GLUCOSE SERPL-MCNC: 113 MG/DL — HIGH (ref 70–99)
HCT VFR BLD CALC: 33.9 % — LOW (ref 37–47)
HGB BLD-MCNC: 10.8 G/DL — LOW (ref 12–16)
MAGNESIUM SERPL-MCNC: 1.8 MG/DL — SIGNIFICANT CHANGE UP (ref 1.8–2.4)
MCHC RBC-ENTMCNC: 25.2 PG — LOW (ref 27–31)
MCHC RBC-ENTMCNC: 31.9 G/DL — LOW (ref 32–37)
MCV RBC AUTO: 79 FL — LOW (ref 81–99)
NRBC # BLD: 0 /100 WBCS — SIGNIFICANT CHANGE UP (ref 0–0)
PHOSPHATE SERPL-MCNC: 3.5 MG/DL — SIGNIFICANT CHANGE UP (ref 2.1–4.9)
PLATELET # BLD AUTO: 251 K/UL — SIGNIFICANT CHANGE UP (ref 130–400)
POTASSIUM SERPL-MCNC: 4.5 MMOL/L — SIGNIFICANT CHANGE UP (ref 3.5–5)
POTASSIUM SERPL-SCNC: 4.5 MMOL/L — SIGNIFICANT CHANGE UP (ref 3.5–5)
RBC # BLD: 4.29 M/UL — SIGNIFICANT CHANGE UP (ref 4.2–5.4)
RBC # FLD: 14.9 % — HIGH (ref 11.5–14.5)
SODIUM SERPL-SCNC: 138 MMOL/L — SIGNIFICANT CHANGE UP (ref 135–146)
WBC # BLD: 8.54 K/UL — SIGNIFICANT CHANGE UP (ref 4.8–10.8)
WBC # FLD AUTO: 8.54 K/UL — SIGNIFICANT CHANGE UP (ref 4.8–10.8)

## 2018-09-29 RX ORDER — AMPICILLIN SODIUM AND SULBACTAM SODIUM 250; 125 MG/ML; MG/ML
INJECTION, POWDER, FOR SUSPENSION INTRAMUSCULAR; INTRAVENOUS
Qty: 0 | Refills: 0 | Status: DISCONTINUED | OUTPATIENT
Start: 2018-09-29 | End: 2018-10-01

## 2018-09-29 RX ORDER — POLYETHYLENE GLYCOL 3350 17 G/17G
17 POWDER, FOR SOLUTION ORAL DAILY
Qty: 0 | Refills: 0 | Status: DISCONTINUED | OUTPATIENT
Start: 2018-09-29 | End: 2018-10-04

## 2018-09-29 RX ORDER — AMPICILLIN SODIUM AND SULBACTAM SODIUM 250; 125 MG/ML; MG/ML
1.5 INJECTION, POWDER, FOR SUSPENSION INTRAMUSCULAR; INTRAVENOUS ONCE
Qty: 0 | Refills: 0 | Status: COMPLETED | OUTPATIENT
Start: 2018-09-29 | End: 2018-09-29

## 2018-09-29 RX ORDER — INFLUENZA VIRUS VACCINE 15; 15; 15; 15 UG/.5ML; UG/.5ML; UG/.5ML; UG/.5ML
0.5 SUSPENSION INTRAMUSCULAR ONCE
Qty: 0 | Refills: 0 | Status: COMPLETED | OUTPATIENT
Start: 2018-09-29 | End: 2018-10-04

## 2018-09-29 RX ORDER — ACETAMINOPHEN 500 MG
650 TABLET ORAL ONCE
Qty: 0 | Refills: 0 | Status: COMPLETED | OUTPATIENT
Start: 2018-09-29 | End: 2018-09-29

## 2018-09-29 RX ORDER — SENNA PLUS 8.6 MG/1
2 TABLET ORAL AT BEDTIME
Qty: 0 | Refills: 0 | Status: DISCONTINUED | OUTPATIENT
Start: 2018-09-29 | End: 2018-10-04

## 2018-09-29 RX ORDER — AMPICILLIN SODIUM AND SULBACTAM SODIUM 250; 125 MG/ML; MG/ML
1.5 INJECTION, POWDER, FOR SUSPENSION INTRAMUSCULAR; INTRAVENOUS EVERY 6 HOURS
Qty: 0 | Refills: 0 | Status: DISCONTINUED | OUTPATIENT
Start: 2018-09-29 | End: 2018-10-01

## 2018-09-29 RX ORDER — DOCUSATE SODIUM 100 MG
100 CAPSULE ORAL THREE TIMES A DAY
Qty: 0 | Refills: 0 | Status: DISCONTINUED | OUTPATIENT
Start: 2018-09-29 | End: 2018-10-04

## 2018-09-29 RX ORDER — PANTOPRAZOLE SODIUM 20 MG/1
40 TABLET, DELAYED RELEASE ORAL
Qty: 0 | Refills: 0 | Status: DISCONTINUED | OUTPATIENT
Start: 2018-09-29 | End: 2018-10-04

## 2018-09-29 RX ADMIN — HYDROMORPHONE HYDROCHLORIDE 1 MILLIGRAM(S): 2 INJECTION INTRAMUSCULAR; INTRAVENOUS; SUBCUTANEOUS at 06:45

## 2018-09-29 RX ADMIN — ENOXAPARIN SODIUM 40 MILLIGRAM(S): 100 INJECTION SUBCUTANEOUS at 12:26

## 2018-09-29 RX ADMIN — HYDROMORPHONE HYDROCHLORIDE 1 MILLIGRAM(S): 2 INJECTION INTRAMUSCULAR; INTRAVENOUS; SUBCUTANEOUS at 00:42

## 2018-09-29 RX ADMIN — HYDROMORPHONE HYDROCHLORIDE 1 MILLIGRAM(S): 2 INJECTION INTRAMUSCULAR; INTRAVENOUS; SUBCUTANEOUS at 14:57

## 2018-09-29 RX ADMIN — HYDROMORPHONE HYDROCHLORIDE 1 MILLIGRAM(S): 2 INJECTION INTRAMUSCULAR; INTRAVENOUS; SUBCUTANEOUS at 09:53

## 2018-09-29 RX ADMIN — HYDROMORPHONE HYDROCHLORIDE 1 MILLIGRAM(S): 2 INJECTION INTRAMUSCULAR; INTRAVENOUS; SUBCUTANEOUS at 10:30

## 2018-09-29 RX ADMIN — HYDROMORPHONE HYDROCHLORIDE 1 MILLIGRAM(S): 2 INJECTION INTRAMUSCULAR; INTRAVENOUS; SUBCUTANEOUS at 02:39

## 2018-09-29 RX ADMIN — HYDROMORPHONE HYDROCHLORIDE 1 MILLIGRAM(S): 2 INJECTION INTRAMUSCULAR; INTRAVENOUS; SUBCUTANEOUS at 06:08

## 2018-09-29 RX ADMIN — HYDROMORPHONE HYDROCHLORIDE 1 MILLIGRAM(S): 2 INJECTION INTRAMUSCULAR; INTRAVENOUS; SUBCUTANEOUS at 17:51

## 2018-09-29 RX ADMIN — HYDROMORPHONE HYDROCHLORIDE 1 MILLIGRAM(S): 2 INJECTION INTRAMUSCULAR; INTRAVENOUS; SUBCUTANEOUS at 03:10

## 2018-09-29 RX ADMIN — Medication 100 MILLIGRAM(S): at 14:57

## 2018-09-29 RX ADMIN — HYDROMORPHONE HYDROCHLORIDE 1 MILLIGRAM(S): 2 INJECTION INTRAMUSCULAR; INTRAVENOUS; SUBCUTANEOUS at 14:31

## 2018-09-29 RX ADMIN — AMPICILLIN SODIUM AND SULBACTAM SODIUM 100 GRAM(S): 250; 125 INJECTION, POWDER, FOR SUSPENSION INTRAMUSCULAR; INTRAVENOUS at 17:53

## 2018-09-29 RX ADMIN — AMPICILLIN SODIUM AND SULBACTAM SODIUM 100 GRAM(S): 250; 125 INJECTION, POWDER, FOR SUSPENSION INTRAMUSCULAR; INTRAVENOUS at 10:51

## 2018-09-29 RX ADMIN — PANTOPRAZOLE SODIUM 40 MILLIGRAM(S): 20 TABLET, DELAYED RELEASE ORAL at 08:45

## 2018-09-29 RX ADMIN — HYDROMORPHONE HYDROCHLORIDE 1 MILLIGRAM(S): 2 INJECTION INTRAMUSCULAR; INTRAVENOUS; SUBCUTANEOUS at 19:10

## 2018-09-30 LAB
ANION GAP SERPL CALC-SCNC: 14 MMOL/L — SIGNIFICANT CHANGE UP (ref 7–14)
BUN SERPL-MCNC: 10 MG/DL — SIGNIFICANT CHANGE UP (ref 10–20)
CALCIUM SERPL-MCNC: 8.8 MG/DL — SIGNIFICANT CHANGE UP (ref 8.5–10.1)
CHLORIDE SERPL-SCNC: 97 MMOL/L — LOW (ref 98–110)
CO2 SERPL-SCNC: 25 MMOL/L — SIGNIFICANT CHANGE UP (ref 17–32)
CREAT SERPL-MCNC: 0.8 MG/DL — SIGNIFICANT CHANGE UP (ref 0.7–1.5)
GLUCOSE SERPL-MCNC: 167 MG/DL — HIGH (ref 70–99)
HCT VFR BLD CALC: 34 % — LOW (ref 37–47)
HGB BLD-MCNC: 10.8 G/DL — LOW (ref 12–16)
MAGNESIUM SERPL-MCNC: 1.8 MG/DL — SIGNIFICANT CHANGE UP (ref 1.8–2.4)
MCHC RBC-ENTMCNC: 25.2 PG — LOW (ref 27–31)
MCHC RBC-ENTMCNC: 31.8 G/DL — LOW (ref 32–37)
MCV RBC AUTO: 79.4 FL — LOW (ref 81–99)
NRBC # BLD: 0 /100 WBCS — SIGNIFICANT CHANGE UP (ref 0–0)
PHOSPHATE SERPL-MCNC: 3.2 MG/DL — SIGNIFICANT CHANGE UP (ref 2.1–4.9)
PLATELET # BLD AUTO: 245 K/UL — SIGNIFICANT CHANGE UP (ref 130–400)
POTASSIUM SERPL-MCNC: 4.4 MMOL/L — SIGNIFICANT CHANGE UP (ref 3.5–5)
POTASSIUM SERPL-SCNC: 4.4 MMOL/L — SIGNIFICANT CHANGE UP (ref 3.5–5)
RBC # BLD: 4.28 M/UL — SIGNIFICANT CHANGE UP (ref 4.2–5.4)
RBC # FLD: 15 % — HIGH (ref 11.5–14.5)
SODIUM SERPL-SCNC: 136 MMOL/L — SIGNIFICANT CHANGE UP (ref 135–146)
WBC # BLD: 7.08 K/UL — SIGNIFICANT CHANGE UP (ref 4.8–10.8)
WBC # FLD AUTO: 7.08 K/UL — SIGNIFICANT CHANGE UP (ref 4.8–10.8)

## 2018-09-30 RX ORDER — MIDAZOLAM HYDROCHLORIDE 1 MG/ML
2 INJECTION, SOLUTION INTRAMUSCULAR; INTRAVENOUS
Qty: 0 | Refills: 0 | Status: DISCONTINUED | OUTPATIENT
Start: 2018-09-30 | End: 2018-10-01

## 2018-09-30 RX ORDER — OXYCODONE AND ACETAMINOPHEN 5; 325 MG/1; MG/1
1 TABLET ORAL EVERY 4 HOURS
Qty: 0 | Refills: 0 | Status: DISCONTINUED | OUTPATIENT
Start: 2018-09-30 | End: 2018-09-30

## 2018-09-30 RX ORDER — HYDROMORPHONE HYDROCHLORIDE 2 MG/ML
1 INJECTION INTRAMUSCULAR; INTRAVENOUS; SUBCUTANEOUS EVERY 4 HOURS
Qty: 0 | Refills: 0 | Status: DISCONTINUED | OUTPATIENT
Start: 2018-09-30 | End: 2018-10-01

## 2018-09-30 RX ORDER — ACETAMINOPHEN 500 MG
650 TABLET ORAL EVERY 6 HOURS
Qty: 0 | Refills: 0 | Status: DISCONTINUED | OUTPATIENT
Start: 2018-09-30 | End: 2018-09-30

## 2018-09-30 RX ORDER — OXYCODONE AND ACETAMINOPHEN 5; 325 MG/1; MG/1
1 TABLET ORAL EVERY 4 HOURS
Qty: 0 | Refills: 0 | Status: DISCONTINUED | OUTPATIENT
Start: 2018-09-30 | End: 2018-10-04

## 2018-09-30 RX ADMIN — HYDROMORPHONE HYDROCHLORIDE 1 MILLIGRAM(S): 2 INJECTION INTRAMUSCULAR; INTRAVENOUS; SUBCUTANEOUS at 04:30

## 2018-09-30 RX ADMIN — OXYCODONE AND ACETAMINOPHEN 1 TABLET(S): 5; 325 TABLET ORAL at 19:52

## 2018-09-30 RX ADMIN — HYDROMORPHONE HYDROCHLORIDE 1 MILLIGRAM(S): 2 INJECTION INTRAMUSCULAR; INTRAVENOUS; SUBCUTANEOUS at 09:48

## 2018-09-30 RX ADMIN — OXYCODONE AND ACETAMINOPHEN 1 TABLET(S): 5; 325 TABLET ORAL at 18:41

## 2018-09-30 RX ADMIN — AMPICILLIN SODIUM AND SULBACTAM SODIUM 100 GRAM(S): 250; 125 INJECTION, POWDER, FOR SUSPENSION INTRAMUSCULAR; INTRAVENOUS at 00:40

## 2018-09-30 RX ADMIN — AMPICILLIN SODIUM AND SULBACTAM SODIUM 100 GRAM(S): 250; 125 INJECTION, POWDER, FOR SUSPENSION INTRAMUSCULAR; INTRAVENOUS at 17:36

## 2018-09-30 RX ADMIN — AMPICILLIN SODIUM AND SULBACTAM SODIUM 100 GRAM(S): 250; 125 INJECTION, POWDER, FOR SUSPENSION INTRAMUSCULAR; INTRAVENOUS at 11:59

## 2018-09-30 RX ADMIN — Medication 100 MILLIGRAM(S): at 06:22

## 2018-09-30 RX ADMIN — AMPICILLIN SODIUM AND SULBACTAM SODIUM 100 GRAM(S): 250; 125 INJECTION, POWDER, FOR SUSPENSION INTRAMUSCULAR; INTRAVENOUS at 06:22

## 2018-09-30 RX ADMIN — HYDROMORPHONE HYDROCHLORIDE 1 MILLIGRAM(S): 2 INJECTION INTRAMUSCULAR; INTRAVENOUS; SUBCUTANEOUS at 10:10

## 2018-09-30 RX ADMIN — Medication 100 MILLIGRAM(S): at 16:06

## 2018-09-30 RX ADMIN — Medication 100 MILLIGRAM(S): at 21:47

## 2018-09-30 RX ADMIN — SENNA PLUS 2 TABLET(S): 8.6 TABLET ORAL at 21:47

## 2018-09-30 RX ADMIN — HYDROMORPHONE HYDROCHLORIDE 1 MILLIGRAM(S): 2 INJECTION INTRAMUSCULAR; INTRAVENOUS; SUBCUTANEOUS at 00:49

## 2018-09-30 RX ADMIN — PANTOPRAZOLE SODIUM 40 MILLIGRAM(S): 20 TABLET, DELAYED RELEASE ORAL at 06:22

## 2018-09-30 RX ADMIN — ENOXAPARIN SODIUM 40 MILLIGRAM(S): 100 INJECTION SUBCUTANEOUS at 11:59

## 2018-10-01 LAB
ANION GAP SERPL CALC-SCNC: 13 MMOL/L — SIGNIFICANT CHANGE UP (ref 7–14)
BUN SERPL-MCNC: 11 MG/DL — SIGNIFICANT CHANGE UP (ref 10–20)
CALCIUM SERPL-MCNC: 9.2 MG/DL — SIGNIFICANT CHANGE UP (ref 8.5–10.1)
CHLORIDE SERPL-SCNC: 100 MMOL/L — SIGNIFICANT CHANGE UP (ref 98–110)
CO2 SERPL-SCNC: 26 MMOL/L — SIGNIFICANT CHANGE UP (ref 17–32)
CREAT SERPL-MCNC: 0.9 MG/DL — SIGNIFICANT CHANGE UP (ref 0.7–1.5)
CULTURE RESULTS: SIGNIFICANT CHANGE UP
GLUCOSE SERPL-MCNC: 144 MG/DL — HIGH (ref 70–99)
HCT VFR BLD CALC: 35.6 % — LOW (ref 37–47)
HGB BLD-MCNC: 11.1 G/DL — LOW (ref 12–16)
MAGNESIUM SERPL-MCNC: 2.1 MG/DL — SIGNIFICANT CHANGE UP (ref 1.8–2.4)
MCHC RBC-ENTMCNC: 25.1 PG — LOW (ref 27–31)
MCHC RBC-ENTMCNC: 31.2 G/DL — LOW (ref 32–37)
MCV RBC AUTO: 80.4 FL — LOW (ref 81–99)
NRBC # BLD: 0 /100 WBCS — SIGNIFICANT CHANGE UP (ref 0–0)
PHOSPHATE SERPL-MCNC: 3.8 MG/DL — SIGNIFICANT CHANGE UP (ref 2.1–4.9)
PLATELET # BLD AUTO: 299 K/UL — SIGNIFICANT CHANGE UP (ref 130–400)
POTASSIUM SERPL-MCNC: 5 MMOL/L — SIGNIFICANT CHANGE UP (ref 3.5–5)
POTASSIUM SERPL-SCNC: 5 MMOL/L — SIGNIFICANT CHANGE UP (ref 3.5–5)
RBC # BLD: 4.43 M/UL — SIGNIFICANT CHANGE UP (ref 4.2–5.4)
RBC # FLD: 14.9 % — HIGH (ref 11.5–14.5)
SODIUM SERPL-SCNC: 139 MMOL/L — SIGNIFICANT CHANGE UP (ref 135–146)
SPECIMEN SOURCE: SIGNIFICANT CHANGE UP
TYPE + AB SCN PNL BLD: SIGNIFICANT CHANGE UP
WBC # BLD: 6.52 K/UL — SIGNIFICANT CHANGE UP (ref 4.8–10.8)
WBC # FLD AUTO: 6.52 K/UL — SIGNIFICANT CHANGE UP (ref 4.8–10.8)

## 2018-10-01 RX ORDER — HYDROMORPHONE HYDROCHLORIDE 2 MG/ML
4 INJECTION INTRAMUSCULAR; INTRAVENOUS; SUBCUTANEOUS EVERY 4 HOURS
Qty: 0 | Refills: 0 | Status: DISCONTINUED | OUTPATIENT
Start: 2018-10-01 | End: 2018-10-01

## 2018-10-01 RX ORDER — OXYCODONE AND ACETAMINOPHEN 5; 325 MG/1; MG/1
2 TABLET ORAL ONCE
Qty: 0 | Refills: 0 | Status: DISCONTINUED | OUTPATIENT
Start: 2018-10-01 | End: 2018-10-01

## 2018-10-01 RX ADMIN — Medication 100 MILLIGRAM(S): at 17:06

## 2018-10-01 RX ADMIN — HYDROMORPHONE HYDROCHLORIDE 4 MILLIGRAM(S): 2 INJECTION INTRAMUSCULAR; INTRAVENOUS; SUBCUTANEOUS at 06:19

## 2018-10-01 RX ADMIN — Medication 1 TABLET(S): at 17:06

## 2018-10-01 RX ADMIN — HYDROMORPHONE HYDROCHLORIDE 4 MILLIGRAM(S): 2 INJECTION INTRAMUSCULAR; INTRAVENOUS; SUBCUTANEOUS at 07:00

## 2018-10-01 RX ADMIN — OXYCODONE AND ACETAMINOPHEN 1 TABLET(S): 5; 325 TABLET ORAL at 20:18

## 2018-10-01 RX ADMIN — Medication 100 MILLIGRAM(S): at 22:34

## 2018-10-01 RX ADMIN — OXYCODONE AND ACETAMINOPHEN 1 TABLET(S): 5; 325 TABLET ORAL at 21:00

## 2018-10-01 RX ADMIN — OXYCODONE AND ACETAMINOPHEN 2 TABLET(S): 5; 325 TABLET ORAL at 03:19

## 2018-10-01 RX ADMIN — SENNA PLUS 2 TABLET(S): 8.6 TABLET ORAL at 22:34

## 2018-10-01 RX ADMIN — OXYCODONE AND ACETAMINOPHEN 2 TABLET(S): 5; 325 TABLET ORAL at 06:22

## 2018-10-01 RX ADMIN — ENOXAPARIN SODIUM 40 MILLIGRAM(S): 100 INJECTION SUBCUTANEOUS at 15:19

## 2018-10-02 ENCOUNTER — TRANSCRIPTION ENCOUNTER (OUTPATIENT)
Age: 43
End: 2018-10-02

## 2018-10-02 LAB
ANION GAP SERPL CALC-SCNC: 14 MMOL/L — SIGNIFICANT CHANGE UP (ref 7–14)
BUN SERPL-MCNC: 12 MG/DL — SIGNIFICANT CHANGE UP (ref 10–20)
CALCIUM SERPL-MCNC: 9.6 MG/DL — SIGNIFICANT CHANGE UP (ref 8.5–10.1)
CHLORIDE SERPL-SCNC: 98 MMOL/L — SIGNIFICANT CHANGE UP (ref 98–110)
CO2 SERPL-SCNC: 28 MMOL/L — SIGNIFICANT CHANGE UP (ref 17–32)
CREAT SERPL-MCNC: 1 MG/DL — SIGNIFICANT CHANGE UP (ref 0.7–1.5)
CULTURE RESULTS: SIGNIFICANT CHANGE UP
GLUCOSE SERPL-MCNC: 93 MG/DL — SIGNIFICANT CHANGE UP (ref 70–99)
HCT VFR BLD CALC: 37.4 % — SIGNIFICANT CHANGE UP (ref 37–47)
HGB BLD-MCNC: 11.7 G/DL — LOW (ref 12–16)
IRON SATN MFR SERPL: 10 % — LOW (ref 15–50)
IRON SATN MFR SERPL: 30 UG/DL — LOW (ref 35–150)
MAGNESIUM SERPL-MCNC: 2.1 MG/DL — SIGNIFICANT CHANGE UP (ref 1.8–2.4)
MCHC RBC-ENTMCNC: 25.1 PG — LOW (ref 27–31)
MCHC RBC-ENTMCNC: 31.3 G/DL — LOW (ref 32–37)
MCV RBC AUTO: 80.3 FL — LOW (ref 81–99)
NRBC # BLD: 0 /100 WBCS — SIGNIFICANT CHANGE UP (ref 0–0)
PLATELET # BLD AUTO: 310 K/UL — SIGNIFICANT CHANGE UP (ref 130–400)
POTASSIUM SERPL-MCNC: 4.9 MMOL/L — SIGNIFICANT CHANGE UP (ref 3.5–5)
POTASSIUM SERPL-SCNC: 4.9 MMOL/L — SIGNIFICANT CHANGE UP (ref 3.5–5)
RBC # BLD: 4.66 M/UL — SIGNIFICANT CHANGE UP (ref 4.2–5.4)
RBC # FLD: 14.9 % — HIGH (ref 11.5–14.5)
SODIUM SERPL-SCNC: 140 MMOL/L — SIGNIFICANT CHANGE UP (ref 135–146)
SPECIMEN SOURCE: SIGNIFICANT CHANGE UP
TIBC SERPL-MCNC: 303 UG/DL — SIGNIFICANT CHANGE UP (ref 220–430)
UIBC SERPL-MCNC: 273 UG/DL — SIGNIFICANT CHANGE UP (ref 110–370)
WBC # BLD: 7.34 K/UL — SIGNIFICANT CHANGE UP (ref 4.8–10.8)
WBC # FLD AUTO: 7.34 K/UL — SIGNIFICANT CHANGE UP (ref 4.8–10.8)

## 2018-10-02 RX ADMIN — OXYCODONE AND ACETAMINOPHEN 1 TABLET(S): 5; 325 TABLET ORAL at 19:00

## 2018-10-02 RX ADMIN — SENNA PLUS 2 TABLET(S): 8.6 TABLET ORAL at 22:38

## 2018-10-02 RX ADMIN — Medication 100 MILLIGRAM(S): at 22:38

## 2018-10-02 RX ADMIN — Medication 1 TABLET(S): at 06:04

## 2018-10-02 RX ADMIN — OXYCODONE AND ACETAMINOPHEN 1 TABLET(S): 5; 325 TABLET ORAL at 10:55

## 2018-10-02 RX ADMIN — OXYCODONE AND ACETAMINOPHEN 1 TABLET(S): 5; 325 TABLET ORAL at 11:27

## 2018-10-02 RX ADMIN — Medication 1 TABLET(S): at 17:53

## 2018-10-02 RX ADMIN — OXYCODONE AND ACETAMINOPHEN 1 TABLET(S): 5; 325 TABLET ORAL at 18:27

## 2018-10-02 RX ADMIN — OXYCODONE AND ACETAMINOPHEN 1 TABLET(S): 5; 325 TABLET ORAL at 23:00

## 2018-10-02 RX ADMIN — OXYCODONE AND ACETAMINOPHEN 1 TABLET(S): 5; 325 TABLET ORAL at 22:36

## 2018-10-02 RX ADMIN — PANTOPRAZOLE SODIUM 40 MILLIGRAM(S): 20 TABLET, DELAYED RELEASE ORAL at 10:56

## 2018-10-02 RX ADMIN — Medication 100 MILLIGRAM(S): at 06:04

## 2018-10-02 RX ADMIN — Medication 100 MILLIGRAM(S): at 17:53

## 2018-10-02 RX ADMIN — OXYCODONE AND ACETAMINOPHEN 1 TABLET(S): 5; 325 TABLET ORAL at 00:30

## 2018-10-02 RX ADMIN — ENOXAPARIN SODIUM 40 MILLIGRAM(S): 100 INJECTION SUBCUTANEOUS at 11:28

## 2018-10-02 RX ADMIN — OXYCODONE AND ACETAMINOPHEN 1 TABLET(S): 5; 325 TABLET ORAL at 01:00

## 2018-10-02 NOTE — DISCHARGE NOTE ADULT - ADDITIONAL INSTRUCTIONS
Please wash Left lower extremity wound twice a day. Apply xeroform and cover with kerlix twice a day. Right lower extremity wound vac placed 10/3 should be changed 10/5 and every monday, wednesday friday after that. Continue at home antibiotics of augmentin for 6 days. Please call [phone] to make a follow up appointment within 1 week with Dr. Artis or Dr. Beck. Clinic is located at [address] [place] [address] on Tuesdays (2-4pm) or Thursdays (9am-1pm). Continue all home medications and follow up with PCP in one week.
2018

## 2018-10-02 NOTE — CONSULT NOTE ADULT - SUBJECTIVE AND OBJECTIVE BOX
Pt is a 43 y.o female with h/o ischemia/amputations, admitted for further surgical care of B/L foot wounds - called to evaluate patient for left ear pain.    PAST MEDICAL HISTORY: ARDS survivor, Sepsis, Small bowel obstruction, Osteoarthritis, Cardiomyopathy, Sleep apnea, HTN,   Gangrene of lower extremity, Gangrene of finger of right hand, Gangrene of finger of left hand  SURGICAL HISTORY: S/P amputation: b/l toes, H/O exploratory laparotomy, Amputation finger, History of cholecystectomy, H/O gastric bypass  MEDICATIONS (STANDING):  amoxicillin 875 milliGRAM(s)/clavulanate 1 Tablet(s) Oral every 12 hours  docusate sodium 100 milliGRAM(s) Oral three times a day  enoxaparin Injectable 40 milliGRAM(s) SubCutaneous daily  influenza Vaccine 0.5 milliLiter(s) IntraMuscular once  pantoprazole Tablet 40 milliGRAM(s) Oral before breakfast  senna 2 Tablet(s) Oral at bedtime  ALLERGIES: Percocet 10/325 (Rash)    Vital Signs: T(F): 97.7 (02 Oct 2018 07:36), Max: 98.6 (01 Oct 2018 15:20), HR: 80, BP: 102/74, RR: 18  GEN:  HEENT:    LABS:                      11.1   6.52  )-----------( 299      ( 01 Oct 2018 17:30 )             35.6 Pt is a 43 y.o female with h/o ischemia/amputations, admitted for further surgical care of B/L foot wounds - called to evaluate patient for left ear pain.  Pt states she has had pain since yesterday/last night, feels it where her ear meets her neck. PT states she has not had any ear pain prior to this, no other ear symptoms - no drainage, hearing loss, tinnitus, or fullness to the ear. Denies any ear symptoms on the right side. Pt states it mostly hurts when she chews or swallows food - otherwise no other throat pain or swallowing complaints.    PAST MEDICAL HISTORY: ARDS survivor, Sepsis, Small bowel obstruction, Osteoarthritis, Cardiomyopathy, Sleep apnea, HTN,   Gangrene of lower extremity, Gangrene of finger of right hand, Gangrene of finger of left hand  SURGICAL HISTORY: S/P amputation: b/l toes, H/O exploratory laparotomy, Amputation finger, History of cholecystectomy, H/O gastric bypass  MEDICATIONS (STANDING):  amoxicillin 875 milliGRAM(s)/clavulanate 1 Tablet(s) Oral every 12 hours  docusate sodium 100 milliGRAM(s) Oral three times a day  enoxaparin Injectable 40 milliGRAM(s) SubCutaneous daily  influenza Vaccine 0.5 milliLiter(s) IntraMuscular once  pantoprazole Tablet 40 milliGRAM(s) Oral before breakfast  senna 2 Tablet(s) Oral at bedtime  ALLERGIES: Percocet 10/325 (Rash)    Vital Signs: T(F): 97.7 (02 Oct 2018 07:36), Max: 98.6 (01 Oct 2018 15:20), HR: 80, BP: 102/74, RR: 18  GEN: NAD, awake and alert. hoarse, baseline - improved  HEENT: NC/AT; oral mucosa pink, no erythema/edema, uvula midline; R ear: no pre/post auricular TTP, no tragal TTP, EAC WNL, TM grossly intact. L ear no pre/post auricular TTP, no tragal TTP, no pain with manipulation of external ear. EAC with minimal erythema when compared to the R, TM intact. + TTP over TMJ on L only, extending down neck overlying the SCM.    LABS:                      11.1   6.52  )-----------( 299      ( 01 Oct 2018 17:30 )             35.6

## 2018-10-02 NOTE — DISCHARGE NOTE ADULT - MEDICATION SUMMARY - MEDICATIONS TO TAKE
I will START or STAY ON the medications listed below when I get home from the hospital:    Vicodin 5 mg-300 mg oral tablet  -- 1 tab(s) by mouth every 6 hours, As Needed -for wound care MDD:4 tabs   -- Caution federal law prohibits the transfer of this drug to any person other  than the person for whom it was prescribed.  Do not drink alcoholic beverages when taking this medication.  May cause drowsiness.  Alcohol may intensify this effect.  Use care when operating dangerous machinery.  This drug may impair the ability to drive or operate machinery.  Use care until you become familiar with its effects.  This product contains acetaminophen.  Do not use  with any other product containing acetaminophen to prevent possible liver damage.  Using more of this medication than prescribed may cause serious breathing problems.    -- Indication: For pain    Metoprolol Succinate ER 25 mg oral tablet, extended release  -- 1 tab(s) by mouth once a day   -- It is very important that you take or use this exactly as directed.  Do not skip doses or discontinue unless directed by your doctor.  May cause drowsiness.  Alcohol may intensify this effect.  Use care when operating dangerous machinery.  Some non-prescription drugs may aggravate your condition.  Read all labels carefully.  If a warning appears, check with your doctor before taking.  Swallow whole.  Do not crush.  Take with food or milk.  This drug may impair the ability to drive or operate machinery.  Use care until you become familiar with its effects.    -- Indication: For high blood pressure    amoxicillin-clavulanate 875 mg-125 mg oral tablet  -- 1 tab(s) by mouth every 12 hours   -- Finish all this medication unless otherwise directed by prescriber.  Take with food or milk.    -- Indication: For wound I will START or STAY ON the medications listed below when I get home from the hospital:    oxyCODONE-acetaminophen 5 mg-325 mg oral tablet  -- 1 tab(s) by mouth every 6 hours, As Needed -for severe pain MDD:4   -- Caution federal law prohibits the transfer of this drug to any person other  than the person for whom it was prescribed.  May cause drowsiness.  Alcohol may intensify this effect.  Use care when operating dangerous machinery.  This prescription cannot be refilled.  This product contains acetaminophen.  Do not use  with any other product containing acetaminophen to prevent possible liver damage.  Using more of this medication than prescribed may cause serious breathing problems.    -- Indication: For pain    Metoprolol Succinate ER 25 mg oral tablet, extended release  -- 1 tab(s) by mouth once a day   -- It is very important that you take or use this exactly as directed.  Do not skip doses or discontinue unless directed by your doctor.  May cause drowsiness.  Alcohol may intensify this effect.  Use care when operating dangerous machinery.  Some non-prescription drugs may aggravate your condition.  Read all labels carefully.  If a warning appears, check with your doctor before taking.  Swallow whole.  Do not crush.  Take with food or milk.  This drug may impair the ability to drive or operate machinery.  Use care until you become familiar with its effects.    -- Indication: For high blood pressure    amoxicillin-clavulanate 875 mg-125 mg oral tablet  -- 1 tab(s) by mouth every 12 hours   -- Finish all this medication unless otherwise directed by prescriber.  Take with food or milk.    -- Indication: For wound

## 2018-10-02 NOTE — DISCHARGE NOTE ADULT - CARE PLAN
Principal Discharge DX:	S/P amputation  Goal:	wound healing  Assessment and plan of treatment:	Please wash Left lower extremity wound twice a day. Apply xeroform and cover with kerlix twice a day. Right lower extremity wound vac placed 10/3 should be changed 10/5 and every monday, wednesday friday after that. Continue at home antibiotics of augmentin for 6 days. Please call [phone] to make a follow up appointment within 1 week with Dr. Artis or Dr. Beck. Clinic is located at [address] [place] [address] on Tuesdays (2-4pm) or Thursdays (9am-1pm).  Secondary Diagnosis:	HTN (hypertension)  Goal:	BP control  Assessment and plan of treatment:	continue all home medications

## 2018-10-02 NOTE — DISCHARGE NOTE ADULT - PATIENT PORTAL LINK FT
You can access the [facility] Patient Portal, offered by [facility], by registering with the following website: [email or web address]

## 2018-10-02 NOTE — CONSULT NOTE ADULT - ASSESSMENT
43 y.o female with 43 y.o female with likely TMJ on L, no sign of infection/OE.    ·	TMJ precautions - soft diet, chewing on both sides  ·	warm compress to area  ·	NSAID's if no C/I  ·	if pain still present after the above, can start Floxin otic 5 drops q12 x 5 days  ·	w/d with attng

## 2018-10-02 NOTE — DISCHARGE NOTE ADULT - CARE PROVIDER_API CALL
Sudhir Artis), Plastic Surgery  [address] [place] [address]  Phone: [phone]  Fax: [phone]    Ananth Beck), Plastic Surgery  [address] [place] [address]  Phone: [phone]  Fax: [phone]    Mhoinder Salazar (ROCIO), Podiatric Medicine and Surgery  [address] [place] [address]  Phone: [phone]  Fax: [phone]

## 2018-10-02 NOTE — DISCHARGE NOTE ADULT - PLAN OF CARE
wound healing Please wash Left lower extremity wound twice a day. Apply xeroform and cover with kerlix twice a day. Right lower extremity wound vac placed 10/3 should be changed 10/5 and every monday, wednesday friday after that. Continue at home antibiotics of augmentin for 6 days. Please call [phone] to make a follow up appointment within 1 week with Dr. Artis or Dr. Beck. Clinic is located at [address] [place] [address] on Tuesdays (2-4pm) or Thursdays (9am-1pm). BP control continue all home medications

## 2018-10-02 NOTE — DISCHARGE NOTE ADULT - CARE PROVIDERS DIRECT ADDRESSES
[email or web address]

## 2018-10-02 NOTE — DISCHARGE NOTE ADULT - HOSPITAL COURSE
43 year old female pt with history of amputations to bilateral forefoot s/p pressors use after complications of gastric bypass surgery in Oct 2017, was admitted for debridement with placement of NPWT.    During admission pt received anticoagulation prophylaxis, IV fluids, antibiotics and pain medication as needed. Her wounds were cleaned and dressed twice daily, and were debrided in the OR on 9/28/2018. Pt was subsequently evaluated by Physical therapist and occupational therapist on 10/01/2018.    Pt is now stable, afebrile and in no acute distress. She is being discharged home with use of wound VAC (and visiting nursing services???), and instructions to follow up with the clinic in 7 days??? 43 year old female with multiple complications s/p gastric bypass surgery in Oct 2017 with  bilateral forefoot amputations was admitted for further debridement of bilateral lower extremity amputation stumps. While inpatient, patient received  IVF, antibiotics (Unasyn then augmentin), wound care twice a day, GI/DVT ppx, OT and PT, and pain management. Patient underwent debridement on 9/26 and had a wound vac placed on right lower extremity. Patient will be discharged with wound vac for the right lower extremity wound and local wound care of xeroform and kerlix on the left lower extremity wound. Continue antibiotics for 6 days. Follow up in clinic in one week.

## 2018-10-03 LAB
24R-OH-CALCIDIOL SERPL-MCNC: 19 NG/ML — LOW (ref 30–80)
FERRITIN SERPL-MCNC: 33 NG/ML — SIGNIFICANT CHANGE UP (ref 15–150)
VIT B12 SERPL-MCNC: 715 PG/ML — SIGNIFICANT CHANGE UP (ref 232–1245)

## 2018-10-03 PROCEDURE — 99222 1ST HOSP IP/OBS MODERATE 55: CPT

## 2018-10-03 RX ADMIN — Medication 1 TABLET(S): at 17:31

## 2018-10-03 RX ADMIN — Medication 100 MILLIGRAM(S): at 15:34

## 2018-10-03 RX ADMIN — Medication 1 TABLET(S): at 06:42

## 2018-10-03 RX ADMIN — OXYCODONE AND ACETAMINOPHEN 1 TABLET(S): 5; 325 TABLET ORAL at 04:00

## 2018-10-03 RX ADMIN — ENOXAPARIN SODIUM 40 MILLIGRAM(S): 100 INJECTION SUBCUTANEOUS at 11:09

## 2018-10-03 RX ADMIN — Medication 100 MILLIGRAM(S): at 06:42

## 2018-10-03 RX ADMIN — OXYCODONE AND ACETAMINOPHEN 1 TABLET(S): 5; 325 TABLET ORAL at 18:26

## 2018-10-03 RX ADMIN — SENNA PLUS 2 TABLET(S): 8.6 TABLET ORAL at 21:58

## 2018-10-03 RX ADMIN — OXYCODONE AND ACETAMINOPHEN 1 TABLET(S): 5; 325 TABLET ORAL at 21:58

## 2018-10-03 RX ADMIN — OXYCODONE AND ACETAMINOPHEN 1 TABLET(S): 5; 325 TABLET ORAL at 17:30

## 2018-10-03 RX ADMIN — OXYCODONE AND ACETAMINOPHEN 1 TABLET(S): 5; 325 TABLET ORAL at 03:23

## 2018-10-03 RX ADMIN — OXYCODONE AND ACETAMINOPHEN 1 TABLET(S): 5; 325 TABLET ORAL at 09:14

## 2018-10-03 RX ADMIN — OXYCODONE AND ACETAMINOPHEN 1 TABLET(S): 5; 325 TABLET ORAL at 10:00

## 2018-10-03 RX ADMIN — Medication 100 MILLIGRAM(S): at 21:58

## 2018-10-04 ENCOUNTER — INBOUND DOCUMENT (OUTPATIENT)
Age: 43
End: 2018-10-04

## 2018-10-04 VITALS — HEIGHT: 68 IN | WEIGHT: 182.1 LBS

## 2018-10-04 LAB
FOLATE RBC-MCNC: 1022 NG/ML — SIGNIFICANT CHANGE UP (ref 499–1504)
HCT VFR BLD CALC: 37 % — SIGNIFICANT CHANGE UP (ref 34–45)
SURGICAL PATHOLOGY STUDY: SIGNIFICANT CHANGE UP

## 2018-10-04 RX ADMIN — OXYCODONE AND ACETAMINOPHEN 1 TABLET(S): 5; 325 TABLET ORAL at 02:30

## 2018-10-04 RX ADMIN — OXYCODONE AND ACETAMINOPHEN 1 TABLET(S): 5; 325 TABLET ORAL at 00:48

## 2018-10-04 RX ADMIN — ENOXAPARIN SODIUM 40 MILLIGRAM(S): 100 INJECTION SUBCUTANEOUS at 12:42

## 2018-10-04 RX ADMIN — Medication 1 TABLET(S): at 05:02

## 2018-10-04 RX ADMIN — Medication 100 MILLIGRAM(S): at 14:05

## 2018-10-04 RX ADMIN — OXYCODONE AND ACETAMINOPHEN 1 TABLET(S): 5; 325 TABLET ORAL at 12:42

## 2018-10-04 RX ADMIN — INFLUENZA VIRUS VACCINE 0.5 MILLILITER(S): 15; 15; 15; 15 SUSPENSION INTRAMUSCULAR at 14:03

## 2018-10-04 RX ADMIN — Medication 100 MILLIGRAM(S): at 05:02

## 2018-10-04 RX ADMIN — OXYCODONE AND ACETAMINOPHEN 1 TABLET(S): 5; 325 TABLET ORAL at 01:58

## 2018-10-04 NOTE — PROGRESS NOTE ADULT - REASON FOR ADMISSION
B/l feet surgery

## 2018-10-04 NOTE — PROGRESS NOTE ADULT - SUBJECTIVE AND OBJECTIVE BOX
AM rounds     Pt: no complaints.   No acute events o/n  Vital Signs Last 24 Hrs  T(C): 36.3 (01 Oct 2018 07:37), Max: 37.3 (30 Sep 2018 17:28)  T(F): 97.4 (01 Oct 2018 07:37), Max: 99.2 (30 Sep 2018 17:28)  HR: 90 (30 Sep 2018 17:28) (90 - 90)  BP: 91/61 (01 Oct 2018 07:37) (91/61 - 102/67)  RR: 18 (01 Oct 2018 07:37) (18 - 18)  SpO2: 100% (30 Sep 2018 17:28) (100% - 100%)        I&O's Summary    30 Sep 2018 07:01  -  01 Oct 2018 07:00  --------------------------------------------------------  IN: 100 mL / OUT: 930 mL / NET: -830 mL    01 Oct 2018 07:01  -  01 Oct 2018 14:29  --------------------------------------------------------  IN: 0 mL / OUT: 320 mL / NET: -320 mL        09-30    136  |  97<L>  |  10  ----------------------------<  167<H>  4.4   |  25  |  0.8    Ca    8.8      30 Sep 2018 16:22  Phos  3.2     09-30  Mg     1.8     09-30                        10.8   7.08  )-----------( 245      ( 30 Sep 2018 16:22 )             34.0     EXAM :  NPWT dressing removed by Podiatry [facility] [place]  right foot- open wound - pink granulation ; small area of exposed bone   left foot - steri strips and dressing in place
AM rounds  POD # 1 s/p debridement / NPWT RLE / closure LLE    Pt: c/o some pain right No acute events o/n  Vital Signs Last 24 Hrs  T(C): 37 (29 Sep 2018 07:48), Max: 37 (29 Sep 2018 07:48)  T(F): 98.6 (29 Sep 2018 07:48), Max: 98.6 (29 Sep 2018 07:48)  HR: 80 (29 Sep 2018 07:48) (77 - 99)  BP: 112/67 (29 Sep 2018 07:48) (90/55 - 140/91)  RR: 18 (29 Sep 2018 07:48) (12 - 18)  SpO2: 99% (28 Sep 2018 23:40) (99% - 100%)        I&O's Summary    28 Sep 2018 07:01  -  29 Sep 2018 07:00  --------------------------------------------------------  IN: 540 mL / OUT: 300 mL / NET: 240 mL    29 Sep 2018 07:01  -  29 Sep 2018 13:16  --------------------------------------------------------  IN: 50 mL / OUT: 520 mL / NET: -470 mL      09-28    137  |  100  |  12  ----------------------------<  84  4.6   |  21  |  0.8    Ca    9.9      28 Sep 2018 14:28                        13.0   6.60  )-----------( 312      ( 28 Sep 2018 14:28 )             40.9     EXAM:  RLE - NPWT in progress .no bleeding noted  LLE - steri strips in place - no bleeding
AM rounds  Pt stable o/n     VSS ; AF    right LE - NPWT in progress   LLE - dressing in place
PM round  Pt: no complaints  No acute events o/n  Vital Signs Last 24 Hrs  T(C): 37.3 (30 Sep 2018 17:28), Max: 37.3 (30 Sep 2018 17:28)  T(F): 99.2 (30 Sep 2018 17:28), Max: 99.2 (30 Sep 2018 17:28)  HR: 90 (30 Sep 2018 17:28) (80 - 90)  BP: 102/67 (30 Sep 2018 17:28) (102/67 - 104/72)  RR: 18 (30 Sep 2018 17:28) (18 - 18)  SpO2: 100% (30 Sep 2018 17:28) (100% - 100%)        I&O's Summary    29 Sep 2018 07:01  -  30 Sep 2018 07:00  --------------------------------------------------------  IN: 100 mL / OUT: 1370 mL / NET: -1270 mL    30 Sep 2018 07:01  -  30 Sep 2018 21:06  --------------------------------------------------------  IN: 100 mL / OUT: 530 mL / NET: -430 mL        09-30    136  |  97<L>  |  10  ----------------------------<  167<H>  4.4   |  25  |  0.8    Ca    8.8      30 Sep 2018 16:22  Phos  3.2     09-30  Mg     1.8     09-30                         10.8   7.08  )-----------( 245      ( 30 Sep 2018 16:22 )             34.0     EXAM :  dressings in place bilateral LE ; NPWT in progress right
PROGRESS NOTE   Patient is a 43y old  Female who presents with a chief complaint of B/l feet surgery (29 Sep 2018 13:16). Today is POD #2. s/p remodeling of proximal amputation site.       HPI:  43F w/ hx of gastric bypass on Oct 2017. A week later presented with obstruction and required return to OR a week later and post-op course complicated by ARDS, sepsis requiring pressors. As results of pressors developed upper and lower limbs ischemia requiring amputation. R partial hand and L wrist amputations on January 2018 and b/l forefoot amputation. S/p b/l feet STSG in April of 2018with partial closure. Due to bone prominence and bone overgrowth continues to have open wounds bilaterally. Comes in today for join case with Plastic Surgery and Podiatry for further exploration of b/l feed wounds.     Since last hospitalization last spring has been doing well. Non-ambulatory, WC dependent.  Denies recent sicknesses. (28 Sep 2018 14:39)      V58.89,892.0,S91.309,50460,33601  ^V58.89,892.0,S91.309,12563,61281  H/o or current diagnosis of HF- Contraindication to ACEI/ARBs  H/o or current diagnosis of HF- ACEI/ARB contraindication unknown  H/o or current diagnosis of HF- Contraindication to ACEI/ARBs  H/o or current diagnosis of HF- ACEI/ARB contraindication unknown  Family history of heart disease (Father)  Family history of cancer (Mother)  No pertinent family history in first degree relatives  Handoff  MEWS Score  ARDS survivor  Sepsis  Takotsubo cardiomyopathy  Small bowel obstruction  Osteoarthritis  Cardiomyopathy  Sleep apnea  HTN (hypertension)  Gangrene of lower extremity  Gangrene of finger of right hand  Gangrene of finger of left hand  Chronic ulcer of left foot limited to breakdown of skin  Chronic ulcer of right foot limited to breakdown of skin  Debridement, wound, foot, with closure  S/P amputation  H/O exploratory laparotomy  Amputation finger  History of cholecystectomy  H/O gastric bypass      VITALS:  Vital Signs Last 24 Hrs  T(C): 36.9 (30 Sep 2018 07:57), Max: 36.9 (29 Sep 2018 15:40)  T(F): 98.4 (30 Sep 2018 07:57), Max: 98.5 (29 Sep 2018 15:40)  HR: 80 (30 Sep 2018 07:57) (80 - 105)  BP: 104/72 (30 Sep 2018 07:57) (102/65 - 104/72)  BP(mean): --  RR: 18 (30 Sep 2018 07:57) (18 - 18)  SpO2: 100% (29 Sep 2018 23:49) (100% - 100%)    LABS:                        10.8   8.54  )-----------( 251      ( 29 Sep 2018 16:37 )             33.9     09-29    138  |  100  |  12  ----------------------------<  113<H>  4.5   |  25  |  0.8    Ca    8.6      29 Sep 2018 16:37  Phos  3.5     09-29  Mg     1.8     09-29      PT/INR - ( 28 Sep 2018 14:28 )   PT: 13.20 sec;   INR: 1.22 ratio         PTT - ( 28 Sep 2018 14:28 )  PTT:32.7 sec  Hemoglobin A1C     PHYSICAL EXAM  GEN: JIMMY CR is a pleasant well-nourished, well developed 43y Female in no acute distress, alert awake, and oriented to person, place and time.     Medication(s):   acetaminophen   Tablet .. 650 milliGRAM(s) Oral every 6 hours PRN  ampicillin/sulbactam  IVPB      ampicillin/sulbactam  IVPB 1.5 Gram(s) IV Intermittent every 6 hours  docusate sodium 100 milliGRAM(s) Oral three times a day  enoxaparin Injectable 40 milliGRAM(s) SubCutaneous daily  HYDROmorphone  Injectable 1 milliGRAM(s) IV Push every 4 hours PRN  influenza   Vaccine 0.5 milliLiter(s) IntraMuscular once  midazolam Injectable 2 milliGRAM(s) IV Push two times a day PRN  pantoprazole    Tablet 40 milliGRAM(s) Oral before breakfast  polyethylene glycol 3350 17 Gram(s) Oral daily PRN  senna 2 Tablet(s) Oral at bedtime
Patient evaluated bedside this AM, S/P bilateral remodeling of amputation sites,including cutting of soft tissue and bone. Dressing changed left foot with burn staff. Surgical site left foot steris in place wound margins viable. right foot with NPWT vac intact. Nonadherent dressing applied to left foot.                      13.0   6.60  )-----------( 312      ( 28 Sep 2018 14:28 ) Vital Signs Last 24 Hrs  T(C): 37 (29 Sep 2018 07:48), Max: 37 (29 Sep 2018 07:48)  T(F): 98.6 (29 Sep 2018 07:48), Max: 98.6 (29 Sep 2018 07:48)  HR: 80 (29 Sep 2018 07:48) (77 - 99)  BP: 112/67 (29 Sep 2018 07:48) (90/55 - 140/91)  BP(mean): --  RR: 18 (29 Sep 2018 07:48) (12 - 18)  SpO2: 99% (28 Sep 2018 23:40) (99% - 100%)             40.9
Podiatry Pre-Operative Note   [id number] JIMMY CR is a 43y year old Female patient presenting to the operating room for surgical management of the bilateral feet. The patient has failed all conservative measures and requires surgical intervention at this time.    PAST MEDICAL & SURGICAL HISTORY:  ARDS survivor  Sepsis  Small bowel obstruction  Osteoarthritis  Cardiomyopathy  Sleep apnea  HTN (hypertension)  Gangrene of lower extremity  Gangrene of finger of right hand  Gangrene of finger of left hand  S/P amputation: b/l toes  H/O exploratory laparotomy  Amputation finger  History of cholecystectomy  H/O gastric bypass    Medications:   Metoprolol Succinate ER 25 mg oral tablet, extended release: 1 tab(s) orally once a day   Vicodin 5 mg-300 mg oral tablet: 1 tab(s) orally every 6 hours, As Needed -for wound care MDD:4 tabs     Allergies  Percocet 10/325 (Rash)    Consent [Done]  H&P [PAST]    T(C): 36.9 (09-28-18 @ 14:24), Max: 36.9 (09-28-18 @ 14:24)  HR: 79 (09-28-18 @ 14:24) (79 - 79)  BP: 140/91 (09-28-18 @ 14:24) (140/91 - 140/91)  RR: 12 (09-28-18 @ 14:24) (12 - 12)  SpO2: --    Surgeon: Dr. Mohinder Salazar DPM  Assistant (s): [facility]   Pre Operative Diagnosis: Bilateral proximal foot amputation site with ulcerations   Planned Procedure: Remodeling of bilateral foot amputation to include cutting of bone and soft tissue     The patient has been educated on the above procedure, with all risks and benefits described. No guarantees were given or implied for the aforementioned procedure.  The above assistant(s) have introduced themselves to the patient. The patient consents to their participation in the procedure listed above.   09-28-18 @ 16:04
d/c note  stable    Vital Signs Last 24 Hrs  T(C): 37.1 (03 Oct 2018 15:28), Max: 37.1 (03 Oct 2018 15:28)  T(F): 98.7 (03 Oct 2018 15:28), Max: 98.7 (03 Oct 2018 15:28)  HR: 79 (03 Oct 2018 15:28) (79 - 80)  BP: 107/76 (03 Oct 2018 15:28) (99/61 - 124/63)  BP(mean): --  RR: 18 (03 Oct 2018 15:28) (18 - 18)  SpO2: 98% (03 Oct 2018 07:19) (95% - 98%)    CVP:  T(C): 37.1 (10-03-18 @ 15:28), Max: 37.1 (10-03-18 @ 15:28)  HR: 79 (10-03-18 @ 15:28) (79 - 80)  BP: 107/76 (10-03-18 @ 15:28) (99/61 - 124/63)  RR: 18 (10-03-18 @ 15:28) (18 - 18)  SpO2: 98% (10-03-18 @ 07:19) (95% - 98%)  CVP(mm Hg): --    U.O.:  I&O's Detail    02 Oct 2018 07:01  -  03 Oct 2018 07:00  --------------------------------------------------------  IN:    Oral Fluid: 120 mL  Total IN: 120 mL    OUT:    Voided: 950 mL  Total OUT: 950 mL    Total NET: -830 mL      03 Oct 2018 07:01  -  03 Oct 2018 17:43  --------------------------------------------------------  IN:  Total IN: 0 mL    OUT:    Voided: 200 mL  Total OUT: 200 mL    Total NET: -200 mL                                        11.7   7.34  )-----------( 310      ( 02 Oct 2018 17:05 )             37.4     10-02    140  |  98  |  12  ----------------------------<  93  4.9   |  28  |  1.0    Ca    9.6      02 Oct 2018 17:05  Mg     2.1     10-02        Large Dressing Change--> large dressing change--> feet wounds granulating
patient evaluated bedside this AM,alert in no apparent distress. Patient is S/P remodeling of bilateral choparts amputation,including excisional debridement of soft tissue and bone. Right foot with NPWT Vac in place,right foot granulating well with dressing intact. Awaiting home VAC for 2 to 4 weeks of NPWT and then consideration for skin grafting. Plan discussed with burn team, D/C planning in progress.                      11.7   7.34  )-----------( 310      ( 02 Oct 2018 17:05 )             37.4   Vital Signs Last 24 Hrs  T(C): 36.6 (02 Oct 2018 23:12), Max: 36.9 (02 Oct 2018 15:19)  T(F): 97.9 (02 Oct 2018 23:12), Max: 98.5 (02 Oct 2018 15:19)  HR: 99 (02 Oct 2018 15:19) (80 - 99)  BP: 99/61 (02 Oct 2018 23:12) (99/61 - 109/66)  BP(mean): --  RR: 18 (02 Oct 2018 23:12) (18 - 18)  SpO2: 95% (02 Oct 2018 23:12) (95% - 99%)
stable    Vital Signs Last 24 Hrs  T(C): 36.6 (02 Oct 2018 23:12), Max: 36.9 (02 Oct 2018 15:19)  T(F): 97.9 (02 Oct 2018 23:12), Max: 98.5 (02 Oct 2018 15:19)  HR: 99 (02 Oct 2018 15:19) (80 - 99)  BP: 99/61 (02 Oct 2018 23:12) (99/61 - 109/66)  BP(mean): --  RR: 18 (02 Oct 2018 23:12) (18 - 18)  SpO2: 95% (02 Oct 2018 23:12) (95% - 99%)    CVP:  T(C): 36.6 (10-02-18 @ 23:12), Max: 36.9 (10-02-18 @ 15:19)  HR: 99 (10-02-18 @ 15:19) (80 - 99)  BP: 99/61 (10-02-18 @ 23:12) (99/61 - 109/66)  RR: 18 (10-02-18 @ 23:12) (18 - 18)  SpO2: 95% (10-02-18 @ 23:12) (95% - 99%)  CVP(mm Hg): --    U.O.:  I&O's Detail    01 Oct 2018 07:01  -  02 Oct 2018 07:00  --------------------------------------------------------  IN:  Total IN: 0 mL    OUT:    Voided: 520 mL  Total OUT: 520 mL    Total NET: -520 mL      02 Oct 2018 07:01  -  03 Oct 2018 00:22  --------------------------------------------------------  IN:    Oral Fluid: 120 mL  Total IN: 120 mL    OUT:    Voided: 450 mL  Total OUT: 450 mL    Total NET: -330 mL                                        11.7   7.34  )-----------( 310      ( 02 Oct 2018 17:05 )             37.4     10-02    140  |  98  |  12  ----------------------------<  93  4.9   |  28  |  1.0    Ca    9.6      02 Oct 2018 17:05  Phos  3.8     10-01  Mg     2.1     10-02        Large Dressing Change--> right foot vac in place  left foot --> granulating well

## 2018-10-04 NOTE — PROGRESS NOTE ADULT - ASSESSMENT
POST OP Check    JIMMY CR  [id number]                          13.0   6.60  )-----------( 312      ( 28 Sep 2018 14:28 )             40.9     09-28    137  |  100  |  12  ----------------------------<  84  4.6   |  21  |  0.8    Ca    9.9      28 Sep 2018 14:28      Vital Signs Last 24 Hrs  T(C): 36.5 (28 Sep 2018 23:40), Max: 36.9 (28 Sep 2018 14:24)  T(F): 97.7 (28 Sep 2018 23:40), Max: 98.4 (28 Sep 2018 14:24)  HR: 82 (28 Sep 2018 23:40) (77 - 99)  BP: 90/55 (28 Sep 2018 23:40) (90/55 - 140/91)  BP(mean): --  RR: 18 (28 Sep 2018 23:40) (12 - 18)  SpO2: 99% (28 Sep 2018 23:40) (99% - 100%)    Patient seen at bedside NAD, AAOx3/resting comfortably with pain controlled. Patient tolerated procedure well without incident.    Procedure: s/p remodeling b/l proximal amputation site ulcerations 9/28/18  Surgeon: Dr.Sottile LITTLE Focused:   No strikethrough is appreciated on surgical dressings.  Dressings were dry, clean, and intact.  No neuromuscular deficits appreciated.  Wound vac on right foot is functioning properly (125mmHg) w/ no leaks at this time.
Assessment:   - left foot dressing was just changed and there are no signs of strike through   - wound vac running @ 125mmHg w/ no leaks @ this time  - pain well controlled w/ current regimen     Plan:   - pt seen/evaluated @ bedside  - wound vac will be changed on 10/1 by podiatry   - c/w iv abx  - findings will be discussed w/ attending   - RN instructed to give pt pain medication @ 6am prior to wound vac change  - podiatry will f/u on 10/1
Ft wound right  - continue NPWT - changed to home system   continue sutures left foot  Discharge home today   outpt f/u   Continue pain mgmt
Stable  NPWT dressing change in am   Continue pain mgmt, VTEP
Stable  NPWT dressing change in am   Continue pain mgmt, VTEP
Stable  NPWT dressing changed   Plan for discharge home with continued NPWT   Continue pain mgmt, VTEP
bilateral feet wounds post debridements--> local wound care , d/c home
full thickness wound bilateral feet post debridement--> local wound care, abx, home vac

## 2018-10-05 LAB
CULTURE RESULTS: SIGNIFICANT CHANGE UP
SPECIMEN SOURCE: SIGNIFICANT CHANGE UP

## 2018-10-06 LAB — VIT B1 SERPL-MCNC: 64.1 NMOL/L — LOW (ref 66.5–200)

## 2018-10-09 DIAGNOSIS — I10 ESSENTIAL (PRIMARY) HYPERTENSION: ICD-10-CM

## 2018-10-09 DIAGNOSIS — M19.90 UNSPECIFIED OSTEOARTHRITIS, UNSPECIFIED SITE: ICD-10-CM

## 2018-10-09 DIAGNOSIS — M26.602 LEFT TEMPOROMANDIBULAR JOINT DISORDER, UNSPECIFIED: ICD-10-CM

## 2018-10-09 DIAGNOSIS — T87.89 OTHER COMPLICATIONS OF AMPUTATION STUMP: ICD-10-CM

## 2018-10-09 DIAGNOSIS — G47.30 SLEEP APNEA, UNSPECIFIED: ICD-10-CM
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
## 2018-10-09 DIAGNOSIS — I42.9 CARDIOMYOPATHY, UNSPECIFIED: ICD-10-CM

## 2018-10-09 DIAGNOSIS — Z89.111 ACQUIRED ABSENCE OF RIGHT HAND: ICD-10-CM

## 2018-10-09 DIAGNOSIS — Z89.122: ICD-10-CM

## 2018-10-09 DIAGNOSIS — L97.419 NON-PRESSURE CHRONIC ULCER OF RIGHT HEEL AND MIDFOOT WITH UNSPECIFIED SEVERITY: ICD-10-CM

## 2018-10-09 DIAGNOSIS — Z98.84 BARIATRIC SURGERY STATUS: ICD-10-CM

## 2018-10-09 DIAGNOSIS — L97.518 NON-PRESSURE CHRONIC ULCER OF OTHER PART OF RIGHT FOOT WITH OTHER SPECIFIED SEVERITY: ICD-10-CM

## 2018-10-09 DIAGNOSIS — Z88.5 ALLERGY STATUS TO NARCOTIC AGENT: ICD-10-CM

## 2018-10-11 ENCOUNTER — OUTPATIENT (OUTPATIENT)
Dept: OUTPATIENT SERVICES | Facility: HOSPITAL | Age: 43
LOS: 1 days | Discharge: HOME | End: 2018-10-11

## 2018-10-11 ENCOUNTER — APPOINTMENT (OUTPATIENT)
Dept: BURN CARE | Facility: CLINIC | Age: 43
End: 2018-10-11

## 2018-10-11 DIAGNOSIS — Z89.9 ACQUIRED ABSENCE OF LIMB, UNSPECIFIED: Chronic | ICD-10-CM

## 2018-10-11 DIAGNOSIS — Z98.890 OTHER SPECIFIED POSTPROCEDURAL STATES: Chronic | ICD-10-CM

## 2018-10-11 DIAGNOSIS — Z98.84 BARIATRIC SURGERY STATUS: Chronic | ICD-10-CM

## 2018-10-11 DIAGNOSIS — S68.119A COMPLETE TRAUMATIC METACARPOPHALANGEAL AMPUTATION OF UNSPECIFIED FINGER, INITIAL ENCOUNTER: Chronic | ICD-10-CM

## 2018-10-16 ENCOUNTER — RESULT REVIEW (OUTPATIENT)
Age: 43
End: 2018-10-16

## 2018-10-16 RX ORDER — ERGOCALCIFEROL 1.25 MG/1
1.25 MG CAPSULE, LIQUID FILLED ORAL
Qty: 4 | Refills: 3 | Status: ACTIVE | COMMUNITY
Start: 2018-10-16 | End: 1900-01-01

## 2018-10-18 ENCOUNTER — OUTPATIENT (OUTPATIENT)
Dept: OUTPATIENT SERVICES | Facility: HOSPITAL | Age: 43
LOS: 1 days | Discharge: HOME | End: 2018-10-18

## 2018-10-18 ENCOUNTER — APPOINTMENT (OUTPATIENT)
Dept: WOUND CARE | Facility: CLINIC | Age: 43
End: 2018-10-18

## 2018-10-18 DIAGNOSIS — Z98.84 BARIATRIC SURGERY STATUS: Chronic | ICD-10-CM

## 2018-10-18 DIAGNOSIS — Z98.890 OTHER SPECIFIED POSTPROCEDURAL STATES: Chronic | ICD-10-CM

## 2018-10-18 DIAGNOSIS — S68.119A COMPLETE TRAUMATIC METACARPOPHALANGEAL AMPUTATION OF UNSPECIFIED FINGER, INITIAL ENCOUNTER: Chronic | ICD-10-CM

## 2018-10-18 DIAGNOSIS — Z89.9 ACQUIRED ABSENCE OF LIMB, UNSPECIFIED: Chronic | ICD-10-CM
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
## 2018-10-22 DIAGNOSIS — S91.302A UNSPECIFIED OPEN WOUND, LEFT FOOT, INITIAL ENCOUNTER: ICD-10-CM

## 2018-10-22 DIAGNOSIS — S91.201A UNSPECIFIED OPEN WOUND OF RIGHT GREAT TOE WITH DAMAGE TO NAIL, INITIAL ENCOUNTER: ICD-10-CM
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
## 2018-10-24 DIAGNOSIS — S91.301A UNSPECIFIED OPEN WOUND, RIGHT FOOT, INITIAL ENCOUNTER: ICD-10-CM

## 2018-10-24 DIAGNOSIS — S91.302A UNSPECIFIED OPEN WOUND, LEFT FOOT, INITIAL ENCOUNTER: ICD-10-CM
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
## 2018-10-26 ENCOUNTER — INPATIENT (INPATIENT)
Facility: HOSPITAL | Age: 43
LOS: 9 days | Discharge: HOME HEALTH | End: 2018-11-05
Attending: PLASTIC SURGERY | Admitting: PLASTIC SURGERY
Payer: COMMERCIAL

## 2018-10-26 ENCOUNTER — RESULT REVIEW (OUTPATIENT)
Age: 43
End: 2018-10-26

## 2018-10-26 VITALS
DIASTOLIC BLOOD PRESSURE: 87 MMHG | TEMPERATURE: 98 F | SYSTOLIC BLOOD PRESSURE: 121 MMHG | RESPIRATION RATE: 18 BRPM | HEIGHT: 67 IN | WEIGHT: 177.03 LBS | HEART RATE: 80 BPM

## 2018-10-26 DIAGNOSIS — Z98.84 BARIATRIC SURGERY STATUS: Chronic | ICD-10-CM

## 2018-10-26 DIAGNOSIS — S68.119A COMPLETE TRAUMATIC METACARPOPHALANGEAL AMPUTATION OF UNSPECIFIED FINGER, INITIAL ENCOUNTER: Chronic | ICD-10-CM

## 2018-10-26 DIAGNOSIS — Z98.890 OTHER SPECIFIED POSTPROCEDURAL STATES: Chronic | ICD-10-CM

## 2018-10-26 DIAGNOSIS — S91.309A UNSPECIFIED OPEN WOUND, UNSPECIFIED FOOT, INITIAL ENCOUNTER: Chronic | ICD-10-CM

## 2018-10-26 DIAGNOSIS — Z89.9 ACQUIRED ABSENCE OF LIMB, UNSPECIFIED: Chronic | ICD-10-CM

## 2018-10-26 LAB
ANION GAP SERPL CALC-SCNC: 13 MMOL/L — SIGNIFICANT CHANGE UP (ref 7–14)
BUN SERPL-MCNC: 13 MG/DL — SIGNIFICANT CHANGE UP (ref 10–20)
CALCIUM SERPL-MCNC: 8.6 MG/DL — SIGNIFICANT CHANGE UP (ref 8.5–10.1)
CHLORIDE SERPL-SCNC: 104 MMOL/L — SIGNIFICANT CHANGE UP (ref 98–110)
CO2 SERPL-SCNC: 24 MMOL/L — SIGNIFICANT CHANGE UP (ref 17–32)
CREAT SERPL-MCNC: 1 MG/DL — SIGNIFICANT CHANGE UP (ref 0.7–1.5)
GLUCOSE SERPL-MCNC: 111 MG/DL — HIGH (ref 70–99)
HCT VFR BLD CALC: 34.7 % — LOW (ref 37–47)
HGB BLD-MCNC: 10.9 G/DL — LOW (ref 12–16)
MAGNESIUM SERPL-MCNC: 1.8 MG/DL — SIGNIFICANT CHANGE UP (ref 1.8–2.4)
MCHC RBC-ENTMCNC: 25.3 PG — LOW (ref 27–31)
MCHC RBC-ENTMCNC: 31.4 G/DL — LOW (ref 32–37)
MCV RBC AUTO: 80.5 FL — LOW (ref 81–99)
NRBC # BLD: 0 /100 WBCS — SIGNIFICANT CHANGE UP (ref 0–0)
PLATELET # BLD AUTO: 248 K/UL — SIGNIFICANT CHANGE UP (ref 130–400)
POTASSIUM SERPL-MCNC: 4.3 MMOL/L — SIGNIFICANT CHANGE UP (ref 3.5–5)
POTASSIUM SERPL-SCNC: 4.3 MMOL/L — SIGNIFICANT CHANGE UP (ref 3.5–5)
RBC # BLD: 4.31 M/UL — SIGNIFICANT CHANGE UP (ref 4.2–5.4)
RBC # FLD: 14.6 % — HIGH (ref 11.5–14.5)
SODIUM SERPL-SCNC: 141 MMOL/L — SIGNIFICANT CHANGE UP (ref 135–146)
WBC # BLD: 6.64 K/UL — SIGNIFICANT CHANGE UP (ref 4.8–10.8)
WBC # FLD AUTO: 6.64 K/UL — SIGNIFICANT CHANGE UP (ref 4.8–10.8)

## 2018-10-26 RX ORDER — ERGOCALCIFEROL 1.25 MG/1
50000 CAPSULE ORAL
Qty: 0 | Refills: 0 | Status: DISCONTINUED | OUTPATIENT
Start: 2018-10-26 | End: 2018-11-02

## 2018-10-26 RX ORDER — SENNA PLUS 8.6 MG/1
2 TABLET ORAL AT BEDTIME
Qty: 0 | Refills: 0 | Status: DISCONTINUED | OUTPATIENT
Start: 2018-10-26 | End: 2018-11-02

## 2018-10-26 RX ORDER — METOPROLOL TARTRATE 50 MG
25 TABLET ORAL DAILY
Qty: 0 | Refills: 0 | Status: DISCONTINUED | OUTPATIENT
Start: 2018-10-26 | End: 2018-11-02

## 2018-10-26 RX ORDER — ENOXAPARIN SODIUM 100 MG/ML
40 INJECTION SUBCUTANEOUS DAILY
Qty: 0 | Refills: 0 | Status: DISCONTINUED | OUTPATIENT
Start: 2018-10-26 | End: 2018-11-02

## 2018-10-26 RX ORDER — HYDROMORPHONE HYDROCHLORIDE 2 MG/ML
1 INJECTION INTRAMUSCULAR; INTRAVENOUS; SUBCUTANEOUS
Qty: 0 | Refills: 0 | Status: DISCONTINUED | OUTPATIENT
Start: 2018-10-26 | End: 2018-10-27

## 2018-10-26 RX ORDER — OXYCODONE HYDROCHLORIDE 5 MG/1
10 TABLET ORAL EVERY 4 HOURS
Qty: 0 | Refills: 0 | Status: DISCONTINUED | OUTPATIENT
Start: 2018-10-26 | End: 2018-11-02

## 2018-10-26 RX ORDER — SODIUM CHLORIDE 9 MG/ML
1000 INJECTION, SOLUTION INTRAVENOUS
Qty: 0 | Refills: 0 | Status: DISCONTINUED | OUTPATIENT
Start: 2018-10-26 | End: 2018-10-26

## 2018-10-26 RX ORDER — FERROUS SULFATE 325(65) MG
325 TABLET ORAL THREE TIMES A DAY
Qty: 0 | Refills: 0 | Status: DISCONTINUED | OUTPATIENT
Start: 2018-10-26 | End: 2018-11-02

## 2018-10-26 RX ORDER — NAFCILLIN 10 G/100ML
1 INJECTION, POWDER, FOR SOLUTION INTRAVENOUS ONCE
Qty: 0 | Refills: 0 | Status: COMPLETED | OUTPATIENT
Start: 2018-10-26 | End: 2018-10-26

## 2018-10-26 RX ORDER — NAFCILLIN 10 G/100ML
INJECTION, POWDER, FOR SOLUTION INTRAVENOUS
Qty: 0 | Refills: 0 | Status: DISCONTINUED | OUTPATIENT
Start: 2018-10-26 | End: 2018-10-27

## 2018-10-26 RX ORDER — OXYCODONE HYDROCHLORIDE 5 MG/1
5 TABLET ORAL EVERY 4 HOURS
Qty: 0 | Refills: 0 | Status: DISCONTINUED | OUTPATIENT
Start: 2018-10-26 | End: 2018-11-02

## 2018-10-26 RX ORDER — POLYETHYLENE GLYCOL 3350 17 G/17G
17 POWDER, FOR SOLUTION ORAL DAILY
Qty: 0 | Refills: 0 | Status: DISCONTINUED | OUTPATIENT
Start: 2018-10-26 | End: 2018-11-02

## 2018-10-26 RX ORDER — DOCUSATE SODIUM 100 MG
100 CAPSULE ORAL THREE TIMES A DAY
Qty: 0 | Refills: 0 | Status: DISCONTINUED | OUTPATIENT
Start: 2018-10-26 | End: 2018-11-02

## 2018-10-26 RX ORDER — ACETAMINOPHEN 500 MG
650 TABLET ORAL EVERY 6 HOURS
Qty: 0 | Refills: 0 | Status: DISCONTINUED | OUTPATIENT
Start: 2018-10-26 | End: 2018-11-02

## 2018-10-26 RX ORDER — NAFCILLIN 10 G/100ML
1 INJECTION, POWDER, FOR SOLUTION INTRAVENOUS EVERY 4 HOURS
Qty: 0 | Refills: 0 | Status: DISCONTINUED | OUTPATIENT
Start: 2018-10-26 | End: 2018-10-27

## 2018-10-26 RX ORDER — HYDROMORPHONE HYDROCHLORIDE 2 MG/ML
1 INJECTION INTRAMUSCULAR; INTRAVENOUS; SUBCUTANEOUS
Qty: 0 | Refills: 0 | Status: DISCONTINUED | OUTPATIENT
Start: 2018-10-26 | End: 2018-10-26

## 2018-10-26 RX ORDER — MORPHINE SULFATE 50 MG/1
4 CAPSULE, EXTENDED RELEASE ORAL
Qty: 0 | Refills: 0 | Status: DISCONTINUED | OUTPATIENT
Start: 2018-10-26 | End: 2018-10-27

## 2018-10-26 RX ADMIN — MORPHINE SULFATE 4 MILLIGRAM(S): 50 CAPSULE, EXTENDED RELEASE ORAL at 14:56

## 2018-10-26 RX ADMIN — OXYCODONE HYDROCHLORIDE 5 MILLIGRAM(S): 5 TABLET ORAL at 18:15

## 2018-10-26 RX ADMIN — SODIUM CHLORIDE 100 MILLILITER(S): 9 INJECTION, SOLUTION INTRAVENOUS at 16:03

## 2018-10-26 RX ADMIN — NAFCILLIN 100 GRAM(S): 10 INJECTION, POWDER, FOR SOLUTION INTRAVENOUS at 18:12

## 2018-10-26 RX ADMIN — NAFCILLIN 100 GRAM(S): 10 INJECTION, POWDER, FOR SOLUTION INTRAVENOUS at 15:57

## 2018-10-26 RX ADMIN — Medication 325 MILLIGRAM(S): at 22:01

## 2018-10-26 RX ADMIN — OXYCODONE HYDROCHLORIDE 5 MILLIGRAM(S): 5 TABLET ORAL at 18:45

## 2018-10-26 RX ADMIN — NAFCILLIN 100 GRAM(S): 10 INJECTION, POWDER, FOR SOLUTION INTRAVENOUS at 22:01

## 2018-10-26 RX ADMIN — MORPHINE SULFATE 4 MILLIGRAM(S): 50 CAPSULE, EXTENDED RELEASE ORAL at 14:11

## 2018-10-26 NOTE — BRIEF OPERATIVE NOTE - PROCEDURE
<<-----Click on this checkbox to enter Procedure Skin graft  10/26/2018  sharp excisional debridement and skin graft bilateral feet  Active  [id number]

## 2018-10-26 NOTE — CHART NOTE - NSCHARTNOTEFT_GEN_A_CORE
PACU ANESTHESIA ADMISSION NOTE      Procedure: Skin graft: sharp excisional debridement and skin graft bilateral feet    Post op diagnosis:  Wound      ____  Intubated  TV:______       Rate: ______      FiO2: ______    _x___  Patent Airway    _x___  Full return of protective reflexes    _x___  Full recovery from anesthesia / back to baseline status    Vitals:  T(C): 36 (10-26-18 @ 14:22), Max: 36.8 (10-26-18 @ 11:26)  HR: 68 (10-26-18 @ 14:52) (68 - 92)  BP: 126/80 (10-26-18 @ 14:52) (115/74 - 132/81)  RR: 13 (10-26-18 @ 14:52) (13 - 19)  SpO2: 100% (10-26-18 @ 14:52) (100% - 100%)    Mental Status:  _x___ Awake   _____ Alert   _____ Drowsy   _____ Sedated    Nausea/Vomiting:  _x___  NO       ______Yes,   See Post - Op Orders         Pain Scale (0-10):  __0___    Treatment: ____ None    _x___ See Post - Op/PCA Orders    Post - Operative Fluids:   __x__ Oral  x ____ See Post - Op Orders    Plan: Discharge:   ___Home       _x__floor     _____Critical Care    _____  Other:_______________  Comments:x  No anesthesia issues or complications noted.  Discharge when criteria met.

## 2018-10-26 NOTE — H&P ADULT - PSH
Amputation finger    H/O exploratory laparotomy    H/O gastric bypass    History of cholecystectomy    Open wound of foot    S/P amputation  b/l toes

## 2018-10-26 NOTE — H&P ADULT - HISTORY OF PRESENT ILLNESS
43F hx gastric bypass (10/2017) c/b obstruction, ARDS, and sepsis requiring ICU admission and pressors, which resulted in upper and lower limb ischemia s/p R partial hand and L wrist amputations (1/2018), b/l forefoot amputations and STSG (4/2018). Continues to have open wounds bilaterally due to bony prominence and bone overgrowth. Presents today for further debridement, possible STSG, possible VAC.       Since last hospitalization last spring has been doing well. Non-ambulatory, WC dependent.  Denies recent sicknesses.

## 2018-10-26 NOTE — H&P ADULT - NSHPPHYSICALEXAM_GEN_ALL_CORE
NAD, resting in stretcher  PERRLA, EOMI, OP clear  Neck supple  Non-labored respirations, airway patent  Soft, NTND  WWP, partial R hand amputation, L wrist amputation, b/l feet dressed c/d/i

## 2018-10-26 NOTE — H&P ADULT - ASSESSMENT
43F hx gastric bypass (10/2017) c/b obstruction, ARDS, and sepsis requiring ICU admission and pressors, which resulted in upper and lower limb ischemia s/p R partial hand and L wrist amputations (1/2018), b/l forefoot amputations and STSG (4/2018). Continues to have open wounds bilaterally due to bony prominence and bone overgrowth. Presents today for further debridement    - To OR for debridement, possible STSG, possible VAC  - Admit to Burn Care, under Dr. Artis  - NPO / IVF   - Consent obtained, preopped    d/w Dr. Artis

## 2018-10-27 LAB
ANION GAP SERPL CALC-SCNC: 12 MMOL/L — SIGNIFICANT CHANGE UP (ref 7–14)
BLD GP AB SCN SERPL QL: SIGNIFICANT CHANGE UP
BUN SERPL-MCNC: 14 MG/DL — SIGNIFICANT CHANGE UP (ref 10–20)
CALCIUM SERPL-MCNC: 8.5 MG/DL — SIGNIFICANT CHANGE UP (ref 8.5–10.1)
CHLORIDE SERPL-SCNC: 104 MMOL/L — SIGNIFICANT CHANGE UP (ref 98–110)
CO2 SERPL-SCNC: 26 MMOL/L — SIGNIFICANT CHANGE UP (ref 17–32)
CREAT SERPL-MCNC: 1 MG/DL — SIGNIFICANT CHANGE UP (ref 0.7–1.5)
GLUCOSE SERPL-MCNC: 87 MG/DL — SIGNIFICANT CHANGE UP (ref 70–99)
HCT VFR BLD CALC: 33.7 % — LOW (ref 37–47)
HGB BLD-MCNC: 10.3 G/DL — LOW (ref 12–16)
MAGNESIUM SERPL-MCNC: 1.9 MG/DL — SIGNIFICANT CHANGE UP (ref 1.8–2.4)
MCHC RBC-ENTMCNC: 25 PG — LOW (ref 27–31)
MCHC RBC-ENTMCNC: 30.6 G/DL — LOW (ref 32–37)
MCV RBC AUTO: 81.8 FL — SIGNIFICANT CHANGE UP (ref 81–99)
NIGHT BLUE STAIN TISS: SIGNIFICANT CHANGE UP
NRBC # BLD: 0 /100 WBCS — SIGNIFICANT CHANGE UP (ref 0–0)
PHOSPHATE SERPL-MCNC: 2.7 MG/DL — SIGNIFICANT CHANGE UP (ref 2.1–4.9)
PLATELET # BLD AUTO: 254 K/UL — SIGNIFICANT CHANGE UP (ref 130–400)
POTASSIUM SERPL-MCNC: 4.6 MMOL/L — SIGNIFICANT CHANGE UP (ref 3.5–5)
POTASSIUM SERPL-SCNC: 4.6 MMOL/L — SIGNIFICANT CHANGE UP (ref 3.5–5)
RBC # BLD: 4.12 M/UL — LOW (ref 4.2–5.4)
RBC # FLD: 14.6 % — HIGH (ref 11.5–14.5)
SODIUM SERPL-SCNC: 142 MMOL/L — SIGNIFICANT CHANGE UP (ref 135–146)
SPECIMEN SOURCE: SIGNIFICANT CHANGE UP
TYPE + AB SCN PNL BLD: SIGNIFICANT CHANGE UP
WBC # BLD: 6.84 K/UL — SIGNIFICANT CHANGE UP (ref 4.8–10.8)
WBC # FLD AUTO: 6.84 K/UL — SIGNIFICANT CHANGE UP (ref 4.8–10.8)

## 2018-10-27 RX ORDER — HYDROMORPHONE HYDROCHLORIDE 2 MG/ML
2 INJECTION INTRAMUSCULAR; INTRAVENOUS; SUBCUTANEOUS
Qty: 0 | Refills: 0 | Status: DISCONTINUED | OUTPATIENT
Start: 2018-10-27 | End: 2018-11-02

## 2018-10-27 RX ADMIN — Medication 325 MILLIGRAM(S): at 06:38

## 2018-10-27 RX ADMIN — Medication 100 MILLIGRAM(S): at 13:08

## 2018-10-27 RX ADMIN — HYDROMORPHONE HYDROCHLORIDE 2 MILLIGRAM(S): 2 INJECTION INTRAMUSCULAR; INTRAVENOUS; SUBCUTANEOUS at 22:06

## 2018-10-27 RX ADMIN — Medication 325 MILLIGRAM(S): at 22:05

## 2018-10-27 RX ADMIN — Medication 100 MILLIGRAM(S): at 06:38

## 2018-10-27 RX ADMIN — Medication 100 MILLIGRAM(S): at 22:06

## 2018-10-27 RX ADMIN — Medication 1 TABLET(S): at 11:44

## 2018-10-27 RX ADMIN — Medication 1 TABLET(S): at 15:26

## 2018-10-27 RX ADMIN — NAFCILLIN 100 GRAM(S): 10 INJECTION, POWDER, FOR SOLUTION INTRAVENOUS at 01:58

## 2018-10-27 RX ADMIN — ENOXAPARIN SODIUM 40 MILLIGRAM(S): 100 INJECTION SUBCUTANEOUS at 11:44

## 2018-10-27 RX ADMIN — Medication 325 MILLIGRAM(S): at 13:08

## 2018-10-27 RX ADMIN — SENNA PLUS 2 TABLET(S): 8.6 TABLET ORAL at 22:05

## 2018-10-27 RX ADMIN — NAFCILLIN 100 GRAM(S): 10 INJECTION, POWDER, FOR SOLUTION INTRAVENOUS at 06:38

## 2018-10-27 RX ADMIN — HYDROMORPHONE HYDROCHLORIDE 2 MILLIGRAM(S): 2 INJECTION INTRAMUSCULAR; INTRAVENOUS; SUBCUTANEOUS at 16:16

## 2018-10-27 RX ADMIN — Medication 1 TABLET(S): at 22:05

## 2018-10-27 RX ADMIN — Medication 25 MILLIGRAM(S): at 06:38

## 2018-10-27 NOTE — PROGRESS NOTE ADULT - SUBJECTIVE AND OBJECTIVE BOX
pod# 1 post debridement and skin graft feet  Vital Signs Last 24 Hrs  T(C): 36.8 (27 Oct 2018 07:12), Max: 36.8 (26 Oct 2018 21:40)  T(F): 98.2 (27 Oct 2018 07:12), Max: 98.2 (26 Oct 2018 21:40)  HR: 105 (27 Oct 2018 07:12) (60 - 105)  BP: 105/67 (27 Oct 2018 07:12) (104/62 - 132/81)  BP(mean): --  RR: 20 (27 Oct 2018 07:12) (12 - 20)  SpO2: 97% (27 Oct 2018 07:12) (97% - 100%)    CVP:  T(C): 36.8 (10-27-18 @ 07:12), Max: 36.8 (10-26-18 @ 21:40)  HR: 105 (10-27-18 @ 07:12) (60 - 105)  BP: 105/67 (10-27-18 @ 07:12) (104/62 - 132/81)  RR: 20 (10-27-18 @ 07:12) (12 - 20)  SpO2: 97% (10-27-18 @ 07:12) (97% - 100%)  CVP(mm Hg): --    U.O.:  I&O's Detail    26 Oct 2018 07:01  -  27 Oct 2018 07:00  --------------------------------------------------------  IN:  Total IN: 0 mL    OUT:  Total OUT: 0 mL    Total NET: 0 mL      27 Oct 2018 07:01  -  27 Oct 2018 13:41  --------------------------------------------------------  IN:  Total IN: 0 mL    OUT:    Voided: 400 mL  Total OUT: 400 mL    Total NET: -400 mL                                        10.9   6.64  )-----------( 248      ( 26 Oct 2018 21:32 )             34.7     10-26    141  |  104  |  13  ----------------------------<  111<H>  4.3   |  24  |  1.0    Ca    8.6      26 Oct 2018 21:32  Mg     1.8     10-26        feet dressing intact

## 2018-10-28 LAB
-  AMIKACIN: SIGNIFICANT CHANGE UP
-  AMOXICILLIN/CLAVULANIC ACID: SIGNIFICANT CHANGE UP
-  AMPICILLIN/SULBACTAM: SIGNIFICANT CHANGE UP
-  AMPICILLIN: SIGNIFICANT CHANGE UP
-  AMPICILLIN: SIGNIFICANT CHANGE UP
-  AZTREONAM: SIGNIFICANT CHANGE UP
-  CEFAZOLIN: SIGNIFICANT CHANGE UP
-  CEFEPIME: SIGNIFICANT CHANGE UP
-  CEFOXITIN: SIGNIFICANT CHANGE UP
-  CEFTRIAXONE: SIGNIFICANT CHANGE UP
-  CIPROFLOXACIN: SIGNIFICANT CHANGE UP
-  ERTAPENEM: SIGNIFICANT CHANGE UP
-  GENTAMICIN: SIGNIFICANT CHANGE UP
-  IMIPENEM: SIGNIFICANT CHANGE UP
-  LEVOFLOXACIN: SIGNIFICANT CHANGE UP
-  MEROPENEM: SIGNIFICANT CHANGE UP
-  PIPERACILLIN/TAZOBACTAM: SIGNIFICANT CHANGE UP
-  TETRACYCLINE: SIGNIFICANT CHANGE UP
-  TOBRAMYCIN: SIGNIFICANT CHANGE UP
-  TRIMETHOPRIM/SULFAMETHOXAZOLE: SIGNIFICANT CHANGE UP
-  VANCOMYCIN: SIGNIFICANT CHANGE UP
ANION GAP SERPL CALC-SCNC: 12 MMOL/L — SIGNIFICANT CHANGE UP (ref 7–14)
BUN SERPL-MCNC: 14 MG/DL — SIGNIFICANT CHANGE UP (ref 10–20)
CALCIUM SERPL-MCNC: 8.8 MG/DL — SIGNIFICANT CHANGE UP (ref 8.5–10.1)
CHLORIDE SERPL-SCNC: 100 MMOL/L — SIGNIFICANT CHANGE UP (ref 98–110)
CO2 SERPL-SCNC: 27 MMOL/L — SIGNIFICANT CHANGE UP (ref 17–32)
CREAT SERPL-MCNC: 0.8 MG/DL — SIGNIFICANT CHANGE UP (ref 0.7–1.5)
GLUCOSE SERPL-MCNC: 87 MG/DL — SIGNIFICANT CHANGE UP (ref 70–99)
HCT VFR BLD CALC: 35 % — LOW (ref 37–47)
HGB BLD-MCNC: 10.9 G/DL — LOW (ref 12–16)
MAGNESIUM SERPL-MCNC: 1.9 MG/DL — SIGNIFICANT CHANGE UP (ref 1.8–2.4)
MCHC RBC-ENTMCNC: 25.2 PG — LOW (ref 27–31)
MCHC RBC-ENTMCNC: 31.1 G/DL — LOW (ref 32–37)
MCV RBC AUTO: 81 FL — SIGNIFICANT CHANGE UP (ref 81–99)
METHOD TYPE: SIGNIFICANT CHANGE UP
METHOD TYPE: SIGNIFICANT CHANGE UP
NRBC # BLD: 0 /100 WBCS — SIGNIFICANT CHANGE UP (ref 0–0)
PHOSPHATE SERPL-MCNC: 3.3 MG/DL — SIGNIFICANT CHANGE UP (ref 2.1–4.9)
PLATELET # BLD AUTO: 265 K/UL — SIGNIFICANT CHANGE UP (ref 130–400)
POTASSIUM SERPL-MCNC: 4.5 MMOL/L — SIGNIFICANT CHANGE UP (ref 3.5–5)
POTASSIUM SERPL-SCNC: 4.5 MMOL/L — SIGNIFICANT CHANGE UP (ref 3.5–5)
RBC # BLD: 4.32 M/UL — SIGNIFICANT CHANGE UP (ref 4.2–5.4)
RBC # FLD: 14.6 % — HIGH (ref 11.5–14.5)
SODIUM SERPL-SCNC: 139 MMOL/L — SIGNIFICANT CHANGE UP (ref 135–146)
WBC # BLD: 6.21 K/UL — SIGNIFICANT CHANGE UP (ref 4.8–10.8)
WBC # FLD AUTO: 6.21 K/UL — SIGNIFICANT CHANGE UP (ref 4.8–10.8)

## 2018-10-28 RX ADMIN — Medication 25 MILLIGRAM(S): at 06:36

## 2018-10-28 RX ADMIN — SENNA PLUS 2 TABLET(S): 8.6 TABLET ORAL at 21:03

## 2018-10-28 RX ADMIN — Medication 100 MILLIGRAM(S): at 06:36

## 2018-10-28 RX ADMIN — Medication 1 TABLET(S): at 17:18

## 2018-10-28 RX ADMIN — HYDROMORPHONE HYDROCHLORIDE 2 MILLIGRAM(S): 2 INJECTION INTRAMUSCULAR; INTRAVENOUS; SUBCUTANEOUS at 14:51

## 2018-10-28 RX ADMIN — HYDROMORPHONE HYDROCHLORIDE 2 MILLIGRAM(S): 2 INJECTION INTRAMUSCULAR; INTRAVENOUS; SUBCUTANEOUS at 01:22

## 2018-10-28 RX ADMIN — HYDROMORPHONE HYDROCHLORIDE 2 MILLIGRAM(S): 2 INJECTION INTRAMUSCULAR; INTRAVENOUS; SUBCUTANEOUS at 10:22

## 2018-10-28 RX ADMIN — Medication 325 MILLIGRAM(S): at 21:04

## 2018-10-28 RX ADMIN — HYDROMORPHONE HYDROCHLORIDE 2 MILLIGRAM(S): 2 INJECTION INTRAMUSCULAR; INTRAVENOUS; SUBCUTANEOUS at 10:55

## 2018-10-28 RX ADMIN — Medication 100 MILLIGRAM(S): at 21:03

## 2018-10-28 RX ADMIN — Medication 100 MILLIGRAM(S): at 13:23

## 2018-10-28 RX ADMIN — HYDROMORPHONE HYDROCHLORIDE 2 MILLIGRAM(S): 2 INJECTION INTRAMUSCULAR; INTRAVENOUS; SUBCUTANEOUS at 21:03

## 2018-10-28 RX ADMIN — HYDROMORPHONE HYDROCHLORIDE 2 MILLIGRAM(S): 2 INJECTION INTRAMUSCULAR; INTRAVENOUS; SUBCUTANEOUS at 04:48

## 2018-10-28 RX ADMIN — Medication 1 TABLET(S): at 06:36

## 2018-10-28 RX ADMIN — Medication 325 MILLIGRAM(S): at 13:23

## 2018-10-28 RX ADMIN — HYDROMORPHONE HYDROCHLORIDE 2 MILLIGRAM(S): 2 INJECTION INTRAMUSCULAR; INTRAVENOUS; SUBCUTANEOUS at 22:18

## 2018-10-28 RX ADMIN — Medication 1 TABLET(S): at 12:49

## 2018-10-28 RX ADMIN — Medication 325 MILLIGRAM(S): at 06:36

## 2018-10-28 RX ADMIN — ENOXAPARIN SODIUM 40 MILLIGRAM(S): 100 INJECTION SUBCUTANEOUS at 12:49

## 2018-10-29 RX ORDER — CIPROFLOXACIN LACTATE 400MG/40ML
500 VIAL (ML) INTRAVENOUS EVERY 12 HOURS
Qty: 0 | Refills: 0 | Status: DISCONTINUED | OUTPATIENT
Start: 2018-10-29 | End: 2018-11-02

## 2018-10-29 RX ADMIN — HYDROMORPHONE HYDROCHLORIDE 2 MILLIGRAM(S): 2 INJECTION INTRAMUSCULAR; INTRAVENOUS; SUBCUTANEOUS at 20:10

## 2018-10-29 RX ADMIN — Medication 1 TABLET(S): at 11:14

## 2018-10-29 RX ADMIN — HYDROMORPHONE HYDROCHLORIDE 2 MILLIGRAM(S): 2 INJECTION INTRAMUSCULAR; INTRAVENOUS; SUBCUTANEOUS at 16:47

## 2018-10-29 RX ADMIN — HYDROMORPHONE HYDROCHLORIDE 2 MILLIGRAM(S): 2 INJECTION INTRAMUSCULAR; INTRAVENOUS; SUBCUTANEOUS at 04:01

## 2018-10-29 RX ADMIN — Medication 500 MILLIGRAM(S): at 17:15

## 2018-10-29 RX ADMIN — Medication 325 MILLIGRAM(S): at 13:44

## 2018-10-29 RX ADMIN — HYDROMORPHONE HYDROCHLORIDE 2 MILLIGRAM(S): 2 INJECTION INTRAMUSCULAR; INTRAVENOUS; SUBCUTANEOUS at 20:45

## 2018-10-29 RX ADMIN — Medication 100 MILLIGRAM(S): at 21:13

## 2018-10-29 RX ADMIN — Medication 1 TABLET(S): at 17:14

## 2018-10-29 RX ADMIN — HYDROMORPHONE HYDROCHLORIDE 2 MILLIGRAM(S): 2 INJECTION INTRAMUSCULAR; INTRAVENOUS; SUBCUTANEOUS at 06:07

## 2018-10-29 RX ADMIN — OXYCODONE HYDROCHLORIDE 10 MILLIGRAM(S): 5 TABLET ORAL at 11:17

## 2018-10-29 RX ADMIN — OXYCODONE HYDROCHLORIDE 10 MILLIGRAM(S): 5 TABLET ORAL at 11:47

## 2018-10-29 RX ADMIN — HYDROMORPHONE HYDROCHLORIDE 2 MILLIGRAM(S): 2 INJECTION INTRAMUSCULAR; INTRAVENOUS; SUBCUTANEOUS at 17:17

## 2018-10-29 RX ADMIN — Medication 25 MILLIGRAM(S): at 06:11

## 2018-10-29 RX ADMIN — Medication 325 MILLIGRAM(S): at 06:11

## 2018-10-29 RX ADMIN — Medication 100 MILLIGRAM(S): at 06:11

## 2018-10-29 RX ADMIN — ENOXAPARIN SODIUM 40 MILLIGRAM(S): 100 INJECTION SUBCUTANEOUS at 11:14

## 2018-10-29 RX ADMIN — SENNA PLUS 2 TABLET(S): 8.6 TABLET ORAL at 21:13

## 2018-10-29 RX ADMIN — Medication 100 MILLIGRAM(S): at 13:44

## 2018-10-29 RX ADMIN — Medication 325 MILLIGRAM(S): at 21:13

## 2018-10-29 RX ADMIN — Medication 1 TABLET(S): at 06:11

## 2018-10-29 NOTE — PROGRESS NOTE ADULT - SUBJECTIVE AND OBJECTIVE BOX
AM rounds   POD # 3 s/p debridement and STSG   Pt: no complaints  No acute events o/n  Vital Signs Last 24 Hrs  T(C): 36.8 (29 Oct 2018 13:02), Max: 36.8 (29 Oct 2018 06:12)  T(F): 98.2 (29 Oct 2018 13:02), Max: 98.2 (29 Oct 2018 06:12)  HR: 92 (29 Oct 2018 13:02) (74 - 92)  BP: 106/67 (29 Oct 2018 13:02) (100/61 - 111/78)  RR: 18 (29 Oct 2018 13:02) (18 - 18)    I&O's Summary    29 Oct 2018 07:01  -  29 Oct 2018 19:42  --------------------------------------------------------  IN: 460 mL / OUT: 500 mL / NET: -40 mL    10-28    139  |  100  |  14  ----------------------------<  87  4.5   |  27  |  0.8    Ca    8.8      28 Oct 2018 16:10  Phos  3.3     10-28  Mg     1.9     10-28                        10.9   6.21  )-----------( 265      ( 28 Oct 2018 16:10 )             35.0     Cxs: M. morgani  and E. faecium   S- Cipro   EXAM:  right foot - NPWT removed - STSG - pink adherent  heel wound - exposed bone and some granulation - (+) odor;   left foot - small SG pink adherent                     donor site left thigh - pink no bleeding

## 2018-10-29 NOTE — PROGRESS NOTE ADULT - ASSESSMENT
Good initial SG take bilat fet   Reeval POD 5  Continue pain mgmt  F/U cxs/ sens  Continue Cipro' d/c Augmentin

## 2018-10-30 LAB
-  AMIKACIN: SIGNIFICANT CHANGE UP
-  AMOXICILLIN/CLAVULANIC ACID: SIGNIFICANT CHANGE UP
-  AMPICILLIN/SULBACTAM: SIGNIFICANT CHANGE UP
-  AMPICILLIN: SIGNIFICANT CHANGE UP
-  AZTREONAM: SIGNIFICANT CHANGE UP
-  CEFAZOLIN: SIGNIFICANT CHANGE UP
-  CEFEPIME: SIGNIFICANT CHANGE UP
-  CEFOXITIN: SIGNIFICANT CHANGE UP
-  CEFTRIAXONE: SIGNIFICANT CHANGE UP
-  CIPROFLOXACIN: SIGNIFICANT CHANGE UP
-  ERTAPENEM: SIGNIFICANT CHANGE UP
-  GENTAMICIN: SIGNIFICANT CHANGE UP
-  IMIPENEM: SIGNIFICANT CHANGE UP
-  LEVOFLOXACIN: SIGNIFICANT CHANGE UP
-  MEROPENEM: SIGNIFICANT CHANGE UP
-  PIPERACILLIN/TAZOBACTAM: SIGNIFICANT CHANGE UP
-  TOBRAMYCIN: SIGNIFICANT CHANGE UP
-  TRIMETHOPRIM/SULFAMETHOXAZOLE: SIGNIFICANT CHANGE UP
CULTURE RESULTS: SIGNIFICANT CHANGE UP
METHOD TYPE: SIGNIFICANT CHANGE UP
ORGANISM # SPEC MICROSCOPIC CNT: SIGNIFICANT CHANGE UP
SPECIMEN SOURCE: SIGNIFICANT CHANGE UP

## 2018-10-30 RX ADMIN — Medication 500 MILLIGRAM(S): at 17:26

## 2018-10-30 RX ADMIN — POLYETHYLENE GLYCOL 3350 17 GRAM(S): 17 POWDER, FOR SOLUTION ORAL at 15:27

## 2018-10-30 RX ADMIN — Medication 500 MILLIGRAM(S): at 05:57

## 2018-10-30 RX ADMIN — Medication 325 MILLIGRAM(S): at 13:37

## 2018-10-30 RX ADMIN — HYDROMORPHONE HYDROCHLORIDE 2 MILLIGRAM(S): 2 INJECTION INTRAMUSCULAR; INTRAVENOUS; SUBCUTANEOUS at 21:49

## 2018-10-30 RX ADMIN — Medication 325 MILLIGRAM(S): at 21:48

## 2018-10-30 RX ADMIN — Medication 325 MILLIGRAM(S): at 05:57

## 2018-10-30 RX ADMIN — HYDROMORPHONE HYDROCHLORIDE 2 MILLIGRAM(S): 2 INJECTION INTRAMUSCULAR; INTRAVENOUS; SUBCUTANEOUS at 05:57

## 2018-10-30 RX ADMIN — HYDROMORPHONE HYDROCHLORIDE 2 MILLIGRAM(S): 2 INJECTION INTRAMUSCULAR; INTRAVENOUS; SUBCUTANEOUS at 06:30

## 2018-10-30 RX ADMIN — Medication 1 TABLET(S): at 11:27

## 2018-10-30 RX ADMIN — Medication 100 MILLIGRAM(S): at 21:48

## 2018-10-30 RX ADMIN — Medication 100 MILLIGRAM(S): at 05:57

## 2018-10-30 RX ADMIN — SENNA PLUS 2 TABLET(S): 8.6 TABLET ORAL at 21:48

## 2018-10-30 RX ADMIN — Medication 100 MILLIGRAM(S): at 13:37

## 2018-10-30 RX ADMIN — HYDROMORPHONE HYDROCHLORIDE 2 MILLIGRAM(S): 2 INJECTION INTRAMUSCULAR; INTRAVENOUS; SUBCUTANEOUS at 11:57

## 2018-10-30 RX ADMIN — HYDROMORPHONE HYDROCHLORIDE 2 MILLIGRAM(S): 2 INJECTION INTRAMUSCULAR; INTRAVENOUS; SUBCUTANEOUS at 22:49

## 2018-10-30 RX ADMIN — ENOXAPARIN SODIUM 40 MILLIGRAM(S): 100 INJECTION SUBCUTANEOUS at 11:28

## 2018-10-30 RX ADMIN — HYDROMORPHONE HYDROCHLORIDE 2 MILLIGRAM(S): 2 INJECTION INTRAMUSCULAR; INTRAVENOUS; SUBCUTANEOUS at 11:27

## 2018-10-30 NOTE — CONSULT NOTE ADULT - ATTENDING COMMENTS
Patient evaluated bedside this AM S/P STSG bilateral amputation sites. Small full thickness ulcer noted inferior right heel,bone pathology pending. Discussed with burn possible plastic closure of ulcer site pending bone pathology. Continue local wound care and antibiotic.

## 2018-10-30 NOTE — PROGRESS NOTE ADULT - SUBJECTIVE AND OBJECTIVE BOX
{POD#4 post skin graft feet    Vital Signs Last 24 Hrs  T(C): 37.1 (30 Oct 2018 21:17), Max: 37.1 (30 Oct 2018 21:17)  T(F): 98.7 (30 Oct 2018 21:17), Max: 98.7 (30 Oct 2018 21:17)  HR: 87 (30 Oct 2018 21:17) (86 - 87)  BP: 117/66 (30 Oct 2018 21:17) (101/66 - 117/66)  BP(mean): --  RR: 20 (30 Oct 2018 21:17) (19 - 20)  SpO2: 98% (30 Oct 2018 21:17) (98% - 98%)    CVP:  T(C): 37.1 (10-30-18 @ 21:17), Max: 37.1 (10-30-18 @ 21:17)  HR: 87 (10-30-18 @ 21:17) (86 - 87)  BP: 117/66 (10-30-18 @ 21:17) (101/66 - 117/66)  RR: 20 (10-30-18 @ 21:17) (19 - 20)  SpO2: 98% (10-30-18 @ 21:17) (98% - 98%)  CVP(mm Hg): --    U.O.:  I&O's Detail    29 Oct 2018 07:01  -  30 Oct 2018 07:00  --------------------------------------------------------  IN:    Oral Fluid: 460 mL  Total IN: 460 mL    OUT:    Voided: 500 mL  Total OUT: 500 mL    Total NET: -40 mL      30 Oct 2018 07:01  -  30 Oct 2018 23:27  --------------------------------------------------------  IN:  Total IN: 0 mL    OUT:    Voided: 275 mL  Total OUT: 275 mL    Total NET: -275 mL                      feet--> dressing intact

## 2018-10-30 NOTE — PROGRESS NOTE ADULT - ASSESSMENT
full thickness wounds feet post skin grafts--> will change dressing in am     rig heel wound--> podiatry f/u

## 2018-10-30 NOTE — CONSULT NOTE ADULT - SUBJECTIVE AND OBJECTIVE BOX
PROGRESS NOTE   Patient is a 43y old  Female who presents with a chief complaint of Debridement of wound, possible skin graft (29 Oct 2018 19:41). Podiatry was consulted for evaluation of right heel for possible debridement.       HPI:  43F hx gastric bypass (10/2017) c/b obstruction, ARDS, and sepsis requiring ICU admission and pressors, which resulted in upper and lower limb ischemia s/p R partial hand and L wrist amputations (1/2018), b/l forefoot amputations and STSG (4/2018). Continues to have open wounds bilaterally due to bony prominence and bone overgrowth. Presents today for further debridement, possible STSG, possible VAC.       Since last hospitalization last spring has been doing well. Non-ambulatory, WC dependent.  Denies recent sicknesses. (26 Oct 2018 12:27)      S81.802A, Z89.432, V49.73, Z89.431, 58068  ^S81.802A, Z89.432, V49.73, Z89.431, 41215  H/o or current diagnosis of HF- Contraindication to ACEI/ARBs  H/o or current diagnosis of HF- ACEI/ARB contraindication unknown  H/o or current diagnosis of HF- Contraindication to ACEI/ARBs  H/o or current diagnosis of HF- ACEI/ARB contraindication unknown  Family history of heart disease (Father)  Family history of cancer (Mother)  No pertinent family history in first degree relatives  Handoff  MEWS Score  ARDS survivor  Sepsis  Takotsubo cardiomyopathy  Small bowel obstruction  Osteoarthritis  Cardiomyopathy  Sleep apnea  HTN (hypertension)  Gangrene of lower extremity  Gangrene of finger of right hand  Gangrene of finger of left hand  Wound  Wound  Skin graft  Open wound of foot  S/P amputation  H/O exploratory laparotomy  Amputation finger  History of cholecystectomy  H/O gastric bypass      VITALS:  Vital Signs Last 24 Hrs  T(C): 36.8 (29 Oct 2018 19:41), Max: 36.8 (29 Oct 2018 13:02)  T(F): 98.3 (29 Oct 2018 19:41), Max: 98.3 (29 Oct 2018 19:41)  HR: 88 (29 Oct 2018 19:41) (88 - 92)  BP: 92/65 (29 Oct 2018 19:41) (92/65 - 106/67)  BP(mean): --  RR: 20 (29 Oct 2018 19:41) (18 - 20)  SpO2: --    LABS:                        10.9   6.21  )-----------( 265      ( 28 Oct 2018 16:10 )             35.0     10-28    139  |  100  |  14  ----------------------------<  87  4.5   |  27  |  0.8    Ca    8.8      28 Oct 2018 16:10  Phos  3.3     10-28  Mg     1.9     10-28        Hemoglobin A1C     PHYSICAL EXAM  GEN: JIMMY CR is a pleasant well-nourished, well developed 43y Female in no acute distress, alert awake, and oriented to person, place and time.     Medication(s):   acetaminophen   Tablet .. 650 milliGRAM(s) Oral every 6 hours PRN  ciprofloxacin     Tablet 500 milliGRAM(s) Oral every 12 hours  docusate sodium 100 milliGRAM(s) Oral three times a day  enoxaparin Injectable 40 milliGRAM(s) SubCutaneous daily  ergocalciferol 19172 Unit(s) Oral every week  ferrous    sulfate 325 milliGRAM(s) Oral three times a day  HYDROmorphone   Tablet 2 milliGRAM(s) Oral every 3 hours PRN  metoprolol succinate ER 25 milliGRAM(s) Oral daily  multivitamin 1 Tablet(s) Oral daily  oxyCODONE    IR 5 milliGRAM(s) Oral every 4 hours PRN  oxyCODONE    IR 10 milliGRAM(s) Oral every 4 hours PRN  polyethylene glycol 3350 17 Gram(s) Oral daily PRN  senna 2 Tablet(s) Oral at bedtime      LE Focused Exam:      Vasc:    - PT pulses 1/4 B/L   - Skin temp warm to warm, proximal to distal B/L  - CFT < 3 sec B/L    Neuro:   - Gross sensation in tact B/L     Derm:   - s/p b/l Choparts amputations w/ STSG's. Grafts stable and - CSOI  - right plantar heel bony prominence < 1cm diameter     MSK:   - Muscle strength 4/5 in all quadrants.   - limited dorsiflexion left foot < 10 degrees PROGRESS NOTE   Patient is a 43y old  Female who presents with a chief complaint of Debridement of wound, possible skin graft (29 Oct 2018 19:41). Podiatry was consulted for evaluation of right heel for possible debridement.       HPI:  43F hx gastric bypass (10/2017) c/b obstruction, ARDS, and sepsis requiring ICU admission and pressors, which resulted in upper and lower limb ischemia s/p R partial hand and L wrist amputations (1/2018), b/l forefoot amputations and STSG (4/2018). Continues to have open wounds bilaterally due to bony prominence and bone overgrowth. Presents today for further debridement, possible STSG, possible VAC.       Since last hospitalization last spring has been doing well. Non-ambulatory, WC dependent.  Denies recent sicknesses. (26 Oct 2018 12:27)      S81.802A, Z89.432, V49.73, Z89.431, 44579  ^S81.802A, Z89.432, V49.73, Z89.431, 77990  H/o or current diagnosis of HF- Contraindication to ACEI/ARBs  H/o or current diagnosis of HF- ACEI/ARB contraindication unknown  H/o or current diagnosis of HF- Contraindication to ACEI/ARBs  H/o or current diagnosis of HF- ACEI/ARB contraindication unknown  Family history of heart disease (Father)  Family history of cancer (Mother)  No pertinent family history in first degree relatives  Handoff  MEWS Score  ARDS survivor  Sepsis  Takotsubo cardiomyopathy  Small bowel obstruction  Osteoarthritis  Cardiomyopathy  Sleep apnea  HTN (hypertension)  Gangrene of lower extremity  Gangrene of finger of right hand  Gangrene of finger of left hand  Wound  Wound  Skin graft  Open wound of foot  S/P amputation  H/O exploratory laparotomy  Amputation finger  History of cholecystectomy  H/O gastric bypass      VITALS:  Vital Signs Last 24 Hrs  T(C): 36.8 (29 Oct 2018 19:41), Max: 36.8 (29 Oct 2018 13:02)  T(F): 98.3 (29 Oct 2018 19:41), Max: 98.3 (29 Oct 2018 19:41)  HR: 88 (29 Oct 2018 19:41) (88 - 92)  BP: 92/65 (29 Oct 2018 19:41) (92/65 - 106/67)  BP(mean): --  RR: 20 (29 Oct 2018 19:41) (18 - 20)  SpO2: --    LABS:                        10.9   6.21  )-----------( 265      ( 28 Oct 2018 16:10 )             35.0     10-28    139  |  100  |  14  ----------------------------<  87  4.5   |  27  |  0.8    Ca    8.8      28 Oct 2018 16:10  Phos  3.3     10-28  Mg     1.9     10-28        Hemoglobin A1C     PHYSICAL EXAM  GEN: JIMMY CR is a pleasant well-nourished, well developed 43y Female in no acute distress, alert awake, and oriented to person, place and time.     Medication(s):   acetaminophen   Tablet .. 650 milliGRAM(s) Oral every 6 hours PRN  ciprofloxacin     Tablet 500 milliGRAM(s) Oral every 12 hours  docusate sodium 100 milliGRAM(s) Oral three times a day  enoxaparin Injectable 40 milliGRAM(s) SubCutaneous daily  ergocalciferol 43033 Unit(s) Oral every week  ferrous    sulfate 325 milliGRAM(s) Oral three times a day  HYDROmorphone   Tablet 2 milliGRAM(s) Oral every 3 hours PRN  metoprolol succinate ER 25 milliGRAM(s) Oral daily  multivitamin 1 Tablet(s) Oral daily  oxyCODONE    IR 5 milliGRAM(s) Oral every 4 hours PRN  oxyCODONE    IR 10 milliGRAM(s) Oral every 4 hours PRN  polyethylene glycol 3350 17 Gram(s) Oral daily PRN  senna 2 Tablet(s) Oral at bedtime      LE Focused Exam:      Vasc:    - PT pulses 1/4 B/L   - Skin temp warm to warm, proximal to distal B/L      Neuro:   - Gross sensation in tact B/L     Derm:   - s/p b/l Choparts amputations w/ STSG's. Grafts stable and - CSOI  - right plantar heel bony prominence < 1cm diameter     MSK:   - Muscle strength 4/5 in all quadrants.   - limited dorsiflexion left foot < 10 degrees

## 2018-10-30 NOTE — CONSULT NOTE ADULT - ASSESSMENT
Assessment:   - s/p b/l Choparts amputations w/ STSG's. Grafts stable and - CSOI  - right plantar heel bony prominence < 1cm diameter     Plan:   - pt seen/evaluated @ bedside w/ attending  - dressed grafted areas w/ adaptic/DSD   - dressed bony prominence w/ xeroform/DSD  - consult ID for abx recs  - consult Plastic sx () for recs  - podiatry will possibly take pt to OR for dbx  - findings discussed w/ attending  - podiatry will f/u Assessment:   - s/p b/l Choparts amputations w/ STSG's. Grafts stable and - CSOI  - right plantar heel bony prominence < 1cm diameter     Plan:   - pt seen/evaluated @ bedside w/ attending  - dressed grafted areas w/ adaptic/DSD   - dressed bony prominence w/ xeroform/DSD  - consult ID for abx recs  - consult Plastic sx () for non-healing right heel and possible surgical intervention  - findings discussed w/ attending  - podiatry will f/u

## 2018-10-31 PROCEDURE — 99223 1ST HOSP IP/OBS HIGH 75: CPT

## 2018-10-31 RX ORDER — DIPHENHYDRAMINE HCL 50 MG
25 CAPSULE ORAL EVERY 6 HOURS
Qty: 0 | Refills: 0 | Status: DISCONTINUED | OUTPATIENT
Start: 2018-10-31 | End: 2018-11-02

## 2018-10-31 RX ADMIN — OXYCODONE HYDROCHLORIDE 10 MILLIGRAM(S): 5 TABLET ORAL at 11:08

## 2018-10-31 RX ADMIN — ENOXAPARIN SODIUM 40 MILLIGRAM(S): 100 INJECTION SUBCUTANEOUS at 11:19

## 2018-10-31 RX ADMIN — HYDROMORPHONE HYDROCHLORIDE 2 MILLIGRAM(S): 2 INJECTION INTRAMUSCULAR; INTRAVENOUS; SUBCUTANEOUS at 05:04

## 2018-10-31 RX ADMIN — Medication 325 MILLIGRAM(S): at 21:40

## 2018-10-31 RX ADMIN — Medication 25 MILLIGRAM(S): at 21:39

## 2018-10-31 RX ADMIN — Medication 100 MILLIGRAM(S): at 05:03

## 2018-10-31 RX ADMIN — POLYETHYLENE GLYCOL 3350 17 GRAM(S): 17 POWDER, FOR SOLUTION ORAL at 10:22

## 2018-10-31 RX ADMIN — Medication 500 MILLIGRAM(S): at 17:14

## 2018-10-31 RX ADMIN — Medication 1 TABLET(S): at 11:18

## 2018-10-31 RX ADMIN — Medication 325 MILLIGRAM(S): at 05:03

## 2018-10-31 RX ADMIN — Medication 500 MILLIGRAM(S): at 05:03

## 2018-10-31 RX ADMIN — HYDROMORPHONE HYDROCHLORIDE 2 MILLIGRAM(S): 2 INJECTION INTRAMUSCULAR; INTRAVENOUS; SUBCUTANEOUS at 21:39

## 2018-10-31 RX ADMIN — Medication 100 MILLIGRAM(S): at 21:40

## 2018-10-31 RX ADMIN — Medication 100 MILLIGRAM(S): at 13:23

## 2018-10-31 RX ADMIN — Medication 25 MILLIGRAM(S): at 16:11

## 2018-10-31 RX ADMIN — Medication 325 MILLIGRAM(S): at 13:23

## 2018-10-31 RX ADMIN — SENNA PLUS 2 TABLET(S): 8.6 TABLET ORAL at 21:40

## 2018-10-31 RX ADMIN — HYDROMORPHONE HYDROCHLORIDE 2 MILLIGRAM(S): 2 INJECTION INTRAMUSCULAR; INTRAVENOUS; SUBCUTANEOUS at 04:37

## 2018-10-31 NOTE — CONSULT NOTE ADULT - SUBJECTIVE AND OBJECTIVE BOX
JIMMY CR [id number]  43y Femalewith PMH/PSH below notable for gastric bypass 10/2017. Post op course complicated by sepsis requiring ICU admission and pressors, which resulted in upper and lower limb ischemia. Patient s/p R partial hand and L wrist amputations (1/2018), b/l forefoot amputations and STSG (4/2018). Continues to have open wounds bilaterally due to bony prominence and bone overgrowth. Patient is in the hospital for closure and foot wounds. Right heal ulcer is non healing as per podiatry and plastics consult was placed for possible closure      PAST MEDICAL & SURGICAL HISTORY:  ARDS survivor  Sepsis  Small bowel obstruction  Osteoarthritis  Cardiomyopathy  Sleep apnea  HTN (hypertension)  Gangrene of lower extremity  Gangrene of finger of right hand  Gangrene of finger of left hand  Open wound of foot  S/P amputation: b/l toes  H/O exploratory laparotomy  Amputation finger  History of cholecystectomy  H/O gastric bypass        MEDICATIONS  (STANDING):  ciprofloxacin     Tablet 500 milliGRAM(s) Oral every 12 hours  docusate sodium 100 milliGRAM(s) Oral three times a day  enoxaparin Injectable 40 milliGRAM(s) SubCutaneous daily  ergocalciferol 18054 Unit(s) Oral every week  ferrous    sulfate 325 milliGRAM(s) Oral three times a day  metoprolol succinate ER 25 milliGRAM(s) Oral daily  multivitamin 1 Tablet(s) Oral daily  senna 2 Tablet(s) Oral at bedtime    MEDICATIONS  (PRN):  acetaminophen   Tablet .. 650 milliGRAM(s) Oral every 6 hours PRN Mild Pain (1 - 3)  HYDROmorphone   Tablet 2 milliGRAM(s) Oral every 3 hours PRN Moderate Pain (4 - 6)  oxyCODONE    IR 5 milliGRAM(s) Oral every 4 hours PRN Moderate Pain (4 - 6)  oxyCODONE    IR 10 milliGRAM(s) Oral every 4 hours PRN Severe Pain (7 - 10)  polyethylene glycol 3350 17 Gram(s) Oral daily PRN Constipation      Allergies    No Known Allergies    Intolerances        REVIEW OF SYSTEMS    [X] A ten-point review of systems was otherwise negative except as noted.  [ ] Due to altered mental status/intubation, subjective information were not able to be obtained from the patient. History was obtained, to the extent possible, from review of the chart and collateral sources of information.      Vital Signs Last 24 Hrs  T(C): 35.9 (31 Oct 2018 06:02), Max: 37.1 (30 Oct 2018 21:17)  T(F): 96.6 (31 Oct 2018 06:02), Max: 98.7 (30 Oct 2018 21:17)  HR: 68 (31 Oct 2018 06:02) (68 - 87)  BP: 100/55 (31 Oct 2018 06:02) (100/55 - 117/66)  BP(mean): --  RR: 18 (31 Oct 2018 06:02) (18 - 20)  SpO2: 98% (30 Oct 2018 21:17) (98% - 98%)    PHYSICAL EXAM:  GENERAL: NAD, well-appearing  CHEST/LUNG: Clear to auscultation bilaterally  HEART: Regular rate and rhythm  ABDOMEN: Soft, Nontender, Nondistended;   EXTREMITIES:  b/l finger ambutations and midfoot amputaitons, b/l feet currently wrapped, no signs of dressing saturation      LABS:  Microbiology and pathology of right heal wound pending results    Remainder of labs are WNL from 10/28 - no electrolyte abnormalities or elevated WBC

## 2018-10-31 NOTE — PROGRESS NOTE ADULT - SUBJECTIVE AND OBJECTIVE BOX
stable POD#5    Vital Signs Last 24 Hrs  T(C): 36.4 (31 Oct 2018 12:13), Max: 37.1 (30 Oct 2018 21:17)  T(F): 97.6 (31 Oct 2018 12:13), Max: 98.7 (30 Oct 2018 21:17)  HR: 87 (31 Oct 2018 12:13) (68 - 87)  BP: 116/78 (31 Oct 2018 12:13) (100/55 - 117/66)  BP(mean): 92 (31 Oct 2018 12:13) (92 - 92)  RR: 18 (31 Oct 2018 12:13) (18 - 20)  SpO2: 98% (30 Oct 2018 21:17) (98% - 98%)    CVP:  T(C): 36.4 (10-31-18 @ 12:13), Max: 37.1 (10-30-18 @ 21:17)  HR: 87 (10-31-18 @ 12:13) (68 - 87)  BP: 116/78 (10-31-18 @ 12:13) (100/55 - 117/66)  RR: 18 (10-31-18 @ 12:13) (18 - 20)  SpO2: 98% (10-30-18 @ 21:17) (98% - 98%)  CVP(mm Hg): --    U.O.:  I&O's Detail    30 Oct 2018 07:01  -  31 Oct 2018 07:00  --------------------------------------------------------  IN:  Total IN: 0 mL    OUT:    Voided: 275 mL  Total OUT: 275 mL    Total NET: -275 mL                            Large Dressing Change--> good take skin graft feet--> donor site healing    open area with exposed bone right heel

## 2018-10-31 NOTE — PROGRESS NOTE ADULT - ASSESSMENT
full thickness wounds feet healing with skin grafts and residual delayed wound healing right heel--> adaptic and kerlex and ace wraps dressing change qd    further debridement with podiatry this week

## 2018-10-31 NOTE — PROCEDURE NOTE - NSPROCDETAILS_GEN_ALL_CORE
dressing applied/flushes easily/secured in place/blood seen on insertion/sterile technique, catheter placed/ultrasound utilization

## 2018-10-31 NOTE — CONSULT NOTE ADULT - ASSESSMENT
43F with b/l foot amputations secondary to complication from pressors while septic in ICU. Right heel ulcer is currently non healing and plastics consult was placed for possible closure    Plan:  Pending evaluation by attending  Pathology of heel ulcer pending

## 2018-11-01 LAB
ANION GAP SERPL CALC-SCNC: 11 MMOL/L — SIGNIFICANT CHANGE UP (ref 7–14)
APTT BLD: 38.8 SEC — SIGNIFICANT CHANGE UP (ref 27–39.2)
BASOPHILS # BLD AUTO: 0.04 K/UL — SIGNIFICANT CHANGE UP (ref 0–0.2)
BASOPHILS NFR BLD AUTO: 0.6 % — SIGNIFICANT CHANGE UP (ref 0–1)
BLD GP AB SCN SERPL QL: SIGNIFICANT CHANGE UP
BUN SERPL-MCNC: 14 MG/DL — SIGNIFICANT CHANGE UP (ref 10–20)
CALCIUM SERPL-MCNC: 9.1 MG/DL — SIGNIFICANT CHANGE UP (ref 8.5–10.1)
CHLORIDE SERPL-SCNC: 101 MMOL/L — SIGNIFICANT CHANGE UP (ref 98–110)
CO2 SERPL-SCNC: 27 MMOL/L — SIGNIFICANT CHANGE UP (ref 17–32)
CREAT SERPL-MCNC: 1 MG/DL — SIGNIFICANT CHANGE UP (ref 0.7–1.5)
EOSINOPHIL # BLD AUTO: 0.3 K/UL — SIGNIFICANT CHANGE UP (ref 0–0.7)
EOSINOPHIL NFR BLD AUTO: 4.1 % — SIGNIFICANT CHANGE UP (ref 0–8)
GLUCOSE SERPL-MCNC: 88 MG/DL — SIGNIFICANT CHANGE UP (ref 70–99)
HCT VFR BLD CALC: 38.7 % — SIGNIFICANT CHANGE UP (ref 37–47)
HGB BLD-MCNC: 12.2 G/DL — SIGNIFICANT CHANGE UP (ref 12–16)
IMM GRANULOCYTES NFR BLD AUTO: 0.4 % — HIGH (ref 0.1–0.3)
INR BLD: 1.13 RATIO — SIGNIFICANT CHANGE UP (ref 0.65–1.3)
LYMPHOCYTES # BLD AUTO: 2.01 K/UL — SIGNIFICANT CHANGE UP (ref 1.2–3.4)
LYMPHOCYTES # BLD AUTO: 27.8 % — SIGNIFICANT CHANGE UP (ref 20.5–51.1)
MCHC RBC-ENTMCNC: 25.7 PG — LOW (ref 27–31)
MCHC RBC-ENTMCNC: 31.5 G/DL — LOW (ref 32–37)
MCV RBC AUTO: 81.6 FL — SIGNIFICANT CHANGE UP (ref 81–99)
MONOCYTES # BLD AUTO: 0.46 K/UL — SIGNIFICANT CHANGE UP (ref 0.1–0.6)
MONOCYTES NFR BLD AUTO: 6.4 % — SIGNIFICANT CHANGE UP (ref 1.7–9.3)
NEUTROPHILS # BLD AUTO: 4.4 K/UL — SIGNIFICANT CHANGE UP (ref 1.4–6.5)
NEUTROPHILS NFR BLD AUTO: 60.7 % — SIGNIFICANT CHANGE UP (ref 42.2–75.2)
NRBC # BLD: 0 /100 WBCS — SIGNIFICANT CHANGE UP (ref 0–0)
PLATELET # BLD AUTO: 278 K/UL — SIGNIFICANT CHANGE UP (ref 130–400)
POTASSIUM SERPL-MCNC: 4.7 MMOL/L — SIGNIFICANT CHANGE UP (ref 3.5–5)
POTASSIUM SERPL-SCNC: 4.7 MMOL/L — SIGNIFICANT CHANGE UP (ref 3.5–5)
PROTHROM AB SERPL-ACNC: 13 SEC — HIGH (ref 9.95–12.87)
RBC # BLD: 4.74 M/UL — SIGNIFICANT CHANGE UP (ref 4.2–5.4)
RBC # FLD: 14.6 % — HIGH (ref 11.5–14.5)
SODIUM SERPL-SCNC: 139 MMOL/L — SIGNIFICANT CHANGE UP (ref 135–146)
SURGICAL PATHOLOGY STUDY: SIGNIFICANT CHANGE UP
TYPE + AB SCN PNL BLD: SIGNIFICANT CHANGE UP
WBC # BLD: 7.24 K/UL — SIGNIFICANT CHANGE UP (ref 4.8–10.8)
WBC # FLD AUTO: 7.24 K/UL — SIGNIFICANT CHANGE UP (ref 4.8–10.8)

## 2018-11-01 PROCEDURE — 99232 SBSQ HOSP IP/OBS MODERATE 35: CPT

## 2018-11-01 RX ADMIN — Medication 500 MILLIGRAM(S): at 17:00

## 2018-11-01 RX ADMIN — Medication 25 MILLIGRAM(S): at 21:18

## 2018-11-01 RX ADMIN — Medication 325 MILLIGRAM(S): at 21:18

## 2018-11-01 RX ADMIN — Medication 100 MILLIGRAM(S): at 06:04

## 2018-11-01 RX ADMIN — Medication 100 MILLIGRAM(S): at 21:18

## 2018-11-01 RX ADMIN — Medication 100 MILLIGRAM(S): at 13:26

## 2018-11-01 RX ADMIN — HYDROMORPHONE HYDROCHLORIDE 2 MILLIGRAM(S): 2 INJECTION INTRAMUSCULAR; INTRAVENOUS; SUBCUTANEOUS at 21:18

## 2018-11-01 RX ADMIN — SENNA PLUS 2 TABLET(S): 8.6 TABLET ORAL at 21:18

## 2018-11-01 RX ADMIN — ENOXAPARIN SODIUM 40 MILLIGRAM(S): 100 INJECTION SUBCUTANEOUS at 11:04

## 2018-11-01 RX ADMIN — Medication 500 MILLIGRAM(S): at 06:04

## 2018-11-01 RX ADMIN — Medication 1 TABLET(S): at 11:04

## 2018-11-01 RX ADMIN — Medication 325 MILLIGRAM(S): at 13:26

## 2018-11-01 RX ADMIN — Medication 325 MILLIGRAM(S): at 06:04

## 2018-11-01 NOTE — PROGRESS NOTE ADULT - SUBJECTIVE AND OBJECTIVE BOX
Patient is a 43y old  Female who presents with a chief complaint of Debridement of wound, possible skin graft (01 Nov 2018 08:01)      INTERVAL HPI/OVERNIGHT EVENTS:   Pt is scheduled for Ex dbx st/b R foot with Dr. Salazar at [facility]. Patient is aware of procedure and is NPO since midnight.    MEDICATIONS  (STANDING):  ciprofloxacin     Tablet 500 milliGRAM(s) Oral every 12 hours  docusate sodium 100 milliGRAM(s) Oral three times a day  enoxaparin Injectable 40 milliGRAM(s) SubCutaneous daily  ergocalciferol 46515 Unit(s) Oral every week  ferrous    sulfate 325 milliGRAM(s) Oral three times a day  metoprolol succinate ER 25 milliGRAM(s) Oral daily  multivitamin 1 Tablet(s) Oral daily  senna 2 Tablet(s) Oral at bedtime    MEDICATIONS  (PRN):  acetaminophen   Tablet .. 650 milliGRAM(s) Oral every 6 hours PRN Mild Pain (1 - 3)  diphenhydrAMINE 25 milliGRAM(s) Oral every 6 hours PRN Rash and/or Itching  HYDROmorphone   Tablet 2 milliGRAM(s) Oral every 3 hours PRN Moderate Pain (4 - 6)  oxyCODONE    IR 5 milliGRAM(s) Oral every 4 hours PRN Moderate Pain (4 - 6)  oxyCODONE    IR 10 milliGRAM(s) Oral every 4 hours PRN Severe Pain (7 - 10)  polyethylene glycol 3350 17 Gram(s) Oral daily PRN Constipation    S81.802A, Z89.432, V49.73, Z89.431, 23559  ^S81.802A, Z89.432, V49.73, Z89.431, 38902  H/o or current diagnosis of HF- Contraindication to ACEI/ARBs  H/o or current diagnosis of HF- ACEI/ARB contraindication unknown  H/o or current diagnosis of HF- Contraindication to ACEI/ARBs  H/o or current diagnosis of HF- ACEI/ARB contraindication unknown  Family history of heart disease (Father)  Family history of cancer (Mother)  No pertinent family history in first degree relatives  Handoff  MEWS Score  ARDS survivor  Sepsis  Takotsubo cardiomyopathy  Small bowel obstruction  Osteoarthritis  Cardiomyopathy  Sleep apnea  HTN (hypertension)  Gangrene of lower extremity  Gangrene of finger of right hand  Gangrene of finger of left hand  Wound  Wound  Skin graft  Open wound of foot  S/P amputation  H/O exploratory laparotomy  Amputation finger  History of cholecystectomy  H/O gastric bypass    Allergies    No Known Allergies    Intolerances        Vital Signs Last 24 Hrs  T(C): 35.9 (01 Nov 2018 05:44), Max: 36.9 (31 Oct 2018 21:49)  T(F): 96.7 (01 Nov 2018 05:44), Max: 98.4 (31 Oct 2018 21:49)  HR: 64 (01 Nov 2018 05:44) (64 - 94)  BP: 102/55 (01 Nov 2018 05:44) (102/55 - 121/73)  BP(mean): 92 (31 Oct 2018 12:13) (92 - 92)  RR: 18 (01 Nov 2018 05:44) (18 - 20)  SpO2: 100% (31 Oct 2018 21:49) (100% - 100%)    LABS:            CAPILLARY BLOOD GLUCOSE          Type and screen: Done/pending    RADIOLOGY & ADDITIONAL TESTS:    Plan:   To OR Friday 11/2 at [facility] with Dr. Salazar for excisional debridement of soft tissue and bone Right Foot.  CXR ordered  EKG ordered  NPO @ MN  Please optimize all labs for surgery  Procedure was explained to patient in detail. All alternatives, risks and complications were discussed. All questions answered.

## 2018-11-01 NOTE — PROGRESS NOTE ADULT - SUBJECTIVE AND OBJECTIVE BOX
Progress Note: General Surgery  Patient: JIMMY CR , 43y (1975)Female   MRN: [id number]  Location: [address]  Visit: 10-26-18 Inpatient  Date: 11-01-18 @ 08:02  Hospital Day: 7  Post-op Day:     Procedure/Diagnosis: right nonhealing heel ulcer  Events over 24h: No acute overnight events, podiatry changed dressing this am, noted bone spur in right heel wound.  Patient denies fever, chills, chest pain, shortness of breath, nausea, vomiting.    Vitals: T(F): 96.7 (11-01-18 @ 05:44), Max: 98.4 (10-31-18 @ 21:49)  HR: 64 (11-01-18 @ 05:44)  BP: 102/55 (11-01-18 @ 05:44) (102/55 - 121/73)  RR: 18 (11-01-18 @ 05:44)  SpO2: 100% (10-31-18 @ 21:49)      Diet: Diet, Regular (10-26-18 @ 14:32)  Diet, NPO after Midnight:      NPO Start Date: 01-Nov-2018,   NPO Start Time: 23:59  Except Medications (11-01-18 @ 07:21)    IV Fluids: yes no , Type: ergocalciferol 15818 Unit(s) Oral every week  ferrous    sulfate 325 milliGRAM(s) Oral three times a day  multivitamin 1 Tablet(s) Oral daily    Physical Examination:  General Appearance: NAD, alert and cooperative  HEENT: NCAT, WNL  Heart: S1 and S2. No murmurs. Rhythm is regular   Lungs: Clear to auscultation BL   Abdomen:  Positive bowel sounds. Soft, nondistended, nontender  MSK/Extremities: right heel ulcer present, when probed bone in wound, erythema around open wound site, skin graft present over anterior right amputation site, intact    Medications: [Standing]  ciprofloxacin     Tablet 500 milliGRAM(s) Oral every 12 hours  docusate sodium 100 milliGRAM(s) Oral three times a day  enoxaparin Injectable 40 milliGRAM(s) SubCutaneous daily  ergocalciferol 25453 Unit(s) Oral every week  ferrous    sulfate 325 milliGRAM(s) Oral three times a day  metoprolol succinate ER 25 milliGRAM(s) Oral daily  multivitamin 1 Tablet(s) Oral daily  senna 2 Tablet(s) Oral at bedtime    DVT Prophylaxis: enoxaparin Injectable 40 milliGRAM(s) SubCutaneous daily    GI Prophylaxis:   Antibiotics: ciprofloxacin     Tablet 500 milliGRAM(s) Oral every 12 hours    Anticoagulation:   Medications:[PRN]  acetaminophen   Tablet .. 650 milliGRAM(s) Oral every 6 hours PRN  diphenhydrAMINE 25 milliGRAM(s) Oral every 6 hours PRN  HYDROmorphone   Tablet 2 milliGRAM(s) Oral every 3 hours PRN  oxyCODONE    IR 5 milliGRAM(s) Oral every 4 hours PRN  oxyCODONE    IR 10 milliGRAM(s) Oral every 4 hours PRN  polyethylene glycol 3350 17 Gram(s) Oral daily PRN

## 2018-11-01 NOTE — PROGRESS NOTE ADULT - ASSESSMENT
Assessment:  43y Female patient admitted with right nonhealing heel ulcer    Plan:  -OR tomorrow with burn and podiatry for debridement  -home meds  -pain control  -continue dressing/debridement per Podiatry/Burn    Date/Time: 11-01-18 @ 08:02

## 2018-11-01 NOTE — PROGRESS NOTE ADULT - SUBJECTIVE AND OBJECTIVE BOX
Pascale evaluated bedside this AM. She is S/P debridement and STSG bilateral amputation sites. Right inferior heel ulcer noted probing  to bone. The ulcer is chronic in nature failing to respond to NPWT. Plan is for excisional debridement of soft tissue and bone right heel,and consider NPWT and grafting in near future. The plans are discussed with the burn team and plastics. Vital Signs Last 24 Hrs  T(C): 35.9 (01 Nov 2018 05:44), Max: 36.9 (31 Oct 2018 21:49)  T(F): 96.7 (01 Nov 2018 05:44), Max: 98.4 (31 Oct 2018 21:49)  HR: 64 (01 Nov 2018 05:44) (64 - 94)  BP: 102/55 (01 Nov 2018 05:44) (102/55 - 121/73)  BP(mean): 92 (31 Oct 2018 12:13) (92 - 92)  RR: 18 (01 Nov 2018 05:44) (18 - 20)  SpO2: 100% (31 Oct 2018 21:49) (100% - 100%)

## 2018-11-01 NOTE — PROGRESS NOTE ADULT - SUBJECTIVE AND OBJECTIVE BOX
AM rounds  Pt: no complaints    No acute events o/n  Vital Signs Last 24 Hrs  T(C): 37.1 (01 Nov 2018 13:44), Max: 37.1 (01 Nov 2018 13:44)  T(F): 98.7 (01 Nov 2018 13:44), Max: 98.7 (01 Nov 2018 13:44)  HR: 87 (01 Nov 2018 13:44) (64 - 94)  BP: 107/68 (01 Nov 2018 13:44) (102/55 - 121/73)  RR: 18 (01 Nov 2018 13:44) (18 - 20)  SpO2: 100% (31 Oct 2018 21:49) (100% - 100%)    I&O's Summary    01 Nov 2018 07:01  -  01 Nov 2018 16:45  --------------------------------------------------------  IN: 240 mL / OUT: 0 mL / NET: 240 mL      11-01    139  |  101  |  14  ----------------------------<  88  4.7   |  27  |  1.0    Ca    9.1      01 Nov 2018 12:43                        12.2   7.24  )-----------( 278      ( 01 Nov 2018 12:43 )             38.7     EXAM:   dressings in place over SG sites - newly changed   donor site dressing in place

## 2018-11-01 NOTE — PROGRESS NOTE ADULT - ASSESSMENT
S/P SG bilat foot wounds   open wound of right heel   OR tomorrow for debridement by Podiatry.   PRS following . S/P SG bilat foot wounds   open wound of right heel   OR tomorrow for debridement right heel by Podiatry.   Continue abx - Cipro   PRS following

## 2018-11-02 ENCOUNTER — RESULT REVIEW (OUTPATIENT)
Age: 43
End: 2018-11-02

## 2018-11-02 LAB
ANION GAP SERPL CALC-SCNC: 12 MMOL/L — SIGNIFICANT CHANGE UP (ref 7–14)
BUN SERPL-MCNC: 12 MG/DL — SIGNIFICANT CHANGE UP (ref 10–20)
CALCIUM SERPL-MCNC: 8.8 MG/DL — SIGNIFICANT CHANGE UP (ref 8.5–10.1)
CHLORIDE SERPL-SCNC: 101 MMOL/L — SIGNIFICANT CHANGE UP (ref 98–110)
CO2 SERPL-SCNC: 26 MMOL/L — SIGNIFICANT CHANGE UP (ref 17–32)
CREAT SERPL-MCNC: 0.9 MG/DL — SIGNIFICANT CHANGE UP (ref 0.7–1.5)
CULTURE RESULTS: SIGNIFICANT CHANGE UP
GLUCOSE SERPL-MCNC: 84 MG/DL — SIGNIFICANT CHANGE UP (ref 70–99)
GRAM STN FLD: SIGNIFICANT CHANGE UP
HCT VFR BLD CALC: 36.4 % — LOW (ref 37–47)
HGB BLD-MCNC: 11.2 G/DL — LOW (ref 12–16)
MAGNESIUM SERPL-MCNC: 1.9 MG/DL — SIGNIFICANT CHANGE UP (ref 1.8–2.4)
MCHC RBC-ENTMCNC: 25.1 PG — LOW (ref 27–31)
MCHC RBC-ENTMCNC: 30.8 G/DL — LOW (ref 32–37)
MCV RBC AUTO: 81.4 FL — SIGNIFICANT CHANGE UP (ref 81–99)
NRBC # BLD: 0 /100 WBCS — SIGNIFICANT CHANGE UP (ref 0–0)
PHOSPHATE SERPL-MCNC: 4.5 MG/DL — SIGNIFICANT CHANGE UP (ref 2.1–4.9)
PLATELET # BLD AUTO: 256 K/UL — SIGNIFICANT CHANGE UP (ref 130–400)
POTASSIUM SERPL-MCNC: 4.8 MMOL/L — SIGNIFICANT CHANGE UP (ref 3.5–5)
POTASSIUM SERPL-SCNC: 4.8 MMOL/L — SIGNIFICANT CHANGE UP (ref 3.5–5)
RBC # BLD: 4.47 M/UL — SIGNIFICANT CHANGE UP (ref 4.2–5.4)
RBC # FLD: 14.6 % — HIGH (ref 11.5–14.5)
SODIUM SERPL-SCNC: 139 MMOL/L — SIGNIFICANT CHANGE UP (ref 135–146)
SPECIMEN SOURCE: SIGNIFICANT CHANGE UP
SPECIMEN SOURCE: SIGNIFICANT CHANGE UP
WBC # BLD: 7.78 K/UL — SIGNIFICANT CHANGE UP (ref 4.8–10.8)
WBC # FLD AUTO: 7.78 K/UL — SIGNIFICANT CHANGE UP (ref 4.8–10.8)

## 2018-11-02 PROCEDURE — 99232 SBSQ HOSP IP/OBS MODERATE 35: CPT

## 2018-11-02 RX ORDER — HYDROMORPHONE HYDROCHLORIDE 2 MG/ML
0.5 INJECTION INTRAMUSCULAR; INTRAVENOUS; SUBCUTANEOUS
Qty: 0 | Refills: 0 | Status: DISCONTINUED | OUTPATIENT
Start: 2018-11-02 | End: 2018-11-02

## 2018-11-02 RX ORDER — ERGOCALCIFEROL 1.25 MG/1
50000 CAPSULE ORAL
Qty: 0 | Refills: 0 | Status: DISCONTINUED | OUTPATIENT
Start: 2018-11-03 | End: 2018-11-05

## 2018-11-02 RX ORDER — OXYCODONE AND ACETAMINOPHEN 5; 325 MG/1; MG/1
2 TABLET ORAL EVERY 4 HOURS
Qty: 0 | Refills: 0 | Status: DISCONTINUED | OUTPATIENT
Start: 2018-11-02 | End: 2018-11-05

## 2018-11-02 RX ORDER — MORPHINE SULFATE 50 MG/1
4 CAPSULE, EXTENDED RELEASE ORAL EVERY 4 HOURS
Qty: 0 | Refills: 0 | Status: DISCONTINUED | OUTPATIENT
Start: 2018-11-02 | End: 2018-11-02

## 2018-11-02 RX ORDER — HYDROMORPHONE HYDROCHLORIDE 2 MG/ML
1 INJECTION INTRAMUSCULAR; INTRAVENOUS; SUBCUTANEOUS EVERY 6 HOURS
Qty: 0 | Refills: 0 | Status: DISCONTINUED | OUTPATIENT
Start: 2018-11-02 | End: 2018-11-02

## 2018-11-02 RX ORDER — MORPHINE SULFATE 50 MG/1
2 CAPSULE, EXTENDED RELEASE ORAL ONCE
Qty: 0 | Refills: 0 | Status: DISCONTINUED | OUTPATIENT
Start: 2018-11-02 | End: 2018-11-02

## 2018-11-02 RX ORDER — DIPHENHYDRAMINE HCL 50 MG
25 CAPSULE ORAL EVERY 6 HOURS
Qty: 0 | Refills: 0 | Status: DISCONTINUED | OUTPATIENT
Start: 2018-11-02 | End: 2018-11-05

## 2018-11-02 RX ORDER — POLYETHYLENE GLYCOL 3350 17 G/17G
17 POWDER, FOR SOLUTION ORAL DAILY
Qty: 0 | Refills: 0 | Status: DISCONTINUED | OUTPATIENT
Start: 2018-11-03 | End: 2018-11-05

## 2018-11-02 RX ORDER — SENNA PLUS 8.6 MG/1
2 TABLET ORAL AT BEDTIME
Qty: 0 | Refills: 0 | Status: DISCONTINUED | OUTPATIENT
Start: 2018-11-03 | End: 2018-11-05

## 2018-11-02 RX ORDER — METOPROLOL TARTRATE 50 MG
25 TABLET ORAL DAILY
Qty: 0 | Refills: 0 | Status: DISCONTINUED | OUTPATIENT
Start: 2018-11-03 | End: 2018-11-05

## 2018-11-02 RX ORDER — CIPROFLOXACIN LACTATE 400MG/40ML
500 VIAL (ML) INTRAVENOUS EVERY 12 HOURS
Qty: 0 | Refills: 0 | Status: DISCONTINUED | OUTPATIENT
Start: 2018-11-02 | End: 2018-11-05

## 2018-11-02 RX ORDER — DOCUSATE SODIUM 100 MG
100 CAPSULE ORAL THREE TIMES A DAY
Qty: 0 | Refills: 0 | Status: DISCONTINUED | OUTPATIENT
Start: 2018-11-02 | End: 2018-11-05

## 2018-11-02 RX ORDER — HYDROMORPHONE HYDROCHLORIDE 2 MG/ML
1 INJECTION INTRAMUSCULAR; INTRAVENOUS; SUBCUTANEOUS EVERY 4 HOURS
Qty: 0 | Refills: 0 | Status: DISCONTINUED | OUTPATIENT
Start: 2018-11-02 | End: 2018-11-05

## 2018-11-02 RX ORDER — OXYCODONE AND ACETAMINOPHEN 5; 325 MG/1; MG/1
1 TABLET ORAL ONCE
Qty: 0 | Refills: 0 | Status: DISCONTINUED | OUTPATIENT
Start: 2018-11-02 | End: 2018-11-02

## 2018-11-02 RX ORDER — ENOXAPARIN SODIUM 100 MG/ML
40 INJECTION SUBCUTANEOUS DAILY
Qty: 0 | Refills: 0 | Status: DISCONTINUED | OUTPATIENT
Start: 2018-11-02 | End: 2018-11-05

## 2018-11-02 RX ORDER — FERROUS SULFATE 325(65) MG
325 TABLET ORAL THREE TIMES A DAY
Qty: 0 | Refills: 0 | Status: DISCONTINUED | OUTPATIENT
Start: 2018-11-03 | End: 2018-11-05

## 2018-11-02 RX ORDER — HYDROMORPHONE HYDROCHLORIDE 2 MG/ML
2 INJECTION INTRAMUSCULAR; INTRAVENOUS; SUBCUTANEOUS
Qty: 0 | Refills: 0 | Status: DISCONTINUED | OUTPATIENT
Start: 2018-11-02 | End: 2018-11-05

## 2018-11-02 RX ORDER — HYDROMORPHONE HYDROCHLORIDE 2 MG/ML
1 INJECTION INTRAMUSCULAR; INTRAVENOUS; SUBCUTANEOUS
Qty: 0 | Refills: 0 | Status: DISCONTINUED | OUTPATIENT
Start: 2018-11-02 | End: 2018-11-02

## 2018-11-02 RX ORDER — ACETAMINOPHEN 500 MG
650 TABLET ORAL EVERY 6 HOURS
Qty: 0 | Refills: 0 | Status: DISCONTINUED | OUTPATIENT
Start: 2018-11-02 | End: 2018-11-05

## 2018-11-02 RX ADMIN — HYDROMORPHONE HYDROCHLORIDE 1 MILLIGRAM(S): 2 INJECTION INTRAMUSCULAR; INTRAVENOUS; SUBCUTANEOUS at 18:44

## 2018-11-02 RX ADMIN — HYDROMORPHONE HYDROCHLORIDE 1 MILLIGRAM(S): 2 INJECTION INTRAMUSCULAR; INTRAVENOUS; SUBCUTANEOUS at 09:45

## 2018-11-02 RX ADMIN — HYDROMORPHONE HYDROCHLORIDE 1 MILLIGRAM(S): 2 INJECTION INTRAMUSCULAR; INTRAVENOUS; SUBCUTANEOUS at 23:14

## 2018-11-02 RX ADMIN — MORPHINE SULFATE 2 MILLIGRAM(S): 50 CAPSULE, EXTENDED RELEASE ORAL at 16:00

## 2018-11-02 RX ADMIN — Medication 100 MILLIGRAM(S): at 23:06

## 2018-11-02 RX ADMIN — MORPHINE SULFATE 4 MILLIGRAM(S): 50 CAPSULE, EXTENDED RELEASE ORAL at 11:54

## 2018-11-02 RX ADMIN — ENOXAPARIN SODIUM 40 MILLIGRAM(S): 100 INJECTION SUBCUTANEOUS at 13:14

## 2018-11-02 RX ADMIN — MORPHINE SULFATE 2 MILLIGRAM(S): 50 CAPSULE, EXTENDED RELEASE ORAL at 15:31

## 2018-11-02 RX ADMIN — Medication 500 MILLIGRAM(S): at 05:59

## 2018-11-02 RX ADMIN — HYDROMORPHONE HYDROCHLORIDE 1 MILLIGRAM(S): 2 INJECTION INTRAMUSCULAR; INTRAVENOUS; SUBCUTANEOUS at 19:05

## 2018-11-02 RX ADMIN — OXYCODONE AND ACETAMINOPHEN 1 TABLET(S): 5; 325 TABLET ORAL at 13:14

## 2018-11-02 RX ADMIN — Medication 100 MILLIGRAM(S): at 15:02

## 2018-11-02 RX ADMIN — OXYCODONE AND ACETAMINOPHEN 1 TABLET(S): 5; 325 TABLET ORAL at 13:45

## 2018-11-02 RX ADMIN — Medication 500 MILLIGRAM(S): at 18:43

## 2018-11-02 RX ADMIN — MORPHINE SULFATE 4 MILLIGRAM(S): 50 CAPSULE, EXTENDED RELEASE ORAL at 12:15

## 2018-11-02 RX ADMIN — HYDROMORPHONE HYDROCHLORIDE 2 MILLIGRAM(S): 2 INJECTION INTRAMUSCULAR; INTRAVENOUS; SUBCUTANEOUS at 05:59

## 2018-11-02 RX ADMIN — HYDROMORPHONE HYDROCHLORIDE 1 MILLIGRAM(S): 2 INJECTION INTRAMUSCULAR; INTRAVENOUS; SUBCUTANEOUS at 23:05

## 2018-11-02 NOTE — BRIEF OPERATIVE NOTE - COMMENTS
37 minutes on turniquet  8 cc pre op of 1:1 mix of 2% lido and 0.5% mercaine  5 cc of post op block 1:1 mix of 2% lido and 0.5% mercaine

## 2018-11-02 NOTE — BRIEF OPERATIVE NOTE - PROCEDURE
<<-----Click on this checkbox to enter Procedure Debridement  11/02/2018  excisional debridement of soft tissue and bone right foot  Active  MMILAD

## 2018-11-02 NOTE — BRIEF OPERATIVE NOTE - PRE-OP DX
Wound  11/02/2018  right foot  Active  Sudhir Murphy  Wound  10/26/2018  biletaral feet wouns  Active  Sudhir Artis

## 2018-11-02 NOTE — BRIEF OPERATIVE NOTE - OPERATION/FINDINGS
nocrotic bone right foot
granulating wounds--> exposed bone right heel--> excision to and inclluding bone

## 2018-11-02 NOTE — PROGRESS NOTE ADULT - SUBJECTIVE AND OBJECTIVE BOX
Progress Note: Trauma Surgery  Patient: JIMMY CR , 43y (1975)Female   MRN: [id number]  Location: [address] 010 A  Visit: 10-26-18 Inpatient  Date: 11-02-18 @ 03:37    Hospital Day: 8    Procedure/Injury: nonhealing right foot ulcer   Events over 24h: DAVID, dressing changed,     Vitals: T(F): 98.2 (11-01-18 @ 21:28), Max: 98.7 (11-01-18 @ 13:44)  HR: 86 (11-01-18 @ 21:28)  BP: 106/63 (11-01-18 @ 21:28) (102/55 - 107/68)  RR: 20 (11-01-18 @ 21:28)  SpO2: 98% (11-01-18 @ 21:28)    Diet: Diet, Regular (10-26-18 @ 14:32)  Diet, NPO after Midnight:      NPO Start Date: 01-Nov-2018,   NPO Start Time: 23:59  Except Medications (11-01-18 @ 07:21)      Out:   11-01-18 @ 07:01  -  11-02-18 @ 03:37  --------------------------------------------------------  OUT:    Voided: 350 mL  Total OUT: 350 mL    Physical Examination:  General Appearance: NAD, alert and cooperative  HEENT: NCAT, WNL  Heart: S1 and S2. No murmurs. Rhythm is regular   Lungs: Clear to auscultation BL   Abdomen:  Positive bowel sounds. Soft, nondistended, nontender  MSK/Extremities: right heel ulcer present, when probed bone in wound, erythema around open wound site, skin graft present over anterior right amputation site, intact      Medications: [Standing]  ciprofloxacin     Tablet 500 milliGRAM(s) Oral every 12 hours  docusate sodium 100 milliGRAM(s) Oral three times a day  enoxaparin Injectable 40 milliGRAM(s) SubCutaneous daily  ergocalciferol 37491 Unit(s) Oral every week  ferrous    sulfate 325 milliGRAM(s) Oral three times a day  metoprolol succinate ER 25 milliGRAM(s) Oral daily  multivitamin 1 Tablet(s) Oral daily  senna 2 Tablet(s) Oral at bedtime    Medications:[PRN]  acetaminophen   Tablet .. 650 milliGRAM(s) Oral every 6 hours PRN  diphenhydrAMINE 25 milliGRAM(s) Oral every 6 hours PRN  HYDROmorphone   Tablet 2 milliGRAM(s) Oral every 3 hours PRN  oxyCODONE    IR 5 milliGRAM(s) Oral every 4 hours PRN  oxyCODONE    IR 10 milliGRAM(s) Oral every 4 hours PRN  polyethylene glycol 3350 17 Gram(s) Oral daily PRN    Labs:                        12.2   7.24  )-----------( 278      ( 01 Nov 2018 12:43 )             38.7     11-01    139  |  101  |  14  ----------------------------<  88  4.7   |  27  |  1.0    Ca    9.1      01 Nov 2018 12:43        PT/INR - ( 01 Nov 2018 12:43 )   PT: 13.00 sec;   INR: 1.13 ratio    PTT - ( 01 Nov 2018 12:43 )  PTT:38.8 sec      Date/Time: 11-02-18 @ 03:37

## 2018-11-02 NOTE — PRE-OP CHECKLIST - IV STARTED
Three months RX for tramadol and Norco in MA basket. Walked both scripts over to  pharmacy. Called pt and confirm that he will pick-them up at our pharmacy.      Nadia Ortiz MA     yes/Right midline

## 2018-11-02 NOTE — PROGRESS NOTE ADULT - ASSESSMENT
Assessment:  43y Female patient admitted with right nonhealing heel ulcer    Plan:  -OR with burn and podiatry for debridement  -home meds  -pain control  -continue dressing/debridement per Podiatry/Burn

## 2018-11-02 NOTE — PRE-ANESTHESIA EVALUATION ADULT - NSANTHOSAYNRD_GEN_A_CORE
No. PANDA screening performed.  STOP BANG Legend: 0-2 = LOW Risk; 3-4 = INTERMEDIATE Risk; 5-8 = HIGH Risk
No. PANDA screening performed.  STOP BANG Legend: 0-2 = LOW Risk; 3-4 = INTERMEDIATE Risk; 5-8 = HIGH Risk

## 2018-11-02 NOTE — CHART NOTE - NSCHARTNOTEFT_GEN_A_CORE
PACU ANESTHESIA ADMISSION NOTE      Procedure: Skin graft: sharp excisional debridement and skin graft bilateral feet    Post op diagnosis:  Wound      ____  Intubated  TV:______       Rate: ______      FiO2: ______    _x___  Patent Airway    _x___  Full return of protective reflexes    _x___  Full recovery from anesthesia / back to baseline status    Vitals:    See anesthesia record      Mental Status:  _x___ Awake   _____ Alert   _____ Drowsy   _____ Sedated    Nausea/Vomiting:  _x___  NO       ______Yes,   See Post - Op Orders         Pain Scale (0-10):  __0___    Treatment: _x___ None    ____ See Post - Op/PCA Orders    Post - Operative Fluids:   __x__ Oral   ____ See Post - Op Orders    Plan: Discharge:   ____Home       ___x__Floor     _____Critical Care    _____  Other:_________________    Comments:  No anesthesia issues or complications noted.  Discharge when criteria met.

## 2018-11-02 NOTE — BRIEF OPERATIVE NOTE - POST-OP DX
Wound  11/02/2018  right foot  Active  Sudhir Murphy  Wound  10/26/2018  bilateral feet wounds  Active  Sudhir Artis

## 2018-11-02 NOTE — CHART NOTE - NSCHARTNOTEFT_GEN_A_CORE
Unable to assess patient today, pt was originally from ?[facility] and was under Dr Artis's care.  However, today pt went for Surgery, and now transferred to BURN UNIT.   Therefore, Pt will be assess 11/3.

## 2018-11-03 LAB
ANION GAP SERPL CALC-SCNC: 11 MMOL/L — SIGNIFICANT CHANGE UP (ref 7–14)
BUN SERPL-MCNC: 15 MG/DL — SIGNIFICANT CHANGE UP (ref 10–20)
CALCIUM SERPL-MCNC: 8.8 MG/DL — SIGNIFICANT CHANGE UP (ref 8.5–10.1)
CHLORIDE SERPL-SCNC: 99 MMOL/L — SIGNIFICANT CHANGE UP (ref 98–110)
CO2 SERPL-SCNC: 25 MMOL/L — SIGNIFICANT CHANGE UP (ref 17–32)
CREAT SERPL-MCNC: 1.1 MG/DL — SIGNIFICANT CHANGE UP (ref 0.7–1.5)
GLUCOSE SERPL-MCNC: 193 MG/DL — HIGH (ref 70–99)
HCT VFR BLD CALC: 35.8 % — LOW (ref 37–47)
HGB BLD-MCNC: 11.2 G/DL — LOW (ref 12–16)
MAGNESIUM SERPL-MCNC: 1.9 MG/DL — SIGNIFICANT CHANGE UP (ref 1.8–2.4)
MCHC RBC-ENTMCNC: 25.7 PG — LOW (ref 27–31)
MCHC RBC-ENTMCNC: 31.3 G/DL — LOW (ref 32–37)
MCV RBC AUTO: 82.3 FL — SIGNIFICANT CHANGE UP (ref 81–99)
NRBC # BLD: 0 /100 WBCS — SIGNIFICANT CHANGE UP (ref 0–0)
PHOSPHATE SERPL-MCNC: 3.4 MG/DL — SIGNIFICANT CHANGE UP (ref 2.1–4.9)
PLATELET # BLD AUTO: 261 K/UL — SIGNIFICANT CHANGE UP (ref 130–400)
POTASSIUM SERPL-MCNC: 4.8 MMOL/L — SIGNIFICANT CHANGE UP (ref 3.5–5)
POTASSIUM SERPL-SCNC: 4.8 MMOL/L — SIGNIFICANT CHANGE UP (ref 3.5–5)
RBC # BLD: 4.35 M/UL — SIGNIFICANT CHANGE UP (ref 4.2–5.4)
RBC # FLD: 14.4 % — SIGNIFICANT CHANGE UP (ref 11.5–14.5)
SODIUM SERPL-SCNC: 135 MMOL/L — SIGNIFICANT CHANGE UP (ref 135–146)
WBC # BLD: 8.46 K/UL — SIGNIFICANT CHANGE UP (ref 4.8–10.8)
WBC # FLD AUTO: 8.46 K/UL — SIGNIFICANT CHANGE UP (ref 4.8–10.8)

## 2018-11-03 RX ORDER — HYDROMORPHONE HYDROCHLORIDE 2 MG/ML
2 INJECTION INTRAMUSCULAR; INTRAVENOUS; SUBCUTANEOUS ONCE
Qty: 0 | Refills: 0 | Status: DISCONTINUED | OUTPATIENT
Start: 2018-11-03 | End: 2018-11-03

## 2018-11-03 RX ADMIN — HYDROMORPHONE HYDROCHLORIDE 2 MILLIGRAM(S): 2 INJECTION INTRAMUSCULAR; INTRAVENOUS; SUBCUTANEOUS at 08:15

## 2018-11-03 RX ADMIN — Medication 100 MILLIGRAM(S): at 06:45

## 2018-11-03 RX ADMIN — Medication 500 MILLIGRAM(S): at 06:45

## 2018-11-03 RX ADMIN — Medication 100 MILLIGRAM(S): at 13:46

## 2018-11-03 RX ADMIN — Medication 1 TABLET(S): at 14:57

## 2018-11-03 RX ADMIN — Medication 10 MILLIGRAM(S): at 14:57

## 2018-11-03 RX ADMIN — HYDROMORPHONE HYDROCHLORIDE 1 MILLIGRAM(S): 2 INJECTION INTRAMUSCULAR; INTRAVENOUS; SUBCUTANEOUS at 02:55

## 2018-11-03 RX ADMIN — Medication 325 MILLIGRAM(S): at 14:05

## 2018-11-03 RX ADMIN — SENNA PLUS 2 TABLET(S): 8.6 TABLET ORAL at 21:40

## 2018-11-03 RX ADMIN — HYDROMORPHONE HYDROCHLORIDE 2 MILLIGRAM(S): 2 INJECTION INTRAMUSCULAR; INTRAVENOUS; SUBCUTANEOUS at 07:53

## 2018-11-03 RX ADMIN — ERGOCALCIFEROL 50000 UNIT(S): 1.25 CAPSULE ORAL at 13:44

## 2018-11-03 RX ADMIN — Medication 325 MILLIGRAM(S): at 21:39

## 2018-11-03 RX ADMIN — Medication 500 MILLIGRAM(S): at 17:05

## 2018-11-03 RX ADMIN — ENOXAPARIN SODIUM 40 MILLIGRAM(S): 100 INJECTION SUBCUTANEOUS at 13:45

## 2018-11-03 RX ADMIN — HYDROMORPHONE HYDROCHLORIDE 1 MILLIGRAM(S): 2 INJECTION INTRAMUSCULAR; INTRAVENOUS; SUBCUTANEOUS at 02:54

## 2018-11-03 RX ADMIN — HYDROMORPHONE HYDROCHLORIDE 2 MILLIGRAM(S): 2 INJECTION INTRAMUSCULAR; INTRAVENOUS; SUBCUTANEOUS at 08:30

## 2018-11-03 RX ADMIN — Medication 100 MILLIGRAM(S): at 21:39

## 2018-11-03 NOTE — DIETITIAN INITIAL EVALUATION ADULT. - OTHER INFO
42y/o female with pmhx listed below noted to have open wounds bilaterally due to bony prominence and bone overgrowth. Presented for further debridement, possible STSG and possible VAC placement. No pressure injury is noted (Markus Score-18).

## 2018-11-03 NOTE — PROGRESS NOTE ADULT - SUBJECTIVE AND OBJECTIVE BOX
stable post debridement right heel    Vital Signs Last 24 Hrs  T(C): 37.3 (02 Nov 2018 23:33), Max: 37.3 (02 Nov 2018 23:33)  T(F): 99.1 (02 Nov 2018 23:33), Max: 99.1 (02 Nov 2018 23:33)  HR: 86 (02 Nov 2018 23:33) (60 - 86)  BP: 95/58 (02 Nov 2018 23:33) (95/58 - 118/81)  BP(mean): 91 (02 Nov 2018 10:45) (67 - 91)  RR: 18 (02 Nov 2018 23:33) (10 - 21)  SpO2: 100% (02 Nov 2018 23:33) (98% - 100%)    CVP:  T(C): 37.3 (11-02-18 @ 23:33), Max: 37.3 (11-02-18 @ 23:33)  HR: 86 (11-02-18 @ 23:33) (60 - 86)  BP: 95/58 (11-02-18 @ 23:33) (95/58 - 118/81)  RR: 18 (11-02-18 @ 23:33) (10 - 21)  SpO2: 100% (11-02-18 @ 23:33) (98% - 100%)  CVP(mm Hg): --    U.O.:  I&O's Detail    01 Nov 2018 07:01  -  02 Nov 2018 07:00  --------------------------------------------------------  IN:    Oral Fluid: 240 mL  Total IN: 240 mL    OUT:    Voided: 350 mL  Total OUT: 350 mL    Total NET: -110 mL      02 Nov 2018 07:01  -  03 Nov 2018 00:21  --------------------------------------------------------  IN:  Total IN: 0 mL    OUT:    Voided: 250 mL  Total OUT: 250 mL    Total NET: -250 mL                                        11.2   7.78  )-----------( 256      ( 02 Nov 2018 16:14 )             36.4     11-02    139  |  101  |  12  ----------------------------<  84  4.8   |  26  |  0.9    Ca    8.8      02 Nov 2018 16:14  Phos  4.5     11-02  Mg     1.9     11-02        Large Dressing Change

## 2018-11-03 NOTE — PROGRESS NOTE ADULT - SUBJECTIVE AND OBJECTIVE BOX
PODIATRY PROGRESS NOTE   [id number] JIMMY CR is a pleasant well-nourished, well developed 43y Female in no acute distress, alert awake, and oriented to person, place and time.   Patient is a 43y old  Female who presents with a chief complaint of Debridement of wound, possible skin graft (03 Nov 2018 00:20)    HPI:  43F hx gastric bypass (10/2017) c/b obstruction, ARDS, and sepsis requiring ICU admission and pressors, which resulted in upper and lower limb ischemia s/p R partial hand and L wrist amputations (1/2018), b/l forefoot amputations and STSG (4/2018). Continues to have open wounds bilaterally due to bony prominence and bone overgrowth. Presents today for further debridement, possible STSG, possible VAC.       Since last hospitalization last spring has been doing well. Non-ambulatory, WC dependent.  Denies recent sicknesses. (26 Oct 2018 12:27)    pt was seen and assessed at am rounds with attending Dr. jaeger and RN. pt was given 2 mg IV hydromorphone prior to dressing change. Pt denies any n/v/f/s/sob at this morning.    s/p ex dbx and skin graft b/l feet 10/26 (burn)  s/p x dbs st/b right foot 11/2 (pod)    Vital Signs Last 24 Hrs  T(C): 36.4 (03 Nov 2018 07:38), Max: 37.3 (02 Nov 2018 23:33)  T(F): 97.6 (03 Nov 2018 07:38), Max: 99.1 (02 Nov 2018 23:33)  HR: 86 (02 Nov 2018 23:33) (60 - 86)  BP: 98/68 (03 Nov 2018 07:38) (95/58 - 118/81)  BP(mean): 91 (02 Nov 2018 10:45) (67 - 91)  RR: 18 (03 Nov 2018 07:38) (10 - 21)  SpO2: 100% (02 Nov 2018 23:33) (98% - 100%)                        11.2   7.78  )-----------( 256      ( 02 Nov 2018 16:14 )             36.4                 11-02    139  |  101  |  12  ----------------------------<  84  4.8   |  26  |  0.9    Ca    8.8      02 Nov 2018 16:14  Phos  4.5     11-02  Mg     1.9     11-02        Culture - Tissue with Gram Stain (collected 11-02-18 @ 11:11)  Source: .Tissue None  Gram Stain (11-02-18 @ 22:29):    No polymorphonuclear cells seen per low power field    Rare Gram Negative Rods per oil power field    derm: surgical site right plantar heel stable, wound measures about 4.5 cm x 3 cm x 3 cm, granular wound base with bone exposed.     graft intact right plantar dorsal aspect of the right foot with granular wound base w/o any signs of infection    superficial wounds noted left plantardorsal and lateral with granular wound base, stable.       A:  s/p ex dbx and skin graft b/l feet 10/26 (burn)  s/p x dbs st/b right foot 11/2 (pod)    P:  pt was evaluated and treated  given hydromorphone iv 2mg prior to dressing change and wound vac application right foot  another hydromorphone iv 2mg given while applying wound vac for pain.  next wound vac change monday, wound vac at 125mg hg contin.  applied xeroform/dsd/kerlix b/l dorsal feet.  obtained wound culture as per RN for burn protocol  continue IV abx as per ID  pt needs visiting nurse and home vac  podiatry will evaluate surgical site on Monday.  will f/u with attending  11-03-18 @ 08:48

## 2018-11-03 NOTE — DIETITIAN INITIAL EVALUATION ADULT. - PHYSICAL APPEARANCE
BMI-28; Based on nutrition focused physical examination the patient appears well nourished with no physical signs of muscle and subcutaneous fat wasting./overweight

## 2018-11-03 NOTE — DIETITIAN INITIAL EVALUATION ADULT. - ENERGY NEEDS
Estimated Calorie Needs: [id number] x AF 1.2-1.4=[id number]/day  Estimated Protein Needs: 80-96grams/day (1-1.2grams/kg of admit weight)  Estimated Fluid Needs: Fluids per Burn ICU team

## 2018-11-03 NOTE — PROGRESS NOTE ADULT - PROVIDER SPECIALTY LIST ADULT
Burn Medical Necessity Statement: Based on my medical judgement, Mohs surgery is the most appropriate treatment for this cancer compared to other treatments.

## 2018-11-03 NOTE — PROGRESS NOTE ADULT - SUBJECTIVE AND OBJECTIVE BOX
stable    Vital Signs Last 24 Hrs  T(C): 36.4 (03 Nov 2018 07:38), Max: 37.3 (02 Nov 2018 23:33)  T(F): 97.6 (03 Nov 2018 07:38), Max: 99.1 (02 Nov 2018 23:33)  HR: 86 (02 Nov 2018 23:33) (83 - 86)  BP: 98/68 (03 Nov 2018 07:38) (95/58 - 112/71)  BP(mean): --  RR: 18 (03 Nov 2018 07:38) (18 - 18)  SpO2: 100% (02 Nov 2018 23:33) (98% - 100%)    CVP:  T(C): 36.4 (11-03-18 @ 07:38), Max: 37.3 (11-02-18 @ 23:33)  HR: 86 (11-02-18 @ 23:33) (83 - 86)  BP: 98/68 (11-03-18 @ 07:38) (95/58 - 112/71)  RR: 18 (11-03-18 @ 07:38) (18 - 18)  SpO2: 100% (11-02-18 @ 23:33) (98% - 100%)  CVP(mm Hg): --    U.O.:  I&O's Detail    02 Nov 2018 07:01  -  03 Nov 2018 07:00  --------------------------------------------------------  IN:  Total IN: 0 mL    OUT:    Voided: 500 mL  Total OUT: 500 mL    Total NET: -500 mL                                    11.2   7.78  )-----------( 256      ( 02 Nov 2018 16:14 )             36.4     11-02    139  |  101  |  12  ----------------------------<  84  4.8   |  26  |  0.9    Ca    8.8      02 Nov 2018 16:14  Phos  4.5     11-02  Mg     1.9     11-02        feet dressing intact post change by podiatry

## 2018-11-03 NOTE — DIETITIAN INITIAL EVALUATION ADULT. - ADHERENCE
The patient reports following a regular diet at home; consumed three small meals at 100%; took centrum, iron, Vitamin B12 and Vitamin D2 supplements daily./good

## 2018-11-03 NOTE — DIETITIAN INITIAL EVALUATION ADULT. - PERTINENT MEDS FT
Colace, Lovenox, Dilaudid, Cipro, Drisdol, Ferrous Sulfate, Dulcolax, Toprol XL, MVI, Miralax, Senna

## 2018-11-04 LAB
-  AMIKACIN: SIGNIFICANT CHANGE UP
-  AMOXICILLIN/CLAVULANIC ACID: SIGNIFICANT CHANGE UP
-  AMPICILLIN/SULBACTAM: SIGNIFICANT CHANGE UP
-  AMPICILLIN: SIGNIFICANT CHANGE UP
-  AMPICILLIN: SIGNIFICANT CHANGE UP
-  AZTREONAM: SIGNIFICANT CHANGE UP
-  CEFAZOLIN: SIGNIFICANT CHANGE UP
-  CEFEPIME: SIGNIFICANT CHANGE UP
-  CEFOXITIN: SIGNIFICANT CHANGE UP
-  CEFTRIAXONE: SIGNIFICANT CHANGE UP
-  CIPROFLOXACIN: SIGNIFICANT CHANGE UP
-  ERTAPENEM: SIGNIFICANT CHANGE UP
-  GENTAMICIN: SIGNIFICANT CHANGE UP
-  IMIPENEM: SIGNIFICANT CHANGE UP
-  LEVOFLOXACIN: SIGNIFICANT CHANGE UP
-  MEROPENEM: SIGNIFICANT CHANGE UP
-  PIPERACILLIN/TAZOBACTAM: SIGNIFICANT CHANGE UP
-  TETRACYCLINE: SIGNIFICANT CHANGE UP
-  TOBRAMYCIN: SIGNIFICANT CHANGE UP
-  TRIMETHOPRIM/SULFAMETHOXAZOLE: SIGNIFICANT CHANGE UP
-  VANCOMYCIN: SIGNIFICANT CHANGE UP
ANION GAP SERPL CALC-SCNC: 11 MMOL/L — SIGNIFICANT CHANGE UP (ref 7–14)
BUN SERPL-MCNC: 16 MG/DL — SIGNIFICANT CHANGE UP (ref 10–20)
CALCIUM SERPL-MCNC: 9.2 MG/DL — SIGNIFICANT CHANGE UP (ref 8.5–10.1)
CHLORIDE SERPL-SCNC: 98 MMOL/L — SIGNIFICANT CHANGE UP (ref 98–110)
CO2 SERPL-SCNC: 27 MMOL/L — SIGNIFICANT CHANGE UP (ref 17–32)
CREAT SERPL-MCNC: 0.9 MG/DL — SIGNIFICANT CHANGE UP (ref 0.7–1.5)
CULTURE RESULTS: SIGNIFICANT CHANGE UP
CULTURE RESULTS: SIGNIFICANT CHANGE UP
GLUCOSE SERPL-MCNC: 79 MG/DL — SIGNIFICANT CHANGE UP (ref 70–99)
MAGNESIUM SERPL-MCNC: 2.1 MG/DL — SIGNIFICANT CHANGE UP (ref 1.8–2.4)
METHOD TYPE: SIGNIFICANT CHANGE UP
METHOD TYPE: SIGNIFICANT CHANGE UP
ORGANISM # SPEC MICROSCOPIC CNT: SIGNIFICANT CHANGE UP
PHOSPHATE SERPL-MCNC: 3.7 MG/DL — SIGNIFICANT CHANGE UP (ref 2.1–4.9)
POTASSIUM SERPL-MCNC: 4.6 MMOL/L — SIGNIFICANT CHANGE UP (ref 3.5–5)
POTASSIUM SERPL-SCNC: 4.6 MMOL/L — SIGNIFICANT CHANGE UP (ref 3.5–5)
SODIUM SERPL-SCNC: 136 MMOL/L — SIGNIFICANT CHANGE UP (ref 135–146)
SPECIMEN SOURCE: SIGNIFICANT CHANGE UP
SPECIMEN SOURCE: SIGNIFICANT CHANGE UP

## 2018-11-04 RX ORDER — SODIUM CHLORIDE 9 MG/ML
1000 INJECTION, SOLUTION INTRAVENOUS
Qty: 0 | Refills: 0 | Status: DISCONTINUED | OUTPATIENT
Start: 2018-11-04 | End: 2018-11-05

## 2018-11-04 RX ORDER — SODIUM CHLORIDE 9 MG/ML
500 INJECTION INTRAMUSCULAR; INTRAVENOUS; SUBCUTANEOUS ONCE
Qty: 0 | Refills: 0 | Status: COMPLETED | OUTPATIENT
Start: 2018-11-04 | End: 2018-11-04

## 2018-11-04 RX ADMIN — SODIUM CHLORIDE 100 MILLILITER(S): 9 INJECTION, SOLUTION INTRAVENOUS at 22:58

## 2018-11-04 RX ADMIN — ENOXAPARIN SODIUM 40 MILLIGRAM(S): 100 INJECTION SUBCUTANEOUS at 13:03

## 2018-11-04 RX ADMIN — Medication 500 MILLIGRAM(S): at 17:02

## 2018-11-04 RX ADMIN — Medication 25 MILLIGRAM(S): at 21:28

## 2018-11-04 RX ADMIN — SODIUM CHLORIDE 500 MILLILITER(S): 9 INJECTION INTRAMUSCULAR; INTRAVENOUS; SUBCUTANEOUS at 17:02

## 2018-11-04 RX ADMIN — HYDROMORPHONE HYDROCHLORIDE 1 MILLIGRAM(S): 2 INJECTION INTRAMUSCULAR; INTRAVENOUS; SUBCUTANEOUS at 01:59

## 2018-11-04 RX ADMIN — HYDROMORPHONE HYDROCHLORIDE 1 MILLIGRAM(S): 2 INJECTION INTRAMUSCULAR; INTRAVENOUS; SUBCUTANEOUS at 02:25

## 2018-11-04 RX ADMIN — HYDROMORPHONE HYDROCHLORIDE 1 MILLIGRAM(S): 2 INJECTION INTRAMUSCULAR; INTRAVENOUS; SUBCUTANEOUS at 22:00

## 2018-11-04 RX ADMIN — Medication 500 MILLIGRAM(S): at 05:50

## 2018-11-04 RX ADMIN — Medication 1 TABLET(S): at 13:03

## 2018-11-04 RX ADMIN — Medication 25 MILLIGRAM(S): at 02:38

## 2018-11-04 RX ADMIN — Medication 100 MILLIGRAM(S): at 21:28

## 2018-11-04 RX ADMIN — HYDROMORPHONE HYDROCHLORIDE 2 MILLIGRAM(S): 2 INJECTION INTRAMUSCULAR; INTRAVENOUS; SUBCUTANEOUS at 13:35

## 2018-11-04 RX ADMIN — Medication 325 MILLIGRAM(S): at 21:29

## 2018-11-04 RX ADMIN — HYDROMORPHONE HYDROCHLORIDE 2 MILLIGRAM(S): 2 INJECTION INTRAMUSCULAR; INTRAVENOUS; SUBCUTANEOUS at 13:18

## 2018-11-04 RX ADMIN — Medication 325 MILLIGRAM(S): at 13:03

## 2018-11-04 RX ADMIN — Medication 100 MILLIGRAM(S): at 05:50

## 2018-11-04 RX ADMIN — Medication 325 MILLIGRAM(S): at 05:50

## 2018-11-04 RX ADMIN — SENNA PLUS 2 TABLET(S): 8.6 TABLET ORAL at 21:28

## 2018-11-04 RX ADMIN — HYDROMORPHONE HYDROCHLORIDE 1 MILLIGRAM(S): 2 INJECTION INTRAMUSCULAR; INTRAVENOUS; SUBCUTANEOUS at 21:24

## 2018-11-04 RX ADMIN — Medication 100 MILLIGRAM(S): at 13:03

## 2018-11-04 NOTE — PROGRESS NOTE ADULT - ASSESSMENT
feet wounds post debridement, skin grafts, debridement and wouund vac right heel--> local wound care, wound vac

## 2018-11-04 NOTE — PROGRESS NOTE ADULT - SUBJECTIVE AND OBJECTIVE BOX
1 hour critical care medicine    Vital Signs Last 24 Hrs  T(C): 37 (04 Nov 2018 19:45), Max: 37.1 (03 Nov 2018 23:33)  T(F): 98.6 (04 Nov 2018 19:45), Max: 98.7 (03 Nov 2018 23:33)  HR: 88 (04 Nov 2018 19:45) (80 - 88)  BP: 112/69 (04 Nov 2018 19:45) (93/66 - 114/70)  BP(mean): --  RR: 19 (04 Nov 2018 19:45) (17 - 19)  SpO2: 100% (04 Nov 2018 19:45) (100% - 100%)    CVP:  T(C): 37 (11-04-18 @ 19:45), Max: 37.1 (11-03-18 @ 23:33)  HR: 88 (11-04-18 @ 19:45) (80 - 88)  BP: 112/69 (11-04-18 @ 19:45) (93/66 - 114/70)  RR: 19 (11-04-18 @ 19:45) (17 - 19)  SpO2: 100% (11-04-18 @ 19:45) (100% - 100%)  CVP(mm Hg): --    U.O.:  I&O's Detail    03 Nov 2018 08:01  -  04 Nov 2018 07:00  --------------------------------------------------------  IN:  Total IN: 0 mL    OUT:    Voided: 470 mL  Total OUT: 470 mL    Total NET: -470 mL      04 Nov 2018 07:01  -  04 Nov 2018 23:16  --------------------------------------------------------  IN:    Oral Fluid: 930 mL    Sodium Chloride 0.9% IV Bolus: 500 mL  Total IN: 1430 mL    OUT:    Voided: 500 mL  Total OUT: 500 mL    Total NET: 930 mL                                        11.2   8.46  )-----------( 261      ( 03 Nov 2018 18:06 )             35.8     11-04    136  |  98  |  16  ----------------------------<  79  4.6   |  27  |  0.9    Ca    9.2      04 Nov 2018 22:03  Phos  3.7     11-04  Mg     2.1     11-04        Large Dressing Change--> good damaso skin grafts--> wound vac right heel

## 2018-11-05 ENCOUNTER — TRANSCRIPTION ENCOUNTER (OUTPATIENT)
Age: 43
End: 2018-11-05

## 2018-11-05 LAB
CULTURE RESULTS: SIGNIFICANT CHANGE UP
CULTURE RESULTS: SIGNIFICANT CHANGE UP
SPECIMEN SOURCE: SIGNIFICANT CHANGE UP
SPECIMEN SOURCE: SIGNIFICANT CHANGE UP

## 2018-11-05 RX ORDER — AZTREONAM 2 G
1 VIAL (EA) INJECTION
Qty: 42 | Refills: 0 | OUTPATIENT
Start: 2018-11-05 | End: 2018-11-18

## 2018-11-05 RX ORDER — SODIUM CHLORIDE 9 MG/ML
500 INJECTION, SOLUTION INTRAVENOUS ONCE
Qty: 0 | Refills: 0 | Status: COMPLETED | OUTPATIENT
Start: 2018-11-05 | End: 2018-11-05

## 2018-11-05 RX ORDER — OXYCODONE AND ACETAMINOPHEN 5; 325 MG/1; MG/1
1 TABLET ORAL
Qty: 20 | Refills: 0
Start: 2018-11-05 | End: 2018-11-09

## 2018-11-05 RX ADMIN — Medication 25 MILLIGRAM(S): at 06:34

## 2018-11-05 RX ADMIN — Medication 1 TABLET(S): at 12:05

## 2018-11-05 RX ADMIN — ENOXAPARIN SODIUM 40 MILLIGRAM(S): 100 INJECTION SUBCUTANEOUS at 12:06

## 2018-11-05 RX ADMIN — HYDROMORPHONE HYDROCHLORIDE 2 MILLIGRAM(S): 2 INJECTION INTRAMUSCULAR; INTRAVENOUS; SUBCUTANEOUS at 16:31

## 2018-11-05 RX ADMIN — HYDROMORPHONE HYDROCHLORIDE 2 MILLIGRAM(S): 2 INJECTION INTRAMUSCULAR; INTRAVENOUS; SUBCUTANEOUS at 07:03

## 2018-11-05 RX ADMIN — Medication 325 MILLIGRAM(S): at 06:34

## 2018-11-05 RX ADMIN — SODIUM CHLORIDE 100 MILLILITER(S): 9 INJECTION, SOLUTION INTRAVENOUS at 12:14

## 2018-11-05 RX ADMIN — Medication 100 MILLIGRAM(S): at 13:43

## 2018-11-05 RX ADMIN — Medication 25 MILLIGRAM(S): at 07:54

## 2018-11-05 RX ADMIN — HYDROMORPHONE HYDROCHLORIDE 2 MILLIGRAM(S): 2 INJECTION INTRAMUSCULAR; INTRAVENOUS; SUBCUTANEOUS at 06:26

## 2018-11-05 RX ADMIN — Medication 100 MILLIGRAM(S): at 06:34

## 2018-11-05 RX ADMIN — SODIUM CHLORIDE 500 MILLILITER(S): 9 INJECTION, SOLUTION INTRAVENOUS at 07:25

## 2018-11-05 RX ADMIN — Medication 325 MILLIGRAM(S): at 13:43

## 2018-11-05 RX ADMIN — Medication 500 MILLIGRAM(S): at 06:34

## 2018-11-05 RX ADMIN — Medication 500 MILLIGRAM(S): at 18:01

## 2018-11-05 NOTE — PROGRESS NOTE ADULT - SUBJECTIVE AND OBJECTIVE BOX
Patient evaluated bedside this AM,in no distress. NPWT vac changed right heel. Pending bone pathology and cultures,Patient will be D/C'd with home vac. presently  on Cipro 500mg Q12. VNS to be set up for home vac changes. Follow up to burn and podiatry as out patient. Discussed with burn team.                       11.2   8.46  )-----------( 261      ( 03 Nov 2018 18:06 )             35.8   Vital Signs Last 24 Hrs  T(C): 36.3 (05 Nov 2018 06:35), Max: 37 (04 Nov 2018 19:45)  T(F): 97.3 (05 Nov 2018 06:35), Max: 98.6 (04 Nov 2018 19:45)  HR: 80 (05 Nov 2018 06:35) (20 - 88)  BP: 114/74 (05 Nov 2018 06:35) (93/66 - 114/74)  BP(mean): --  RR: 18 (05 Nov 2018 06:35) (18 - 20)  SpO2: 100% (04 Nov 2018 19:45) (100% - 100%)

## 2018-11-05 NOTE — DISCHARGE NOTE ADULT - PATIENT PORTAL LINK FT
You can access the [facility] Portal, offered by [facility], by registering with the following website: [email or web address]

## 2018-11-05 NOTE — CONSULT NOTE ADULT - SUBJECTIVE AND OBJECTIVE BOX
JIMMY CR  43y, Female  Allergy: No Known Allergies      HPI:  43F hx gastric bypass (10/2017) c/b obstruction, ARDS, and sepsis requiring ICU admission and pressors, which resulted in upper and lower limb ischemia s/p R partial hand and L wrist amputations (1/2018), b/l forefoot amputations and STSG (4/2018). Continues to have open wounds bilaterally due to bony prominence and bone overgrowth. Presents today for further debridement, possible STSG, possible VAC.       Since last hospitalization last spring has been doing well. Non-ambulatory, WC dependent.  Denies recent sicknesses. (26 Oct 2018 12:27)    FAMILY HISTORY:  Family history of heart disease (Father)  Family history of cancer (Mother): HODKINS LYMPHOMA    PAST MEDICAL & SURGICAL HISTORY:  ARDS survivor  Sepsis  Small bowel obstruction  Osteoarthritis  Cardiomyopathy  Sleep apnea  HTN (hypertension)  Gangrene of lower extremity  Gangrene of finger of right hand  Gangrene of finger of left hand  Open wound of foot  S/P amputation: b/l toes  H/O exploratory laparotomy  Amputation finger  History of cholecystectomy  H/O gastric bypass        VITALS:  T(F): 97.3, Max: 98.6 (11-04-18 @ 19:45)  HR: 78  BP: 116/73  RR: 18Vital Signs Last 24 Hrs  T(C): 36.3 (05 Nov 2018 15:30), Max: 37 (04 Nov 2018 19:45)  T(F): 97.3 (05 Nov 2018 15:30), Max: 98.6 (04 Nov 2018 19:45)  HR: 78 (05 Nov 2018 15:30) (20 - 88)  BP: 116/73 (05 Nov 2018 15:30) (98/66 - 116/73)  BP(mean): --  RR: 18 (05 Nov 2018 15:30) (16 - 20)  SpO2: 100% (05 Nov 2018 15:30) (100% - 100%)    TESTS & MEASUREMENTS:                        11.2   8.46  )-----------( 261      ( 03 Nov 2018 18:06 )             35.8     11-04    136  |  98  |  16  ----------------------------<  79  4.6   |  27  |  0.9    Ca    9.2      04 Nov 2018 22:03  Phos  3.7     11-04  Mg     2.1     11-04          Culture - Infection Control Surveillance (collected 11-03-18 @ 19:18)  Source: .INF wound  Preliminary Report (11-04-18 @ 21:49):    Culture in progress    Culture - Infection Control Surveillance (collected 11-02-18 @ 18:04)  Source: .INF Rectal  Preliminary Report (11-04-18 @ 21:46):    Culture in progress    Culture - Surgical Swab (collected 11-02-18 @ 11:11)  Source: .Surgical Swab None  Final Report (11-04-18 @ 21:53):    Few Escherichia coli ESBL    Few Enterococcus faecalis    See previous culture [id number]    Rare Corynebacterium species "Susceptibilities not performed"    Few Bacteroides fragilis "Susceptibilities not performed"    Culture - Tissue with Gram Stain (collected 11-02-18 @ 11:11)  Source: .Tissue None  Gram Stain (11-02-18 @ 22:29):    No polymorphonuclear cells seen per low power field    Rare Gram Negative Rods per oil power field  Final Report (11-04-18 @ 21:44):    Few Escherichia coli ESBL    Moderate Enterococcus faecalis    Few Corynebacterium species "Susceptibilities not performed"    Moderate Bacteroides fragilis "Susceptibilities not performed"  Organism: Escherichia coli ESBL  Enterococcus faecalis (11-04-18 @ 21:44)  Organism: Enterococcus faecalis (11-04-18 @ 21:44)      -  Ampicillin: S <=2 Predicts results to ampicillin/sulbactam, amoxacillin-clavulanate and  piperacillin-tazobactam.      -  Tetra/Doxy: R >8      -  Vancomycin: S 2      Method Type: CORY  Organism: Escherichia coli ESBL (11-04-18 @ 21:44)      -  Amikacin: S <=8      -  Amoxicillin/Clavulanic Acid: I 16/8      -  Ampicillin: R >16 These ampicillin results predict results for amoxicillin      -  Ampicillin/Sulbactam: R >16/8      -  Aztreonam: R >16      -  Cefazolin: R >16      -  Cefepime: R >16      -  Cefoxitin: S 8      -  Ceftriaxone: R >32 Enterobacter, Citrobacter, and Serratia may develop resistance during prolonged therapy      -  Ciprofloxacin: R >2      -  Ertapenem: S <=0.5      -  Gentamicin: S <=1      -  Imipenem: S <=1      -  Levofloxacin: R >4      -  Meropenem: S <=1      -  Piperacillin/Tazobactam: R <=8      -  Tobramycin: S <=2      -  Trimethoprim/Sulfamethoxazole: S <=0.5/9.5      Method Type: CORY    Culture - Infection Control Surveillance (collected 10-31-18 @ 08:48)  Source: .INF Rectal  Final Report (11-02-18 @ 18:17):    No Klebsiella pneumonia (Carbapenem resistant) isolated            RADIOLOGY & ADDITIONAL TESTS:    ANTIBIOTICS:  ciprofloxacin     Tablet 500 milliGRAM(s) Oral every 12 hours

## 2018-11-05 NOTE — CONSULT NOTE ADULT - REASON FOR ADMISSION
Debridement of wound, possible skin graft

## 2018-11-05 NOTE — CONSULT NOTE ADULT - ASSESSMENT
IMPRESSION:  Chronic changes right heel with possibly osteitis.    RECOMMENDATIONS:  Bone pathology pending  Unasyn 3 gg iv q6h   On d/c po Augmentin 875 mg q12h and po Bactrim 1tablet DS q8h for 14 days

## 2018-11-05 NOTE — DISCHARGE NOTE ADULT - PLAN OF CARE
f/u with PMD continue med, f/u PMD f/u with your cardiologist Continue with wound vac until able to skin graft Wash with soap and water with wound vac change, change wound vac q72h. F/U Burn Clinic and Podiatry Clinic

## 2018-11-05 NOTE — DISCHARGE NOTE ADULT - CARE PROVIDERS DIRECT ADDRESSES
[email or web address]

## 2018-11-05 NOTE — DISCHARGE NOTE ADULT - CARE PLAN
Principal Discharge DX:	Open wound of foot  Goal:	Continue with wound vac until able to skin graft  Assessment and plan of treatment:	Wash with soap and water with wound vac change, change wound vac q72h. F/U Burn Clinic and Podiatry Clinic  Secondary Diagnosis:	Sleep apnea  Goal:	f/u with PMD  Secondary Diagnosis:	HTN (hypertension)  Goal:	continue med, f/u PMD  Secondary Diagnosis:	Cardiomyopathy  Goal:	f/u with your cardiologist

## 2018-11-05 NOTE — DISCHARGE NOTE ADULT - MEDICATION SUMMARY - MEDICATIONS TO TAKE
I will START or STAY ON the medications listed below when I get home from the hospital:    oxyCODONE-acetaminophen 5 mg-325 mg oral tablet  -- 1 tab(s) by mouth every 6 hours, As Needed -for severe pain MDD:4   -- Caution federal law prohibits the transfer of this drug to any person other  than the person for whom it was prescribed.  May cause drowsiness.  Alcohol may intensify this effect.  Use care when operating dangerous machinery.  This prescription cannot be refilled.  This product contains acetaminophen.  Do not use  with any other product containing acetaminophen to prevent possible liver damage.  Using more of this medication than prescribed may cause serious breathing problems.    -- Indication: For Open wound of foot    Metoprolol Succinate ER 25 mg oral tablet, extended release  -- 1 tab(s) by mouth once a day   -- It is very important that you take or use this exactly as directed.  Do not skip doses or discontinue unless directed by your doctor.  May cause drowsiness.  Alcohol may intensify this effect.  Use care when operating dangerous machinery.  Some non-prescription drugs may aggravate your condition.  Read all labels carefully.  If a warning appears, check with your doctor before taking.  Swallow whole.  Do not crush.  Take with food or milk.  This drug may impair the ability to drive or operate machinery.  Use care until you become familiar with its effects.    -- Indication: For HTN (hypertension)    ferrous sulfate 325 mg (65 mg elemental iron) oral tablet  -- 1 tab(s) by mouth 3 times a day  -- Indication: For anemia    Augmentin 875 mg-125 mg oral tablet  -- 1 tab(s) by mouth 2 times a day MDD:2  -- Finish all this medication unless otherwise directed by prescriber.  Take with food or milk.    -- Indication: For Open wound of foot    Bactrim  mg-160 mg oral tablet  -- 1 tab(s) by mouth every 8 hours   -- Avoid prolonged or excessive exposure to direct and/or artificial sunlight while taking this medication.  Finish all this medication unless otherwise directed by prescriber.  Medication should be taken with plenty of water.    -- Indication: For Open wound of foot    Vitamin B-100 oral tablet  -- 1 tab(s) by mouth once a day  -- Indication: For Supplement    Vitamin D2 50,000 intl units (1.25 mg) oral capsule  -- 1 cap(s) by mouth once a week  -- Indication: For Supplement

## 2018-11-05 NOTE — DISCHARGE NOTE ADULT - HOSPITAL COURSE
43F hx gastric bypass (10/2017) c/b obstruction, ARDS, and sepsis requiring ICU admission and pressors, which resulted in upper and lower limb ischemia s/p R partial hand and L wrist amputations (1/2018), b/l forefoot amputations and STSG (4/2018). Presented on this admission with open wounds bilaterally due to bony prominence and bone overgrowth, went for surgical debridement on 10/26 and 11/2 with bone and soft tissue biopsy. Wound culture from OR grew out multiple bacteria, bone biopsy pending and will be followed up as an outpatient. Pt is being discharged home on PO antibiotics as per Infectious Disease, and will have wound vac change to Right heel three times per week by visiting nurse services. Pt needs to follow up with Burn Clinic, Podiatry Clinic, PMD, and Cardiology.

## 2018-11-05 NOTE — DISCHARGE NOTE ADULT - ADDITIONAL INSTRUCTIONS
Continue Wound Care with wound vac changes, follow up in Burn Clinic, Podiatry Clinic, PMD and Cardiology

## 2018-11-05 NOTE — DISCHARGE NOTE ADULT - CARE PROVIDER_API CALL
Sudhir Artis), Plastic Surgery  [address] [place] [address]  Phone: [phone]  Fax: [phone]    Mohinder Salazar (ROCIO), Podiatric Medicine and Surgery  [address] [place] [id number]  Phone: [phone]  Fax: [phone]

## 2018-11-06 VITALS — WEIGHT: 177.03 LBS | HEIGHT: 67 IN

## 2018-11-06 LAB — SURGICAL PATHOLOGY STUDY: SIGNIFICANT CHANGE UP

## 2018-11-06 NOTE — PROGRESS NOTE ADULT - ASSESSMENT
Stable  Good skin graft take   Continue NPWT - with VNS support   Continue po Cipro  out pt f/u with Burn, Podiatry services

## 2018-11-06 NOTE — PROGRESS NOTE ADULT - REASON FOR ADMISSION
Debridement of wound, possible skin graft

## 2018-11-06 NOTE — PROGRESS NOTE ADULT - ATTENDING COMMENTS
Continuing care discussed with pt
Continuing care discussed with pt. Questions addressed
Pt seen and discussed with Podiatry Service. Continuing care discussed with pt
Pt on OR schedule for Friday Nov 2 w Podiatry and Burn for debridement of right heel  Agree w management
Reviewed case in detail c Dr. Valdez  He plans on debriding bony prominence underneath open right heel wound--calcaneal bone--and send for culture (r/o OM)  Local wound care and pressure reduction thereafter  For OR today

## 2018-11-06 NOTE — PROGRESS NOTE ADULT - SUBJECTIVE AND OBJECTIVE BOX
DISCHARGE NOTE   Pt seen at am rounds 11/5/18  Stable o/n   No complaints  VSS AF      I&O's Summary    05 Nov 2018 07:01  -  06 Nov 2018 07:00  --------------------------------------------------------  IN: 1540 mL / OUT: 875 mL / NET: 665 mL    EXAM:  left foot - healing pink drying small SG sites  right foot - SG healing pink ; NPWT in progress right heel      dressing change done

## 2018-11-07 NOTE — CONSULT NOTE ADULT - COMMENTS
"  SUBJECTIVE:   Donnie Ernandez is a 61 year old male who presents to clinic today for the following health issues:      ENT Symptoms             Symptoms: cc Present Absent Comment   Fever/Chills  x  Felt like he did on Monday   Fatigue       Muscle Aches   x    Eye Irritation   x    Sneezing       Nasal Mike/Drg  x     Sinus Pressure/Pain  x     Loss of smell       Dental pain       Sore Throat  x     Swollen Glands  x     Ear Pain/Fullness  x  itching   Cough  x  Non productive   Wheeze   x    Chest Pain   x    Shortness of breath   x    Rash       Other         Symptom duration:  3 days   Symptom severity:  moderate   Treatments tried:  vitamin C   Contacts:  co workers with strep throat     Insomnia:  Requesting refill of Ambien  Uses as needed for sleep  Tolerates well - no side effects.  No sleep walking, sleep eating or other inappropriate activities while sleeping.  Able to wake up in the morning without any residual grogginess.      Problem list and histories reviewed & adjusted, as indicated.  Additional history: as documented      Reviewed and updated as needed this visit by clinical staff  Tobacco  Allergies  Meds       Reviewed and updated as needed this visit by Provider         ROS:  Constitutional, HEENT, cardiovascular, pulmonary, gi and gu systems are negative, except as otherwise noted.    OBJECTIVE:     /70 (BP Location: Right arm)  Pulse 60  Temp 99.6  F (37.6  C) (Tympanic)  Ht 5' 6\" (1.676 m)  Wt 171 lb (77.6 kg)  SpO2 98%  BMI 27.6 kg/m2  Body mass index is 27.6 kg/(m^2).  GENERAL: healthy, alert and no distress  HENT: ear canals and TM's normal, nose and mouth without ulcers or lesions  NECK: no adenopathy, no asymmetry, masses, or scars and thyroid normal to palpation  RESP: lungs clear to auscultation - no rales, rhonchi or wheezes  CV: regular rate and rhythm, normal S1 S2, no S3 or S4, no murmur, click or rub, no peripheral edema and peripheral pulses strong    Diagnostic " Test Results:  Results for orders placed or performed in visit on 11/07/18 (from the past 24 hour(s))   Rapid strep screen   Result Value Ref Range    Specimen Description Throat     Rapid Strep A Screen       NEGATIVE: No Group A streptococcal antigen detected by immunoassay, await culture report.       ASSESSMENT/PLAN:       ICD-10-CM    1. Primary insomnia F51.01 Well controlled.  zolpidem (AMBIEN CR) 12.5 MG CR tablet     2. Viral URI J06.9 See below     3. Sore throat J02.9 Rapid strep screen negative     Beta strep group A culture pending       1. Drink plenty of fluids.  2. May use tylenol 1000 mg every 8 hours or ibuprofen 600 mg every 6 hours for any discomfort you are having.  3. Use Neti pot or nasal saline if you are having nasal or sinus congestion  4. For cough, dextromethorphan/guaifenesin combinations help loosen secretions and suppress cough safely without significant risk of sedation.   5. For nasal congestion and sinus pressure, pseudoephedrine (Sudafed) or phenylephrine is often helpful but it can cause elevations in blood pressure and insomnia.  Use Coricidin as a decongestant if you have a history of hypertension.  6. For runny nose and nasal congestion, use over-the-counter oxymetazoline (i.e. Afrin - use 2 sprays of 0.05% in each nostril every 12 hours; stop after 3-5 days)  7. Suggest humidifier in room at night  8. Call clinic if no improvement in 1 week      The risks, benefits and treatment options of prescribed medications or other treatments have been discussed with the patient. The patient verbalized their understanding and should call or follow up if no improvement or if they develop further problems.    MAYANK [facility]   wound vacc bilaterally on stumps.

## 2018-11-13 ENCOUNTER — OUTPATIENT (OUTPATIENT)
Dept: OUTPATIENT SERVICES | Facility: HOSPITAL | Age: 43
LOS: 1 days | Discharge: HOME | End: 2018-11-13

## 2018-11-13 ENCOUNTER — APPOINTMENT (OUTPATIENT)
Dept: WOUND CARE | Facility: CLINIC | Age: 43
End: 2018-11-13

## 2018-11-13 ENCOUNTER — FORM ENCOUNTER (OUTPATIENT)
Age: 43
End: 2018-11-13

## 2018-11-13 DIAGNOSIS — Z98.84 BARIATRIC SURGERY STATUS: Chronic | ICD-10-CM

## 2018-11-13 DIAGNOSIS — S91.309A UNSPECIFIED OPEN WOUND, UNSPECIFIED FOOT, INITIAL ENCOUNTER: Chronic | ICD-10-CM

## 2018-11-13 DIAGNOSIS — Z98.890 OTHER SPECIFIED POSTPROCEDURAL STATES: Chronic | ICD-10-CM

## 2018-11-13 DIAGNOSIS — Z89.9 ACQUIRED ABSENCE OF LIMB, UNSPECIFIED: Chronic | ICD-10-CM

## 2018-11-13 DIAGNOSIS — S68.119A COMPLETE TRAUMATIC METACARPOPHALANGEAL AMPUTATION OF UNSPECIFIED FINGER, INITIAL ENCOUNTER: Chronic | ICD-10-CM

## 2018-11-14 ENCOUNTER — OUTPATIENT (OUTPATIENT)
Dept: OUTPATIENT SERVICES | Facility: HOSPITAL | Age: 43
LOS: 1 days | Discharge: HOME | End: 2018-11-14

## 2018-11-14 DIAGNOSIS — Z98.84 BARIATRIC SURGERY STATUS: Chronic | ICD-10-CM

## 2018-11-14 DIAGNOSIS — Z89.9 ACQUIRED ABSENCE OF LIMB, UNSPECIFIED: Chronic | ICD-10-CM

## 2018-11-14 DIAGNOSIS — Z98.890 OTHER SPECIFIED POSTPROCEDURAL STATES: Chronic | ICD-10-CM

## 2018-11-14 DIAGNOSIS — S68.119A COMPLETE TRAUMATIC METACARPOPHALANGEAL AMPUTATION OF UNSPECIFIED FINGER, INITIAL ENCOUNTER: Chronic | ICD-10-CM

## 2018-11-14 DIAGNOSIS — S91.309A UNSPECIFIED OPEN WOUND, UNSPECIFIED FOOT, INITIAL ENCOUNTER: Chronic | ICD-10-CM

## 2018-11-14 NOTE — PROCEDURE NOTE - NSPROCDETAILS_GEN_ALL_CORE
location identified, draped/prepped, sterile technique used/supine position/sterile dressing applied/sterile technique, catheter placed/ultrasound guidance

## 2018-11-14 NOTE — PROCEDURE NOTE - NSPOSTCAREGUIDE_GEN_A_CORE
Instructed patient/caregiver to follow-up with primary care physician/Keep the cast/splint/dressing clean and dry/Care for catheter as per unit/ICU protocols/Verbal/written post procedure instructions were given to patient/caregiver/Instructed patient/caregiver regarding signs and symptoms of infection

## 2018-11-15 ENCOUNTER — OUTPATIENT (OUTPATIENT)
Dept: OUTPATIENT SERVICES | Facility: HOSPITAL | Age: 43
LOS: 1 days | Discharge: HOME | End: 2018-11-15

## 2018-11-15 ENCOUNTER — APPOINTMENT (OUTPATIENT)
Dept: BURN CARE | Facility: CLINIC | Age: 43
End: 2018-11-15

## 2018-11-15 DIAGNOSIS — S68.119A COMPLETE TRAUMATIC METACARPOPHALANGEAL AMPUTATION OF UNSPECIFIED FINGER, INITIAL ENCOUNTER: Chronic | ICD-10-CM

## 2018-11-15 DIAGNOSIS — Z98.890 OTHER SPECIFIED POSTPROCEDURAL STATES: Chronic | ICD-10-CM

## 2018-11-15 DIAGNOSIS — S91.309A UNSPECIFIED OPEN WOUND, UNSPECIFIED FOOT, INITIAL ENCOUNTER: Chronic | ICD-10-CM

## 2018-11-15 DIAGNOSIS — Z98.84 BARIATRIC SURGERY STATUS: Chronic | ICD-10-CM

## 2018-11-15 DIAGNOSIS — Z89.9 ACQUIRED ABSENCE OF LIMB, UNSPECIFIED: Chronic | ICD-10-CM

## 2018-11-16 ENCOUNTER — APPOINTMENT (OUTPATIENT)
Dept: SURGERY | Facility: CLINIC | Age: 43
End: 2018-11-16

## 2018-11-17 DIAGNOSIS — Z89.112 ACQUIRED ABSENCE OF LEFT HAND: ICD-10-CM

## 2018-11-17 DIAGNOSIS — M86.671 OTHER CHRONIC OSTEOMYELITIS, RIGHT ANKLE AND FOOT: ICD-10-CM

## 2018-11-17 DIAGNOSIS — M86.171 OTHER ACUTE OSTEOMYELITIS, RIGHT ANKLE AND FOOT: ICD-10-CM
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
## 2018-11-17 DIAGNOSIS — Z98.84 BARIATRIC SURGERY STATUS: ICD-10-CM

## 2018-11-17 DIAGNOSIS — Z89.122: ICD-10-CM

## 2018-11-17 DIAGNOSIS — L97.414 NON-PRESSURE CHRONIC ULCER OF RIGHT HEEL AND MIDFOOT WITH NECROSIS OF BONE: ICD-10-CM

## 2018-11-17 DIAGNOSIS — E51.9 THIAMINE DEFICIENCY, UNSPECIFIED: ICD-10-CM

## 2018-11-17 DIAGNOSIS — T87.89 OTHER COMPLICATIONS OF AMPUTATION STUMP: ICD-10-CM

## 2018-11-17 DIAGNOSIS — T87.53 NECROSIS OF AMPUTATION STUMP, RIGHT LOWER EXTREMITY: ICD-10-CM

## 2018-11-17 DIAGNOSIS — M19.90 UNSPECIFIED OSTEOARTHRITIS, UNSPECIFIED SITE: ICD-10-CM

## 2018-11-17 DIAGNOSIS — I10 ESSENTIAL (PRIMARY) HYPERTENSION: ICD-10-CM

## 2018-11-17 DIAGNOSIS — G47.30 SLEEP APNEA, UNSPECIFIED: ICD-10-CM

## 2018-11-19 DIAGNOSIS — S91.302A UNSPECIFIED OPEN WOUND, LEFT FOOT, INITIAL ENCOUNTER: ICD-10-CM

## 2018-11-19 DIAGNOSIS — S91.301A UNSPECIFIED OPEN WOUND, RIGHT FOOT, INITIAL ENCOUNTER: ICD-10-CM
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
## 2018-11-19 DIAGNOSIS — Z94.5 SKIN TRANSPLANT STATUS: ICD-10-CM

## 2018-11-20 DIAGNOSIS — I10 ESSENTIAL (PRIMARY) HYPERTENSION: ICD-10-CM

## 2018-11-20 DIAGNOSIS — Z79.899 OTHER LONG TERM (CURRENT) DRUG THERAPY: ICD-10-CM

## 2018-11-23 ENCOUNTER — OUTPATIENT (OUTPATIENT)
Dept: OUTPATIENT SERVICES | Facility: HOSPITAL | Age: 43
LOS: 1 days | Discharge: HOME | End: 2018-11-23

## 2018-11-23 ENCOUNTER — APPOINTMENT (OUTPATIENT)
Dept: PODIATRY | Facility: CLINIC | Age: 43
End: 2018-11-23
Payer: COMMERCIAL

## 2018-11-23 VITALS
DIASTOLIC BLOOD PRESSURE: 87 MMHG | HEIGHT: 68 IN | HEART RATE: 93 BPM | SYSTOLIC BLOOD PRESSURE: 141 MMHG | BODY MASS INDEX: 26.67 KG/M2 | WEIGHT: 176 LBS

## 2018-11-23 DIAGNOSIS — S91.309A UNSPECIFIED OPEN WOUND, UNSPECIFIED FOOT, INITIAL ENCOUNTER: Chronic | ICD-10-CM

## 2018-11-23 DIAGNOSIS — Z98.890 OTHER SPECIFIED POSTPROCEDURAL STATES: Chronic | ICD-10-CM

## 2018-11-23 DIAGNOSIS — S68.119A COMPLETE TRAUMATIC METACARPOPHALANGEAL AMPUTATION OF UNSPECIFIED FINGER, INITIAL ENCOUNTER: Chronic | ICD-10-CM

## 2018-11-23 DIAGNOSIS — Z89.9 ACQUIRED ABSENCE OF LIMB, UNSPECIFIED: Chronic | ICD-10-CM

## 2018-11-23 DIAGNOSIS — Z98.84 BARIATRIC SURGERY STATUS: Chronic | ICD-10-CM

## 2018-11-23 PROCEDURE — 99212 OFFICE O/P EST SF 10 MIN: CPT

## 2018-11-24 LAB
CULTURE RESULTS: SIGNIFICANT CHANGE UP
SPECIMEN SOURCE: SIGNIFICANT CHANGE UP
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
## 2018-11-26 DIAGNOSIS — S91.301A UNSPECIFIED OPEN WOUND, RIGHT FOOT, INITIAL ENCOUNTER: ICD-10-CM

## 2018-11-26 DIAGNOSIS — S91.302A UNSPECIFIED OPEN WOUND, LEFT FOOT, INITIAL ENCOUNTER: ICD-10-CM

## 2018-11-26 DIAGNOSIS — Z94.5 SKIN TRANSPLANT STATUS: ICD-10-CM

## 2018-11-28 ENCOUNTER — OTHER (OUTPATIENT)
Age: 43
End: 2018-11-28

## 2018-11-29 ENCOUNTER — OUTPATIENT (OUTPATIENT)
Dept: OUTPATIENT SERVICES | Facility: HOSPITAL | Age: 43
LOS: 1 days | Discharge: HOME | End: 2018-11-29

## 2018-11-29 ENCOUNTER — APPOINTMENT (OUTPATIENT)
Dept: WOUND CARE | Facility: CLINIC | Age: 43
End: 2018-11-29

## 2018-11-29 DIAGNOSIS — Z98.890 OTHER SPECIFIED POSTPROCEDURAL STATES: Chronic | ICD-10-CM

## 2018-11-29 DIAGNOSIS — S68.119A COMPLETE TRAUMATIC METACARPOPHALANGEAL AMPUTATION OF UNSPECIFIED FINGER, INITIAL ENCOUNTER: Chronic | ICD-10-CM

## 2018-11-29 DIAGNOSIS — S91.309A UNSPECIFIED OPEN WOUND, UNSPECIFIED FOOT, INITIAL ENCOUNTER: Chronic | ICD-10-CM

## 2018-11-29 DIAGNOSIS — Z98.84 BARIATRIC SURGERY STATUS: Chronic | ICD-10-CM

## 2018-11-29 DIAGNOSIS — Z89.9 ACQUIRED ABSENCE OF LIMB, UNSPECIFIED: Chronic | ICD-10-CM

## 2018-12-10 DIAGNOSIS — S91.301A UNSPECIFIED OPEN WOUND, RIGHT FOOT, INITIAL ENCOUNTER: ICD-10-CM
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
## 2018-12-10 DIAGNOSIS — Z94.5 SKIN TRANSPLANT STATUS: ICD-10-CM

## 2018-12-14 ENCOUNTER — APPOINTMENT (OUTPATIENT)
Dept: PODIATRY | Facility: CLINIC | Age: 43
End: 2018-12-14
Payer: COMMERCIAL

## 2018-12-14 ENCOUNTER — OUTPATIENT (OUTPATIENT)
Dept: OUTPATIENT SERVICES | Facility: HOSPITAL | Age: 43
LOS: 1 days | Discharge: HOME | End: 2018-12-14

## 2018-12-14 VITALS
HEART RATE: 82 BPM | SYSTOLIC BLOOD PRESSURE: 118 MMHG | HEIGHT: 68 IN | BODY MASS INDEX: 26.67 KG/M2 | DIASTOLIC BLOOD PRESSURE: 74 MMHG | WEIGHT: 176 LBS

## 2018-12-14 DIAGNOSIS — Z98.890 OTHER SPECIFIED POSTPROCEDURAL STATES: Chronic | ICD-10-CM

## 2018-12-14 DIAGNOSIS — Z98.84 BARIATRIC SURGERY STATUS: Chronic | ICD-10-CM

## 2018-12-14 DIAGNOSIS — S68.119A COMPLETE TRAUMATIC METACARPOPHALANGEAL AMPUTATION OF UNSPECIFIED FINGER, INITIAL ENCOUNTER: Chronic | ICD-10-CM

## 2018-12-14 DIAGNOSIS — S91.309A UNSPECIFIED OPEN WOUND, UNSPECIFIED FOOT, INITIAL ENCOUNTER: Chronic | ICD-10-CM

## 2018-12-14 DIAGNOSIS — Z89.9 ACQUIRED ABSENCE OF LIMB, UNSPECIFIED: Chronic | ICD-10-CM

## 2018-12-14 PROCEDURE — 97605 NEG PRS WND THER DME<=50SQCM: CPT

## 2018-12-15 LAB
CULTURE RESULTS: SIGNIFICANT CHANGE UP
SPECIMEN SOURCE: SIGNIFICANT CHANGE UP

## 2018-12-20 ENCOUNTER — OUTPATIENT (OUTPATIENT)
Dept: OUTPATIENT SERVICES | Facility: HOSPITAL | Age: 43
LOS: 1 days | Discharge: HOME | End: 2018-12-20

## 2018-12-20 ENCOUNTER — APPOINTMENT (OUTPATIENT)
Dept: WOUND CARE | Facility: CLINIC | Age: 43
End: 2018-12-20

## 2018-12-20 DIAGNOSIS — Z89.9 ACQUIRED ABSENCE OF LIMB, UNSPECIFIED: Chronic | ICD-10-CM

## 2018-12-20 DIAGNOSIS — S91.301A UNSPECIFIED OPEN WOUND, RIGHT FOOT, INITIAL ENCOUNTER: ICD-10-CM

## 2018-12-20 DIAGNOSIS — S91.302A UNSPECIFIED OPEN WOUND, LEFT FOOT, INITIAL ENCOUNTER: ICD-10-CM
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
## 2018-12-20 DIAGNOSIS — Z98.890 OTHER SPECIFIED POSTPROCEDURAL STATES: Chronic | ICD-10-CM

## 2018-12-20 DIAGNOSIS — Z94.5 SKIN TRANSPLANT STATUS: ICD-10-CM

## 2018-12-20 DIAGNOSIS — S68.119A COMPLETE TRAUMATIC METACARPOPHALANGEAL AMPUTATION OF UNSPECIFIED FINGER, INITIAL ENCOUNTER: Chronic | ICD-10-CM

## 2018-12-20 DIAGNOSIS — Z98.84 BARIATRIC SURGERY STATUS: Chronic | ICD-10-CM

## 2018-12-20 DIAGNOSIS — S91.309A UNSPECIFIED OPEN WOUND, UNSPECIFIED FOOT, INITIAL ENCOUNTER: Chronic | ICD-10-CM

## 2018-12-24 ENCOUNTER — INPATIENT (INPATIENT)
Facility: HOSPITAL | Age: 43
LOS: 0 days | Discharge: HOME HEALTH | End: 2018-12-25
Attending: STUDENT IN AN ORGANIZED HEALTH CARE EDUCATION/TRAINING PROGRAM | Admitting: STUDENT IN AN ORGANIZED HEALTH CARE EDUCATION/TRAINING PROGRAM
Payer: COMMERCIAL

## 2018-12-24 VITALS
TEMPERATURE: 97 F | HEART RATE: 83 BPM | RESPIRATION RATE: 18 BRPM | DIASTOLIC BLOOD PRESSURE: 89 MMHG | SYSTOLIC BLOOD PRESSURE: 133 MMHG | OXYGEN SATURATION: 98 %

## 2018-12-24 DIAGNOSIS — Z98.84 BARIATRIC SURGERY STATUS: ICD-10-CM

## 2018-12-24 DIAGNOSIS — Z98.890 OTHER SPECIFIED POSTPROCEDURAL STATES: Chronic | ICD-10-CM

## 2018-12-24 DIAGNOSIS — S68.119A COMPLETE TRAUMATIC METACARPOPHALANGEAL AMPUTATION OF UNSPECIFIED FINGER, INITIAL ENCOUNTER: Chronic | ICD-10-CM

## 2018-12-24 DIAGNOSIS — I82.622 ACUTE EMBOLISM AND THROMBOSIS OF DEEP VEINS OF LEFT UPPER EXTREMITY: ICD-10-CM

## 2018-12-24 DIAGNOSIS — M86.9 OSTEOMYELITIS, UNSPECIFIED: ICD-10-CM

## 2018-12-24 DIAGNOSIS — M79.602 PAIN IN LEFT ARM: ICD-10-CM

## 2018-12-24 DIAGNOSIS — I82.A12 ACUTE EMBOLISM AND THROMBOSIS OF LEFT AXILLARY VEIN: ICD-10-CM

## 2018-12-24 DIAGNOSIS — Z89.112 ACQUIRED ABSENCE OF LEFT HAND: ICD-10-CM

## 2018-12-24 DIAGNOSIS — Z89.9 ACQUIRED ABSENCE OF LIMB, UNSPECIFIED: Chronic | ICD-10-CM

## 2018-12-24 DIAGNOSIS — Z89.421 ACQUIRED ABSENCE OF OTHER RIGHT TOE(S): ICD-10-CM

## 2018-12-24 DIAGNOSIS — S91.309A UNSPECIFIED OPEN WOUND, UNSPECIFIED FOOT, INITIAL ENCOUNTER: Chronic | ICD-10-CM

## 2018-12-24 DIAGNOSIS — I82.B12 ACUTE EMBOLISM AND THROMBOSIS OF LEFT SUBCLAVIAN VEIN: ICD-10-CM

## 2018-12-24 DIAGNOSIS — Z90.49 ACQUIRED ABSENCE OF OTHER SPECIFIED PARTS OF DIGESTIVE TRACT: ICD-10-CM

## 2018-12-24 DIAGNOSIS — Z89.422 ACQUIRED ABSENCE OF OTHER LEFT TOE(S): ICD-10-CM

## 2018-12-24 DIAGNOSIS — I10 ESSENTIAL (PRIMARY) HYPERTENSION: ICD-10-CM

## 2018-12-24 DIAGNOSIS — M19.90 UNSPECIFIED OSTEOARTHRITIS, UNSPECIFIED SITE: ICD-10-CM
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
## 2018-12-24 DIAGNOSIS — Z89.021 ACQUIRED ABSENCE OF RIGHT FINGER(S): ICD-10-CM

## 2018-12-24 DIAGNOSIS — I42.9 CARDIOMYOPATHY, UNSPECIFIED: ICD-10-CM

## 2018-12-24 DIAGNOSIS — Z98.84 BARIATRIC SURGERY STATUS: Chronic | ICD-10-CM

## 2018-12-24 DIAGNOSIS — G47.33 OBSTRUCTIVE SLEEP APNEA (ADULT) (PEDIATRIC): ICD-10-CM

## 2018-12-24 DIAGNOSIS — T82.868A THROMBOSIS DUE TO VASCULAR PROSTHETIC DEVICES, IMPLANTS AND GRAFTS, INITIAL ENCOUNTER: ICD-10-CM

## 2018-12-24 PROCEDURE — 93971 EXTREMITY STUDY: CPT | Mod: 26

## 2018-12-24 PROCEDURE — 93931 UPPER EXTREMITY STUDY: CPT | Mod: 26

## 2018-12-25 ENCOUNTER — TRANSCRIPTION ENCOUNTER (OUTPATIENT)
Age: 43
End: 2018-12-25

## 2018-12-25 VITALS — HEART RATE: 70 BPM | SYSTOLIC BLOOD PRESSURE: 125 MMHG | TEMPERATURE: 98 F | DIASTOLIC BLOOD PRESSURE: 70 MMHG

## 2018-12-25 PROBLEM — I42.9 CARDIOMYOPATHY, UNSPECIFIED: Chronic | Status: INACTIVE | Noted: 2018-02-15 | Resolved: 2018-12-25

## 2018-12-25 LAB
ALBUMIN SERPL ELPH-MCNC: 3.6 G/DL — SIGNIFICANT CHANGE UP (ref 3.5–5.2)
ALBUMIN SERPL ELPH-MCNC: 4.1 G/DL — SIGNIFICANT CHANGE UP (ref 3.5–5.2)
ALP SERPL-CCNC: 113 U/L — SIGNIFICANT CHANGE UP (ref 30–115)
ALP SERPL-CCNC: 126 U/L — HIGH (ref 30–115)
ALT FLD-CCNC: 17 U/L — SIGNIFICANT CHANGE UP (ref 0–41)
ALT FLD-CCNC: 21 U/L — SIGNIFICANT CHANGE UP (ref 0–41)
ANION GAP SERPL CALC-SCNC: 13 MMOL/L — SIGNIFICANT CHANGE UP (ref 7–14)
ANION GAP SERPL CALC-SCNC: 14 MMOL/L — SIGNIFICANT CHANGE UP (ref 7–14)
APTT BLD: 100.5 SEC — CRITICAL HIGH (ref 27–39.2)
APTT BLD: 33.6 SEC — SIGNIFICANT CHANGE UP (ref 27–39.2)
APTT BLD: 97.1 SEC — CRITICAL HIGH (ref 27–39.2)
AST SERPL-CCNC: 17 U/L — SIGNIFICANT CHANGE UP (ref 0–41)
AST SERPL-CCNC: 28 U/L — SIGNIFICANT CHANGE UP (ref 0–41)
BASOPHILS # BLD AUTO: 0.04 K/UL — SIGNIFICANT CHANGE UP (ref 0–0.2)
BASOPHILS # BLD AUTO: 0.05 K/UL — SIGNIFICANT CHANGE UP (ref 0–0.2)
BASOPHILS NFR BLD AUTO: 0.6 % — SIGNIFICANT CHANGE UP (ref 0–1)
BASOPHILS NFR BLD AUTO: 0.7 % — SIGNIFICANT CHANGE UP (ref 0–1)
BILIRUB SERPL-MCNC: 0.4 MG/DL — SIGNIFICANT CHANGE UP (ref 0.2–1.2)
BILIRUB SERPL-MCNC: 0.6 MG/DL — SIGNIFICANT CHANGE UP (ref 0.2–1.2)
BUN SERPL-MCNC: 13 MG/DL — SIGNIFICANT CHANGE UP (ref 10–20)
BUN SERPL-MCNC: 15 MG/DL — SIGNIFICANT CHANGE UP (ref 10–20)
CALCIUM SERPL-MCNC: 9.1 MG/DL — SIGNIFICANT CHANGE UP (ref 8.5–10.1)
CALCIUM SERPL-MCNC: 9.5 MG/DL — SIGNIFICANT CHANGE UP (ref 8.5–10.1)
CHLORIDE SERPL-SCNC: 101 MMOL/L — SIGNIFICANT CHANGE UP (ref 98–110)
CHLORIDE SERPL-SCNC: 102 MMOL/L — SIGNIFICANT CHANGE UP (ref 98–110)
CO2 SERPL-SCNC: 25 MMOL/L — SIGNIFICANT CHANGE UP (ref 17–32)
CO2 SERPL-SCNC: 25 MMOL/L — SIGNIFICANT CHANGE UP (ref 17–32)
CREAT SERPL-MCNC: 0.8 MG/DL — SIGNIFICANT CHANGE UP (ref 0.7–1.5)
CREAT SERPL-MCNC: 0.9 MG/DL — SIGNIFICANT CHANGE UP (ref 0.7–1.5)
EOSINOPHIL # BLD AUTO: 0.22 K/UL — SIGNIFICANT CHANGE UP (ref 0–0.7)
EOSINOPHIL # BLD AUTO: 0.23 K/UL — SIGNIFICANT CHANGE UP (ref 0–0.7)
EOSINOPHIL NFR BLD AUTO: 2.9 % — SIGNIFICANT CHANGE UP (ref 0–8)
EOSINOPHIL NFR BLD AUTO: 3.7 % — SIGNIFICANT CHANGE UP (ref 0–8)
GLUCOSE SERPL-MCNC: 89 MG/DL — SIGNIFICANT CHANGE UP (ref 70–99)
GLUCOSE SERPL-MCNC: 89 MG/DL — SIGNIFICANT CHANGE UP (ref 70–99)
HCT VFR BLD CALC: 37.4 % — SIGNIFICANT CHANGE UP (ref 37–47)
HCT VFR BLD CALC: 38.4 % — SIGNIFICANT CHANGE UP (ref 37–47)
HGB BLD-MCNC: 11.8 G/DL — LOW (ref 12–16)
HGB BLD-MCNC: 12.1 G/DL — SIGNIFICANT CHANGE UP (ref 12–16)
IMM GRANULOCYTES NFR BLD AUTO: 0.3 % — SIGNIFICANT CHANGE UP (ref 0.1–0.3)
IMM GRANULOCYTES NFR BLD AUTO: 0.4 % — HIGH (ref 0.1–0.3)
INR BLD: 1.09 RATIO — SIGNIFICANT CHANGE UP (ref 0.65–1.3)
INR BLD: 1.1 RATIO — SIGNIFICANT CHANGE UP (ref 0.65–1.3)
LYMPHOCYTES # BLD AUTO: 2.6 K/UL — SIGNIFICANT CHANGE UP (ref 1.2–3.4)
LYMPHOCYTES # BLD AUTO: 2.9 K/UL — SIGNIFICANT CHANGE UP (ref 1.2–3.4)
LYMPHOCYTES # BLD AUTO: 38.2 % — SIGNIFICANT CHANGE UP (ref 20.5–51.1)
LYMPHOCYTES # BLD AUTO: 41.3 % — SIGNIFICANT CHANGE UP (ref 20.5–51.1)
MAGNESIUM SERPL-MCNC: 1.9 MG/DL — SIGNIFICANT CHANGE UP (ref 1.8–2.4)
MCHC RBC-ENTMCNC: 24.6 PG — LOW (ref 27–31)
MCHC RBC-ENTMCNC: 24.8 PG — LOW (ref 27–31)
MCHC RBC-ENTMCNC: 31.5 G/DL — LOW (ref 32–37)
MCHC RBC-ENTMCNC: 31.6 G/DL — LOW (ref 32–37)
MCV RBC AUTO: 78.2 FL — LOW (ref 81–99)
MCV RBC AUTO: 78.7 FL — LOW (ref 81–99)
MONOCYTES # BLD AUTO: 0.45 K/UL — SIGNIFICANT CHANGE UP (ref 0.1–0.6)
MONOCYTES # BLD AUTO: 0.47 K/UL — SIGNIFICANT CHANGE UP (ref 0.1–0.6)
MONOCYTES NFR BLD AUTO: 6.2 % — SIGNIFICANT CHANGE UP (ref 1.7–9.3)
MONOCYTES NFR BLD AUTO: 7.2 % — SIGNIFICANT CHANGE UP (ref 1.7–9.3)
NEUTROPHILS # BLD AUTO: 2.95 K/UL — SIGNIFICANT CHANGE UP (ref 1.4–6.5)
NEUTROPHILS # BLD AUTO: 3.92 K/UL — SIGNIFICANT CHANGE UP (ref 1.4–6.5)
NEUTROPHILS NFR BLD AUTO: 46.9 % — SIGNIFICANT CHANGE UP (ref 42.2–75.2)
NEUTROPHILS NFR BLD AUTO: 51.6 % — SIGNIFICANT CHANGE UP (ref 42.2–75.2)
PLATELET # BLD AUTO: 220 K/UL — SIGNIFICANT CHANGE UP (ref 130–400)
PLATELET # BLD AUTO: 243 K/UL — SIGNIFICANT CHANGE UP (ref 130–400)
POTASSIUM SERPL-MCNC: 4.3 MMOL/L — SIGNIFICANT CHANGE UP (ref 3.5–5)
POTASSIUM SERPL-MCNC: 5.5 MMOL/L — HIGH (ref 3.5–5)
POTASSIUM SERPL-SCNC: 4.3 MMOL/L — SIGNIFICANT CHANGE UP (ref 3.5–5)
POTASSIUM SERPL-SCNC: 5.5 MMOL/L — HIGH (ref 3.5–5)
PROT SERPL-MCNC: 6.4 G/DL — SIGNIFICANT CHANGE UP (ref 6–8)
PROT SERPL-MCNC: 7.9 G/DL — SIGNIFICANT CHANGE UP (ref 6–8)
PROTHROM AB SERPL-ACNC: 12.5 SEC — SIGNIFICANT CHANGE UP (ref 9.95–12.87)
PROTHROM AB SERPL-ACNC: 12.6 SEC — SIGNIFICANT CHANGE UP (ref 9.95–12.87)
RBC # BLD: 4.75 M/UL — SIGNIFICANT CHANGE UP (ref 4.2–5.4)
RBC # BLD: 4.91 M/UL — SIGNIFICANT CHANGE UP (ref 4.2–5.4)
RBC # FLD: 13.3 % — SIGNIFICANT CHANGE UP (ref 11.5–14.5)
RBC # FLD: 13.3 % — SIGNIFICANT CHANGE UP (ref 11.5–14.5)
SODIUM SERPL-SCNC: 140 MMOL/L — SIGNIFICANT CHANGE UP (ref 135–146)
SODIUM SERPL-SCNC: 140 MMOL/L — SIGNIFICANT CHANGE UP (ref 135–146)
WBC # BLD: 6.29 K/UL — SIGNIFICANT CHANGE UP (ref 4.8–10.8)
WBC # BLD: 7.59 K/UL — SIGNIFICANT CHANGE UP (ref 4.8–10.8)
WBC # FLD AUTO: 6.29 K/UL — SIGNIFICANT CHANGE UP (ref 4.8–10.8)
WBC # FLD AUTO: 7.59 K/UL — SIGNIFICANT CHANGE UP (ref 4.8–10.8)

## 2018-12-25 PROCEDURE — 99222 1ST HOSP IP/OBS MODERATE 55: CPT

## 2018-12-25 RX ORDER — DOCUSATE SODIUM 100 MG
100 CAPSULE ORAL
Qty: 0 | Refills: 0 | Status: DISCONTINUED | OUTPATIENT
Start: 2018-12-25 | End: 2018-12-25

## 2018-12-25 RX ORDER — CHLORHEXIDINE GLUCONATE 213 G/1000ML
1 SOLUTION TOPICAL
Qty: 0 | Refills: 0 | Status: DISCONTINUED | OUTPATIENT
Start: 2018-12-25 | End: 2018-12-25

## 2018-12-25 RX ORDER — METOPROLOL TARTRATE 50 MG
50 TABLET ORAL DAILY
Qty: 0 | Refills: 0 | Status: DISCONTINUED | OUTPATIENT
Start: 2018-12-25 | End: 2018-12-25

## 2018-12-25 RX ORDER — HEPARIN SODIUM 5000 [USP'U]/ML
INJECTION INTRAVENOUS; SUBCUTANEOUS
Qty: 25000 | Refills: 0 | Status: DISCONTINUED | OUTPATIENT
Start: 2018-12-25 | End: 2018-12-25

## 2018-12-25 RX ORDER — APIXABAN 2.5 MG/1
1 TABLET, FILM COATED ORAL
Qty: 60 | Refills: 0 | OUTPATIENT
Start: 2018-12-25 | End: 2019-01-23

## 2018-12-25 RX ORDER — APIXABAN 2.5 MG/1
5 TABLET, FILM COATED ORAL EVERY 12 HOURS
Qty: 0 | Refills: 0 | Status: DISCONTINUED | OUTPATIENT
Start: 2018-12-25 | End: 2018-12-25

## 2018-12-25 RX ORDER — APIXABAN 2.5 MG/1
10 TABLET, FILM COATED ORAL EVERY 12 HOURS
Qty: 0 | Refills: 0 | Status: DISCONTINUED | OUTPATIENT
Start: 2018-12-25 | End: 2018-12-25

## 2018-12-25 RX ORDER — OXYCODONE AND ACETAMINOPHEN 5; 325 MG/1; MG/1
1 TABLET ORAL EVERY 6 HOURS
Qty: 0 | Refills: 0 | Status: DISCONTINUED | OUTPATIENT
Start: 2018-12-25 | End: 2018-12-25

## 2018-12-25 RX ORDER — FERROUS SULFATE 325(65) MG
325 TABLET ORAL DAILY
Qty: 0 | Refills: 0 | Status: DISCONTINUED | OUTPATIENT
Start: 2018-12-25 | End: 2018-12-25

## 2018-12-25 RX ORDER — APIXABAN 2.5 MG/1
2 TABLET, FILM COATED ORAL
Qty: 28 | Refills: 0 | OUTPATIENT
Start: 2018-12-25 | End: 2018-12-31

## 2018-12-25 RX ORDER — APIXABAN 2.5 MG/1
2 TABLET, FILM COATED ORAL
Qty: 0 | Refills: 0 | COMMUNITY
Start: 2018-12-25

## 2018-12-25 RX ORDER — HEPARIN SODIUM 5000 [USP'U]/ML
6500 INJECTION INTRAVENOUS; SUBCUTANEOUS EVERY 6 HOURS
Qty: 0 | Refills: 0 | Status: DISCONTINUED | OUTPATIENT
Start: 2018-12-25 | End: 2018-12-25

## 2018-12-25 RX ORDER — HEPARIN SODIUM 5000 [USP'U]/ML
6500 INJECTION INTRAVENOUS; SUBCUTANEOUS ONCE
Qty: 0 | Refills: 0 | Status: COMPLETED | OUTPATIENT
Start: 2018-12-25 | End: 2018-12-25

## 2018-12-25 RX ORDER — ERGOCALCIFEROL 1.25 MG/1
50000 CAPSULE ORAL
Qty: 0 | Refills: 0 | Status: DISCONTINUED | OUTPATIENT
Start: 2018-12-25 | End: 2018-12-25

## 2018-12-25 RX ORDER — APIXABAN 2.5 MG/1
1 TABLET, FILM COATED ORAL
Qty: 0 | Refills: 0 | COMMUNITY
Start: 2018-12-25

## 2018-12-25 RX ORDER — HEPARIN SODIUM 5000 [USP'U]/ML
3000 INJECTION INTRAVENOUS; SUBCUTANEOUS EVERY 6 HOURS
Qty: 0 | Refills: 0 | Status: DISCONTINUED | OUTPATIENT
Start: 2018-12-25 | End: 2018-12-25

## 2018-12-25 RX ADMIN — ERGOCALCIFEROL 50000 UNIT(S): 1.25 CAPSULE ORAL at 11:23

## 2018-12-25 RX ADMIN — Medication 50 MILLIGRAM(S): at 06:28

## 2018-12-25 RX ADMIN — HEPARIN SODIUM 6500 UNIT(S): 5000 INJECTION INTRAVENOUS; SUBCUTANEOUS at 02:33

## 2018-12-25 RX ADMIN — HEPARIN SODIUM 1400 UNIT(S)/HR: 5000 INJECTION INTRAVENOUS; SUBCUTANEOUS at 02:33

## 2018-12-25 RX ADMIN — Medication 1 TABLET(S): at 11:23

## 2018-12-25 RX ADMIN — Medication 100 MILLIGRAM(S): at 06:28

## 2018-12-25 RX ADMIN — Medication 325 MILLIGRAM(S): at 11:23

## 2018-12-25 NOTE — H&P ADULT - NSHPPHYSICALEXAM_GEN_ALL_CORE
Pt rec'd b12 inj in left hip as ordered w/o prob  
GENERAL: NAD, well-developed  HEAD:  Atraumatic, Normocephalic  EYES: EOMI, PERRLA  NECK: Supple, No JVD  CHEST/LUNG: Clear to auscultation bilaterally; No wheeze  HEART: Regular rate and rhythm; No murmurs, rubs, or gallops  ABDOMEN: Soft, Nontender, Nondistended; Bowel sounds present  EXTREMITIES: + right and left hand amputation. + wound vac on right heel. + LUE swelling and mild tenderness on palpation, no erythema  PSYCH: AAOx3  NEUROLOGY: non-focal  SKIN: No rashes or lesions

## 2018-12-25 NOTE — H&P ADULT - NSHPLABSRESULTS_GEN_ALL_CORE
T(C): 36.1 (12-24-18 @ 22:20), Max: 36.1 (12-24-18 @ 22:20)  T(F): 97 (12-24-18 @ 22:20), Max: 97 (12-24-18 @ 22:20)  HR: 83 (12-24-18 @ 22:20) (83 - 83)  BP: 133/89 (12-24-18 @ 22:20) (133/89 - 133/89)  RR: 18 (12-24-18 @ 22:20) (18 - 18)  SpO2: 98% (12-24-18 @ 22:20) (98% - 98%)  Labs:                         12.1   7.59    )-----------(   243      ( 24 Dec 2018 23:17 )              38.4     Neutro%  51.6    Lympho%  38.2    Mono%    6.2     Bands    x        12-24    140  |  101  |  15  ----------------------------<  89  5.5<H>   |  25  |  0.9  hemolyzed    Ca    9.5      24 Dec 2018 23:17    TPro  7.9  /  Alb  4.1  /  TBili  0.4  /  DBili  x   /  AST  28  /  ALT  21  /  AlkPhos  126<H>  12-24    eGFR if Non African American: 78 mL/min/1.73M2 (12-24-18 @ 23:17)  eGFR if : 90 mL/min/1.73M2 (12-24-18 @ 23:17)      ( 24 Dec 2018 23:17 )   PT: 12.60 sec;   INR: 1.10 ratio;       PTT:33.6 sec

## 2018-12-25 NOTE — ED PROVIDER NOTE - OBJECTIVE STATEMENT
42 yo F pmh of severe sepsis with multiple pressers that developed vasoconstriction requiring amputation of all 4 extremities. Patient presenting with left arm swelling and pain x 3 days. Had picc line removed this was wednesday and since has been having worsening pain and swelling to the left upper extremity. no fevers, no cp, no sob. PICC line was treating an osteomyelitis of the right heel, course completed.

## 2018-12-25 NOTE — DISCHARGE NOTE ADULT - CARE PROVIDERS DIRECT ADDRESSES
,DirectAddress_Unknown,[email or web address] [email or web address]

## 2018-12-25 NOTE — ED PROVIDER NOTE - MEDICAL DECISION MAKING DETAILS
patient seen by vascular, as recommended, heparin is started and patient is admitted to Medicine for diffuse DVT in multiple veins with decreased pulses pending official arterial duplex readings and vascular consult reevaluation.

## 2018-12-25 NOTE — CONSULT NOTE ADULT - ASSESSMENT
ASSESSMENT: 44 Y/O female with PMHx of HTN,Gastric bypass, sepsis in 2017 requiring vesopresor use leading to necrosis of her digits. She has partial right hand amputation, left wrist amputation, b/l LE TMAs? Patient reports having a picc line placed last month in her LUE for antibiotic administration due to OM of LE. Patient developed pain in her LUE on Thursday and The picc line was dced last Friday. Patient reports she has finished her course of IV Abx. Today she reports developing swelling in her LUE. She was found to have extensive DVT in LUE     Plan:   Heparin drip  f/u official read on venous duplex    Discussed with vascular fellow Dr Brooke

## 2018-12-25 NOTE — DISCHARGE NOTE ADULT - CARE PLAN
Principal Discharge DX:	Acute deep vein thrombosis (DVT) of left upper extremity, unspecified vein  Goal:	c/w eliquis for AC for 3 months and keep left arm elevated.  Assessment and plan of treatment:	c/w eliquis for AC for 3 months, FU PCP outpatient in 2 weeks for FU and monitoring of blood workup.  Secondary Diagnosis:	Gangrene of finger of left hand  Secondary Diagnosis:	Gangrene of lower extremity  Secondary Diagnosis:	H/O gastric bypass  Secondary Diagnosis:	HTN (hypertension)  Secondary Diagnosis:	Osteomyelitis  Secondary Diagnosis:	S/P amputation  Goal:	fu vascular in 3 months Principal Discharge DX:	Acute deep vein thrombosis (DVT) of left upper extremity, unspecified vein  Goal:	c/w eliquis for AC for 3 months and keep left arm elevated.  Assessment and plan of treatment:	continue with eliquis for AC for 3 months, FU PCP outpatient in 2 weeks for FU and monitoring of blood workup. follow up with vascular in 3 months.  Secondary Diagnosis:	Gangrene of finger of left hand  Secondary Diagnosis:	Gangrene of lower extremity  Secondary Diagnosis:	H/O gastric bypass  Secondary Diagnosis:	HTN (hypertension)  Secondary Diagnosis:	Osteomyelitis  Secondary Diagnosis:	S/P amputation  Goal:	fu vascular in 3 months

## 2018-12-25 NOTE — ED PROVIDER NOTE - ATTENDING CONTRIBUTION TO CARE
Patient is c/o LUE pain/swelling x 3 days, had PICC line in the past, denies trauma. Denies cp/sob/f/c.   lungs: CTA  LUE exam: +swelling, +tender, decreased radial/ulnar pulses, mild cold forearm stump.   A/P: LUE pain/swelling with decreased pulses and mildly cold stump  US, vascular consult  reevaluation

## 2018-12-25 NOTE — DISCHARGE NOTE ADULT - PLAN OF CARE
c/w eliquis for AC for 3 months and keep left arm elevated. c/w eliquis for AC for 3 months, FU PCP outpatient in 2 weeks for FU and monitoring of blood workup. fu vascular in 3 months continue with eliquis for AC for 3 months, FU PCP outpatient in 2 weeks for FU and monitoring of blood workup. follow up with vascular in 3 months.

## 2018-12-25 NOTE — H&P ADULT - PMH
ARDS survivor    Gangrene of finger of left hand    Gangrene of finger of right hand    Gangrene of lower extremity    HTN (hypertension)    Osteoarthritis    Osteomyelitis    Sepsis    Sleep apnea    Small bowel obstruction

## 2018-12-25 NOTE — H&P ADULT - ASSESSMENT
42 yo F with pmhx as stated presents for LUE swelling and pain found to have extensive DVT on the arm.    # LUE pain and swelling 2/2 DVT  - continue with heparin drip for now  - follow up venous duplex report  - follow up with vascular surgery    # HTN- controlled  - c/w metoprolol ER 50mg qd    # right heel OM  - per pt, need more debridement. wound is still open  - s/p IV Ertapenem course finished  - continue with wound vac   - surgical pathology: right heel acute OM. + filamentous organism suspicious of actinomyces  - consider ID/ burn/ podiatry    # dvt ppx: on heparin drip  - no need for GI ppx for now 42 yo F with pmhx as stated presents for LUE swelling and pain found to have extensive DVT on the arm.    # LUE pain and swelling 2/2 DVT  - continue with heparin drip for now  - follow up venous duplex report  - follow up with vascular surgery    # HTN- controlled  - c/w metoprolol ER 50mg qd    # right heel OM  - per pt, need more debridement. wound is still open  - s/p IV Ertapenem course finished  - continue with wound vac   - surgical pathology: right heel acute OM. + filamentous organism suspicious of actinomyces  - consider ID    # dvt ppx: on heparin drip  - no need for GI ppx for now

## 2018-12-25 NOTE — H&P ADULT - ATTENDING COMMENTS
I saw and examined the patient independently.    Patient had LUE doppler consistent with extensive dvt CECIL  [facility]     medically ready for discharge home today.

## 2018-12-25 NOTE — ED PROVIDER NOTE - PHYSICAL EXAMINATION
CONSTITUTIONAL: Well-developed; well-nourished; in no acute distress.   SKIN: warm, dry  HEAD: Normocephalic; atraumatic.  EYES: PERRL, no conjunctival erythema  ENT: No nasal discharge; airway clear.  NECK: Supple; non tender.  CARD: S1, S2 normal;  Regular rate and rhythm.   RESP: No wheezes, rales or rhonchi.  ABD: soft non tender, non distended, no rebound or guarding  EXT: Normal ROM.  amputation of finger in both upper extremities and bilateral feet. + edema and tenderness over the left upper extremity   LYMPH: No acute cervical adenopathy.  NEURO: Alert, oriented, grossly unremarkable. neurovascularly intact

## 2018-12-25 NOTE — DISCHARGE NOTE ADULT - MEDICATION SUMMARY - MEDICATIONS TO TAKE
I will START or STAY ON the medications listed below when I get home from the hospital:    oxyCODONE-acetaminophen 5 mg-325 mg oral tablet  -- 1 tab(s) by mouth every 6 hours, As Needed -for severe pain MDD:4   -- Caution federal law prohibits the transfer of this drug to any person other  than the person for whom it was prescribed.  May cause drowsiness.  Alcohol may intensify this effect.  Use care when operating dangerous machinery.  This prescription cannot be refilled.  This product contains acetaminophen.  Do not use  with any other product containing acetaminophen to prevent possible liver damage.  Using more of this medication than prescribed may cause serious breathing problems.    -- Indication: For pain    apixaban 5 mg oral tablet  -- 2 tab(s) by mouth every 12 hours  -- Indication: For DVT    apixaban 5 mg oral tablet  -- 1 tab(s) by mouth every 12 hours  -- Indication: For DVT    apixaban 5 mg oral tablet  -- 2 tab(s) by mouth every 12 hours for 7 days until 1/1/18  -- Indication: For dvt    apixaban 5 mg oral tablet  -- 1 tab(s) by mouth every 12 hours   -- Indication: For dvt    Metoprolol Succinate ER 50 mg oral tablet, extended release  -- 1 tab(s) by mouth once a day  -- Indication: For Hypertension    ferrous sulfate 325 mg (65 mg elemental iron) oral tablet  -- 1 tab(s) by mouth 3 times a day  -- Indication: For Anemia    Vitamin B-100 oral tablet  -- 1 tab(s) by mouth once a day  -- Indication: For Supplement    Vitamin D2 50,000 intl units (1.25 mg) oral capsule  -- 1 cap(s) by mouth once a week  -- Indication: For Supplement

## 2018-12-25 NOTE — ED PROVIDER NOTE - NS ED ROS FT
Eyes:  No visual changes, eye pain or discharge.  ENMT:   no sore throat or runny nose,  Cardiac:  No chest pain, SOB  Respiratory:  No cough or respiratory distress.    GI:  No nausea, vomiting, diarrhea or abdominal pain.  :  No dysuria, frequency or burning.  MS:  No myalgia, muscle weakness, joint pain or back pain.  Neuro:  No headache or weakness.  No LOC.  Skin:  No skin rash. + swelling and pain to the left upper extremity   Endocrine: No history of thyroid disease or diabetes.

## 2018-12-25 NOTE — DISCHARGE NOTE ADULT - HOSPITAL COURSE
44 Y/O female with PMHx of HTN, Gastric bypass complicated with obstruction, sepsis in 2017 requiring vasopressor use leading to necrosis of her digits. She has partial right hand amputation, left wrist amputation, b/l LE TMAs. Pt was admitted to [facility] for bilateral heel debridement and skin graft in November and was discharged with right heel wound vac and PO Bactrim and augmentin. Pt received PICC line insertion on 11/14 and po abx was stopped and switched to 5 weeks of IV ertapenam by ID for right heel OM, per pt course of IV ertapenam ended on 12/20 and the PICC line was removed the next day, 12/21. Pt noticed pain on LUE on 12/20, did not pay too much attention to it initially then the arm became swollen on 12/24 which prompted pt to come to the ED. Pt denies fever, chills, sob , cp, numbness, tingling, weakness. No abd pain, n/v/d/, dysuria.  In ED, VS stable. Pt was found to have extensive DVT on LUE (official report pending) and started on heparin drip.    Patient had LUE doppler consistent with extensive dvt LUE, started on heparin drip, discussed with patient about starting oral AC and patient agree for elequis verbally understanding risks and benefits of NOACS. patient advised to continue elequis for 3 months and FU with PCP for blood work(H/H) monitoring with AC. Patient also advised to keep LUE elevated. Vascular evaluated the patient and recommended to fu outpatient as arterial duplex LUE showed Normal blood flow.     Discussed with CM, patient will continue to receive same services for left foot wound care and home care services as discharged with last visit.    Patient is clinically stable for discharge home.

## 2018-12-25 NOTE — DISCHARGE NOTE ADULT - CARE PROVIDER_API CALL
Kenisha Childs), Internal Medicine  [address] [place] [address]  Phone: [phone]  Fax: [phone]    Alek Hart), Surgery; Vascular Surgery  [address] [place] [address]  Phone: [phone]  Fax: [phone]

## 2018-12-25 NOTE — H&P ADULT - HISTORY OF PRESENT ILLNESS
42 Y/O female with PMHx of HTN, Gastric bypass complicated with obstruction, sepsis in 2017 requiring vasopressor use leading to necrosis of her digits. She has partial right hand amputation, left wrist amputation, b/l LE TMAs. Pt was admitted to [facility] for bilateral heel debridement and skin graft and was discharged with right heel wound vac and PO Bactrim and augmentin. Pt received PICC line insertion on 11/14 and started on 5 weeks of IV ertapenam by ID for right heel OM, course of IV ertapenam ended on 12/20 and the PICC line was removed the next day. Pt noticed pain on LUE on 12/20, did not pay too much attention to it initially then the arm became swollen on 12/24 which prompted pt to come to the ED. Pt denies fever, chills, sob , cp, numbness, tingling, weakness. No abd pain, n/v/d/, dysuria.  In ED, VS stable. Pt was found to have extensive DVT on LUE (official report pending) and started on heparin drip. 44 Y/O female with PMHx of HTN, Gastric bypass complicated with obstruction, sepsis in 2017 requiring vasopressor use leading to necrosis of her digits. She has partial right hand amputation, left wrist amputation, b/l LE TMAs. Pt was admitted to [facility] for bilateral heel debridement and skin graft in November and was discharged with right heel wound vac and PO Bactrim and augmentin. Pt received PICC line insertion on 11/14 and started on 5 weeks of IV ertapenam by ID for right heel OM, per pt course of IV ertapenam ended on 12/20 and the PICC line was removed the next day, 12/21. Pt noticed pain on LUE on 12/20, did not pay too much attention to it initially then the arm became swollen on 12/24 which prompted pt to come to the ED. Pt denies fever, chills, sob , cp, numbness, tingling, weakness. No abd pain, n/v/d/, dysuria.  In ED, VS stable. Pt was found to have extensive DVT on LUE (official report pending) and started on heparin drip. 44 Y/O female with PMHx of HTN, Gastric bypass complicated with obstruction, sepsis in 2017 requiring vasopressor use leading to necrosis of her digits. She has partial right hand amputation, left wrist amputation, b/l LE TMAs. Pt was admitted to [facility] for bilateral heel debridement and skin graft in November and was discharged with right heel wound vac and PO Bactrim and augmentin. Pt received PICC line insertion on 11/14 and po abx was stopped and switched to 5 weeks of IV ertapenam by ID for right heel OM, per pt course of IV ertapenam ended on 12/20 and the PICC line was removed the next day, 12/21. Pt noticed pain on LUE on 12/20, did not pay too much attention to it initially then the arm became swollen on 12/24 which prompted pt to come to the ED. Pt denies fever, chills, sob , cp, numbness, tingling, weakness. No abd pain, n/v/d/, dysuria.  In ED, VS stable. Pt was found to have extensive DVT on LUE (official report pending) and started on heparin drip.

## 2018-12-25 NOTE — CONSULT NOTE ADULT - ATTENDING COMMENTS
43 year old with new left upper extremity DVT secondary to PICC line    1) start NOAC for anticoagulation  2) arm elevation  3) follow up in office in 3 months

## 2018-12-25 NOTE — DISCHARGE NOTE ADULT - SECONDARY DIAGNOSIS.
Gangrene of finger of left hand Gangrene of lower extremity H/O gastric bypass HTN (hypertension) Osteomyelitis S/P amputation

## 2018-12-25 NOTE — ED ADULT NURSE REASSESSMENT NOTE - NS ED NURSE REASSESS COMMENT FT1
Patient alert and oreitned x4. Resting in bed. Patient note to have b/l feet amputated with wound vac to right foot.  L hand amputated. Right fingers amputated. ESBL to right foot. Contact precautions maintained.
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
## 2018-12-25 NOTE — DISCHARGE NOTE ADULT - PATIENT PORTAL LINK FT
You can access the [facility] Patient Portal, offered by [facility], by registering with the following website: [email or web address]

## 2018-12-25 NOTE — ED ADULT NURSE NOTE - NSIMPLEMENTINTERV_GEN_ALL_ED
Implemented All Fall with Harm Risk Interventions:  [place] to call system. Call bell, personal items and telephone within reach. Instruct patient to call for assistance. Room bathroom lighting operational. Non-slip footwear when patient is off stretcher. Physically safe environment: no spills, clutter or unnecessary equipment. Stretcher in lowest position, wheels locked, appropriate side rails in place. Provide visual cue, wrist band, yellow gown, etc. Monitor gait and stability. Monitor for mental status changes and reorient to person, place, and time. Review medications for side effects contributing to fall risk. Reinforce activity limits and safety measures with patient and family. Provide visual clues: red socks.

## 2018-12-26 RX ORDER — APIXABAN 2.5 MG/1
1 TABLET, FILM COATED ORAL
Qty: 60 | Refills: 0 | OUTPATIENT
Start: 2018-12-26 | End: 2019-01-24

## 2018-12-26 RX ORDER — APIXABAN 2.5 MG/1
2 TABLET, FILM COATED ORAL
Qty: 28 | Refills: 0 | OUTPATIENT
Start: 2018-12-26 | End: 2019-01-01

## 2019-01-03 ENCOUNTER — APPOINTMENT (OUTPATIENT)
Dept: WOUND CARE | Facility: CLINIC | Age: 44
End: 2019-01-03

## 2019-01-03 ENCOUNTER — OUTPATIENT (OUTPATIENT)
Dept: OUTPATIENT SERVICES | Facility: HOSPITAL | Age: 44
LOS: 1 days | Discharge: HOME | End: 2019-01-03
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
## 2019-01-03 DIAGNOSIS — S91.301A UNSPECIFIED OPEN WOUND, RIGHT FOOT, INITIAL ENCOUNTER: ICD-10-CM

## 2019-01-03 DIAGNOSIS — Z98.890 OTHER SPECIFIED POSTPROCEDURAL STATES: Chronic | ICD-10-CM

## 2019-01-03 DIAGNOSIS — S68.119A COMPLETE TRAUMATIC METACARPOPHALANGEAL AMPUTATION OF UNSPECIFIED FINGER, INITIAL ENCOUNTER: Chronic | ICD-10-CM

## 2019-01-03 DIAGNOSIS — Z98.84 BARIATRIC SURGERY STATUS: Chronic | ICD-10-CM

## 2019-01-03 DIAGNOSIS — Z89.9 ACQUIRED ABSENCE OF LIMB, UNSPECIFIED: Chronic | ICD-10-CM
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
## 2019-01-03 DIAGNOSIS — S91.309A UNSPECIFIED OPEN WOUND, UNSPECIFIED FOOT, INITIAL ENCOUNTER: Chronic | ICD-10-CM

## 2019-01-03 PROBLEM — M86.9 OSTEOMYELITIS, UNSPECIFIED: Chronic | Status: ACTIVE | Noted: 2018-12-25

## 2019-01-04 ENCOUNTER — OUTPATIENT (OUTPATIENT)
Dept: OUTPATIENT SERVICES | Facility: HOSPITAL | Age: 44
LOS: 1 days | Discharge: HOME | End: 2019-01-04

## 2019-01-04 ENCOUNTER — APPOINTMENT (OUTPATIENT)
Dept: PODIATRY | Facility: CLINIC | Age: 44
End: 2019-01-04
Payer: COMMERCIAL

## 2019-01-04 VITALS
SYSTOLIC BLOOD PRESSURE: 115 MMHG | DIASTOLIC BLOOD PRESSURE: 82 MMHG | WEIGHT: 180 LBS | HEIGHT: 68 IN | BODY MASS INDEX: 27.28 KG/M2

## 2019-01-04 DIAGNOSIS — L97.509 NON-PRESSURE CHRONIC ULCER OF OTHER PART OF UNSPECIFIED FOOT WITH UNSPECIFIED SEVERITY: ICD-10-CM

## 2019-01-04 DIAGNOSIS — S91.309A UNSPECIFIED OPEN WOUND, UNSPECIFIED FOOT, INITIAL ENCOUNTER: Chronic | ICD-10-CM

## 2019-01-04 DIAGNOSIS — S91.309D UNSPECIFIED OPEN WOUND, UNSPECIFIED FOOT, SUBSEQUENT ENCOUNTER: ICD-10-CM

## 2019-01-04 DIAGNOSIS — Z98.890 OTHER SPECIFIED POSTPROCEDURAL STATES: Chronic | ICD-10-CM

## 2019-01-04 DIAGNOSIS — Z89.9 ACQUIRED ABSENCE OF LIMB, UNSPECIFIED: Chronic | ICD-10-CM

## 2019-01-04 DIAGNOSIS — Z98.84 BARIATRIC SURGERY STATUS: Chronic | ICD-10-CM

## 2019-01-04 DIAGNOSIS — S68.119A COMPLETE TRAUMATIC METACARPOPHALANGEAL AMPUTATION OF UNSPECIFIED FINGER, INITIAL ENCOUNTER: Chronic | ICD-10-CM

## 2019-01-04 PROCEDURE — 11042 DBRDMT SUBQ TIS 1ST 20SQCM/<: CPT | Mod: RT,59

## 2019-01-04 PROCEDURE — 97605 NEG PRS WND THER DME<=50SQCM: CPT | Mod: 59,RT

## 2019-01-09 NOTE — PHYSICAL EXAM
[Healing] : healing [Size%: ______] : Size: [unfilled]% [Infected?] : Infected: No [3] : 3 out of 10 [Abnormal] : abnormal [Small] : small  [] : no [de-identified] : calcium alginate [de-identified] : right foot\par \par right foot-->  2x 0.5cm with exposed bone--> cont wound vac

## 2019-01-09 NOTE — HISTORY OF PRESENT ILLNESS
[Did you have an operation on your burn/wound injury?] : Did you have an operation on your burn/wound injury? Yes [Did this injury occur on the job?] : Did this injury occur on the job? No [de-identified] : bilateral feet wounds post bone debridement [de-identified] : healing wound right heel
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
## 2019-01-24 ENCOUNTER — APPOINTMENT (OUTPATIENT)
Dept: BURN CARE | Facility: CLINIC | Age: 44
End: 2019-01-24

## 2019-01-25 ENCOUNTER — OUTPATIENT (OUTPATIENT)
Dept: OUTPATIENT SERVICES | Facility: HOSPITAL | Age: 44
LOS: 1 days | Discharge: HOME | End: 2019-01-25

## 2019-01-25 ENCOUNTER — APPOINTMENT (OUTPATIENT)
Dept: PODIATRY | Facility: CLINIC | Age: 44
End: 2019-01-25

## 2019-01-25 VITALS
OXYGEN SATURATION: 97 % | DIASTOLIC BLOOD PRESSURE: 89 MMHG | SYSTOLIC BLOOD PRESSURE: 132 MMHG | WEIGHT: 181 LBS | HEART RATE: 84 BPM | TEMPERATURE: 97 F | HEIGHT: 68 IN | RESPIRATION RATE: 17 BRPM

## 2019-01-25 DIAGNOSIS — Z98.890 OTHER SPECIFIED POSTPROCEDURAL STATES: Chronic | ICD-10-CM

## 2019-01-25 DIAGNOSIS — Z89.9 ACQUIRED ABSENCE OF LIMB, UNSPECIFIED: Chronic | ICD-10-CM

## 2019-01-25 DIAGNOSIS — S68.119A COMPLETE TRAUMATIC METACARPOPHALANGEAL AMPUTATION OF UNSPECIFIED FINGER, INITIAL ENCOUNTER: Chronic | ICD-10-CM

## 2019-01-25 DIAGNOSIS — S91.309D UNSPECIFIED OPEN WOUND, UNSPECIFIED FOOT, SUBSEQUENT ENCOUNTER: ICD-10-CM

## 2019-01-25 DIAGNOSIS — Z01.818 ENCOUNTER FOR OTHER PREPROCEDURAL EXAMINATION: ICD-10-CM

## 2019-01-25 DIAGNOSIS — Z98.84 BARIATRIC SURGERY STATUS: Chronic | ICD-10-CM

## 2019-01-25 DIAGNOSIS — S91.309A UNSPECIFIED OPEN WOUND, UNSPECIFIED FOOT, INITIAL ENCOUNTER: Chronic | ICD-10-CM

## 2019-01-25 LAB
ALBUMIN SERPL ELPH-MCNC: 4.3 G/DL — SIGNIFICANT CHANGE UP (ref 3.5–5.2)
ALP SERPL-CCNC: 117 U/L — HIGH (ref 30–115)
ALT FLD-CCNC: 17 U/L — SIGNIFICANT CHANGE UP (ref 0–41)
ANION GAP SERPL CALC-SCNC: 15 MMOL/L — HIGH (ref 7–14)
APTT BLD: 37.1 SEC — SIGNIFICANT CHANGE UP (ref 27–39.2)
AST SERPL-CCNC: 15 U/L — SIGNIFICANT CHANGE UP (ref 0–41)
BASOPHILS # BLD AUTO: 0.04 K/UL — SIGNIFICANT CHANGE UP (ref 0–0.2)
BASOPHILS NFR BLD AUTO: 0.6 % — SIGNIFICANT CHANGE UP (ref 0–1)
BILIRUB SERPL-MCNC: 0.5 MG/DL — SIGNIFICANT CHANGE UP (ref 0.2–1.2)
BUN SERPL-MCNC: 17 MG/DL — SIGNIFICANT CHANGE UP (ref 10–20)
CALCIUM SERPL-MCNC: 9.1 MG/DL — SIGNIFICANT CHANGE UP (ref 8.5–10.1)
CHLORIDE SERPL-SCNC: 100 MMOL/L — SIGNIFICANT CHANGE UP (ref 98–110)
CO2 SERPL-SCNC: 25 MMOL/L — SIGNIFICANT CHANGE UP (ref 17–32)
CREAT SERPL-MCNC: 1 MG/DL — SIGNIFICANT CHANGE UP (ref 0.7–1.5)
EOSINOPHIL # BLD AUTO: 0.07 K/UL — SIGNIFICANT CHANGE UP (ref 0–0.7)
EOSINOPHIL NFR BLD AUTO: 1.1 % — SIGNIFICANT CHANGE UP (ref 0–8)
GLUCOSE SERPL-MCNC: 72 MG/DL — SIGNIFICANT CHANGE UP (ref 70–99)
HCT VFR BLD CALC: 38.5 % — SIGNIFICANT CHANGE UP (ref 37–47)
HGB BLD-MCNC: 11.9 G/DL — LOW (ref 12–16)
IMM GRANULOCYTES NFR BLD AUTO: 0.3 % — SIGNIFICANT CHANGE UP (ref 0.1–0.3)
INR BLD: 1.39 RATIO — HIGH (ref 0.65–1.3)
LYMPHOCYTES # BLD AUTO: 2.39 K/UL — SIGNIFICANT CHANGE UP (ref 1.2–3.4)
LYMPHOCYTES # BLD AUTO: 36.5 % — SIGNIFICANT CHANGE UP (ref 20.5–51.1)
MCHC RBC-ENTMCNC: 24.4 PG — LOW (ref 27–31)
MCHC RBC-ENTMCNC: 30.9 G/DL — LOW (ref 32–37)
MCV RBC AUTO: 79.1 FL — LOW (ref 81–99)
MONOCYTES # BLD AUTO: 0.44 K/UL — SIGNIFICANT CHANGE UP (ref 0.1–0.6)
MONOCYTES NFR BLD AUTO: 6.7 % — SIGNIFICANT CHANGE UP (ref 1.7–9.3)
NEUTROPHILS # BLD AUTO: 3.58 K/UL — SIGNIFICANT CHANGE UP (ref 1.4–6.5)
NEUTROPHILS NFR BLD AUTO: 54.8 % — SIGNIFICANT CHANGE UP (ref 42.2–75.2)
NRBC # BLD: 0 /100 WBCS — SIGNIFICANT CHANGE UP (ref 0–0)
PLATELET # BLD AUTO: 259 K/UL — SIGNIFICANT CHANGE UP (ref 130–400)
POTASSIUM SERPL-MCNC: 5.2 MMOL/L — HIGH (ref 3.5–5)
POTASSIUM SERPL-SCNC: 5.2 MMOL/L — HIGH (ref 3.5–5)
PROT SERPL-MCNC: 7 G/DL — SIGNIFICANT CHANGE UP (ref 6–8)
PROTHROM AB SERPL-ACNC: 15.9 SEC — HIGH (ref 9.95–12.87)
RBC # BLD: 4.87 M/UL — SIGNIFICANT CHANGE UP (ref 4.2–5.4)
RBC # FLD: 14 % — SIGNIFICANT CHANGE UP (ref 11.5–14.5)
SODIUM SERPL-SCNC: 140 MMOL/L — SIGNIFICANT CHANGE UP (ref 135–146)
WBC # BLD: 6.54 K/UL — SIGNIFICANT CHANGE UP (ref 4.8–10.8)
WBC # FLD AUTO: 6.54 K/UL — SIGNIFICANT CHANGE UP (ref 4.8–10.8)
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
## 2019-01-25 NOTE — H&P PST ADULT - HISTORY OF PRESENT ILLNESS
PATIENT DENIES CHEST PAIN, SHORTNESS OF BREATH, PALPITATIONS, COUGHING, FEVER, DYSURIA.  CAN WALK UP 2-3 FLIGHTS OF STEPS WITHOUT SOB. PATIENT DENIES CHEST PAIN, SHORTNESS OF BREATH, PALPITATIONS, COUGHING, FEVER, DYSURIA.  CAN WALK UP 2-3 FLIGHTS OF STEPS WITHOUT SOB.    ON CONTACT ISOLATION ESBL-ECOLI

## 2019-01-25 NOTE — H&P PST ADULT - REASON FOR ADMISSION
42 Y/O FEMALE HERE FOR PRE-ADMISSION SURGICAL TESTING. PATIENT REPORTS S/P RYGB 2017. HAD A SBO AND WENT INTO MULTI SYSTEM ORGAN FAILURE AND WAS SEPTIC. SHE WAS ON VASOPRESSORS. SHE IN TURN HAS TO HAVE HER RIGHT FINGERS, LEFT HAND AND B/L TOES AMPUTATED  NOW FOR SCHEDULED PROCEDURE. 44 Y/O FEMALE HERE FOR PRE-ADMISSION SURGICAL TESTING. PATIENT REPORTS S/P RYGB 2017. HAD A SBO AND WENT INTO MULTI SYSTEM ORGAN FAILURE AND WAS SEPTIC. SHE WAS ON VASOPRESSORS. SHE IN TURN HAS TO HAVE HER RIGHT FINGERS, LEFT HAND AND B/L TOES AMPUTATED AS A RESULT. SHE NOW HAS A RIGHT OPEN FOOT WOUND THAT'S NOT HEALING.  NOW FOR SCHEDULED PROCEDURE.

## 2019-01-25 NOTE — H&P PST ADULT - PSH
Amputation finger  ALL 5 RIGHT HAND  H/O exploratory laparotomy  S/T SBO  H/O gastric bypass  RYGB  History of cholecystectomy    Open wound of foot  MULTIPLE DEBRIEDMENTS  S/P amputation  b/l toes, AND LEFT HAND

## 2019-01-25 NOTE — H&P PST ADULT - EXTREMITIES COMMENTS
AMPUTATION OF ALL 5 FINGERS RIGHT HAND, AMPUTATION OF ENTIRE LEFT HAND, AMPUTATION OF ALL TOES TO B/L FEET

## 2019-01-26 PROBLEM — K56.609 UNSPECIFIED INTESTINAL OBSTRUCTION, UNSPECIFIED AS TO PARTIAL VERSUS COMPLETE OBSTRUCTION: Chronic | Status: ACTIVE | Noted: 2018-02-15

## 2019-01-26 PROBLEM — A41.9 SEPSIS, UNSPECIFIED ORGANISM: Chronic | Status: ACTIVE | Noted: 2018-03-12

## 2019-01-26 PROBLEM — I10 ESSENTIAL (PRIMARY) HYPERTENSION: Chronic | Status: INACTIVE | Noted: 2018-02-15 | Resolved: 2019-01-25

## 2019-01-26 PROBLEM — G47.30 SLEEP APNEA, UNSPECIFIED: Chronic | Status: ACTIVE | Noted: 2018-02-15
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
## 2019-02-08 ENCOUNTER — INPATIENT (INPATIENT)
Facility: HOSPITAL | Age: 44
LOS: 0 days | Discharge: HOME HEALTH | End: 2019-02-09
Attending: HOSPITALIST | Admitting: HOSPITALIST
Payer: COMMERCIAL

## 2019-02-08 ENCOUNTER — RESULT REVIEW (OUTPATIENT)
Age: 44
End: 2019-02-08

## 2019-02-08 VITALS
OXYGEN SATURATION: 100 % | DIASTOLIC BLOOD PRESSURE: 89 MMHG | WEIGHT: 179.9 LBS | SYSTOLIC BLOOD PRESSURE: 119 MMHG | HEIGHT: 68 IN | TEMPERATURE: 98 F | HEART RATE: 82 BPM | RESPIRATION RATE: 17 BRPM

## 2019-02-08 DIAGNOSIS — Z89.9 ACQUIRED ABSENCE OF LIMB, UNSPECIFIED: Chronic | ICD-10-CM

## 2019-02-08 DIAGNOSIS — Z98.890 OTHER SPECIFIED POSTPROCEDURAL STATES: Chronic | ICD-10-CM

## 2019-02-08 DIAGNOSIS — S91.309A UNSPECIFIED OPEN WOUND, UNSPECIFIED FOOT, INITIAL ENCOUNTER: Chronic | ICD-10-CM

## 2019-02-08 DIAGNOSIS — S68.119A COMPLETE TRAUMATIC METACARPOPHALANGEAL AMPUTATION OF UNSPECIFIED FINGER, INITIAL ENCOUNTER: Chronic | ICD-10-CM

## 2019-02-08 DIAGNOSIS — L97.514 NON-PRESSURE CHRONIC ULCER OF OTHER PART OF RIGHT FOOT WITH NECROSIS OF BONE: ICD-10-CM

## 2019-02-08 DIAGNOSIS — Z98.84 BARIATRIC SURGERY STATUS: Chronic | ICD-10-CM

## 2019-02-08 PROCEDURE — 28120 PART REMOVAL OF ANKLE/HEEL: CPT | Mod: 58,RT

## 2019-02-08 PROCEDURE — 28122 PARTIAL REMOVAL OF FOOT BONE: CPT | Mod: 58,XS,RT

## 2019-02-08 RX ORDER — ONDANSETRON 8 MG/1
4 TABLET, FILM COATED ORAL ONCE
Qty: 0 | Refills: 0 | Status: DISCONTINUED | OUTPATIENT
Start: 2019-02-08 | End: 2019-02-09

## 2019-02-08 RX ORDER — CELECOXIB 200 MG/1
200 CAPSULE ORAL ONCE
Qty: 0 | Refills: 0 | Status: DISCONTINUED | OUTPATIENT
Start: 2019-02-08 | End: 2019-02-08

## 2019-02-08 RX ORDER — OXYCODONE AND ACETAMINOPHEN 5; 325 MG/1; MG/1
2 TABLET ORAL ONCE
Qty: 0 | Refills: 0 | Status: DISCONTINUED | OUTPATIENT
Start: 2019-02-08 | End: 2019-02-08

## 2019-02-08 RX ORDER — OXYCODONE AND ACETAMINOPHEN 5; 325 MG/1; MG/1
2 TABLET ORAL EVERY 4 HOURS
Qty: 0 | Refills: 0 | Status: DISCONTINUED | OUTPATIENT
Start: 2019-02-08 | End: 2019-02-09

## 2019-02-08 RX ORDER — SODIUM CHLORIDE 9 MG/ML
1000 INJECTION, SOLUTION INTRAVENOUS
Qty: 0 | Refills: 0 | Status: DISCONTINUED | OUTPATIENT
Start: 2019-02-08 | End: 2019-02-09

## 2019-02-08 RX ORDER — HYDROMORPHONE HYDROCHLORIDE 2 MG/ML
0.5 INJECTION INTRAMUSCULAR; INTRAVENOUS; SUBCUTANEOUS
Qty: 0 | Refills: 0 | Status: DISCONTINUED | OUTPATIENT
Start: 2019-02-08 | End: 2019-02-09

## 2019-02-08 RX ORDER — HYDROMORPHONE HYDROCHLORIDE 2 MG/ML
1 INJECTION INTRAMUSCULAR; INTRAVENOUS; SUBCUTANEOUS
Qty: 0 | Refills: 0 | Status: DISCONTINUED | OUTPATIENT
Start: 2019-02-08 | End: 2019-02-09

## 2019-02-08 RX ADMIN — OXYCODONE AND ACETAMINOPHEN 2 TABLET(S): 5; 325 TABLET ORAL at 23:39

## 2019-02-08 RX ADMIN — SODIUM CHLORIDE 50 MILLILITER(S): 9 INJECTION, SOLUTION INTRAVENOUS at 18:35

## 2019-02-08 RX ADMIN — OXYCODONE AND ACETAMINOPHEN 2 TABLET(S): 5; 325 TABLET ORAL at 18:37

## 2019-02-08 RX ADMIN — OXYCODONE AND ACETAMINOPHEN 2 TABLET(S): 5; 325 TABLET ORAL at 23:56

## 2019-02-08 NOTE — PROGRESS NOTE ADULT - SUBJECTIVE AND OBJECTIVE BOX
Podiatry Note    Patient s/p EX DBX ST/B Right foot partial closure.   Dr. William Villarreal requesting admission for q24h for observation.     Admission discussed with Dr. Chase Childs [phone].  Podiatry to follow up on AM rounds.

## 2019-02-08 NOTE — BRIEF OPERATIVE NOTE - PROCEDURE
<<-----Click on this checkbox to enter Procedure Cuneiform ostectomy  02/08/2019    Active  JAIRO  Calcanectomy of right foot  02/08/2019    Active  JAIRO
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
## 2019-02-08 NOTE — PROGRESS NOTE ADULT - PROBLEM SELECTOR PLAN 1
Right heel wound.  Surgical procedure: Right foot debridement with possible closure and wound VAC placement

## 2019-02-09 ENCOUNTER — TRANSCRIPTION ENCOUNTER (OUTPATIENT)
Age: 44
End: 2019-02-09

## 2019-02-09 VITALS
RESPIRATION RATE: 16 BRPM | SYSTOLIC BLOOD PRESSURE: 123 MMHG | DIASTOLIC BLOOD PRESSURE: 81 MMHG | HEART RATE: 86 BPM | TEMPERATURE: 97 F

## 2019-02-09 LAB
ANION GAP SERPL CALC-SCNC: 8 MMOL/L — SIGNIFICANT CHANGE UP (ref 7–14)
BASOPHILS # BLD AUTO: 0.02 K/UL — SIGNIFICANT CHANGE UP (ref 0–0.2)
BASOPHILS NFR BLD AUTO: 0.2 % — SIGNIFICANT CHANGE UP (ref 0–1)
BUN SERPL-MCNC: 13 MG/DL — SIGNIFICANT CHANGE UP (ref 10–20)
CALCIUM SERPL-MCNC: 8.4 MG/DL — LOW (ref 8.5–10.1)
CHLORIDE SERPL-SCNC: 102 MMOL/L — SIGNIFICANT CHANGE UP (ref 98–110)
CO2 SERPL-SCNC: 24 MMOL/L — SIGNIFICANT CHANGE UP (ref 17–32)
CREAT SERPL-MCNC: 0.8 MG/DL — SIGNIFICANT CHANGE UP (ref 0.7–1.5)
EOSINOPHIL # BLD AUTO: 0.01 K/UL — SIGNIFICANT CHANGE UP (ref 0–0.7)
EOSINOPHIL NFR BLD AUTO: 0.1 % — SIGNIFICANT CHANGE UP (ref 0–8)
GLUCOSE SERPL-MCNC: 184 MG/DL — HIGH (ref 70–99)
HCT VFR BLD CALC: 36.8 % — LOW (ref 37–47)
HGB BLD-MCNC: 11.2 G/DL — LOW (ref 12–16)
IMM GRANULOCYTES NFR BLD AUTO: 0.2 % — SIGNIFICANT CHANGE UP (ref 0.1–0.3)
LYMPHOCYTES # BLD AUTO: 0.62 K/UL — LOW (ref 1.2–3.4)
LYMPHOCYTES # BLD AUTO: 7.1 % — LOW (ref 20.5–51.1)
MCHC RBC-ENTMCNC: 24.6 PG — LOW (ref 27–31)
MCHC RBC-ENTMCNC: 30.4 G/DL — LOW (ref 32–37)
MCV RBC AUTO: 80.7 FL — LOW (ref 81–99)
MONOCYTES # BLD AUTO: 0.74 K/UL — HIGH (ref 0.1–0.6)
MONOCYTES NFR BLD AUTO: 8.5 % — SIGNIFICANT CHANGE UP (ref 1.7–9.3)
NEUTROPHILS # BLD AUTO: 7.33 K/UL — HIGH (ref 1.4–6.5)
NEUTROPHILS NFR BLD AUTO: 83.9 % — HIGH (ref 42.2–75.2)
NRBC # BLD: 0 /100 WBCS — SIGNIFICANT CHANGE UP (ref 0–0)
PLATELET # BLD AUTO: 197 K/UL — SIGNIFICANT CHANGE UP (ref 130–400)
POTASSIUM SERPL-MCNC: 5.1 MMOL/L — HIGH (ref 3.5–5)
POTASSIUM SERPL-SCNC: 5.1 MMOL/L — HIGH (ref 3.5–5)
RBC # BLD: 4.56 M/UL — SIGNIFICANT CHANGE UP (ref 4.2–5.4)
RBC # FLD: 14.4 % — SIGNIFICANT CHANGE UP (ref 11.5–14.5)
SODIUM SERPL-SCNC: 134 MMOL/L — LOW (ref 135–146)
WBC # BLD: 8.74 K/UL — SIGNIFICANT CHANGE UP (ref 4.8–10.8)
WBC # FLD AUTO: 8.74 K/UL — SIGNIFICANT CHANGE UP (ref 4.8–10.8)

## 2019-02-09 RX ORDER — HYDROMORPHONE HYDROCHLORIDE 2 MG/ML
1 INJECTION INTRAMUSCULAR; INTRAVENOUS; SUBCUTANEOUS EVERY 4 HOURS
Qty: 0 | Refills: 0 | Status: DISCONTINUED | OUTPATIENT
Start: 2019-02-09 | End: 2019-02-09

## 2019-02-09 RX ORDER — HEPARIN SODIUM 5000 [USP'U]/ML
5000 INJECTION INTRAVENOUS; SUBCUTANEOUS EVERY 8 HOURS
Qty: 0 | Refills: 0 | Status: DISCONTINUED | OUTPATIENT
Start: 2019-02-09 | End: 2019-02-09

## 2019-02-09 RX ORDER — METOPROLOL TARTRATE 50 MG
1 TABLET ORAL
Qty: 0 | Refills: 0 | COMMUNITY

## 2019-02-09 RX ORDER — HYDROMORPHONE HYDROCHLORIDE 2 MG/ML
2 INJECTION INTRAMUSCULAR; INTRAVENOUS; SUBCUTANEOUS ONCE
Qty: 0 | Refills: 0 | Status: DISCONTINUED | OUTPATIENT
Start: 2019-02-09 | End: 2019-02-09

## 2019-02-09 RX ORDER — HYDROMORPHONE HYDROCHLORIDE 2 MG/ML
1 INJECTION INTRAMUSCULAR; INTRAVENOUS; SUBCUTANEOUS
Qty: 0 | Refills: 0 | Status: DISCONTINUED | OUTPATIENT
Start: 2019-02-09 | End: 2019-02-09

## 2019-02-09 RX ADMIN — HYDROMORPHONE HYDROCHLORIDE 1 MILLIGRAM(S): 2 INJECTION INTRAMUSCULAR; INTRAVENOUS; SUBCUTANEOUS at 10:21

## 2019-02-09 RX ADMIN — HYDROMORPHONE HYDROCHLORIDE 1 MILLIGRAM(S): 2 INJECTION INTRAMUSCULAR; INTRAVENOUS; SUBCUTANEOUS at 13:47

## 2019-02-09 RX ADMIN — SODIUM CHLORIDE 50 MILLILITER(S): 9 INJECTION, SOLUTION INTRAVENOUS at 12:02

## 2019-02-09 RX ADMIN — HYDROMORPHONE HYDROCHLORIDE 1 MILLIGRAM(S): 2 INJECTION INTRAMUSCULAR; INTRAVENOUS; SUBCUTANEOUS at 01:31

## 2019-02-09 RX ADMIN — HYDROMORPHONE HYDROCHLORIDE 1 MILLIGRAM(S): 2 INJECTION INTRAMUSCULAR; INTRAVENOUS; SUBCUTANEOUS at 15:05

## 2019-02-09 RX ADMIN — HYDROMORPHONE HYDROCHLORIDE 1 MILLIGRAM(S): 2 INJECTION INTRAMUSCULAR; INTRAVENOUS; SUBCUTANEOUS at 10:12

## 2019-02-09 RX ADMIN — HYDROMORPHONE HYDROCHLORIDE 1 MILLIGRAM(S): 2 INJECTION INTRAMUSCULAR; INTRAVENOUS; SUBCUTANEOUS at 07:39

## 2019-02-09 RX ADMIN — HYDROMORPHONE HYDROCHLORIDE 2 MILLIGRAM(S): 2 INJECTION INTRAMUSCULAR; INTRAVENOUS; SUBCUTANEOUS at 04:10

## 2019-02-09 RX ADMIN — HYDROMORPHONE HYDROCHLORIDE 1 MILLIGRAM(S): 2 INJECTION INTRAMUSCULAR; INTRAVENOUS; SUBCUTANEOUS at 11:34

## 2019-02-09 NOTE — DISCHARGE NOTE ADULT - PATIENT PORTAL LINK FT
You can access the [facility] Patient Portal, offered by [facility], by registering with the following website: [email or web address]

## 2019-02-09 NOTE — DISCHARGE NOTE ADULT - CARE PROVIDER_API CALL
William Villarreal (DPM)  [facility]  [address], [address]  [place] [address]  Phone: [phone]  Fax: [phone]  Follow Up Time:

## 2019-02-09 NOTE — DISCHARGE NOTE ADULT - ADDITIONAL INSTRUCTIONS
oca wound care: irrigation with NS and apply 1/4" plain packing/xeroform along the suture site/dsd/ABD/kerlix/ACE right foot until sunday and will need to apply home vac on monday.  req visiting nurse q48h for wound vac application, home vac setting at 125mmhg cont q48h

## 2019-02-09 NOTE — DISCHARGE NOTE ADULT - MEDICATION SUMMARY - MEDICATIONS TO TAKE
I will START or STAY ON the medications listed below when I get home from the hospital:    ferrous sulfate 325 mg (65 mg elemental iron) oral tablet  -- 1 tab(s) by mouth 3 times a day  -- Indication: For iron def    Vitamin B-100 oral tablet  -- 1 tab(s) by mouth once a day  -- Indication: For general health    Multi-Day Plus Minerals oral tablet  -- 1 tab(s) by mouth once a day  -- Indication: For general health    Vitamin D2 50,000 intl units (1.25 mg) oral capsule  -- 1 cap(s) by mouth once a week  -- Indication: For general health I will START or STAY ON the medications listed below when I get home from the hospital:    ferrous sulfate 325 mg (65 mg elemental iron) oral tablet  -- 1 tab(s) by mouth 3 times a day  -- Indication: For general health    Vitamin B-100 oral tablet  -- 1 tab(s) by mouth once a day  -- Indication: For general health    Multi-Day Plus Minerals oral tablet  -- 1 tab(s) by mouth once a day  -- Indication: For general health    Vitamin D2 50,000 intl units (1.25 mg) oral capsule  -- 1 cap(s) by mouth once a week  -- Indication: For general health
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
## 2019-02-09 NOTE — H&P ADULT - NSCORESITESY/N_GEN_A_CORE_RD
No You can access the [facility] Patient Portal, offered by [facility], by registering with the following website: [email or web address]

## 2019-02-09 NOTE — DISCHARGE NOTE ADULT - MEDICATION SUMMARY - MEDICATIONS TO STOP TAKING
I will STOP taking the medications listed below when I get home from the hospital:    Metoprolol Succinate ER 50 mg oral tablet, extended release  -- 1 tab(s) by mouth once a day I will STOP taking the medications listed below when I get home from the hospital:  None

## 2019-02-09 NOTE — DISCHARGE NOTE ADULT - PLAN OF CARE
Please follow up with Dr. Carmen in one week You had debridement done of your right foot. Please follow up with Dr. Carmen in one week.

## 2019-02-09 NOTE — PROGRESS NOTE ADULT - SUBJECTIVE AND OBJECTIVE BOX
PODIATRY PROGRESS NOTE   [id number] JIMMY CR is a pleasant well-nourished, well developed 43y Female in no acute distress, alert awake, and oriented to person, place and time.   Patient is a 43y old  Female who presents with a chief complaint of post surgical monitoring (09 Feb 2019 12:09)    HPI:  43F PMHx HTN, gastric bypass 2017 c/b obstruction in sepsis requiring vasopressor use leading to necrosis and R hand amputation, L wrist amputation, BL LE TMA here for monitoring s/p debridement R foot. She had surgery with Dr. Montemayor yesterday, admitted for monitoring. H/o R heel OM, previously treated with ertapenem via PICC. She states her pain is sharp, on R foot, nonradiating, burning, worsen with movement. No fever, sick contacts, recent travel. (09 Feb 2019 09:51)    pt was seen and assessed am rounds at bedside with attending Dr. Montemayor.  pt states that she was nauseous this morning, possibly from pain medication.  she also sates that she has throbbing/pain surgical site right foot.  dressing clean dry intact.    Vital Signs Last 24 Hrs  T(C): 36.2 (09 Feb 2019 05:00), Max: 36.9 (08 Feb 2019 18:07)  T(F): 97.2 (09 Feb 2019 05:00), Max: 98.5 (08 Feb 2019 18:07)  HR: 76 (09 Feb 2019 05:00) (71 - 83)  BP: 99/58 (09 Feb 2019 05:00) (99/58 - 119/89)  BP(mean): 81 (08 Feb 2019 18:07) (81 - 81)  RR: 16 (09 Feb 2019 05:00) (14 - 19)  SpO2: 100% (08 Feb 2019 20:52) (97% - 100%)                        11.2   8.74  )-----------( 197      ( 09 Feb 2019 11:00 )             36.8                 02-09    134<L>  |  102  |  13  ----------------------------<  184<H>  5.1<H>   |  24  |  0.8    Ca    8.4<L>      09 Feb 2019 11:00    derm; suture intact right plantar foot with bone/fat exposed distal aspect of the surgical site right foot, no signs of infection.      A:  s/p EX DBX ST/B Right foot partial closure.   P:  pt evaluated and treated  pt is good to go from podiatry standpoint  irrigated with normal saline, applied iodoform packing/xeroform along the suture site/dsd/ABD/kerlic/ACE right foot  loca wound care: irrigation with NS and apply 1/4" plain packing/xeroform along the suture site/dsd/ABD/kerlix/ACE right foot until sunday and will need to apply home vac on monday.  req visiting nurse q48h for wound vac application, home vac setting at 125mmhg cont q48h  req po percocet 5/325mg for 20 days prior to dc  req po zofran 4mg q12h as needed for 14 days prior to dc  f/u as an outpatient on Friday [address] with [address]. montemayor. ( please make an appt  monday)  podiatry will follow up while in house.  02-09-19 @ 13:33

## 2019-02-09 NOTE — DISCHARGE NOTE ADULT - CARE PLAN
Principal Discharge DX:	Osteomyelitis  Goal:	Please follow up with Dr. Carmen in one week  Assessment and plan of treatment:	You had debridement done of your right foot. Please follow up with Dr. Carmen in one week.

## 2019-02-11 PROBLEM — I82.409 ACUTE EMBOLISM AND THROMBOSIS OF UNSPECIFIED DEEP VEINS OF UNSPECIFIED LOWER EXTREMITY: Chronic | Status: ACTIVE | Noted: 2019-01-25

## 2019-02-12 ENCOUNTER — RX RENEWAL (OUTPATIENT)
Age: 44
End: 2019-02-12

## 2019-02-13 DIAGNOSIS — G47.33 OBSTRUCTIVE SLEEP APNEA (ADULT) (PEDIATRIC): ICD-10-CM

## 2019-02-13 DIAGNOSIS — Z89.421 ACQUIRED ABSENCE OF OTHER RIGHT TOE(S): ICD-10-CM

## 2019-02-13 DIAGNOSIS — Z89.021 ACQUIRED ABSENCE OF RIGHT FINGER(S): ICD-10-CM

## 2019-02-13 DIAGNOSIS — Z89.422 ACQUIRED ABSENCE OF OTHER LEFT TOE(S): ICD-10-CM

## 2019-02-13 DIAGNOSIS — Z98.84 BARIATRIC SURGERY STATUS: ICD-10-CM

## 2019-02-13 DIAGNOSIS — Z89.112 ACQUIRED ABSENCE OF LEFT HAND: ICD-10-CM

## 2019-02-13 DIAGNOSIS — I96 GANGRENE, NOT ELSEWHERE CLASSIFIED: ICD-10-CM

## 2019-02-13 DIAGNOSIS — M86.9 OSTEOMYELITIS, UNSPECIFIED: ICD-10-CM

## 2019-02-13 DIAGNOSIS — I10 ESSENTIAL (PRIMARY) HYPERTENSION: ICD-10-CM

## 2019-02-13 DIAGNOSIS — Z86.718 PERSONAL HISTORY OF OTHER VENOUS THROMBOSIS AND EMBOLISM: ICD-10-CM

## 2019-02-13 DIAGNOSIS — L97.514 NON-PRESSURE CHRONIC ULCER OF OTHER PART OF RIGHT FOOT WITH NECROSIS OF BONE: ICD-10-CM

## 2019-02-13 DIAGNOSIS — M19.90 UNSPECIFIED OSTEOARTHRITIS, UNSPECIFIED SITE: ICD-10-CM

## 2019-02-13 DIAGNOSIS — M21.961 UNSPECIFIED ACQUIRED DEFORMITY OF RIGHT LOWER LEG: ICD-10-CM

## 2019-02-14 ENCOUNTER — OUTPATIENT (OUTPATIENT)
Dept: OUTPATIENT SERVICES | Facility: HOSPITAL | Age: 44
LOS: 1 days | Discharge: HOME | End: 2019-02-14

## 2019-02-14 ENCOUNTER — APPOINTMENT (OUTPATIENT)
Dept: BURN CARE | Facility: CLINIC | Age: 44
End: 2019-02-14

## 2019-02-14 DIAGNOSIS — Z98.84 BARIATRIC SURGERY STATUS: Chronic | ICD-10-CM

## 2019-02-14 DIAGNOSIS — Z98.890 OTHER SPECIFIED POSTPROCEDURAL STATES: Chronic | ICD-10-CM

## 2019-02-14 DIAGNOSIS — S91.309A UNSPECIFIED OPEN WOUND, UNSPECIFIED FOOT, INITIAL ENCOUNTER: Chronic | ICD-10-CM

## 2019-02-14 DIAGNOSIS — Z89.9 ACQUIRED ABSENCE OF LIMB, UNSPECIFIED: Chronic | ICD-10-CM

## 2019-02-14 DIAGNOSIS — S68.119A COMPLETE TRAUMATIC METACARPOPHALANGEAL AMPUTATION OF UNSPECIFIED FINGER, INITIAL ENCOUNTER: Chronic | ICD-10-CM

## 2019-02-14 NOTE — HISTORY OF PRESENT ILLNESS
[Did you have an operation on your burn/wound injury?] : Did you have an operation on your burn/wound injury? Yes [Did this injury occur on the job?] : Did this injury occur on the job? No [de-identified] : post debridement right foot podiatry [de-identified] : new open wounds right foot
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
## 2019-02-14 NOTE — PHYSICAL EXAM
[New] : new [Size%: ______] : Size: [unfilled]% [Infected?] : Infected: No [6] : 6 out of 10 [Abnormal] : abnormal [Medium] : medium [] : no [de-identified] : percocet [de-identified] : calcium alginate [de-identified] : right foot\par \par right foot-->  open wounds prox and distal foot--> malodor--> dry gauze/ hold wound vac

## 2019-02-15 ENCOUNTER — RX RENEWAL (OUTPATIENT)
Age: 44
End: 2019-02-15

## 2019-02-15 ENCOUNTER — OUTPATIENT (OUTPATIENT)
Dept: OUTPATIENT SERVICES | Facility: HOSPITAL | Age: 44
LOS: 1 days | Discharge: HOME | End: 2019-02-15

## 2019-02-15 ENCOUNTER — APPOINTMENT (OUTPATIENT)
Dept: PODIATRY | Facility: CLINIC | Age: 44
End: 2019-02-15
Payer: COMMERCIAL

## 2019-02-15 VITALS
SYSTOLIC BLOOD PRESSURE: 119 MMHG | DIASTOLIC BLOOD PRESSURE: 82 MMHG | HEIGHT: 68 IN | HEART RATE: 73 BPM | BODY MASS INDEX: 28.64 KG/M2 | WEIGHT: 189 LBS

## 2019-02-15 DIAGNOSIS — S68.119A COMPLETE TRAUMATIC METACARPOPHALANGEAL AMPUTATION OF UNSPECIFIED FINGER, INITIAL ENCOUNTER: Chronic | ICD-10-CM

## 2019-02-15 DIAGNOSIS — S91.309A UNSPECIFIED OPEN WOUND, UNSPECIFIED FOOT, INITIAL ENCOUNTER: Chronic | ICD-10-CM

## 2019-02-15 DIAGNOSIS — Z98.84 BARIATRIC SURGERY STATUS: Chronic | ICD-10-CM

## 2019-02-15 DIAGNOSIS — Z89.9 ACQUIRED ABSENCE OF LIMB, UNSPECIFIED: Chronic | ICD-10-CM

## 2019-02-15 DIAGNOSIS — Z98.890 OTHER SPECIFIED POSTPROCEDURAL STATES: Chronic | ICD-10-CM

## 2019-02-15 PROCEDURE — 99024 POSTOP FOLLOW-UP VISIT: CPT

## 2019-02-19 ENCOUNTER — OUTPATIENT (OUTPATIENT)
Dept: OUTPATIENT SERVICES | Facility: HOSPITAL | Age: 44
LOS: 1 days | Discharge: HOME | End: 2019-02-19

## 2019-02-19 DIAGNOSIS — S68.119A COMPLETE TRAUMATIC METACARPOPHALANGEAL AMPUTATION OF UNSPECIFIED FINGER, INITIAL ENCOUNTER: Chronic | ICD-10-CM

## 2019-02-19 DIAGNOSIS — S91.301D UNSPECIFIED OPEN WOUND, RIGHT FOOT, SUBSEQUENT ENCOUNTER: ICD-10-CM

## 2019-02-19 DIAGNOSIS — S91.309A UNSPECIFIED OPEN WOUND, UNSPECIFIED FOOT, INITIAL ENCOUNTER: Chronic | ICD-10-CM

## 2019-02-19 DIAGNOSIS — Z89.9 ACQUIRED ABSENCE OF LIMB, UNSPECIFIED: Chronic | ICD-10-CM

## 2019-02-19 DIAGNOSIS — Z98.84 BARIATRIC SURGERY STATUS: Chronic | ICD-10-CM

## 2019-02-19 DIAGNOSIS — Z98.890 OTHER SPECIFIED POSTPROCEDURAL STATES: Chronic | ICD-10-CM

## 2019-02-21 ENCOUNTER — APPOINTMENT (OUTPATIENT)
Dept: WOUND CARE | Facility: CLINIC | Age: 44
End: 2019-02-21

## 2019-02-21 ENCOUNTER — OUTPATIENT (OUTPATIENT)
Dept: OUTPATIENT SERVICES | Facility: HOSPITAL | Age: 44
LOS: 1 days | Discharge: HOME | End: 2019-02-21

## 2019-02-21 ENCOUNTER — INPATIENT (INPATIENT)
Facility: HOSPITAL | Age: 44
LOS: 3 days | Discharge: HOME HEALTH | End: 2019-02-25
Attending: PLASTIC SURGERY | Admitting: PLASTIC SURGERY

## 2019-02-21 VITALS
SYSTOLIC BLOOD PRESSURE: 127 MMHG | RESPIRATION RATE: 20 BRPM | TEMPERATURE: 99 F | DIASTOLIC BLOOD PRESSURE: 63 MMHG | HEART RATE: 80 BPM | OXYGEN SATURATION: 96 %

## 2019-02-21 DIAGNOSIS — S91.309A UNSPECIFIED OPEN WOUND, UNSPECIFIED FOOT, INITIAL ENCOUNTER: Chronic | ICD-10-CM

## 2019-02-21 DIAGNOSIS — G47.30 SLEEP APNEA, UNSPECIFIED: ICD-10-CM

## 2019-02-21 DIAGNOSIS — B95.4 OTHER STREPTOCOCCUS AS THE CAUSE OF DISEASES CLASSIFIED ELSEWHERE: ICD-10-CM

## 2019-02-21 DIAGNOSIS — I10 ESSENTIAL (PRIMARY) HYPERTENSION: ICD-10-CM

## 2019-02-21 DIAGNOSIS — S68.119A COMPLETE TRAUMATIC METACARPOPHALANGEAL AMPUTATION OF UNSPECIFIED FINGER, INITIAL ENCOUNTER: Chronic | ICD-10-CM

## 2019-02-21 DIAGNOSIS — Z89.432 ACQUIRED ABSENCE OF LEFT FOOT: ICD-10-CM

## 2019-02-21 DIAGNOSIS — Z98.84 BARIATRIC SURGERY STATUS: Chronic | ICD-10-CM

## 2019-02-21 DIAGNOSIS — Z98.890 OTHER SPECIFIED POSTPROCEDURAL STATES: Chronic | ICD-10-CM

## 2019-02-21 DIAGNOSIS — Z89.021 ACQUIRED ABSENCE OF RIGHT FINGER(S): ICD-10-CM

## 2019-02-21 DIAGNOSIS — I42.9 CARDIOMYOPATHY, UNSPECIFIED: ICD-10-CM
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
## 2019-02-21 DIAGNOSIS — Z87.898 PERSONAL HISTORY OF OTHER SPECIFIED CONDITIONS: ICD-10-CM

## 2019-02-21 DIAGNOSIS — Z79.01 LONG TERM (CURRENT) USE OF ANTICOAGULANTS: ICD-10-CM

## 2019-02-21 DIAGNOSIS — T87.89 OTHER COMPLICATIONS OF AMPUTATION STUMP: ICD-10-CM

## 2019-02-21 DIAGNOSIS — Z89.9 ACQUIRED ABSENCE OF LIMB, UNSPECIFIED: Chronic | ICD-10-CM

## 2019-02-21 DIAGNOSIS — B96.89 OTHER SPECIFIED BACTERIAL AGENTS AS THE CAUSE OF DISEASES CLASSIFIED ELSEWHERE: ICD-10-CM

## 2019-02-21 DIAGNOSIS — B95.2 ENTEROCOCCUS AS THE CAUSE OF DISEASES CLASSIFIED ELSEWHERE: ICD-10-CM

## 2019-02-21 DIAGNOSIS — Z86.718 PERSONAL HISTORY OF OTHER VENOUS THROMBOSIS AND EMBOLISM: ICD-10-CM

## 2019-02-21 DIAGNOSIS — M19.90 UNSPECIFIED OSTEOARTHRITIS, UNSPECIFIED SITE: ICD-10-CM

## 2019-02-21 DIAGNOSIS — Z90.49 ACQUIRED ABSENCE OF OTHER SPECIFIED PARTS OF DIGESTIVE TRACT: ICD-10-CM

## 2019-02-21 DIAGNOSIS — Z89.112 ACQUIRED ABSENCE OF LEFT HAND: ICD-10-CM

## 2019-02-21 DIAGNOSIS — Z98.84 BARIATRIC SURGERY STATUS: ICD-10-CM

## 2019-02-21 DIAGNOSIS — I96 GANGRENE, NOT ELSEWHERE CLASSIFIED: ICD-10-CM

## 2019-02-21 LAB
BASOPHILS # BLD AUTO: 0.05 K/UL — SIGNIFICANT CHANGE UP (ref 0–0.2)
BASOPHILS NFR BLD AUTO: 0.5 % — SIGNIFICANT CHANGE UP (ref 0–1)
EOSINOPHIL # BLD AUTO: 0.09 K/UL — SIGNIFICANT CHANGE UP (ref 0–0.7)
EOSINOPHIL NFR BLD AUTO: 0.9 % — SIGNIFICANT CHANGE UP (ref 0–8)
HCT VFR BLD CALC: 35.6 % — LOW (ref 37–47)
HGB BLD-MCNC: 11.1 G/DL — LOW (ref 12–16)
IMM GRANULOCYTES NFR BLD AUTO: 0.3 % — SIGNIFICANT CHANGE UP (ref 0.1–0.3)
LYMPHOCYTES # BLD AUTO: 2.7 K/UL — SIGNIFICANT CHANGE UP (ref 1.2–3.4)
LYMPHOCYTES # BLD AUTO: 27.4 % — SIGNIFICANT CHANGE UP (ref 20.5–51.1)
MCHC RBC-ENTMCNC: 24.6 PG — LOW (ref 27–31)
MCHC RBC-ENTMCNC: 31.2 G/DL — LOW (ref 32–37)
MCV RBC AUTO: 78.8 FL — LOW (ref 81–99)
MONOCYTES # BLD AUTO: 0.63 K/UL — HIGH (ref 0.1–0.6)
MONOCYTES NFR BLD AUTO: 6.4 % — SIGNIFICANT CHANGE UP (ref 1.7–9.3)
NEUTROPHILS # BLD AUTO: 6.35 K/UL — SIGNIFICANT CHANGE UP (ref 1.4–6.5)
NEUTROPHILS NFR BLD AUTO: 64.5 % — SIGNIFICANT CHANGE UP (ref 42.2–75.2)
NRBC # BLD: 0 /100 WBCS — SIGNIFICANT CHANGE UP (ref 0–0)
PLATELET # BLD AUTO: 320 K/UL — SIGNIFICANT CHANGE UP (ref 130–400)
RBC # BLD: 4.52 M/UL — SIGNIFICANT CHANGE UP (ref 4.2–5.4)
RBC # FLD: 14.6 % — HIGH (ref 11.5–14.5)
WBC # BLD: 9.85 K/UL — SIGNIFICANT CHANGE UP (ref 4.8–10.8)
WBC # FLD AUTO: 9.85 K/UL — SIGNIFICANT CHANGE UP (ref 4.8–10.8)

## 2019-02-21 RX ORDER — CIPROFLOXACIN LACTATE 400MG/40ML
400 VIAL (ML) INTRAVENOUS ONCE
Qty: 0 | Refills: 0 | Status: COMPLETED | OUTPATIENT
Start: 2019-02-21 | End: 2019-02-22

## 2019-02-21 RX ORDER — CIPROFLOXACIN LACTATE 400MG/40ML
400 VIAL (ML) INTRAVENOUS EVERY 12 HOURS
Qty: 0 | Refills: 0 | Status: DISCONTINUED | OUTPATIENT
Start: 2019-02-22 | End: 2019-02-22

## 2019-02-21 RX ORDER — CIPROFLOXACIN LACTATE 400MG/40ML
VIAL (ML) INTRAVENOUS
Qty: 0 | Refills: 0 | Status: DISCONTINUED | OUTPATIENT
Start: 2019-02-22 | End: 2019-02-22

## 2019-02-21 RX ORDER — SODIUM CHLORIDE 9 MG/ML
1000 INJECTION, SOLUTION INTRAVENOUS
Qty: 0 | Refills: 0 | Status: DISCONTINUED | OUTPATIENT
Start: 2019-02-21 | End: 2019-02-22

## 2019-02-21 NOTE — ED ADULT NURSE NOTE - NSIMPLEMENTINTERV_GEN_ALL_ED
Implemented All Fall with Harm Risk Interventions:  [place] to call system. Call bell, personal items and telephone within reach. Instruct patient to call for assistance. Room bathroom lighting operational. Non-slip footwear when patient is off stretcher. Physically safe environment: no spills, clutter or unnecessary equipment. Stretcher in lowest position, wheels locked, appropriate side rails in place. Provide visual cue, wrist band, yellow gown, etc. Monitor gait and stability. Monitor for mental status changes and reorient to person, place, and time. Review medications for side effects contributing to fall risk. Reinforce activity limits and safety measures with patient and family. Provide visual clues: red socks.

## 2019-02-21 NOTE — ED PROVIDER NOTE - PHYSICAL EXAMINATION
VITAL SIGNS: I have reviewed nursing notes and confirm.  CONSTITUTIONAL: Well-developed; well-nourished; in no acute distress.  SKIN: Skin exam is warm and dry, no acute rash.  HEAD: Normocephalic; atraumatic.  EYES: PERRL, EOM intact; conjunctiva and sclera clear.  ENT: No nasal discharge; airway clear.   NECK: Supple; non tender.  CARD:+ S1, S2   RESP: No wheezes, rales or rhonchi.  ABD: Normal bowel sounds; soft; non-distended; non-tender;  EXT: Normal ROM. multiple digital amputatoins to b/l hands, feet. right foot in dressing. pt does not want it removed at this time.   LYMPH: No acute adenopathy.  NEURO: Alert. Grossly unremarkable. No focal deficits.  PSYCH: Cooperative, appropriate.
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
## 2019-02-21 NOTE — ED ADULT NURSE NOTE - NSFALLRSKHRMRISKTYPE_ED_ALL_ED
bones(Osteoporosis,prev fx,steroid use,metastatic bone ca)/surgery(72hr postop, recent leg amputee, sig. abd/thor surg)

## 2019-02-21 NOTE — ED PROVIDER NOTE - OBJECTIVE STATEMENT
44 yo f, s/p right foot surgery at [facility] by Dr Salazar of podiatry, sts wound not closing, saw Dr Artis in office, sent in for admission for OR for wound care tomorrow. pt has no acute complaints.

## 2019-02-21 NOTE — HISTORY OF PRESENT ILLNESS
[Did you have an operation on your burn/wound injury?] : Did you have an operation on your burn/wound injury? Yes [Did this injury occur on the job?] : Did this injury occur on the job? No [de-identified] : post debridement right foot podiatry [de-identified] : new open wounds right foot--> necrotic tissue
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
## 2019-02-21 NOTE — PHYSICAL EXAM
[New] : new [Size%: ______] : Size: [unfilled]% [Infected?] : Infected: No [6] : 6 out of 10 [Abnormal] : abnormal [Medium] : medium [] : no [de-identified] : percocet [de-identified] : calcium alginate [de-identified] : right foot\par \par right foot-->  open wounds prox and distal foot--> malodor--> will admit and debride

## 2019-02-22 ENCOUNTER — RESULT REVIEW (OUTPATIENT)
Age: 44
End: 2019-02-22

## 2019-02-22 DIAGNOSIS — Z02.9 ENCOUNTER FOR ADMINISTRATIVE EXAMINATIONS, UNSPECIFIED: ICD-10-CM

## 2019-02-22 LAB
ANION GAP SERPL CALC-SCNC: 10 MMOL/L — SIGNIFICANT CHANGE UP (ref 7–14)
ANION GAP SERPL CALC-SCNC: 15 MMOL/L — HIGH (ref 7–14)
APPEARANCE UR: CLEAR — SIGNIFICANT CHANGE UP
APTT BLD: 29.4 SEC — SIGNIFICANT CHANGE UP (ref 27–39.2)
BILIRUB UR-MCNC: NEGATIVE — SIGNIFICANT CHANGE UP
BLD GP AB SCN SERPL QL: SIGNIFICANT CHANGE UP
BUN SERPL-MCNC: 13 MG/DL — SIGNIFICANT CHANGE UP (ref 10–20)
BUN SERPL-MCNC: 15 MG/DL — SIGNIFICANT CHANGE UP (ref 10–20)
CALCIUM SERPL-MCNC: 8.4 MG/DL — LOW (ref 8.5–10.1)
CALCIUM SERPL-MCNC: 8.9 MG/DL — SIGNIFICANT CHANGE UP (ref 8.5–10.1)
CHLORIDE SERPL-SCNC: 107 MMOL/L — SIGNIFICANT CHANGE UP (ref 98–110)
CHLORIDE SERPL-SCNC: 99 MMOL/L — SIGNIFICANT CHANGE UP (ref 98–110)
CO2 SERPL-SCNC: 24 MMOL/L — SIGNIFICANT CHANGE UP (ref 17–32)
CO2 SERPL-SCNC: 25 MMOL/L — SIGNIFICANT CHANGE UP (ref 17–32)
COLOR SPEC: YELLOW — SIGNIFICANT CHANGE UP
CREAT SERPL-MCNC: 0.9 MG/DL — SIGNIFICANT CHANGE UP (ref 0.7–1.5)
CREAT SERPL-MCNC: 1 MG/DL — SIGNIFICANT CHANGE UP (ref 0.7–1.5)
DIFF PNL FLD: NEGATIVE — SIGNIFICANT CHANGE UP
GLUCOSE SERPL-MCNC: 101 MG/DL — HIGH (ref 70–99)
GLUCOSE SERPL-MCNC: 104 MG/DL — HIGH (ref 70–99)
GLUCOSE UR QL: NEGATIVE — SIGNIFICANT CHANGE UP
HCG UR QL: NEGATIVE — SIGNIFICANT CHANGE UP
HCT VFR BLD CALC: 31.5 % — LOW (ref 37–47)
HGB BLD-MCNC: 9.6 G/DL — LOW (ref 12–16)
INR BLD: 1.18 RATIO — SIGNIFICANT CHANGE UP (ref 0.65–1.3)
KETONES UR-MCNC: NEGATIVE — SIGNIFICANT CHANGE UP
LEUKOCYTE ESTERASE UR-ACNC: NEGATIVE — SIGNIFICANT CHANGE UP
MAGNESIUM SERPL-MCNC: 1.9 MG/DL — SIGNIFICANT CHANGE UP (ref 1.8–2.4)
MAGNESIUM SERPL-MCNC: 2 MG/DL — SIGNIFICANT CHANGE UP (ref 1.8–2.4)
MCHC RBC-ENTMCNC: 24.1 PG — LOW (ref 27–31)
MCHC RBC-ENTMCNC: 30.5 G/DL — LOW (ref 32–37)
MCV RBC AUTO: 78.9 FL — LOW (ref 81–99)
NITRITE UR-MCNC: NEGATIVE — SIGNIFICANT CHANGE UP
NRBC # BLD: 0 /100 WBCS — SIGNIFICANT CHANGE UP (ref 0–0)
PH UR: 6.5 — SIGNIFICANT CHANGE UP (ref 5–8)
PHOSPHATE SERPL-MCNC: 3.7 MG/DL — SIGNIFICANT CHANGE UP (ref 2.1–4.9)
PHOSPHATE SERPL-MCNC: 3.8 MG/DL — SIGNIFICANT CHANGE UP (ref 2.1–4.9)
PLATELET # BLD AUTO: 291 K/UL — SIGNIFICANT CHANGE UP (ref 130–400)
POTASSIUM SERPL-MCNC: 4.3 MMOL/L — SIGNIFICANT CHANGE UP (ref 3.5–5)
POTASSIUM SERPL-MCNC: 5.4 MMOL/L — HIGH (ref 3.5–5)
POTASSIUM SERPL-SCNC: 4.3 MMOL/L — SIGNIFICANT CHANGE UP (ref 3.5–5)
POTASSIUM SERPL-SCNC: 5.4 MMOL/L — HIGH (ref 3.5–5)
PROT UR-MCNC: NEGATIVE — SIGNIFICANT CHANGE UP
PROTHROM AB SERPL-ACNC: 13.5 SEC — HIGH (ref 9.95–12.87)
RBC # BLD: 3.99 M/UL — LOW (ref 4.2–5.4)
RBC # FLD: 14.6 % — HIGH (ref 11.5–14.5)
SODIUM SERPL-SCNC: 139 MMOL/L — SIGNIFICANT CHANGE UP (ref 135–146)
SODIUM SERPL-SCNC: 141 MMOL/L — SIGNIFICANT CHANGE UP (ref 135–146)
SP GR SPEC: >=1.03 — SIGNIFICANT CHANGE UP (ref 1.01–1.03)
TYPE + AB SCN PNL BLD: SIGNIFICANT CHANGE UP
UROBILINOGEN FLD QL: 1 (ref 0.2–0.2)
WBC # BLD: 7.6 K/UL — SIGNIFICANT CHANGE UP (ref 4.8–10.8)
WBC # FLD AUTO: 7.6 K/UL — SIGNIFICANT CHANGE UP (ref 4.8–10.8)

## 2019-02-22 RX ORDER — ONDANSETRON 8 MG/1
4 TABLET, FILM COATED ORAL
Qty: 0 | Refills: 0 | Status: DISCONTINUED | OUTPATIENT
Start: 2019-02-22 | End: 2019-02-22

## 2019-02-22 RX ORDER — OXYCODONE HYDROCHLORIDE 5 MG/1
5 TABLET ORAL EVERY 4 HOURS
Qty: 0 | Refills: 0 | Status: DISCONTINUED | OUTPATIENT
Start: 2019-02-22 | End: 2019-02-25

## 2019-02-22 RX ORDER — ACETAMINOPHEN 500 MG
650 TABLET ORAL EVERY 6 HOURS
Qty: 0 | Refills: 0 | Status: DISCONTINUED | OUTPATIENT
Start: 2019-02-22 | End: 2019-02-22

## 2019-02-22 RX ORDER — ACETAMINOPHEN 500 MG
650 TABLET ORAL EVERY 6 HOURS
Qty: 0 | Refills: 0 | Status: DISCONTINUED | OUTPATIENT
Start: 2019-02-22 | End: 2019-02-25

## 2019-02-22 RX ORDER — DOCUSATE SODIUM 100 MG
100 CAPSULE ORAL
Qty: 0 | Refills: 0 | Status: DISCONTINUED | OUTPATIENT
Start: 2019-02-22 | End: 2019-02-25

## 2019-02-22 RX ORDER — HYDROMORPHONE HYDROCHLORIDE 2 MG/ML
1 INJECTION INTRAMUSCULAR; INTRAVENOUS; SUBCUTANEOUS
Qty: 0 | Refills: 0 | Status: DISCONTINUED | OUTPATIENT
Start: 2019-02-22 | End: 2019-02-22

## 2019-02-22 RX ORDER — SODIUM CHLORIDE 9 MG/ML
1000 INJECTION INTRAMUSCULAR; INTRAVENOUS; SUBCUTANEOUS
Qty: 0 | Refills: 0 | Status: DISCONTINUED | OUTPATIENT
Start: 2019-02-22 | End: 2019-02-25

## 2019-02-22 RX ORDER — SODIUM POLYSTYRENE SULFONATE 4.1 MEQ/G
30 POWDER, FOR SUSPENSION ORAL ONCE
Qty: 0 | Refills: 0 | Status: COMPLETED | OUTPATIENT
Start: 2019-02-22 | End: 2019-02-22

## 2019-02-22 RX ORDER — SODIUM CHLORIDE 9 MG/ML
1000 INJECTION INTRAMUSCULAR; INTRAVENOUS; SUBCUTANEOUS
Qty: 0 | Refills: 0 | Status: DISCONTINUED | OUTPATIENT
Start: 2019-02-22 | End: 2019-02-22

## 2019-02-22 RX ORDER — PANTOPRAZOLE SODIUM 20 MG/1
40 TABLET, DELAYED RELEASE ORAL
Qty: 0 | Refills: 0 | Status: DISCONTINUED | OUTPATIENT
Start: 2019-02-22 | End: 2019-02-25

## 2019-02-22 RX ORDER — MORPHINE SULFATE 50 MG/1
2 CAPSULE, EXTENDED RELEASE ORAL EVERY 4 HOURS
Qty: 0 | Refills: 0 | Status: DISCONTINUED | OUTPATIENT
Start: 2019-02-22 | End: 2019-02-22

## 2019-02-22 RX ORDER — SENNA PLUS 8.6 MG/1
2 TABLET ORAL AT BEDTIME
Qty: 0 | Refills: 0 | Status: DISCONTINUED | OUTPATIENT
Start: 2019-02-22 | End: 2019-02-25

## 2019-02-22 RX ORDER — POLYETHYLENE GLYCOL 3350 17 G/17G
17 POWDER, FOR SOLUTION ORAL DAILY
Qty: 0 | Refills: 0 | Status: DISCONTINUED | OUTPATIENT
Start: 2019-02-22 | End: 2019-02-22

## 2019-02-22 RX ORDER — APIXABAN 2.5 MG/1
5 TABLET, FILM COATED ORAL EVERY 12 HOURS
Qty: 0 | Refills: 0 | Status: DISCONTINUED | OUTPATIENT
Start: 2019-02-22 | End: 2019-02-25

## 2019-02-22 RX ORDER — HYDROMORPHONE HYDROCHLORIDE 2 MG/ML
0.5 INJECTION INTRAMUSCULAR; INTRAVENOUS; SUBCUTANEOUS
Qty: 0 | Refills: 0 | Status: DISCONTINUED | OUTPATIENT
Start: 2019-02-22 | End: 2019-02-22

## 2019-02-22 RX ORDER — FERROUS SULFATE 325(65) MG
1 TABLET ORAL
Qty: 0 | Refills: 0 | COMMUNITY

## 2019-02-22 RX ORDER — SENNA PLUS 8.6 MG/1
2 TABLET ORAL AT BEDTIME
Qty: 0 | Refills: 0 | Status: DISCONTINUED | OUTPATIENT
Start: 2019-02-22 | End: 2019-02-22

## 2019-02-22 RX ORDER — POLYETHYLENE GLYCOL 3350 17 G/17G
17 POWDER, FOR SOLUTION ORAL DAILY
Qty: 0 | Refills: 0 | Status: DISCONTINUED | OUTPATIENT
Start: 2019-02-22 | End: 2019-02-25

## 2019-02-22 RX ORDER — CIPROFLOXACIN LACTATE 400MG/40ML
400 VIAL (ML) INTRAVENOUS EVERY 12 HOURS
Qty: 0 | Refills: 0 | Status: DISCONTINUED | OUTPATIENT
Start: 2019-02-22 | End: 2019-02-25

## 2019-02-22 RX ORDER — ERGOCALCIFEROL 1.25 MG/1
50000 CAPSULE ORAL
Qty: 0 | Refills: 0 | Status: DISCONTINUED | OUTPATIENT
Start: 2019-02-22 | End: 2019-02-22

## 2019-02-22 RX ORDER — MORPHINE SULFATE 50 MG/1
4 CAPSULE, EXTENDED RELEASE ORAL
Qty: 0 | Refills: 0 | Status: DISCONTINUED | OUTPATIENT
Start: 2019-02-22 | End: 2019-02-22

## 2019-02-22 RX ORDER — HYDROMORPHONE HYDROCHLORIDE 2 MG/ML
0.5 INJECTION INTRAMUSCULAR; INTRAVENOUS; SUBCUTANEOUS EVERY 4 HOURS
Qty: 0 | Refills: 0 | Status: DISCONTINUED | OUTPATIENT
Start: 2019-02-22 | End: 2019-02-25

## 2019-02-22 RX ORDER — DOCUSATE SODIUM 100 MG
100 CAPSULE ORAL
Qty: 0 | Refills: 0 | Status: DISCONTINUED | OUTPATIENT
Start: 2019-02-22 | End: 2019-02-22

## 2019-02-22 RX ORDER — HYDROMORPHONE HYDROCHLORIDE 2 MG/ML
0.5 INJECTION INTRAMUSCULAR; INTRAVENOUS; SUBCUTANEOUS
Qty: 0 | Refills: 0 | Status: DISCONTINUED | OUTPATIENT
Start: 2019-02-22 | End: 2019-02-25

## 2019-02-22 RX ORDER — OXYCODONE AND ACETAMINOPHEN 5; 325 MG/1; MG/1
2 TABLET ORAL EVERY 4 HOURS
Qty: 0 | Refills: 0 | Status: DISCONTINUED | OUTPATIENT
Start: 2019-02-22 | End: 2019-02-22

## 2019-02-22 RX ORDER — SODIUM CHLORIDE 9 MG/ML
1000 INJECTION, SOLUTION INTRAVENOUS
Qty: 0 | Refills: 0 | Status: DISCONTINUED | OUTPATIENT
Start: 2019-02-22 | End: 2019-02-22

## 2019-02-22 RX ORDER — APIXABAN 2.5 MG/1
5 TABLET, FILM COATED ORAL EVERY 12 HOURS
Qty: 0 | Refills: 0 | Status: DISCONTINUED | OUTPATIENT
Start: 2019-02-22 | End: 2019-02-22

## 2019-02-22 RX ORDER — METOPROLOL TARTRATE 50 MG
50 TABLET ORAL DAILY
Qty: 0 | Refills: 0 | Status: DISCONTINUED | OUTPATIENT
Start: 2019-02-22 | End: 2019-02-25

## 2019-02-22 RX ORDER — SODIUM HYPOCHLORITE 0.125 %
1 SOLUTION, NON-ORAL MISCELLANEOUS
Qty: 0 | Refills: 0 | Status: DISCONTINUED | OUTPATIENT
Start: 2019-02-22 | End: 2019-02-22

## 2019-02-22 RX ORDER — METOPROLOL TARTRATE 50 MG
50 TABLET ORAL DAILY
Qty: 0 | Refills: 0 | Status: DISCONTINUED | OUTPATIENT
Start: 2019-02-22 | End: 2019-02-22

## 2019-02-22 RX ORDER — PANTOPRAZOLE SODIUM 20 MG/1
40 TABLET, DELAYED RELEASE ORAL
Qty: 0 | Refills: 0 | Status: DISCONTINUED | OUTPATIENT
Start: 2019-02-22 | End: 2019-02-22

## 2019-02-22 RX ORDER — ERGOCALCIFEROL 1.25 MG/1
50000 CAPSULE ORAL
Qty: 0 | Refills: 0 | Status: DISCONTINUED | OUTPATIENT
Start: 2019-02-22 | End: 2019-02-25

## 2019-02-22 RX ORDER — HEPARIN SODIUM 5000 [USP'U]/ML
5000 INJECTION INTRAVENOUS; SUBCUTANEOUS EVERY 8 HOURS
Qty: 0 | Refills: 0 | Status: DISCONTINUED | OUTPATIENT
Start: 2019-02-22 | End: 2019-02-22

## 2019-02-22 RX ADMIN — Medication 100 MILLIGRAM(S): at 02:13

## 2019-02-22 RX ADMIN — SODIUM CHLORIDE 100 MILLILITER(S): 9 INJECTION INTRAMUSCULAR; INTRAVENOUS; SUBCUTANEOUS at 13:12

## 2019-02-22 RX ADMIN — Medication 100 MILLIGRAM(S): at 05:24

## 2019-02-22 RX ADMIN — OXYCODONE AND ACETAMINOPHEN 2 TABLET(S): 5; 325 TABLET ORAL at 01:35

## 2019-02-22 RX ADMIN — Medication 200 MILLIGRAM(S): at 06:06

## 2019-02-22 RX ADMIN — SODIUM CHLORIDE 100 MILLILITER(S): 9 INJECTION, SOLUTION INTRAVENOUS at 01:23

## 2019-02-22 RX ADMIN — HEPARIN SODIUM 5000 UNIT(S): 5000 INJECTION INTRAVENOUS; SUBCUTANEOUS at 06:06

## 2019-02-22 RX ADMIN — Medication 100 MILLIGRAM(S): at 21:31

## 2019-02-22 RX ADMIN — Medication 100 MILLIGRAM(S): at 17:54

## 2019-02-22 RX ADMIN — HYDROMORPHONE HYDROCHLORIDE 0.5 MILLIGRAM(S): 2 INJECTION INTRAMUSCULAR; INTRAVENOUS; SUBCUTANEOUS at 17:55

## 2019-02-22 RX ADMIN — SODIUM CHLORIDE 100 MILLILITER(S): 9 INJECTION INTRAMUSCULAR; INTRAVENOUS; SUBCUTANEOUS at 22:51

## 2019-02-22 RX ADMIN — HYDROMORPHONE HYDROCHLORIDE 0.5 MILLIGRAM(S): 2 INJECTION INTRAMUSCULAR; INTRAVENOUS; SUBCUTANEOUS at 20:46

## 2019-02-22 RX ADMIN — HYDROMORPHONE HYDROCHLORIDE 0.5 MILLIGRAM(S): 2 INJECTION INTRAMUSCULAR; INTRAVENOUS; SUBCUTANEOUS at 16:23

## 2019-02-22 RX ADMIN — OXYCODONE AND ACETAMINOPHEN 2 TABLET(S): 5; 325 TABLET ORAL at 00:55

## 2019-02-22 RX ADMIN — Medication 1 APPLICATION(S): at 06:07

## 2019-02-22 RX ADMIN — MORPHINE SULFATE 4 MILLIGRAM(S): 50 CAPSULE, EXTENDED RELEASE ORAL at 03:18

## 2019-02-22 RX ADMIN — HYDROMORPHONE HYDROCHLORIDE 0.5 MILLIGRAM(S): 2 INJECTION INTRAMUSCULAR; INTRAVENOUS; SUBCUTANEOUS at 10:44

## 2019-02-22 RX ADMIN — ERGOCALCIFEROL 50000 UNIT(S): 1.25 CAPSULE ORAL at 13:12

## 2019-02-22 RX ADMIN — HYDROMORPHONE HYDROCHLORIDE 0.5 MILLIGRAM(S): 2 INJECTION INTRAMUSCULAR; INTRAVENOUS; SUBCUTANEOUS at 22:51

## 2019-02-22 RX ADMIN — HYDROMORPHONE HYDROCHLORIDE 0.5 MILLIGRAM(S): 2 INJECTION INTRAMUSCULAR; INTRAVENOUS; SUBCUTANEOUS at 20:31

## 2019-02-22 RX ADMIN — Medication 200 MILLIGRAM(S): at 00:55

## 2019-02-22 RX ADMIN — SODIUM CHLORIDE 100 MILLILITER(S): 9 INJECTION INTRAMUSCULAR; INTRAVENOUS; SUBCUTANEOUS at 02:57

## 2019-02-22 RX ADMIN — PANTOPRAZOLE SODIUM 40 MILLIGRAM(S): 20 TABLET, DELAYED RELEASE ORAL at 06:06

## 2019-02-22 RX ADMIN — Medication 100 MILLIGRAM(S): at 06:06

## 2019-02-22 RX ADMIN — Medication 200 MILLIGRAM(S): at 17:52

## 2019-02-22 RX ADMIN — Medication 100 MILLIGRAM(S): at 13:11

## 2019-02-22 RX ADMIN — SODIUM CHLORIDE 125 MILLILITER(S): 9 INJECTION, SOLUTION INTRAVENOUS at 10:44

## 2019-02-22 RX ADMIN — MORPHINE SULFATE 4 MILLIGRAM(S): 50 CAPSULE, EXTENDED RELEASE ORAL at 03:03

## 2019-02-22 RX ADMIN — HYDROMORPHONE HYDROCHLORIDE 0.5 MILLIGRAM(S): 2 INJECTION INTRAMUSCULAR; INTRAVENOUS; SUBCUTANEOUS at 23:06

## 2019-02-22 RX ADMIN — SODIUM POLYSTYRENE SULFONATE 30 GRAM(S): 4.1 POWDER, FOR SUSPENSION ORAL at 02:57

## 2019-02-22 RX ADMIN — SENNA PLUS 2 TABLET(S): 8.6 TABLET ORAL at 21:31

## 2019-02-22 RX ADMIN — APIXABAN 5 MILLIGRAM(S): 2.5 TABLET, FILM COATED ORAL at 17:53

## 2019-02-22 NOTE — H&P ADULT - NSHPPHYSICALEXAM_GEN_ALL_CORE
General: patient lying comfortably in stretcher, in no acute distress, speaking in full sentences  Right foot wound: s/p amputation, with one large open wound at the top of the stump, smaller wound at lower part of stump. Malodorous. Yellow and black eschar present.

## 2019-02-22 NOTE — BRIEF OPERATIVE NOTE - SPECIMENS
necrotic tissue Congestive heart failure, unspecified HF chronicity, unspecified heart failure type Type 2 diabetes mellitus with complication, unspecified whether long term insulin use

## 2019-02-22 NOTE — H&P ADULT - HISTORY OF PRESENT ILLNESS
Patient is a 43 year old female with pmhx of DVT on Eliquis (LUE 12/2018), gastric bypass (10/2017) complicated by obstruction, ARDs and sepsis requiring ICU admission and pressors, which resulted in upper and lower limb ischemia s/p bilateral hand and foot amputations and skin grafts. Patient states a couple of weeks ago she had surgery on her right foot with podiatry that resulted in "two large holes" in her foot. Patient presented to burn clinic today to see Dr. Artis, for which it was noticed to have a malodor and appear infected. Patient told to come to ED for admission and debridement tomorrow 2/22/19. Patient currently reports pain in right foot. Denies any trouble breathing, chest pain, fever, nausea or vomiting. Patient is a 43 year old female with pmhx of DVT on Eliquis (LUE 12/2018), gastric bypass (10/2017) complicated by obstruction, ARDs and sepsis requiring ICU admission and pressors, which resulted in upper and lower limb ischemia s/p bilateral hand and foot amputations and skin grafts presents with right foot stump wound. Patient states a couple of weeks ago she had surgery on her right foot with podiatry that resulted in "two large holes" in her foot. Patient presented to burn clinic today to see Dr. Artis, for which it was noticed to have a malodor and appear infected. Patient told to come to ED for admission and debridement tomorrow 2/22/19. Patient also reports a DVT in her left arm which was found in december after her PICC line was removed, after finishing IV antibiotics for osteomyelitis. Patient was placed on eliquis since then.  Patient currently reports pain in right foot. Denies any trouble breathing, chest pain, fever, nausea or vomiting.

## 2019-02-22 NOTE — CONSULT NOTE ADULT - ASSESSMENT
Assessment:   - left foot s/p choparts amputation stable/healed  - right foot s/p choparts amputation    Plan:   - pt seen/evaluated @ bedside  - left foot healed/stable  - right foot dressed by burn (s/p debridement by burn 2/22/19)  - podiatry will coordinate w/ burn regarding tx plan and possible need for podiatric intervention   - plan will be discussed w/ podiatry

## 2019-02-22 NOTE — H&P ADULT - NSHPLABSRESULTS_GEN_ALL_CORE
11.1   9.85  )-----------( 320      ( 21 Feb 2019 23:30 )             35.6   02-21    139  |  99  |  15  ----------------------------<  104<H>  5.4<H>   |  25  |  1.0    Ca    8.9      21 Feb 2019 23:30  Phos  3.7     02-21  Mg     2.0     02-21

## 2019-02-22 NOTE — H&P ADULT - ASSESSMENT
Patient is a 43 year old female with pmhx of DVT on Eliquis (LUE 12/2018), gastric bypass (10/2017) complicated by obstruction, ARDs and sepsis requiring ICU admission and pressors, which resulted in upper and lower limb ischemia s/p bilateral hand and foot amputations and skin grafts with right foot stump wound admitted to burn service.     Plan:    Admit to burn  Plan for debridement 2/22  Local wound care  IV abx  IV fluids  Pain control   DVT - continue eliquis, hold in AM for OR, start heparin sub-q for DVT ppx  HTN - continue metoprolol

## 2019-02-22 NOTE — CONSULT NOTE ADULT - SUBJECTIVE AND OBJECTIVE BOX
PROGRESS NOTE   Patient is a 43y old  Female who presents with a chief complaint of right foot wound (22 Feb 2019 00:48)      HPI:  Patient is a 43 year old female with pmhx of DVT on Eliquis (LUE 12/2018), gastric bypass (10/2017) complicated by obstruction, ARDs and sepsis requiring ICU admission and pressors, which resulted in upper and lower limb ischemia s/p bilateral hand and foot amputations and skin grafts presents with right foot stump wound. Patient states a couple of weeks ago she had surgery on her right foot with podiatry that resulted in "two large holes" in her foot. Patient presented to burn clinic today to see Dr. Artis, for which it was noticed to have a malodor and appear infected. Patient told to come to ED for admission and debridement tomorrow 2/22/19. Patient also reports a DVT in her left arm which was found in december after her PICC line was removed, after finishing IV antibiotics for osteomyelitis. Patient was placed on eliquis since then.  Patient currently reports pain in right foot. Denies any trouble breathing, chest pain, fever, nausea or vomiting. (22 Feb 2019 00:48)      WOUND INFECTION AFTER SURGERY  ^SENT IN TO BE ADMITTED  H/o or current diagnosis of HF- Contraindication to ACEI/ARBs  H/o or current diagnosis of HF- ACEI/ARB contraindication unknown  H/o or current diagnosis of HF- Contraindication to ACEI/ARBs  H/o or current diagnosis of HF- ACEI/ARB contraindication unknown  Family history of heart disease (Father)  Family history of cancer (Mother)  No pertinent family history in first degree relatives  MEWS Score  HTN (hypertension)  Cardiomyopathy  DVT (deep venous thrombosis)  Osteomyelitis  ARDS survivor  Sepsis  Takotsubo cardiomyopathy  Small bowel obstruction  Osteoarthritis  Cardiomyopathy  Sleep apnea  HTN (hypertension)  Gangrene of lower extremity  Gangrene of finger of right hand  Gangrene of finger of left hand  Wound  Wound  Wound infection after surgery  Debridement  Open wound of foot  S/P amputation  H/O exploratory laparotomy  Amputation finger  History of cholecystectomy  H/O gastric bypass  SENT IN TO BE ADMITTED  12      VITALS:  Vital Signs Last 24 Hrs  T(C): 35.6 (22 Feb 2019 12:14), Max: 37.4 (21 Feb 2019 21:13)  T(F): 96 (22 Feb 2019 12:14), Max: 99.3 (21 Feb 2019 21:13)  HR: 71 (22 Feb 2019 12:14) (56 - 85)  BP: 115/56 (22 Feb 2019 12:14) (94/54 - 127/63)  BP(mean): 81 (22 Feb 2019 12:14) (81 - 81)  RR: 18 (22 Feb 2019 12:14) (11 - 20)  SpO2: 100% (22 Feb 2019 12:14) (96% - 100%)    LABS:                        11.1   9.85  )-----------( 320      ( 21 Feb 2019 23:30 )             35.6     02-21    139  |  99  |  15  ----------------------------<  104<H>  5.4<H>   |  25  |  1.0    Ca    8.9      21 Feb 2019 23:30  Phos  3.7     02-21  Mg     2.0     02-21      PT/INR - ( 21 Feb 2019 23:30 )   PT: 13.50 sec;   INR: 1.18 ratio         PTT - ( 21 Feb 2019 23:30 )  PTT:29.4 sec  Hemoglobin A1C     PHYSICAL EXAM  GEN: JIMMY CR is a pleasant well-nourished, well developed 43y Female in no acute distress, alert awake, and oriented to person, place and time.     Medication(s):   acetaminophen   Tablet .. 650 milliGRAM(s) Oral every 6 hours PRN  apixaban 5 milliGRAM(s) Oral every 12 hours  ciprofloxacin   IVPB 400 milliGRAM(s) IV Intermittent every 12 hours  clindamycin IVPB 600 milliGRAM(s) IV Intermittent every 8 hours  docusate sodium 100 milliGRAM(s) Oral two times a day  ergocalciferol 84348 Unit(s) Oral every week  metoprolol succinate ER 50 milliGRAM(s) Oral daily  oxyCODONE    IR 5 milliGRAM(s) Oral every 4 hours PRN  pantoprazole    Tablet 40 milliGRAM(s) Oral before breakfast  polyethylene glycol 3350 17 Gram(s) Oral daily PRN  senna 2 Tablet(s) Oral at bedtime  sodium chloride 0.9%. 1000 milliLiter(s) IV Continuous <Continuous>      LE Focused Exam:      Vasc:    - PT pulses 1/4 B/L   - Skin temp warm to warm, proximal to distal B/L  - CFT < 3 sec B/L    Neuro:   - Gross sensation in tact B/L     Derm:   - left foot s/p choparts amputation stable/healed  - right foot s/p choparts amputation    MSK:   - Muscle strength 3/5 in all quadrants w/ no defects B/L

## 2019-02-22 NOTE — BRIEF OPERATIVE NOTE - PROCEDURE
<<-----Click on this checkbox to enter Procedure Debridement  02/22/2019  debridement right foot  Active  [id number]

## 2019-02-22 NOTE — PRE-ANESTHESIA EVALUATION ADULT - NSANTHADDINFOFT_GEN_ALL_CORE
42 y/o BF evaluated for debridement of right foot.  ASA 3.  Plan GA.  Plan, benefits, foreseeable risks, viable alternatives discussed with patient and all her pertinent questions answered and she understands and elects to proceed.

## 2019-02-23 LAB
ANION GAP SERPL CALC-SCNC: 10 MMOL/L — SIGNIFICANT CHANGE UP (ref 7–14)
BUN SERPL-MCNC: 14 MG/DL — SIGNIFICANT CHANGE UP (ref 10–20)
CALCIUM SERPL-MCNC: 8.7 MG/DL — SIGNIFICANT CHANGE UP (ref 8.5–10.1)
CHLORIDE SERPL-SCNC: 105 MMOL/L — SIGNIFICANT CHANGE UP (ref 98–110)
CO2 SERPL-SCNC: 25 MMOL/L — SIGNIFICANT CHANGE UP (ref 17–32)
CREAT SERPL-MCNC: 0.9 MG/DL — SIGNIFICANT CHANGE UP (ref 0.7–1.5)
GLUCOSE SERPL-MCNC: 110 MG/DL — HIGH (ref 70–99)
HCT VFR BLD CALC: 34.9 % — LOW (ref 37–47)
HGB BLD-MCNC: 10.6 G/DL — LOW (ref 12–16)
MAGNESIUM SERPL-MCNC: 1.9 MG/DL — SIGNIFICANT CHANGE UP (ref 1.8–2.4)
MCHC RBC-ENTMCNC: 24 PG — LOW (ref 27–31)
MCHC RBC-ENTMCNC: 30.4 G/DL — LOW (ref 32–37)
MCV RBC AUTO: 79.1 FL — LOW (ref 81–99)
NRBC # BLD: 0 /100 WBCS — SIGNIFICANT CHANGE UP (ref 0–0)
PHOSPHATE SERPL-MCNC: 3.9 MG/DL — SIGNIFICANT CHANGE UP (ref 2.1–4.9)
PLATELET # BLD AUTO: 314 K/UL — SIGNIFICANT CHANGE UP (ref 130–400)
POTASSIUM SERPL-MCNC: 4.7 MMOL/L — SIGNIFICANT CHANGE UP (ref 3.5–5)
POTASSIUM SERPL-SCNC: 4.7 MMOL/L — SIGNIFICANT CHANGE UP (ref 3.5–5)
RBC # BLD: 4.41 M/UL — SIGNIFICANT CHANGE UP (ref 4.2–5.4)
RBC # FLD: 14.5 % — SIGNIFICANT CHANGE UP (ref 11.5–14.5)
SODIUM SERPL-SCNC: 140 MMOL/L — SIGNIFICANT CHANGE UP (ref 135–146)
WBC # BLD: 6.07 K/UL — SIGNIFICANT CHANGE UP (ref 4.8–10.8)
WBC # FLD AUTO: 6.07 K/UL — SIGNIFICANT CHANGE UP (ref 4.8–10.8)

## 2019-02-23 RX ADMIN — Medication 200 MILLIGRAM(S): at 05:04

## 2019-02-23 RX ADMIN — HYDROMORPHONE HYDROCHLORIDE 0.5 MILLIGRAM(S): 2 INJECTION INTRAMUSCULAR; INTRAVENOUS; SUBCUTANEOUS at 04:48

## 2019-02-23 RX ADMIN — HYDROMORPHONE HYDROCHLORIDE 0.5 MILLIGRAM(S): 2 INJECTION INTRAMUSCULAR; INTRAVENOUS; SUBCUTANEOUS at 09:35

## 2019-02-23 RX ADMIN — Medication 100 MILLIGRAM(S): at 21:13

## 2019-02-23 RX ADMIN — HYDROMORPHONE HYDROCHLORIDE 0.5 MILLIGRAM(S): 2 INJECTION INTRAMUSCULAR; INTRAVENOUS; SUBCUTANEOUS at 23:43

## 2019-02-23 RX ADMIN — HYDROMORPHONE HYDROCHLORIDE 0.5 MILLIGRAM(S): 2 INJECTION INTRAMUSCULAR; INTRAVENOUS; SUBCUTANEOUS at 18:30

## 2019-02-23 RX ADMIN — HYDROMORPHONE HYDROCHLORIDE 0.5 MILLIGRAM(S): 2 INJECTION INTRAMUSCULAR; INTRAVENOUS; SUBCUTANEOUS at 22:08

## 2019-02-23 RX ADMIN — APIXABAN 5 MILLIGRAM(S): 2.5 TABLET, FILM COATED ORAL at 18:02

## 2019-02-23 RX ADMIN — SODIUM CHLORIDE 100 MILLILITER(S): 9 INJECTION INTRAMUSCULAR; INTRAVENOUS; SUBCUTANEOUS at 11:21

## 2019-02-23 RX ADMIN — Medication 100 MILLIGRAM(S): at 13:44

## 2019-02-23 RX ADMIN — Medication 100 MILLIGRAM(S): at 05:15

## 2019-02-23 RX ADMIN — Medication 100 MILLIGRAM(S): at 18:02

## 2019-02-23 RX ADMIN — PANTOPRAZOLE SODIUM 40 MILLIGRAM(S): 20 TABLET, DELAYED RELEASE ORAL at 07:06

## 2019-02-23 RX ADMIN — APIXABAN 5 MILLIGRAM(S): 2.5 TABLET, FILM COATED ORAL at 05:15

## 2019-02-23 RX ADMIN — Medication 200 MILLIGRAM(S): at 18:03

## 2019-02-23 RX ADMIN — HYDROMORPHONE HYDROCHLORIDE 0.5 MILLIGRAM(S): 2 INJECTION INTRAMUSCULAR; INTRAVENOUS; SUBCUTANEOUS at 04:33

## 2019-02-23 RX ADMIN — HYDROMORPHONE HYDROCHLORIDE 0.5 MILLIGRAM(S): 2 INJECTION INTRAMUSCULAR; INTRAVENOUS; SUBCUTANEOUS at 22:30

## 2019-02-23 RX ADMIN — HYDROMORPHONE HYDROCHLORIDE 0.5 MILLIGRAM(S): 2 INJECTION INTRAMUSCULAR; INTRAVENOUS; SUBCUTANEOUS at 18:03

## 2019-02-23 RX ADMIN — Medication 100 MILLIGRAM(S): at 05:04

## 2019-02-23 RX ADMIN — SENNA PLUS 2 TABLET(S): 8.6 TABLET ORAL at 21:13

## 2019-02-23 NOTE — PROGRESS NOTE ADULT - ASSESSMENT
A/P: POD 1 s/p alexandru    cont IVF  cont wound care  Cont IV antibx  DVT GI Prophylaxis  Pain control  OT/PT

## 2019-02-23 NOTE — PROGRESS NOTE ADULT - SUBJECTIVE AND OBJECTIVE BOX
Patient is a 43y old  Female who presents with a chief complaint of right foot wound (22 Feb 2019 13:35)    No acute events overnight. POD 1 s/p alexandru    Vital Signs Last 24 Hrs  T(C): 36.5 (23 Feb 2019 07:42), Max: 36.7 (23 Feb 2019 00:25)  T(F): 97.7 (23 Feb 2019 07:42), Max: 98 (23 Feb 2019 00:25)  HR: 76 (23 Feb 2019 07:42) (76 - 82)  BP: 99/61 (23 Feb 2019 07:42) (91/57 - 113/61)  BP(mean): --  RR: 18 (23 Feb 2019 07:42) (18 - 18)  SpO2: 98% (23 Feb 2019 07:42) (98% - 100%)    I&O's Summary    22 Feb 2019 07:01  -  23 Feb 2019 07:00  --------------------------------------------------------  IN: 3210 mL / OUT: 1495 mL / NET: 1715 mL    23 Feb 2019 07:01  -  23 Feb 2019 14:24  --------------------------------------------------------  IN: 1200 mL / OUT: 800 mL / NET: 400 mL        Meds:  MEDICATIONS  (STANDING):  apixaban 5 milliGRAM(s) Oral every 12 hours  ciprofloxacin   IVPB 400 milliGRAM(s) IV Intermittent every 12 hours  clindamycin IVPB 600 milliGRAM(s) IV Intermittent every 8 hours  docusate sodium 100 milliGRAM(s) Oral two times a day  ergocalciferol 70582 Unit(s) Oral every week  metoprolol succinate ER 50 milliGRAM(s) Oral daily  pantoprazole    Tablet 40 milliGRAM(s) Oral before breakfast  senna 2 Tablet(s) Oral at bedtime  sodium chloride 0.9%. 1000 milliLiter(s) (100 mL/Hr) IV Continuous <Continuous>    MEDICATIONS  (PRN):  acetaminophen   Tablet .. 650 milliGRAM(s) Oral every 6 hours PRN Temp greater or equal to 38.5C (101.3F), Mild Pain (1 - 3)  HYDROmorphone  Injectable 0.5 milliGRAM(s) IV Push every 4 hours PRN Severe Pain (7 - 10)  HYDROmorphone  Injectable 0.5 milliGRAM(s) IV Push two times a day PRN wound care  oxyCODONE    IR 5 milliGRAM(s) Oral every 4 hours PRN Moderate Pain (4 - 6)  polyethylene glycol 3350 17 Gram(s) Oral daily PRN Constipation          Labs:                        9.6    7.60  )-----------( 291      ( 22 Feb 2019 16:15 )             31.5     02-22    141  |  107  |  13  ----------------------------<  101<H>  4.3   |  24  |  0.9    Ca    8.4<L>      22 Feb 2019 16:15  Phos  3.8     02-22  Mg     1.9     02-22    PE: AAO x 3  Full htickness wound to rt foot TMA with granulation tissue, bone exposed  serosang dc

## 2019-02-24 LAB
ANION GAP SERPL CALC-SCNC: 10 MMOL/L — SIGNIFICANT CHANGE UP (ref 7–14)
BASOPHILS # BLD AUTO: 0.04 K/UL — SIGNIFICANT CHANGE UP (ref 0–0.2)
BASOPHILS NFR BLD AUTO: 0.5 % — SIGNIFICANT CHANGE UP (ref 0–1)
BUN SERPL-MCNC: 15 MG/DL — SIGNIFICANT CHANGE UP (ref 10–20)
CALCIUM SERPL-MCNC: 9 MG/DL — SIGNIFICANT CHANGE UP (ref 8.5–10.1)
CHLORIDE SERPL-SCNC: 105 MMOL/L — SIGNIFICANT CHANGE UP (ref 98–110)
CO2 SERPL-SCNC: 23 MMOL/L — SIGNIFICANT CHANGE UP (ref 17–32)
CREAT SERPL-MCNC: 1 MG/DL — SIGNIFICANT CHANGE UP (ref 0.7–1.5)
EOSINOPHIL # BLD AUTO: 0.18 K/UL — SIGNIFICANT CHANGE UP (ref 0–0.7)
EOSINOPHIL NFR BLD AUTO: 2.2 % — SIGNIFICANT CHANGE UP (ref 0–8)
GLUCOSE SERPL-MCNC: 90 MG/DL — SIGNIFICANT CHANGE UP (ref 70–99)
HCT VFR BLD CALC: 37.8 % — SIGNIFICANT CHANGE UP (ref 37–47)
HGB BLD-MCNC: 11.4 G/DL — LOW (ref 12–16)
IMM GRANULOCYTES NFR BLD AUTO: 0.4 % — HIGH (ref 0.1–0.3)
LYMPHOCYTES # BLD AUTO: 2.43 K/UL — SIGNIFICANT CHANGE UP (ref 1.2–3.4)
LYMPHOCYTES # BLD AUTO: 30.1 % — SIGNIFICANT CHANGE UP (ref 20.5–51.1)
MAGNESIUM SERPL-MCNC: 2 MG/DL — SIGNIFICANT CHANGE UP (ref 1.8–2.4)
MCHC RBC-ENTMCNC: 24.2 PG — LOW (ref 27–31)
MCHC RBC-ENTMCNC: 30.2 G/DL — LOW (ref 32–37)
MCV RBC AUTO: 80.1 FL — LOW (ref 81–99)
MONOCYTES # BLD AUTO: 0.49 K/UL — SIGNIFICANT CHANGE UP (ref 0.1–0.6)
MONOCYTES NFR BLD AUTO: 6.1 % — SIGNIFICANT CHANGE UP (ref 1.7–9.3)
NEUTROPHILS # BLD AUTO: 4.89 K/UL — SIGNIFICANT CHANGE UP (ref 1.4–6.5)
NEUTROPHILS NFR BLD AUTO: 60.7 % — SIGNIFICANT CHANGE UP (ref 42.2–75.2)
NRBC # BLD: 0 /100 WBCS — SIGNIFICANT CHANGE UP (ref 0–0)
PHOSPHATE SERPL-MCNC: 4.4 MG/DL — SIGNIFICANT CHANGE UP (ref 2.1–4.9)
PLATELET # BLD AUTO: 362 K/UL — SIGNIFICANT CHANGE UP (ref 130–400)
POTASSIUM SERPL-MCNC: 4.9 MMOL/L — SIGNIFICANT CHANGE UP (ref 3.5–5)
POTASSIUM SERPL-SCNC: 4.9 MMOL/L — SIGNIFICANT CHANGE UP (ref 3.5–5)
RBC # BLD: 4.72 M/UL — SIGNIFICANT CHANGE UP (ref 4.2–5.4)
RBC # FLD: 14.3 % — SIGNIFICANT CHANGE UP (ref 11.5–14.5)
SODIUM SERPL-SCNC: 138 MMOL/L — SIGNIFICANT CHANGE UP (ref 135–146)
WBC # BLD: 8.06 K/UL — SIGNIFICANT CHANGE UP (ref 4.8–10.8)
WBC # FLD AUTO: 8.06 K/UL — SIGNIFICANT CHANGE UP (ref 4.8–10.8)

## 2019-02-24 RX ADMIN — APIXABAN 5 MILLIGRAM(S): 2.5 TABLET, FILM COATED ORAL at 18:11

## 2019-02-24 RX ADMIN — Medication 100 MILLIGRAM(S): at 14:25

## 2019-02-24 RX ADMIN — SODIUM CHLORIDE 50 MILLILITER(S): 9 INJECTION INTRAMUSCULAR; INTRAVENOUS; SUBCUTANEOUS at 18:40

## 2019-02-24 RX ADMIN — HYDROMORPHONE HYDROCHLORIDE 0.5 MILLIGRAM(S): 2 INJECTION INTRAMUSCULAR; INTRAVENOUS; SUBCUTANEOUS at 09:00

## 2019-02-24 RX ADMIN — HYDROMORPHONE HYDROCHLORIDE 0.5 MILLIGRAM(S): 2 INJECTION INTRAMUSCULAR; INTRAVENOUS; SUBCUTANEOUS at 07:51

## 2019-02-24 RX ADMIN — Medication 100 MILLIGRAM(S): at 18:11

## 2019-02-24 RX ADMIN — Medication 200 MILLIGRAM(S): at 05:56

## 2019-02-24 RX ADMIN — HYDROMORPHONE HYDROCHLORIDE 0.5 MILLIGRAM(S): 2 INJECTION INTRAMUSCULAR; INTRAVENOUS; SUBCUTANEOUS at 18:09

## 2019-02-24 RX ADMIN — HYDROMORPHONE HYDROCHLORIDE 0.5 MILLIGRAM(S): 2 INJECTION INTRAMUSCULAR; INTRAVENOUS; SUBCUTANEOUS at 20:49

## 2019-02-24 RX ADMIN — HYDROMORPHONE HYDROCHLORIDE 0.5 MILLIGRAM(S): 2 INJECTION INTRAMUSCULAR; INTRAVENOUS; SUBCUTANEOUS at 00:30

## 2019-02-24 RX ADMIN — HYDROMORPHONE HYDROCHLORIDE 0.5 MILLIGRAM(S): 2 INJECTION INTRAMUSCULAR; INTRAVENOUS; SUBCUTANEOUS at 04:00

## 2019-02-24 RX ADMIN — SENNA PLUS 2 TABLET(S): 8.6 TABLET ORAL at 21:48

## 2019-02-24 RX ADMIN — HYDROMORPHONE HYDROCHLORIDE 0.5 MILLIGRAM(S): 2 INJECTION INTRAMUSCULAR; INTRAVENOUS; SUBCUTANEOUS at 22:08

## 2019-02-24 RX ADMIN — Medication 200 MILLIGRAM(S): at 18:10

## 2019-02-24 RX ADMIN — PANTOPRAZOLE SODIUM 40 MILLIGRAM(S): 20 TABLET, DELAYED RELEASE ORAL at 06:01

## 2019-02-24 RX ADMIN — Medication 100 MILLIGRAM(S): at 05:56

## 2019-02-24 RX ADMIN — HYDROMORPHONE HYDROCHLORIDE 0.5 MILLIGRAM(S): 2 INJECTION INTRAMUSCULAR; INTRAVENOUS; SUBCUTANEOUS at 22:30

## 2019-02-24 RX ADMIN — HYDROMORPHONE HYDROCHLORIDE 0.5 MILLIGRAM(S): 2 INJECTION INTRAMUSCULAR; INTRAVENOUS; SUBCUTANEOUS at 21:11

## 2019-02-24 RX ADMIN — HYDROMORPHONE HYDROCHLORIDE 0.5 MILLIGRAM(S): 2 INJECTION INTRAMUSCULAR; INTRAVENOUS; SUBCUTANEOUS at 12:00

## 2019-02-24 RX ADMIN — Medication 100 MILLIGRAM(S): at 05:23

## 2019-02-24 RX ADMIN — HYDROMORPHONE HYDROCHLORIDE 0.5 MILLIGRAM(S): 2 INJECTION INTRAMUSCULAR; INTRAVENOUS; SUBCUTANEOUS at 11:59

## 2019-02-24 RX ADMIN — HYDROMORPHONE HYDROCHLORIDE 0.5 MILLIGRAM(S): 2 INJECTION INTRAMUSCULAR; INTRAVENOUS; SUBCUTANEOUS at 03:17

## 2019-02-24 RX ADMIN — Medication 100 MILLIGRAM(S): at 21:48

## 2019-02-24 RX ADMIN — APIXABAN 5 MILLIGRAM(S): 2.5 TABLET, FILM COATED ORAL at 05:56

## 2019-02-24 RX ADMIN — HYDROMORPHONE HYDROCHLORIDE 0.5 MILLIGRAM(S): 2 INJECTION INTRAMUSCULAR; INTRAVENOUS; SUBCUTANEOUS at 18:40

## 2019-02-24 NOTE — HISTORY OF PRESENT ILLNESS
[FreeTextEntry1] : 42 y/o s/p bilateral TMA, s/p STSG presents for follow up S/p right foot Dbx\par Patient denies NVFC or SOB\par Does complain of pain

## 2019-02-24 NOTE — PROGRESS NOTE ADULT - ASSESSMENT
A/P: POD 2 s/p alexandru    cont wound care  Cont IV antibx  DVT GI Prophylaxis  Pain control  OT/PT  Wound VAC Tomorrow

## 2019-02-24 NOTE — ASSESSMENT
[FreeTextEntry1] : Patient examined, history and chart reviewed\par WOund appears well coapted No signs of infection\par SUtures to remain \par PAtient will follow up with Dr. Artis\par patient return 2 weeks for possible suture removal\par

## 2019-02-24 NOTE — PHYSICAL EXAM
[FreeTextEntry1] : Small plantar midfoot opening. Minimal drainage on today presentation. Right heel wound edges are well approximated. Linear longitudinal wound noted. Non infected

## 2019-02-24 NOTE — PROGRESS NOTE ADULT - SUBJECTIVE AND OBJECTIVE BOX
Patient is a 43y old  Female who presents with a chief complaint of right foot wound (23 Feb 2019 14:18)    No acute events overnight    Vital Signs Last 24 Hrs  T(C): 36.3 (24 Feb 2019 07:27), Max: 37.1 (23 Feb 2019 20:00)  T(F): 97.3 (24 Feb 2019 07:27), Max: 98.7 (23 Feb 2019 20:00)  HR: 72 (23 Feb 2019 23:20) (68 - 80)  BP: 109/64 (24 Feb 2019 07:27) (99/64 - 116/68)  BP(mean): --  RR: 18 (24 Feb 2019 07:27) (17 - 18)  SpO2: 99% (23 Feb 2019 20:00) (99% - 99%)  I&O's Summary    23 Feb 2019 07:01  -  24 Feb 2019 07:00  --------------------------------------------------------  IN: 3520 mL / OUT: 3000 mL / NET: 520 mL    24 Feb 2019 07:01  -  24 Feb 2019 13:38  --------------------------------------------------------  IN: 760 mL / OUT: 600 mL / NET: 160 mL        Meds:  MEDICATIONS  (STANDING):  apixaban 5 milliGRAM(s) Oral every 12 hours  ciprofloxacin   IVPB 400 milliGRAM(s) IV Intermittent every 12 hours  clindamycin IVPB 600 milliGRAM(s) IV Intermittent every 8 hours  docusate sodium 100 milliGRAM(s) Oral two times a day  ergocalciferol 15586 Unit(s) Oral every week  metoprolol succinate ER 50 milliGRAM(s) Oral daily  pantoprazole    Tablet 40 milliGRAM(s) Oral before breakfast  senna 2 Tablet(s) Oral at bedtime  sodium chloride 0.9%. 1000 milliLiter(s) (50 mL/Hr) IV Continuous <Continuous>    MEDICATIONS  (PRN):  acetaminophen   Tablet .. 650 milliGRAM(s) Oral every 6 hours PRN Temp greater or equal to 38.5C (101.3F), Mild Pain (1 - 3)  HYDROmorphone  Injectable 0.5 milliGRAM(s) IV Push every 4 hours PRN Severe Pain (7 - 10)  HYDROmorphone  Injectable 0.5 milliGRAM(s) IV Push two times a day PRN wound care  oxyCODONE    IR 5 milliGRAM(s) Oral every 4 hours PRN Moderate Pain (4 - 6)  polyethylene glycol 3350 17 Gram(s) Oral daily PRN Constipation        Culture - Surgical Swab (collected 22 Feb 2019 14:21)  Source: .Surgical Swab None  Preliminary Report (23 Feb 2019 22:28):    Few Enterococcus species    Few Corynebacterium species "Susceptibilities not performed"    Few Streptococcus agalactiae (Group B) isolated    Group B streptococci are susceptible to ampicillin,    penicillin and cefazolin, but may be resistant to    erythromycin and clindamycin.    Recommendations for intrapartum prophylaxis for Group B    streptococci are penicillin or ampicillin.        Labs:                        10.6   6.07  )-----------( 314      ( 23 Feb 2019 16:35 )             34.9     02-23    140  |  105  |  14  ----------------------------<  110<H>  4.7   |  25  |  0.9    Ca    8.7      23 Feb 2019 16:35  Phos  3.9     02-23  Mg     1.9     02-23    PE: AAO x 3    Full thickness wound to right TMA with granulation tissue, bone exposed,   Large dressing change

## 2019-02-25 ENCOUNTER — TRANSCRIPTION ENCOUNTER (OUTPATIENT)
Age: 44
End: 2019-02-25

## 2019-02-25 VITALS
RESPIRATION RATE: 18 BRPM | TEMPERATURE: 98 F | SYSTOLIC BLOOD PRESSURE: 117 MMHG | HEART RATE: 80 BPM | DIASTOLIC BLOOD PRESSURE: 77 MMHG

## 2019-02-25 LAB
BACTERIA SPEC CULT: ABNORMAL
BASOPHILS # BLD AUTO: 0.05 K/UL — SIGNIFICANT CHANGE UP (ref 0–0.2)
BASOPHILS NFR BLD AUTO: 0.8 % — SIGNIFICANT CHANGE UP (ref 0–1)
BLD GP AB SCN SERPL QL: SIGNIFICANT CHANGE UP
EOSINOPHIL # BLD AUTO: 0.19 K/UL — SIGNIFICANT CHANGE UP (ref 0–0.7)
EOSINOPHIL NFR BLD AUTO: 3.1 % — SIGNIFICANT CHANGE UP (ref 0–8)
HCT VFR BLD CALC: 32.5 % — LOW (ref 37–47)
HGB BLD-MCNC: 10.3 G/DL — LOW (ref 12–16)
IMM GRANULOCYTES NFR BLD AUTO: 0.3 % — SIGNIFICANT CHANGE UP (ref 0.1–0.3)
LYMPHOCYTES # BLD AUTO: 2.55 K/UL — SIGNIFICANT CHANGE UP (ref 1.2–3.4)
LYMPHOCYTES # BLD AUTO: 41.3 % — SIGNIFICANT CHANGE UP (ref 20.5–51.1)
MAGNESIUM SERPL-MCNC: 2 MG/DL — SIGNIFICANT CHANGE UP (ref 1.8–2.4)
MCHC RBC-ENTMCNC: 24.8 PG — LOW (ref 27–31)
MCHC RBC-ENTMCNC: 31.7 G/DL — LOW (ref 32–37)
MCV RBC AUTO: 78.1 FL — LOW (ref 81–99)
MONOCYTES # BLD AUTO: 0.54 K/UL — SIGNIFICANT CHANGE UP (ref 0.1–0.6)
MONOCYTES NFR BLD AUTO: 8.8 % — SIGNIFICANT CHANGE UP (ref 1.7–9.3)
NEUTROPHILS # BLD AUTO: 2.82 K/UL — SIGNIFICANT CHANGE UP (ref 1.4–6.5)
NEUTROPHILS NFR BLD AUTO: 45.7 % — SIGNIFICANT CHANGE UP (ref 42.2–75.2)
NRBC # BLD: 0 /100 WBCS — SIGNIFICANT CHANGE UP (ref 0–0)
PLATELET # BLD AUTO: 321 K/UL — SIGNIFICANT CHANGE UP (ref 130–400)
RBC # BLD: 4.16 M/UL — LOW (ref 4.2–5.4)
RBC # FLD: 14.4 % — SIGNIFICANT CHANGE UP (ref 11.5–14.5)
TYPE + AB SCN PNL BLD: SIGNIFICANT CHANGE UP
WBC # BLD: 6.17 K/UL — SIGNIFICANT CHANGE UP (ref 4.8–10.8)
WBC # FLD AUTO: 6.17 K/UL — SIGNIFICANT CHANGE UP (ref 4.8–10.8)

## 2019-02-25 RX ADMIN — HYDROMORPHONE HYDROCHLORIDE 0.5 MILLIGRAM(S): 2 INJECTION INTRAMUSCULAR; INTRAVENOUS; SUBCUTANEOUS at 06:25

## 2019-02-25 RX ADMIN — PANTOPRAZOLE SODIUM 40 MILLIGRAM(S): 20 TABLET, DELAYED RELEASE ORAL at 06:04

## 2019-02-25 RX ADMIN — HYDROMORPHONE HYDROCHLORIDE 0.5 MILLIGRAM(S): 2 INJECTION INTRAMUSCULAR; INTRAVENOUS; SUBCUTANEOUS at 10:34

## 2019-02-25 RX ADMIN — HYDROMORPHONE HYDROCHLORIDE 0.5 MILLIGRAM(S): 2 INJECTION INTRAMUSCULAR; INTRAVENOUS; SUBCUTANEOUS at 02:30

## 2019-02-25 RX ADMIN — HYDROMORPHONE HYDROCHLORIDE 0.5 MILLIGRAM(S): 2 INJECTION INTRAMUSCULAR; INTRAVENOUS; SUBCUTANEOUS at 16:28

## 2019-02-25 RX ADMIN — OXYCODONE HYDROCHLORIDE 5 MILLIGRAM(S): 5 TABLET ORAL at 17:58

## 2019-02-25 RX ADMIN — APIXABAN 5 MILLIGRAM(S): 2.5 TABLET, FILM COATED ORAL at 05:55

## 2019-02-25 RX ADMIN — Medication 100 MILLIGRAM(S): at 05:55

## 2019-02-25 RX ADMIN — HYDROMORPHONE HYDROCHLORIDE 0.5 MILLIGRAM(S): 2 INJECTION INTRAMUSCULAR; INTRAVENOUS; SUBCUTANEOUS at 02:00

## 2019-02-25 RX ADMIN — Medication 100 MILLIGRAM(S): at 15:06

## 2019-02-25 RX ADMIN — HYDROMORPHONE HYDROCHLORIDE 0.5 MILLIGRAM(S): 2 INJECTION INTRAMUSCULAR; INTRAVENOUS; SUBCUTANEOUS at 11:38

## 2019-02-25 RX ADMIN — HYDROMORPHONE HYDROCHLORIDE 0.5 MILLIGRAM(S): 2 INJECTION INTRAMUSCULAR; INTRAVENOUS; SUBCUTANEOUS at 15:59

## 2019-02-25 RX ADMIN — Medication 100 MILLIGRAM(S): at 05:40

## 2019-02-25 RX ADMIN — HYDROMORPHONE HYDROCHLORIDE 0.5 MILLIGRAM(S): 2 INJECTION INTRAMUSCULAR; INTRAVENOUS; SUBCUTANEOUS at 11:16

## 2019-02-25 RX ADMIN — Medication 200 MILLIGRAM(S): at 05:55

## 2019-02-25 RX ADMIN — Medication 100 MILLIGRAM(S): at 18:00

## 2019-02-25 RX ADMIN — HYDROMORPHONE HYDROCHLORIDE 0.5 MILLIGRAM(S): 2 INJECTION INTRAMUSCULAR; INTRAVENOUS; SUBCUTANEOUS at 12:35

## 2019-02-25 RX ADMIN — POLYETHYLENE GLYCOL 3350 17 GRAM(S): 17 POWDER, FOR SOLUTION ORAL at 05:55

## 2019-02-25 RX ADMIN — HYDROMORPHONE HYDROCHLORIDE 0.5 MILLIGRAM(S): 2 INJECTION INTRAMUSCULAR; INTRAVENOUS; SUBCUTANEOUS at 07:00

## 2019-02-25 RX ADMIN — APIXABAN 5 MILLIGRAM(S): 2.5 TABLET, FILM COATED ORAL at 18:00

## 2019-02-25 NOTE — DISCHARGE NOTE ADULT - HOSPITAL COURSE
43 year old female with pmhx of DVT on Eliquis (LUE 12/2018), gastric bypass (10/2017) complicated by obstruction, ARDs and sepsis requiring ICU admission and pressors, which resulted in upper and lower limb ischemia s/p bilateral hand and foot amputations and skin grafts presents with right foot stump wound. While inpatient, patient was treated with IVF, IV antibitotics (Clinda/Cipro), GI/dvt ppx, pain management and wound care xeroform/ABD/Kerlix twice a day.  Patient went to the OR on 2/22/19 for debridement. WCx grew Enterococcus, Corynebacter species, group B strep. Podiatry was aware of patient and following. Patient okay with wound care. Nurse to resume wound vac therapy. Patient stable and medically cleared for discharge. Follow up in clinic in one week. Take at home antibiotics Augmentin for 7 days.

## 2019-02-25 NOTE — DISCHARGE NOTE ADULT - CARE PLAN
Principal Discharge DX:	Open wound of foot  Goal:	wound healing  Assessment and plan of treatment:	please wash with soap and water. resume care of wound vac to right foot with change every 48 hours. until wound vac placement, saline wet to dry gauze twice a day. Please call [phone] to make a follow up appointment on 2/28 with Dr. Artis or Dr. Beck. Clinic is located at [address] [place] [address] on Tuesdays (2-4pm) or Thursdays (9am-1pm). Continue oral antibiotics of Augmentin for 7 days.  Secondary Diagnosis:	HTN (hypertension)  Goal:	BP management  Assessment and plan of treatment:	continue home meds and follow up with PMD.  Secondary Diagnosis:	DVT (deep venous thrombosis)  Goal:	DVT management  Assessment and plan of treatment:	continue home meds and follow up with PMD.

## 2019-02-25 NOTE — DISCHARGE NOTE ADULT - MEDICATION SUMMARY - MEDICATIONS TO TAKE
I will START or STAY ON the medications listed below when I get home from the hospital:    Percocet 5/325 oral tablet  -- 1 tab(s) by mouth every 6 hours, As Needed -for severe pain MDD:MDD 4 tab   -- Caution federal law prohibits the transfer of this drug to any person other  than the person for whom it was prescribed.  May cause drowsiness.  Alcohol may intensify this effect.  Use care when operating dangerous machinery.  This prescription cannot be refilled.  This product contains acetaminophen.  Do not use  with any other product containing acetaminophen to prevent possible liver damage.  Using more of this medication than prescribed may cause serious breathing problems.    -- Indication: For Open wound of foot    Eliquis 5 mg oral tablet  -- 5 milligram(s) by mouth every 12 hours  -- Indication: For DVT (deep venous thrombosis)    metoprolol succinate 50 mg oral tablet, extended release  -- 1 tab(s) by mouth once a day  -- Indication: For HTN (hypertension)    Augmentin 875 mg-125 mg oral tablet  -- 1 tab(s) by mouth 2 times a day   -- Finish all this medication unless otherwise directed by prescriber.  Take with food or milk.    -- Indication: For WOUND INFECTION AFTER SURGERY    Vitamin B-100 oral tablet  -- 1 tab(s) by mouth once a day  -- Indication: For supplement     Multi-Day Plus Minerals oral tablet  -- 1 tab(s) by mouth once a day  -- Indication: For supplement     Vitamin D2 50,000 intl units (1.25 mg) oral capsule  -- 1 cap(s) by mouth once a week  -- Indication: For supplement

## 2019-02-25 NOTE — DISCHARGE NOTE ADULT - CARE PROVIDERS DIRECT ADDRESSES
[email or web address]

## 2019-02-25 NOTE — DISCHARGE NOTE ADULT - ADDITIONAL INSTRUCTIONS
please wash with soap and water. resume care of wound vac to right foot with change every 48 hours. until wound vac placement, saline wet to dry gauze twice a day. Please call [phone] to make a follow up appointment on 2/28 with Dr. Artis or Dr. Beck. Clinic is located at [address] [place] [address] on Tuesdays (2-4pm) or Thursdays (9am-1pm). Continue oral antibiotics of Augmentin for 7 days. Continue all home meds and follow up with PMD.

## 2019-02-25 NOTE — DISCHARGE NOTE ADULT - PATIENT PORTAL LINK FT
You can access the [facility] Patient Portal, offered by [facility], by registering with the following website: [email or web address]

## 2019-02-25 NOTE — DISCHARGE NOTE ADULT - CARE PROVIDER_API CALL
Sudhir Artis)  Plastic Surgery  [address] [place] [address]  Phone: [phone]  Fax: [phone]  Follow Up Time:     Ananth Beck)  Plastic Surgery  [address] [place] [address]  Phone: [phone]  Fax: [phone]  Follow Up Time:

## 2019-02-25 NOTE — DISCHARGE NOTE ADULT - PLAN OF CARE
wound healing please wash with soap and water. resume care of wound vac to right foot with change every 48 hours. until wound vac placement, saline wet to dry gauze twice a day. Please call [phone] to make a follow up appointment on 2/28 with Dr. Artis or Dr. Beck. Clinic is located at [address] [place] [address] on Tuesdays (2-4pm) or Thursdays (9am-1pm). Continue oral antibiotics of Augmentin for 7 days. BP management continue home meds and follow up with PMD. DVT management

## 2019-02-25 NOTE — PROGRESS NOTE ADULT - SUBJECTIVE AND OBJECTIVE BOX
d/c note    Vital Signs Last 24 Hrs  T(C): 36.7 (25 Feb 2019 08:14), Max: 37 (24 Feb 2019 16:04)  T(F): 98.1 (25 Feb 2019 08:14), Max: 98.6 (24 Feb 2019 16:04)  HR: 80 (25 Feb 2019 08:14) (80 - 84)  BP: 124/78 (25 Feb 2019 08:14) (106/66 - 124/78)  BP(mean): --  RR: 18 (25 Feb 2019 08:14) (18 - 18)  SpO2: --    CVP:  T(C): 36.7 (02-25-19 @ 08:14), Max: 37 (02-24-19 @ 16:04)  HR: 80 (02-25-19 @ 08:14) (80 - 84)  BP: 124/78 (02-25-19 @ 08:14) (106/66 - 124/78)  RR: 18 (02-25-19 @ 08:14) (18 - 18)  SpO2: --  CVP(mm Hg): --    U.O.:  I&O's Detail    24 Feb 2019 07:01  -  25 Feb 2019 07:00  --------------------------------------------------------  IN:    IV PiggyBack: 600 mL    Oral Fluid: 560 mL    sodium chloride 0.9%.: 1300 mL  Total IN: 2460 mL    OUT:    Voided: 2725 mL  Total OUT: 2725 mL    Total NET: -265 mL                                        11.4   8.06  )-----------( 362      ( 24 Feb 2019 16:25 )             37.8     02-24    138  |  105  |  15  ----------------------------<  90  4.9   |  23  |  1.0    Ca    9.0      24 Feb 2019 16:25  Phos  4.4     02-24  Mg     2.0     02-24        Large Dressing Change--> right foot wound granulating well

## 2019-02-26 LAB — SURGICAL PATHOLOGY STUDY: SIGNIFICANT CHANGE UP

## 2019-02-27 LAB — BACTERIA SPEC CULT: ABNORMAL
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
## 2019-02-28 DIAGNOSIS — S91.301A UNSPECIFIED OPEN WOUND, RIGHT FOOT, INITIAL ENCOUNTER: ICD-10-CM
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
## 2019-03-12 ENCOUNTER — OUTPATIENT (OUTPATIENT)
Dept: OUTPATIENT SERVICES | Facility: HOSPITAL | Age: 44
LOS: 1 days | Discharge: HOME | End: 2019-03-12

## 2019-03-12 ENCOUNTER — APPOINTMENT (OUTPATIENT)
Dept: BURN CARE | Facility: CLINIC | Age: 44
End: 2019-03-12

## 2019-03-12 DIAGNOSIS — Z98.890 OTHER SPECIFIED POSTPROCEDURAL STATES: Chronic | ICD-10-CM

## 2019-03-12 DIAGNOSIS — S68.119A COMPLETE TRAUMATIC METACARPOPHALANGEAL AMPUTATION OF UNSPECIFIED FINGER, INITIAL ENCOUNTER: Chronic | ICD-10-CM

## 2019-03-12 DIAGNOSIS — S91.309A UNSPECIFIED OPEN WOUND, UNSPECIFIED FOOT, INITIAL ENCOUNTER: Chronic | ICD-10-CM

## 2019-03-12 DIAGNOSIS — Z89.9 ACQUIRED ABSENCE OF LIMB, UNSPECIFIED: Chronic | ICD-10-CM

## 2019-03-12 DIAGNOSIS — Z98.84 BARIATRIC SURGERY STATUS: Chronic | ICD-10-CM
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
## 2019-03-12 NOTE — PHYSICAL EXAM
[Healing] : healing [Size%: ______] : Size: [unfilled]% [Infected?] : Infected: No [6] : 6 out of 10 [Abnormal] : abnormal [Medium] : medium [] : no [de-identified] : percocet [de-identified] : wound vac [de-identified] : right foot\par \par right foot-->  open wounds prox and distal foot --> granulating with wound vac

## 2019-03-12 NOTE — HISTORY OF PRESENT ILLNESS
[Did you have an operation on your burn/wound injury?] : Did you have an operation on your burn/wound injury? Yes [Did this injury occur on the job?] : Did this injury occur on the job? No [de-identified] : post debridement right foot podiatry [de-identified] :  open wounds right foot granulating with wound vac

## 2019-03-13 ENCOUNTER — TRANSCRIPTION ENCOUNTER (OUTPATIENT)
Age: 44
End: 2019-03-13

## 2019-03-15 ENCOUNTER — APPOINTMENT (OUTPATIENT)
Dept: PODIATRY | Facility: CLINIC | Age: 44
End: 2019-03-15
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
## 2019-03-21 DIAGNOSIS — L97.519 NON-PRESSURE CHRONIC ULCER OF OTHER PART OF RIGHT FOOT WITH UNSPECIFIED SEVERITY: ICD-10-CM

## 2019-03-21 DIAGNOSIS — S91.301A UNSPECIFIED OPEN WOUND, RIGHT FOOT, INITIAL ENCOUNTER: ICD-10-CM
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
## 2019-03-28 ENCOUNTER — OUTPATIENT (OUTPATIENT)
Dept: OUTPATIENT SERVICES | Facility: HOSPITAL | Age: 44
LOS: 1 days | Discharge: HOME | End: 2019-03-28

## 2019-03-28 ENCOUNTER — APPOINTMENT (OUTPATIENT)
Dept: BURN CARE | Facility: CLINIC | Age: 44
End: 2019-03-28

## 2019-03-28 DIAGNOSIS — S91.309A UNSPECIFIED OPEN WOUND, UNSPECIFIED FOOT, INITIAL ENCOUNTER: Chronic | ICD-10-CM

## 2019-03-28 DIAGNOSIS — S68.119A COMPLETE TRAUMATIC METACARPOPHALANGEAL AMPUTATION OF UNSPECIFIED FINGER, INITIAL ENCOUNTER: Chronic | ICD-10-CM

## 2019-03-28 DIAGNOSIS — Z98.890 OTHER SPECIFIED POSTPROCEDURAL STATES: Chronic | ICD-10-CM

## 2019-03-28 DIAGNOSIS — Z89.9 ACQUIRED ABSENCE OF LIMB, UNSPECIFIED: Chronic | ICD-10-CM

## 2019-03-28 DIAGNOSIS — Z98.84 BARIATRIC SURGERY STATUS: Chronic | ICD-10-CM

## 2019-03-28 NOTE — PHYSICAL EXAM
[Healing] : healing [Size%: ______] : Size: [unfilled]% [Infected?] : Infected: No [6] : 6 out of 10 [Abnormal] : abnormal [Medium] : medium [] : no [de-identified] : percocet [de-identified] : wound vac [de-identified] : right foot\par \par right foot-->  open wounds prox and distal foot --> granulating with wound vac

## 2019-03-28 NOTE — HISTORY OF PRESENT ILLNESS
[Did you have an operation on your burn/wound injury?] : Did you have an operation on your burn/wound injury? Yes [Did this injury occur on the job?] : Did this injury occur on the job? No [de-identified] : post debridement right foot podiatry [de-identified] :  open wounds right foot granulating with wound vac

## 2019-04-05 ENCOUNTER — OUTPATIENT (OUTPATIENT)
Dept: OUTPATIENT SERVICES | Facility: HOSPITAL | Age: 44
LOS: 1 days | Discharge: HOME | End: 2019-04-05

## 2019-04-05 ENCOUNTER — APPOINTMENT (OUTPATIENT)
Dept: PODIATRY | Facility: CLINIC | Age: 44
End: 2019-04-05
Payer: COMMERCIAL

## 2019-04-05 VITALS
SYSTOLIC BLOOD PRESSURE: 121 MMHG | WEIGHT: 190 LBS | BODY MASS INDEX: 28.79 KG/M2 | HEART RATE: 66 BPM | HEIGHT: 68 IN | DIASTOLIC BLOOD PRESSURE: 84 MMHG

## 2019-04-05 DIAGNOSIS — Z98.890 OTHER SPECIFIED POSTPROCEDURAL STATES: Chronic | ICD-10-CM

## 2019-04-05 DIAGNOSIS — Z89.9 ACQUIRED ABSENCE OF LIMB, UNSPECIFIED: Chronic | ICD-10-CM

## 2019-04-05 DIAGNOSIS — Z98.84 BARIATRIC SURGERY STATUS: Chronic | ICD-10-CM

## 2019-04-05 DIAGNOSIS — S91.309A UNSPECIFIED OPEN WOUND, UNSPECIFIED FOOT, INITIAL ENCOUNTER: Chronic | ICD-10-CM

## 2019-04-05 DIAGNOSIS — S68.119A COMPLETE TRAUMATIC METACARPOPHALANGEAL AMPUTATION OF UNSPECIFIED FINGER, INITIAL ENCOUNTER: Chronic | ICD-10-CM

## 2019-04-05 PROCEDURE — 97605 NEG PRS WND THER DME<=50SQCM: CPT | Mod: RT

## 2019-04-05 NOTE — ASSESSMENT
[FreeTextEntry1] : Patient examined, history and chart reviewed\par WOund appears well coapted  and healing Plantar heal ulcer has drainage that was noted\par Rx for Augmentin take as directed \par Patient told to report to ed if foot at anytime becomes red hot or swollen \par wound vac applied\par PAtient will follow up with Dr. Artis\par patient return 4 weeks \par

## 2019-04-05 NOTE — HISTORY OF PRESENT ILLNESS
[FreeTextEntry1] : 44 y/o s/p bilateral TMA, s/p STSG presents for follow up S/p right foot Dbx\par Patient denies NVFC or SOB\par Does complain of pain
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
## 2019-04-10 DIAGNOSIS — S91.301A UNSPECIFIED OPEN WOUND, RIGHT FOOT, INITIAL ENCOUNTER: ICD-10-CM
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
## 2019-04-11 ENCOUNTER — OUTPATIENT (OUTPATIENT)
Dept: OUTPATIENT SERVICES | Facility: HOSPITAL | Age: 44
LOS: 1 days | Discharge: HOME | End: 2019-04-11

## 2019-04-11 ENCOUNTER — APPOINTMENT (OUTPATIENT)
Dept: BURN CARE | Facility: CLINIC | Age: 44
End: 2019-04-11

## 2019-04-11 DIAGNOSIS — Z98.890 OTHER SPECIFIED POSTPROCEDURAL STATES: Chronic | ICD-10-CM

## 2019-04-11 DIAGNOSIS — Z89.9 ACQUIRED ABSENCE OF LIMB, UNSPECIFIED: Chronic | ICD-10-CM

## 2019-04-11 DIAGNOSIS — Z98.84 BARIATRIC SURGERY STATUS: Chronic | ICD-10-CM

## 2019-04-11 DIAGNOSIS — S68.119A COMPLETE TRAUMATIC METACARPOPHALANGEAL AMPUTATION OF UNSPECIFIED FINGER, INITIAL ENCOUNTER: Chronic | ICD-10-CM

## 2019-04-11 DIAGNOSIS — S91.309A UNSPECIFIED OPEN WOUND, UNSPECIFIED FOOT, INITIAL ENCOUNTER: Chronic | ICD-10-CM
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
## 2019-04-11 NOTE — PHYSICAL EXAM
[Healing] : healing [Size%: ______] : Size: [unfilled]% [Infected?] : Infected: No [6] : 6 out of 10 [Abnormal] : abnormal [Medium] : medium [] : no [de-identified] : percocet [de-identified] : wound vac [de-identified] : right foot\par \par right foot-->  open wounds prox and distal foot --> new drainage--> culture sent--> hold vac for 1 week--> start abx and wet gauze dressing for one week

## 2019-04-11 NOTE — HISTORY OF PRESENT ILLNESS
[Did you have an operation on your burn/wound injury?] : Did you have an operation on your burn/wound injury? Yes [Did this injury occur on the job?] : Did this injury occur on the job? No [de-identified] : post debridement right foot podiatry [de-identified] :  open wounds right foot granulating with wound vac--> new drainage right heel

## 2019-04-14 LAB — BACTERIA SPEC CULT: ABNORMAL

## 2019-04-18 ENCOUNTER — APPOINTMENT (OUTPATIENT)
Dept: BURN CARE | Facility: CLINIC | Age: 44
End: 2019-04-18

## 2019-04-18 ENCOUNTER — OUTPATIENT (OUTPATIENT)
Dept: OUTPATIENT SERVICES | Facility: HOSPITAL | Age: 44
LOS: 1 days | Discharge: HOME | End: 2019-04-18

## 2019-04-18 DIAGNOSIS — Z98.890 OTHER SPECIFIED POSTPROCEDURAL STATES: Chronic | ICD-10-CM

## 2019-04-18 DIAGNOSIS — S91.309A UNSPECIFIED OPEN WOUND, UNSPECIFIED FOOT, INITIAL ENCOUNTER: Chronic | ICD-10-CM

## 2019-04-18 DIAGNOSIS — S68.119A COMPLETE TRAUMATIC METACARPOPHALANGEAL AMPUTATION OF UNSPECIFIED FINGER, INITIAL ENCOUNTER: Chronic | ICD-10-CM

## 2019-04-18 DIAGNOSIS — Z89.9 ACQUIRED ABSENCE OF LIMB, UNSPECIFIED: Chronic | ICD-10-CM

## 2019-04-18 DIAGNOSIS — Z98.84 BARIATRIC SURGERY STATUS: Chronic | ICD-10-CM

## 2019-04-21 LAB — BACTERIA SPEC CULT: NORMAL

## 2019-04-22 NOTE — HISTORY OF PRESENT ILLNESS
[Did you have an operation on your burn/wound injury?] : Did you have an operation on your burn/wound injury? Yes [Did this injury occur on the job?] : Did this injury occur on the job? No [de-identified] : post debridement right foot podiatry [de-identified] :  open wounds right foot granulating--> residual drainage posteriorly
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
## 2019-04-22 NOTE — PHYSICAL EXAM
[Healing] : healing [Size%: ______] : Size: [unfilled]% [Infected?] : Infected: No [3] : 3 out of 10 [Abnormal] : abnormal [Medium] : medium [] : yes [de-identified] : percocet [de-identified] : right foot\par \par right foot-->  open wounds prox and distal foot --> granulating--> small residual fluid--> cx negative--> local wound care

## 2019-05-02 ENCOUNTER — OUTPATIENT (OUTPATIENT)
Dept: OUTPATIENT SERVICES | Facility: HOSPITAL | Age: 44
LOS: 1 days | Discharge: HOME | End: 2019-05-02

## 2019-05-02 ENCOUNTER — APPOINTMENT (OUTPATIENT)
Dept: BURN CARE | Facility: CLINIC | Age: 44
End: 2019-05-02

## 2019-05-02 DIAGNOSIS — S91.309A UNSPECIFIED OPEN WOUND, UNSPECIFIED FOOT, INITIAL ENCOUNTER: Chronic | ICD-10-CM

## 2019-05-02 DIAGNOSIS — S68.119A COMPLETE TRAUMATIC METACARPOPHALANGEAL AMPUTATION OF UNSPECIFIED FINGER, INITIAL ENCOUNTER: Chronic | ICD-10-CM

## 2019-05-02 DIAGNOSIS — Z98.84 BARIATRIC SURGERY STATUS: Chronic | ICD-10-CM

## 2019-05-02 DIAGNOSIS — Z98.890 OTHER SPECIFIED POSTPROCEDURAL STATES: Chronic | ICD-10-CM
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
## 2019-05-02 DIAGNOSIS — S91.301A UNSPECIFIED OPEN WOUND, RIGHT FOOT, INITIAL ENCOUNTER: ICD-10-CM
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
## 2019-05-02 DIAGNOSIS — Z89.9 ACQUIRED ABSENCE OF LIMB, UNSPECIFIED: Chronic | ICD-10-CM

## 2019-05-07 NOTE — PHYSICAL EXAM
[Healing] : healing [Size%: ______] : Size: [unfilled]% [Infected?] : Infected: No [3] : 3 out of 10 [Abnormal] : abnormal [Medium] : medium [] : no [de-identified] : percocet [de-identified] : right foot\par \par right foot-->  open wounds prox and distal foot --> granulating--> small residual fluid--> local wound care
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
## 2019-05-07 NOTE — HISTORY OF PRESENT ILLNESS
[Did you have an operation on your burn/wound injury?] : Did you have an operation on your burn/wound injury? Yes [Did this injury occur on the job?] : Did this injury occur on the job? No [de-identified] : post debridement right foot podiatry [de-identified] :  open wounds right foot granulating--> decreased drainage posteriorly

## 2019-05-07 NOTE — HISTORY OF PRESENT ILLNESS
[Did you have an operation on your burn/wound injury?] : Did you have an operation on your burn/wound injury? Yes [Did this injury occur on the job?] : Did this injury occur on the job? No [de-identified] : post debridement right foot podiatry [de-identified] :  open wounds right foot granulating--> decreased drainage posteriorly

## 2019-05-16 ENCOUNTER — APPOINTMENT (OUTPATIENT)
Dept: BURN CARE | Facility: CLINIC | Age: 44
End: 2019-05-16

## 2019-05-16 ENCOUNTER — OUTPATIENT (OUTPATIENT)
Dept: OUTPATIENT SERVICES | Facility: HOSPITAL | Age: 44
LOS: 1 days | Discharge: HOME | End: 2019-05-16

## 2019-05-16 DIAGNOSIS — S68.119A COMPLETE TRAUMATIC METACARPOPHALANGEAL AMPUTATION OF UNSPECIFIED FINGER, INITIAL ENCOUNTER: Chronic | ICD-10-CM

## 2019-05-16 DIAGNOSIS — Z98.890 OTHER SPECIFIED POSTPROCEDURAL STATES: Chronic | ICD-10-CM

## 2019-05-16 DIAGNOSIS — Z98.84 BARIATRIC SURGERY STATUS: Chronic | ICD-10-CM

## 2019-05-16 DIAGNOSIS — Z89.9 ACQUIRED ABSENCE OF LIMB, UNSPECIFIED: Chronic | ICD-10-CM

## 2019-05-16 DIAGNOSIS — S91.309A UNSPECIFIED OPEN WOUND, UNSPECIFIED FOOT, INITIAL ENCOUNTER: Chronic | ICD-10-CM
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
## 2019-05-16 NOTE — HISTORY OF PRESENT ILLNESS
[Did you have an operation on your burn/wound injury?] : Did you have an operation on your burn/wound injury? Yes [Did this injury occur on the job?] : Did this injury occur on the job? No [de-identified] : post debridement right foot podiatry [de-identified] :  open wounds right foot granulating--> decreased drainage posteriorly

## 2019-05-16 NOTE — PHYSICAL EXAM
[Size%: ______] : Size: [unfilled]% [Healing] : healing [Infected?] : Infected: No [3] : 3 out of 10 [Abnormal] : abnormal [Medium] : medium [] : no [de-identified] : right foot\par \par right foot-->  open wounds prox and distal foot --> granulating--> small residual fluid--> local wound care--> culture sent

## 2019-05-18 LAB — BACTERIA SPEC CULT: NORMAL

## 2019-05-24 ENCOUNTER — APPOINTMENT (OUTPATIENT)
Dept: PODIATRY | Facility: CLINIC | Age: 44
End: 2019-05-24
Payer: COMMERCIAL

## 2019-05-24 ENCOUNTER — OUTPATIENT (OUTPATIENT)
Dept: OUTPATIENT SERVICES | Facility: HOSPITAL | Age: 44
LOS: 1 days | Discharge: HOME | End: 2019-05-24

## 2019-05-24 VITALS
SYSTOLIC BLOOD PRESSURE: 112 MMHG | DIASTOLIC BLOOD PRESSURE: 78 MMHG | HEIGHT: 68 IN | BODY MASS INDEX: 26.52 KG/M2 | WEIGHT: 175 LBS | HEART RATE: 85 BPM

## 2019-05-24 DIAGNOSIS — S68.119A COMPLETE TRAUMATIC METACARPOPHALANGEAL AMPUTATION OF UNSPECIFIED FINGER, INITIAL ENCOUNTER: Chronic | ICD-10-CM

## 2019-05-24 DIAGNOSIS — S91.309A UNSPECIFIED OPEN WOUND, UNSPECIFIED FOOT, INITIAL ENCOUNTER: Chronic | ICD-10-CM

## 2019-05-24 DIAGNOSIS — Z89.9 ACQUIRED ABSENCE OF LIMB, UNSPECIFIED: Chronic | ICD-10-CM

## 2019-05-24 DIAGNOSIS — Z98.890 OTHER SPECIFIED POSTPROCEDURAL STATES: Chronic | ICD-10-CM

## 2019-05-24 DIAGNOSIS — Z98.84 BARIATRIC SURGERY STATUS: Chronic | ICD-10-CM

## 2019-05-24 PROCEDURE — 99212 OFFICE O/P EST SF 10 MIN: CPT
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
## 2019-05-29 DIAGNOSIS — S91.301A UNSPECIFIED OPEN WOUND, RIGHT FOOT, INITIAL ENCOUNTER: ICD-10-CM
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
## 2019-05-31 NOTE — ASSESSMENT
[FreeTextEntry1] : Patient examined, history and chart reviewed\par WOund appears well coapted  and healing Plantar heal ulcer has drainage that was noted\par Patient can proceed for shoe filler \par PAtient will follow up with Dr. Artis\par patient return 4 weeks \par

## 2019-06-06 ENCOUNTER — OUTPATIENT (OUTPATIENT)
Dept: OUTPATIENT SERVICES | Facility: HOSPITAL | Age: 44
LOS: 1 days | Discharge: HOME | End: 2019-06-06

## 2019-06-06 ENCOUNTER — APPOINTMENT (OUTPATIENT)
Dept: BURN CARE | Facility: CLINIC | Age: 44
End: 2019-06-06
Payer: COMMERCIAL

## 2019-06-06 DIAGNOSIS — Z98.84 BARIATRIC SURGERY STATUS: Chronic | ICD-10-CM
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
## 2019-06-06 DIAGNOSIS — Z98.890 OTHER SPECIFIED POSTPROCEDURAL STATES: Chronic | ICD-10-CM

## 2019-06-06 DIAGNOSIS — S91.301A UNSPECIFIED OPEN WOUND, RIGHT FOOT, INITIAL ENCOUNTER: ICD-10-CM

## 2019-06-06 DIAGNOSIS — S91.309A UNSPECIFIED OPEN WOUND, UNSPECIFIED FOOT, INITIAL ENCOUNTER: Chronic | ICD-10-CM

## 2019-06-06 DIAGNOSIS — S68.119A COMPLETE TRAUMATIC METACARPOPHALANGEAL AMPUTATION OF UNSPECIFIED FINGER, INITIAL ENCOUNTER: Chronic | ICD-10-CM
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
## 2019-06-06 DIAGNOSIS — Z89.9 ACQUIRED ABSENCE OF LIMB, UNSPECIFIED: Chronic | ICD-10-CM

## 2019-06-06 PROCEDURE — 99213 OFFICE O/P EST LOW 20 MIN: CPT

## 2019-06-13 ENCOUNTER — APPOINTMENT (OUTPATIENT)
Dept: PODIATRY | Facility: CLINIC | Age: 44
End: 2019-06-13
Payer: COMMERCIAL

## 2019-06-13 ENCOUNTER — OUTPATIENT (OUTPATIENT)
Dept: OUTPATIENT SERVICES | Facility: HOSPITAL | Age: 44
LOS: 1 days | Discharge: HOME | End: 2019-06-13

## 2019-06-13 DIAGNOSIS — Z98.84 BARIATRIC SURGERY STATUS: Chronic | ICD-10-CM

## 2019-06-13 DIAGNOSIS — Z98.890 OTHER SPECIFIED POSTPROCEDURAL STATES: Chronic | ICD-10-CM

## 2019-06-13 DIAGNOSIS — S68.119A COMPLETE TRAUMATIC METACARPOPHALANGEAL AMPUTATION OF UNSPECIFIED FINGER, INITIAL ENCOUNTER: Chronic | ICD-10-CM

## 2019-06-13 DIAGNOSIS — M86.179 OTHER ACUTE OSTEOMYELITIS, UNSPECIFIED ANKLE AND FOOT: ICD-10-CM

## 2019-06-13 DIAGNOSIS — S91.309A UNSPECIFIED OPEN WOUND, UNSPECIFIED FOOT, INITIAL ENCOUNTER: Chronic | ICD-10-CM

## 2019-06-13 DIAGNOSIS — Z89.9 ACQUIRED ABSENCE OF LIMB, UNSPECIFIED: Chronic | ICD-10-CM

## 2019-06-13 PROCEDURE — 97760 ORTHOTIC MGMT&TRAING 1ST ENC: CPT

## 2019-06-13 NOTE — HISTORY OF PRESENT ILLNESS
[Did you have an operation on your burn/wound injury?] : Did you have an operation on your burn/wound injury? Yes [Did this injury occur on the job?] : Did this injury occur on the job? No [de-identified] : post debridement right foot podiatry [de-identified] :  open wounds right foot granulating--> decreased drainage posteriorly\par \par new small wound prox foot

## 2019-06-13 NOTE — PHYSICAL EXAM
[Healing] : healing [Size%: ______] : Size: [unfilled]% [Infected?] : Infected: No [3] : 3 out of 10 [Abnormal] : abnormal [Medium] : medium [] : no [de-identified] : right foot\par \par right foot-->  open wounds prox and distal foot --> granulating--> small residual fluid--> local wound care\par \par new small small prox foot--. no infection--> local wound care
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
## 2019-06-14 NOTE — ASSESSMENT
[FreeTextEntry1] : left foot mildly debrided of callous. wounds to right foot cleaned and dressed with gauze, omnifix. began fabrication of orthotics. left completed, went over wearing precautions. right to be finished next time pt returns in 2 weeks.

## 2019-06-18 DIAGNOSIS — Z89.431 ACQUIRED ABSENCE OF RIGHT FOOT: ICD-10-CM

## 2019-06-18 DIAGNOSIS — L97.309 NON-PRESSURE CHRONIC ULCER OF UNSPECIFIED ANKLE WITH UNSPECIFIED SEVERITY: ICD-10-CM

## 2019-06-18 DIAGNOSIS — Z89.432 ACQUIRED ABSENCE OF LEFT FOOT: ICD-10-CM

## 2019-06-20 ENCOUNTER — APPOINTMENT (OUTPATIENT)
Dept: BURN CARE | Facility: CLINIC | Age: 44
End: 2019-06-20
Payer: COMMERCIAL

## 2019-06-20 ENCOUNTER — OUTPATIENT (OUTPATIENT)
Dept: OUTPATIENT SERVICES | Facility: HOSPITAL | Age: 44
LOS: 1 days | Discharge: HOME | End: 2019-06-20
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
## 2019-06-20 DIAGNOSIS — Z98.84 BARIATRIC SURGERY STATUS: Chronic | ICD-10-CM

## 2019-06-20 DIAGNOSIS — S68.119A COMPLETE TRAUMATIC METACARPOPHALANGEAL AMPUTATION OF UNSPECIFIED FINGER, INITIAL ENCOUNTER: Chronic | ICD-10-CM

## 2019-06-20 DIAGNOSIS — S91.309A UNSPECIFIED OPEN WOUND, UNSPECIFIED FOOT, INITIAL ENCOUNTER: Chronic | ICD-10-CM

## 2019-06-20 DIAGNOSIS — Z98.890 OTHER SPECIFIED POSTPROCEDURAL STATES: Chronic | ICD-10-CM

## 2019-06-20 DIAGNOSIS — Z89.9 ACQUIRED ABSENCE OF LIMB, UNSPECIFIED: Chronic | ICD-10-CM
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
## 2019-06-20 DIAGNOSIS — S91.301A UNSPECIFIED OPEN WOUND, RIGHT FOOT, INITIAL ENCOUNTER: ICD-10-CM

## 2019-06-20 PROCEDURE — 99213 OFFICE O/P EST LOW 20 MIN: CPT

## 2019-06-20 NOTE — HISTORY OF PRESENT ILLNESS
[Did you have an operation on your burn/wound injury?] : Did you have an operation on your burn/wound injury? Yes [Did this injury occur on the job?] : Did this injury occur on the job? No [de-identified] : post debridement right foot podiatry [de-identified] :  open wounds right foot granulating--> decreased drainage posteriorl\par \par  small wound prox foot

## 2019-06-20 NOTE — PHYSICAL EXAM
[Healing] : healing [Size%: ______] : Size: [unfilled]% [Infected?] : Infected: No [3] : 3 out of 10 [Abnormal] : abnormal [] : no [Medium] : medium [de-identified] : right foot\par \par right foot-->  open wounds prox and distal foot --> granulating--> -> local wound care\par \par  small wound / exposed bone prox foot--. no infection--> local wound care
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
## 2019-06-27 ENCOUNTER — APPOINTMENT (OUTPATIENT)
Dept: PODIATRY | Facility: CLINIC | Age: 44
End: 2019-06-27
Payer: COMMERCIAL

## 2019-06-27 ENCOUNTER — OUTPATIENT (OUTPATIENT)
Dept: OUTPATIENT SERVICES | Facility: HOSPITAL | Age: 44
LOS: 1 days | Discharge: HOME | End: 2019-06-27

## 2019-06-27 DIAGNOSIS — S68.119A COMPLETE TRAUMATIC METACARPOPHALANGEAL AMPUTATION OF UNSPECIFIED FINGER, INITIAL ENCOUNTER: Chronic | ICD-10-CM

## 2019-06-27 DIAGNOSIS — Z89.9 ACQUIRED ABSENCE OF LIMB, UNSPECIFIED: Chronic | ICD-10-CM

## 2019-06-27 DIAGNOSIS — Z98.890 OTHER SPECIFIED POSTPROCEDURAL STATES: Chronic | ICD-10-CM

## 2019-06-27 DIAGNOSIS — Z98.84 BARIATRIC SURGERY STATUS: Chronic | ICD-10-CM

## 2019-06-27 DIAGNOSIS — L97.509 NON-PRESSURE CHRONIC ULCER OF OTHER PART OF UNSPECIFIED FOOT WITH UNSPECIFIED SEVERITY: ICD-10-CM

## 2019-06-27 DIAGNOSIS — S91.309A UNSPECIFIED OPEN WOUND, UNSPECIFIED FOOT, INITIAL ENCOUNTER: Chronic | ICD-10-CM

## 2019-06-27 PROCEDURE — 97760 ORTHOTIC MGMT&TRAING 1ST ENC: CPT

## 2019-06-28 NOTE — ASSESSMENT
[FreeTextEntry1] : wound sites cleaned and dressed with sterile gauze, romain, omnifix. orthotic fabricated to right shoe and applied. pt expressed comfort with same. pt advised if has any problem with orthotic to remove and use inserts had in shoe and contact here. pt will return to see podiatry in approximately 1 month.

## 2019-07-11 ENCOUNTER — APPOINTMENT (OUTPATIENT)
Dept: BURN CARE | Facility: CLINIC | Age: 44
End: 2019-07-11
Payer: COMMERCIAL

## 2019-07-11 ENCOUNTER — OUTPATIENT (OUTPATIENT)
Dept: OUTPATIENT SERVICES | Facility: HOSPITAL | Age: 44
LOS: 1 days | Discharge: HOME | End: 2019-07-11

## 2019-07-11 DIAGNOSIS — S68.119A COMPLETE TRAUMATIC METACARPOPHALANGEAL AMPUTATION OF UNSPECIFIED FINGER, INITIAL ENCOUNTER: Chronic | ICD-10-CM

## 2019-07-11 DIAGNOSIS — Z89.9 ACQUIRED ABSENCE OF LIMB, UNSPECIFIED: Chronic | ICD-10-CM

## 2019-07-11 DIAGNOSIS — S91.309A UNSPECIFIED OPEN WOUND, UNSPECIFIED FOOT, INITIAL ENCOUNTER: Chronic | ICD-10-CM

## 2019-07-11 DIAGNOSIS — Z98.84 BARIATRIC SURGERY STATUS: Chronic | ICD-10-CM

## 2019-07-11 DIAGNOSIS — Z98.890 OTHER SPECIFIED POSTPROCEDURAL STATES: Chronic | ICD-10-CM

## 2019-07-11 PROCEDURE — 99213 OFFICE O/P EST LOW 20 MIN: CPT | Mod: 25

## 2019-07-11 PROCEDURE — 97602 WOUND(S) CARE NON-SELECTIVE: CPT
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
## 2019-07-11 NOTE — HISTORY OF PRESENT ILLNESS
[Did this injury occur on the job?] : Did this injury occur on the job? No [Did you have an operation on your burn/wound injury?] : Did you have an operation on your burn/wound injury? Yes [de-identified] : post debridement right foot podiatry [de-identified] :  open wounds right foot granulating--> increased drainage posteriorly\par \par  small wound prox foot

## 2019-07-11 NOTE — PHYSICAL EXAM
[Healing] : healing [Infected?] : Infected: No [Size%: ______] : Size: [unfilled]% [Abnormal] : abnormal [3] : 3 out of 10 [Medium] : medium [] : yes [de-identified] : right foot\par \par right foot-->  open wounds prox and distal foot --> granulating--> -> local wound care\par \par  small wound / exposed bone prox foot--. no infection--> local wound care\par \par murky fluid and drainage right posterior foot--. skin open and culture sent--> NS wet to dry dressing change

## 2019-07-13 LAB
BACTERIA SPEC CULT: ABNORMAL
BACTERIA SPEC CULT: ABNORMAL

## 2019-07-19 ENCOUNTER — APPOINTMENT (OUTPATIENT)
Dept: PODIATRY | Facility: CLINIC | Age: 44
End: 2019-07-19
Payer: COMMERCIAL

## 2019-07-19 ENCOUNTER — OUTPATIENT (OUTPATIENT)
Dept: OUTPATIENT SERVICES | Facility: HOSPITAL | Age: 44
LOS: 1 days | Discharge: HOME | End: 2019-07-19

## 2019-07-19 VITALS
BODY MASS INDEX: 26.52 KG/M2 | DIASTOLIC BLOOD PRESSURE: 78 MMHG | WEIGHT: 175 LBS | SYSTOLIC BLOOD PRESSURE: 113 MMHG | HEIGHT: 68 IN | HEART RATE: 83 BPM

## 2019-07-19 DIAGNOSIS — Z98.890 OTHER SPECIFIED POSTPROCEDURAL STATES: Chronic | ICD-10-CM

## 2019-07-19 DIAGNOSIS — S68.119A COMPLETE TRAUMATIC METACARPOPHALANGEAL AMPUTATION OF UNSPECIFIED FINGER, INITIAL ENCOUNTER: Chronic | ICD-10-CM

## 2019-07-19 DIAGNOSIS — Z89.9 ACQUIRED ABSENCE OF LIMB, UNSPECIFIED: Chronic | ICD-10-CM

## 2019-07-19 DIAGNOSIS — S91.309A UNSPECIFIED OPEN WOUND, UNSPECIFIED FOOT, INITIAL ENCOUNTER: Chronic | ICD-10-CM

## 2019-07-19 DIAGNOSIS — Z98.84 BARIATRIC SURGERY STATUS: Chronic | ICD-10-CM

## 2019-07-19 PROCEDURE — 99212 OFFICE O/P EST SF 10 MIN: CPT

## 2019-07-22 DIAGNOSIS — S91.301A UNSPECIFIED OPEN WOUND, RIGHT FOOT, INITIAL ENCOUNTER: ICD-10-CM
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
## 2019-07-26 NOTE — ASSESSMENT
[FreeTextEntry1] : Patient examined, history and chart reviewed\par WOund appears well coapted  and healing Plantar heal ulcer has drainage that was noted\par PAtient will follow up with Dr. Artis\par patient return 4 weeks \par

## 2019-08-01 ENCOUNTER — APPOINTMENT (OUTPATIENT)
Dept: BURN CARE | Facility: CLINIC | Age: 44
End: 2019-08-01
Payer: COMMERCIAL

## 2019-08-01 ENCOUNTER — OUTPATIENT (OUTPATIENT)
Dept: OUTPATIENT SERVICES | Facility: HOSPITAL | Age: 44
LOS: 1 days | Discharge: HOME | End: 2019-08-01

## 2019-08-01 DIAGNOSIS — S91.301A UNSPECIFIED OPEN WOUND, RIGHT FOOT, INITIAL ENCOUNTER: ICD-10-CM

## 2019-08-01 DIAGNOSIS — Z98.890 OTHER SPECIFIED POSTPROCEDURAL STATES: Chronic | ICD-10-CM

## 2019-08-01 DIAGNOSIS — Z98.84 BARIATRIC SURGERY STATUS: Chronic | ICD-10-CM
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
## 2019-08-01 DIAGNOSIS — S68.119A COMPLETE TRAUMATIC METACARPOPHALANGEAL AMPUTATION OF UNSPECIFIED FINGER, INITIAL ENCOUNTER: Chronic | ICD-10-CM

## 2019-08-01 DIAGNOSIS — S91.309A UNSPECIFIED OPEN WOUND, UNSPECIFIED FOOT, INITIAL ENCOUNTER: Chronic | ICD-10-CM
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
## 2019-08-01 DIAGNOSIS — Z89.9 ACQUIRED ABSENCE OF LIMB, UNSPECIFIED: Chronic | ICD-10-CM

## 2019-08-01 PROCEDURE — 99213 OFFICE O/P EST LOW 20 MIN: CPT | Mod: 25

## 2019-08-01 PROCEDURE — 97602 WOUND(S) CARE NON-SELECTIVE: CPT

## 2019-08-04 NOTE — PHYSICAL EXAM
[Size%: ______] : Size: [unfilled]% [Infected?] : Infected: No [3] : 3 out of 10 [Abnormal] : abnormal [Small] : small  [] : no [de-identified] : right heel --> chronic wound -->local wound care--> will debride
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
## 2019-08-04 NOTE — HISTORY OF PRESENT ILLNESS
[Did you have an operation on your burn/wound injury?] : Did you have an operation on your burn/wound injury? Yes [Did this injury occur on the job?] : Did this injury occur on the job? No [de-identified] : chronic wound right heel [de-identified] : right heel with chronic drainage

## 2019-08-29 ENCOUNTER — APPOINTMENT (OUTPATIENT)
Dept: BURN CARE | Facility: CLINIC | Age: 44
End: 2019-08-29
Payer: COMMERCIAL

## 2019-08-29 ENCOUNTER — OUTPATIENT (OUTPATIENT)
Dept: OUTPATIENT SERVICES | Facility: HOSPITAL | Age: 44
LOS: 1 days | Discharge: HOME | End: 2019-08-29

## 2019-08-29 DIAGNOSIS — Z89.9 ACQUIRED ABSENCE OF LIMB, UNSPECIFIED: Chronic | ICD-10-CM
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
## 2019-08-29 DIAGNOSIS — S91.309A UNSPECIFIED OPEN WOUND, UNSPECIFIED FOOT, INITIAL ENCOUNTER: Chronic | ICD-10-CM

## 2019-08-29 DIAGNOSIS — S91.301A UNSPECIFIED OPEN WOUND, RIGHT FOOT, INITIAL ENCOUNTER: ICD-10-CM

## 2019-08-29 DIAGNOSIS — Z98.890 OTHER SPECIFIED POSTPROCEDURAL STATES: Chronic | ICD-10-CM
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
## 2019-08-29 DIAGNOSIS — Z98.84 BARIATRIC SURGERY STATUS: Chronic | ICD-10-CM
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
## 2019-08-29 DIAGNOSIS — S68.119A COMPLETE TRAUMATIC METACARPOPHALANGEAL AMPUTATION OF UNSPECIFIED FINGER, INITIAL ENCOUNTER: Chronic | ICD-10-CM

## 2019-08-29 PROCEDURE — 99213 OFFICE O/P EST LOW 20 MIN: CPT | Mod: 25

## 2019-08-29 PROCEDURE — 97602 WOUND(S) CARE NON-SELECTIVE: CPT

## 2019-08-31 LAB — BACTERIA SPEC CULT: ABNORMAL

## 2019-09-04 NOTE — PHYSICAL EXAM
[New] : new [Size%: ______] : Size: [unfilled]% [Infected?] : Infected: No [3] : 3 out of 10 [Abnormal] : abnormal [Small] : small  [] : no [de-identified] : right heel  and forefoot wounds--> tenderness and exposed bone--> local wound care--> will debride [TWNoteComboBox1] : wet to dry

## 2019-09-04 NOTE — HISTORY OF PRESENT ILLNESS
[Did you have an operation on your burn/wound injury?] : Did you have an operation on your burn/wound injury? Yes [Did this injury occur on the job?] : Did this injury occur on the job? No [de-identified] : chronic wound right heel [de-identified] : right heel with chronic drainage and new prox  foot wound
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
## 2019-09-10 ENCOUNTER — OUTPATIENT (OUTPATIENT)
Dept: OUTPATIENT SERVICES | Facility: HOSPITAL | Age: 44
LOS: 1 days | Discharge: HOME | End: 2019-09-10
Payer: COMMERCIAL

## 2019-09-10 VITALS
WEIGHT: 182.98 LBS | HEART RATE: 80 BPM | HEIGHT: 67 IN | SYSTOLIC BLOOD PRESSURE: 126 MMHG | TEMPERATURE: 98 F | DIASTOLIC BLOOD PRESSURE: 72 MMHG | OXYGEN SATURATION: 97 % | RESPIRATION RATE: 16 BRPM

## 2019-09-10 DIAGNOSIS — S91.301S UNSPECIFIED OPEN WOUND, RIGHT FOOT, SEQUELA: ICD-10-CM

## 2019-09-10 DIAGNOSIS — Z01.818 ENCOUNTER FOR OTHER PREPROCEDURAL EXAMINATION: ICD-10-CM

## 2019-09-10 DIAGNOSIS — Z98.890 OTHER SPECIFIED POSTPROCEDURAL STATES: Chronic | ICD-10-CM

## 2019-09-10 DIAGNOSIS — S91.309A UNSPECIFIED OPEN WOUND, UNSPECIFIED FOOT, INITIAL ENCOUNTER: Chronic | ICD-10-CM

## 2019-09-10 DIAGNOSIS — S68.119A COMPLETE TRAUMATIC METACARPOPHALANGEAL AMPUTATION OF UNSPECIFIED FINGER, INITIAL ENCOUNTER: Chronic | ICD-10-CM

## 2019-09-10 DIAGNOSIS — Z89.9 ACQUIRED ABSENCE OF LIMB, UNSPECIFIED: Chronic | ICD-10-CM

## 2019-09-10 DIAGNOSIS — Z98.84 BARIATRIC SURGERY STATUS: Chronic | ICD-10-CM

## 2019-09-10 LAB
ALBUMIN SERPL ELPH-MCNC: 4.4 G/DL — SIGNIFICANT CHANGE UP (ref 3.5–5.2)
ALP SERPL-CCNC: 120 U/L — HIGH (ref 30–115)
ALT FLD-CCNC: 20 U/L — SIGNIFICANT CHANGE UP (ref 0–41)
ANION GAP SERPL CALC-SCNC: 12 MMOL/L — SIGNIFICANT CHANGE UP (ref 7–14)
APTT BLD: 29.8 SEC — SIGNIFICANT CHANGE UP (ref 27–39.2)
AST SERPL-CCNC: 17 U/L — SIGNIFICANT CHANGE UP (ref 0–41)
BASOPHILS # BLD AUTO: 0.04 K/UL — SIGNIFICANT CHANGE UP (ref 0–0.2)
BASOPHILS NFR BLD AUTO: 0.6 % — SIGNIFICANT CHANGE UP (ref 0–1)
BILIRUB SERPL-MCNC: 0.4 MG/DL — SIGNIFICANT CHANGE UP (ref 0.2–1.2)
BUN SERPL-MCNC: 17 MG/DL — SIGNIFICANT CHANGE UP (ref 10–20)
CALCIUM SERPL-MCNC: 9 MG/DL — SIGNIFICANT CHANGE UP (ref 8.5–10.1)
CHLORIDE SERPL-SCNC: 104 MMOL/L — SIGNIFICANT CHANGE UP (ref 98–110)
CO2 SERPL-SCNC: 24 MMOL/L — SIGNIFICANT CHANGE UP (ref 17–32)
CREAT SERPL-MCNC: 1 MG/DL — SIGNIFICANT CHANGE UP (ref 0.7–1.5)
EOSINOPHIL # BLD AUTO: 0.06 K/UL — SIGNIFICANT CHANGE UP (ref 0–0.7)
EOSINOPHIL NFR BLD AUTO: 0.8 % — SIGNIFICANT CHANGE UP (ref 0–8)
GLUCOSE SERPL-MCNC: 83 MG/DL — SIGNIFICANT CHANGE UP (ref 70–99)
HCT VFR BLD CALC: 36.7 % — LOW (ref 37–47)
HGB BLD-MCNC: 11.1 G/DL — LOW (ref 12–16)
IMM GRANULOCYTES NFR BLD AUTO: 0.1 % — SIGNIFICANT CHANGE UP (ref 0.1–0.3)
INR BLD: 0.94 RATIO — SIGNIFICANT CHANGE UP (ref 0.65–1.3)
LYMPHOCYTES # BLD AUTO: 2.96 K/UL — SIGNIFICANT CHANGE UP (ref 1.2–3.4)
LYMPHOCYTES # BLD AUTO: 41.8 % — SIGNIFICANT CHANGE UP (ref 20.5–51.1)
MCHC RBC-ENTMCNC: 23.1 PG — LOW (ref 27–31)
MCHC RBC-ENTMCNC: 30.2 G/DL — LOW (ref 32–37)
MCV RBC AUTO: 76.5 FL — LOW (ref 81–99)
MONOCYTES # BLD AUTO: 0.6 K/UL — SIGNIFICANT CHANGE UP (ref 0.1–0.6)
MONOCYTES NFR BLD AUTO: 8.5 % — SIGNIFICANT CHANGE UP (ref 1.7–9.3)
NEUTROPHILS # BLD AUTO: 3.41 K/UL — SIGNIFICANT CHANGE UP (ref 1.4–6.5)
NEUTROPHILS NFR BLD AUTO: 48.2 % — SIGNIFICANT CHANGE UP (ref 42.2–75.2)
NRBC # BLD: 0 /100 WBCS — SIGNIFICANT CHANGE UP (ref 0–0)
PLATELET # BLD AUTO: 256 K/UL — SIGNIFICANT CHANGE UP (ref 130–400)
POTASSIUM SERPL-MCNC: 5.1 MMOL/L — HIGH (ref 3.5–5)
POTASSIUM SERPL-SCNC: 5.1 MMOL/L — HIGH (ref 3.5–5)
PROT SERPL-MCNC: 7.5 G/DL — SIGNIFICANT CHANGE UP (ref 6–8)
PROTHROM AB SERPL-ACNC: 10.8 SEC — SIGNIFICANT CHANGE UP (ref 9.95–12.87)
RBC # BLD: 4.8 M/UL — SIGNIFICANT CHANGE UP (ref 4.2–5.4)
RBC # FLD: 16.8 % — HIGH (ref 11.5–14.5)
SODIUM SERPL-SCNC: 140 MMOL/L — SIGNIFICANT CHANGE UP (ref 135–146)
WBC # BLD: 7.08 K/UL — SIGNIFICANT CHANGE UP (ref 4.8–10.8)
WBC # FLD AUTO: 7.08 K/UL — SIGNIFICANT CHANGE UP (ref 4.8–10.8)

## 2019-09-10 PROCEDURE — 99223 1ST HOSP IP/OBS HIGH 75: CPT

## 2019-09-10 PROCEDURE — 93010 ELECTROCARDIOGRAM REPORT: CPT

## 2019-09-10 RX ORDER — METOPROLOL TARTRATE 50 MG
1 TABLET ORAL
Qty: 0 | Refills: 0 | DISCHARGE

## 2019-09-10 RX ORDER — APIXABAN 2.5 MG/1
5 TABLET, FILM COATED ORAL
Qty: 0 | Refills: 0 | DISCHARGE

## 2019-09-10 NOTE — H&P PST ADULT - NSICDXPASTSURGICALHX_GEN_ALL_CORE_FT
PAST SURGICAL HISTORY:  Amputation finger ALL 5 RIGHT HAND    H/O exploratory laparotomy S/T SBO    H/O gastric bypass RYGB    History of cholecystectomy     Open wound of foot MULTIPLE DEBRIEDMENTS    S/P amputation b/l toes, AND LEFT HAND

## 2019-09-10 NOTE — H&P PST ADULT - HISTORY OF PRESENT ILLNESS
"DEBRIDEMENT OF RIGHT FOOT WOUNDS"  PT CURRENTLY DENIES CHEST PAIN, PALPITATIONS, DYSURIA, RECENT ILLNESS  OPEN WOUND RIGHT FOOT DRAINING CURRENTLY ON CIPRO  EXERCISE TOLERANCE DOES NOT GET SOB DURING ADL'S    AS PER THE PT, THIS IS A COMPLETE MEDICAL AND SURGICAL HX, INCLUDING MEDICATIONS PRESCRIBED AND OVER THE COUNTER      PE BY MARCIA JONES

## 2019-09-10 NOTE — H&P PST ADULT - VENOUS THROMBOEMBOLISM
- Advance diet  - Off epi; wean cardene as tolerated for MAP <80  - Daily labs  - Pain meds PRN  - ASA, statin  - D/c beta blocker  - Remove remaining CT today  - Continue ICU care   no

## 2019-09-10 NOTE — H&P PST ADULT - NSICDXFAMILYHX_GEN_ALL_CORE_FT
FAMILY HISTORY:  Father  Still living? Yes, Estimated age: Age Unknown  Family history of heart disease, Age at diagnosis: Age Unknown    Mother  Still living? Yes, Estimated age: Age Unknown  Family history of cancer, Age at diagnosis: Age Unknown
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
## 2019-09-12 PROBLEM — I42.9 CARDIOMYOPATHY, UNSPECIFIED: Chronic | Status: ACTIVE | Noted: 2019-01-25

## 2019-09-12 PROBLEM — I10 ESSENTIAL (PRIMARY) HYPERTENSION: Chronic | Status: ACTIVE | Noted: 2019-01-25

## 2019-09-13 ENCOUNTER — OUTPATIENT (OUTPATIENT)
Dept: OUTPATIENT SERVICES | Facility: HOSPITAL | Age: 44
LOS: 1 days | Discharge: HOME | End: 2019-09-13
Payer: COMMERCIAL

## 2019-09-13 ENCOUNTER — RESULT REVIEW (OUTPATIENT)
Age: 44
End: 2019-09-13

## 2019-09-13 VITALS
SYSTOLIC BLOOD PRESSURE: 115 MMHG | RESPIRATION RATE: 18 BRPM | WEIGHT: 182.98 LBS | HEART RATE: 91 BPM | HEIGHT: 68 IN | DIASTOLIC BLOOD PRESSURE: 91 MMHG | TEMPERATURE: 99 F

## 2019-09-13 VITALS — RESPIRATION RATE: 16 BRPM | DIASTOLIC BLOOD PRESSURE: 76 MMHG | SYSTOLIC BLOOD PRESSURE: 94 MMHG | HEART RATE: 72 BPM

## 2019-09-13 DIAGNOSIS — S68.119A COMPLETE TRAUMATIC METACARPOPHALANGEAL AMPUTATION OF UNSPECIFIED FINGER, INITIAL ENCOUNTER: Chronic | ICD-10-CM
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
## 2019-09-13 DIAGNOSIS — Z98.84 BARIATRIC SURGERY STATUS: Chronic | ICD-10-CM

## 2019-09-13 DIAGNOSIS — Z89.9 ACQUIRED ABSENCE OF LIMB, UNSPECIFIED: Chronic | ICD-10-CM

## 2019-09-13 DIAGNOSIS — S91.309A UNSPECIFIED OPEN WOUND, UNSPECIFIED FOOT, INITIAL ENCOUNTER: Chronic | ICD-10-CM

## 2019-09-13 DIAGNOSIS — Z98.890 OTHER SPECIFIED POSTPROCEDURAL STATES: Chronic | ICD-10-CM
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
## 2019-09-13 PROCEDURE — 88305 TISSUE EXAM BY PATHOLOGIST: CPT | Mod: 26

## 2019-09-13 PROCEDURE — 88304 TISSUE EXAM BY PATHOLOGIST: CPT | Mod: 26

## 2019-09-13 PROCEDURE — 88311 DECALCIFY TISSUE: CPT | Mod: 26

## 2019-09-13 RX ORDER — CIPROFLOXACIN LACTATE 400MG/40ML
1 VIAL (ML) INTRAVENOUS
Qty: 0 | Refills: 0 | DISCHARGE

## 2019-09-13 RX ORDER — SODIUM CHLORIDE 9 MG/ML
1000 INJECTION, SOLUTION INTRAVENOUS
Refills: 0 | Status: DISCONTINUED | OUTPATIENT
Start: 2019-09-13 | End: 2019-09-14

## 2019-09-13 RX ORDER — MORPHINE SULFATE 50 MG/1
4 CAPSULE, EXTENDED RELEASE ORAL
Refills: 0 | Status: DISCONTINUED | OUTPATIENT
Start: 2019-09-13 | End: 2019-09-14

## 2019-09-13 RX ADMIN — MORPHINE SULFATE 4 MILLIGRAM(S): 50 CAPSULE, EXTENDED RELEASE ORAL at 13:37

## 2019-09-13 RX ADMIN — SODIUM CHLORIDE 100 MILLILITER(S): 9 INJECTION, SOLUTION INTRAVENOUS at 13:21

## 2019-09-13 RX ADMIN — MORPHINE SULFATE 4 MILLIGRAM(S): 50 CAPSULE, EXTENDED RELEASE ORAL at 13:20

## 2019-09-13 RX ADMIN — MORPHINE SULFATE 4 MILLIGRAM(S): 50 CAPSULE, EXTENDED RELEASE ORAL at 14:30

## 2019-09-13 RX ADMIN — MORPHINE SULFATE 4 MILLIGRAM(S): 50 CAPSULE, EXTENDED RELEASE ORAL at 13:38

## 2019-09-13 NOTE — ASU DISCHARGE PLAN (ADULT/PEDIATRIC) - MEDICATION INSTRUCTIONS
Please complete clindamycin as prescribed. Please take 1-2 percocet as needed for moderate to severe pain every 4 to 6 hours.

## 2019-09-13 NOTE — CHART NOTE - NSCHARTNOTEFT_GEN_A_CORE
PACU ANESTHESIA ADMISSION NOTE      Procedure: Limited debridement of wound: excisional debridement and bone biopsy prox and distal right foot    Post op diagnosis:  Wound      ____  Intubated  TV:______       Rate: ______      FiO2: ______    _x___  Patent Airway    _x___  Full return of protective reflexes    _x___  Full recovery from anesthesia / back to baseline status    Vitals:  T(C): 36.7 (09-13-19 @ 12:57), Max: 37.1 (09-13-19 @ 09:04)  HR: 75 (09-13-19 @ 13:32) (75 - 94)  BP: 123/81 (09-13-19 @ 13:32) (112/71 - 123/81)  RR: 13 (09-13-19 @ 13:32) (12 - 18)  SpO2: 100% (09-13-19 @ 13:32) (93% - 100%)    Mental Status:  _x___ Awake   _____ Alert   _____ Drowsy   _____ Sedated    Nausea/Vomiting:  _x___  NO       ______Yes,   See Post - Op Orders         Pain Scale (0-10):  __0___    Treatment: _x___ None    ____ See Post - Op/PCA Orders    Post - Operative Fluids:   __x__ Oral   ____ See Post - Op Orders    Plan: Discharge:   _x___Home       _____Floor     _____Critical Care    _____  Other:_________________    Comments:  No anesthesia issues or complications noted.  Discharge when criteria met.

## 2019-09-13 NOTE — ASU DISCHARGE PLAN (ADULT/PEDIATRIC) - CALL YOUR DOCTOR IF YOU HAVE ANY OF THE FOLLOWING:
Swelling that gets worse/Bleeding that does not stop/Pain not relieved by Medications/Wound/Surgical Site with redness, or foul smelling discharge or pus/Excessive diarrhea/Inability to tolerate liquids or foods/Numbness, tingling, color or temperature change to extremity/Increased irritability or sluggishness/Nausea and vomiting that does not stop/Unable to urinate
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
## 2019-09-13 NOTE — BRIEF OPERATIVE NOTE - NSICDXBRIEFPROCEDURE_GEN_ALL_CORE_FT
PROCEDURES:  Limited debridement of wound 13-Sep-2019 12:59:41 excisional debridement and bone biopsy prox and distal right foot Sudhir Artis

## 2019-09-13 NOTE — ASU DISCHARGE PLAN (ADULT/PEDIATRIC) - CARE PROVIDER_API CALL
Sudhir Artis)  Plastic Surgery  [address] [place] [address]  Phone: [phone]  Fax: [phone]  Follow Up Time:

## 2019-09-13 NOTE — ASU DISCHARGE PLAN (ADULT/PEDIATRIC) - ASU DC SPECIAL INSTRUCTIONSFT
Clean with soap and water. Apply xeroform one to two times daily. Wrap with kerlix and ACE. Please call [phone] to make an appointment within [facility].

## 2019-09-16 LAB
-  AMPICILLIN/SULBACTAM: SIGNIFICANT CHANGE UP
-  CEFAZOLIN: SIGNIFICANT CHANGE UP
-  CLINDAMYCIN: SIGNIFICANT CHANGE UP
-  ERYTHROMYCIN: SIGNIFICANT CHANGE UP
-  GENTAMICIN: SIGNIFICANT CHANGE UP
-  OXACILLIN: SIGNIFICANT CHANGE UP
-  RIFAMPIN: SIGNIFICANT CHANGE UP
-  TETRACYCLINE: SIGNIFICANT CHANGE UP
-  TRIMETHOPRIM/SULFAMETHOXAZOLE: SIGNIFICANT CHANGE UP
-  VANCOMYCIN: SIGNIFICANT CHANGE UP
METHOD TYPE: SIGNIFICANT CHANGE UP

## 2019-09-19 ENCOUNTER — OUTPATIENT (OUTPATIENT)
Dept: OUTPATIENT SERVICES | Facility: HOSPITAL | Age: 44
LOS: 1 days | Discharge: HOME | End: 2019-09-19

## 2019-09-19 ENCOUNTER — APPOINTMENT (OUTPATIENT)
Dept: BURN CARE | Facility: CLINIC | Age: 44
End: 2019-09-19
Payer: COMMERCIAL
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
## 2019-09-19 DIAGNOSIS — T87.89 OTHER COMPLICATIONS OF AMPUTATION STUMP: ICD-10-CM

## 2019-09-19 DIAGNOSIS — Z98.84 BARIATRIC SURGERY STATUS: Chronic | ICD-10-CM

## 2019-09-19 DIAGNOSIS — Z98.890 OTHER SPECIFIED POSTPROCEDURAL STATES: Chronic | ICD-10-CM
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
## 2019-09-19 DIAGNOSIS — S91.301A UNSPECIFIED OPEN WOUND, RIGHT FOOT, INITIAL ENCOUNTER: ICD-10-CM

## 2019-09-19 DIAGNOSIS — S68.119A COMPLETE TRAUMATIC METACARPOPHALANGEAL AMPUTATION OF UNSPECIFIED FINGER, INITIAL ENCOUNTER: Chronic | ICD-10-CM

## 2019-09-19 DIAGNOSIS — Z89.9 ACQUIRED ABSENCE OF LIMB, UNSPECIFIED: Chronic | ICD-10-CM

## 2019-09-19 DIAGNOSIS — S91.309A UNSPECIFIED OPEN WOUND, UNSPECIFIED FOOT, INITIAL ENCOUNTER: Chronic | ICD-10-CM
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
## 2019-09-19 LAB
CULTURE RESULTS: SIGNIFICANT CHANGE UP
CULTURE RESULTS: SIGNIFICANT CHANGE UP
ORGANISM # SPEC MICROSCOPIC CNT: SIGNIFICANT CHANGE UP
ORGANISM # SPEC MICROSCOPIC CNT: SIGNIFICANT CHANGE UP
SPECIMEN SOURCE: SIGNIFICANT CHANGE UP
SPECIMEN SOURCE: SIGNIFICANT CHANGE UP

## 2019-09-19 PROCEDURE — 97602 WOUND(S) CARE NON-SELECTIVE: CPT

## 2019-09-19 PROCEDURE — 99213 OFFICE O/P EST LOW 20 MIN: CPT | Mod: 25

## 2019-09-19 RX ORDER — CLINDAMYCIN HYDROCHLORIDE 300 MG/1
300 CAPSULE ORAL EVERY 6 HOURS
Qty: 28 | Refills: 0 | Status: DISCONTINUED | COMMUNITY
Start: 2019-09-19 | End: 2019-09-19

## 2019-09-19 NOTE — PHYSICAL EXAM
[Size%: ______] : Size: [unfilled]% [Healing] : healing [Infected?] : Infected: No [3] : 3 out of 10 [Abnormal] : abnormal [Small] : small  [de-identified] : right heel  and forefoot wounds--healing --. dakins--> oral abx--> bone bx pending [] : no [TWNoteComboBox1] : wet to dry [TWNoteComboBox2] : Clint

## 2019-09-19 NOTE — HISTORY OF PRESENT ILLNESS
[Did you have an operation on your burn/wound injury?] : Did you have an operation on your burn/wound injury? Yes [Did this injury occur on the job?] : Did this injury occur on the job? No [de-identified] : chronic wound right heel [de-identified] : right heel and foot post bone bx and debridement
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
## 2019-09-20 DIAGNOSIS — Z86.718 PERSONAL HISTORY OF OTHER VENOUS THROMBOSIS AND EMBOLISM: ICD-10-CM

## 2019-09-20 LAB — SURGICAL PATHOLOGY STUDY: SIGNIFICANT CHANGE UP

## 2019-09-26 ENCOUNTER — APPOINTMENT (OUTPATIENT)
Dept: BURN CARE | Facility: CLINIC | Age: 44
End: 2019-09-26
Payer: COMMERCIAL

## 2019-09-26 ENCOUNTER — OUTPATIENT (OUTPATIENT)
Dept: OUTPATIENT SERVICES | Facility: HOSPITAL | Age: 44
LOS: 1 days | Discharge: HOME | End: 2019-09-26

## 2019-09-26 DIAGNOSIS — Z98.84 BARIATRIC SURGERY STATUS: Chronic | ICD-10-CM

## 2019-09-26 DIAGNOSIS — Z98.890 OTHER SPECIFIED POSTPROCEDURAL STATES: Chronic | ICD-10-CM

## 2019-09-26 DIAGNOSIS — S68.119A COMPLETE TRAUMATIC METACARPOPHALANGEAL AMPUTATION OF UNSPECIFIED FINGER, INITIAL ENCOUNTER: Chronic | ICD-10-CM

## 2019-09-26 DIAGNOSIS — Z89.9 ACQUIRED ABSENCE OF LIMB, UNSPECIFIED: Chronic | ICD-10-CM

## 2019-09-26 DIAGNOSIS — S91.309A UNSPECIFIED OPEN WOUND, UNSPECIFIED FOOT, INITIAL ENCOUNTER: Chronic | ICD-10-CM

## 2019-09-26 PROCEDURE — 97602 WOUND(S) CARE NON-SELECTIVE: CPT

## 2019-09-26 PROCEDURE — 99213 OFFICE O/P EST LOW 20 MIN: CPT | Mod: 25

## 2019-10-10 ENCOUNTER — APPOINTMENT (OUTPATIENT)
Dept: BURN CARE | Facility: CLINIC | Age: 44
End: 2019-10-10
Payer: COMMERCIAL

## 2019-10-10 ENCOUNTER — OUTPATIENT (OUTPATIENT)
Dept: OUTPATIENT SERVICES | Facility: HOSPITAL | Age: 44
LOS: 1 days | Discharge: HOME | End: 2019-10-10

## 2019-10-10 DIAGNOSIS — Z98.890 OTHER SPECIFIED POSTPROCEDURAL STATES: Chronic | ICD-10-CM

## 2019-10-10 DIAGNOSIS — Z98.84 BARIATRIC SURGERY STATUS: Chronic | ICD-10-CM

## 2019-10-10 DIAGNOSIS — S68.119A COMPLETE TRAUMATIC METACARPOPHALANGEAL AMPUTATION OF UNSPECIFIED FINGER, INITIAL ENCOUNTER: Chronic | ICD-10-CM

## 2019-10-10 DIAGNOSIS — S91.309A UNSPECIFIED OPEN WOUND, UNSPECIFIED FOOT, INITIAL ENCOUNTER: Chronic | ICD-10-CM

## 2019-10-10 DIAGNOSIS — Z89.9 ACQUIRED ABSENCE OF LIMB, UNSPECIFIED: Chronic | ICD-10-CM

## 2019-10-10 PROCEDURE — 97602 WOUND(S) CARE NON-SELECTIVE: CPT

## 2019-10-10 PROCEDURE — 99213 OFFICE O/P EST LOW 20 MIN: CPT | Mod: 25

## 2019-10-18 ENCOUNTER — APPOINTMENT (OUTPATIENT)
Dept: PODIATRY | Facility: CLINIC | Age: 44
End: 2019-10-18
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
## 2019-10-21 DIAGNOSIS — S91.301A UNSPECIFIED OPEN WOUND, RIGHT FOOT, INITIAL ENCOUNTER: ICD-10-CM
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
## 2019-10-21 NOTE — ASU PATIENT PROFILE, ADULT - NS PRO ABUSE SCREEN SUSPICION NEGLECT YN
unable to assess Rituxan Pregnancy And Lactation Text: This medication is Pregnancy Category C and it isn't know if it is safe during pregnancy. It is unknown if this medication is excreted in breast milk but similar antibodies are known to be excreted.

## 2019-11-07 ENCOUNTER — OUTPATIENT (OUTPATIENT)
Dept: OUTPATIENT SERVICES | Facility: HOSPITAL | Age: 44
LOS: 1 days | Discharge: HOME | End: 2019-11-07

## 2019-11-07 ENCOUNTER — APPOINTMENT (OUTPATIENT)
Dept: BURN CARE | Facility: CLINIC | Age: 44
End: 2019-11-07
Payer: COMMERCIAL

## 2019-11-07 ENCOUNTER — LABORATORY RESULT (OUTPATIENT)
Age: 44
End: 2019-11-07

## 2019-11-07 DIAGNOSIS — G47.33 OBSTRUCTIVE SLEEP APNEA (ADULT) (PEDIATRIC): ICD-10-CM

## 2019-11-07 DIAGNOSIS — S91.301S UNSPECIFIED OPEN WOUND, RIGHT FOOT, SEQUELA: ICD-10-CM

## 2019-11-07 DIAGNOSIS — M19.90 UNSPECIFIED OSTEOARTHRITIS, UNSPECIFIED SITE: ICD-10-CM

## 2019-11-07 DIAGNOSIS — Z97.3 PRESENCE OF SPECTACLES AND CONTACT LENSES: ICD-10-CM

## 2019-11-07 DIAGNOSIS — Z98.890 OTHER SPECIFIED POSTPROCEDURAL STATES: Chronic | ICD-10-CM

## 2019-11-07 DIAGNOSIS — Z01.419 ENCOUNTER FOR GYNECOLOGICAL EXAMINATION (GENERAL) (ROUTINE) W/OUT ABNORMAL FINDINGS: ICD-10-CM

## 2019-11-07 DIAGNOSIS — Z89.9 ACQUIRED ABSENCE OF LIMB, UNSPECIFIED: Chronic | ICD-10-CM

## 2019-11-07 DIAGNOSIS — I96 GANGRENE, NOT ELSEWHERE CLASSIFIED: ICD-10-CM

## 2019-11-07 DIAGNOSIS — S91.309A UNSPECIFIED OPEN WOUND, UNSPECIFIED FOOT, INITIAL ENCOUNTER: Chronic | ICD-10-CM

## 2019-11-07 DIAGNOSIS — Z98.84 BARIATRIC SURGERY STATUS: Chronic | ICD-10-CM

## 2019-11-07 DIAGNOSIS — Z98.84 BARIATRIC SURGERY STATUS: ICD-10-CM

## 2019-11-07 DIAGNOSIS — S68.119A COMPLETE TRAUMATIC METACARPOPHALANGEAL AMPUTATION OF UNSPECIFIED FINGER, INITIAL ENCOUNTER: Chronic | ICD-10-CM

## 2019-11-07 DIAGNOSIS — E55.9 VITAMIN D DEFICIENCY, UNSPECIFIED: ICD-10-CM

## 2019-11-07 PROCEDURE — 16025 DRESS/DEBRID P-THICK BURN M: CPT

## 2019-11-07 PROCEDURE — 99213 OFFICE O/P EST LOW 20 MIN: CPT | Mod: 25

## 2019-11-08 ENCOUNTER — APPOINTMENT (OUTPATIENT)
Dept: CARDIOLOGY | Facility: CLINIC | Age: 44
End: 2019-11-08
Payer: COMMERCIAL

## 2019-11-08 VITALS
BODY MASS INDEX: 27.28 KG/M2 | HEART RATE: 86 BPM | DIASTOLIC BLOOD PRESSURE: 72 MMHG | WEIGHT: 180 LBS | SYSTOLIC BLOOD PRESSURE: 118 MMHG | HEIGHT: 68 IN

## 2019-11-08 PROBLEM — I96: Status: ACTIVE | Noted: 2017-12-12

## 2019-11-08 PROBLEM — Z97.3 WEARS GLASSES: Status: ACTIVE | Noted: 2017-04-05

## 2019-11-08 PROBLEM — G47.33 MODERATE OBSTRUCTIVE SLEEP APNEA: Status: ACTIVE | Noted: 2017-04-05

## 2019-11-08 PROBLEM — E55.9 VITAMIN D DEFICIENCY, UNSPECIFIED: Status: ACTIVE | Noted: 2018-10-16

## 2019-11-08 PROBLEM — Z98.84 BARIATRIC SURGERY STATUS: Status: ACTIVE | Noted: 2018-08-31

## 2019-11-08 PROBLEM — Z01.419 WOMEN'S ANNUAL ROUTINE GYNECOLOGICAL EXAMINATION: Status: ACTIVE | Noted: 2017-03-02

## 2019-11-08 PROBLEM — M19.90 OSTEOARTHRITIS: Status: ACTIVE | Noted: 2017-04-05

## 2019-11-08 PROCEDURE — 93000 ELECTROCARDIOGRAM COMPLETE: CPT

## 2019-11-08 PROCEDURE — 99214 OFFICE O/P EST MOD 30 MIN: CPT

## 2019-11-08 RX ORDER — METOPROLOL SUCCINATE 50 MG/1
50 TABLET, EXTENDED RELEASE ORAL
Qty: 30 | Refills: 5 | Status: DISCONTINUED | COMMUNITY
Start: 2017-12-11 | End: 2019-11-08

## 2019-11-08 NOTE — PHYSICAL EXAM
[Healing] : healing [Size%: ______] : Size: [unfilled]% [3] : 3 out of 10 [Infected?] : Infected: No [Small] : small  [Abnormal] : abnormal [de-identified] : right heel  and forefoot wounds--healing --. dakins--> oral abx--> bone bx chronic osteo--. ID consult--> no iv abx [] : no [TWNoteComboBox1] : wet to dry [TWNoteComboBox2] : Clint

## 2019-11-08 NOTE — HISTORY OF PRESENT ILLNESS
[Did you have an operation on your burn/wound injury?] : Did you have an operation on your burn/wound injury? Yes [de-identified] : chronic wound right heel [Did this injury occur on the job?] : Did this injury occur on the job? No [de-identified] : right heel and foot post bone bx and debridement--> healing\par \par

## 2019-11-08 NOTE — ASSESSMENT
[FreeTextEntry1] : Stress induced cardiomyopathy (septic shock).\par LV recovery.  No clinical CHF.\par \par Normotensive off Rx.\par \par ECHO (4/12/18): nL LVSF. Trace MR. Mild TR. \par Adenosine NST (6/6/18): Mild breast attenuation artifact. No ischemia / infarction. nL LVSF.
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
## 2019-11-08 NOTE — REASON FOR VISIT
[Follow-Up - Clinic] : a clinic follow-up of [Cardiomyopathy] : cardiomyopathy [Hypertension] : hypertension [FreeTextEntry1] : Feels well.\par \par Continued recovery.  Serial foot debridements / wound care.  Hand prosthetics made / going to PT to use.\par \par No chest pain.  Breathing comfortable.  No palpitations, lightheadedness, syncope.\par \par Stopped Metoprolol on own.  Low BP's.  No symptoms.

## 2019-11-08 NOTE — DISCUSSION/SUMMARY
[FreeTextEntry1] : Stress induced cardiomyopathy discussed.  LV recovery confers good prognosis.  Chance of recurrence with another is uncertain but likely greater compared to general population.  Uncertain if continued beta-blocker is beneficial in absence of another indication.  Will obtain ECHO to ensure sustained LV recovery off Rx.  If develops recurrent hypertension, beta-blocker or ACE-I seem most appropriate to use.  Plan for annual follow-up / clinical monitoring.

## 2019-11-10 LAB — BACTERIA SPEC CULT: ABNORMAL

## 2019-11-12 ENCOUNTER — OTHER (OUTPATIENT)
Age: 44
End: 2019-11-12

## 2019-11-12 NOTE — BRIEF OPERATIVE NOTE - TYPE OF ANESTHESIA
4th attempt: Contacted patient again.     She checks BP after taking medications about one hour after. Checks two times daily    11/1: 146/62 67  165/66 68    11/2: 146/58 70  152/63 70    11/3: 175/76 68 930 AM- 1.5 hours after medication  139/54 62    11/7: 133/60 68  161/66 62    11/8: 154/55 64    11/9: 158/67 68    11/10: 153/60 66    11/11: 176/69 72  149/60 67    11/12: 157/65 86 815 AM  700 AM took medications.    Will review with Elizabeth and get back to patient.  BP still elevated.  
General
Local with sedation
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
## 2019-11-18 DIAGNOSIS — S91.301S UNSPECIFIED OPEN WOUND, RIGHT FOOT, SEQUELA: ICD-10-CM
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
## 2019-12-05 ENCOUNTER — OUTPATIENT (OUTPATIENT)
Dept: OUTPATIENT SERVICES | Facility: HOSPITAL | Age: 44
LOS: 1 days | Discharge: HOME | End: 2019-12-05

## 2019-12-05 ENCOUNTER — APPOINTMENT (OUTPATIENT)
Dept: BURN CARE | Facility: CLINIC | Age: 44
End: 2019-12-05
Payer: COMMERCIAL

## 2019-12-05 DIAGNOSIS — Z98.84 BARIATRIC SURGERY STATUS: Chronic | ICD-10-CM

## 2019-12-05 DIAGNOSIS — Z98.890 OTHER SPECIFIED POSTPROCEDURAL STATES: Chronic | ICD-10-CM

## 2019-12-05 DIAGNOSIS — S68.119A COMPLETE TRAUMATIC METACARPOPHALANGEAL AMPUTATION OF UNSPECIFIED FINGER, INITIAL ENCOUNTER: Chronic | ICD-10-CM

## 2019-12-05 DIAGNOSIS — S91.309A UNSPECIFIED OPEN WOUND, UNSPECIFIED FOOT, INITIAL ENCOUNTER: Chronic | ICD-10-CM

## 2019-12-05 DIAGNOSIS — Z89.9 ACQUIRED ABSENCE OF LIMB, UNSPECIFIED: Chronic | ICD-10-CM

## 2019-12-05 PROCEDURE — 16025 DRESS/DEBRID P-THICK BURN M: CPT

## 2019-12-05 PROCEDURE — 99213 OFFICE O/P EST LOW 20 MIN: CPT | Mod: 25

## 2019-12-08 LAB — BACTERIA SPEC CULT: NORMAL
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
## 2019-12-16 DIAGNOSIS — M79.671 PAIN IN RIGHT FOOT: ICD-10-CM

## 2019-12-16 DIAGNOSIS — L97.509 NON-PRESSURE CHRONIC ULCER OF OTHER PART OF UNSPECIFIED FOOT WITH UNSPECIFIED SEVERITY: ICD-10-CM

## 2019-12-23 NOTE — PRE-ANESTHESIA EVALUATION ADULT - NSANTHRISKNONERD_GEN_ALL_CORE
Called LM for patient still has not scheduled or completed XR ESOPHOGRAM and she has a f/u with Dr. Hoffmann scheduled for 1/2/20 and Dr. Hoffmann is going to want that done prior to appointment to review. Have scheduling number.   
No risk alerts present
No risk alerts present

## 2019-12-24 ENCOUNTER — APPOINTMENT (OUTPATIENT)
Dept: PODIATRY | Facility: CLINIC | Age: 44
End: 2019-12-24
Payer: COMMERCIAL

## 2019-12-24 ENCOUNTER — OUTPATIENT (OUTPATIENT)
Dept: OUTPATIENT SERVICES | Facility: HOSPITAL | Age: 44
LOS: 1 days | Discharge: HOME | End: 2019-12-24

## 2019-12-24 VITALS
HEART RATE: 77 BPM | WEIGHT: 180 LBS | HEIGHT: 68 IN | SYSTOLIC BLOOD PRESSURE: 132 MMHG | DIASTOLIC BLOOD PRESSURE: 84 MMHG | BODY MASS INDEX: 27.28 KG/M2

## 2019-12-24 DIAGNOSIS — S91.309D UNSPECIFIED OPEN WOUND, UNSPECIFIED FOOT, SUBSEQUENT ENCOUNTER: ICD-10-CM

## 2019-12-24 DIAGNOSIS — Z89.9 ACQUIRED ABSENCE OF LIMB, UNSPECIFIED: Chronic | ICD-10-CM

## 2019-12-24 DIAGNOSIS — Z98.84 BARIATRIC SURGERY STATUS: Chronic | ICD-10-CM

## 2019-12-24 DIAGNOSIS — Z89.432 ACQUIRED ABSENCE OF LEFT FOOT: ICD-10-CM

## 2019-12-24 DIAGNOSIS — Z89.431 ACQUIRED ABSENCE OF RIGHT FOOT: ICD-10-CM

## 2019-12-24 DIAGNOSIS — Z94.5 SKIN TRANSPLANT STATUS: ICD-10-CM

## 2019-12-24 DIAGNOSIS — Z98.890 OTHER SPECIFIED POSTPROCEDURAL STATES: Chronic | ICD-10-CM

## 2019-12-24 DIAGNOSIS — S68.119A COMPLETE TRAUMATIC METACARPOPHALANGEAL AMPUTATION OF UNSPECIFIED FINGER, INITIAL ENCOUNTER: Chronic | ICD-10-CM

## 2019-12-24 DIAGNOSIS — S91.309D: ICD-10-CM

## 2019-12-24 DIAGNOSIS — S91.301S UNSPECIFIED OPEN WOUND, RIGHT FOOT, SEQUELA: ICD-10-CM

## 2019-12-24 DIAGNOSIS — S91.309A UNSPECIFIED OPEN WOUND, UNSPECIFIED FOOT, INITIAL ENCOUNTER: Chronic | ICD-10-CM

## 2019-12-24 PROCEDURE — 99213 OFFICE O/P EST LOW 20 MIN: CPT

## 2019-12-29 PROBLEM — Z89.432: Status: ACTIVE | Noted: 2018-05-03

## 2019-12-29 PROBLEM — Z94.5 S/P SPLIT THICKNESS SKIN GRAFT: Status: ACTIVE | Noted: 2018-05-03

## 2019-12-29 PROBLEM — Z89.431: Status: ACTIVE | Noted: 2018-05-03

## 2019-12-29 PROBLEM — S91.309D: Status: ACTIVE | Noted: 2018-05-17

## 2019-12-29 NOTE — ASSESSMENT
[FreeTextEntry1] : wound sites cleaned and dressed with sterile gauze, romain, omnifix. right foot has chronic ulceration of right foot patient seen dr. mary who informed her of chronic OM.  Patient may require further debriment of bone will continue to monitor area.

## 2020-01-07 ENCOUNTER — APPOINTMENT (OUTPATIENT)
Dept: CARDIOLOGY | Facility: CLINIC | Age: 45
End: 2020-01-07
Payer: COMMERCIAL

## 2020-01-07 PROCEDURE — 93306 TTE W/DOPPLER COMPLETE: CPT

## 2020-01-09 ENCOUNTER — OUTPATIENT (OUTPATIENT)
Dept: OUTPATIENT SERVICES | Facility: HOSPITAL | Age: 45
LOS: 1 days | Discharge: HOME | End: 2020-01-09

## 2020-01-09 ENCOUNTER — LABORATORY RESULT (OUTPATIENT)
Age: 45
End: 2020-01-09

## 2020-01-09 ENCOUNTER — APPOINTMENT (OUTPATIENT)
Dept: BURN CARE | Facility: CLINIC | Age: 45
End: 2020-01-09
Payer: COMMERCIAL

## 2020-01-09 DIAGNOSIS — Z89.9 ACQUIRED ABSENCE OF LIMB, UNSPECIFIED: Chronic | ICD-10-CM

## 2020-01-09 DIAGNOSIS — S91.309A UNSPECIFIED OPEN WOUND, UNSPECIFIED FOOT, INITIAL ENCOUNTER: Chronic | ICD-10-CM

## 2020-01-09 DIAGNOSIS — S68.119A COMPLETE TRAUMATIC METACARPOPHALANGEAL AMPUTATION OF UNSPECIFIED FINGER, INITIAL ENCOUNTER: Chronic | ICD-10-CM

## 2020-01-09 DIAGNOSIS — Z98.84 BARIATRIC SURGERY STATUS: Chronic | ICD-10-CM

## 2020-01-09 DIAGNOSIS — Z98.890 OTHER SPECIFIED POSTPROCEDURAL STATES: Chronic | ICD-10-CM

## 2020-01-09 PROCEDURE — 16025 DRESS/DEBRID P-THICK BURN M: CPT

## 2020-01-09 PROCEDURE — 99213 OFFICE O/P EST LOW 20 MIN: CPT | Mod: 25
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
## 2020-02-20 ENCOUNTER — LABORATORY RESULT (OUTPATIENT)
Age: 45
End: 2020-02-20

## 2020-02-20 ENCOUNTER — APPOINTMENT (OUTPATIENT)
Dept: BURN CARE | Facility: CLINIC | Age: 45
End: 2020-02-20
Payer: COMMERCIAL

## 2020-02-20 ENCOUNTER — OUTPATIENT (OUTPATIENT)
Dept: OUTPATIENT SERVICES | Facility: HOSPITAL | Age: 45
LOS: 1 days | Discharge: HOME | End: 2020-02-20

## 2020-02-20 DIAGNOSIS — T22.20XA BURN OF SECOND DEGREE OF SHOULDER AND UPPER LIMB, EXCEPT WRIST AND HAND, UNSPECIFIED SITE, INITIAL ENCOUNTER: ICD-10-CM
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
## 2020-02-20 DIAGNOSIS — Z98.890 OTHER SPECIFIED POSTPROCEDURAL STATES: Chronic | ICD-10-CM

## 2020-02-20 DIAGNOSIS — T31.0 BURNS INVOLVING LESS THAN 10% OF BODY SURFACE: ICD-10-CM

## 2020-02-20 DIAGNOSIS — T24.212A BURN OF SECOND DEGREE OF LEFT THIGH, INITIAL ENCOUNTER: ICD-10-CM

## 2020-02-20 DIAGNOSIS — S91.309A UNSPECIFIED OPEN WOUND, UNSPECIFIED FOOT, INITIAL ENCOUNTER: Chronic | ICD-10-CM

## 2020-02-20 DIAGNOSIS — S68.119A COMPLETE TRAUMATIC METACARPOPHALANGEAL AMPUTATION OF UNSPECIFIED FINGER, INITIAL ENCOUNTER: Chronic | ICD-10-CM

## 2020-02-20 DIAGNOSIS — Z89.9 ACQUIRED ABSENCE OF LIMB, UNSPECIFIED: Chronic | ICD-10-CM
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
## 2020-02-20 DIAGNOSIS — Z98.84 BARIATRIC SURGERY STATUS: Chronic | ICD-10-CM

## 2020-02-20 PROCEDURE — 16030 DRESS/DEBRID P-THICK BURN L: CPT

## 2020-02-20 PROCEDURE — 99214 OFFICE O/P EST MOD 30 MIN: CPT | Mod: 25

## 2020-02-20 NOTE — HISTORY OF PRESENT ILLNESS
[Did you have an operation on your burn/wound injury?] : Did you have an operation on your burn/wound injury? Yes [Did this injury occur on the job?] : Did this injury occur on the job? No [de-identified] : chronic wound right heel [de-identified] : right foot healing--> cont drainage
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
## 2020-02-20 NOTE — PHYSICAL EXAM
[Healing] : healing [Size%: ______] : Size: [unfilled]% [Infected?] : Infected: No [3] : 3 out of 10 [Small] : small  [Abnormal] : abnormal [de-identified] : right heel --healing -> local wound care\par \par cont right heel drainage--. cx sent\par \par local wound care\par \par discussed  possible further bone debridement and muscle flap right heel [] : no [TWNoteComboBox1] : wet to dry

## 2020-02-27 ENCOUNTER — APPOINTMENT (OUTPATIENT)
Dept: BURN CARE | Facility: CLINIC | Age: 45
End: 2020-02-27
Payer: COMMERCIAL

## 2020-02-27 ENCOUNTER — OUTPATIENT (OUTPATIENT)
Dept: OUTPATIENT SERVICES | Facility: HOSPITAL | Age: 45
LOS: 1 days | Discharge: HOME | End: 2020-02-27

## 2020-02-27 DIAGNOSIS — Z98.84 BARIATRIC SURGERY STATUS: Chronic | ICD-10-CM

## 2020-02-27 DIAGNOSIS — S91.309A UNSPECIFIED OPEN WOUND, UNSPECIFIED FOOT, INITIAL ENCOUNTER: Chronic | ICD-10-CM

## 2020-02-27 DIAGNOSIS — Z98.890 OTHER SPECIFIED POSTPROCEDURAL STATES: Chronic | ICD-10-CM

## 2020-02-27 DIAGNOSIS — S91.301S UNSPECIFIED OPEN WOUND, RIGHT FOOT, SEQUELA: ICD-10-CM

## 2020-02-27 DIAGNOSIS — Z89.9 ACQUIRED ABSENCE OF LIMB, UNSPECIFIED: Chronic | ICD-10-CM

## 2020-02-27 DIAGNOSIS — S68.119A COMPLETE TRAUMATIC METACARPOPHALANGEAL AMPUTATION OF UNSPECIFIED FINGER, INITIAL ENCOUNTER: Chronic | ICD-10-CM

## 2020-02-27 PROCEDURE — 99213 OFFICE O/P EST LOW 20 MIN: CPT | Mod: 25

## 2020-02-27 PROCEDURE — 16030 DRESS/DEBRID P-THICK BURN L: CPT

## 2020-03-05 ENCOUNTER — OUTPATIENT (OUTPATIENT)
Dept: OUTPATIENT SERVICES | Facility: HOSPITAL | Age: 45
LOS: 1 days | Discharge: HOME | End: 2020-03-05

## 2020-03-05 ENCOUNTER — APPOINTMENT (OUTPATIENT)
Dept: BURN CARE | Facility: CLINIC | Age: 45
End: 2020-03-05
Payer: COMMERCIAL

## 2020-03-05 DIAGNOSIS — Z98.890 OTHER SPECIFIED POSTPROCEDURAL STATES: Chronic | ICD-10-CM

## 2020-03-05 DIAGNOSIS — Z98.84 BARIATRIC SURGERY STATUS: Chronic | ICD-10-CM

## 2020-03-05 DIAGNOSIS — S68.119A COMPLETE TRAUMATIC METACARPOPHALANGEAL AMPUTATION OF UNSPECIFIED FINGER, INITIAL ENCOUNTER: Chronic | ICD-10-CM

## 2020-03-05 DIAGNOSIS — Z89.9 ACQUIRED ABSENCE OF LIMB, UNSPECIFIED: Chronic | ICD-10-CM

## 2020-03-05 DIAGNOSIS — S91.309A UNSPECIFIED OPEN WOUND, UNSPECIFIED FOOT, INITIAL ENCOUNTER: Chronic | ICD-10-CM

## 2020-03-05 PROCEDURE — 99213 OFFICE O/P EST LOW 20 MIN: CPT | Mod: 25

## 2020-03-05 PROCEDURE — 16025 DRESS/DEBRID P-THICK BURN M: CPT

## 2020-03-09 DIAGNOSIS — T22.331A BURN OF THIRD DEGREE OF RIGHT UPPER ARM, INITIAL ENCOUNTER: ICD-10-CM
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
## 2020-03-09 DIAGNOSIS — S91.301A UNSPECIFIED OPEN WOUND, RIGHT FOOT, INITIAL ENCOUNTER: ICD-10-CM

## 2020-03-09 DIAGNOSIS — T24.312A BURN OF THIRD DEGREE OF LEFT THIGH, INITIAL ENCOUNTER: ICD-10-CM

## 2020-03-12 ENCOUNTER — OUTPATIENT (OUTPATIENT)
Dept: OUTPATIENT SERVICES | Facility: HOSPITAL | Age: 45
LOS: 1 days | Discharge: HOME | End: 2020-03-12

## 2020-03-12 DIAGNOSIS — T22.30XA BURN OF THIRD DEGREE OF SHOULDER AND UPPER LIMB, EXCEPT WRIST AND HAND, UNSPECIFIED SITE, INITIAL ENCOUNTER: ICD-10-CM

## 2020-03-12 DIAGNOSIS — S68.119A COMPLETE TRAUMATIC METACARPOPHALANGEAL AMPUTATION OF UNSPECIFIED FINGER, INITIAL ENCOUNTER: Chronic | ICD-10-CM

## 2020-03-12 DIAGNOSIS — T24.312A BURN OF THIRD DEGREE OF LEFT THIGH, INITIAL ENCOUNTER: ICD-10-CM

## 2020-03-12 DIAGNOSIS — S91.309A UNSPECIFIED OPEN WOUND, UNSPECIFIED FOOT, INITIAL ENCOUNTER: Chronic | ICD-10-CM

## 2020-03-12 DIAGNOSIS — Z89.022 ACQUIRED ABSENCE OF LEFT FINGER(S): ICD-10-CM
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
## 2020-03-12 DIAGNOSIS — Z98.890 OTHER SPECIFIED POSTPROCEDURAL STATES: Chronic | ICD-10-CM

## 2020-03-12 DIAGNOSIS — Z89.9 ACQUIRED ABSENCE OF LIMB, UNSPECIFIED: Chronic | ICD-10-CM
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
## 2020-03-12 DIAGNOSIS — Z98.84 BARIATRIC SURGERY STATUS: Chronic | ICD-10-CM

## 2020-04-02 NOTE — ASU PATIENT PROFILE, ADULT - AS SC BRADEN MOISTURE
Tranexamic Acid Counseling:  Patient advised of the small risk of bleeding problems with tranexamic acid. They were also instructed to call if they developed any nausea, vomiting or diarrhea. All of the patient's questions and concerns were addressed. (4) rarely moist

## 2020-04-09 ENCOUNTER — APPOINTMENT (OUTPATIENT)
Dept: BURN CARE | Facility: CLINIC | Age: 45
End: 2020-04-09

## 2020-04-30 NOTE — ED ADULT NURSE NOTE - NSSISCREENINGQ5_ED_A_ED
[Non-Contributory] : Non-contributory [Duration: ___ wks] : duration: [unfilled] weeks [Vaginal] : Vaginal [___ lbs.] : [unfilled] lbs [Normal?] : normal delivery [___ oz.] : [unfilled] oz. No

## 2020-07-01 NOTE — PATIENT PROFILE ADULT. - MEDICATION PUMPS, PROFILE
CC:   Chief Complaint   Patient presents with   • Back Pain        HPI: Radha presents with the complaint of lower back on the right and left sides pain that is described as aching and is not radiating.  The symptoms are present for the last 3 days. States on Saturday was gardening and lifting in her yard most of the day. Woke up the following morning to symptoms. Had similar pain a month ago after exerting herself that resolved after a few days.  The symptoms came on gradually and is getting worse with time. Radha has has a previous history of injury or back problems which she reports as low back pain from exerting herself. No prior kidney stones.  The patient has had some improvement with OTC medications (Alleve).  No fevers/chills, paresthesias, urinary symptoms/loss of bowel or bladder function. No other modifying factors at this time.     Current Outpatient Medications   Medication Sig   • tiZANidine (ZANAFLEX) 4 MG tablet Take 1 tablet by mouth every 8 hours as needed (pain).   • ibuprofen (MOTRIN) 800 MG tablet Take 1 tablet by mouth every 8 hours as needed for Pain. Take with food   • gentamicin (GARAMYCIN) 0.3 % ophthalmic solution Place 1 drop into both eyes 4 times daily.   • Ascorbic Acid (VITAMIN C) 100 MG tablet Take 100 mg by mouth daily.   • cholecalciferol (VITAMIN D3) 1000 UNITS tablet Take 1,000 Units by mouth daily.   • Omega-3 Fatty Acids (FISH OIL PO) Take by mouth daily.     No current facility-administered medications for this visit.        Vitals:    07/01/20 0929   BP: (!) 181/107   Pulse: 95   Resp: 20   Temp: 98.2 °F (36.8 °C)   TempSrc: Tympanic   SpO2: 99%       Social History     Tobacco Use   • Smoking status: Current Every Day Smoker     Packs/day: 1.00     Years: 30.00     Pack years: 30.00     Types: Cigarettes     Start date: 1980   • Smokeless tobacco: Never Used   • Tobacco comment: Quit during pregnancy.   Substance Use Topics   • Alcohol use: Yes     Alcohol/week: 7.0  - 8.0 standard drinks     Types: 7 - 8 Standard drinks or equivalent per week   • Drug use: No       Social History     Tobacco Use   Smoking Status Current Every Day Smoker   • Packs/day: 1.00   • Years: 30.00   • Pack years: 30.00   • Types: Cigarettes   • Start date: 1980   Smokeless Tobacco Never Used   Tobacco Comment    Quit during pregnancy.       PHYSICAL EXAM:  GENERAL: Appears stated age, is in mild distress and moving slowly with guarded back.  HEENT: NC/AT, PERRL, EOMI, Nares patent, and unremarkable.   LUNGS: CTAB without any adventitious lung sounds.   CVS: RRR, Nl S1, S2. No LE edema.   Abd: + BS, Soft, NT, ND  BACK: No obvious abnormal curvature of the back. No overlying skin changes to the back/spine. No discomfort to palpation in any spinous process region. No step off. There is mild discomfort to palpation of the Lumbar paraspinal (L4-5) musculature bilaterally. Straight leg raise is negative bilaterally. No CVA TTP.   EXT: Strength 5/5 equal on bilateral lower extremities  NEURO: bilaterally equal patellar and achillies tendon reflexes. No change in sensation or perception of touch over lower extremities.  GAIT: normal.     Urgent Care Course:  Pt declined UA - does not feel symptoms are secondary to UTI or possible renal stone.     A/P:  Radha was seen today for back pain.    Diagnoses and all orders for this visit:    Acute bilateral low back pain without sciatica  -     tiZANidine (ZANAFLEX) 4 MG tablet; Take 1 tablet by mouth every 8 hours as needed (pain).  -     ibuprofen (MOTRIN) 800 MG tablet; Take 1 tablet by mouth every 8 hours as needed for Pain. Take with food    Elevated blood-pressure reading without diagnosis of hypertension      Will prescribe a muscle relaxer to take with Ibuprofen 800 mg Q 8 hrs for the next 24 hrs, then PRN. Relative rest. Return to previous level of activity as soon as possible. Avoid re-injury once pain subsides. Heating pad and gentle stretching  exercises.     Regarding BP, denies any prior history of HTN. Recommend to monitor and check 1-2 x/weekly. Record values. If persistently > 130/90 to follow up with PCP for evaluation.  BP parameters of systolic >180 or diastolic >110 to seek medical attention.     Call or return to the  for any additional questions/concerns.       Marco Contreras MD          none

## 2020-09-23 ENCOUNTER — APPOINTMENT (OUTPATIENT)
Dept: CARDIOLOGY | Facility: CLINIC | Age: 45
End: 2020-09-23
Payer: COMMERCIAL

## 2020-09-23 VITALS
BODY MASS INDEX: 30.53 KG/M2 | TEMPERATURE: 98 F | SYSTOLIC BLOOD PRESSURE: 130 MMHG | HEIGHT: 66 IN | HEART RATE: 83 BPM | WEIGHT: 190 LBS | DIASTOLIC BLOOD PRESSURE: 80 MMHG

## 2020-09-23 PROCEDURE — 93000 ELECTROCARDIOGRAM COMPLETE: CPT

## 2020-09-23 PROCEDURE — 99214 OFFICE O/P EST MOD 30 MIN: CPT

## 2020-09-23 RX ORDER — OXYCODONE AND ACETAMINOPHEN 5; 325 MG/1; MG/1
5-325 TABLET ORAL
Qty: 30 | Refills: 0 | Status: DISCONTINUED | COMMUNITY
Start: 2019-11-07 | End: 2020-09-23

## 2020-09-23 RX ORDER — COLLAGENASE SANTYL 250 [ARB'U]/G
250 OINTMENT TOPICAL DAILY
Qty: 90 | Refills: 4 | Status: DISCONTINUED | COMMUNITY
Start: 2018-05-17 | End: 2020-09-23

## 2020-09-23 RX ORDER — CIPROFLOXACIN HYDROCHLORIDE 500 MG/1
500 TABLET, FILM COATED ORAL
Qty: 14 | Refills: 0 | Status: DISCONTINUED | COMMUNITY
Start: 2020-01-14 | End: 2020-09-23

## 2020-09-23 RX ORDER — OXYCODONE AND ACETAMINOPHEN 5; 325 MG/1; MG/1
5-325 TABLET ORAL
Qty: 28 | Refills: 0 | Status: DISCONTINUED | COMMUNITY
Start: 2019-04-18 | End: 2020-09-23

## 2020-09-23 RX ORDER — OXYCODONE AND ACETAMINOPHEN 5; 325 MG/1; MG/1
5-325 TABLET ORAL
Qty: 30 | Refills: 0 | Status: DISCONTINUED | COMMUNITY
Start: 2020-02-27 | End: 2020-09-23

## 2020-09-23 RX ORDER — CLINDAMYCIN HYDROCHLORIDE 300 MG/1
300 CAPSULE ORAL EVERY 6 HOURS
Qty: 40 | Refills: 0 | Status: DISCONTINUED | COMMUNITY
Start: 2020-02-25 | End: 2020-09-23

## 2020-09-23 RX ORDER — OXYCODONE AND ACETAMINOPHEN 5; 325 MG/1; MG/1
5-325 TABLET ORAL
Qty: 30 | Refills: 0 | Status: DISCONTINUED | COMMUNITY
Start: 2019-10-10 | End: 2020-09-23

## 2020-09-23 RX ORDER — OXYCODONE AND ACETAMINOPHEN 5; 325 MG/1; MG/1
5-325 TABLET ORAL
Qty: 30 | Refills: 0 | Status: DISCONTINUED | COMMUNITY
Start: 2020-02-20 | End: 2020-09-23

## 2020-09-23 RX ORDER — SULFAMETHOXAZOLE AND TRIMETHOPRIM 800; 160 MG/1; MG/1
800-160 TABLET ORAL TWICE DAILY
Qty: 14 | Refills: 0 | Status: DISCONTINUED | COMMUNITY
Start: 2019-07-16 | End: 2020-09-23

## 2020-09-23 RX ORDER — OXYCODONE AND ACETAMINOPHEN 5; 325 MG/1; MG/1
5-325 TABLET ORAL
Qty: 42 | Refills: 0 | Status: DISCONTINUED | COMMUNITY
Start: 2019-02-13 | End: 2020-09-23

## 2020-09-23 RX ORDER — OXYCODONE AND ACETAMINOPHEN 5; 325 MG/1; MG/1
5-325 TABLET ORAL
Qty: 15 | Refills: 0 | Status: DISCONTINUED | COMMUNITY
Start: 2019-08-29 | End: 2020-09-23

## 2020-09-23 RX ORDER — OXYCODONE AND ACETAMINOPHEN 5; 325 MG/1; MG/1
5-325 TABLET ORAL
Qty: 15 | Refills: 0 | Status: DISCONTINUED | COMMUNITY
Start: 2019-08-01 | End: 2020-09-23

## 2020-09-23 RX ORDER — OXYCODONE AND ACETAMINOPHEN 5; 325 MG/1; MG/1
5-325 TABLET ORAL
Qty: 30 | Refills: 0 | Status: DISCONTINUED | COMMUNITY
Start: 2019-03-28 | End: 2020-09-23

## 2020-09-23 RX ORDER — OXYCODONE AND ACETAMINOPHEN 5; 325 MG/1; MG/1
5-325 TABLET ORAL
Qty: 50 | Refills: 0 | Status: DISCONTINUED | COMMUNITY
Start: 2019-03-13 | End: 2020-09-23

## 2020-09-23 RX ORDER — CLINDAMYCIN HYDROCHLORIDE 300 MG/1
300 CAPSULE ORAL EVERY 8 HOURS
Qty: 21 | Refills: 0 | Status: DISCONTINUED | COMMUNITY
Start: 2020-01-14 | End: 2020-09-23

## 2020-09-23 NOTE — CHART NOTE - NSCHARTNOTESELECT_GEN_ALL_CORE
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  Patient states that she had an embolization of her liver on 09/10 that she has been feeling progressively more weak since.  She also reports nausea and fatigue.  She has generalized abdominal pain.  She denies vomiting, fever, chills, diarrhea, constipation, or urinary symptoms.  She is on the transplant list.  There are no other associated symptoms or modifying factors at this time.    Allergies   Allergen Reactions   • Iron Sucrose Other (See Comments)     Patient had itching on her tongue and in her mouth.  Per Dr. Fabricio valenzuela to give Venofer with pre-meds given before Venofer.  Benadryl and Solumedrol ordered to be given as pre-medication.   • Blueberry   (Food Or Med) HIVES       Current Discharge Medication List      Prior to Admission Medications    Details   traMADol (ULTRAM) 50 MG tablet Take 1 tablet by mouth every 6 hours as needed for Pain.  Qty: 20 tablet, Refills: 0      ondansetron (Zofran ODT) 4 MG disintegrating tablet Place 1 tablet onto the tongue every 8 hours as needed for Nausea.  Qty: 15 tablet, Refills: 0      sucralfate (CARAFATE) 1 g tablet Take 1 tablet by mouth 4 times daily.  Qty: 40 tablet, Refills: 0      pantoprazole (PROTONIX) 40 MG tablet Take 1 tab po BID x 4 weeks, then decrease to 40mg po daily  Qty: 120 tablet, Refills: 0      diphenhydrAMINE (BENADRYL) 12.5 MG/5ML liquid Take 12.5-25mg PO as needed for migraine  Qty: 1 Bottle, Refills: 3      metoCLOPramide (REGLAN) 5 MG tablet Use 5-10mg PO as needed for migraine  Qty: 30 tablet, Refills: 3      topiramate (TOPAMAX) 25 MG tablet Take 1  Event Note tablet by mouth nightly. After 1 week, increase to 50mg nightly  Qty: 30 tablet, Refills: 3      levonorgestrel (REHANA) 13.5 MG intrauterine device 13.5 mg by Intrauterine route. January 2020      aspirin-acetaminophen-caffeine (EXCEDRIN MIGRAINE) 250-250-65 MG per tablet Take 1 tablet by mouth every 6 hours as needed for Pain.      lisinopril (ZESTRIL) 10 MG tablet Take 0.5 tablets by mouth daily.  Qty: 50 tablet, Refills: 3      furosemide (LASIX) 20 MG tablet Take 20 mg by mouth as directed. Take Monday-Friday  Qty: 90 tablet, Refills: 3      ferrous sulfate 325 (65 FE) MG tablet Take 325 mg by mouth daily (with dinner).         Modified Medications    Details   oxyCODONE-acetaminophen (Percocet) 5-325 MG per tablet Take 1-2 tablets by mouth every 6 hours as needed for Pain (Not to exceed 4000 mg acetaminophen per day).  Qty: 12 tablet, Refills: 0         Discontinued Medications       electrolyte/PEG 3350 (GOLYTELY) 236 g solution              Past Medical History:   Diagnosis Date   • Anemia     CHRONIC-HB 6.9 1/2020-FELT WEAK-UGI BLEED-HOSPITALIZATION 1-2020   • AVM (arteriovenous malformation)    • Cardiomyopathy (CMS/HCC)     Echo 1-2020-EF 59%-SEVERELY ENLARGED RV AND LV   • Colon polyps    • Congestive cardiac failure (CMS/HCC)    • Pulmonary hypertension (CMS/HCC)    • SVT (supraventricular tachycardia) (CMS/HCC)    • Transfusion history     2 units PRBC's 1/10/2020       Past Surgical History:   Procedure Laterality Date   • CARDIAC ELECTROPHYSIOLOGY MAPPING AND ABLATION  2016   • COLONOSCOPY  08/21/2019    2 AVM cecum, punctate AVM transverse colon   • COLONOSCOPY  03/03/2020    no recurrence polyp; 2 AVM; tx'd with APC   • ESOPHAGOGASTRODUODENOSCOPY  08/02/2019    total 7 ateriovenous malformations duodenum   • ESOPHAGOGASTRODUODENOSCOPY  01/11/2020    cauterization AVM   • ESOPHAGOGASTRODUODENOSCOPY  03/03/2020    3 small AVM duodenum; APC   • ESOPHAGOGASTRODUODENOSCOPY  08/07/2020    Normal Biopsy     • IR EMBOLIZATION  09/10/2020   • IUD INSERTION  01/15/2019    [place]       Family History   Problem Relation Age of Onset   • Diabetes Mother    • Gastrointestinal Father    • Other Father         hemorrhagic telangiectasia   • Patient is unaware of any medical problems Sister    • Patient is unaware of any medical problems Brother        Social History     Tobacco Use   • Smoking status: Never Smoker   • Smokeless tobacco: Never Used   Substance Use Topics   • Alcohol use: Not Currently     Frequency: Never     Comment: Rare   • Drug use: Never       E-cigarette/Vaping     E-Cigarette/Vaping Substances & Devices       Review of Systems   Constitutional: Negative for chills and fever.   HENT: Negative for congestion and rhinorrhea.    Eyes: Negative for visual disturbance.   Respiratory: Negative for cough and shortness of breath.    Cardiovascular: Negative for chest pain and leg swelling.   Gastrointestinal: Positive for abdominal pain, constipation and nausea. Negative for blood in stool, diarrhea and vomiting.   Genitourinary: Negative for dysuria and frequency.   Musculoskeletal: Negative for arthralgias and myalgias.   Skin: Negative for color change.   Neurological: Positive for weakness. Negative for dizziness and light-headedness.       Physical Exam     ED Triage Vitals [09/23/20 1626]   ED Triage Vitals Group      Temp 97.8 °F (36.6 °C)      Heart Rate 91      Resp 18      /68      SpO2 100 %      EtCO2 mmHg       Height       Weight       Weight Scale Used       BMI (Calculated)       IBW/kg (Calculated)        Physical Exam   Constitutional: She appears well-developed and well-nourished. No distress.   HENT:   Head: Normocephalic and atraumatic.   Right Ear: External ear normal.   Left Ear: External ear normal.   Eyes: Pupils are equal, round, and reactive to light. Lids are normal. No scleral icterus.   Neck: Neck supple.   Cardiovascular: Normal rate, regular rhythm, normal heart sounds and  intact distal pulses.   Pulmonary/Chest: Effort normal and breath sounds normal. No respiratory distress. She has no wheezes. She has no rales.   Abdominal: She exhibits no distension. There is abdominal tenderness (generalized). There is no rebound.   Neurological: She is alert.   Skin: Skin is warm and dry. She is not diaphoretic. No erythema. No pallor.   Psychiatric: She has a normal mood and affect.   Nursing note and vitals reviewed.      ED Course     Procedures    Lab Results     Results for orders placed or performed during the hospital encounter of 09/23/20   Comprehensive Metabolic Panel   Result Value Ref Range    Fasting Status      Sodium 134 (L) 135 - 145 mmol/L    Potassium 3.8 3.4 - 5.1 mmol/L    Chloride 100 98 - 107 mmol/L    Carbon Dioxide 27 21 - 32 mmol/L    Anion Gap 11 10 - 20 mmol/L    Glucose 78 65 - 99 mg/dL    BUN 10 6 - 20 mg/dL    Creatinine 0.64 0.51 - 0.95 mg/dL    Glomerular Filtration Rate >90 >90 mL/min/1.73m2    BUN/ Creatinine Ratio 16 7 - 25    Bilirubin, Total 1.1 (H) 0.2 - 1.0 mg/dL    GOT/AST 34 <=37 Units/L    Alkaline Phosphatase 161 (H) 45 - 117 Units/L    Albumin 3.3 (L) 3.6 - 5.1 g/dL    Protein, Total 8.3 (H) 6.4 - 8.2 g/dL    Globulin 5.0 (H) 2.0 - 4.0 g/dL    A/G Ratio 0.7 (L) 1.0 - 2.4    GPT/ALT 84 (H) <64 Units/L    Calcium 9.0 8.4 - 10.2 mg/dL   Lipase   Result Value Ref Range    Lipase <50 (L) 73 - 393 Units/L   CBC with Automated Differential (performable only)   Result Value Ref Range    WBC 6.8 4.2 - 11.0 K/mcL    RBC 4.48 4.00 - 5.20 mil/mcL    HGB 10.2 (L) 12.0 - 15.5 g/dL    HCT 34.3 (L) 36.0 - 46.5 %    MCV 76.6 (L) 78.0 - 100.0 fl    MCH 22.8 (L) 26.0 - 34.0 pg    MCHC 29.7 (L) 32.0 - 36.5 g/dL    RDW-CV 16.2 (H) 11.0 - 15.0 %     140 - 450 K/mcL    NRBC 0 <=0 /100 WBC    Neutrophil, Percent 66 %    Lymphocytes, Percent 23 %    Mono, Percent 8 %    Eosinophils, Percent 2 %    Basophils, Percent 1 %    Immature Granulocytes 0 %    Absolute  Neutrophils 4.5 1.8 - 7.7 K/mcL    Absolute Lymphocytes 1.6 1.0 - 4.8 K/mcL    Absolute Monocytes 0.6 0.3 - 0.9 K/mcL    Absolute Eosinophils  0.1 0.1 - 0.5 K/mcL    Absolute Basophils 0.1 0.0 - 0.3 K/mcL    Absolute Immmature Granulocytes 0.0 0.0 - 0.2 K/mcL    RDW-SD 44.6 39.0 - 50.0 fL   Magnesium   Result Value Ref Range    Magnesium 2.0 1.7 - 2.4 mg/dL   Prothrombin Time   Result Value Ref Range    Prothrombin Time 13.3 (H) 9.7 - 11.8 sec    INR 1.3 <=5.0 sec   ISTAT8 VENOUS  POINT OF CARE   Result Value Ref Range    BUN - POINT OF CARE 9 6 - 20 mg/dL    SODIUM - POINT OF CARE 135 135 - 145 mmol/L    POTASSIUM - POINT OF CARE 3.9 3.4 - 5.1 mmol/L    CHLORIDE - POINT OF CARE 99 98 - 107 mmol/L    TCO2 - POINT OF CARE 25 (H) 19 - 24 mmol/L    ANION GAP - POINT OF CARE 16 10 - 20 mmol/L    HEMATOCRIT - POINT OF CARE 34.0 (L) 36.0 - 46.5 %    HEMOGLOBIN - POINT OF CARE 11.6 (L) 12.0 - 15.5 g/dL    GLUCOSE - POINT OF CARE 84 70 - 99 mg/dL    CALCIUM, IONIZED - POINT OF CARE 1.14 (L) 1.15 - 1.29 mmol/L    Creatinine 0.50 (L) 0.51 - 0.95 mg/dL    Glomerular Filtration Rate >90 >90 mL/min/1.73m2   ISTAT BETA HCG - POINT OF CARE   Result Value Ref Range    ISTAT BETA HCG - POINT OF CARE <5.0 <5.0 IU/L       EKG Results     Radiology Results     Imaging Results          US Liver W Doppler Complete (Final result)  Result time 09/23/20 20:50:47    Final result                 Impression:    IMPRESSION:   1. Patent hepatic vasculature with normal directional flow. There is  increased pulsatility within the portal venous system. There are low  resistance pattern arterial waveforms with late upstrokes which can be seen  with proximal stenosis and/or distal shunting.  2. Diffusely heterogeneous liver, similar to the prior examination.             Narrative:    EXAM:  US LIVER W DOPPLER COMPLETE    INDICATION: r/o clot, abd pain    TECHNIQUE:  Grayscale, color Doppler and spectral Doppler ultrasound of the  liver were  performed.    COMPARISON: Ultrasound liver 9/17/2020.    FINDINGS:    GRAYSCALE FINDINGS:   There is diffuse heterogeneity of the thyroid parenchymal echotexture. No  discrete mass is identified. No biliary ductal dilation is identified. The  liver measures 13.5 cm.    HEPATIC COLOR FLOW AND SPECTRAL DOPPLER FINDINGS:   There is monophasic hepatopetal flow in the main, right and left portal  veins. There is increased portal venous pulsatility. The proper, right and  left hepatic arteries are patent. The arterial upstrokes are delayed. There  is a low resistance arterial flow pattern. The hepatic veins and  intrahepatic IVC are patent.    HEPATIC SPECTRAL DOPPLER MEASUREMENTS:  Main portal vein velocity: 55 cm/sec.  Right portal vein velocity: 47 cm/sec.  Left portal vein velocity: 31 cm/sec.  Main hepatic artery peak systolic velocity: 176 cm/sec.    GALLBLADDER: Unremarkable.    COMMON BILE DUCT: 2 mm.    PANCREAS: Limited views of the head are unremarkable.    RIGHT KIDNEY: Survey images demonstrate no hydronephrosis.    OTHER: No ascites is identified.                                ED Medication Orders (From admission, onward)    Ordered Start     Status Ordering Provider    09/23/20 1710 09/23/20 1709  morphine injection 4 mg  EVERY 30 MIN PRN      Last MAR action: Given DAMIEN LAYNE    09/23/20 1710 09/23/20 1711  sodium chloride (NORMAL SALINE) 0.9 % bolus 1,000 mL  ONCE      Last MAR action: Completed DAMIEN LAYNE    09/23/20 1710 09/23/20 1711  ondansetron (ZOFRAN) injection 4 mg  ONCE      Last MAR action: Given DAMIEN LAYNE        Initial impression:  The patient presents to the ED with generalized abdominal pain and weakness.  She has history of AVMs of the liver.  We discussed agreed on plan to evaluate with labs and possible imaging.  Will provide her with pain medication, IV fluids, and antiemetics.  Will consult with Transplant.  Patient agreeable.  All questions answered.     6:28 PM  I spoke with CONNOR Zazueta with liver transplant, regarding this patient.  We agreed on plan of care.  She will discussed patient's case with Dr. Wadsworth.    6:30 PM I rechecked the patient.  I updated her on the results of her labs.  I discussed options regarding plan of care.  Patient reports that she would like to go home.      6:32 PM I spoke with CONNOR Zazueta with liver transplant, again regarding patient.  She informed me that Dr. Wadsworth recommends a liver Doppler ultrasound.  That this is normal, the patient may go home.  Will update Carlita on these test results.    9:02 PM I discussed patient's ultrasound findings with CONNOR Zazueta liver transplant.  We agreed on plan of care.    9:03 PM I rechecked the patient.  I updated her on her ultrasound findings.  I discussed plan of care.  The patient remains comfortable to plan to go home.  I will again prescriber short course of pain medication.  She should follow-up liver transplant as soon as possible.  Discussed ED return precautions.  Patient expresses her understanding.  All questions answered.    MDM    Clinical Impression     ED Diagnosis   1. Generalized abdominal pain     2. Nausea     3. Weakness generalized         Disposition        Discharge 9/23/2020  9:02 PM  Maryellen Olivares discharge to home/self care.                         Diandra Renee PA-C  09/23/20 [id number]

## 2020-10-11 NOTE — ASSESSMENT
[FreeTextEntry1] : Stress induced cardiomyopathy (septic shock).\par LV recovery.  No clinical CHF.\par \par BP mildly elevated off Rx.\par \par ECHO (1/7/20): nL LV size.  EF 50-55%.\par ECHO (4/12/18): nL LVSF. Trace MR. Mild TR. \par Adenosine NST (6/6/18): Mild breast attenuation artifact. No ischemia / infarction. nL LVSF.

## 2020-10-11 NOTE — DISCUSSION/SUMMARY
[FreeTextEntry1] : ECHO 1-year.\par Weight loss\par Monitor BP.  Resume beta-blocker if Rx again indicated.\par Reg PMD follow-up.\par Follow-up 1-year or prn.\par \par Stress induced cardiomyopathy again discussed.  LV recovery confers good prognosis.  Chance of recurrence with another systemic stress is uncertain, but likely greater compared to general population.  Uncertain if continued beta-blocker is beneficial in absence of another indication.  Will continue ECHO / clinical monitoring.

## 2020-10-11 NOTE — REASON FOR VISIT
[Follow-Up - Clinic] : a clinic follow-up of [Cardiomyopathy] : cardiomyopathy [Hypertension] : hypertension [FreeTextEntry1] : Feels well.\par \par Healing wounds.  Following with Dr. Artis.  Coping with prosthetics.\par \par Gained 10-lbs.\par \par No chest pain.  Breathing comfortable.  No palpitations, lightheadedness, syncope.\par \par ECHO (1/7/20): nL LV size.  EF 50-55%.

## 2020-11-06 ENCOUNTER — APPOINTMENT (OUTPATIENT)
Dept: CARDIOLOGY | Facility: CLINIC | Age: 45
End: 2020-11-06

## 2020-11-06 NOTE — PATIENT PROFILE ADULT - FALL HARM RISK
[facility]  Hospitalist Progress Note  Antonio Dunn FAINA SNOWDEN MD                 other

## 2021-01-01 NOTE — PATIENT PROFILE ADULT. - AS SC BRADEN MOISTURE
(4) rarely moist
Yes, the patient is being discharged from [facility]...

## 2021-01-22 NOTE — DISCHARGE NOTE ADULT - CONDITION (STATED IN TERMS THAT PERMIT A SPECIFIC MEASURABLE COMPARISON WITH CONDITION ON ADMISSION):
stable. Bactrim Pregnancy And Lactation Text: This medication is Pregnancy Category D and is known to cause fetal risk.  It is also excreted in breast milk.

## 2021-06-06 NOTE — DISCHARGE NOTE ADULT - NS AS ACTIVITY OBS
Showering allowed No evidence of rupture of membranes or  labor at this time. Discussed findings with Dr. Flower. Pt. d/c'd home. Pt. to follow up with next OB appointment. Pt. instructed to return to triage with increase abdominal contractions, leakage of fluid, vaginal bleeding or decrease fetal movement. Increase PO hydration encouraged.

## 2021-06-25 NOTE — ASU PREOP CHECKLIST - STERILIZATION AFFIRMATION
Spoke with patient regarding her recent lab results.  She would like to have her labs redrawn again in 2 weeks to see if her platelets are still elevated and at that time if they are, she will follow-up with hematology.  We did discuss her ongoing right arm pain, I did offer her a referral order today for EMG but she elected to wait and see how things go over the next 2 weeks.  She is requesting to have her lab results faxed to her today now that we have discussed them.  She will make an appointment for lab only in 2 weeks and we will see how things looks at that time. Lab results faxed to requested number.   
n/a

## 2021-07-08 ENCOUNTER — OUTPATIENT (OUTPATIENT)
Dept: OUTPATIENT SERVICES | Facility: HOSPITAL | Age: 46
LOS: 1 days | Discharge: HOME | End: 2021-07-08

## 2021-07-08 ENCOUNTER — APPOINTMENT (OUTPATIENT)
Dept: BURN CARE | Facility: CLINIC | Age: 46
End: 2021-07-08
Payer: COMMERCIAL

## 2021-07-08 ENCOUNTER — LABORATORY RESULT (OUTPATIENT)
Age: 46
End: 2021-07-08

## 2021-07-08 DIAGNOSIS — Z98.890 OTHER SPECIFIED POSTPROCEDURAL STATES: Chronic | ICD-10-CM

## 2021-07-08 DIAGNOSIS — S91.309A UNSPECIFIED OPEN WOUND, UNSPECIFIED FOOT, INITIAL ENCOUNTER: Chronic | ICD-10-CM

## 2021-07-08 DIAGNOSIS — Z89.9 ACQUIRED ABSENCE OF LIMB, UNSPECIFIED: Chronic | ICD-10-CM

## 2021-07-08 DIAGNOSIS — S68.119A COMPLETE TRAUMATIC METACARPOPHALANGEAL AMPUTATION OF UNSPECIFIED FINGER, INITIAL ENCOUNTER: Chronic | ICD-10-CM

## 2021-07-08 DIAGNOSIS — Z98.84 BARIATRIC SURGERY STATUS: Chronic | ICD-10-CM

## 2021-07-08 PROCEDURE — 99213 OFFICE O/P EST LOW 20 MIN: CPT

## 2021-07-28 NOTE — DISCHARGE NOTE ADULT - NS AS DC DISCHARGE ACCOMPANY
7/28/2021         RE: Yong Guillermo  [address]  [facility] [address]        Dear Colleague,    Thank you for referring your patient, Yong Guillermo, to the [facility]. Please see a copy of my visit note below.    Type of service:  telephone   30 minutes     Assessment: Yong is on phone follow up diabetes education.   He has had type 2 diabetes for 10+years.  Currently taking metformin 1000 mg bid, 25 mg Jardiance once daily, and 100 mg Januvia once daily and 12 units Lantus insulin once daily between 9-11 pm.   He has been very careful with food choices: eating 3 times daily, small portion of carbs, avoiding sweets and sweet beverages.  He reports his weight is currently 145 lbs on home scale.  He doesn't have regular routine with activity other than yoga.   He does spend time in wood working shop.      BF:  homemade granola/ fruit/milk  or egg dish/vegetables   12-1  Lunch:  sandwich, fruit, cucumber       7 pm  dinner:  B rice, meat/vegetables         bedtime:  peanuts, or sm ice cream bar, or yogurt blueberries  likes fruit      FBS:   127, 149, 130, 129, 127, 130, 131 mg/dl  2 hr after meals:  129, 148 , 151, 156, 216 (ice cream),     Plan:  To continue current diabetes meds.  Reviewed signs/treatment for hypoglycemia.  Discussed nutrition options; encouraged him to increase snacks during day that are heart healthy.     Reviewed BG target. Discussed diabetes ABC's, foot care, dental, skin, and eye care.     To call questions/concerns.   Has appt with Dr. Pineda Sept 2021, due for Alc at that time.       1)  follow carb guidelines- complete  2) increase activity- 3 days per week, 30 minutes- sometimes, did start yoga twice weekly- complete  3) stop to eat lunch- complete     Subjective and Objective:       Yong Guillermo is referred by Dr. Watson for Diabetes Education.                Lab Results   Component Value Date     HGBA1C 10.4 (H) 01/28/2021       Component      Latest Ref Rng & Units 10/30/2018 5/9/2019 11/1/2019 8/11/2020   Hemoglobin A1c      <=5.6 % 7.6 (H) 7.7 (H) 7.9 (H) 7.6 (H)      Component      Latest Ref Rng & Units 1/28/2021   Hemoglobin A1c      <=5.6 % 10.4 (H)      Alc  7.1%   6/2021        Education:      Monitoring   Meter (per above goals): assessment, discussed, pt returned demonstration,  competent  Monitoring: assessment, discussed, pt returned demonstration, literature provided   BG goals: assessment, discussed, pt returned demonstration, literature provided        Nutrition Management:  assessment, discussed, pt returned demo,  literature provided   Weight:assessment, discussed, pt returned demo,  literature provided   Portions/Balance: assessment, discussed, pt returned demo,  literature provided   Carb ID/Count: assessment, discussed, pt returned demo,  literature provided   Label Reading: assessment, discussed, pt returned demo,  literature provided   Heart Healthy Fats:assessment, discussed, pt returned demo,  literature provided - discussed ideas for heatlhy healthy snacks  Menu Planning: assessment, discussed, pt returned demo,  literature provided   Dining Out: assessment, discussed,  literature provided   Physical Activity: assessment, discussed,  literature provided   Medications: assessment, discussed  Orals: assessment, discussed  Injected Medications: Assessed and Discussed - emailed instruction sheet     Diabetes Disease Process: Discussed     Acute Complications: Prevent, Detect, Treat:  Hypoglycemia: Assessed and Discussed  Hyperglycemia: Discussed  Sick Days: Literature provided     Chronic Complications  Foot Care, skin, eye: literature provided, discussed, reviewed  ABC: Literature provideddiscussed, reviewed  Teeth:Literature provided, discussed, reviewed  Goal Setting and Problem Solving: Discussed  Barriers: Discussed  Psychosocial Adjustments: Discussed        Time spent with the patient: 30 minutes for  diabetes education and counseling.-- MNT   Previous Education: yes  Visit Type:  DSMT              Family

## 2021-08-12 ENCOUNTER — APPOINTMENT (OUTPATIENT)
Dept: BURN CARE | Facility: CLINIC | Age: 46
End: 2021-08-12
Payer: COMMERCIAL

## 2021-08-12 ENCOUNTER — OUTPATIENT (OUTPATIENT)
Dept: OUTPATIENT SERVICES | Facility: HOSPITAL | Age: 46
LOS: 1 days | Discharge: HOME | End: 2021-08-12

## 2021-08-12 DIAGNOSIS — Z89.9 ACQUIRED ABSENCE OF LIMB, UNSPECIFIED: Chronic | ICD-10-CM

## 2021-08-12 DIAGNOSIS — Z98.890 OTHER SPECIFIED POSTPROCEDURAL STATES: Chronic | ICD-10-CM

## 2021-08-12 DIAGNOSIS — S91.309A UNSPECIFIED OPEN WOUND, UNSPECIFIED FOOT, INITIAL ENCOUNTER: Chronic | ICD-10-CM

## 2021-08-12 DIAGNOSIS — S68.119A COMPLETE TRAUMATIC METACARPOPHALANGEAL AMPUTATION OF UNSPECIFIED FINGER, INITIAL ENCOUNTER: Chronic | ICD-10-CM

## 2021-08-12 DIAGNOSIS — Z98.84 BARIATRIC SURGERY STATUS: Chronic | ICD-10-CM

## 2021-08-12 PROCEDURE — 99213 OFFICE O/P EST LOW 20 MIN: CPT

## 2021-08-15 LAB — BACTERIA SPEC CULT: ABNORMAL

## 2021-08-17 ENCOUNTER — OUTPATIENT (OUTPATIENT)
Dept: OUTPATIENT SERVICES | Facility: HOSPITAL | Age: 46
LOS: 1 days | Discharge: HOME | End: 2021-08-17
Payer: COMMERCIAL

## 2021-08-17 ENCOUNTER — NON-APPOINTMENT (OUTPATIENT)
Age: 46
End: 2021-08-17

## 2021-08-17 VITALS
HEIGHT: 68 IN | TEMPERATURE: 98 F | SYSTOLIC BLOOD PRESSURE: 123 MMHG | OXYGEN SATURATION: 98 % | RESPIRATION RATE: 18 BRPM | HEART RATE: 84 BPM | DIASTOLIC BLOOD PRESSURE: 75 MMHG

## 2021-08-17 DIAGNOSIS — S68.119A COMPLETE TRAUMATIC METACARPOPHALANGEAL AMPUTATION OF UNSPECIFIED FINGER, INITIAL ENCOUNTER: Chronic | ICD-10-CM

## 2021-08-17 DIAGNOSIS — Z98.891 HISTORY OF UTERINE SCAR FROM PREVIOUS SURGERY: Chronic | ICD-10-CM

## 2021-08-17 DIAGNOSIS — Z98.890 OTHER SPECIFIED POSTPROCEDURAL STATES: Chronic | ICD-10-CM

## 2021-08-17 DIAGNOSIS — L02.91 CUTANEOUS ABSCESS, UNSPECIFIED: ICD-10-CM

## 2021-08-17 DIAGNOSIS — Z89.9 ACQUIRED ABSENCE OF LIMB, UNSPECIFIED: Chronic | ICD-10-CM

## 2021-08-17 DIAGNOSIS — Z86.03 PERSONAL HISTORY OF NEOPLASM OF UNCERTAIN BEHAVIOR: Chronic | ICD-10-CM

## 2021-08-17 DIAGNOSIS — S91.309A UNSPECIFIED OPEN WOUND, UNSPECIFIED FOOT, INITIAL ENCOUNTER: Chronic | ICD-10-CM

## 2021-08-17 DIAGNOSIS — Z01.818 ENCOUNTER FOR OTHER PREPROCEDURAL EXAMINATION: ICD-10-CM

## 2021-08-17 DIAGNOSIS — Z98.84 BARIATRIC SURGERY STATUS: Chronic | ICD-10-CM

## 2021-08-17 LAB
ALBUMIN SERPL ELPH-MCNC: 4.1 G/DL — SIGNIFICANT CHANGE UP (ref 3.5–5.2)
ALP SERPL-CCNC: 99 U/L — SIGNIFICANT CHANGE UP (ref 30–115)
ALT FLD-CCNC: 9 U/L — SIGNIFICANT CHANGE UP (ref 0–41)
ANION GAP SERPL CALC-SCNC: 9 MMOL/L — SIGNIFICANT CHANGE UP (ref 7–14)
APTT BLD: 30.7 SEC — SIGNIFICANT CHANGE UP (ref 27–39.2)
AST SERPL-CCNC: 13 U/L — SIGNIFICANT CHANGE UP (ref 0–41)
BASOPHILS # BLD AUTO: 0.05 K/UL — SIGNIFICANT CHANGE UP (ref 0–0.2)
BASOPHILS NFR BLD AUTO: 0.8 % — SIGNIFICANT CHANGE UP (ref 0–1)
BILIRUB SERPL-MCNC: 0.4 MG/DL — SIGNIFICANT CHANGE UP (ref 0.2–1.2)
BUN SERPL-MCNC: 10 MG/DL — SIGNIFICANT CHANGE UP (ref 10–20)
CALCIUM SERPL-MCNC: 8.6 MG/DL — SIGNIFICANT CHANGE UP (ref 8.5–10.1)
CHLORIDE SERPL-SCNC: 106 MMOL/L — SIGNIFICANT CHANGE UP (ref 98–110)
CO2 SERPL-SCNC: 24 MMOL/L — SIGNIFICANT CHANGE UP (ref 17–32)
CREAT SERPL-MCNC: 0.8 MG/DL — SIGNIFICANT CHANGE UP (ref 0.7–1.5)
EOSINOPHIL # BLD AUTO: 0.08 K/UL — SIGNIFICANT CHANGE UP (ref 0–0.7)
EOSINOPHIL NFR BLD AUTO: 1.2 % — SIGNIFICANT CHANGE UP (ref 0–8)
GLUCOSE SERPL-MCNC: 75 MG/DL — SIGNIFICANT CHANGE UP (ref 70–99)
HCT VFR BLD CALC: 33.6 % — LOW (ref 37–47)
HGB BLD-MCNC: 9.7 G/DL — LOW (ref 12–16)
IMM GRANULOCYTES NFR BLD AUTO: 0.3 % — SIGNIFICANT CHANGE UP (ref 0.1–0.3)
INR BLD: 0.97 RATIO — SIGNIFICANT CHANGE UP (ref 0.65–1.3)
LYMPHOCYTES # BLD AUTO: 1.99 K/UL — SIGNIFICANT CHANGE UP (ref 1.2–3.4)
LYMPHOCYTES # BLD AUTO: 30.9 % — SIGNIFICANT CHANGE UP (ref 20.5–51.1)
MCHC RBC-ENTMCNC: 20.4 PG — LOW (ref 27–31)
MCHC RBC-ENTMCNC: 28.9 G/DL — LOW (ref 32–37)
MCV RBC AUTO: 70.7 FL — LOW (ref 81–99)
MONOCYTES # BLD AUTO: 0.58 K/UL — SIGNIFICANT CHANGE UP (ref 0.1–0.6)
MONOCYTES NFR BLD AUTO: 9 % — SIGNIFICANT CHANGE UP (ref 1.7–9.3)
NEUTROPHILS # BLD AUTO: 3.73 K/UL — SIGNIFICANT CHANGE UP (ref 1.4–6.5)
NEUTROPHILS NFR BLD AUTO: 57.8 % — SIGNIFICANT CHANGE UP (ref 42.2–75.2)
NRBC # BLD: 0 /100 WBCS — SIGNIFICANT CHANGE UP (ref 0–0)
PLATELET # BLD AUTO: 250 K/UL — SIGNIFICANT CHANGE UP (ref 130–400)
POTASSIUM SERPL-MCNC: 5 MMOL/L — SIGNIFICANT CHANGE UP (ref 3.5–5)
POTASSIUM SERPL-SCNC: 5 MMOL/L — SIGNIFICANT CHANGE UP (ref 3.5–5)
PROT SERPL-MCNC: 6.6 G/DL — SIGNIFICANT CHANGE UP (ref 6–8)
PROTHROM AB SERPL-ACNC: 11.2 SEC — SIGNIFICANT CHANGE UP (ref 9.95–12.87)
RBC # BLD: 4.75 M/UL — SIGNIFICANT CHANGE UP (ref 4.2–5.4)
RBC # FLD: 17.7 % — HIGH (ref 11.5–14.5)
SODIUM SERPL-SCNC: 139 MMOL/L — SIGNIFICANT CHANGE UP (ref 135–146)
WBC # BLD: 6.45 K/UL — SIGNIFICANT CHANGE UP (ref 4.8–10.8)
WBC # FLD AUTO: 6.45 K/UL — SIGNIFICANT CHANGE UP (ref 4.8–10.8)

## 2021-08-17 PROCEDURE — 93010 ELECTROCARDIOGRAM REPORT: CPT

## 2021-08-17 RX ORDER — ERGOCALCIFEROL 1.25 MG/1
1 CAPSULE ORAL
Qty: 0 | Refills: 0 | DISCHARGE

## 2021-08-17 NOTE — H&P PST ADULT - NSICDXPASTMEDICALHX_GEN_ALL_CORE_FT
PAST MEDICAL HISTORY:  ARDS survivor     Cardiomyopathy RESOLVED    DVT (deep venous thrombosis) LEFT UE D/T PICC LINE 12/18    Gangrene of finger of left hand     Gangrene of finger of right hand     Gangrene of lower extremity     HTN (hypertension) RESOLVED    Osteoarthritis     Osteomyelitis     Sepsis D/T MULTI-SYSTEM ORGAN FAILURE    Sleep apnea NO CPAP    Small bowel obstruction S/P 2017 S/P RYGB    Wound of right foot

## 2021-08-17 NOTE — H&P PST ADULT - NSICDXPASTSURGICALHX_GEN_ALL_CORE_FT
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

## 2021-08-17 NOTE — H&P PST ADULT - HISTORY OF PRESENT ILLNESS
45 y/o female presents to PAST in preparation for debridement of osteomyleittis of right foot with Dr. Artis in [facility] under [place] on 8/23/21    Pt states that following having a gastric bypass surgery in 2017 she had developed infections that required her to get amputations of both feet and hands. Pt states that she had developed an ulcer on her right heal that was not healing. It was recommended to have above procedure to debride wound.   PATIENT CURRENTLY DENIES CHEST PAIN  SHORTNESS OF BREATH  PALPITATIONS,  DYSURIA, OR UPPER RESPIRATORY INFECTION IN PAST 2 WEEKS  EXERCISE  TOLERANCE: pt is unable to walk, wheelchair bound due to both feet being amputated  pt denies any covid s/s, or tested positive in the past, vaccinated 5/21 Moderna  pt advised self quarantine till day of procedure  Patient verbalized understanding of instructions and was given the opportunity to ask questions and have them answered.  As per patient, this is their complete medical and surgical history, including medications both prescribed or over the counter.    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  NO--Prior Anesthesia Complication  NO--Latex Allergy  NO--Loose teeth  NO--History of Rheumatoid Arthritis  Yes--PANDA no tx   NO--Bleeding risk  NO--Other_____    L02.91/97597    Abscess of skin    Encounter for other preprocedural examination    ^L02.91/97597    H/o or current diagnosis of HF- ACEI/ARB contraindication unknown    H/o or current diagnosis of HF- Contraindication to ACEI/ARBs    H/o or current diagnosis of HF- ACEI/ARB contraindication unknown    H/o or current diagnosis of HF- Contraindication to ACEI/ARBs    No pertinent family history in first degree relatives    Family history of cancer (Mother)    Family history of heart disease (Father)    Abscess of skin    Encounter for other preprocedural examination    Gangrene of finger of left hand    Gangrene of finger of right hand    Gangrene of lower extremity    HTN (hypertension)    Sleep apnea    Cardiomyopathy    Osteoarthritis    Small bowel obstruction    Takotsubo cardiomyopathy    Sepsis    ARDS survivor    Osteomyelitis    DVT (deep venous thrombosis)    Cardiomyopathy    HTN (hypertension)    H/O gastric bypass    History of cholecystectomy    Amputation finger    H/O exploratory laparotomy    S/P amputation    Open wound of foot

## 2021-08-17 NOTE — H&P PST ADULT - COMMENTS
unable to obtain weight, pt unable to stand on scale due to feet amputation. As per pt last weight was 190 lbs last weight was 1 yr ago.

## 2021-08-18 ENCOUNTER — APPOINTMENT (OUTPATIENT)
Dept: CARDIOLOGY | Facility: CLINIC | Age: 46
End: 2021-08-18
Payer: COMMERCIAL

## 2021-08-18 PROCEDURE — 93306 TTE W/DOPPLER COMPLETE: CPT

## 2021-08-19 ENCOUNTER — APPOINTMENT (OUTPATIENT)
Dept: CARDIOLOGY | Facility: CLINIC | Age: 46
End: 2021-08-19
Payer: COMMERCIAL

## 2021-08-19 VITALS
BODY MASS INDEX: 30.01 KG/M2 | HEIGHT: 68 IN | TEMPERATURE: 97.3 F | DIASTOLIC BLOOD PRESSURE: 72 MMHG | SYSTOLIC BLOOD PRESSURE: 104 MMHG | WEIGHT: 198 LBS

## 2021-08-19 PROCEDURE — 99214 OFFICE O/P EST MOD 30 MIN: CPT

## 2021-08-19 NOTE — REASON FOR VISIT
[Follow-Up - Clinic] : a clinic follow-up of [Cardiomyopathy] : cardiomyopathy [Hypertension] : hypertension [FreeTextEntry1] : Feels well.\par \par Scheduled for repeat heel debridement.  Plan for hyperbaric O2.  If insufficient healing will need amputation.\par \par Not waking (limited by heel pain with brace).  Gained another 7-lbs.\par \par Breathing comfortable.\par \par EKG (PAST 8/17/21): NSR 87\par ECHO (8/18/21): nL chambers / biventricular function.  Mild TR.

## 2021-08-19 NOTE — ASSESSMENT
[FreeTextEntry1] : Stress induced cardiomyopathy (septic shock).\par LV recovery.  No clinical CHF.\par \par Normotensive off Rx.\par \par ECHO (8/18/21): nL chambers / biventricular function.  Mild TR.\par ECHO (1/7/20): nL LV size.  EF 50-55%.\par ECHO (4/12/18): nL LVSF. Trace MR. Mild TR. \par Adenosine NST (6/6/18): Mild breast attenuation artifact. No ischemia / infarction. nL LVSF.\par \par Intermediate risk patient for perioperative cardiac events (RCRI = 1).\par Low risk procedure.\par No cardiac decompensation.\par Risk of recurrent stress cardiomyopathy with debridement seems low.  Interim procedures without event.

## 2021-08-19 NOTE — DISCUSSION/SUMMARY
[FreeTextEntry1] : No further cardiac testing required prior to above procedure.\par Monitor BP.\par Needs PMD (referred to Dr. Aguilar).\par Follow-up 1-year or prn.

## 2021-08-20 ENCOUNTER — OUTPATIENT (OUTPATIENT)
Dept: OUTPATIENT SERVICES | Facility: HOSPITAL | Age: 46
LOS: 1 days | Discharge: HOME | End: 2021-08-20

## 2021-08-20 ENCOUNTER — LABORATORY RESULT (OUTPATIENT)
Age: 46
End: 2021-08-20

## 2021-08-20 DIAGNOSIS — Z98.84 BARIATRIC SURGERY STATUS: Chronic | ICD-10-CM

## 2021-08-20 DIAGNOSIS — S68.119A COMPLETE TRAUMATIC METACARPOPHALANGEAL AMPUTATION OF UNSPECIFIED FINGER, INITIAL ENCOUNTER: Chronic | ICD-10-CM

## 2021-08-20 DIAGNOSIS — S91.309A UNSPECIFIED OPEN WOUND, UNSPECIFIED FOOT, INITIAL ENCOUNTER: Chronic | ICD-10-CM

## 2021-08-20 DIAGNOSIS — Z98.890 OTHER SPECIFIED POSTPROCEDURAL STATES: Chronic | ICD-10-CM

## 2021-08-20 DIAGNOSIS — Z86.03 PERSONAL HISTORY OF NEOPLASM OF UNCERTAIN BEHAVIOR: Chronic | ICD-10-CM

## 2021-08-20 DIAGNOSIS — Z98.891 HISTORY OF UTERINE SCAR FROM PREVIOUS SURGERY: Chronic | ICD-10-CM

## 2021-08-20 DIAGNOSIS — Z11.59 ENCOUNTER FOR SCREENING FOR OTHER VIRAL DISEASES: ICD-10-CM

## 2021-08-20 DIAGNOSIS — Z89.9 ACQUIRED ABSENCE OF LIMB, UNSPECIFIED: Chronic | ICD-10-CM

## 2021-08-20 PROBLEM — S91.301A UNSPECIFIED OPEN WOUND, RIGHT FOOT, INITIAL ENCOUNTER: Chronic | Status: ACTIVE | Noted: 2021-08-17

## 2021-09-07 ENCOUNTER — OUTPATIENT (OUTPATIENT)
Dept: OUTPATIENT SERVICES | Facility: HOSPITAL | Age: 46
LOS: 1 days | Discharge: HOME | End: 2021-09-07

## 2021-09-07 DIAGNOSIS — Z89.9 ACQUIRED ABSENCE OF LIMB, UNSPECIFIED: Chronic | ICD-10-CM

## 2021-09-07 DIAGNOSIS — S91.309A UNSPECIFIED OPEN WOUND, UNSPECIFIED FOOT, INITIAL ENCOUNTER: Chronic | ICD-10-CM

## 2021-09-07 DIAGNOSIS — Z98.890 OTHER SPECIFIED POSTPROCEDURAL STATES: Chronic | ICD-10-CM

## 2021-09-07 DIAGNOSIS — Z86.03 PERSONAL HISTORY OF NEOPLASM OF UNCERTAIN BEHAVIOR: Chronic | ICD-10-CM

## 2021-09-07 DIAGNOSIS — Z11.59 ENCOUNTER FOR SCREENING FOR OTHER VIRAL DISEASES: ICD-10-CM

## 2021-09-07 DIAGNOSIS — Z98.891 HISTORY OF UTERINE SCAR FROM PREVIOUS SURGERY: Chronic | ICD-10-CM

## 2021-09-07 DIAGNOSIS — S68.119A COMPLETE TRAUMATIC METACARPOPHALANGEAL AMPUTATION OF UNSPECIFIED FINGER, INITIAL ENCOUNTER: Chronic | ICD-10-CM

## 2021-09-07 DIAGNOSIS — Z98.84 BARIATRIC SURGERY STATUS: Chronic | ICD-10-CM

## 2021-09-09 ENCOUNTER — APPOINTMENT (OUTPATIENT)
Dept: BURN CARE | Facility: CLINIC | Age: 46
End: 2021-09-09

## 2021-09-10 ENCOUNTER — RESULT REVIEW (OUTPATIENT)
Age: 46
End: 2021-09-10

## 2021-09-10 ENCOUNTER — INPATIENT (INPATIENT)
Facility: HOSPITAL | Age: 46
LOS: 5 days | Discharge: HOME HEALTH | End: 2021-09-16
Attending: PLASTIC SURGERY | Admitting: PLASTIC SURGERY
Payer: COMMERCIAL

## 2021-09-10 VITALS
HEART RATE: 83 BPM | TEMPERATURE: 98 F | HEIGHT: 68 IN | RESPIRATION RATE: 16 BRPM | DIASTOLIC BLOOD PRESSURE: 73 MMHG | WEIGHT: 190.04 LBS | OXYGEN SATURATION: 95 % | SYSTOLIC BLOOD PRESSURE: 119 MMHG

## 2021-09-10 DIAGNOSIS — Z98.890 OTHER SPECIFIED POSTPROCEDURAL STATES: Chronic | ICD-10-CM
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
## 2021-09-10 DIAGNOSIS — Z98.891 HISTORY OF UTERINE SCAR FROM PREVIOUS SURGERY: Chronic | ICD-10-CM

## 2021-09-10 DIAGNOSIS — Z89.9 ACQUIRED ABSENCE OF LIMB, UNSPECIFIED: Chronic | ICD-10-CM

## 2021-09-10 DIAGNOSIS — S91.309A UNSPECIFIED OPEN WOUND, UNSPECIFIED FOOT, INITIAL ENCOUNTER: Chronic | ICD-10-CM

## 2021-09-10 DIAGNOSIS — Z98.84 BARIATRIC SURGERY STATUS: Chronic | ICD-10-CM

## 2021-09-10 DIAGNOSIS — S68.119A COMPLETE TRAUMATIC METACARPOPHALANGEAL AMPUTATION OF UNSPECIFIED FINGER, INITIAL ENCOUNTER: Chronic | ICD-10-CM

## 2021-09-10 DIAGNOSIS — S91.301A UNSPECIFIED OPEN WOUND, RIGHT FOOT, INITIAL ENCOUNTER: ICD-10-CM

## 2021-09-10 DIAGNOSIS — Z86.03 PERSONAL HISTORY OF NEOPLASM OF UNCERTAIN BEHAVIOR: Chronic | ICD-10-CM

## 2021-09-10 LAB
ANION GAP SERPL CALC-SCNC: 10 MMOL/L — SIGNIFICANT CHANGE UP (ref 7–14)
ANION GAP SERPL CALC-SCNC: 8 MMOL/L — SIGNIFICANT CHANGE UP (ref 7–14)
ANISOCYTOSIS BLD QL: SIGNIFICANT CHANGE UP
BASOPHILS # BLD AUTO: 0 K/UL — SIGNIFICANT CHANGE UP (ref 0–0.2)
BASOPHILS NFR BLD AUTO: 0 % — SIGNIFICANT CHANGE UP (ref 0–1)
BLD GP AB SCN SERPL QL: SIGNIFICANT CHANGE UP
BUN SERPL-MCNC: 11 MG/DL — SIGNIFICANT CHANGE UP (ref 10–20)
BUN SERPL-MCNC: 12 MG/DL — SIGNIFICANT CHANGE UP (ref 10–20)
CALCIUM SERPL-MCNC: 8.1 MG/DL — LOW (ref 8.5–10.1)
CALCIUM SERPL-MCNC: 8.5 MG/DL — SIGNIFICANT CHANGE UP (ref 8.5–10.1)
CHLORIDE SERPL-SCNC: 103 MMOL/L — SIGNIFICANT CHANGE UP (ref 98–110)
CHLORIDE SERPL-SCNC: 106 MMOL/L — SIGNIFICANT CHANGE UP (ref 98–110)
CO2 SERPL-SCNC: 19 MMOL/L — SIGNIFICANT CHANGE UP (ref 17–32)
CO2 SERPL-SCNC: 23 MMOL/L — SIGNIFICANT CHANGE UP (ref 17–32)
CREAT SERPL-MCNC: 0.8 MG/DL — SIGNIFICANT CHANGE UP (ref 0.7–1.5)
CREAT SERPL-MCNC: 0.9 MG/DL — SIGNIFICANT CHANGE UP (ref 0.7–1.5)
EOSINOPHIL # BLD AUTO: 0 K/UL — SIGNIFICANT CHANGE UP (ref 0–0.7)
EOSINOPHIL NFR BLD AUTO: 0 % — SIGNIFICANT CHANGE UP (ref 0–8)
GIANT PLATELETS BLD QL SMEAR: PRESENT — SIGNIFICANT CHANGE UP
GLUCOSE SERPL-MCNC: 154 MG/DL — HIGH (ref 70–99)
GLUCOSE SERPL-MCNC: 159 MG/DL — HIGH (ref 70–99)
HCT VFR BLD CALC: 31.5 % — LOW (ref 37–47)
HCT VFR BLD CALC: 34.7 % — LOW (ref 37–47)
HGB BLD-MCNC: 10 G/DL — LOW (ref 12–16)
HGB BLD-MCNC: 9 G/DL — LOW (ref 12–16)
HYPOCHROMIA BLD QL: SLIGHT — SIGNIFICANT CHANGE UP
LYMPHOCYTES # BLD AUTO: 0.8 K/UL — LOW (ref 1.2–3.4)
LYMPHOCYTES # BLD AUTO: 11.3 % — LOW (ref 20.5–51.1)
MAGNESIUM SERPL-MCNC: 1.9 MG/DL — SIGNIFICANT CHANGE UP (ref 1.8–2.4)
MAGNESIUM SERPL-MCNC: 1.9 MG/DL — SIGNIFICANT CHANGE UP (ref 1.8–2.4)
MANUAL SMEAR VERIFICATION: SIGNIFICANT CHANGE UP
MCHC RBC-ENTMCNC: 21.4 PG — LOW (ref 27–31)
MCHC RBC-ENTMCNC: 21.4 PG — LOW (ref 27–31)
MCHC RBC-ENTMCNC: 28.6 G/DL — LOW (ref 32–37)
MCHC RBC-ENTMCNC: 28.8 G/DL — LOW (ref 32–37)
MCV RBC AUTO: 74.3 FL — LOW (ref 81–99)
MCV RBC AUTO: 75 FL — LOW (ref 81–99)
MICROCYTES BLD QL: SLIGHT — SIGNIFICANT CHANGE UP
MONOCYTES # BLD AUTO: 0.3 K/UL — SIGNIFICANT CHANGE UP (ref 0.1–0.6)
MONOCYTES NFR BLD AUTO: 4.3 % — SIGNIFICANT CHANGE UP (ref 1.7–9.3)
NEUTROPHILS # BLD AUTO: 5.98 K/UL — SIGNIFICANT CHANGE UP (ref 1.4–6.5)
NEUTROPHILS NFR BLD AUTO: 83.5 % — HIGH (ref 42.2–75.2)
NEUTS BAND # BLD: 0.9 % — SIGNIFICANT CHANGE UP (ref 0–6)
NRBC # BLD: 0 /100 WBCS — SIGNIFICANT CHANGE UP (ref 0–0)
PHOSPHATE SERPL-MCNC: 3.5 MG/DL — SIGNIFICANT CHANGE UP (ref 2.1–4.9)
PHOSPHATE SERPL-MCNC: 3.8 MG/DL — SIGNIFICANT CHANGE UP (ref 2.1–4.9)
PLAT MORPH BLD: NORMAL — SIGNIFICANT CHANGE UP
PLATELET # BLD AUTO: 196 K/UL — SIGNIFICANT CHANGE UP (ref 130–400)
PLATELET # BLD AUTO: 246 K/UL — SIGNIFICANT CHANGE UP (ref 130–400)
POIKILOCYTOSIS BLD QL AUTO: SLIGHT — SIGNIFICANT CHANGE UP
POLYCHROMASIA BLD QL SMEAR: SIGNIFICANT CHANGE UP
POTASSIUM SERPL-MCNC: 5.1 MMOL/L — HIGH (ref 3.5–5)
POTASSIUM SERPL-MCNC: 5.8 MMOL/L — HIGH (ref 3.5–5)
POTASSIUM SERPL-SCNC: 5.1 MMOL/L — HIGH (ref 3.5–5)
POTASSIUM SERPL-SCNC: 5.8 MMOL/L — HIGH (ref 3.5–5)
RBC # BLD: 4.2 M/UL — SIGNIFICANT CHANGE UP (ref 4.2–5.4)
RBC # BLD: 4.67 M/UL — SIGNIFICANT CHANGE UP (ref 4.2–5.4)
RBC # FLD: 20.6 % — HIGH (ref 11.5–14.5)
RBC # FLD: 20.6 % — HIGH (ref 11.5–14.5)
RBC BLD AUTO: ABNORMAL
SODIUM SERPL-SCNC: 133 MMOL/L — LOW (ref 135–146)
SODIUM SERPL-SCNC: 136 MMOL/L — SIGNIFICANT CHANGE UP (ref 135–146)
WBC # BLD: 4.59 K/UL — LOW (ref 4.8–10.8)
WBC # BLD: 7.09 K/UL — SIGNIFICANT CHANGE UP (ref 4.8–10.8)
WBC # FLD AUTO: 4.59 K/UL — LOW (ref 4.8–10.8)
WBC # FLD AUTO: 7.09 K/UL — SIGNIFICANT CHANGE UP (ref 4.8–10.8)

## 2021-09-10 PROCEDURE — 99221 1ST HOSP IP/OBS SF/LOW 40: CPT | Mod: 25

## 2021-09-10 PROCEDURE — 11047 DBRDMT BONE EACH ADDL: CPT

## 2021-09-10 PROCEDURE — 11044 DBRDMT BONE 1ST 20 SQ CM/<: CPT

## 2021-09-10 PROCEDURE — 97606 NEG PRS WND THER DME>50 SQCM: CPT

## 2021-09-10 PROCEDURE — 88305 TISSUE EXAM BY PATHOLOGIST: CPT | Mod: 26

## 2021-09-10 PROCEDURE — 88312 SPECIAL STAINS GROUP 1: CPT | Mod: 26

## 2021-09-10 PROCEDURE — 88311 DECALCIFY TISSUE: CPT | Mod: 26

## 2021-09-10 RX ORDER — HYDROMORPHONE HYDROCHLORIDE 2 MG/ML
1 INJECTION INTRAMUSCULAR; INTRAVENOUS; SUBCUTANEOUS ONCE
Refills: 0 | Status: DISCONTINUED | OUTPATIENT
Start: 2021-09-10 | End: 2021-09-10

## 2021-09-10 RX ORDER — SENNA PLUS 8.6 MG/1
2 TABLET ORAL AT BEDTIME
Refills: 0 | Status: DISCONTINUED | OUTPATIENT
Start: 2021-09-10 | End: 2021-09-16

## 2021-09-10 RX ORDER — AMPICILLIN SODIUM AND SULBACTAM SODIUM 250; 125 MG/ML; MG/ML
1.5 INJECTION, POWDER, FOR SUSPENSION INTRAMUSCULAR; INTRAVENOUS ONCE
Refills: 0 | Status: COMPLETED | OUTPATIENT
Start: 2021-09-10 | End: 2021-09-10

## 2021-09-10 RX ORDER — SODIUM ZIRCONIUM CYCLOSILICATE 10 G/10G
10 POWDER, FOR SUSPENSION ORAL ONCE
Refills: 0 | Status: COMPLETED | OUTPATIENT
Start: 2021-09-10 | End: 2021-09-10

## 2021-09-10 RX ORDER — CIPROFLOXACIN LACTATE 400MG/40ML
VIAL (ML) INTRAVENOUS
Refills: 0 | Status: DISCONTINUED | OUTPATIENT
Start: 2021-09-10 | End: 2021-09-13

## 2021-09-10 RX ORDER — MEPERIDINE HYDROCHLORIDE 50 MG/ML
12.5 INJECTION INTRAMUSCULAR; INTRAVENOUS; SUBCUTANEOUS
Refills: 0 | Status: DISCONTINUED | OUTPATIENT
Start: 2021-09-10 | End: 2021-09-10

## 2021-09-10 RX ORDER — HYDROMORPHONE HYDROCHLORIDE 2 MG/ML
1 INJECTION INTRAMUSCULAR; INTRAVENOUS; SUBCUTANEOUS
Refills: 0 | Status: DISCONTINUED | OUTPATIENT
Start: 2021-09-10 | End: 2021-09-10

## 2021-09-10 RX ORDER — AMPICILLIN SODIUM AND SULBACTAM SODIUM 250; 125 MG/ML; MG/ML
INJECTION, POWDER, FOR SUSPENSION INTRAMUSCULAR; INTRAVENOUS
Refills: 0 | Status: DISCONTINUED | OUTPATIENT
Start: 2021-09-10 | End: 2021-09-15

## 2021-09-10 RX ORDER — OXYCODONE AND ACETAMINOPHEN 5; 325 MG/1; MG/1
1 TABLET ORAL EVERY 4 HOURS
Refills: 0 | Status: DISCONTINUED | OUTPATIENT
Start: 2021-09-10 | End: 2021-09-10

## 2021-09-10 RX ORDER — FERROUS SULFATE 325(65) MG
325 TABLET ORAL DAILY
Refills: 0 | Status: DISCONTINUED | OUTPATIENT
Start: 2021-09-10 | End: 2021-09-16

## 2021-09-10 RX ORDER — PANTOPRAZOLE SODIUM 20 MG/1
40 TABLET, DELAYED RELEASE ORAL
Refills: 0 | Status: DISCONTINUED | OUTPATIENT
Start: 2021-09-10 | End: 2021-09-16

## 2021-09-10 RX ORDER — SODIUM CHLORIDE 9 MG/ML
1000 INJECTION INTRAMUSCULAR; INTRAVENOUS; SUBCUTANEOUS
Refills: 0 | Status: DISCONTINUED | OUTPATIENT
Start: 2021-09-10 | End: 2021-09-15

## 2021-09-10 RX ORDER — ONDANSETRON 8 MG/1
4 TABLET, FILM COATED ORAL ONCE
Refills: 0 | Status: COMPLETED | OUTPATIENT
Start: 2021-09-10 | End: 2021-09-10

## 2021-09-10 RX ORDER — AMPICILLIN SODIUM AND SULBACTAM SODIUM 250; 125 MG/ML; MG/ML
1.5 INJECTION, POWDER, FOR SUSPENSION INTRAMUSCULAR; INTRAVENOUS EVERY 6 HOURS
Refills: 0 | Status: DISCONTINUED | OUTPATIENT
Start: 2021-09-10 | End: 2021-09-15

## 2021-09-10 RX ORDER — SODIUM CHLORIDE 9 MG/ML
1000 INJECTION, SOLUTION INTRAVENOUS
Refills: 0 | Status: DISCONTINUED | OUTPATIENT
Start: 2021-09-10 | End: 2021-09-10

## 2021-09-10 RX ORDER — MULTIVIT-MIN/FERROUS GLUCONATE 9 MG/15 ML
1 LIQUID (ML) ORAL
Qty: 0 | Refills: 0 | DISCHARGE

## 2021-09-10 RX ORDER — SACCHAROMYCES BOULARDII 250 MG
250 POWDER IN PACKET (EA) ORAL
Refills: 0 | Status: DISCONTINUED | OUTPATIENT
Start: 2021-09-10 | End: 2021-09-16

## 2021-09-10 RX ORDER — CIPROFLOXACIN LACTATE 400MG/40ML
400 VIAL (ML) INTRAVENOUS ONCE
Refills: 0 | Status: COMPLETED | OUTPATIENT
Start: 2021-09-10 | End: 2021-09-10

## 2021-09-10 RX ORDER — MORPHINE SULFATE 50 MG/1
2 CAPSULE, EXTENDED RELEASE ORAL
Refills: 0 | Status: DISCONTINUED | OUTPATIENT
Start: 2021-09-10 | End: 2021-09-11

## 2021-09-10 RX ORDER — ACETAMINOPHEN 500 MG
650 TABLET ORAL EVERY 6 HOURS
Refills: 0 | Status: DISCONTINUED | OUTPATIENT
Start: 2021-09-10 | End: 2021-09-16

## 2021-09-10 RX ORDER — OXYCODONE AND ACETAMINOPHEN 5; 325 MG/1; MG/1
2 TABLET ORAL EVERY 4 HOURS
Refills: 0 | Status: DISCONTINUED | OUTPATIENT
Start: 2021-09-10 | End: 2021-09-16

## 2021-09-10 RX ORDER — HYDROMORPHONE HYDROCHLORIDE 2 MG/ML
0.5 INJECTION INTRAMUSCULAR; INTRAVENOUS; SUBCUTANEOUS
Refills: 0 | Status: DISCONTINUED | OUTPATIENT
Start: 2021-09-10 | End: 2021-09-10

## 2021-09-10 RX ORDER — CIPROFLOXACIN LACTATE 400MG/40ML
400 VIAL (ML) INTRAVENOUS EVERY 12 HOURS
Refills: 0 | Status: DISCONTINUED | OUTPATIENT
Start: 2021-09-10 | End: 2021-09-13

## 2021-09-10 RX ORDER — MULTIVIT-MIN/FERROUS GLUCONATE 9 MG/15 ML
1 LIQUID (ML) ORAL DAILY
Refills: 0 | Status: DISCONTINUED | OUTPATIENT
Start: 2021-09-10 | End: 2021-09-16

## 2021-09-10 RX ORDER — ENOXAPARIN SODIUM 100 MG/ML
40 INJECTION SUBCUTANEOUS DAILY
Refills: 0 | Status: DISCONTINUED | OUTPATIENT
Start: 2021-09-10 | End: 2021-09-16

## 2021-09-10 RX ADMIN — HYDROMORPHONE HYDROCHLORIDE 1 MILLIGRAM(S): 2 INJECTION INTRAMUSCULAR; INTRAVENOUS; SUBCUTANEOUS at 10:00

## 2021-09-10 RX ADMIN — SODIUM ZIRCONIUM CYCLOSILICATE 10 GRAM(S): 10 POWDER, FOR SUSPENSION ORAL at 13:22

## 2021-09-10 RX ADMIN — PANTOPRAZOLE SODIUM 40 MILLIGRAM(S): 20 TABLET, DELAYED RELEASE ORAL at 12:07

## 2021-09-10 RX ADMIN — OXYCODONE AND ACETAMINOPHEN 1 TABLET(S): 5; 325 TABLET ORAL at 21:42

## 2021-09-10 RX ADMIN — HYDROMORPHONE HYDROCHLORIDE 1 MILLIGRAM(S): 2 INJECTION INTRAMUSCULAR; INTRAVENOUS; SUBCUTANEOUS at 10:15

## 2021-09-10 RX ADMIN — MORPHINE SULFATE 2 MILLIGRAM(S): 50 CAPSULE, EXTENDED RELEASE ORAL at 20:00

## 2021-09-10 RX ADMIN — Medication 200 MILLIGRAM(S): at 10:32

## 2021-09-10 RX ADMIN — AMPICILLIN SODIUM AND SULBACTAM SODIUM 100 GRAM(S): 250; 125 INJECTION, POWDER, FOR SUSPENSION INTRAMUSCULAR; INTRAVENOUS at 11:55

## 2021-09-10 RX ADMIN — MORPHINE SULFATE 2 MILLIGRAM(S): 50 CAPSULE, EXTENDED RELEASE ORAL at 15:20

## 2021-09-10 RX ADMIN — HYDROMORPHONE HYDROCHLORIDE 1 MILLIGRAM(S): 2 INJECTION INTRAMUSCULAR; INTRAVENOUS; SUBCUTANEOUS at 12:26

## 2021-09-10 RX ADMIN — SODIUM CHLORIDE 100 MILLILITER(S): 9 INJECTION, SOLUTION INTRAVENOUS at 10:32

## 2021-09-10 RX ADMIN — MORPHINE SULFATE 2 MILLIGRAM(S): 50 CAPSULE, EXTENDED RELEASE ORAL at 19:31

## 2021-09-10 RX ADMIN — HYDROMORPHONE HYDROCHLORIDE 1 MILLIGRAM(S): 2 INJECTION INTRAMUSCULAR; INTRAVENOUS; SUBCUTANEOUS at 11:55

## 2021-09-10 RX ADMIN — ONDANSETRON 4 MILLIGRAM(S): 8 TABLET, FILM COATED ORAL at 14:54

## 2021-09-10 RX ADMIN — Medication 1 TABLET(S): at 12:07

## 2021-09-10 RX ADMIN — OXYCODONE AND ACETAMINOPHEN 1 TABLET(S): 5; 325 TABLET ORAL at 17:34

## 2021-09-10 RX ADMIN — OXYCODONE AND ACETAMINOPHEN 1 TABLET(S): 5; 325 TABLET ORAL at 12:14

## 2021-09-10 RX ADMIN — MORPHINE SULFATE 2 MILLIGRAM(S): 50 CAPSULE, EXTENDED RELEASE ORAL at 14:54

## 2021-09-10 RX ADMIN — AMPICILLIN SODIUM AND SULBACTAM SODIUM 100 GRAM(S): 250; 125 INJECTION, POWDER, FOR SUSPENSION INTRAMUSCULAR; INTRAVENOUS at 17:31

## 2021-09-10 RX ADMIN — Medication 325 MILLIGRAM(S): at 12:14

## 2021-09-10 RX ADMIN — OXYCODONE AND ACETAMINOPHEN 1 TABLET(S): 5; 325 TABLET ORAL at 20:42

## 2021-09-10 RX ADMIN — HYDROMORPHONE HYDROCHLORIDE 1 MILLIGRAM(S): 2 INJECTION INTRAMUSCULAR; INTRAVENOUS; SUBCUTANEOUS at 09:45

## 2021-09-10 RX ADMIN — HYDROMORPHONE HYDROCHLORIDE 1 MILLIGRAM(S): 2 INJECTION INTRAMUSCULAR; INTRAVENOUS; SUBCUTANEOUS at 09:30

## 2021-09-10 RX ADMIN — SENNA PLUS 2 TABLET(S): 8.6 TABLET ORAL at 21:47

## 2021-09-10 RX ADMIN — HYDROMORPHONE HYDROCHLORIDE 1 MILLIGRAM(S): 2 INJECTION INTRAMUSCULAR; INTRAVENOUS; SUBCUTANEOUS at 23:47

## 2021-09-10 RX ADMIN — Medication 200 MILLIGRAM(S): at 17:32

## 2021-09-10 RX ADMIN — AMPICILLIN SODIUM AND SULBACTAM SODIUM 100 GRAM(S): 250; 125 INJECTION, POWDER, FOR SUSPENSION INTRAMUSCULAR; INTRAVENOUS at 23:27

## 2021-09-10 NOTE — H&P ADULT - PROBLEM SELECTOR PLAN 1
#right foot wound  - admit to burn service: med/surg  - iv abx/ ivf  - pain management  - gi/dvt ppx  - wound care: NPWT, to be changed Mon, Wed, Fri  - pt c/s  - wound culture and bone biopsy taken in OR, f/u results

## 2021-09-10 NOTE — H&P ADULT - ASSESSMENT
43 year old female with pmhx of DVT (LUE 12/2018) no longer on anticoagulation, gastric bypass (10/2017) complicated by obstruction, ARDs and sepsis requiring ICU admission and pressors, which resulted in upper and lower limb ischemia s/p bilateral hand and foot amputations and skin grafts presented from ambulatory for surgery on her right foot stump wound. Patient was taken to OR, debrided, bone biposy sent and is being admitted for IV abx, pain control and wound care. Patient currently has wound vac on.    #right foot wound  - admit to burn service: med/surg  - iv abx/ ivf  - pain management  - gi/dvt ppx  - wound care: NPWT, to be changed Mon, Wed, Fri  - pt c/s  - wound culture and bone biopsy taken in OR, f/u results

## 2021-09-10 NOTE — CHART NOTE - NSCHARTNOTEFT_GEN_A_CORE
PACU ANESTHESIA ADMISSION NOTE      Procedure: R foot OM debridement  Post op diagnosis:  R foot OM    ____  Intubated  TV:______       Rate: ______      FiO2: ______    __x__  Patent Airway    __x__  Full return of protective reflexes    __x__  Full recovery from anesthesia / back to baseline status    Vitals:  T(C): 36.8 (09-10-21 @ 07:16), Max: 36.8 (09-10-21 @ 06:29)  HR: 83 (09-10-21 @ 07:16) (83 - 83)  BP: 119/73 (09-10-21 @ 07:16) (119/73 - 119/73)  RR: 16 (09-10-21 @ 07:16) (16 - 16)  SpO2: 95% (09-10-21 @ 07:16) (95% - 95%)    Mental Status:  __x__ Awake   ___x__ Alert   _____ Drowsy   _____ Sedated    Nausea/Vomiting:  __x__ NO  ______Yes,   See Post - Op Orders          Pain Scale (0-10):  _____    Treatment: ____ None    __x__ See Post - Op/PCA Orders    Post - Operative Fluids:   ____ Oral   __x__ See Post - Op Orders    Plan: Discharge:   ____Home       _x____Floor     _____Critical Care    _____  Other:_________________    Comments: Patient had smooth intraoperative event, no anesthesia complication.  PACU Vital signs: HR:    89        BP:      123  / 74         RR:   18          O2 Sat:  96     %     Temp 97.6

## 2021-09-10 NOTE — H&P ADULT - NSHPLABSRESULTS_GEN_ALL_CORE
Vital Signs Last 24 Hrs  T(C): 36.3 (10 Sep 2021 09:07), Max: 36.8 (10 Sep 2021 06:29)  T(F): 97.4 (10 Sep 2021 09:07), Max: 98.3 (10 Sep 2021 06:29)  HR: 63 (10 Sep 2021 11:30) (62 - 91)  BP: 102/58 (10 Sep 2021 11:30) (102/58 - 128/75)  BP(mean): 74 (10 Sep 2021 11:30) (74 - 96)  RR: 7 (10 Sep 2021 11:30) (7 - 17)  SpO2: 100% (10 Sep 2021 11:30) (84% - 100%)

## 2021-09-10 NOTE — H&P ADULT - NSHPPHYSICALEXAM_GEN_ALL_CORE
GEN: NAD, lying on stretcher, awake & alert  Skin: NPWT to right foot, minimal serosanguinous drainage in canister

## 2021-09-10 NOTE — H&P ADULT - HISTORY OF PRESENT ILLNESS
Patient is a 43 year old female with pmhx of DVT (LUE 12/2018) no longer on anticoagulation, gastric bypass (10/2017) complicated by obstruction, ARDs and sepsis requiring ICU admission and pressors, which resulted in upper and lower limb ischemia s/p bilateral hand and foot amputations and skin grafts presented from ambulatory for surgery on her right foot stump wound. Patient was taken to OR, debrided, bone biposy sent and is being admitted for IV abx, pain control and wound care. Patient currently has wound vac on. Denies any trouble breathing, chest pain, fever, nausea or vomiting.

## 2021-09-10 NOTE — ASU PATIENT PROFILE, ADULT - TEACHING/LEARNING DEVELOPMENTAL CONSIDERATIONS
"Chief Complaint   Patient presents with     Physical       Initial /73  Pulse 75  Temp 98.8  F (37.1  C) (Tympanic)  Ht 5' 4.5\" (1.638 m)  Wt 156 lb 3.2 oz (70.9 kg)  SpO2 99%  BMI 26.4 kg/m2 Estimated body mass index is 26.4 kg/(m^2) as calculated from the following:    Height as of this encounter: 5' 4.5\" (1.638 m).    Weight as of this encounter: 156 lb 3.2 oz (70.9 kg).  Medication Reconciliation: complete     Selina Burrows CMA      "
none

## 2021-09-11 LAB
ANION GAP SERPL CALC-SCNC: 7 MMOL/L — SIGNIFICANT CHANGE UP (ref 7–14)
BASOPHILS # BLD AUTO: 0.03 K/UL — SIGNIFICANT CHANGE UP (ref 0–0.2)
BASOPHILS NFR BLD AUTO: 0.3 % — SIGNIFICANT CHANGE UP (ref 0–1)
BUN SERPL-MCNC: 8 MG/DL — LOW (ref 10–20)
CALCIUM SERPL-MCNC: 8.1 MG/DL — LOW (ref 8.5–10.1)
CHLORIDE SERPL-SCNC: 105 MMOL/L — SIGNIFICANT CHANGE UP (ref 98–110)
CO2 SERPL-SCNC: 25 MMOL/L — SIGNIFICANT CHANGE UP (ref 17–32)
COVID-19 SPIKE DOMAIN AB INTERP: POSITIVE
COVID-19 SPIKE DOMAIN ANTIBODY RESULT: >250 U/ML — HIGH
CREAT SERPL-MCNC: 0.9 MG/DL — SIGNIFICANT CHANGE UP (ref 0.7–1.5)
EOSINOPHIL # BLD AUTO: 0.04 K/UL — SIGNIFICANT CHANGE UP (ref 0–0.7)
EOSINOPHIL NFR BLD AUTO: 0.5 % — SIGNIFICANT CHANGE UP (ref 0–8)
GLUCOSE SERPL-MCNC: 98 MG/DL — SIGNIFICANT CHANGE UP (ref 70–99)
GRAM STN FLD: SIGNIFICANT CHANGE UP
HCT VFR BLD CALC: 31.7 % — LOW (ref 37–47)
HGB BLD-MCNC: 9.1 G/DL — LOW (ref 12–16)
IMM GRANULOCYTES NFR BLD AUTO: 0.3 % — SIGNIFICANT CHANGE UP (ref 0.1–0.3)
LYMPHOCYTES # BLD AUTO: 2.15 K/UL — SIGNIFICANT CHANGE UP (ref 1.2–3.4)
LYMPHOCYTES # BLD AUTO: 24.7 % — SIGNIFICANT CHANGE UP (ref 20.5–51.1)
MAGNESIUM SERPL-MCNC: 1.9 MG/DL — SIGNIFICANT CHANGE UP (ref 1.8–2.4)
MCHC RBC-ENTMCNC: 21.5 PG — LOW (ref 27–31)
MCHC RBC-ENTMCNC: 28.7 G/DL — LOW (ref 32–37)
MCV RBC AUTO: 74.8 FL — LOW (ref 81–99)
MONOCYTES # BLD AUTO: 0.88 K/UL — HIGH (ref 0.1–0.6)
MONOCYTES NFR BLD AUTO: 10.1 % — HIGH (ref 1.7–9.3)
NEUTROPHILS # BLD AUTO: 5.56 K/UL — SIGNIFICANT CHANGE UP (ref 1.4–6.5)
NEUTROPHILS NFR BLD AUTO: 64.1 % — SIGNIFICANT CHANGE UP (ref 42.2–75.2)
NRBC # BLD: 0 /100 WBCS — SIGNIFICANT CHANGE UP (ref 0–0)
PHOSPHATE SERPL-MCNC: 3.5 MG/DL — SIGNIFICANT CHANGE UP (ref 2.1–4.9)
PLATELET # BLD AUTO: 232 K/UL — SIGNIFICANT CHANGE UP (ref 130–400)
POTASSIUM SERPL-MCNC: 4.1 MMOL/L — SIGNIFICANT CHANGE UP (ref 3.5–5)
POTASSIUM SERPL-SCNC: 4.1 MMOL/L — SIGNIFICANT CHANGE UP (ref 3.5–5)
RBC # BLD: 4.24 M/UL — SIGNIFICANT CHANGE UP (ref 4.2–5.4)
RBC # FLD: 21.2 % — HIGH (ref 11.5–14.5)
SARS-COV-2 IGG+IGM SERPL QL IA: >250 U/ML — HIGH
SARS-COV-2 IGG+IGM SERPL QL IA: POSITIVE
SODIUM SERPL-SCNC: 137 MMOL/L — SIGNIFICANT CHANGE UP (ref 135–146)
SPECIMEN SOURCE: SIGNIFICANT CHANGE UP
WBC # BLD: 8.69 K/UL — SIGNIFICANT CHANGE UP (ref 4.8–10.8)
WBC # FLD AUTO: 8.69 K/UL — SIGNIFICANT CHANGE UP (ref 4.8–10.8)

## 2021-09-11 PROCEDURE — 99231 SBSQ HOSP IP/OBS SF/LOW 25: CPT

## 2021-09-11 RX ORDER — MORPHINE SULFATE 50 MG/1
4 CAPSULE, EXTENDED RELEASE ORAL EVERY 4 HOURS
Refills: 0 | Status: DISCONTINUED | OUTPATIENT
Start: 2021-09-11 | End: 2021-09-16

## 2021-09-11 RX ADMIN — MORPHINE SULFATE 4 MILLIGRAM(S): 50 CAPSULE, EXTENDED RELEASE ORAL at 19:54

## 2021-09-11 RX ADMIN — MORPHINE SULFATE 4 MILLIGRAM(S): 50 CAPSULE, EXTENDED RELEASE ORAL at 15:52

## 2021-09-11 RX ADMIN — OXYCODONE AND ACETAMINOPHEN 2 TABLET(S): 5; 325 TABLET ORAL at 22:30

## 2021-09-11 RX ADMIN — MORPHINE SULFATE 4 MILLIGRAM(S): 50 CAPSULE, EXTENDED RELEASE ORAL at 07:30

## 2021-09-11 RX ADMIN — OXYCODONE AND ACETAMINOPHEN 2 TABLET(S): 5; 325 TABLET ORAL at 04:22

## 2021-09-11 RX ADMIN — OXYCODONE AND ACETAMINOPHEN 2 TABLET(S): 5; 325 TABLET ORAL at 10:04

## 2021-09-11 RX ADMIN — SENNA PLUS 2 TABLET(S): 8.6 TABLET ORAL at 21:39

## 2021-09-11 RX ADMIN — OXYCODONE AND ACETAMINOPHEN 2 TABLET(S): 5; 325 TABLET ORAL at 14:07

## 2021-09-11 RX ADMIN — OXYCODONE AND ACETAMINOPHEN 2 TABLET(S): 5; 325 TABLET ORAL at 00:21

## 2021-09-11 RX ADMIN — OXYCODONE AND ACETAMINOPHEN 2 TABLET(S): 5; 325 TABLET ORAL at 05:30

## 2021-09-11 RX ADMIN — ENOXAPARIN SODIUM 40 MILLIGRAM(S): 100 INJECTION SUBCUTANEOUS at 11:33

## 2021-09-11 RX ADMIN — OXYCODONE AND ACETAMINOPHEN 2 TABLET(S): 5; 325 TABLET ORAL at 13:37

## 2021-09-11 RX ADMIN — Medication 250 MILLIGRAM(S): at 17:30

## 2021-09-11 RX ADMIN — OXYCODONE AND ACETAMINOPHEN 2 TABLET(S): 5; 325 TABLET ORAL at 17:37

## 2021-09-11 RX ADMIN — PANTOPRAZOLE SODIUM 40 MILLIGRAM(S): 20 TABLET, DELAYED RELEASE ORAL at 05:17

## 2021-09-11 RX ADMIN — OXYCODONE AND ACETAMINOPHEN 2 TABLET(S): 5; 325 TABLET ORAL at 18:07

## 2021-09-11 RX ADMIN — OXYCODONE AND ACETAMINOPHEN 2 TABLET(S): 5; 325 TABLET ORAL at 21:39

## 2021-09-11 RX ADMIN — AMPICILLIN SODIUM AND SULBACTAM SODIUM 100 GRAM(S): 250; 125 INJECTION, POWDER, FOR SUSPENSION INTRAMUSCULAR; INTRAVENOUS at 05:16

## 2021-09-11 RX ADMIN — OXYCODONE AND ACETAMINOPHEN 2 TABLET(S): 5; 325 TABLET ORAL at 09:34

## 2021-09-11 RX ADMIN — MORPHINE SULFATE 4 MILLIGRAM(S): 50 CAPSULE, EXTENDED RELEASE ORAL at 15:37

## 2021-09-11 RX ADMIN — MORPHINE SULFATE 4 MILLIGRAM(S): 50 CAPSULE, EXTENDED RELEASE ORAL at 06:57

## 2021-09-11 RX ADMIN — Medication 250 MILLIGRAM(S): at 03:10

## 2021-09-11 RX ADMIN — MORPHINE SULFATE 4 MILLIGRAM(S): 50 CAPSULE, EXTENDED RELEASE ORAL at 11:32

## 2021-09-11 RX ADMIN — AMPICILLIN SODIUM AND SULBACTAM SODIUM 100 GRAM(S): 250; 125 INJECTION, POWDER, FOR SUSPENSION INTRAMUSCULAR; INTRAVENOUS at 11:33

## 2021-09-11 RX ADMIN — Medication 200 MILLIGRAM(S): at 05:16

## 2021-09-11 RX ADMIN — MORPHINE SULFATE 4 MILLIGRAM(S): 50 CAPSULE, EXTENDED RELEASE ORAL at 11:47

## 2021-09-11 RX ADMIN — AMPICILLIN SODIUM AND SULBACTAM SODIUM 100 GRAM(S): 250; 125 INJECTION, POWDER, FOR SUSPENSION INTRAMUSCULAR; INTRAVENOUS at 17:30

## 2021-09-11 RX ADMIN — MORPHINE SULFATE 4 MILLIGRAM(S): 50 CAPSULE, EXTENDED RELEASE ORAL at 20:30

## 2021-09-11 RX ADMIN — Medication 325 MILLIGRAM(S): at 11:33

## 2021-09-11 RX ADMIN — AMPICILLIN SODIUM AND SULBACTAM SODIUM 100 GRAM(S): 250; 125 INJECTION, POWDER, FOR SUSPENSION INTRAMUSCULAR; INTRAVENOUS at 23:42

## 2021-09-11 RX ADMIN — Medication 1 TABLET(S): at 11:33

## 2021-09-11 RX ADMIN — Medication 200 MILLIGRAM(S): at 17:29

## 2021-09-11 RX ADMIN — OXYCODONE AND ACETAMINOPHEN 2 TABLET(S): 5; 325 TABLET ORAL at 01:30

## 2021-09-11 RX ADMIN — MORPHINE SULFATE 2 MILLIGRAM(S): 50 CAPSULE, EXTENDED RELEASE ORAL at 02:57

## 2021-09-11 NOTE — PROGRESS NOTE ADULT - ASSESSMENT
A/P: POD 1 s/p Verenice/VAC     cont wound VAC  Cont IV antibx  DVT GI Prophylaxis  Pain control  OT/PT  D/c Planning

## 2021-09-11 NOTE — PROGRESS NOTE ADULT - SUBJECTIVE AND OBJECTIVE BOX
Patient is a 46y old  Female who presents with a chief complaint of Right foot stump wound (10 Sep 2021 11:29)    No acute events overnight    Vital Signs Last 24 Hrs  T(C): 36.4 (11 Sep 2021 13:30), Max: 37 (11 Sep 2021 04:33)  T(F): 97.5 (11 Sep 2021 13:30), Max: 98.6 (11 Sep 2021 04:33)  HR: 84 (11 Sep 2021 13:30) (72 - 88)  BP: 113/68 (11 Sep 2021 13:30) (98/57 - 137/69)  BP(mean): --  RR: 18 (11 Sep 2021 13:30) (18 - 18)  SpO2: 99% (11 Sep 2021 13:30) (99% - 99%)    I&O's Summary    10 Sep 2021 07:01  -  11 Sep 2021 07:00  --------------------------------------------------------  IN: 550 mL / OUT: 1700 mL / NET: -1150 mL    11 Sep 2021 07:01  -  11 Sep 2021 16:52  --------------------------------------------------------  IN: 400 mL / OUT: 300 mL / NET: 100 mL        Meds:  MEDICATIONS  (STANDING):  ampicillin/sulbactam  IVPB      ampicillin/sulbactam  IVPB 1.5 Gram(s) IV Intermittent every 6 hours  ciprofloxacin   IVPB 400 milliGRAM(s) IV Intermittent every 12 hours  ciprofloxacin   IVPB      enoxaparin Injectable 40 milliGRAM(s) SubCutaneous daily  ferrous    sulfate 325 milliGRAM(s) Oral daily  multivitamin/minerals 1 Tablet(s) Oral daily  pantoprazole    Tablet 40 milliGRAM(s) Oral before breakfast  saccharomyces boulardii 250 milliGRAM(s) Oral two times a day  senna 2 Tablet(s) Oral at bedtime  sodium chloride 0.9%. 1000 milliLiter(s) (50 mL/Hr) IV Continuous <Continuous>    MEDICATIONS  (PRN):  acetaminophen   Tablet .. 650 milliGRAM(s) Oral every 6 hours PRN Temp greater or equal to 38.5C (101.3F), Mild Pain (1 - 3)  morphine  - Injectable 4 milliGRAM(s) IV Push every 4 hours PRN Severe Pain (7 - 10)  oxycodone    5 mG/acetaminophen 325 mG 2 Tablet(s) Oral every 4 hours PRN Moderate Pain (4 - 6)        Culture - Tissue with Gram Stain (collected 10 Sep 2021 08:42)  Source: .Tissue None  Gram Stain (11 Sep 2021 01:09):    Rare polymorphonuclear leukocytes per low power field    Rare Gram positive cocci in pairs per oil power field    Rare Gram Positive Rods per oil power field  Preliminary Report (11 Sep 2021 16:39):    Insufficient growth-culture reincubated    Culture - Tissue with Gram Stain (collected 10 Sep 2021 08:42)  Source: .Tissue None  Gram Stain (11 Sep 2021 01:08):    Rare polymorphonuclear leukocytes per low power field    Moderate Gram positive cocci in pairs per oil power field    Rare Gram Negative Rods per oil power field    Culture - Tissue with Gram Stain (collected 10 Sep 2021 08:42)  Source: .Tissue None  Gram Stain (11 Sep 2021 01:08):    Rare polymorphonuclear leukocytes per low power field    Moderate Gram positive cocci in pairs per oil power field    Rare Gram Negative Rods per oil power field        Labs:                        10.0   7.09  )-----------( 246      ( 10 Sep 2021 17:28 )             34.7     09-10    136  |  103  |  11  ----------------------------<  154<H>  5.1<H>   |  23  |  0.8    Ca    8.5      10 Sep 2021 17:28  Phos  3.5     09-10  Mg     1.9     09-10          PE: AAO x 3    VAC dressing in place

## 2021-09-12 LAB
-  AMPICILLIN/SULBACTAM: SIGNIFICANT CHANGE UP
-  CEFAZOLIN: SIGNIFICANT CHANGE UP
-  CLINDAMYCIN: SIGNIFICANT CHANGE UP
-  ERYTHROMYCIN: SIGNIFICANT CHANGE UP
-  GENTAMICIN: SIGNIFICANT CHANGE UP
-  OXACILLIN: SIGNIFICANT CHANGE UP
-  RIFAMPIN: SIGNIFICANT CHANGE UP
-  TETRACYCLINE: SIGNIFICANT CHANGE UP
-  TRIMETHOPRIM/SULFAMETHOXAZOLE: SIGNIFICANT CHANGE UP
-  VANCOMYCIN: SIGNIFICANT CHANGE UP
ANION GAP SERPL CALC-SCNC: 9 MMOL/L — SIGNIFICANT CHANGE UP (ref 7–14)
BASOPHILS # BLD AUTO: 0.03 K/UL — SIGNIFICANT CHANGE UP (ref 0–0.2)
BASOPHILS NFR BLD AUTO: 0.5 % — SIGNIFICANT CHANGE UP (ref 0–1)
BUN SERPL-MCNC: 5 MG/DL — LOW (ref 10–20)
CALCIUM SERPL-MCNC: 5.6 MG/DL — CRITICAL LOW (ref 8.5–10.1)
CHLORIDE SERPL-SCNC: 113 MMOL/L — HIGH (ref 98–110)
CO2 SERPL-SCNC: 17 MMOL/L — SIGNIFICANT CHANGE UP (ref 17–32)
CREAT SERPL-MCNC: 0.6 MG/DL — LOW (ref 0.7–1.5)
CULTURE RESULTS: SIGNIFICANT CHANGE UP
EOSINOPHIL # BLD AUTO: 0.12 K/UL — SIGNIFICANT CHANGE UP (ref 0–0.7)
EOSINOPHIL NFR BLD AUTO: 1.8 % — SIGNIFICANT CHANGE UP (ref 0–8)
GLUCOSE SERPL-MCNC: 68 MG/DL — LOW (ref 70–99)
HCT VFR BLD CALC: 27.7 % — LOW (ref 37–47)
HGB BLD-MCNC: 7.8 G/DL — LOW (ref 12–16)
IMM GRANULOCYTES NFR BLD AUTO: 0.3 % — SIGNIFICANT CHANGE UP (ref 0.1–0.3)
LYMPHOCYTES # BLD AUTO: 1.91 K/UL — SIGNIFICANT CHANGE UP (ref 1.2–3.4)
LYMPHOCYTES # BLD AUTO: 28.9 % — SIGNIFICANT CHANGE UP (ref 20.5–51.1)
MAGNESIUM SERPL-MCNC: 1.3 MG/DL — LOW (ref 1.8–2.4)
MCHC RBC-ENTMCNC: 21.3 PG — LOW (ref 27–31)
MCHC RBC-ENTMCNC: 28.2 G/DL — LOW (ref 32–37)
MCV RBC AUTO: 75.7 FL — LOW (ref 81–99)
METHOD TYPE: SIGNIFICANT CHANGE UP
MONOCYTES # BLD AUTO: 0.59 K/UL — SIGNIFICANT CHANGE UP (ref 0.1–0.6)
MONOCYTES NFR BLD AUTO: 8.9 % — SIGNIFICANT CHANGE UP (ref 1.7–9.3)
NEUTROPHILS # BLD AUTO: 3.93 K/UL — SIGNIFICANT CHANGE UP (ref 1.4–6.5)
NEUTROPHILS NFR BLD AUTO: 59.6 % — SIGNIFICANT CHANGE UP (ref 42.2–75.2)
NRBC # BLD: 0 /100 WBCS — SIGNIFICANT CHANGE UP (ref 0–0)
PHOSPHATE SERPL-MCNC: 2.3 MG/DL — SIGNIFICANT CHANGE UP (ref 2.1–4.9)
PLATELET # BLD AUTO: 186 K/UL — SIGNIFICANT CHANGE UP (ref 130–400)
POTASSIUM SERPL-MCNC: 3.4 MMOL/L — LOW (ref 3.5–5)
POTASSIUM SERPL-SCNC: 3.4 MMOL/L — LOW (ref 3.5–5)
RBC # BLD: 3.66 M/UL — LOW (ref 4.2–5.4)
RBC # FLD: 21.1 % — HIGH (ref 11.5–14.5)
SODIUM SERPL-SCNC: 139 MMOL/L — SIGNIFICANT CHANGE UP (ref 135–146)
SPECIMEN SOURCE: SIGNIFICANT CHANGE UP
WBC # BLD: 6.6 K/UL — SIGNIFICANT CHANGE UP (ref 4.8–10.8)
WBC # FLD AUTO: 6.6 K/UL — SIGNIFICANT CHANGE UP (ref 4.8–10.8)

## 2021-09-12 PROCEDURE — 99231 SBSQ HOSP IP/OBS SF/LOW 25: CPT

## 2021-09-12 RX ADMIN — OXYCODONE AND ACETAMINOPHEN 2 TABLET(S): 5; 325 TABLET ORAL at 10:49

## 2021-09-12 RX ADMIN — MORPHINE SULFATE 4 MILLIGRAM(S): 50 CAPSULE, EXTENDED RELEASE ORAL at 08:30

## 2021-09-12 RX ADMIN — OXYCODONE AND ACETAMINOPHEN 2 TABLET(S): 5; 325 TABLET ORAL at 18:29

## 2021-09-12 RX ADMIN — AMPICILLIN SODIUM AND SULBACTAM SODIUM 100 GRAM(S): 250; 125 INJECTION, POWDER, FOR SUSPENSION INTRAMUSCULAR; INTRAVENOUS at 11:13

## 2021-09-12 RX ADMIN — MORPHINE SULFATE 4 MILLIGRAM(S): 50 CAPSULE, EXTENDED RELEASE ORAL at 16:53

## 2021-09-12 RX ADMIN — AMPICILLIN SODIUM AND SULBACTAM SODIUM 100 GRAM(S): 250; 125 INJECTION, POWDER, FOR SUSPENSION INTRAMUSCULAR; INTRAVENOUS at 17:52

## 2021-09-12 RX ADMIN — OXYCODONE AND ACETAMINOPHEN 2 TABLET(S): 5; 325 TABLET ORAL at 10:19

## 2021-09-12 RX ADMIN — MORPHINE SULFATE 4 MILLIGRAM(S): 50 CAPSULE, EXTENDED RELEASE ORAL at 08:45

## 2021-09-12 RX ADMIN — Medication 200 MILLIGRAM(S): at 17:52

## 2021-09-12 RX ADMIN — MORPHINE SULFATE 4 MILLIGRAM(S): 50 CAPSULE, EXTENDED RELEASE ORAL at 04:23

## 2021-09-12 RX ADMIN — OXYCODONE AND ACETAMINOPHEN 2 TABLET(S): 5; 325 TABLET ORAL at 14:22

## 2021-09-12 RX ADMIN — OXYCODONE AND ACETAMINOPHEN 2 TABLET(S): 5; 325 TABLET ORAL at 14:52

## 2021-09-12 RX ADMIN — OXYCODONE AND ACETAMINOPHEN 2 TABLET(S): 5; 325 TABLET ORAL at 02:30

## 2021-09-12 RX ADMIN — MORPHINE SULFATE 4 MILLIGRAM(S): 50 CAPSULE, EXTENDED RELEASE ORAL at 20:43

## 2021-09-12 RX ADMIN — AMPICILLIN SODIUM AND SULBACTAM SODIUM 100 GRAM(S): 250; 125 INJECTION, POWDER, FOR SUSPENSION INTRAMUSCULAR; INTRAVENOUS at 05:17

## 2021-09-12 RX ADMIN — Medication 325 MILLIGRAM(S): at 11:13

## 2021-09-12 RX ADMIN — Medication 250 MILLIGRAM(S): at 05:18

## 2021-09-12 RX ADMIN — Medication 250 MILLIGRAM(S): at 17:52

## 2021-09-12 RX ADMIN — MORPHINE SULFATE 4 MILLIGRAM(S): 50 CAPSULE, EXTENDED RELEASE ORAL at 00:30

## 2021-09-12 RX ADMIN — ENOXAPARIN SODIUM 40 MILLIGRAM(S): 100 INJECTION SUBCUTANEOUS at 11:14

## 2021-09-12 RX ADMIN — MORPHINE SULFATE 4 MILLIGRAM(S): 50 CAPSULE, EXTENDED RELEASE ORAL at 16:38

## 2021-09-12 RX ADMIN — PANTOPRAZOLE SODIUM 40 MILLIGRAM(S): 20 TABLET, DELAYED RELEASE ORAL at 05:18

## 2021-09-12 RX ADMIN — Medication 1 TABLET(S): at 11:13

## 2021-09-12 RX ADMIN — OXYCODONE AND ACETAMINOPHEN 2 TABLET(S): 5; 325 TABLET ORAL at 01:48

## 2021-09-12 RX ADMIN — Medication 200 MILLIGRAM(S): at 05:17

## 2021-09-12 RX ADMIN — OXYCODONE AND ACETAMINOPHEN 2 TABLET(S): 5; 325 TABLET ORAL at 23:07

## 2021-09-12 RX ADMIN — MORPHINE SULFATE 4 MILLIGRAM(S): 50 CAPSULE, EXTENDED RELEASE ORAL at 00:07

## 2021-09-12 RX ADMIN — MORPHINE SULFATE 4 MILLIGRAM(S): 50 CAPSULE, EXTENDED RELEASE ORAL at 12:34

## 2021-09-12 RX ADMIN — OXYCODONE AND ACETAMINOPHEN 2 TABLET(S): 5; 325 TABLET ORAL at 06:17

## 2021-09-12 RX ADMIN — AMPICILLIN SODIUM AND SULBACTAM SODIUM 100 GRAM(S): 250; 125 INJECTION, POWDER, FOR SUSPENSION INTRAMUSCULAR; INTRAVENOUS at 23:27

## 2021-09-12 NOTE — PROGRESS NOTE ADULT - ASSESSMENT
A/P: POD 2 s/p Verenice/VAC     cont wound VAC  Cont IV antibx  DVT GI Prophylaxis  Pain control  OT/PT  D/c Planning

## 2021-09-12 NOTE — PROGRESS NOTE ADULT - SUBJECTIVE AND OBJECTIVE BOX
Patient is a 46y old  Female who presents with a chief complaint of Right foot stump wound (10 Sep 2021 11:29)    No acute events overnight    Vital Signs Last 24 Hrs  T(C): 36.8 (12 Sep 2021 16:45), Max: 36.8 (11 Sep 2021 21:00)  T(F): 98.2 (12 Sep 2021 16:45), Max: 98.3 (11 Sep 2021 21:00)  HR: 85 (12 Sep 2021 16:45) (70 - 87)  BP: 122/65 (12 Sep 2021 16:45) (93/55 - 122/65)  BP(mean): --  RR: 18 (12 Sep 2021 16:45) (18 - 18)  SpO2: 96% (12 Sep 2021 16:45) (96% - 100%)    I&O's Summary    11 Sep 2021 07:01  -  12 Sep 2021 07:00  --------------------------------------------------------  IN: 550 mL / OUT: 1150 mL / NET: -600 mL    12 Sep 2021 07:01  -  12 Sep 2021 17:50  --------------------------------------------------------  IN: 350 mL / OUT: 700 mL / NET: -350 mL      Meds:  MEDICATIONS  (STANDING):  ampicillin/sulbactam  IVPB      ampicillin/sulbactam  IVPB 1.5 Gram(s) IV Intermittent every 6 hours  ciprofloxacin   IVPB 400 milliGRAM(s) IV Intermittent every 12 hours  ciprofloxacin   IVPB      enoxaparin Injectable 40 milliGRAM(s) SubCutaneous daily  ferrous    sulfate 325 milliGRAM(s) Oral daily  multivitamin/minerals 1 Tablet(s) Oral daily  pantoprazole    Tablet 40 milliGRAM(s) Oral before breakfast  saccharomyces boulardii 250 milliGRAM(s) Oral two times a day  senna 2 Tablet(s) Oral at bedtime  sodium chloride 0.9%. 1000 milliLiter(s) (50 mL/Hr) IV Continuous <Continuous>    MEDICATIONS  (PRN):  acetaminophen   Tablet .. 650 milliGRAM(s) Oral every 6 hours PRN Temp greater or equal to 38.5C (101.3F), Mild Pain (1 - 3)  morphine  - Injectable 4 milliGRAM(s) IV Push every 4 hours PRN Severe Pain (7 - 10)  oxycodone    5 mG/acetaminophen 325 mG 2 Tablet(s) Oral every 4 hours PRN Moderate Pain (4 - 6)        Culture - Tissue with Gram Stain (collected 10 Sep 2021 08:42)  Source: .Tissue None  Gram Stain (11 Sep 2021 01:09):    Rare polymorphonuclear leukocytes per low power field    Rare Gram positive cocci in pairs per oil power field    Rare Gram Positive Rods per oil power field  Preliminary Report (11 Sep 2021 16:39):    Insufficient growth-culture reincubated    Culture - Tissue with Gram Stain (collected 10 Sep 2021 08:42)  Source: .Tissue None  Gram Stain (11 Sep 2021 01:08):    Rare polymorphonuclear leukocytes per low power field    Moderate Gram positive cocci in pairs per oil power field    Rare Gram Negative Rods per oil power field    Culture - Tissue with Gram Stain (collected 10 Sep 2021 08:42)  Source: .Tissue None  Gram Stain (11 Sep 2021 01:08):    Rare polymorphonuclear leukocytes per low power field    Moderate Gram positive cocci in pairs per oil power field    Rare Gram Negative Rods per oil power field        Labs:                        10.0   7.09  )-----------( 246      ( 10 Sep 2021 17:28 )             34.7     09-10    136  |  103  |  11  ----------------------------<  154<H>  5.1<H>   |  23  |  0.8    Ca    8.5      10 Sep 2021 17:28  Phos  3.5     09-10  Mg     1.9     09-10          PE: AAO x 3    VAC dressing in place

## 2021-09-13 LAB
-  AMIKACIN: SIGNIFICANT CHANGE UP
-  AZTREONAM: SIGNIFICANT CHANGE UP
-  CEFEPIME: SIGNIFICANT CHANGE UP
-  CEFTRIAXONE: SIGNIFICANT CHANGE UP
-  CIPROFLOXACIN: SIGNIFICANT CHANGE UP
-  GENTAMICIN: SIGNIFICANT CHANGE UP
-  LEVOFLOXACIN: SIGNIFICANT CHANGE UP
-  MEROPENEM: SIGNIFICANT CHANGE UP
-  PIPERACILLIN/TAZOBACTAM: SIGNIFICANT CHANGE UP
-  TOBRAMYCIN: SIGNIFICANT CHANGE UP
-  TRIMETHOPRIM/SULFAMETHOXAZOLE: SIGNIFICANT CHANGE UP
ANION GAP SERPL CALC-SCNC: 7 MMOL/L — SIGNIFICANT CHANGE UP (ref 7–14)
BUN SERPL-MCNC: 9 MG/DL — LOW (ref 10–20)
CALCIUM SERPL-MCNC: 8.3 MG/DL — LOW (ref 8.5–10.1)
CHLORIDE SERPL-SCNC: 106 MMOL/L — SIGNIFICANT CHANGE UP (ref 98–110)
CO2 SERPL-SCNC: 25 MMOL/L — SIGNIFICANT CHANGE UP (ref 17–32)
CREAT SERPL-MCNC: 0.8 MG/DL — SIGNIFICANT CHANGE UP (ref 0.7–1.5)
CULTURE RESULTS: SIGNIFICANT CHANGE UP
GLUCOSE SERPL-MCNC: 100 MG/DL — HIGH (ref 70–99)
HCT VFR BLD CALC: 31.9 % — LOW (ref 37–47)
HGB BLD-MCNC: 9.1 G/DL — LOW (ref 12–16)
MAGNESIUM SERPL-MCNC: 1.9 MG/DL — SIGNIFICANT CHANGE UP (ref 1.8–2.4)
MCHC RBC-ENTMCNC: 21.2 PG — LOW (ref 27–31)
MCHC RBC-ENTMCNC: 28.5 G/DL — LOW (ref 32–37)
MCV RBC AUTO: 74.4 FL — LOW (ref 81–99)
METHOD TYPE: SIGNIFICANT CHANGE UP
NRBC # BLD: 0 /100 WBCS — SIGNIFICANT CHANGE UP (ref 0–0)
ORGANISM # SPEC MICROSCOPIC CNT: SIGNIFICANT CHANGE UP
PHOSPHATE SERPL-MCNC: 3.6 MG/DL — SIGNIFICANT CHANGE UP (ref 2.1–4.9)
PLATELET # BLD AUTO: 232 K/UL — SIGNIFICANT CHANGE UP (ref 130–400)
POTASSIUM SERPL-MCNC: 4.7 MMOL/L — SIGNIFICANT CHANGE UP (ref 3.5–5)
POTASSIUM SERPL-SCNC: 4.7 MMOL/L — SIGNIFICANT CHANGE UP (ref 3.5–5)
RBC # BLD: 4.29 M/UL — SIGNIFICANT CHANGE UP (ref 4.2–5.4)
RBC # FLD: 21.2 % — HIGH (ref 11.5–14.5)
SODIUM SERPL-SCNC: 138 MMOL/L — SIGNIFICANT CHANGE UP (ref 135–146)
SPECIMEN SOURCE: SIGNIFICANT CHANGE UP
WBC # BLD: 6.65 K/UL — SIGNIFICANT CHANGE UP (ref 4.8–10.8)
WBC # FLD AUTO: 6.65 K/UL — SIGNIFICANT CHANGE UP (ref 4.8–10.8)

## 2021-09-13 PROCEDURE — 97605 NEG PRS WND THER DME<=50SQCM: CPT

## 2021-09-13 PROCEDURE — 76937 US GUIDE VASCULAR ACCESS: CPT | Mod: 26,59

## 2021-09-13 PROCEDURE — 99231 SBSQ HOSP IP/OBS SF/LOW 25: CPT

## 2021-09-13 PROCEDURE — 36569 INSJ PICC 5 YR+ W/O IMAGING: CPT

## 2021-09-13 RX ORDER — POLYETHYLENE GLYCOL 3350 17 G/17G
17 POWDER, FOR SOLUTION ORAL DAILY
Refills: 0 | Status: DISCONTINUED | OUTPATIENT
Start: 2021-09-13 | End: 2021-09-16

## 2021-09-13 RX ADMIN — Medication 200 MILLIGRAM(S): at 05:25

## 2021-09-13 RX ADMIN — PANTOPRAZOLE SODIUM 40 MILLIGRAM(S): 20 TABLET, DELAYED RELEASE ORAL at 05:24

## 2021-09-13 RX ADMIN — Medication 250 MILLIGRAM(S): at 05:24

## 2021-09-13 RX ADMIN — AMPICILLIN SODIUM AND SULBACTAM SODIUM 100 GRAM(S): 250; 125 INJECTION, POWDER, FOR SUSPENSION INTRAMUSCULAR; INTRAVENOUS at 17:47

## 2021-09-13 RX ADMIN — OXYCODONE AND ACETAMINOPHEN 2 TABLET(S): 5; 325 TABLET ORAL at 17:47

## 2021-09-13 RX ADMIN — SENNA PLUS 2 TABLET(S): 8.6 TABLET ORAL at 21:23

## 2021-09-13 RX ADMIN — OXYCODONE AND ACETAMINOPHEN 2 TABLET(S): 5; 325 TABLET ORAL at 12:01

## 2021-09-13 RX ADMIN — Medication 1 TABLET(S): at 12:45

## 2021-09-13 RX ADMIN — AMPICILLIN SODIUM AND SULBACTAM SODIUM 100 GRAM(S): 250; 125 INJECTION, POWDER, FOR SUSPENSION INTRAMUSCULAR; INTRAVENOUS at 23:45

## 2021-09-13 RX ADMIN — Medication 325 MILLIGRAM(S): at 12:45

## 2021-09-13 RX ADMIN — MORPHINE SULFATE 4 MILLIGRAM(S): 50 CAPSULE, EXTENDED RELEASE ORAL at 09:44

## 2021-09-13 RX ADMIN — MORPHINE SULFATE 4 MILLIGRAM(S): 50 CAPSULE, EXTENDED RELEASE ORAL at 01:03

## 2021-09-13 RX ADMIN — MORPHINE SULFATE 4 MILLIGRAM(S): 50 CAPSULE, EXTENDED RELEASE ORAL at 14:43

## 2021-09-13 RX ADMIN — OXYCODONE AND ACETAMINOPHEN 2 TABLET(S): 5; 325 TABLET ORAL at 07:49

## 2021-09-13 RX ADMIN — POLYETHYLENE GLYCOL 3350 17 GRAM(S): 17 POWDER, FOR SOLUTION ORAL at 19:17

## 2021-09-13 RX ADMIN — Medication 250 MILLIGRAM(S): at 17:48

## 2021-09-13 RX ADMIN — MORPHINE SULFATE 4 MILLIGRAM(S): 50 CAPSULE, EXTENDED RELEASE ORAL at 00:47

## 2021-09-13 RX ADMIN — AMPICILLIN SODIUM AND SULBACTAM SODIUM 100 GRAM(S): 250; 125 INJECTION, POWDER, FOR SUSPENSION INTRAMUSCULAR; INTRAVENOUS at 12:45

## 2021-09-13 RX ADMIN — MORPHINE SULFATE 4 MILLIGRAM(S): 50 CAPSULE, EXTENDED RELEASE ORAL at 05:31

## 2021-09-13 RX ADMIN — OXYCODONE AND ACETAMINOPHEN 2 TABLET(S): 5; 325 TABLET ORAL at 03:07

## 2021-09-13 RX ADMIN — AMPICILLIN SODIUM AND SULBACTAM SODIUM 100 GRAM(S): 250; 125 INJECTION, POWDER, FOR SUSPENSION INTRAMUSCULAR; INTRAVENOUS at 05:25

## 2021-09-13 RX ADMIN — ENOXAPARIN SODIUM 40 MILLIGRAM(S): 100 INJECTION SUBCUTANEOUS at 12:45

## 2021-09-13 RX ADMIN — OXYCODONE AND ACETAMINOPHEN 2 TABLET(S): 5; 325 TABLET ORAL at 23:45

## 2021-09-13 RX ADMIN — MORPHINE SULFATE 4 MILLIGRAM(S): 50 CAPSULE, EXTENDED RELEASE ORAL at 21:23

## 2021-09-13 NOTE — PROGRESS NOTE ADULT - SUBJECTIVE AND OBJECTIVE BOX
Patient is a 46y old  Female who presents with a chief complaint of Right foot stump wound (13 Sep 2021 11:43)  AM Rounds  POD #3 s/p debridement of R foot 9/10    Vital Signs Last 24 Hrs  T(C): 36.6 (13 Sep 2021 09:52), Max: 37 (13 Sep 2021 01:06)  T(F): 97.8 (13 Sep 2021 09:52), Max: 98.6 (13 Sep 2021 01:06)  HR: 84 (13 Sep 2021 09:52) (83 - 99)  BP: 121/62 (13 Sep 2021 09:52) (97/53 - 122/73)  RR: 18 (13 Sep 2021 09:52) (18 - 18)  SpO2: 99% (13 Sep 2021 09:52) (96% - 99%)    LABS:                        9.1    6.65  )-----------( 232      ( 13 Sep 2021 00:39 )             31.9     09-13    138  |  106  |  9<L>  ----------------------------<  100<H>  4.7   |  25  |  0.8    Ca    8.3<L>      13 Sep 2021 00:39  Phos  3.6     09-13  Mg     1.9     09-13    MEDICATIONS  (STANDING):  ampicillin/sulbactam  IVPB      ampicillin/sulbactam  IVPB 1.5 Gram(s) IV Intermittent every 6 hours  ciprofloxacin   IVPB 400 milliGRAM(s) IV Intermittent every 12 hours  ciprofloxacin   IVPB      enoxaparin Injectable 40 milliGRAM(s) SubCutaneous daily  ferrous    sulfate 325 milliGRAM(s) Oral daily  multivitamin/minerals 1 Tablet(s) Oral daily  pantoprazole    Tablet 40 milliGRAM(s) Oral before breakfast  saccharomyces boulardii 250 milliGRAM(s) Oral two times a day  senna 2 Tablet(s) Oral at bedtime  sodium chloride 0.9%. 1000 milliLiter(s) (50 mL/Hr) IV Continuous <Continuous>    MEDICATIONS  (PRN):  acetaminophen   Tablet .. 650 milliGRAM(s) Oral every 6 hours PRN Temp greater or equal to 38.5C (101.3F), Mild Pain (1 - 3)  morphine  - Injectable 4 milliGRAM(s) IV Push every 4 hours PRN Severe Pain (7 - 10)  oxycodone    5 mG/acetaminophen 325 mG 2 Tablet(s) Oral every 4 hours PRN Moderate Pain (4 - 6)      PHYSICAL EXAM:  General: A&O x3 Pt lying in bed comfortable, no acute distress  Chest: Speaking full sentences, on room air  Skin: approx. 4 x 2 x 2cm wound to R foot, wound bed pink and moist with minimal drainage in vac cannister. No surrounding erythema, swelling or purulent drainage noted    +wound VAC reapplied

## 2021-09-13 NOTE — CONSULT NOTE ADULT - ASSESSMENT
Impression: R stump osteomyelitis s/p debridement and bone biopsy pending results     Necrotic bone found during debridement and as such will treat for osteomyelitis. Gram stain and cultures growing Staphylococcus aureus, Streptococcus anginosus, Corynebacterium species, Mixed Anaerobic Natividad, and Alcaligenes faecalis     Impression: R stump osteomyelitis s/p debridement and bone biopsy pending results     Necrotic bone found during debridement and as such will treat for osteomyelitis. Gram stain and cultures growing Staphylococcus aureus, Streptococcus anginosus, Corynebacterium species, Mixed Anaerobic Natividad, and Alcaligenes faecalis. Unasyn covers for most of these organisms and such we can use this agent as monotreatment for the time being. Since cultures grew MSSA, no utility for Vancomycin. Pt will need a 6 week course of antibiotics.     1. D/c Ciprofloxacin  2. Continue Unasyn  1.5g IV q6  3. Pt will need PICC line for IV abx upon d/c  4. On d/c, instead of TID administration of Unasyn, will consider d/c with Ceftriaxone + Doxycycline

## 2021-09-13 NOTE — CHART NOTE - NSCHARTNOTEFT_GEN_A_CORE
Wound VAC changed at bedside, patient tolerated well, no active bleeding, wound bed appears pink and moist. Next VAC change Wednesday 9/15.

## 2021-09-13 NOTE — CONSULT NOTE ADULT - SUBJECTIVE AND OBJECTIVE BOX
JIMMY CR  46y, Female  Allergy: No Known Allergies    CHIEF COMPLAINT: Right foot stump wound (12 Sep 2021 17:48)    HPI:  HPI:  Patient is a 43 year old female with pmhx of DVT (LUE 2018) no longer on anticoagulation, gastric bypass (10/2017) complicated by obstruction, ARDs and sepsis requiring ICU admission and pressors, which resulted in upper and lower limb ischemia s/p bilateral hand and foot amputations and skin grafts in 2018 presented from ambulatory for surgery on her right foot stump wound. Patient was taken to OR on 9/10 and is now s/p debridement. Bone biposy sent and is being admitted for IV abx, pain control and wound care.     Wound vac was changed this morning. Patient feels well other than pain around wound debridement site and denies any trouble breathing, chest pain, fever, nausea or vomiting.     Infectious Diseases History:  Old Micro Data/Cultures:   Culture - Tissue with Gram Stain (09.10.21 @ 08:42)    -  Oxacillin: S <=0.25    -  RIF- Rifampin: S <=1 Should not be used as monotherapy    -  Tetra/Doxy: S <=1    -  Trimethoprim/Sulfamethoxazole: S <=0.5/9.5    -  Vancomycin: S 2    Gram Stain:   Rare polymorphonuclear leukocytes per low power field  Moderate Gram positive cocci in pairs per oil power field  Rare Gram Negative Rods per oil power field    -  Ampicillin/Sulbactam: S <=8/4    -  Cefazolin: S <=4    -  Clindamycin: S <=0.25    -  Erythromycin: S <=0.25    -  Gentamicin: S <=1 Should not be used as monotherapy    Specimen Source: .Tissue None    Culture Results:   Rare Staphylococcus aureus  Moderate Streptococcus anginosus "Susceptibilities not performed"  Few Corynebacterium species "Susceptibilities not performed"  Numerous Mixed Anaerobic Natividad "Susceptibilities not performed"  Rare Alcaligenes faecalis Susceptibility to follow.    Organism Identification: Staphylococcus aureus    Organism: Staphylococcus aureus    FAMILY HISTORY:  Family history of cancer (Mother)  HODKINS LYMPHOMA  Family history of heart disease (Father)    PAST MEDICAL & SURGICAL HISTORY:  Gangrene of finger of left hand  Gangrene of finger of right hand  Gangrene of lower extremity  Sleep apnea (NO CPAP)  Osteoarthritis  Small bowel obstruction (S/P  S/P RYGB)  Sepsis  D/T MULTI-SYSTEM ORGAN FAILURE  ARDS survivor  Osteomyelitis  DVT (deep venous thrombosis)  LEFT UE D/T PICC LINE  (d/c)  Cardiomyopathy (RESOLVED)  HTN (hypertension) (RESOLED)    SURGICAL HX:  History of cholecystectomy  Amputation B/l UE and LE  H/O exploratory laparotomy (S/T SBO)  Open wound of foot requiring multiple debridements   H/O:   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, benign)    ROS  General: Denies rigors, nightsweats  HEENT: Denies headache  CV: Denies CP, palpitations  PULM: Denies wheezing, SOB  GI: Denies hematemesis, hematochezia, melena  NEURO: Denies paresthesias, weakness  PSYCH: Denies depression, anxiety    VITALS:  T(F): 97.8, Max: 98.6 (21 @ 01:06)  HR: 84  BP: 121/62  RR: 18Vital Signs Last 24 Hrs  T(C): 36.6 (13 Sep 2021 09:52), Max: 37 (13 Sep 2021 01:06)  T(F): 97.8 (13 Sep 2021 09:52), Max: 98.6 (13 Sep 2021 01:06)  HR: 84 (13 Sep 2021 09:52) (77 - 99)  BP: 121/62 (13 Sep 2021 09:52) (97/53 - 122/73)  BP(mean): --  RR: 18 (13 Sep 2021 09:52) (18 - 18)  SpO2: 99% (13 Sep 2021 09:52) (96% - 100%)    PHYSICAL EXAM:  Gen: NAD, resting in bed  HEENT: Normocephalic, atraumatic  Neck: supple, no lymphadenopathy  CV: Regular rate & regular rhythm  Lungs: CTAB  Abdomen: Soft, BS present  Ext: B/l amputations UE, b/l amputations of LE, RLE wrapped in dressing with wound vac attached.     TESTS & MEASUREMENTS:                        9.1    6.65  )-----------( 232      ( 13 Sep 2021 00:39 )             31.9     -    138  |  106  |  9<L>  ----------------------------<  100<H>  4.7   |  25  |  0.8    Ca    8.3<L>      13 Sep 2021 00:39  Phos  3.6     -13  Mg     1.9     -    MEDICATIONS    ANTIBIOTICS:  ampicillin/sulbactam  IVPB 1.5 Gram(s) IV Intermittent every 6 hours (9/10 - )  ciprofloxacin   IVPB 400 milliGRAM(s) IV Intermittent every 12 hours (9/10 - )

## 2021-09-13 NOTE — PROGRESS NOTE ADULT - ASSESSMENT
Patient is a 46y old  Female who presents with a chief complaint of Right foot stump wound.     #right stump wound  -s/p debridement Friday 9/10  -No more OR at this time  -cont wound VAC  -Cont IV antibiotics  -DVT/GI Prophylaxis  -Pain control  -OT/PT    #ID   -f/u bone biopsy results from 9/10   -Currently on Unasyn and Cipro, f/u ID c/s    Case discussed with patient, agrees with current treatment plan

## 2021-09-13 NOTE — CONSULT NOTE ADULT - ATTENDING COMMENTS
History and physical as above    #Right Stump WOund infection with necrotic bone   - s/p debridement 9/10 - WOund Cx MSSA, Strep anginosus, polymicrobial     Plan  - continue unasyn while in-house  - will plan for PICC line antibiotics to finish with Ertapenem 1g daily and Doxycycline 100 mg BID PO   - local wound care    Please call or message on [employer] Teams if with any questions.  Spectra [phone] History and physical as above    RLE currently with wound vac in place     #Right Stump WOund infection with necrotic bone   - s/p debridement 9/10 - WOund Cx MSSA, Strep anginosus, polymicrobial     Plan  - please check ESR/CRP   - continue unasyn while in-house  - will plan for PICC line antibiotics to finish with Ertapenem 1g daily and Doxycycline 100 mg BID PO for 6 week course  - follow-up bony path to see if suggestive of acute vs chronic OM   - local wound care      Please call or message on Microsoft Teams if with any questions.  Spectra [phone]

## 2021-09-14 ENCOUNTER — TRANSCRIPTION ENCOUNTER (OUTPATIENT)
Age: 46
End: 2021-09-14

## 2021-09-14 LAB
BASOPHILS # BLD AUTO: 0.03 K/UL — SIGNIFICANT CHANGE UP (ref 0–0.2)
BASOPHILS NFR BLD AUTO: 0.4 % — SIGNIFICANT CHANGE UP (ref 0–1)
BUN SERPL-MCNC: 13 MG/DL — SIGNIFICANT CHANGE UP (ref 10–20)
CALCIUM SERPL-MCNC: 8.4 MG/DL — LOW (ref 8.5–10.1)
CO2 SERPL-SCNC: 22 MMOL/L — SIGNIFICANT CHANGE UP (ref 17–32)
CREAT SERPL-MCNC: 0.9 MG/DL — SIGNIFICANT CHANGE UP (ref 0.7–1.5)
EOSINOPHIL # BLD AUTO: 0.27 K/UL — SIGNIFICANT CHANGE UP (ref 0–0.7)
EOSINOPHIL NFR BLD AUTO: 3.7 % — SIGNIFICANT CHANGE UP (ref 0–8)
GLUCOSE SERPL-MCNC: 93 MG/DL — SIGNIFICANT CHANGE UP (ref 70–99)
HCT VFR BLD CALC: 33.2 % — LOW (ref 37–47)
HGB BLD-MCNC: 9.6 G/DL — LOW (ref 12–16)
IMM GRANULOCYTES NFR BLD AUTO: 0.3 % — SIGNIFICANT CHANGE UP (ref 0.1–0.3)
LYMPHOCYTES # BLD AUTO: 2.15 K/UL — SIGNIFICANT CHANGE UP (ref 1.2–3.4)
LYMPHOCYTES # BLD AUTO: 29.4 % — SIGNIFICANT CHANGE UP (ref 20.5–51.1)
MAGNESIUM SERPL-MCNC: 1.9 MG/DL — SIGNIFICANT CHANGE UP (ref 1.8–2.4)
MCHC RBC-ENTMCNC: 21.5 PG — LOW (ref 27–31)
MCHC RBC-ENTMCNC: 28.9 G/DL — LOW (ref 32–37)
MCV RBC AUTO: 74.4 FL — LOW (ref 81–99)
MONOCYTES # BLD AUTO: 0.64 K/UL — HIGH (ref 0.1–0.6)
MONOCYTES NFR BLD AUTO: 8.7 % — SIGNIFICANT CHANGE UP (ref 1.7–9.3)
NEUTROPHILS # BLD AUTO: 4.21 K/UL — SIGNIFICANT CHANGE UP (ref 1.4–6.5)
NEUTROPHILS NFR BLD AUTO: 57.5 % — SIGNIFICANT CHANGE UP (ref 42.2–75.2)
NRBC # BLD: 0 /100 WBCS — SIGNIFICANT CHANGE UP (ref 0–0)
PHOSPHATE SERPL-MCNC: 3.5 MG/DL — SIGNIFICANT CHANGE UP (ref 2.1–4.9)
PLATELET # BLD AUTO: 238 K/UL — SIGNIFICANT CHANGE UP (ref 130–400)
RBC # BLD: 4.46 M/UL — SIGNIFICANT CHANGE UP (ref 4.2–5.4)
RBC # FLD: 21.3 % — HIGH (ref 11.5–14.5)
WBC # BLD: 7.32 K/UL — SIGNIFICANT CHANGE UP (ref 4.8–10.8)
WBC # FLD AUTO: 7.32 K/UL — SIGNIFICANT CHANGE UP (ref 4.8–10.8)

## 2021-09-14 PROCEDURE — 36573 INSJ PICC RS&I 5 YR+: CPT

## 2021-09-14 PROCEDURE — 93970 EXTREMITY STUDY: CPT | Mod: 26

## 2021-09-14 PROCEDURE — 99231 SBSQ HOSP IP/OBS SF/LOW 25: CPT

## 2021-09-14 RX ADMIN — OXYCODONE AND ACETAMINOPHEN 2 TABLET(S): 5; 325 TABLET ORAL at 21:45

## 2021-09-14 RX ADMIN — ENOXAPARIN SODIUM 40 MILLIGRAM(S): 100 INJECTION SUBCUTANEOUS at 12:22

## 2021-09-14 RX ADMIN — Medication 1 TABLET(S): at 12:21

## 2021-09-14 RX ADMIN — SENNA PLUS 2 TABLET(S): 8.6 TABLET ORAL at 21:46

## 2021-09-14 RX ADMIN — MORPHINE SULFATE 4 MILLIGRAM(S): 50 CAPSULE, EXTENDED RELEASE ORAL at 23:17

## 2021-09-14 RX ADMIN — MORPHINE SULFATE 4 MILLIGRAM(S): 50 CAPSULE, EXTENDED RELEASE ORAL at 23:32

## 2021-09-14 RX ADMIN — Medication 250 MILLIGRAM(S): at 18:05

## 2021-09-14 RX ADMIN — OXYCODONE AND ACETAMINOPHEN 2 TABLET(S): 5; 325 TABLET ORAL at 22:41

## 2021-09-14 RX ADMIN — MORPHINE SULFATE 4 MILLIGRAM(S): 50 CAPSULE, EXTENDED RELEASE ORAL at 12:21

## 2021-09-14 RX ADMIN — MORPHINE SULFATE 4 MILLIGRAM(S): 50 CAPSULE, EXTENDED RELEASE ORAL at 02:01

## 2021-09-14 RX ADMIN — AMPICILLIN SODIUM AND SULBACTAM SODIUM 100 GRAM(S): 250; 125 INJECTION, POWDER, FOR SUSPENSION INTRAMUSCULAR; INTRAVENOUS at 23:18

## 2021-09-14 RX ADMIN — Medication 325 MILLIGRAM(S): at 12:22

## 2021-09-14 RX ADMIN — PANTOPRAZOLE SODIUM 40 MILLIGRAM(S): 20 TABLET, DELAYED RELEASE ORAL at 06:14

## 2021-09-14 RX ADMIN — AMPICILLIN SODIUM AND SULBACTAM SODIUM 100 GRAM(S): 250; 125 INJECTION, POWDER, FOR SUSPENSION INTRAMUSCULAR; INTRAVENOUS at 05:08

## 2021-09-14 RX ADMIN — MORPHINE SULFATE 4 MILLIGRAM(S): 50 CAPSULE, EXTENDED RELEASE ORAL at 18:53

## 2021-09-14 RX ADMIN — OXYCODONE AND ACETAMINOPHEN 2 TABLET(S): 5; 325 TABLET ORAL at 09:53

## 2021-09-14 RX ADMIN — AMPICILLIN SODIUM AND SULBACTAM SODIUM 100 GRAM(S): 250; 125 INJECTION, POWDER, FOR SUSPENSION INTRAMUSCULAR; INTRAVENOUS at 12:22

## 2021-09-14 RX ADMIN — OXYCODONE AND ACETAMINOPHEN 2 TABLET(S): 5; 325 TABLET ORAL at 16:17

## 2021-09-14 RX ADMIN — AMPICILLIN SODIUM AND SULBACTAM SODIUM 100 GRAM(S): 250; 125 INJECTION, POWDER, FOR SUSPENSION INTRAMUSCULAR; INTRAVENOUS at 18:04

## 2021-09-14 RX ADMIN — Medication 250 MILLIGRAM(S): at 05:09

## 2021-09-14 NOTE — PROCEDURE NOTE - NSICDXPROCEDURE_GEN_ALL_CORE_FT
PROCEDURES:  Venipuncture in adult 14-Sep-2021 20:49:52  Kavya Hussein  US guided vascular access 14-Sep-2021 20:50:04  Kavya Hussein

## 2021-09-14 NOTE — CHART NOTE - NSCHARTNOTEFT_GEN_A_CORE
IR attempted LUE, unsuccessful.   Additionally, pt was unable to tolerate further attempt.  Will plan for PICC placement tomorrow, with conscious sedation. IR attempted LUE peripherally inserted central catheter placement.  Vessel cannulated but resistance met when inserting sheath.  Patient was unable to tolerate the procedure without sedation and asked us to stop.  She will be rescheduled with fluoroscopy and conscious sedation.

## 2021-09-14 NOTE — PROGRESS NOTE ADULT - ASSESSMENT
Patient is a 46y old  Female who presents with a chief complaint of Right foot stump wound.   POD #4 s/p debridement of right foot     #right stump wound  -No more OR at this time  -cont wound VAC therapy  -Cont IV antibiotics  - will need PICC for abx on discharge   -DVT/GI Prophylaxis  -Pain control  -OT/PT  -discharge planning     #ID   -f/u bone biopsy results from 9/10   -Currently on Unasyn, ID c/s will plan for PICC line antibiotics to finish with Ertapenem 1g daily and Doxycycline 100 mg BID PO for 6 week course    Case discussed with patient, agrees with current treatment plan

## 2021-09-14 NOTE — DISCHARGE NOTE NURSING/CASE MANAGEMENT/SOCIAL WORK - PATIENT PORTAL LINK FT
You can access the FollowMyHealth Patient Portal offered by [facility] by registering at the following website: [email or web address]. By joining [employer]’s FollowMyHealth portal, you will also be able to view your health information using other applications (apps) compatible with our system.

## 2021-09-14 NOTE — PROCEDURE NOTE - ADDITIONAL PROCEDURE DETAILS
Patient required venous acces for IV antibiotic administration and IV pain medications to enable providers to complete her wound care. Patient lost her previously placed IV and Nursing staff was unable to place another line or get access. Patient has a history of difficult venous access. US was used at the bedside to locate a vein. The forearm was scanned for a vein that would provide adequate access. Selected vein was scanned for patency. Catheter was visualized in the vessel once the procedure was complete.

## 2021-09-14 NOTE — DISCHARGE NOTE NURSING/CASE MANAGEMENT/SOCIAL WORK - NSDCVIVACCINE_GEN_ALL_CORE_FT
influenza, injectable, quadrivalent, preservative free; 04-Oct-2018 14:03; Sofia Keller (JEISON); [facility]; 499DE (Exp. Date: 30-Jun-2019); IntraMuscular; Deltoid Left.; 0.5 milliLiter(s); VIS (VIS Published: 07-Aug-2015, VIS Presented: 04-Oct-2018);

## 2021-09-14 NOTE — PROGRESS NOTE ADULT - SUBJECTIVE AND OBJECTIVE BOX
am rounds with attending     INTERVAL HISTORY:  afebrile. no acute events overnight.   POD#4    Vital Signs Last 24 Hrs  T(C): 36.6 (14 Sep 2021 09:15), Max: 37.2 (13 Sep 2021 17:08)  T(F): 97.9 (14 Sep 2021 09:15), Max: 98.9 (13 Sep 2021 17:08)  HR: 94 (14 Sep 2021 09:15) (80 - 94)  BP: 124/67 (14 Sep 2021 09:15) (115/66 - 131/79)  RR: 18 (14 Sep 2021 09:15) (18 - 18)  SpO2: 100% (14 Sep 2021 05:00) (97% - 100%)    MEDICATIONS  (STANDING):  ampicillin/sulbactam  IVPB      ampicillin/sulbactam  IVPB 1.5 Gram(s) IV Intermittent every 6 hours  bisacodyl Suppository 10 milliGRAM(s) Rectal daily  enoxaparin Injectable 40 milliGRAM(s) SubCutaneous daily  ferrous    sulfate 325 milliGRAM(s) Oral daily  multivitamin/minerals 1 Tablet(s) Oral daily  pantoprazole    Tablet 40 milliGRAM(s) Oral before breakfast  polyethylene glycol 3350 17 Gram(s) Oral daily  saccharomyces boulardii 250 milliGRAM(s) Oral two times a day  senna 2 Tablet(s) Oral at bedtime  sodium chloride 0.9%. 1000 milliLiter(s) (50 mL/Hr) IV Continuous <Continuous>    MEDICATIONS  (PRN):  acetaminophen   Tablet .. 650 milliGRAM(s) Oral every 6 hours PRN Temp greater or equal to 38.5C (101.3F), Mild Pain (1 - 3)  morphine  - Injectable 4 milliGRAM(s) IV Push every 4 hours PRN Severe Pain (7 - 10)  oxycodone    5 mG/acetaminophen 325 mG 2 Tablet(s) Oral every 4 hours PRN Moderate Pain (4 - 6)    Allergies    No Known Allergies    Intolerances        Lab Results:                        9.1    6.65  )-----------( 232      ( 13 Sep 2021 00:39 )             31.9     09-13    138  |  106  |  9<L>  ----------------------------<  100<H>  4.7   |  25  |  0.8    Ca    8.3<L>      13 Sep 2021 00:39  Phos  3.6     09-13  Mg     1.9     09-13        PHYSICAL EXAM:  General: A&O x3 Pt lying in bed comfortable, no acute distress  Chest: no acute cardiopulm distress   Neuro: AAOx3  Skin: wound vac in place, no drainage on bandage.

## 2021-09-15 LAB
ANION GAP SERPL CALC-SCNC: 9 MMOL/L — SIGNIFICANT CHANGE UP (ref 7–14)
CHLORIDE SERPL-SCNC: 106 MMOL/L — SIGNIFICANT CHANGE UP (ref 98–110)
CRP SERPL-MCNC: 32 MG/L — HIGH
ERYTHROCYTE [SEDIMENTATION RATE] IN BLOOD: 35 MM/HR — HIGH (ref 0–20)
POTASSIUM SERPL-MCNC: 5.1 MMOL/L — HIGH (ref 3.5–5)
POTASSIUM SERPL-SCNC: 5.1 MMOL/L — HIGH (ref 3.5–5)
SODIUM SERPL-SCNC: 137 MMOL/L — SIGNIFICANT CHANGE UP (ref 135–146)

## 2021-09-15 PROCEDURE — 99152 MOD SED SAME PHYS/QHP 5/>YRS: CPT

## 2021-09-15 PROCEDURE — 36573 INSJ PICC RS&I 5 YR+: CPT

## 2021-09-15 PROCEDURE — 97605 NEG PRS WND THER DME<=50SQCM: CPT

## 2021-09-15 PROCEDURE — 99231 SBSQ HOSP IP/OBS SF/LOW 25: CPT

## 2021-09-15 RX ORDER — ERTAPENEM SODIUM 1 G/1
1000 INJECTION, POWDER, LYOPHILIZED, FOR SOLUTION INTRAMUSCULAR; INTRAVENOUS EVERY 24 HOURS
Refills: 0 | Status: DISCONTINUED | OUTPATIENT
Start: 2021-09-15 | End: 2021-09-15

## 2021-09-15 RX ORDER — ERTAPENEM SODIUM 1 G/1
INJECTION, POWDER, LYOPHILIZED, FOR SOLUTION INTRAMUSCULAR; INTRAVENOUS
Refills: 0 | Status: DISCONTINUED | OUTPATIENT
Start: 2021-09-15 | End: 2021-09-15

## 2021-09-15 RX ORDER — ONDANSETRON 8 MG/1
4 TABLET, FILM COATED ORAL
Refills: 0 | Status: DISCONTINUED | OUTPATIENT
Start: 2021-09-15 | End: 2021-09-15

## 2021-09-15 RX ORDER — ONDANSETRON 8 MG/1
4 TABLET, FILM COATED ORAL
Refills: 0 | Status: DISCONTINUED | OUTPATIENT
Start: 2021-09-15 | End: 2021-09-16

## 2021-09-15 RX ORDER — SODIUM ZIRCONIUM CYCLOSILICATE 10 G/10G
10 POWDER, FOR SUSPENSION ORAL ONCE
Refills: 0 | Status: COMPLETED | OUTPATIENT
Start: 2021-09-15 | End: 2021-09-15

## 2021-09-15 RX ORDER — ERTAPENEM SODIUM 1 G/1
1000 INJECTION, POWDER, LYOPHILIZED, FOR SOLUTION INTRAMUSCULAR; INTRAVENOUS EVERY 24 HOURS
Refills: 0 | Status: DISCONTINUED | OUTPATIENT
Start: 2021-09-15 | End: 2021-09-16

## 2021-09-15 RX ORDER — ERTAPENEM SODIUM 1 G/1
1 INJECTION, POWDER, LYOPHILIZED, FOR SOLUTION INTRAMUSCULAR; INTRAVENOUS EVERY 24 HOURS
Refills: 0 | Status: DISCONTINUED | OUTPATIENT
Start: 2021-09-15 | End: 2021-09-15

## 2021-09-15 RX ADMIN — OXYCODONE AND ACETAMINOPHEN 2 TABLET(S): 5; 325 TABLET ORAL at 01:54

## 2021-09-15 RX ADMIN — OXYCODONE AND ACETAMINOPHEN 2 TABLET(S): 5; 325 TABLET ORAL at 21:55

## 2021-09-15 RX ADMIN — MORPHINE SULFATE 4 MILLIGRAM(S): 50 CAPSULE, EXTENDED RELEASE ORAL at 16:36

## 2021-09-15 RX ADMIN — OXYCODONE AND ACETAMINOPHEN 2 TABLET(S): 5; 325 TABLET ORAL at 05:55

## 2021-09-15 RX ADMIN — ENOXAPARIN SODIUM 40 MILLIGRAM(S): 100 INJECTION SUBCUTANEOUS at 15:32

## 2021-09-15 RX ADMIN — Medication 250 MILLIGRAM(S): at 05:39

## 2021-09-15 RX ADMIN — SODIUM ZIRCONIUM CYCLOSILICATE 10 GRAM(S): 10 POWDER, FOR SUSPENSION ORAL at 17:57

## 2021-09-15 RX ADMIN — POLYETHYLENE GLYCOL 3350 17 GRAM(S): 17 POWDER, FOR SOLUTION ORAL at 15:36

## 2021-09-15 RX ADMIN — MORPHINE SULFATE 4 MILLIGRAM(S): 50 CAPSULE, EXTENDED RELEASE ORAL at 09:30

## 2021-09-15 RX ADMIN — AMPICILLIN SODIUM AND SULBACTAM SODIUM 100 GRAM(S): 250; 125 INJECTION, POWDER, FOR SUSPENSION INTRAMUSCULAR; INTRAVENOUS at 05:39

## 2021-09-15 RX ADMIN — Medication 1 TABLET(S): at 15:32

## 2021-09-15 RX ADMIN — Medication 100 MILLIGRAM(S): at 17:57

## 2021-09-15 RX ADMIN — ONDANSETRON 4 MILLIGRAM(S): 8 TABLET, FILM COATED ORAL at 18:36

## 2021-09-15 RX ADMIN — MORPHINE SULFATE 4 MILLIGRAM(S): 50 CAPSULE, EXTENDED RELEASE ORAL at 17:15

## 2021-09-15 RX ADMIN — ERTAPENEM SODIUM 120 MILLIGRAM(S): 1 INJECTION, POWDER, LYOPHILIZED, FOR SOLUTION INTRAMUSCULAR; INTRAVENOUS at 16:25

## 2021-09-15 RX ADMIN — MORPHINE SULFATE 4 MILLIGRAM(S): 50 CAPSULE, EXTENDED RELEASE ORAL at 23:34

## 2021-09-15 RX ADMIN — PANTOPRAZOLE SODIUM 40 MILLIGRAM(S): 20 TABLET, DELAYED RELEASE ORAL at 05:39

## 2021-09-15 RX ADMIN — MORPHINE SULFATE 4 MILLIGRAM(S): 50 CAPSULE, EXTENDED RELEASE ORAL at 10:00

## 2021-09-15 RX ADMIN — Medication 325 MILLIGRAM(S): at 15:32

## 2021-09-15 RX ADMIN — Medication 250 MILLIGRAM(S): at 17:57

## 2021-09-15 RX ADMIN — OXYCODONE AND ACETAMINOPHEN 2 TABLET(S): 5; 325 TABLET ORAL at 13:15

## 2021-09-15 RX ADMIN — OXYCODONE AND ACETAMINOPHEN 2 TABLET(S): 5; 325 TABLET ORAL at 02:35

## 2021-09-15 RX ADMIN — OXYCODONE AND ACETAMINOPHEN 2 TABLET(S): 5; 325 TABLET ORAL at 12:16

## 2021-09-15 RX ADMIN — OXYCODONE AND ACETAMINOPHEN 2 TABLET(S): 5; 325 TABLET ORAL at 06:45

## 2021-09-15 NOTE — PROGRESS NOTE ADULT - ASSESSMENT
RLE currently with wound vac in place     #Right Stump WOund infection with necrotic bone   - s/p debridement 9/10 - WOund Cx MSSA, Strep anginosus, polymicrobial   - Sedimentation Rate, Erythrocyte: 35 mm/Hr (09.14.21 @ 21:00)      Plan  - follow-up CRP   - doxycycline 100 mg BID PO and ertapenem 1g daily for 6 weeks from debridment (9/10-10/20)  - follow-up bony path to see if suggestive of acute vs chronic OM  - final course pending path -- if showing chronic OM, may shorten couse  - local wound care    - Weekly CBC, CMP, ESR/CRP  - ID follow-up with Dr. Lux Tenorio for Telehealth. We will call the patient between 10:30-1:30      [address]       [phone]       Fax [phone]      Please call or message on Microsoft Teams if with any questions.  Spectra [phone]

## 2021-09-15 NOTE — PROGRESS NOTE ADULT - SUBJECTIVE AND OBJECTIVE BOX
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     INTERVAL HPI/OVERNIGHT EVENTS:  - No acute events overnight  - Patient afebrile, no complaints    Vital Signs Last 24 Hrs  T(C): 37 (15 Sep 2021 08:54), Max: 37 (15 Sep 2021 08:54)  T(F): 98.6 (15 Sep 2021 08:54), Max: 98.6 (15 Sep 2021 08:54)  HR: 85 (15 Sep 2021 08:54) (83 - 100)  BP: 133/72 (15 Sep 2021 08:54) (116/68 - 136/89)  RR: 20 (15 Sep 2021 08:54) (18 - 20)  SpO2: 100% (15 Sep 2021 08:54) (98% - 100%)    LABS:                        9.6    7.32  )-----------( 238      ( 14 Sep 2021 21:00 )             33.2     09-14    137  |  106  |  13  ----------------------------<  93  5.1<H>   |  22  |  0.9    Ca    8.4<L>      14 Sep 2021 21:00  Phos  3.5     09-14  Mg     1.9     09-14    Culture Results:   Few Corynebacterium striatum group "Susceptibilities not performed"  Moderate Streptococcus anginosus "Susceptibilities not performed"  Moderate Mixed Anaerobic Natividad "Susceptibilities not performed" (09.10.21 @ 09:57)    MEDICATIONS  (STANDING):  bisacodyl Suppository 10 milliGRAM(s) Rectal daily  doxycycline hyclate Capsule 100 milliGRAM(s) Oral every 12 hours  enoxaparin Injectable 40 milliGRAM(s) SubCutaneous daily  ertapenem  IVPB 1000 milliGRAM(s) IV Intermittent every 24 hours  ferrous    sulfate 325 milliGRAM(s) Oral daily  multivitamin/minerals 1 Tablet(s) Oral daily  pantoprazole    Tablet 40 milliGRAM(s) Oral before breakfast  polyethylene glycol 3350 17 Gram(s) Oral daily  saccharomyces boulardii 250 milliGRAM(s) Oral two times a day  senna 2 Tablet(s) Oral at bedtime  sodium chloride 0.9%. 1000 milliLiter(s) (50 mL/Hr) IV Continuous <Continuous>  sodium zirconium cyclosilicate 10 Gram(s) Oral once    MEDICATIONS  (PRN):  acetaminophen   Tablet .. 650 milliGRAM(s) Oral every 6 hours PRN Temp greater or equal to 38.5C (101.3F), Mild Pain (1 - 3)  morphine  - Injectable 4 milliGRAM(s) IV Push every 4 hours PRN Severe Pain (7 - 10)  oxycodone    5 mG/acetaminophen 325 mG 2 Tablet(s) Oral every 4 hours PRN Moderate Pain (4 - 6)    PHYSICAL EXAM:  GENERAL: Patient lying in bed in NAD, A&O x3  SKIN:   RLE: ~4.5 cm long opening at the posterior aspect of distal RLE with minimal bleeding noted at wound site. Wound in clean, pink base and granulating tissue. No purulence or malodor appreciated.

## 2021-09-15 NOTE — PROGRESS NOTE ADULT - SUBJECTIVE AND OBJECTIVE BOX
INTERVENTIONAL RADIOLOGY BRIEF-OPERATIVE NOTE    Procedure:  Peripherally Inserted Central Catheter     Pre-Op Diagnosis:  Wound Infection    Post-Op Diagnosis:  Same    Attending:   Dr. Trejo  Resident:   None    Anesthesia (type):  [ ] General Anesthesia  [X] Sedation-- Versed, 2 mg and Fentanyl, 50 mcg, iv  [ ] Spinal Anesthesia  [x] Local/Regional-- 1% Lidocaine, SQ, 5 cc    Total Face-to-Face Sedation Time:  16 minutes    Contrast:  None    Blood Loss:  3 cc    Condition:   [ ] Critical  [ ] Serious  [ ] Fair   [X] Good    Findings/Follow up Plan of Care:  5-Citizen of [place], 37 cm single lumen PICC enters from the right upper extremity, with tip in the superior vena cava, at the caval-atrial junction.  The catheter works well and is ready to use.    Specimens Removed:  None    Implants:  None    Complications:  None immediate    Disposition:  Ready to use.      Please call Interventional Radiology [phone] with any questions, concerns, or issues.

## 2021-09-15 NOTE — PROGRESS NOTE ADULT - SUBJECTIVE AND OBJECTIVE BOX
SHAYEJIMMY ANG  46y, Female  Allergy: No Known Allergies      LOS  5d    CHIEF COMPLAINT: Right foot stump wound (15 Sep 2021 13:50)      INTERVAL EVENTS/HPI  - No acute events overnight  - T(F): , Max: 98.6 (09-15-21 @ 08:54)  - Denies any worsening symptoms  - Tolerating medication  - WBC Count: 7.32 (09-14-21 @ 21:00)  WBC Count: 6.65 (09-13-21 @ 00:39)     - Creatinine, Serum: 0.9 (09-14-21 @ 21:00)       ROS  General: Denies rigors, nightsweats  HEENT: Denies headache, rhinorrhea, sore throat, eye pain  CV: Denies CP, palpitations  PULM: Denies wheezing, hemoptysis  GI: Denies hematemesis, hematochezia, melena  : Denies discharge, hematuria  MSK: Denies arthralgias, myalgias  SKIN: Denies rash, lesions  NEURO: Denies paresthesias, weakness  PSYCH: Denies depression, anxiety    VITALS:  T(F): 98.6, Max: 98.6 (09-15-21 @ 08:54)  HR: 85  BP: 133/72  RR: 20Vital Signs Last 24 Hrs  T(C): 37 (15 Sep 2021 08:54), Max: 37 (15 Sep 2021 08:54)  T(F): 98.6 (15 Sep 2021 08:54), Max: 98.6 (15 Sep 2021 08:54)  HR: 85 (15 Sep 2021 08:54) (83 - 100)  BP: 133/72 (15 Sep 2021 08:54) (116/68 - 136/89)  BP(mean): --  RR: 20 (15 Sep 2021 08:54) (18 - 20)  SpO2: 100% (15 Sep 2021 08:54) (98% - 100%)    PHYSICAL EXAM:  Gen: NAD, resting in bed  HEENT: Normocephalic, atraumatic  Neck: supple, no lymphadenopathy  CV: Regular rate & regular rhythm  Lungs: decreased BS at bases, no fremitus  Abdomen: Soft, BS present  Ext: RLE with open wound -- mostly clean, pink base  Neuro: non focal, awake  Skin: no rash, no erythema  Lines: no phlebitis    FH: Non-contributory  Social Hx: Non-contributory    TESTS & MEASUREMENTS:                        9.6    7.32  )-----------( 238      ( 14 Sep 2021 21:00 )             33.2     09-14    137  |  106  |  13  ----------------------------<  93  5.1<H>   |  22  |  0.9    Ca    8.4<L>      14 Sep 2021 21:00  Phos  3.5     09-14  Mg     1.9     09-14      eGFR if Non African American: 76 mL/min/1.73M2 (09-14-21 @ 21:00)  eGFR if : 88 mL/min/1.73M2 (09-14-21 @ 21:00)          Culture - Surgical Swab (collected 09-10-21 @ 09:57)  Source: .Surgical Swab None  Final Report (09-12-21 @ 17:04):    Few Corynebacterium striatum group "Susceptibilities not performed"    Moderate Streptococcus anginosus "Susceptibilities not performed"    Moderate Mixed Anaerobic Natividad "Susceptibilities not performed"    Culture - Tissue with Gram Stain (collected 09-10-21 @ 08:42)  Source: .Tissue None  Gram Stain (09-11-21 @ 01:09):    Rare polymorphonuclear leukocytes per low power field    Rare Gram positive cocci in pairs per oil power field    Rare Gram Positive Rods per oil power field  Final Report (09-12-21 @ 16:50):    Rare Alpha hemolytic strep "Susceptibilities not performed"    Rare Corynebacterium species "Susceptibilities not performed"    Moderate Mixed Anaerobic Natividad "Susceptibilities not performed"    Culture - Tissue with Gram Stain (collected 09-10-21 @ 08:42)  Source: .Tissue None  Gram Stain (09-11-21 @ 01:08):    Rare polymorphonuclear leukocytes per low power field    Moderate Gram positive cocci in pairs per oil power field    Rare Gram Negative Rods per oil power field  Final Report (09-12-21 @ 16:46):    Few Streptococcus anginosus "Susceptibilities not performed"    Rare Corynebacterium species "Susceptibilities not performed"    Numerous Mixed Anaerobic Natividad "Susceptibilities not performed"    Culture - Tissue with Gram Stain (collected 09-10-21 @ 08:42)  Source: .Tissue None  Gram Stain (09-11-21 @ 01:08):    Rare polymorphonuclear leukocytes per low power field    Moderate Gram positive cocci in pairs per oil power field    Rare Gram Negative Rods per oil power field  Final Report (09-13-21 @ 16:55):    Rare Staphylococcus aureus    Moderate Streptococcus anginosus "Susceptibilities not performed"    Few Corynebacterium species "Susceptibilities not performed"    Numerous Mixed Anaerobic Natividad "Susceptibilities not performed"    Rare Alcaligenes faecalis  Organism: Staphylococcus aureus  Alcaligenes faecalis (09-13-21 @ 16:55)  Organism: Alcaligenes faecalis (09-13-21 @ 16:55)      -  Amikacin: S <=16      -  Aztreonam: R >16      -  Cefepime: S 8      -  Ceftriaxone: S <=1      -  Ciprofloxacin: S 1      -  Gentamicin: S 4      -  Levofloxacin: S <=0.5      -  Meropenem: S <=1      -  Piperacillin/Tazobactam: S <=8      -  Tobramycin: S 4      -  Trimethoprim/Sulfamethoxazole: S <=0.5/9.5      Method Type: CORY  Organism: Staphylococcus aureus (09-13-21 @ 16:55)      -  Ampicillin/Sulbactam: S <=8/4      -  Cefazolin: S <=4      -  Clindamycin: S <=0.25      -  Erythromycin: S <=0.25      -  Gentamicin: S <=1 Should not be used as monotherapy      -  Oxacillin: S <=0.25      -  RIF- Rifampin: S <=1 Should not be used as monotherapy      -  Tetra/Doxy: S <=1      -  Trimethoprim/Sulfamethoxazole: S <=0.5/9.5      -  Vancomycin: S 2      Method Type: CORY            INFECTIOUS DISEASES TESTING      INFLAMMATORY MARKERS  Sedimentation Rate, Erythrocyte: 35 mm/Hr (09-14-21 @ 21:00)      RADIOLOGY & ADDITIONAL TESTS:  I have personally reviewed the last available Chest xray  CXR      CT      CARDIOLOGY TESTING      MEDICATIONS  bisacodyl Suppository 10 Rectal daily  doxycycline hyclate Capsule 100 Oral every 12 hours  enoxaparin Injectable 40 SubCutaneous daily  ertapenem  IVPB 1000 IV Intermittent every 24 hours  ferrous    sulfate 325 Oral daily  multivitamin/minerals 1 Oral daily  pantoprazole    Tablet 40 Oral before breakfast  polyethylene glycol 3350 17 Oral daily  saccharomyces boulardii 250 Oral two times a day  senna 2 Oral at bedtime  sodium chloride 0.9%. 1000 IV Continuous <Continuous>  sodium zirconium cyclosilicate 10 Oral once      WEIGHT  Weight (kg): 86.2 (09-10-21 @ 07:16)  Creatinine, Serum: 0.9 mg/dL (09-14-21 @ 21:00)      ANTIBIOTICS:  doxycycline hyclate Capsule 100 milliGRAM(s) Oral every 12 hours  ertapenem  IVPB 1000 milliGRAM(s) IV Intermittent every 24 hours      All available historical records have been reviewed

## 2021-09-15 NOTE — PROGRESS NOTE ADULT - ASSESSMENT
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     Procedures:  - 9/10: s/p debridement of right lower extremity + negative pressure wound therapy    Right lower extremity wound  - IV antibiotics: Unasyn  - IV fluids: NS @50  - PICC place today 9/15: pending  - ID recs 9/12: Patient to be discharged with Ertapenem 1g daily + doxy 100 BID PO for 6 weeks   - Pain management  - GI/DVT ppx  - Wound care: NPWT (MWF) changed today 9/15 --> next change Friday 17   - Social work: patient requesting EZ boots/heel protectors    - PT c/s  - Follow up bone biopsy  - Follow up CRP    {[id number]}     Patient is a 43 year old female with pmhx of DVT, 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     Procedures:  - 9/10: s/p debridement of right lower extremity + negative pressure wound therapy    Right lower extremity wound  - IV antibiotics: Unasyn  - IV fluids: NS @50  - PICC place today 9/15: pending  - ID recs 9/12: Patient to be discharged with Ertapenem 1g daily + doxy 100 BID PO for 6 weeks   - Pain management  - GI/DVT ppx  - Wound care: NPWT (MW) changed today 9/15 --> next change Friday 17   - Nurse management: patient requesting EZ boots/heel protectors, ordered by NM.   - PT c/s  - Follow up bone biopsy  - Follow up CRP    {[id number]}

## 2021-09-15 NOTE — PROGRESS NOTE ADULT - TIME BILLING
I have personally seen and examined this patient.    I have reviewed all pertinent clinical information and reviewed all relevant imaging and diagnostic studies personally.   I counseled the patient about diagnostic testing and treatment plan. All questions were answered.   I discussed recommendations with the primary team.
wound eval and treat
wound eval and treat
wound care
wound care

## 2021-09-16 ENCOUNTER — TRANSCRIPTION ENCOUNTER (OUTPATIENT)
Age: 46
End: 2021-09-16

## 2021-09-16 VITALS
TEMPERATURE: 97 F | SYSTOLIC BLOOD PRESSURE: 100 MMHG | OXYGEN SATURATION: 97 % | RESPIRATION RATE: 18 BRPM | DIASTOLIC BLOOD PRESSURE: 59 MMHG | HEART RATE: 79 BPM

## 2021-09-16 PROCEDURE — 99232 SBSQ HOSP IP/OBS MODERATE 35: CPT

## 2021-09-16 RX ORDER — ERTAPENEM SODIUM 1 G/1
1 INJECTION, POWDER, LYOPHILIZED, FOR SOLUTION INTRAMUSCULAR; INTRAVENOUS
Qty: 0 | Refills: 0 | DISCHARGE
Start: 2021-09-16

## 2021-09-16 RX ORDER — OXYCODONE AND ACETAMINOPHEN 5; 325 MG/1; MG/1
2 TABLET ORAL
Qty: 42 | Refills: 0
Start: 2021-09-16 | End: 2021-09-22

## 2021-09-16 RX ORDER — SACCHAROMYCES BOULARDII 250 MG
1 POWDER IN PACKET (EA) ORAL
Qty: 60 | Refills: 1
Start: 2021-09-16 | End: 2021-11-14

## 2021-09-16 RX ORDER — SENNA PLUS 8.6 MG/1
2 TABLET ORAL
Qty: 0 | Refills: 0 | DISCHARGE
Start: 2021-09-16

## 2021-09-16 RX ORDER — FERROUS SULFATE 325(65) MG
1 TABLET ORAL
Qty: 0 | Refills: 0 | DISCHARGE
Start: 2021-09-16

## 2021-09-16 RX ORDER — ACETAMINOPHEN 500 MG
2 TABLET ORAL
Qty: 0 | Refills: 0 | DISCHARGE
Start: 2021-09-16

## 2021-09-16 RX ADMIN — OXYCODONE AND ACETAMINOPHEN 2 TABLET(S): 5; 325 TABLET ORAL at 09:29

## 2021-09-16 RX ADMIN — Medication 325 MILLIGRAM(S): at 11:20

## 2021-09-16 RX ADMIN — OXYCODONE AND ACETAMINOPHEN 2 TABLET(S): 5; 325 TABLET ORAL at 10:00

## 2021-09-16 RX ADMIN — Medication 100 MILLIGRAM(S): at 07:01

## 2021-09-16 RX ADMIN — ENOXAPARIN SODIUM 40 MILLIGRAM(S): 100 INJECTION SUBCUTANEOUS at 11:20

## 2021-09-16 RX ADMIN — POLYETHYLENE GLYCOL 3350 17 GRAM(S): 17 POWDER, FOR SOLUTION ORAL at 11:20

## 2021-09-16 RX ADMIN — Medication 250 MILLIGRAM(S): at 07:01

## 2021-09-16 RX ADMIN — PANTOPRAZOLE SODIUM 40 MILLIGRAM(S): 20 TABLET, DELAYED RELEASE ORAL at 07:01

## 2021-09-16 RX ADMIN — Medication 1 TABLET(S): at 11:20

## 2021-09-16 NOTE — DISCHARGE NOTE PROVIDER - NSDCFUADDAPPT_GEN_ALL_CORE_FT
Please call [phone] to make a follow up appointment within 2 weeks with Dr. Artis or Dr. Beck. Clinic is located at [address] [place] [address] on Tuesdays (2-4pm) or Thursdays (9am-1pm).     Please follow up with ID Dr. Lux Lutz on Tuesdays for Telehealth. will call the patient between 10:30-1:30, [phone] , [phone]

## 2021-09-16 NOTE — PROGRESS NOTE ADULT - ATTENDING COMMENTS
AM rounds  As above   PICC place RUE   Pt - no complaints. VSS    Right heel NPWT in progress; no bleeding     A/P :    Chronic wound right heel - amputation site   Continue NPWT - home unit placed   Concerns addressed- re duplex findings and K+ levels.  Discharge home today to continue IV / po abx - 6 week course per ID   Out pt f/u
wound vac in place rigth foot-> iv abx, dressing change in a m
AM rounds   As above     EXAM :  pt awake alert   c/o pain with NPWT dressing change   Right heel wound - grossly clean, pink     NPWT dressing change done     A/P   Stable   Continue NPWT  Bone biopsy pending   Abx per ID. Probable PICC line placement. Pt is concerned about clots with PICC line   Continuing care discussed with pt.   Concerns addressed
large dressing change --> right foot granulating--> wound , iv abx

## 2021-09-16 NOTE — CHART NOTE - NSCHARTNOTEFT_GEN_A_CORE
Interventional Radiology Note:   Called by case management on the floor, need brand name of the PICC line.  [employer] Power PICC, 37 cm  ok to use

## 2021-09-16 NOTE — DISCHARGE NOTE PROVIDER - CARE PROVIDER_API CALL
Sudhir Artis)  Plastic Surgery  [address] [place] [address]  Phone: [phone]  Fax: [phone]  Follow Up Time:     Lux Lutz  Infectious Diseases  [address] [place] [address]  Phone: [phone]  Fax: [phone]  Follow Up Time:

## 2021-09-16 NOTE — DISCHARGE NOTE PROVIDER - HOSPITAL COURSE
Patient is a 43 year old female with PMHx of DVT (LUE 12/2018) no longer on anticoagulation, gastric bypass (10/2017) complicated by bowel obstruction, ARDs and sepsis requiring ICU admission and pressors, which resulted in upper and lower limb ischemia s/p bilateral hand and foot amputations and skin grafts presented from ambulatory surgery for admission s/p debridement of her right foot stump wound and wound vac placement. Patient was taken to OR 9/10/21, wound debrided, bone biopsy sent, wound vac placed, and pt is being admitted for IV abx, pain control and wound care.     Wound Cultures x3 from OR on 9/10: few corynebacterium striatum, mod strep anginosus, mod mixed anaerobic marcie  Bone Biopsy 9/10: pending results  ESR 9/14: 35    CRP 9/14: 32  As per ID recommendations pt started on doxycycline 100 mg BID PO and ertapenem 1g daily for 6 weeks from day of debridement (9/10-10/20).  PICC line placed in RUE on 9/15 for home long term IV antibiotics infusion.   Will follow-up bony path to see if suggestive of acute vs chronic OM  - final course pending path -- if showing chronic OM, may shorten course of abx.   F/U Weekly CBC, CMP, ESR/CRP.  Recommended  ID follow-up with Dr. Lux Tenorio for Telehealth. We will call the patient between 10:30-1:30, [address]  ([phone] )     Pt has hx of LUE DVT 12/2018, completed course of AC, currently not on AC. Repeated LUE Duplex 9/14 negative for DVT but  shows left cephalic superficial thrombosis.    Pt is stable to be discharge home with recommendations to continue wound vac therapy, continue IV antibiotics, and to follow up in [facility] in two weeks after discharge.

## 2021-09-16 NOTE — DISCHARGE NOTE PROVIDER - NSDCMRMEDTOKEN_GEN_ALL_CORE_FT
acetaminophen 325 mg oral tablet: 2 tab(s) orally every 6 hours, As needed, Temp greater or equal to 38.5C (101.3F), Mild Pain (1 - 3)  doxycycline hyclate 100 mg oral capsule: 1 cap(s) orally 2 times a day  ertapenem 1 g injection: 1 gram(s) intravenous once a day  ferrous sulfate 325 mg (65 mg elemental iron) oral tablet: 1 tab(s) orally once a day  Florastor 250 mg oral capsule: 1 cap(s) orally 2 times a day  Multi-Day Plus Minerals oral tablet: 1 tab(s) orally once a day  Percocet 5 mg-325 mg oral tablet: 2 tab(s) orally every 8 hours, As Needed -Moderate Pain (4 - 6) MDD:6 tabs  senna oral tablet: 2 tab(s) orally once a day (at bedtime)

## 2021-09-16 NOTE — PROGRESS NOTE ADULT - SUBJECTIVE AND OBJECTIVE BOX
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     INTERVAL HPI/OVERNIGHT EVENTS:  - No acute events overnight  - Patient afebrile, no complaints    ital Signs Last 24 Hrs  T(C): 36.4 (16 Sep 2021 09:00), Max: 37 (15 Sep 2021 21:00)  T(F): 97.6 (16 Sep 2021 09:00), Max: 98.6 (15 Sep 2021 21:00)  HR: 72 (16 Sep 2021 09:00) (72 - 83)  BP: 133/73 (16 Sep 2021 09:00) (122/72 - 136/89)  BP(mean): --  ABP: --  ABP(mean): --  RR: 18 (16 Sep 2021 09:00) (18 - 18)  SpO2: 100% (16 Sep 2021 09:00) (98% - 100%)    I&O's Summary    15 Sep 2021 07:01  -  16 Sep 2021 07:00  --------------------------------------------------------  IN: 0 mL / OUT: 500 mL / NET: -500 mL      Allergies  No Known Allergies      Lab Results:                        9.6    7.32  )-----------( 238      ( 14 Sep 2021 21:00 )             33.2     09-14    137  |  106  |  13  ----------------------------<  93  5.1<H>   |  22  |  0.9    Ca    8.4<L>      14 Sep 2021 21:00  Phos  3.5     09-14  Mg     1.9     09-14        MEDICATIONS  (STANDING):  bisacodyl Suppository 10 milliGRAM(s) Rectal daily  doxycycline hyclate Capsule 100 milliGRAM(s) Oral every 12 hours  enoxaparin Injectable 40 milliGRAM(s) SubCutaneous daily  ertapenem  IVPB 1000 milliGRAM(s) IV Intermittent every 24 hours  ferrous    sulfate 325 milliGRAM(s) Oral daily  multivitamin/minerals 1 Tablet(s) Oral daily  pantoprazole    Tablet 40 milliGRAM(s) Oral before breakfast  polyethylene glycol 3350 17 Gram(s) Oral daily  saccharomyces boulardii 250 milliGRAM(s) Oral two times a day  senna 2 Tablet(s) Oral at bedtime    MEDICATIONS  (PRN):  acetaminophen   Tablet .. 650 milliGRAM(s) Oral every 6 hours PRN Temp greater or equal to 38.5C (101.3F), Mild Pain (1 - 3)  morphine  - Injectable 4 milliGRAM(s) IV Push every 4 hours PRN Severe Pain (7 - 10)  ondansetron Injectable 4 milliGRAM(s) IV Push two times a day PRN Nausea and/or Vomiting  oxycodone    5 mG/acetaminophen 325 mG 2 Tablet(s) Oral every 4 hours PRN Moderate Pain (4 - 6)      PHYSICAL EXAM:  GENERAL: Patient lying in bed in NAD, A&O x3, cooperative  SRLE:  wound vac in place on Right foot stump, working properly, no bleeding noted.     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     INTERVAL HPI/OVERNIGHT EVENTS:  - No acute events overnight  - Patient afebrile, no complaints    Vital Signs Last 24 Hrs  T(C): 36.4 (16 Sep 2021 09:00), Max: 37 (15 Sep 2021 21:00)  T(F): 97.6 (16 Sep 2021 09:00), Max: 98.6 (15 Sep 2021 21:00)  HR: 72 (16 Sep 2021 09:00) (72 - 83)  BP: 133/73 (16 Sep 2021 09:00) (122/72 - 136/89)  RR: 18 (16 Sep 2021 09:00) (18 - 18)  SpO2: 100% (16 Sep 2021 09:00) (98% - 100%)    I&O's Summary    15 Sep 2021 07:01  -  16 Sep 2021 07:00  --------------------------------------------------------  IN: 0 mL / OUT: 500 mL / NET: -500 mL      Allergies  No Known Allergies      Lab Results:                        9.6    7.32  )-----------( 238      ( 14 Sep 2021 21:00 )             33.2     09-14    137  |  106  |  13  ----------------------------<  93  5.1<H>   |  22  |  0.9    Ca    8.4<L>      14 Sep 2021 21:00  Phos  3.5     09-14  Mg     1.9     09-14        MEDICATIONS  (STANDING):  bisacodyl Suppository 10 milliGRAM(s) Rectal daily  doxycycline hyclate Capsule 100 milliGRAM(s) Oral every 12 hours  enoxaparin Injectable 40 milliGRAM(s) SubCutaneous daily  ertapenem  IVPB 1000 milliGRAM(s) IV Intermittent every 24 hours  ferrous    sulfate 325 milliGRAM(s) Oral daily  multivitamin/minerals 1 Tablet(s) Oral daily  pantoprazole    Tablet 40 milliGRAM(s) Oral before breakfast  polyethylene glycol 3350 17 Gram(s) Oral daily  saccharomyces boulardii 250 milliGRAM(s) Oral two times a day  senna 2 Tablet(s) Oral at bedtime    MEDICATIONS  (PRN):  acetaminophen   Tablet .. 650 milliGRAM(s) Oral every 6 hours PRN Temp greater or equal to 38.5C (101.3F), Mild Pain (1 - 3)  morphine  - Injectable 4 milliGRAM(s) IV Push every 4 hours PRN Severe Pain (7 - 10)  ondansetron Injectable 4 milliGRAM(s) IV Push two times a day PRN Nausea and/or Vomiting  oxycodone    5 mG/acetaminophen 325 mG 2 Tablet(s) Oral every 4 hours PRN Moderate Pain (4 - 6)      PHYSICAL EXAM:  GENERAL: Patient lying in bed in NAD, A&O x3, cooperative  SRLE:  wound vac in place on Right heel  wound site working properly, no bleeding noted.

## 2021-09-16 NOTE — PROGRESS NOTE ADULT - REASON FOR ADMISSION
Right foot stump wound

## 2021-09-16 NOTE — DISCHARGE NOTE PROVIDER - NSDCCPCAREPLAN_GEN_ALL_CORE_FT
PRINCIPAL DISCHARGE DIAGNOSIS  Diagnosis: Non-healing wound of amputation stump  Assessment and Plan of Treatment: s/p debridement of Right foot wound and wound vacuum placement on 9/10/2021. Please continue wound vac therapy at home, change dressing every three days with help of VNS. Please, continue antibiotics as prescribed. Please, follow up in [facility] in two week. Please follow up with Infection disease doctor in two-tree weeks.

## 2021-09-16 NOTE — DISCHARGE NOTE PROVIDER - CARE PROVIDERS DIRECT ADDRESSES
[email or web address]

## 2021-09-16 NOTE — DISCHARGE NOTE PROVIDER - NSFOLLOWUPCLINICS_GEN_ALL_ED_FT
[facility]  [facility]  [address], [address]  [place] [address]  Phone: [phone]  Fax:   Follow Up Time: 2 weeks

## 2021-09-16 NOTE — PROGRESS NOTE ADULT - ASSESSMENT
Patient is a 43 year old female with PMHx of DVT (LUE 12/2018) no longer on anticoagulation, gastric bypass (10/2017) complicated by bowel obstruction, ARDs and sepsis requiring ICU admission and pressors, which resulted in upper and lower limb ischemia s/p bilateral hand and foot amputations and skin grafts presented from ambulatory surgery for admission s/p debridement of her right foot stump wound and wound vac placement. Patient was taken to OR 9/10/21, wound debrided, bone biopsy sent, wound vac placed, and pt is being admitted for IV abx, pain control and wound care.       # Right lower extremity wound  - 9/10: s/p debridement of right lower extremity + negative pressure wound therapy  - Wound Cultures x3 from OR on 9/10: few corynebacterium striatum, mod strep anginosus, mod mixed anaerobic marcie  - Bone Biopsy 9/10: pending results  - ESR 9/14: 35    CRP 9/14: 32  -  PICC placed 9/15 for IV abx  - As per ID recommendations pt started on doxycycline 100 mg BID PO and ertapenem 1g daily for 6 weeks from day of debridement (9/10-10/20).  - Recommended  ID follow-up with Dr. Lux Tenorio for Telehealth. between 10:30-1:30, [address]  ([phone] )  - Pain management  - continue Wound care: NPWT (MWF) changed 9/15 --> next change Friday 17   - pt is stable to be discharge home today with recommendations to follow up in [facility] in one week after discharge  - plan discussed with patient and  who presented on bedside

## 2021-09-16 NOTE — DISCHARGE NOTE PROVIDER - REASON FOR ADMISSION
Price Per Unit Or Per Cc In $ (Use Numbers Only, No Special Characters Or $): 10 Right foot stump wound

## 2021-09-20 LAB — SURGICAL PATHOLOGY STUDY: SIGNIFICANT CHANGE UP

## 2021-09-21 NOTE — PRE-OP CHECKLIST - SITE MARKED BY ANESTHESIOLOGIST
Caller - Pharmacy - called stating that they are in need of a diagnostic code for medication - lisdexamfetamine (Vyvanse) 50 MG capsule. Requesting a call back.     Patient Name: Darrel  Caller Name: [facility]   Phone Number: [phone]     [place] [address] [address]   [phone]  
Writer left message on pharmacy phone number with ADD code of F98.8    Left contact information for call back with any additional questions.    
n/a

## 2021-09-23 ENCOUNTER — OUTPATIENT (OUTPATIENT)
Dept: OUTPATIENT SERVICES | Facility: HOSPITAL | Age: 46
LOS: 1 days | Discharge: HOME | End: 2021-09-23

## 2021-09-23 ENCOUNTER — APPOINTMENT (OUTPATIENT)
Dept: BURN CARE | Facility: CLINIC | Age: 46
End: 2021-09-23
Payer: COMMERCIAL

## 2021-09-23 ENCOUNTER — LABORATORY RESULT (OUTPATIENT)
Age: 46
End: 2021-09-23

## 2021-09-23 DIAGNOSIS — Z89.422 ACQUIRED ABSENCE OF OTHER LEFT TOE(S): ICD-10-CM

## 2021-09-23 DIAGNOSIS — Z89.9 ACQUIRED ABSENCE OF LIMB, UNSPECIFIED: Chronic | ICD-10-CM

## 2021-09-23 DIAGNOSIS — I82.612 ACUTE EMBOLISM AND THROMBOSIS OF SUPERFICIAL VEINS OF LEFT UPPER EXTREMITY: ICD-10-CM

## 2021-09-23 DIAGNOSIS — Z98.891 HISTORY OF UTERINE SCAR FROM PREVIOUS SURGERY: Chronic | ICD-10-CM

## 2021-09-23 DIAGNOSIS — M86.8X7 OTHER OSTEOMYELITIS, ANKLE AND FOOT: ICD-10-CM

## 2021-09-23 DIAGNOSIS — M87.874 OTHER OSTEONECROSIS, RIGHT FOOT: ICD-10-CM

## 2021-09-23 DIAGNOSIS — Z98.84 BARIATRIC SURGERY STATUS: Chronic | ICD-10-CM

## 2021-09-23 DIAGNOSIS — Z89.421 ACQUIRED ABSENCE OF OTHER RIGHT TOE(S): ICD-10-CM

## 2021-09-23 DIAGNOSIS — Z98.890 OTHER SPECIFIED POSTPROCEDURAL STATES: Chronic | ICD-10-CM

## 2021-09-23 DIAGNOSIS — Z86.718 PERSONAL HISTORY OF OTHER VENOUS THROMBOSIS AND EMBOLISM: ICD-10-CM

## 2021-09-23 DIAGNOSIS — Z98.84 BARIATRIC SURGERY STATUS: ICD-10-CM

## 2021-09-23 DIAGNOSIS — Z86.79 PERSONAL HISTORY OF OTHER DISEASES OF THE CIRCULATORY SYSTEM: ICD-10-CM

## 2021-09-23 DIAGNOSIS — Z89.112 ACQUIRED ABSENCE OF LEFT HAND: ICD-10-CM

## 2021-09-23 DIAGNOSIS — S68.119A COMPLETE TRAUMATIC METACARPOPHALANGEAL AMPUTATION OF UNSPECIFIED FINGER, INITIAL ENCOUNTER: Chronic | ICD-10-CM

## 2021-09-23 DIAGNOSIS — T87.43 INFECTION OF AMPUTATION STUMP, RIGHT LOWER EXTREMITY: ICD-10-CM

## 2021-09-23 DIAGNOSIS — Z90.49 ACQUIRED ABSENCE OF OTHER SPECIFIED PARTS OF DIGESTIVE TRACT: ICD-10-CM

## 2021-09-23 DIAGNOSIS — B95.61 METHICILLIN SUSCEPTIBLE STAPHYLOCOCCUS AUREUS INFECTION AS THE CAUSE OF DISEASES CLASSIFIED ELSEWHERE: ICD-10-CM

## 2021-09-23 DIAGNOSIS — S91.309A UNSPECIFIED OPEN WOUND, UNSPECIFIED FOOT, INITIAL ENCOUNTER: Chronic | ICD-10-CM

## 2021-09-23 DIAGNOSIS — B95.4 OTHER STREPTOCOCCUS AS THE CAUSE OF DISEASES CLASSIFIED ELSEWHERE: ICD-10-CM

## 2021-09-23 DIAGNOSIS — Z86.03 PERSONAL HISTORY OF NEOPLASM OF UNCERTAIN BEHAVIOR: Chronic | ICD-10-CM

## 2021-09-23 PROCEDURE — 99213 OFFICE O/P EST LOW 20 MIN: CPT

## 2021-10-07 ENCOUNTER — APPOINTMENT (OUTPATIENT)
Dept: BURN CARE | Facility: CLINIC | Age: 46
End: 2021-10-07
Payer: COMMERCIAL

## 2021-10-07 ENCOUNTER — OUTPATIENT (OUTPATIENT)
Dept: OUTPATIENT SERVICES | Facility: HOSPITAL | Age: 46
LOS: 1 days | Discharge: HOME | End: 2021-10-07

## 2021-10-07 DIAGNOSIS — Z98.890 OTHER SPECIFIED POSTPROCEDURAL STATES: Chronic | ICD-10-CM

## 2021-10-07 DIAGNOSIS — Z89.9 ACQUIRED ABSENCE OF LIMB, UNSPECIFIED: Chronic | ICD-10-CM

## 2021-10-07 DIAGNOSIS — Z86.03 PERSONAL HISTORY OF NEOPLASM OF UNCERTAIN BEHAVIOR: Chronic | ICD-10-CM

## 2021-10-07 DIAGNOSIS — Z98.891 HISTORY OF UTERINE SCAR FROM PREVIOUS SURGERY: Chronic | ICD-10-CM

## 2021-10-07 DIAGNOSIS — S91.309A UNSPECIFIED OPEN WOUND, UNSPECIFIED FOOT, INITIAL ENCOUNTER: Chronic | ICD-10-CM

## 2021-10-07 DIAGNOSIS — Z98.84 BARIATRIC SURGERY STATUS: Chronic | ICD-10-CM

## 2021-10-07 DIAGNOSIS — S68.119A COMPLETE TRAUMATIC METACARPOPHALANGEAL AMPUTATION OF UNSPECIFIED FINGER, INITIAL ENCOUNTER: Chronic | ICD-10-CM

## 2021-10-07 PROCEDURE — 99213 OFFICE O/P EST LOW 20 MIN: CPT

## 2021-10-09 LAB — BACTERIA SPEC CULT: NORMAL

## 2021-10-28 ENCOUNTER — OUTPATIENT (OUTPATIENT)
Dept: OUTPATIENT SERVICES | Facility: HOSPITAL | Age: 46
LOS: 1 days | Discharge: HOME | End: 2021-10-28

## 2021-10-28 ENCOUNTER — APPOINTMENT (OUTPATIENT)
Dept: BURN CARE | Facility: CLINIC | Age: 46
End: 2021-10-28
Payer: COMMERCIAL

## 2021-10-28 DIAGNOSIS — Z98.891 HISTORY OF UTERINE SCAR FROM PREVIOUS SURGERY: Chronic | ICD-10-CM

## 2021-10-28 DIAGNOSIS — Z89.9 ACQUIRED ABSENCE OF LIMB, UNSPECIFIED: Chronic | ICD-10-CM

## 2021-10-28 DIAGNOSIS — S91.309A UNSPECIFIED OPEN WOUND, UNSPECIFIED FOOT, INITIAL ENCOUNTER: Chronic | ICD-10-CM

## 2021-10-28 DIAGNOSIS — Z98.890 OTHER SPECIFIED POSTPROCEDURAL STATES: Chronic | ICD-10-CM

## 2021-10-28 DIAGNOSIS — S68.119A COMPLETE TRAUMATIC METACARPOPHALANGEAL AMPUTATION OF UNSPECIFIED FINGER, INITIAL ENCOUNTER: Chronic | ICD-10-CM

## 2021-10-28 DIAGNOSIS — Z86.03 PERSONAL HISTORY OF NEOPLASM OF UNCERTAIN BEHAVIOR: Chronic | ICD-10-CM

## 2021-10-28 DIAGNOSIS — Z98.84 BARIATRIC SURGERY STATUS: Chronic | ICD-10-CM

## 2021-10-28 PROCEDURE — 99213 OFFICE O/P EST LOW 20 MIN: CPT

## 2021-10-30 LAB — BACTERIA SPEC CULT: NORMAL

## 2021-11-18 ENCOUNTER — APPOINTMENT (OUTPATIENT)
Dept: BURN CARE | Facility: CLINIC | Age: 46
End: 2021-11-18
Payer: COMMERCIAL

## 2021-11-18 ENCOUNTER — OUTPATIENT (OUTPATIENT)
Dept: OUTPATIENT SERVICES | Facility: HOSPITAL | Age: 46
LOS: 1 days | Discharge: HOME | End: 2021-11-18

## 2021-11-18 DIAGNOSIS — Z98.890 OTHER SPECIFIED POSTPROCEDURAL STATES: Chronic | ICD-10-CM

## 2021-11-18 DIAGNOSIS — Z86.03 PERSONAL HISTORY OF NEOPLASM OF UNCERTAIN BEHAVIOR: Chronic | ICD-10-CM

## 2021-11-18 DIAGNOSIS — Z98.84 BARIATRIC SURGERY STATUS: Chronic | ICD-10-CM

## 2021-11-18 DIAGNOSIS — S68.119A COMPLETE TRAUMATIC METACARPOPHALANGEAL AMPUTATION OF UNSPECIFIED FINGER, INITIAL ENCOUNTER: Chronic | ICD-10-CM

## 2021-11-18 DIAGNOSIS — Z98.891 HISTORY OF UTERINE SCAR FROM PREVIOUS SURGERY: Chronic | ICD-10-CM

## 2021-11-18 DIAGNOSIS — Z89.9 ACQUIRED ABSENCE OF LIMB, UNSPECIFIED: Chronic | ICD-10-CM

## 2021-11-18 DIAGNOSIS — L97.509 NON-PRESSURE CHRONIC ULCER OF OTHER PART OF UNSPECIFIED FOOT WITH UNSPECIFIED SEVERITY: ICD-10-CM

## 2021-11-18 DIAGNOSIS — S91.309A UNSPECIFIED OPEN WOUND, UNSPECIFIED FOOT, INITIAL ENCOUNTER: Chronic | ICD-10-CM

## 2021-11-18 PROCEDURE — 99213 OFFICE O/P EST LOW 20 MIN: CPT

## 2021-11-21 LAB — BACTERIA SPEC CULT: ABNORMAL

## 2022-01-01 NOTE — PATIENT PROFILE ADULT - NSPRONUTRITIONRISK_GEN_A_NUR
This note was copied from the mother's chart. Pt has been using formula supplement d/t low blood glucose with baby B. Encouraged breast stimulation with EBP every 2-3 hours after time direct breast feeding to enhance milk production and earlier milk coming in. Pt verbalizes understanding. No indicators present

## 2022-01-06 ENCOUNTER — APPOINTMENT (OUTPATIENT)
Dept: BURN CARE | Facility: CLINIC | Age: 47
End: 2022-01-06
Payer: COMMERCIAL

## 2022-01-06 ENCOUNTER — OUTPATIENT (OUTPATIENT)
Dept: OUTPATIENT SERVICES | Facility: HOSPITAL | Age: 47
LOS: 1 days | Discharge: HOME | End: 2022-01-06

## 2022-01-06 DIAGNOSIS — S91.309A UNSPECIFIED OPEN WOUND, UNSPECIFIED FOOT, INITIAL ENCOUNTER: Chronic | ICD-10-CM

## 2022-01-06 DIAGNOSIS — Z98.891 HISTORY OF UTERINE SCAR FROM PREVIOUS SURGERY: Chronic | ICD-10-CM

## 2022-01-06 DIAGNOSIS — Z89.9 ACQUIRED ABSENCE OF LIMB, UNSPECIFIED: Chronic | ICD-10-CM

## 2022-01-06 DIAGNOSIS — Z98.84 BARIATRIC SURGERY STATUS: Chronic | ICD-10-CM

## 2022-01-06 DIAGNOSIS — Z98.890 OTHER SPECIFIED POSTPROCEDURAL STATES: Chronic | ICD-10-CM

## 2022-01-06 DIAGNOSIS — S68.119A COMPLETE TRAUMATIC METACARPOPHALANGEAL AMPUTATION OF UNSPECIFIED FINGER, INITIAL ENCOUNTER: Chronic | ICD-10-CM

## 2022-01-06 DIAGNOSIS — Z86.03 PERSONAL HISTORY OF NEOPLASM OF UNCERTAIN BEHAVIOR: Chronic | ICD-10-CM

## 2022-01-06 PROCEDURE — 99213 OFFICE O/P EST LOW 20 MIN: CPT

## 2022-01-24 NOTE — PATIENT PROFILE ADULT. - FALLEN IN THE PAST
What Type Of Note Output Would You Prefer (Optional)?: Standard Output What Is The Reason For Today's Visit?: Annual Full Body Skin Examination with No Concerns What Is The Reason For Today's Visit? (Being Monitored For X): concerning skin lesions on an annual basis How Severe Are Your Spot(S)?: mild no

## 2022-01-25 NOTE — PRE-ANESTHESIA EVALUATION ADULT - MALLAMPATI CLASS
You can reach your [facility] Care Team any time of the day by calling [phone]. This number will put you in touch with the 24 hour nurse line if the clinic is closed.    To contact your OB/GYN Station Coordinator/Surgery Scheduler please call [phone]. This is a direct number for your care team between 8 a.m. and 4 p.m. Monday through Friday.    [facility] is open for your convenience:  Monday through Friday 8 a.m. to 6 p.m.  Closed weekends and all major holidays.    Patient Education   Personalized Prevention Plan  You are due for the preventive services outlined below.  Your care team is available to assist you in scheduling these services.  If you have already completed any of these items, please share that information with your care team to update in your medical record.  Health Maintenance Due   Topic Date Due     HIV Screening  Never done     Hepatitis C Screening  Never done     PHQ-2  01/01/2022        
Class II - visualization of the soft palate, fauces, and uvula

## 2022-02-03 ENCOUNTER — APPOINTMENT (OUTPATIENT)
Dept: BURN CARE | Facility: CLINIC | Age: 47
End: 2022-02-03
Payer: COMMERCIAL

## 2022-02-03 ENCOUNTER — OUTPATIENT (OUTPATIENT)
Dept: OUTPATIENT SERVICES | Facility: HOSPITAL | Age: 47
LOS: 1 days | Discharge: HOME | End: 2022-02-03

## 2022-02-03 DIAGNOSIS — Z98.891 HISTORY OF UTERINE SCAR FROM PREVIOUS SURGERY: Chronic | ICD-10-CM

## 2022-02-03 DIAGNOSIS — Z86.03 PERSONAL HISTORY OF NEOPLASM OF UNCERTAIN BEHAVIOR: Chronic | ICD-10-CM

## 2022-02-03 DIAGNOSIS — S91.309A UNSPECIFIED OPEN WOUND, UNSPECIFIED FOOT, INITIAL ENCOUNTER: Chronic | ICD-10-CM

## 2022-02-03 DIAGNOSIS — Z98.890 OTHER SPECIFIED POSTPROCEDURAL STATES: Chronic | ICD-10-CM

## 2022-02-03 DIAGNOSIS — Z98.84 BARIATRIC SURGERY STATUS: Chronic | ICD-10-CM

## 2022-02-03 DIAGNOSIS — Z89.9 ACQUIRED ABSENCE OF LIMB, UNSPECIFIED: Chronic | ICD-10-CM

## 2022-02-03 DIAGNOSIS — S68.119A COMPLETE TRAUMATIC METACARPOPHALANGEAL AMPUTATION OF UNSPECIFIED FINGER, INITIAL ENCOUNTER: Chronic | ICD-10-CM

## 2022-02-03 PROCEDURE — 99213 OFFICE O/P EST LOW 20 MIN: CPT

## 2022-02-05 LAB — BACTERIA SPEC CULT: ABNORMAL

## 2022-03-03 ENCOUNTER — APPOINTMENT (OUTPATIENT)
Dept: BURN CARE | Facility: CLINIC | Age: 47
End: 2022-03-03
Payer: COMMERCIAL

## 2022-03-03 ENCOUNTER — OUTPATIENT (OUTPATIENT)
Dept: OUTPATIENT SERVICES | Facility: HOSPITAL | Age: 47
LOS: 1 days | Discharge: HOME | End: 2022-03-03

## 2022-03-03 DIAGNOSIS — S68.119A COMPLETE TRAUMATIC METACARPOPHALANGEAL AMPUTATION OF UNSPECIFIED FINGER, INITIAL ENCOUNTER: Chronic | ICD-10-CM

## 2022-03-03 DIAGNOSIS — Z98.890 OTHER SPECIFIED POSTPROCEDURAL STATES: Chronic | ICD-10-CM

## 2022-03-03 DIAGNOSIS — S91.309A UNSPECIFIED OPEN WOUND, UNSPECIFIED FOOT, INITIAL ENCOUNTER: Chronic | ICD-10-CM

## 2022-03-03 DIAGNOSIS — Z98.891 HISTORY OF UTERINE SCAR FROM PREVIOUS SURGERY: Chronic | ICD-10-CM

## 2022-03-03 DIAGNOSIS — Z86.03 PERSONAL HISTORY OF NEOPLASM OF UNCERTAIN BEHAVIOR: Chronic | ICD-10-CM

## 2022-03-03 DIAGNOSIS — Z98.84 BARIATRIC SURGERY STATUS: Chronic | ICD-10-CM

## 2022-03-03 DIAGNOSIS — Z89.9 ACQUIRED ABSENCE OF LIMB, UNSPECIFIED: Chronic | ICD-10-CM

## 2022-03-03 PROCEDURE — 99212 OFFICE O/P EST SF 10 MIN: CPT

## 2022-04-14 ENCOUNTER — APPOINTMENT (OUTPATIENT)
Dept: BURN CARE | Facility: CLINIC | Age: 47
End: 2022-04-14
Payer: COMMERCIAL

## 2022-04-14 ENCOUNTER — OUTPATIENT (OUTPATIENT)
Dept: OUTPATIENT SERVICES | Facility: HOSPITAL | Age: 47
LOS: 1 days | Discharge: HOME | End: 2022-04-14

## 2022-04-14 DIAGNOSIS — Z86.03 PERSONAL HISTORY OF NEOPLASM OF UNCERTAIN BEHAVIOR: Chronic | ICD-10-CM

## 2022-04-14 DIAGNOSIS — Z98.84 BARIATRIC SURGERY STATUS: Chronic | ICD-10-CM

## 2022-04-14 DIAGNOSIS — Z89.9 ACQUIRED ABSENCE OF LIMB, UNSPECIFIED: Chronic | ICD-10-CM

## 2022-04-14 DIAGNOSIS — Z98.890 OTHER SPECIFIED POSTPROCEDURAL STATES: Chronic | ICD-10-CM

## 2022-04-14 DIAGNOSIS — Z98.891 HISTORY OF UTERINE SCAR FROM PREVIOUS SURGERY: Chronic | ICD-10-CM

## 2022-04-14 DIAGNOSIS — S68.119A COMPLETE TRAUMATIC METACARPOPHALANGEAL AMPUTATION OF UNSPECIFIED FINGER, INITIAL ENCOUNTER: Chronic | ICD-10-CM

## 2022-04-14 DIAGNOSIS — S91.309A UNSPECIFIED OPEN WOUND, UNSPECIFIED FOOT, INITIAL ENCOUNTER: Chronic | ICD-10-CM

## 2022-04-14 PROCEDURE — 99212 OFFICE O/P EST SF 10 MIN: CPT

## 2022-04-17 LAB — BACTERIA SPEC CULT: ABNORMAL

## 2022-06-15 NOTE — H&P PST ADULT - CARDIOVASCULAR
Detail Level: Zone
Detail Level: Generalized
Regular rate & rhythm, normal S1, S2; no murmurs, gallops or rubs; no S3, S4

## 2022-06-16 ENCOUNTER — OUTPATIENT (OUTPATIENT)
Dept: OUTPATIENT SERVICES | Facility: HOSPITAL | Age: 47
LOS: 1 days | Discharge: HOME | End: 2022-06-16

## 2022-06-16 ENCOUNTER — APPOINTMENT (OUTPATIENT)
Dept: BURN CARE | Facility: CLINIC | Age: 47
End: 2022-06-16
Payer: COMMERCIAL

## 2022-06-16 DIAGNOSIS — Z89.9 ACQUIRED ABSENCE OF LIMB, UNSPECIFIED: Chronic | ICD-10-CM

## 2022-06-16 DIAGNOSIS — S68.119A COMPLETE TRAUMATIC METACARPOPHALANGEAL AMPUTATION OF UNSPECIFIED FINGER, INITIAL ENCOUNTER: Chronic | ICD-10-CM

## 2022-06-16 DIAGNOSIS — Z98.890 OTHER SPECIFIED POSTPROCEDURAL STATES: Chronic | ICD-10-CM

## 2022-06-16 DIAGNOSIS — Z98.891 HISTORY OF UTERINE SCAR FROM PREVIOUS SURGERY: Chronic | ICD-10-CM

## 2022-06-16 DIAGNOSIS — Z98.84 BARIATRIC SURGERY STATUS: Chronic | ICD-10-CM

## 2022-06-16 DIAGNOSIS — Z86.03 PERSONAL HISTORY OF NEOPLASM OF UNCERTAIN BEHAVIOR: Chronic | ICD-10-CM

## 2022-06-16 DIAGNOSIS — S91.309A UNSPECIFIED OPEN WOUND, UNSPECIFIED FOOT, INITIAL ENCOUNTER: Chronic | ICD-10-CM

## 2022-06-16 PROCEDURE — 99212 OFFICE O/P EST SF 10 MIN: CPT

## 2022-07-04 NOTE — PROCEDURE NOTE - NSPATIENTPOSTION_GEN_A_CORE
Nephrology Progress Note  7/4/2022 11:17 AM  Subjective: Interval History: Indigo Hernandez is a 60 y.o. female with  overall improved feels better today no acute distress        Data:   Scheduled Meds:   insulin glargine  20 Units SubCUTAneous Nightly    FLUoxetine  80 mg Oral Daily    aspirin  81 mg Oral Daily    atorvastatin  80 mg Oral Nightly    busPIRone  10 mg Oral TID    Vitamin D  2,000 Units Oral Daily    clopidogrel  75 mg Oral Daily    hydrALAZINE  25 mg Oral TID    insulin glargine  70 Units SubCUTAneous QAM AC    levothyroxine  25 mcg Oral QAM AC    metoprolol succinate  25 mg Oral Daily    topiramate  50 mg Oral Nightly    sodium chloride flush  5-40 mL IntraVENous BID    heparin (porcine)  5,000 Units SubCUTAneous 3 times per day    insulin lispro  0-12 Units SubCUTAneous TID WC    insulin lispro  0-6 Units SubCUTAneous Nightly     Continuous Infusions:   sodium chloride 100 mL/hr at 07/04/22 0526    dextrose      sodium chloride 150 mL/hr at 07/03/22 1159         CBC   Recent Labs     07/02/22 2000 07/03/22  0404 07/04/22  0600   WBC 8.1 8.1 6.6   HGB 11.1* 10.2* 10.1*   HCT 36.5* 34.7* 33.4*    207 182      BMP   Recent Labs     07/02/22 2000 07/03/22  0404 07/04/22  0600    137 143   K 4.7 3.9 4.4   CL 99 104 112*   CO2 21 22 24   PHOS  --   --  3.3   BUN 51* 50* 38*   CREATININE 3.8* 3.9* 1.9*     Hepatic:   Recent Labs     07/02/22 2000 07/03/22  0404   AST 23 16   ALT 24 18   BILITOT 0.2 0.2   ALKPHOS 120 103     Troponin: No results for input(s): TROPONINI in the last 72 hours. BNP: No results for input(s): BNP in the last 72 hours. Lipids: No results for input(s): CHOL, HDL in the last 72 hours. Invalid input(s): LDLCALCU  ABGs: No results found for: PHART, PO2ART, ESJ5SLK  INR: No results for input(s): INR in the last 72 hours.   Renal Labs  Albumin:    Lab Results   Component Value Date/Time    LABALBU 3.2 07/04/2022 06:00 AM     Calcium: Lab Results   Component Value Date/Time    CALCIUM 8.0 07/04/2022 06:00 AM     Phosphorus:    Lab Results   Component Value Date/Time    PHOS 3.3 07/04/2022 06:00 AM     U/A:    Lab Results   Component Value Date/Time    NITRU NEGATIVE 07/03/2022 02:37 PM    COLORU YELLOW 07/03/2022 02:37 PM    WBCUA 15 07/03/2022 02:37 PM    RBCUA 1 07/03/2022 02:37 PM    MUCUS RARE 07/03/2022 02:37 PM    TRICHOMONAS NONE SEEN 07/03/2022 02:37 PM    YEAST OCCASIONAL 07/02/2022 10:30 PM    BACTERIA RARE 07/03/2022 02:37 PM    CLARITYU CLEAR 07/03/2022 02:37 PM    SPECGRAV >1.030 07/03/2022 02:37 PM    UROBILINOGEN 0.2 07/03/2022 02:37 PM    BILIRUBINUR SMALL 07/03/2022 02:37 PM    BLOODU NEGATIVE 07/03/2022 02:37 PM    KETUA NEGATIVE 07/03/2022 02:37 PM     ABG:  No results found for: PHART, ADS2AVY, PO2ART, GLP7TGR, BEART, THGBART, EWL6YFX, W5GPWWUW  HgBA1c:    Lab Results   Component Value Date/Time    LABA1C 8.1 07/03/2022 04:04 AM     Microalbumen/Creatinine ratio:  No components found for: RUCREAT  TSH:  No results found for: TSH  IRON:    Lab Results   Component Value Date/Time    IRON 32 07/04/2022 06:00 AM     Iron Saturation:  No components found for: PERCENTFE  TIBC:    Lab Results   Component Value Date/Time    TIBC 219 07/04/2022 06:00 AM     FERRITIN:    Lab Results   Component Value Date/Time    FERRITIN 75 07/04/2022 06:00 AM     RPR:  No results found for: RPR  RAHEEM:  No results found for: ANATITER, RAHEEM  24 Hour Urine for Creatinine Clearance:  No components found for: CREAT4, UHRS10, UTV10      Objective:   I/O: 07/03 0701 - 07/04 0700  In: 4383 [P.O.:540; I.V.:2843]  Out: 1375 [UNCEK:7382]  I/O last 3 completed shifts:   In: 0355 [P.O.:540; I.V.:2843; IV Piggyback:1000]  Out: 0641 [Urine:1375]  I/O this shift:  In: 10 [I.V.:10]  Out: 800 [Urine:800]  Vitals: /64   Pulse 82   Temp 97.7 °F (36.5 °C) (Oral)   Resp 18   Ht 5' 4\" (1.626 m)   Wt 252 lb (114.3 kg)   SpO2 98%   BMI 43.26 kg/m²  {  General appearance: awake weak  HEENT: Head: Normal, normocephalic, atraumatic. Neck: supple, symmetrical, trachea midline  Lungs: diminished breath sounds bilaterally  Heart: S1, S2 normal  Abdomen: abnormal findings:  soft nt obese  Extremities: edema trace  Neurologic: Mental status: alertness: alert        Assessment and Plan:      IMP:  1 arf from atn/pra on ckd 3 1.2-1.6  2 dm2 uncontrolled  3 n/v with gastroparesis  4 htn  5 anxiety/depression    Plan     1 resolving arf and stay off ace and metformin for now but can plan restart ace as outpt.  Pt was to see dr Santiago Johnston as outpt and can do that still on dc and only 100mg proteinuria  2 careful for low glucose and treat as need  3 taking po and + bm improved  4 bp stable overall  5 affect better    Overall improved and labs not back when saw this am but now saw and better close to baseline  Can dc later today or in am if glucose stable and fu renal 2 week  Stay off metformin and lisinopril for now  Stay hydrated  Avoid nsaid             Joesph Mata MD, MD supine

## 2022-07-18 NOTE — ASU PREOP CHECKLIST - BOWEL PREP
Blood Pressure  Received: Today  GEOFFREY Rushing S Neuro Nurse Msg Pool  Patient is due for a blood pressure check and we have been unable to get a hold of patient to come in for a blood pressure check/send in home readings. He has a pending appointment with your office tomorrow at 3:15 pm, are you able to check his blood pressure at office visit and we will have the provider address? Thank you.     GEOFFREY [facility] Office   [phone]      n/a

## 2022-08-18 ENCOUNTER — APPOINTMENT (OUTPATIENT)
Dept: BURN CARE | Facility: CLINIC | Age: 47
End: 2022-08-18

## 2022-09-12 ENCOUNTER — APPOINTMENT (OUTPATIENT)
Dept: CARDIOLOGY | Facility: CLINIC | Age: 47
End: 2022-09-12

## 2022-09-12 VITALS
HEIGHT: 68 IN | BODY MASS INDEX: 31.67 KG/M2 | SYSTOLIC BLOOD PRESSURE: 116 MMHG | DIASTOLIC BLOOD PRESSURE: 74 MMHG | WEIGHT: 209 LBS

## 2022-09-12 DIAGNOSIS — Z01.810 ENCOUNTER FOR PREPROCEDURAL CARDIOVASCULAR EXAMINATION: ICD-10-CM

## 2022-09-12 PROCEDURE — 93000 ELECTROCARDIOGRAM COMPLETE: CPT

## 2022-09-12 PROCEDURE — 99214 OFFICE O/P EST MOD 30 MIN: CPT | Mod: 25

## 2022-09-12 NOTE — REASON FOR VISIT
[Follow-Up - Clinic] : a clinic follow-up of [Cardiomyopathy] : cardiomyopathy [Hypertension] : hypertension [FreeTextEntry1] : Feels well.\par \par Scheduled for bilateral BKA with plan for prosthetics.  Ambulation / recovery anticipated to be better.\par \par No chest pain.  Breathing comfortable.  No palpitations, lightheadedness, syncope.

## 2022-09-12 NOTE — ASSESSMENT
[FreeTextEntry1] : Stress induced cardiomyopathy (septic shock).\par Sustained LV recovery.  No clinical CHF.\par \par Normotensive off Rx.\par \par ECHO (8/18/21): nL chambers / biventricular function.  Mild TR.\par ECHO (1/7/20): nL LV size.  EF 50-55%.\par ECHO (4/12/18): nL LVSF. Trace MR. Mild TR. \par Adenosine NST (6/6/18): Mild breast attenuation artifact. No ischemia / infarction. nL LVSF.\par \par Intermediate risk patient for traditional perioperative cardiac events by RCRI (1).\par Intermediate risk procedure.\par Noa elevated risk of recurrent stress cardiomyopathy postop, but has had interim procedures without event.\par No cardiac decompensation.

## 2022-09-13 NOTE — DISCHARGE NOTE ADULT - NS MD DC FALL RISK RISK
For information on Fall & Injury Prevention, visit [email or web address]
PAST SURGICAL HISTORY:  H/O bilateral hip replacements Right Hip ORIF on Xray    History of cholecystectomy

## 2022-09-15 ENCOUNTER — APPOINTMENT (OUTPATIENT)
Dept: CARDIOLOGY | Facility: CLINIC | Age: 47
End: 2022-09-15

## 2022-09-15 PROCEDURE — 93306 TTE W/DOPPLER COMPLETE: CPT

## 2022-10-21 ENCOUNTER — APPOINTMENT (OUTPATIENT)
Dept: CARDIOLOGY | Facility: CLINIC | Age: 47
End: 2022-10-21

## 2022-11-09 NOTE — PATIENT PROFILE ADULT - BRADEN SENSORY
(4) no impairment Cyclophosphamide Counseling:  I discussed with the patient the risks of cyclophosphamide including but not limited to hair loss, hormonal abnormalities, decreased fertility, abdominal pain, diarrhea, nausea and vomiting, bone marrow suppression and infection. The patient understands that monitoring is required while taking this medication.

## 2022-12-15 NOTE — ED PROVIDER NOTE - ATTENDING CONTRIBUTION TO CARE
Routed to Dr. Georgia Dang to review  Ok to refill ?   Patient called back . Needs refill for Gabapentin refill ASAP. She is completely out of medication. Thanks    pt presents from home for eval of wound vac as she is s/p extremity necrosis. Pt seen several times in ED for multiple medical problems related to extrem necrosis. Podiatry consulted and will see pt in ED.       AVSS, exam as noted, CTAB, RRR, abdomen soft NTND, (+) bowel sounds, neuro nonfocal

## 2022-12-19 NOTE — ASU PATIENT PROFILE, ADULT - HARM RISK FACTORS
Vaccine Information Statement(s) or the Emergency Use Authorization was given today. This has been reviewed, questions answered, and verbal consent given by Patient for injection(s) and administration of Influenza (Inactivated).      Patient tolerated without incident. See immunization grid for documentation.    Donnie Li received Influenza 0.5 ml injection and was administered in LT deltoid without complications.    Calista Combs CMA     no

## 2023-01-03 NOTE — PACU DISCHARGE NOTE - NS MD DISCHARGE NOTE DISCHARGE
"  POST-OP NOTE - NEUROSURGERY      CHIEF COMPLAINT  Chief Complaint   Patient presents with   â¢ Office Visit   â¢ Surgical Followup          Mr. Jason Diop is evaluated today at the request of Dr. Sandra Walker. HISTORY OF PRESENT ILLNESS  Mr. Jason Diop is a 77year old male, PMHx CKD stage IV, diabetic nephropathy, colon cancer s/p resection, COPD, CHF, HTN, HLD, gout, and SOBIA who presents 2 weeks s/p C3-5 PCDF (12/09/2022) with Dr. Bismark Villa. He presents with his wife for this visit. Preoperatively, patient was experiencing bilateral hand weakness and numbness (R>L), dexterity issues, and balance difficulty. He notes improved strength in bilateral hands, but continues to endorse bilateral hand numbness. Overall, his gait has improved, but he continues to use a walker to ambulate. He has been working with PT at home. Over the past 4 days, he endorses a ""clear\"" drainage from the cervical incision. His wife states that she has placed a gauze dressing over the incision due to the drainage. He denies any fevers, chills, incision dehiscence, or purulent drainage. MEDICATIONS  Current Outpatient Medications   Medication Sig Dispense Refill   â¢ amLODIPine (NORVASC) 10 MG tablet      â¢ metFORMIN (GLUCOPHAGE-XR) 500 MG 24 hr tablet every 24 hours. â¢ valsartan-hydrochlorothiazide (DIOVAN-HCT) 160-25 MG per tablet every 24 hours. â¢ isosorbide mononitrate (IMDUR) 30 MG 24 hr tablet Take 1 tablet by mouth daily. Indications: Stable Angina Pectoris 90 tablet 1   â¢ lidocaine (LIDOCARE) 4 % patch Place 1 patch onto the skin daily. 30 patch 0   â¢ docusate sodium-sennosides (SENOKOT S) 50-8.6 MG per tablet Take 2 tablets by mouth daily as needed for Constipation. 30 tablet 1   â¢ HYDROcodone-acetaminophen (NORCO)  MG per tablet Take 1 tablet by mouth every 4 hours as needed for Pain. 20 tablet 0   â¢ naLOXone (NARCAN) 4 MG/0.1ML nasal spray Spray the content of 1 device into 1 nostril. Call 911.  May repeat with 2nd device in " Home alternate nostril if no response in 2-3 minutes. 2 each 1   â¢ bisacodyl (DULCOLAX) 10 MG suppository Place 1 suppository rectally daily as needed for Constipation. 10 each 0   â¢ fluticasone-umeclidin-vilanterol (TRELEGY ELLIPTA) 100-62.5-25 MCG/ACT inhaler Inhale 1 puff into the lungs daily. 60 each 6   â¢ calcitRIOL (ROCALTROL) 0.25 MCG capsule Take 1 capsule by mouth daily. 90 capsule 1   â¢ levothyroxine 125 MCG tablet Take 1 tablet by mouth daily. 90 tablet 1   â¢ furosemide (LASIX) 40 MG tablet Take 2 tablets by mouth 2 times daily. 360 tablet 1   â¢ allopurinol (ZYLOPRIM) 300 MG tablet Take 0.5 tablets by mouth daily. Indications: Gout 90 tablet 1   â¢ cloNIDine (CATAPRES) 0.3 MG tablet Take 1 tablet by mouth in the morning and 1 tablet in the evening. 180 tablet 1   â¢ potassium CHLORIDE (KLOR-CON M) 20 MEQ marichuy ER tablet Take 1 tablet by mouth in the morning and 1 tablet in the evening. 180 tablet 1   â¢ tamsulosin (FLOMAX) 0.4 MG Cap TAKE 1 CAPSULE EVERY DAY 90 capsule 1   â¢ NIFEdipine CC (ADALAT CC) 90 MG 24 hr tablet TAKE 1 TABLET BY MOUTH DAILY 90 tablet 1   â¢ gabapentin (NEURONTIN) 300 MG capsule Take 1 capsule by mouth in the morning and 1 capsule in the evening. 180 capsule 1   â¢ magnesium gluconate (MAGONATE) 500 MG tablet Take 500 mg by mouth 1 time. â¢ atorvastatin (LIPITOR) 40 MG tablet Take 1 tablet by mouth daily. 90 tablet 3   â¢ aspirin 325 MG tablet Take 1 tablet by mouth daily. 30 tablet 1   â¢ carvedilol (COREG) 25 MG tablet Take 1 tablet by mouth 2 times daily (with meals). 180 tablet 1   â¢ acetaminophen (TYLENOL) 325 MG tablet Take 2 tablets by mouth every 6 hours as needed for Pain. 30 tablet 0     No current facility-administered medications for this visit.        ALLERGIES  ALLERGIES:   Allergen Reactions   â¢ Seasonal Other (See Comments)        HISTORIES  Past Medical History:   Diagnosis Date   â¢ Chronic kidney disease    â¢ Colon cancer (CMS/HCC)    â¢ Congestive cardiac failure (CMS/HCC) â¢ COPD (chronic obstructive pulmonary disease) (CMS/formerly [facility])    â¢ Diabetes mellitus (CMS/formerly [facility])    â¢ Enlarged prostate    â¢ Essential (primary) hypertension    â¢ High cholesterol    â¢ Malignant neoplasm of colon (CMS/formerly [facility]) 2019   â¢ Neck pain    â¢ SOBIA (obstructive sleep apnea)        Past Surgical History:   Procedure Laterality Date   â¢ Colectomy     â¢ Joint replacement      Left Knee   â¢ Knee arthroplasty Left    â¢ Nephrectomy Left        Family History   Problem Relation Age of Onset   â¢ Patient is unaware of any medical problems Mother    â¢ Patient is unaware of any medical problems Father    â¢ Cancer Sister    â¢ Heart disease Brother        Social History     Tobacco Use   â¢ Smoking status: Former     Packs/day: 0.50     Years: 30.00     Pack years: 15.00     Types: Cigarettes     Quit date: 1996     Years since quittin.5   â¢ Smokeless tobacco: Never   Vaping Use   â¢ Vaping Use: never used   Substance Use Topics   â¢ Alcohol use: Yes     Alcohol/week: 2.0 standard drinks     Types: 1 Glasses of wine, 1 Cans of beer per week   â¢ Drug use: No         Review of Systems   Constitutional: Negative for chills, fatigue and fever. HENT: Negative for hearing loss and tinnitus. Eyes: Negative for visual disturbance. Respiratory: Negative for shortness of breath. Cardiovascular: Negative for chest pain. Gastrointestinal: Negative for abdominal pain. Genitourinary: Negative for difficulty urinating. Musculoskeletal: Negative for back pain, gait problem and neck pain. Arthralgias: bilateral shoulder. Neurological: Positive for numbness (bilateral hand). Negative for dizziness, tremors, seizures, weakness, light-headedness and headaches. Psychiatric/Behavioral: Negative for confusion. Physical Exam  Vitals and nursing note reviewed. Constitutional:       General: He is not in acute distress. HENT:      Head: Normocephalic and atraumatic. Neck: Neck supple.    Eyes:      Extraocular Movements: Extraocular movements intact. Pupils: Pupils are equal, round, and reactive to light. Cardiovascular:      Rate and Rhythm: Normal rate. Pulmonary:      Effort: Pulmonary effort is normal.   Abdominal:      General: There is no distension. Skin:     General: Skin is warm and dry. Comments: Posterior cervical incision is intact with staples in place. Active serosanguinous drainage from the central aspect of incision. No evidence of incision dehiscence or purulent drainage. Neurological:      Mental Status: He is alert and oriented to person, place, and time. Cranial Nerves: No cranial nerve deficit. Sensory: No sensory deficit. Coordination: Coordination normal.      Gait: Gait normal.      Comments: Strength BUE 5/5, BLE 5/5. Psychiatric:         Mood and Affect: Mood normal.         Behavior: Behavior normal.       Vitals:    12/22/22 0909   BP: 108/64   Pulse: 86   Resp: 16     Temperature (degrees Fahrenheit): 97.3    IMAGING  I have reviewed the pertinent imaging study reports. These are the pertinent findings:  Â· No valid procedures specified. Â· No valid procedures specified. Â· No valid procedures specified. ASSESSMENT/PLAN  Mr. Hermelindo Rob is a 77year old male, PMHx CKD stage IV, diabetic nephropathy, colon cancer s/p resection, COPD, CHF, HTN, HLD, gout, and SOBIA who presents 2 weeks s/p C3-5 PCDF (12/09/2022) with Dr. Pamela Poon. ED Diagnosis   1. Status post cervical spinal fusion            Plan:  - Superior and inferior aspect of cervical incision staples removed today. Keep incision clean and dry. Ok to shower, keep incision covered. Do not soak/scrub incision. No lotions, creams, or ointments around the incision. Notify of new onset fevers/chills and incision dehiscence, drainage, edema, or erythema present.  - Follow up in 1 week for incision check. - Maintain cervical collar at all times except when showering     Discussed with patient.  He expressed verbal understanding and is in agreement with the plan. All questions answered.      Eusebio Douglas PA-C  Total face to face time spent with patient: 30 minutes    Lawrence Claude, MD  [facility]  [address] [facility], [address] [place] [address], [address]    A copy of this consultation has been sent electronically to:   Dr. Amy Patton MD  [facility],  [address] [facility]

## 2023-01-16 NOTE — DISCHARGE NOTE ADULT - NSFTFHOMEHTHYNRD_GEN_ALL_CORE
Your opinion matters! Thank you for choosing Dr. Sammie Jha, ENT, at [facility]. In the next few weeks, you may receive a patient satisfaction survey about your most recent clinic visit with us. Please take some time to evaluate your visit. We strive to provide you with the best medical care and hope you were happy with our services. Your input will help us better meet your health care needs in the future. Again, thank you for your feedback and we look forward to your next visit.     Medication prescribed at today’s visit:  Will be sent to your pharmacy electronically. Please allow 1-2 hours for your pharmacy to get the medication ready for you.    Medication Requests:  If you need a refill on your prescription please call your pharmacy and let them know. Please be proactive and call before your medication runs out. The pharmacy will then contact us for the refill. Please allow 24-48 hours for the refill to be processed.     Imaging or Referral orders:   You should be contacted by [facility] within 5-7 business days to schedule these appointments/tests. If you have not been contacted by that time please call the [facility] Scheduling department at [phone] or my office if you have further questions.    Test results:  If your physician has ordered additional laboratory or radiology testing as part of your ongoing plan of care, please allow 5-7 business days from the day of your lab draw or test completion for the results to be sent and reviewed by your provider. If your results are critical and require more immediate intervention, you will be contacted sooner. Your results will be conveyed to you via a phone call or letter. If you have not received your results within 2 weeks of your test, please contact our office.     [facility] Registration:  If you are not registered for a [employer] account, please ask your  to send you the link via e-mail.    Clinic hours for   [facility]:  Monday 8:00 am - 4:00 pm  Wednesday 8:00 am - 4:00 pm   Thursday 8:00 am - 4:00 pm  Friday  8:00 am - 12:00 pm         [facility]  ENT/Otolaryngology  [address] [place] [address]  [phone]      Yes

## 2023-02-02 ENCOUNTER — OUTPATIENT (OUTPATIENT)
Dept: OUTPATIENT SERVICES | Facility: HOSPITAL | Age: 48
LOS: 1 days | End: 2023-02-02

## 2023-02-02 DIAGNOSIS — S88.112A COMPLETE TRAUMATIC AMPUTATION AT LEVEL BETWEEN KNEE AND ANKLE, LEFT LOWER LEG, INITIAL ENCOUNTER: ICD-10-CM

## 2023-02-02 DIAGNOSIS — Z89.9 ACQUIRED ABSENCE OF LIMB, UNSPECIFIED: Chronic | ICD-10-CM

## 2023-02-02 DIAGNOSIS — S91.309A UNSPECIFIED OPEN WOUND, UNSPECIFIED FOOT, INITIAL ENCOUNTER: Chronic | ICD-10-CM

## 2023-02-02 DIAGNOSIS — Z98.890 OTHER SPECIFIED POSTPROCEDURAL STATES: Chronic | ICD-10-CM

## 2023-02-02 DIAGNOSIS — S88.112D COMPLETE TRAUMATIC AMPUTATION AT LEVEL BETWEEN KNEE AND ANKLE, LEFT LOWER LEG, SUBSEQUENT ENCOUNTER: ICD-10-CM

## 2023-02-02 DIAGNOSIS — Z98.84 BARIATRIC SURGERY STATUS: Chronic | ICD-10-CM

## 2023-02-02 DIAGNOSIS — Z89.512 ACQUIRED ABSENCE OF LEFT LEG BELOW KNEE: ICD-10-CM

## 2023-02-02 DIAGNOSIS — Z86.03 PERSONAL HISTORY OF NEOPLASM OF UNCERTAIN BEHAVIOR: Chronic | ICD-10-CM

## 2023-02-02 DIAGNOSIS — S68.119A COMPLETE TRAUMATIC METACARPOPHALANGEAL AMPUTATION OF UNSPECIFIED FINGER, INITIAL ENCOUNTER: Chronic | ICD-10-CM

## 2023-02-02 DIAGNOSIS — Z98.891 HISTORY OF UTERINE SCAR FROM PREVIOUS SURGERY: Chronic | ICD-10-CM

## 2023-02-07 ENCOUNTER — OUTPATIENT (OUTPATIENT)
Dept: OUTPATIENT SERVICES | Facility: HOSPITAL | Age: 48
LOS: 1 days | End: 2023-02-07
Payer: COMMERCIAL

## 2023-02-07 DIAGNOSIS — Z98.891 HISTORY OF UTERINE SCAR FROM PREVIOUS SURGERY: Chronic | ICD-10-CM

## 2023-02-07 DIAGNOSIS — S91.309A UNSPECIFIED OPEN WOUND, UNSPECIFIED FOOT, INITIAL ENCOUNTER: Chronic | ICD-10-CM

## 2023-02-07 DIAGNOSIS — Z89.9 ACQUIRED ABSENCE OF LIMB, UNSPECIFIED: Chronic | ICD-10-CM

## 2023-02-07 DIAGNOSIS — Z98.84 BARIATRIC SURGERY STATUS: Chronic | ICD-10-CM

## 2023-02-07 DIAGNOSIS — Z86.03 PERSONAL HISTORY OF NEOPLASM OF UNCERTAIN BEHAVIOR: Chronic | ICD-10-CM

## 2023-02-07 DIAGNOSIS — Z98.890 OTHER SPECIFIED POSTPROCEDURAL STATES: Chronic | ICD-10-CM

## 2023-02-07 DIAGNOSIS — S68.119A COMPLETE TRAUMATIC METACARPOPHALANGEAL AMPUTATION OF UNSPECIFIED FINGER, INITIAL ENCOUNTER: Chronic | ICD-10-CM

## 2023-02-07 DIAGNOSIS — Z00.00 ENCOUNTER FOR GENERAL ADULT MEDICAL EXAMINATION WITHOUT ABNORMAL FINDINGS: ICD-10-CM

## 2023-02-07 PROCEDURE — 97116 GAIT TRAINING THERAPY: CPT | Mod: GP

## 2023-02-07 PROCEDURE — 97110 THERAPEUTIC EXERCISES: CPT | Mod: GP

## 2023-02-08 DIAGNOSIS — Z00.00 ENCOUNTER FOR GENERAL ADULT MEDICAL EXAMINATION WITHOUT ABNORMAL FINDINGS: ICD-10-CM

## 2023-02-09 ENCOUNTER — OUTPATIENT (OUTPATIENT)
Dept: OUTPATIENT SERVICES | Facility: HOSPITAL | Age: 48
LOS: 1 days | End: 2023-02-09
Payer: COMMERCIAL

## 2023-02-09 DIAGNOSIS — Z00.00 ENCOUNTER FOR GENERAL ADULT MEDICAL EXAMINATION WITHOUT ABNORMAL FINDINGS: ICD-10-CM

## 2023-02-09 DIAGNOSIS — Z98.890 OTHER SPECIFIED POSTPROCEDURAL STATES: Chronic | ICD-10-CM

## 2023-02-09 DIAGNOSIS — Z86.03 PERSONAL HISTORY OF NEOPLASM OF UNCERTAIN BEHAVIOR: Chronic | ICD-10-CM

## 2023-02-09 DIAGNOSIS — Z89.9 ACQUIRED ABSENCE OF LIMB, UNSPECIFIED: Chronic | ICD-10-CM

## 2023-02-09 DIAGNOSIS — S68.119A COMPLETE TRAUMATIC METACARPOPHALANGEAL AMPUTATION OF UNSPECIFIED FINGER, INITIAL ENCOUNTER: Chronic | ICD-10-CM

## 2023-02-09 DIAGNOSIS — Z98.891 HISTORY OF UTERINE SCAR FROM PREVIOUS SURGERY: Chronic | ICD-10-CM

## 2023-02-09 DIAGNOSIS — Z98.84 BARIATRIC SURGERY STATUS: Chronic | ICD-10-CM

## 2023-02-09 DIAGNOSIS — S91.309A UNSPECIFIED OPEN WOUND, UNSPECIFIED FOOT, INITIAL ENCOUNTER: Chronic | ICD-10-CM

## 2023-02-09 PROCEDURE — 97110 THERAPEUTIC EXERCISES: CPT | Mod: GP

## 2023-02-09 PROCEDURE — 97116 GAIT TRAINING THERAPY: CPT | Mod: GP

## 2023-02-11 DIAGNOSIS — Z89.512 ACQUIRED ABSENCE OF LEFT LEG BELOW KNEE: ICD-10-CM

## 2023-02-11 DIAGNOSIS — S88.112D COMPLETE TRAUMATIC AMPUTATION AT LEVEL BETWEEN KNEE AND ANKLE, LEFT LOWER LEG, SUBSEQUENT ENCOUNTER: ICD-10-CM

## 2023-02-14 ENCOUNTER — OUTPATIENT (OUTPATIENT)
Dept: OUTPATIENT SERVICES | Facility: HOSPITAL | Age: 48
LOS: 1 days | End: 2023-02-14

## 2023-02-14 DIAGNOSIS — Z00.00 ENCOUNTER FOR GENERAL ADULT MEDICAL EXAMINATION WITHOUT ABNORMAL FINDINGS: ICD-10-CM

## 2023-02-14 DIAGNOSIS — Z89.9 ACQUIRED ABSENCE OF LIMB, UNSPECIFIED: Chronic | ICD-10-CM

## 2023-02-14 DIAGNOSIS — Z89.512 ACQUIRED ABSENCE OF LEFT LEG BELOW KNEE: ICD-10-CM

## 2023-02-14 DIAGNOSIS — Z86.03 PERSONAL HISTORY OF NEOPLASM OF UNCERTAIN BEHAVIOR: Chronic | ICD-10-CM

## 2023-02-14 DIAGNOSIS — S88.112D COMPLETE TRAUMATIC AMPUTATION AT LEVEL BETWEEN KNEE AND ANKLE, LEFT LOWER LEG, SUBSEQUENT ENCOUNTER: ICD-10-CM

## 2023-02-14 DIAGNOSIS — Z98.84 BARIATRIC SURGERY STATUS: Chronic | ICD-10-CM

## 2023-02-14 DIAGNOSIS — S91.309A UNSPECIFIED OPEN WOUND, UNSPECIFIED FOOT, INITIAL ENCOUNTER: Chronic | ICD-10-CM

## 2023-02-14 DIAGNOSIS — Z98.891 HISTORY OF UTERINE SCAR FROM PREVIOUS SURGERY: Chronic | ICD-10-CM

## 2023-02-14 DIAGNOSIS — Z98.890 OTHER SPECIFIED POSTPROCEDURAL STATES: Chronic | ICD-10-CM

## 2023-02-14 DIAGNOSIS — S68.119A COMPLETE TRAUMATIC METACARPOPHALANGEAL AMPUTATION OF UNSPECIFIED FINGER, INITIAL ENCOUNTER: Chronic | ICD-10-CM

## 2023-02-16 ENCOUNTER — OUTPATIENT (OUTPATIENT)
Dept: OUTPATIENT SERVICES | Facility: HOSPITAL | Age: 48
LOS: 1 days | End: 2023-02-16

## 2023-02-16 DIAGNOSIS — Z98.890 OTHER SPECIFIED POSTPROCEDURAL STATES: Chronic | ICD-10-CM

## 2023-02-16 DIAGNOSIS — S88.112D COMPLETE TRAUMATIC AMPUTATION AT LEVEL BETWEEN KNEE AND ANKLE, LEFT LOWER LEG, SUBSEQUENT ENCOUNTER: ICD-10-CM

## 2023-02-16 DIAGNOSIS — S68.119A COMPLETE TRAUMATIC METACARPOPHALANGEAL AMPUTATION OF UNSPECIFIED FINGER, INITIAL ENCOUNTER: Chronic | ICD-10-CM

## 2023-02-16 DIAGNOSIS — Z89.9 ACQUIRED ABSENCE OF LIMB, UNSPECIFIED: Chronic | ICD-10-CM

## 2023-02-16 DIAGNOSIS — Z89.512 ACQUIRED ABSENCE OF LEFT LEG BELOW KNEE: ICD-10-CM

## 2023-02-16 DIAGNOSIS — Z98.891 HISTORY OF UTERINE SCAR FROM PREVIOUS SURGERY: Chronic | ICD-10-CM

## 2023-02-16 DIAGNOSIS — Z98.84 BARIATRIC SURGERY STATUS: Chronic | ICD-10-CM

## 2023-02-16 DIAGNOSIS — Z86.03 PERSONAL HISTORY OF NEOPLASM OF UNCERTAIN BEHAVIOR: Chronic | ICD-10-CM

## 2023-02-16 DIAGNOSIS — S91.309A UNSPECIFIED OPEN WOUND, UNSPECIFIED FOOT, INITIAL ENCOUNTER: Chronic | ICD-10-CM

## 2023-02-22 DIAGNOSIS — S88.112D COMPLETE TRAUMATIC AMPUTATION AT LEVEL BETWEEN KNEE AND ANKLE, LEFT LOWER LEG, SUBSEQUENT ENCOUNTER: ICD-10-CM

## 2023-02-22 DIAGNOSIS — Z89.512 ACQUIRED ABSENCE OF LEFT LEG BELOW KNEE: ICD-10-CM

## 2023-03-02 ENCOUNTER — OUTPATIENT (OUTPATIENT)
Dept: OUTPATIENT SERVICES | Facility: HOSPITAL | Age: 48
LOS: 1 days | End: 2023-03-02
Payer: COMMERCIAL

## 2023-03-02 DIAGNOSIS — Z86.03 PERSONAL HISTORY OF NEOPLASM OF UNCERTAIN BEHAVIOR: Chronic | ICD-10-CM

## 2023-03-02 DIAGNOSIS — Z98.891 HISTORY OF UTERINE SCAR FROM PREVIOUS SURGERY: Chronic | ICD-10-CM

## 2023-03-02 DIAGNOSIS — S68.119A COMPLETE TRAUMATIC METACARPOPHALANGEAL AMPUTATION OF UNSPECIFIED FINGER, INITIAL ENCOUNTER: Chronic | ICD-10-CM

## 2023-03-02 DIAGNOSIS — Z89.512 ACQUIRED ABSENCE OF LEFT LEG BELOW KNEE: ICD-10-CM

## 2023-03-02 DIAGNOSIS — S88.112D COMPLETE TRAUMATIC AMPUTATION AT LEVEL BETWEEN KNEE AND ANKLE, LEFT LOWER LEG, SUBSEQUENT ENCOUNTER: ICD-10-CM

## 2023-03-02 DIAGNOSIS — Z00.00 ENCOUNTER FOR GENERAL ADULT MEDICAL EXAMINATION WITHOUT ABNORMAL FINDINGS: ICD-10-CM

## 2023-03-02 DIAGNOSIS — Z89.9 ACQUIRED ABSENCE OF LIMB, UNSPECIFIED: Chronic | ICD-10-CM

## 2023-03-02 DIAGNOSIS — Z98.890 OTHER SPECIFIED POSTPROCEDURAL STATES: Chronic | ICD-10-CM

## 2023-03-02 DIAGNOSIS — Z98.84 BARIATRIC SURGERY STATUS: Chronic | ICD-10-CM

## 2023-03-02 DIAGNOSIS — S91.309A UNSPECIFIED OPEN WOUND, UNSPECIFIED FOOT, INITIAL ENCOUNTER: Chronic | ICD-10-CM

## 2023-03-02 PROCEDURE — 97110 THERAPEUTIC EXERCISES: CPT | Mod: GP

## 2023-03-02 PROCEDURE — 97116 GAIT TRAINING THERAPY: CPT | Mod: GP

## 2023-03-07 ENCOUNTER — OUTPATIENT (OUTPATIENT)
Dept: OUTPATIENT SERVICES | Facility: HOSPITAL | Age: 48
LOS: 1 days | End: 2023-03-07
Payer: COMMERCIAL

## 2023-03-07 DIAGNOSIS — Z98.890 OTHER SPECIFIED POSTPROCEDURAL STATES: Chronic | ICD-10-CM

## 2023-03-07 DIAGNOSIS — S91.309A UNSPECIFIED OPEN WOUND, UNSPECIFIED FOOT, INITIAL ENCOUNTER: Chronic | ICD-10-CM

## 2023-03-07 DIAGNOSIS — S88.112D COMPLETE TRAUMATIC AMPUTATION AT LEVEL BETWEEN KNEE AND ANKLE, LEFT LOWER LEG, SUBSEQUENT ENCOUNTER: ICD-10-CM

## 2023-03-07 DIAGNOSIS — Z89.512 ACQUIRED ABSENCE OF LEFT LEG BELOW KNEE: ICD-10-CM

## 2023-03-07 DIAGNOSIS — Z98.891 HISTORY OF UTERINE SCAR FROM PREVIOUS SURGERY: Chronic | ICD-10-CM

## 2023-03-07 DIAGNOSIS — S68.119A COMPLETE TRAUMATIC METACARPOPHALANGEAL AMPUTATION OF UNSPECIFIED FINGER, INITIAL ENCOUNTER: Chronic | ICD-10-CM

## 2023-03-07 DIAGNOSIS — Z86.03 PERSONAL HISTORY OF NEOPLASM OF UNCERTAIN BEHAVIOR: Chronic | ICD-10-CM

## 2023-03-07 DIAGNOSIS — Z98.84 BARIATRIC SURGERY STATUS: Chronic | ICD-10-CM

## 2023-03-07 DIAGNOSIS — Z89.9 ACQUIRED ABSENCE OF LIMB, UNSPECIFIED: Chronic | ICD-10-CM

## 2023-03-07 PROCEDURE — 97116 GAIT TRAINING THERAPY: CPT | Mod: GP

## 2023-03-07 PROCEDURE — 97110 THERAPEUTIC EXERCISES: CPT | Mod: GP

## 2023-03-09 ENCOUNTER — OUTPATIENT (OUTPATIENT)
Dept: OUTPATIENT SERVICES | Facility: HOSPITAL | Age: 48
LOS: 1 days | End: 2023-03-09

## 2023-03-09 DIAGNOSIS — S68.119A COMPLETE TRAUMATIC METACARPOPHALANGEAL AMPUTATION OF UNSPECIFIED FINGER, INITIAL ENCOUNTER: Chronic | ICD-10-CM

## 2023-03-09 DIAGNOSIS — S91.309A UNSPECIFIED OPEN WOUND, UNSPECIFIED FOOT, INITIAL ENCOUNTER: Chronic | ICD-10-CM

## 2023-03-09 DIAGNOSIS — Z98.891 HISTORY OF UTERINE SCAR FROM PREVIOUS SURGERY: Chronic | ICD-10-CM

## 2023-03-09 DIAGNOSIS — S88.112D COMPLETE TRAUMATIC AMPUTATION AT LEVEL BETWEEN KNEE AND ANKLE, LEFT LOWER LEG, SUBSEQUENT ENCOUNTER: ICD-10-CM

## 2023-03-09 DIAGNOSIS — Z89.512 ACQUIRED ABSENCE OF LEFT LEG BELOW KNEE: ICD-10-CM

## 2023-03-09 DIAGNOSIS — Z98.890 OTHER SPECIFIED POSTPROCEDURAL STATES: Chronic | ICD-10-CM

## 2023-03-09 DIAGNOSIS — Z89.9 ACQUIRED ABSENCE OF LIMB, UNSPECIFIED: Chronic | ICD-10-CM

## 2023-03-09 DIAGNOSIS — Z98.84 BARIATRIC SURGERY STATUS: Chronic | ICD-10-CM

## 2023-03-09 DIAGNOSIS — Z00.00 ENCOUNTER FOR GENERAL ADULT MEDICAL EXAMINATION WITHOUT ABNORMAL FINDINGS: ICD-10-CM

## 2023-03-09 DIAGNOSIS — Z86.03 PERSONAL HISTORY OF NEOPLASM OF UNCERTAIN BEHAVIOR: Chronic | ICD-10-CM

## 2023-03-14 ENCOUNTER — OUTPATIENT (OUTPATIENT)
Dept: OUTPATIENT SERVICES | Facility: HOSPITAL | Age: 48
LOS: 1 days | End: 2023-03-14

## 2023-03-14 DIAGNOSIS — S68.119A COMPLETE TRAUMATIC METACARPOPHALANGEAL AMPUTATION OF UNSPECIFIED FINGER, INITIAL ENCOUNTER: Chronic | ICD-10-CM

## 2023-03-14 DIAGNOSIS — Z98.890 OTHER SPECIFIED POSTPROCEDURAL STATES: Chronic | ICD-10-CM

## 2023-03-14 DIAGNOSIS — Z00.00 ENCOUNTER FOR GENERAL ADULT MEDICAL EXAMINATION WITHOUT ABNORMAL FINDINGS: ICD-10-CM

## 2023-03-14 DIAGNOSIS — Z98.84 BARIATRIC SURGERY STATUS: Chronic | ICD-10-CM

## 2023-03-14 DIAGNOSIS — Z98.891 HISTORY OF UTERINE SCAR FROM PREVIOUS SURGERY: Chronic | ICD-10-CM

## 2023-03-14 DIAGNOSIS — S91.309A UNSPECIFIED OPEN WOUND, UNSPECIFIED FOOT, INITIAL ENCOUNTER: Chronic | ICD-10-CM

## 2023-03-14 DIAGNOSIS — S88.112D COMPLETE TRAUMATIC AMPUTATION AT LEVEL BETWEEN KNEE AND ANKLE, LEFT LOWER LEG, SUBSEQUENT ENCOUNTER: ICD-10-CM

## 2023-03-14 DIAGNOSIS — Z89.9 ACQUIRED ABSENCE OF LIMB, UNSPECIFIED: Chronic | ICD-10-CM

## 2023-03-14 DIAGNOSIS — Z89.512 ACQUIRED ABSENCE OF LEFT LEG BELOW KNEE: ICD-10-CM

## 2023-03-14 DIAGNOSIS — Z86.03 PERSONAL HISTORY OF NEOPLASM OF UNCERTAIN BEHAVIOR: Chronic | ICD-10-CM

## 2023-03-15 DIAGNOSIS — Z00.00 ENCOUNTER FOR GENERAL ADULT MEDICAL EXAMINATION WITHOUT ABNORMAL FINDINGS: ICD-10-CM

## 2023-03-15 DIAGNOSIS — Z89.512 ACQUIRED ABSENCE OF LEFT LEG BELOW KNEE: ICD-10-CM

## 2023-03-16 ENCOUNTER — OUTPATIENT (OUTPATIENT)
Dept: OUTPATIENT SERVICES | Facility: HOSPITAL | Age: 48
LOS: 1 days | End: 2023-03-16

## 2023-03-16 DIAGNOSIS — S88.112D COMPLETE TRAUMATIC AMPUTATION AT LEVEL BETWEEN KNEE AND ANKLE, LEFT LOWER LEG, SUBSEQUENT ENCOUNTER: ICD-10-CM

## 2023-03-16 DIAGNOSIS — S91.309A UNSPECIFIED OPEN WOUND, UNSPECIFIED FOOT, INITIAL ENCOUNTER: Chronic | ICD-10-CM

## 2023-03-16 DIAGNOSIS — Z98.890 OTHER SPECIFIED POSTPROCEDURAL STATES: Chronic | ICD-10-CM

## 2023-03-16 DIAGNOSIS — Z89.512 ACQUIRED ABSENCE OF LEFT LEG BELOW KNEE: ICD-10-CM

## 2023-03-16 DIAGNOSIS — Z98.891 HISTORY OF UTERINE SCAR FROM PREVIOUS SURGERY: Chronic | ICD-10-CM

## 2023-03-16 DIAGNOSIS — Z98.84 BARIATRIC SURGERY STATUS: Chronic | ICD-10-CM

## 2023-03-16 DIAGNOSIS — Z89.9 ACQUIRED ABSENCE OF LIMB, UNSPECIFIED: Chronic | ICD-10-CM

## 2023-03-16 DIAGNOSIS — S68.119A COMPLETE TRAUMATIC METACARPOPHALANGEAL AMPUTATION OF UNSPECIFIED FINGER, INITIAL ENCOUNTER: Chronic | ICD-10-CM

## 2023-03-16 DIAGNOSIS — Z86.03 PERSONAL HISTORY OF NEOPLASM OF UNCERTAIN BEHAVIOR: Chronic | ICD-10-CM

## 2023-03-16 DIAGNOSIS — Z00.00 ENCOUNTER FOR GENERAL ADULT MEDICAL EXAMINATION WITHOUT ABNORMAL FINDINGS: ICD-10-CM

## 2023-03-17 DIAGNOSIS — Z89.512 ACQUIRED ABSENCE OF LEFT LEG BELOW KNEE: ICD-10-CM

## 2023-03-17 DIAGNOSIS — Z00.00 ENCOUNTER FOR GENERAL ADULT MEDICAL EXAMINATION WITHOUT ABNORMAL FINDINGS: ICD-10-CM

## 2023-03-23 ENCOUNTER — OUTPATIENT (OUTPATIENT)
Dept: OUTPATIENT SERVICES | Facility: HOSPITAL | Age: 48
LOS: 1 days | End: 2023-03-23

## 2023-03-23 DIAGNOSIS — Z89.9 ACQUIRED ABSENCE OF LIMB, UNSPECIFIED: Chronic | ICD-10-CM

## 2023-03-23 DIAGNOSIS — S88.112D COMPLETE TRAUMATIC AMPUTATION AT LEVEL BETWEEN KNEE AND ANKLE, LEFT LOWER LEG, SUBSEQUENT ENCOUNTER: ICD-10-CM

## 2023-03-23 DIAGNOSIS — Z00.00 ENCOUNTER FOR GENERAL ADULT MEDICAL EXAMINATION WITHOUT ABNORMAL FINDINGS: ICD-10-CM

## 2023-03-23 DIAGNOSIS — Z98.890 OTHER SPECIFIED POSTPROCEDURAL STATES: Chronic | ICD-10-CM

## 2023-03-23 DIAGNOSIS — S91.309A UNSPECIFIED OPEN WOUND, UNSPECIFIED FOOT, INITIAL ENCOUNTER: Chronic | ICD-10-CM

## 2023-03-23 DIAGNOSIS — S68.119A COMPLETE TRAUMATIC METACARPOPHALANGEAL AMPUTATION OF UNSPECIFIED FINGER, INITIAL ENCOUNTER: Chronic | ICD-10-CM

## 2023-03-23 DIAGNOSIS — Z86.03 PERSONAL HISTORY OF NEOPLASM OF UNCERTAIN BEHAVIOR: Chronic | ICD-10-CM

## 2023-03-23 DIAGNOSIS — Z89.512 ACQUIRED ABSENCE OF LEFT LEG BELOW KNEE: ICD-10-CM

## 2023-03-23 DIAGNOSIS — Z98.891 HISTORY OF UTERINE SCAR FROM PREVIOUS SURGERY: Chronic | ICD-10-CM

## 2023-03-23 DIAGNOSIS — Z98.84 BARIATRIC SURGERY STATUS: Chronic | ICD-10-CM

## 2023-03-25 DIAGNOSIS — Z00.00 ENCOUNTER FOR GENERAL ADULT MEDICAL EXAMINATION WITHOUT ABNORMAL FINDINGS: ICD-10-CM

## 2023-03-29 NOTE — H&P PST ADULT - ALCOHOL USE HISTORY SINGLE SELECT
Impression: S/P CE/Toric IOL SN6AT4 [2.25 D]-Aspheric +16.00, ORA, Hydrus, TRIMOXI OS - 36 Days. Presence of intraocular lens  Z96.1. Excellent post op course Plan: IOP is lower and in goal range. OD has had some VF and OCT changes. He was not on glaucoma drops prior to surgery. No glaucoma drops recommended at this time. Return in 6 months for IOP, OCT ON and VF 24-2 check to monitor for glaucoma or progression in RNFL changes. never

## 2023-03-30 ENCOUNTER — OUTPATIENT (OUTPATIENT)
Dept: OUTPATIENT SERVICES | Facility: HOSPITAL | Age: 48
LOS: 1 days | End: 2023-03-30

## 2023-03-30 DIAGNOSIS — Z98.84 BARIATRIC SURGERY STATUS: Chronic | ICD-10-CM

## 2023-03-30 DIAGNOSIS — S68.119A COMPLETE TRAUMATIC METACARPOPHALANGEAL AMPUTATION OF UNSPECIFIED FINGER, INITIAL ENCOUNTER: Chronic | ICD-10-CM

## 2023-03-30 DIAGNOSIS — Z98.891 HISTORY OF UTERINE SCAR FROM PREVIOUS SURGERY: Chronic | ICD-10-CM

## 2023-03-30 DIAGNOSIS — S91.309A UNSPECIFIED OPEN WOUND, UNSPECIFIED FOOT, INITIAL ENCOUNTER: Chronic | ICD-10-CM

## 2023-03-30 DIAGNOSIS — Z86.03 PERSONAL HISTORY OF NEOPLASM OF UNCERTAIN BEHAVIOR: Chronic | ICD-10-CM

## 2023-03-30 DIAGNOSIS — Z98.890 OTHER SPECIFIED POSTPROCEDURAL STATES: Chronic | ICD-10-CM

## 2023-03-30 DIAGNOSIS — S88.112D COMPLETE TRAUMATIC AMPUTATION AT LEVEL BETWEEN KNEE AND ANKLE, LEFT LOWER LEG, SUBSEQUENT ENCOUNTER: ICD-10-CM

## 2023-03-30 DIAGNOSIS — Z00.00 ENCOUNTER FOR GENERAL ADULT MEDICAL EXAMINATION WITHOUT ABNORMAL FINDINGS: ICD-10-CM

## 2023-03-30 DIAGNOSIS — Z89.512 ACQUIRED ABSENCE OF LEFT LEG BELOW KNEE: ICD-10-CM

## 2023-03-30 DIAGNOSIS — Z89.9 ACQUIRED ABSENCE OF LIMB, UNSPECIFIED: Chronic | ICD-10-CM

## 2023-03-31 DIAGNOSIS — Z89.512 ACQUIRED ABSENCE OF LEFT LEG BELOW KNEE: ICD-10-CM

## 2023-03-31 DIAGNOSIS — Z00.00 ENCOUNTER FOR GENERAL ADULT MEDICAL EXAMINATION WITHOUT ABNORMAL FINDINGS: ICD-10-CM

## 2023-04-11 ENCOUNTER — OUTPATIENT (OUTPATIENT)
Dept: OUTPATIENT SERVICES | Facility: HOSPITAL | Age: 48
LOS: 1 days | End: 2023-04-11
Payer: COMMERCIAL

## 2023-04-11 DIAGNOSIS — S68.119A COMPLETE TRAUMATIC METACARPOPHALANGEAL AMPUTATION OF UNSPECIFIED FINGER, INITIAL ENCOUNTER: Chronic | ICD-10-CM

## 2023-04-11 DIAGNOSIS — Z98.891 HISTORY OF UTERINE SCAR FROM PREVIOUS SURGERY: Chronic | ICD-10-CM

## 2023-04-11 DIAGNOSIS — Z98.890 OTHER SPECIFIED POSTPROCEDURAL STATES: Chronic | ICD-10-CM

## 2023-04-11 DIAGNOSIS — Z89.9 ACQUIRED ABSENCE OF LIMB, UNSPECIFIED: Chronic | ICD-10-CM

## 2023-04-11 DIAGNOSIS — Z98.84 BARIATRIC SURGERY STATUS: Chronic | ICD-10-CM

## 2023-04-11 DIAGNOSIS — Z86.03 PERSONAL HISTORY OF NEOPLASM OF UNCERTAIN BEHAVIOR: Chronic | ICD-10-CM

## 2023-04-11 DIAGNOSIS — S88.112D COMPLETE TRAUMATIC AMPUTATION AT LEVEL BETWEEN KNEE AND ANKLE, LEFT LOWER LEG, SUBSEQUENT ENCOUNTER: ICD-10-CM

## 2023-04-11 DIAGNOSIS — S91.309A UNSPECIFIED OPEN WOUND, UNSPECIFIED FOOT, INITIAL ENCOUNTER: Chronic | ICD-10-CM

## 2023-04-11 DIAGNOSIS — Z89.512 ACQUIRED ABSENCE OF LEFT LEG BELOW KNEE: ICD-10-CM

## 2023-04-11 PROCEDURE — 97116 GAIT TRAINING THERAPY: CPT | Mod: GP

## 2023-04-11 PROCEDURE — 97110 THERAPEUTIC EXERCISES: CPT | Mod: GP

## 2023-04-13 ENCOUNTER — OUTPATIENT (OUTPATIENT)
Dept: OUTPATIENT SERVICES | Facility: HOSPITAL | Age: 48
LOS: 1 days | End: 2023-04-13

## 2023-04-13 DIAGNOSIS — Z86.03 PERSONAL HISTORY OF NEOPLASM OF UNCERTAIN BEHAVIOR: Chronic | ICD-10-CM

## 2023-04-13 DIAGNOSIS — S88.112D COMPLETE TRAUMATIC AMPUTATION AT LEVEL BETWEEN KNEE AND ANKLE, LEFT LOWER LEG, SUBSEQUENT ENCOUNTER: ICD-10-CM

## 2023-04-13 DIAGNOSIS — Z98.890 OTHER SPECIFIED POSTPROCEDURAL STATES: Chronic | ICD-10-CM

## 2023-04-13 DIAGNOSIS — S68.119A COMPLETE TRAUMATIC METACARPOPHALANGEAL AMPUTATION OF UNSPECIFIED FINGER, INITIAL ENCOUNTER: Chronic | ICD-10-CM

## 2023-04-13 DIAGNOSIS — Z89.9 ACQUIRED ABSENCE OF LIMB, UNSPECIFIED: Chronic | ICD-10-CM

## 2023-04-13 DIAGNOSIS — Z89.512 ACQUIRED ABSENCE OF LEFT LEG BELOW KNEE: ICD-10-CM

## 2023-04-13 DIAGNOSIS — Z98.891 HISTORY OF UTERINE SCAR FROM PREVIOUS SURGERY: Chronic | ICD-10-CM

## 2023-04-13 DIAGNOSIS — S91.309A UNSPECIFIED OPEN WOUND, UNSPECIFIED FOOT, INITIAL ENCOUNTER: Chronic | ICD-10-CM

## 2023-04-13 DIAGNOSIS — Z98.84 BARIATRIC SURGERY STATUS: Chronic | ICD-10-CM

## 2023-04-13 NOTE — PROGRESS NOTE ADULT - SUBJECTIVE AND OBJECTIVE BOX
POST OP Check    JIMMY CR  MRN-[id number]                          9.8    7.81  )-----------( 310      ( 21 Feb 2018 05:13 )             31.3     02-21    136  |  102  |  9<L>  ----------------------------<  80  4.4   |  28  |  0.9    Ca    8.8      21 Feb 2018 05:13      Vital Signs Last 24 Hrs  T(C): 36.4 (21 Feb 2018 18:08), Max: 36.4 (21 Feb 2018 08:33)  T(F): 97.6 (21 Feb 2018 18:08), Max: 97.6 (21 Feb 2018 18:08)  HR: 96 (21 Feb 2018 18:44) (88 - 990)  BP: 114/73 (21 Feb 2018 18:44) (114/73 - 133/76)  BP(mean): --  RR: 13 (21 Feb 2018 18:44) (13 - 22)  SpO2: 96% (21 Feb 2018 18:44) (95% - 100%)    Patient seen at bedside NAD, AAOx3/resting comfortably with pain controlled at this time. Patient tolerated procedure well without incident.   Patient denies nausea, vomiting, chills, fever, SOB.    Procedure: Bilateral proximal TMA with application of bilateral NPWT @125mmHg continuous  Surgeon: Dr. Marie LITTLE Focused:  No strikethrough is appreciated on surgical dressings.  Dressings were dry, clean, and intact.  No neuromuscular deficits appreciated. Bilateral NPWT holding seal at 125mmHg continuous. No

## 2023-04-18 ENCOUNTER — OUTPATIENT (OUTPATIENT)
Dept: OUTPATIENT SERVICES | Facility: HOSPITAL | Age: 48
LOS: 1 days | End: 2023-04-18

## 2023-04-18 DIAGNOSIS — Z98.84 BARIATRIC SURGERY STATUS: Chronic | ICD-10-CM

## 2023-04-18 DIAGNOSIS — S91.309A UNSPECIFIED OPEN WOUND, UNSPECIFIED FOOT, INITIAL ENCOUNTER: Chronic | ICD-10-CM

## 2023-04-18 DIAGNOSIS — Z98.890 OTHER SPECIFIED POSTPROCEDURAL STATES: Chronic | ICD-10-CM

## 2023-04-18 DIAGNOSIS — Z89.9 ACQUIRED ABSENCE OF LIMB, UNSPECIFIED: Chronic | ICD-10-CM

## 2023-04-18 DIAGNOSIS — Z86.03 PERSONAL HISTORY OF NEOPLASM OF UNCERTAIN BEHAVIOR: Chronic | ICD-10-CM

## 2023-04-18 DIAGNOSIS — S88.112D COMPLETE TRAUMATIC AMPUTATION AT LEVEL BETWEEN KNEE AND ANKLE, LEFT LOWER LEG, SUBSEQUENT ENCOUNTER: ICD-10-CM

## 2023-04-18 DIAGNOSIS — Z89.512 ACQUIRED ABSENCE OF LEFT LEG BELOW KNEE: ICD-10-CM

## 2023-04-18 DIAGNOSIS — Z98.891 HISTORY OF UTERINE SCAR FROM PREVIOUS SURGERY: Chronic | ICD-10-CM

## 2023-04-18 DIAGNOSIS — S68.119A COMPLETE TRAUMATIC METACARPOPHALANGEAL AMPUTATION OF UNSPECIFIED FINGER, INITIAL ENCOUNTER: Chronic | ICD-10-CM

## 2023-04-19 NOTE — DIETITIAN INITIAL EVALUATION ADULT. - NS FNS WEIGHT USED FOR CALC
Confirmed with pharmacy. They will process refill.    Padmaja Bolton RN on 4/19/2023 at 1:21 PM     80.3kg/admission

## 2023-04-20 ENCOUNTER — OUTPATIENT (OUTPATIENT)
Dept: OUTPATIENT SERVICES | Facility: HOSPITAL | Age: 48
LOS: 1 days | End: 2023-04-20

## 2023-04-20 DIAGNOSIS — Z89.512 ACQUIRED ABSENCE OF LEFT LEG BELOW KNEE: ICD-10-CM

## 2023-04-20 DIAGNOSIS — Z89.9 ACQUIRED ABSENCE OF LIMB, UNSPECIFIED: Chronic | ICD-10-CM

## 2023-04-20 DIAGNOSIS — Z98.890 OTHER SPECIFIED POSTPROCEDURAL STATES: Chronic | ICD-10-CM

## 2023-04-20 DIAGNOSIS — Z98.891 HISTORY OF UTERINE SCAR FROM PREVIOUS SURGERY: Chronic | ICD-10-CM

## 2023-04-20 DIAGNOSIS — Z86.03 PERSONAL HISTORY OF NEOPLASM OF UNCERTAIN BEHAVIOR: Chronic | ICD-10-CM

## 2023-04-20 DIAGNOSIS — S88.112D COMPLETE TRAUMATIC AMPUTATION AT LEVEL BETWEEN KNEE AND ANKLE, LEFT LOWER LEG, SUBSEQUENT ENCOUNTER: ICD-10-CM

## 2023-04-20 DIAGNOSIS — S68.119A COMPLETE TRAUMATIC METACARPOPHALANGEAL AMPUTATION OF UNSPECIFIED FINGER, INITIAL ENCOUNTER: Chronic | ICD-10-CM

## 2023-04-20 DIAGNOSIS — Z98.84 BARIATRIC SURGERY STATUS: Chronic | ICD-10-CM

## 2023-04-20 DIAGNOSIS — S91.309A UNSPECIFIED OPEN WOUND, UNSPECIFIED FOOT, INITIAL ENCOUNTER: Chronic | ICD-10-CM

## 2023-04-25 ENCOUNTER — OUTPATIENT (OUTPATIENT)
Dept: OUTPATIENT SERVICES | Facility: HOSPITAL | Age: 48
LOS: 1 days | End: 2023-04-25

## 2023-04-25 DIAGNOSIS — Z86.03 PERSONAL HISTORY OF NEOPLASM OF UNCERTAIN BEHAVIOR: Chronic | ICD-10-CM

## 2023-04-25 DIAGNOSIS — Z98.890 OTHER SPECIFIED POSTPROCEDURAL STATES: Chronic | ICD-10-CM

## 2023-04-25 DIAGNOSIS — Z89.512 ACQUIRED ABSENCE OF LEFT LEG BELOW KNEE: ICD-10-CM

## 2023-04-25 DIAGNOSIS — Z98.891 HISTORY OF UTERINE SCAR FROM PREVIOUS SURGERY: Chronic | ICD-10-CM

## 2023-04-25 DIAGNOSIS — S91.309A UNSPECIFIED OPEN WOUND, UNSPECIFIED FOOT, INITIAL ENCOUNTER: Chronic | ICD-10-CM

## 2023-04-25 DIAGNOSIS — S88.112D COMPLETE TRAUMATIC AMPUTATION AT LEVEL BETWEEN KNEE AND ANKLE, LEFT LOWER LEG, SUBSEQUENT ENCOUNTER: ICD-10-CM

## 2023-04-25 DIAGNOSIS — S68.119A COMPLETE TRAUMATIC METACARPOPHALANGEAL AMPUTATION OF UNSPECIFIED FINGER, INITIAL ENCOUNTER: Chronic | ICD-10-CM

## 2023-04-25 DIAGNOSIS — Z98.84 BARIATRIC SURGERY STATUS: Chronic | ICD-10-CM

## 2023-04-25 DIAGNOSIS — Z89.9 ACQUIRED ABSENCE OF LIMB, UNSPECIFIED: Chronic | ICD-10-CM

## 2023-04-27 ENCOUNTER — OUTPATIENT (OUTPATIENT)
Dept: OUTPATIENT SERVICES | Facility: HOSPITAL | Age: 48
LOS: 1 days | End: 2023-04-27

## 2023-04-27 DIAGNOSIS — Z89.512 ACQUIRED ABSENCE OF LEFT LEG BELOW KNEE: ICD-10-CM

## 2023-04-27 DIAGNOSIS — Z98.890 OTHER SPECIFIED POSTPROCEDURAL STATES: Chronic | ICD-10-CM

## 2023-04-27 DIAGNOSIS — Z86.03 PERSONAL HISTORY OF NEOPLASM OF UNCERTAIN BEHAVIOR: Chronic | ICD-10-CM

## 2023-04-27 DIAGNOSIS — Z98.84 BARIATRIC SURGERY STATUS: Chronic | ICD-10-CM

## 2023-04-27 DIAGNOSIS — Z89.9 ACQUIRED ABSENCE OF LIMB, UNSPECIFIED: Chronic | ICD-10-CM

## 2023-04-27 DIAGNOSIS — S88.112D COMPLETE TRAUMATIC AMPUTATION AT LEVEL BETWEEN KNEE AND ANKLE, LEFT LOWER LEG, SUBSEQUENT ENCOUNTER: ICD-10-CM

## 2023-04-27 DIAGNOSIS — S68.119A COMPLETE TRAUMATIC METACARPOPHALANGEAL AMPUTATION OF UNSPECIFIED FINGER, INITIAL ENCOUNTER: Chronic | ICD-10-CM

## 2023-04-27 DIAGNOSIS — S91.309A UNSPECIFIED OPEN WOUND, UNSPECIFIED FOOT, INITIAL ENCOUNTER: Chronic | ICD-10-CM

## 2023-04-27 DIAGNOSIS — Z98.891 HISTORY OF UTERINE SCAR FROM PREVIOUS SURGERY: Chronic | ICD-10-CM

## 2023-05-02 ENCOUNTER — OUTPATIENT (OUTPATIENT)
Dept: OUTPATIENT SERVICES | Facility: HOSPITAL | Age: 48
LOS: 1 days | End: 2023-05-02
Payer: COMMERCIAL

## 2023-05-02 DIAGNOSIS — Z98.890 OTHER SPECIFIED POSTPROCEDURAL STATES: Chronic | ICD-10-CM

## 2023-05-02 DIAGNOSIS — S88.112D COMPLETE TRAUMATIC AMPUTATION AT LEVEL BETWEEN KNEE AND ANKLE, LEFT LOWER LEG, SUBSEQUENT ENCOUNTER: ICD-10-CM

## 2023-05-02 DIAGNOSIS — Z89.9 ACQUIRED ABSENCE OF LIMB, UNSPECIFIED: Chronic | ICD-10-CM

## 2023-05-02 DIAGNOSIS — Z89.512 ACQUIRED ABSENCE OF LEFT LEG BELOW KNEE: ICD-10-CM

## 2023-05-02 DIAGNOSIS — S68.119A COMPLETE TRAUMATIC METACARPOPHALANGEAL AMPUTATION OF UNSPECIFIED FINGER, INITIAL ENCOUNTER: Chronic | ICD-10-CM

## 2023-05-02 DIAGNOSIS — Z98.84 BARIATRIC SURGERY STATUS: Chronic | ICD-10-CM

## 2023-05-02 DIAGNOSIS — Z86.03 PERSONAL HISTORY OF NEOPLASM OF UNCERTAIN BEHAVIOR: Chronic | ICD-10-CM

## 2023-05-02 DIAGNOSIS — Z98.891 HISTORY OF UTERINE SCAR FROM PREVIOUS SURGERY: Chronic | ICD-10-CM

## 2023-05-02 DIAGNOSIS — S91.309A UNSPECIFIED OPEN WOUND, UNSPECIFIED FOOT, INITIAL ENCOUNTER: Chronic | ICD-10-CM

## 2023-05-02 PROCEDURE — 97116 GAIT TRAINING THERAPY: CPT | Mod: GP

## 2023-05-02 PROCEDURE — 97110 THERAPEUTIC EXERCISES: CPT | Mod: GP

## 2023-05-09 ENCOUNTER — OUTPATIENT (OUTPATIENT)
Dept: OUTPATIENT SERVICES | Facility: HOSPITAL | Age: 48
LOS: 1 days | End: 2023-05-09

## 2023-05-09 DIAGNOSIS — Z89.9 ACQUIRED ABSENCE OF LIMB, UNSPECIFIED: Chronic | ICD-10-CM

## 2023-05-09 DIAGNOSIS — Z98.890 OTHER SPECIFIED POSTPROCEDURAL STATES: Chronic | ICD-10-CM

## 2023-05-09 DIAGNOSIS — S88.112D COMPLETE TRAUMATIC AMPUTATION AT LEVEL BETWEEN KNEE AND ANKLE, LEFT LOWER LEG, SUBSEQUENT ENCOUNTER: ICD-10-CM

## 2023-05-09 DIAGNOSIS — S68.119A COMPLETE TRAUMATIC METACARPOPHALANGEAL AMPUTATION OF UNSPECIFIED FINGER, INITIAL ENCOUNTER: Chronic | ICD-10-CM

## 2023-05-09 DIAGNOSIS — Z89.512 ACQUIRED ABSENCE OF LEFT LEG BELOW KNEE: ICD-10-CM

## 2023-05-09 DIAGNOSIS — Z98.84 BARIATRIC SURGERY STATUS: Chronic | ICD-10-CM

## 2023-05-09 DIAGNOSIS — S91.309A UNSPECIFIED OPEN WOUND, UNSPECIFIED FOOT, INITIAL ENCOUNTER: Chronic | ICD-10-CM

## 2023-05-09 DIAGNOSIS — Z98.891 HISTORY OF UTERINE SCAR FROM PREVIOUS SURGERY: Chronic | ICD-10-CM

## 2023-05-09 DIAGNOSIS — Z86.03 PERSONAL HISTORY OF NEOPLASM OF UNCERTAIN BEHAVIOR: Chronic | ICD-10-CM

## 2023-05-11 ENCOUNTER — OUTPATIENT (OUTPATIENT)
Dept: OUTPATIENT SERVICES | Facility: HOSPITAL | Age: 48
LOS: 1 days | End: 2023-05-11

## 2023-05-11 DIAGNOSIS — S68.119A COMPLETE TRAUMATIC METACARPOPHALANGEAL AMPUTATION OF UNSPECIFIED FINGER, INITIAL ENCOUNTER: Chronic | ICD-10-CM

## 2023-05-11 DIAGNOSIS — Z98.84 BARIATRIC SURGERY STATUS: Chronic | ICD-10-CM

## 2023-05-11 DIAGNOSIS — Z98.890 OTHER SPECIFIED POSTPROCEDURAL STATES: Chronic | ICD-10-CM

## 2023-05-11 DIAGNOSIS — S88.112D COMPLETE TRAUMATIC AMPUTATION AT LEVEL BETWEEN KNEE AND ANKLE, LEFT LOWER LEG, SUBSEQUENT ENCOUNTER: ICD-10-CM

## 2023-05-11 DIAGNOSIS — Z89.512 ACQUIRED ABSENCE OF LEFT LEG BELOW KNEE: ICD-10-CM

## 2023-05-11 DIAGNOSIS — Z98.891 HISTORY OF UTERINE SCAR FROM PREVIOUS SURGERY: Chronic | ICD-10-CM

## 2023-05-11 DIAGNOSIS — S91.309A UNSPECIFIED OPEN WOUND, UNSPECIFIED FOOT, INITIAL ENCOUNTER: Chronic | ICD-10-CM

## 2023-05-11 DIAGNOSIS — Z89.9 ACQUIRED ABSENCE OF LIMB, UNSPECIFIED: Chronic | ICD-10-CM

## 2023-05-11 DIAGNOSIS — Z86.03 PERSONAL HISTORY OF NEOPLASM OF UNCERTAIN BEHAVIOR: Chronic | ICD-10-CM

## 2023-06-15 NOTE — PRE-OP CHECKLIST - NOTHING BY MOUTH SINCE
Call placed no answer or voice mail box set up.    Try again later  
28-Mar-2018 00:00
27-Mar-2018 00:00
02-Apr-2018 00:00

## 2023-07-05 NOTE — DISCHARGE NOTE ADULT - DISCHARGE DATE
09-Feb-2019 [place] Counseling:  I discussed with the patient the risks of imiquimod including but not limited to erythema, scaling, itching, weeping, crusting, and pain.  Patient understands that the inflammatory response to imiquimod is variable from person to person and was educated regarded proper titration schedule.  If flu-like symptoms develop, patient knows to discontinue the medication and contact us.

## 2023-08-30 ENCOUNTER — OUTPATIENT (OUTPATIENT)
Dept: OUTPATIENT SERVICES | Facility: HOSPITAL | Age: 48
LOS: 1 days | End: 2023-08-30
Payer: COMMERCIAL

## 2023-08-30 DIAGNOSIS — Z89.9 ACQUIRED ABSENCE OF LIMB, UNSPECIFIED: Chronic | ICD-10-CM

## 2023-08-30 DIAGNOSIS — Z86.03 PERSONAL HISTORY OF NEOPLASM OF UNCERTAIN BEHAVIOR: Chronic | ICD-10-CM

## 2023-08-30 DIAGNOSIS — S68.119A COMPLETE TRAUMATIC METACARPOPHALANGEAL AMPUTATION OF UNSPECIFIED FINGER, INITIAL ENCOUNTER: Chronic | ICD-10-CM

## 2023-08-30 DIAGNOSIS — Z98.84 BARIATRIC SURGERY STATUS: Chronic | ICD-10-CM

## 2023-08-30 DIAGNOSIS — Z98.890 OTHER SPECIFIED POSTPROCEDURAL STATES: Chronic | ICD-10-CM

## 2023-08-30 DIAGNOSIS — S91.309A UNSPECIFIED OPEN WOUND, UNSPECIFIED FOOT, INITIAL ENCOUNTER: Chronic | ICD-10-CM

## 2023-08-30 DIAGNOSIS — Z89.512 ACQUIRED ABSENCE OF LEFT LEG BELOW KNEE: ICD-10-CM

## 2023-08-30 DIAGNOSIS — Z98.891 HISTORY OF UTERINE SCAR FROM PREVIOUS SURGERY: Chronic | ICD-10-CM

## 2023-08-30 PROCEDURE — 97162 PT EVAL MOD COMPLEX 30 MIN: CPT | Mod: GP

## 2023-08-30 PROCEDURE — 97110 THERAPEUTIC EXERCISES: CPT | Mod: GP

## 2023-08-31 DIAGNOSIS — Z89.512 ACQUIRED ABSENCE OF LEFT LEG BELOW KNEE: ICD-10-CM

## 2023-09-09 NOTE — ASU PATIENT PROFILE, ADULT - PAIN DESCRIPTION (FREQUENCY/QUALITY), PROFILE
OFFICE VISIT    Patient: Jamel Pak   : 1994 MRN: [id number]    SUBJECTIVE:  Chief Complaint   Patient presents with   • Physical     Annual Exam       A 29 year old male presents for Annual physical exam.     Patient has given consent to record this visit for documentation in their clinical record.    HISTORY OF PRESENT ILLNESS:  Historian: Self.  Annual physical exam:  Diet: He is doing portion control.     Caffeine intake: Drinks two or three cups of coffee a day. He doesn't drink soda or energy drinks.       Exercise: Work is his exercise. Does family walks at night.     Sleep health: Sleeps on average 8 to 10 hours.       Hyperlipidemia, mixed: Has a history of Hyperlipidemia.     Anxiety: He is compliant with lexapro.             PAST MEDICAL HISTORY:  Past Medical History:   Diagnosis Date   • Acne    • Anxiety    • Pilonidal cyst      MEDICATIONS:  Current Outpatient Medications   Medication Sig Dispense Refill   • escitalopram (Lexapro) 10 MG tablet Take 1 tablet by mouth daily. 90 tablet 1   • acyclovir (ZOVIRAX) 400 MG tablet Take 1 tablet by mouth daily. 90 tablet 1   • fluticasone (FLONASE) 50 MCG/ACT nasal spray Spray 1 spray in each nostril in the morning and 1 spray in the evening. Apt due for refills. Please call to schedule. 48 g 0   • Multiple Vitamin (MULTI VITAMIN MENS PO) Take by mouth daily.        No current facility-administered medications for this visit.     ALLERGIES:  Allergies as of 2023 - Reviewed 2023   Allergen Reaction Noted   • Penicillins Other (See Comments) 2012   • Motrin Other (See Comments) 2020     FAMILY HISTORY:  Family History   Problem Relation Age of Onset   • Cancer, Colon Maternal Uncle    • Cancer Maternal Uncle         colon     SOCIAL HISTORY:  Social History     Tobacco Use   • Smoking status: Never   • Smokeless tobacco: Never   Vaping Use   • Vaping Use: never used   Substance Use Topics   • Alcohol use: No     Comment: rarely   •  Drug use: No     Past Surgical HISTORY  Past Surgical History:   Procedure Laterality Date   • Colonoscopy  01/25/2021   • Drain pilonidal cyst simpl     • Oral surgery procedure     • Remv pilonidal lesion extens  11/08/2020       REVIEW OF SYSTEMS:  Constitutional: Negative for fever, chills, or fatigue.  Eyes: Negative for visual changes.  ENT: Negative for rhinorrhea, ear pain or sore throat.  Respiratory: Negative cough or shortness of breath.    Cardiovascular: Negative for chest pain, palpitations.   Gastrointestinal: Negative for nausea, vomiting, diarrhea or abdominal pain.   Genitourinary: Negative for dysuria, urgency, frequency.  Extremities:  Negative for joint swelling or joint pain.  Neurologic:  Negative for headache.  Endocrine: Negative for heat or cold intolerance, weight loss or gain.  Psychiatric: Negative for anxiety, depression.    OBJECTIVE:  Visit Vitals  /70 (BP Location: RUE - Right upper extremity, Patient Position: Sitting, Cuff Size: Regular)   Pulse (!) 56   Resp 16   Ht 5' 9\"   Wt 92.1 kg (203 lb)   SpO2 99%   BMI 29.98 kg/m²       PHYSICAL EXAM:  General: Alert, cooperative, conversive in no acute distress.   Skin: Warm and dry without rash.     Head: Normocephalic-atraumatic.   Neck: Trachea is midline. No adenopathy.  Normal thyroid without mass or tenderness.   Eyes: Normal conjunctivae and sclerae.  PERRL (Pupils equal, round, reactive to light).  EOMI (Extraocular movements intact).   ENT: Mucous membranes are moist.  Normal tympanic membranes and external auditory canals bilaterally.  No pharyngeal erythema or exudate.   Cardiovascular: Symmetrical pulses.  Regular rate and rhythm without murmur.   Respiratory: Normal respiratory effort.  CTA (Clear to auscultation).  No wheezes, rales or rhonchi.   Gastrointestinal: Soft and nontender.  Normal bowel sounds.   Musculoskeletal: No deformity or edema.  No tenderness to palpation.   Neurologic: Oriented x 3.  Cranial nerves  2-12 are intact.  No focal deficits or lateralizing signs.   Psychiatric: Cooperative.  Appropriate mood and affect.      DIAGNOSTIC STUDIES:  LAB RESULTS:  Lab Services on 09/08/2023   Component Date Value Ref Range Status   • Fasting Status 09/08/2023 12  0 - 999 Hours Final   • Sodium 09/08/2023 141  135 - 145 mmol/L Final   • Potassium 09/08/2023 4.1  3.4 - 5.1 mmol/L Final   • Chloride 09/08/2023 105  97 - 110 mmol/L Final   • Carbon Dioxide 09/08/2023 28  21 - 32 mmol/L Final   • Anion Gap 09/08/2023 12  7 - 19 mmol/L Final   • Glucose 09/08/2023 89  70 - 99 mg/dL Final   • BUN 09/08/2023 18  6 - 20 mg/dL Final   • Creatinine 09/08/2023 0.88  0.67 - 1.17 mg/dL Final   • Glomerular Filtration Rate 09/08/2023 >90  >=60 Final   • BUN/Cr 09/08/2023 20  7 - 25 Final   • Calcium 09/08/2023 9.0  8.4 - 10.2 mg/dL Final   • Bilirubin, Total 09/08/2023 0.4  0.2 - 1.0 mg/dL Final   • GOT/AST 09/08/2023 45 (H)  <=37 Units/L Final   • GPT/ALT 09/08/2023 98 (H)  <64 Units/L Final   • Alkaline Phosphatase 09/08/2023 108  45 - 117 Units/L Final   • Albumin 09/08/2023 4.3  3.6 - 5.1 g/dL Final   • Protein, Total 09/08/2023 7.7  6.4 - 8.2 g/dL Final   • Globulin 09/08/2023 3.4  2.0 - 4.0 g/dL Final   • A/G Ratio 09/08/2023 1.3  1.0 - 2.4 Final   • Cholesterol 09/08/2023 212 (H)  <=199 mg/dL Final   • Triglycerides 09/08/2023 136  <=149 mg/dL Final   • HDL 09/08/2023 50  >=40 mg/dL Final   • LDL 09/08/2023 135 (H)  <=129 mg/dL Final   • Non-HDL Cholesterol 09/08/2023 162  mg/dL Final   • Cholesterol/ HDL Ratio 09/08/2023 4.2  <=4.4 Final   • TSH 09/08/2023 1.306  0.350 - 5.000 mcUnits/mL Final   • WBC 09/08/2023 6.9  4.2 - 11.0 K/mcL Final   • RBC 09/08/2023 5.19  4.50 - 5.90 mil/mcL Final   • HGB 09/08/2023 14.9  13.0 - 17.0 g/dL Final   • HCT 09/08/2023 43.7  39.0 - 51.0 % Final   • MCV 09/08/2023 84.2  78.0 - 100.0 fl Final   • MCH 09/08/2023 28.7  26.0 - 34.0 pg Final   • MCHC 09/08/2023 34.1  32.0 - 36.5 g/dL Final   •  RDW-CV 09/08/2023 12.5  11.0 - 15.0 % Final   • RDW-SD 09/08/2023 38.1 (L)  39.0 - 50.0 fL Final   • PLT 09/08/2023 214  140 - 450 K/mcL Final   • NRBC 09/08/2023 0  <=0 /100 WBC Final   • Neutrophil, Percent 09/08/2023 59  % Final   • Lymphocytes, Percent 09/08/2023 29  % Final   • Mono, Percent 09/08/2023 8  % Final   • Eosinophils, Percent 09/08/2023 2  % Final   • Basophils, Percent 09/08/2023 1  % Final   • Immature Granulocytes 09/08/2023 1  % Final   • Absolute Neutrophils 09/08/2023 4.2  1.8 - 7.7 K/mcL Final   • Absolute Lymphocytes 09/08/2023 2.0  1.0 - 4.8 K/mcL Final   • Absolute Monocytes 09/08/2023 0.5  0.3 - 0.9 K/mcL Final   • Absolute Eosinophils  09/08/2023 0.1  0.0 - 0.5 K/mcL Final   • Absolute Basophils 09/08/2023 0.0  0.0 - 0.3 K/mcL Final   • Absolute Immature Granulocytes 09/08/2023 0.0  0.0 - 0.2 K/mcL Final       ASSESSMENT AND PLAN:  This 29 year old male presents with :  1. Annual physical exam:  Discussed health and wellness.  Recommend low fat, low salt, low cholesterol diet. Caffeine and alcohol in moderation. Exercise for atleast 30 minutes, most days of the week. Screening labs ordered which he will complete today. Encourage flu shot in the fall.    Ordered CBC with Automated Differential; Future, Comprehensive Metabolic Panel; Future, Lipid Panel With Reflex; Future, Thyroid Stimulating Hormone; Future.      2. Hyperlipidemia, mixed:  Monitor lipid as they were borderline in the past, no current treatment.     3. Anxiety:  Anxiety is controlled with Lexapro. CPM.  Continue Escitalopram (Lexapro) 10 MG tablet; Take 1 tablet by mouth daily.  Dispense: 90 tablet; Refill: 1.    Recommend annual physical in one year.          Orders Placed This Encounter   • CBC with Automated Differential   • Comprehensive Metabolic Panel   • Lipid Panel With Reflex   • Thyroid Stimulating Hormone   • escitalopram (Lexapro) 10 MG tablet   • acyclovir (ZOVIRAX) 400 MG tablet       Refer to  orders.  Medical compliance with plan discussed and risks of non-compliance reviewed.  Patient education completed on disease process, etiology & prognosis.  Proper usage and side effects of medications reviewed & discussed.  Return to clinic as clinically indicated as discussed with the patient who verbalized understanding of the plan and is in agreement with the plan.    I, Roxanne Rosenberg, have created a visit summary document based on the audio recording between Dr. Truman Costa MD and this patient for the physician to review, edit as needed, and authenticate.    Creation Date: 9/9/2023     I have reviewed and edited the visit summary above and attest that it is accurate.    Truman Costa MD   tingling/phantom/aching/constant

## 2023-09-14 ENCOUNTER — OUTPATIENT (OUTPATIENT)
Dept: OUTPATIENT SERVICES | Facility: HOSPITAL | Age: 48
LOS: 1 days | End: 2023-09-14
Payer: COMMERCIAL

## 2023-09-14 DIAGNOSIS — Z98.890 OTHER SPECIFIED POSTPROCEDURAL STATES: Chronic | ICD-10-CM

## 2023-09-14 DIAGNOSIS — S68.119A COMPLETE TRAUMATIC METACARPOPHALANGEAL AMPUTATION OF UNSPECIFIED FINGER, INITIAL ENCOUNTER: Chronic | ICD-10-CM

## 2023-09-14 DIAGNOSIS — S91.309A UNSPECIFIED OPEN WOUND, UNSPECIFIED FOOT, INITIAL ENCOUNTER: Chronic | ICD-10-CM

## 2023-09-14 DIAGNOSIS — Z89.512 ACQUIRED ABSENCE OF LEFT LEG BELOW KNEE: ICD-10-CM

## 2023-09-14 DIAGNOSIS — Z86.03 PERSONAL HISTORY OF NEOPLASM OF UNCERTAIN BEHAVIOR: Chronic | ICD-10-CM

## 2023-09-14 DIAGNOSIS — Z98.84 BARIATRIC SURGERY STATUS: Chronic | ICD-10-CM

## 2023-09-14 DIAGNOSIS — Z89.9 ACQUIRED ABSENCE OF LIMB, UNSPECIFIED: Chronic | ICD-10-CM

## 2023-09-14 DIAGNOSIS — Z98.891 HISTORY OF UTERINE SCAR FROM PREVIOUS SURGERY: Chronic | ICD-10-CM

## 2023-09-14 PROCEDURE — 97110 THERAPEUTIC EXERCISES: CPT | Mod: GP

## 2023-09-15 ENCOUNTER — APPOINTMENT (OUTPATIENT)
Dept: CARDIOLOGY | Facility: CLINIC | Age: 48
End: 2023-09-15
Payer: COMMERCIAL

## 2023-09-15 VITALS
SYSTOLIC BLOOD PRESSURE: 130 MMHG | HEIGHT: 68 IN | BODY MASS INDEX: 30.77 KG/M2 | HEART RATE: 73 BPM | DIASTOLIC BLOOD PRESSURE: 80 MMHG | WEIGHT: 203 LBS

## 2023-09-15 DIAGNOSIS — Z89.512 ACQUIRED ABSENCE OF LEFT LEG BELOW KNEE: ICD-10-CM

## 2023-09-15 DIAGNOSIS — I42.9 CARDIOMYOPATHY, UNSPECIFIED: ICD-10-CM

## 2023-09-15 DIAGNOSIS — I10 ESSENTIAL (PRIMARY) HYPERTENSION: ICD-10-CM

## 2023-09-15 PROCEDURE — 99213 OFFICE O/P EST LOW 20 MIN: CPT | Mod: 25

## 2023-09-15 PROCEDURE — 93000 ELECTROCARDIOGRAM COMPLETE: CPT

## 2023-09-15 RX ORDER — BACLOFEN 5 MG/1
5 TABLET ORAL AS DIRECTED
Refills: 0 | Status: ACTIVE | COMMUNITY

## 2023-09-21 ENCOUNTER — OUTPATIENT (OUTPATIENT)
Dept: OUTPATIENT SERVICES | Facility: HOSPITAL | Age: 48
LOS: 1 days | End: 2023-09-21

## 2023-09-21 DIAGNOSIS — Z86.03 PERSONAL HISTORY OF NEOPLASM OF UNCERTAIN BEHAVIOR: Chronic | ICD-10-CM

## 2023-09-21 DIAGNOSIS — S91.309A UNSPECIFIED OPEN WOUND, UNSPECIFIED FOOT, INITIAL ENCOUNTER: Chronic | ICD-10-CM

## 2023-09-21 DIAGNOSIS — Z98.890 OTHER SPECIFIED POSTPROCEDURAL STATES: Chronic | ICD-10-CM

## 2023-09-21 DIAGNOSIS — Z89.512 ACQUIRED ABSENCE OF LEFT LEG BELOW KNEE: ICD-10-CM

## 2023-09-21 DIAGNOSIS — S68.119A COMPLETE TRAUMATIC METACARPOPHALANGEAL AMPUTATION OF UNSPECIFIED FINGER, INITIAL ENCOUNTER: Chronic | ICD-10-CM

## 2023-09-21 DIAGNOSIS — Z98.84 BARIATRIC SURGERY STATUS: Chronic | ICD-10-CM

## 2023-09-21 DIAGNOSIS — Z89.9 ACQUIRED ABSENCE OF LIMB, UNSPECIFIED: Chronic | ICD-10-CM

## 2023-09-21 DIAGNOSIS — Z98.891 HISTORY OF UTERINE SCAR FROM PREVIOUS SURGERY: Chronic | ICD-10-CM

## 2023-09-26 ENCOUNTER — OUTPATIENT (OUTPATIENT)
Dept: OUTPATIENT SERVICES | Facility: HOSPITAL | Age: 48
LOS: 1 days | End: 2023-09-26

## 2023-09-26 DIAGNOSIS — Z98.891 HISTORY OF UTERINE SCAR FROM PREVIOUS SURGERY: Chronic | ICD-10-CM

## 2023-09-26 DIAGNOSIS — Z98.890 OTHER SPECIFIED POSTPROCEDURAL STATES: Chronic | ICD-10-CM

## 2023-09-26 DIAGNOSIS — Z86.03 PERSONAL HISTORY OF NEOPLASM OF UNCERTAIN BEHAVIOR: Chronic | ICD-10-CM

## 2023-09-26 DIAGNOSIS — Z98.84 BARIATRIC SURGERY STATUS: Chronic | ICD-10-CM

## 2023-09-26 DIAGNOSIS — Z89.512 ACQUIRED ABSENCE OF LEFT LEG BELOW KNEE: ICD-10-CM

## 2023-10-03 ENCOUNTER — OUTPATIENT (OUTPATIENT)
Dept: OUTPATIENT SERVICES | Facility: HOSPITAL | Age: 48
LOS: 1 days | End: 2023-10-03
Payer: COMMERCIAL

## 2023-10-03 DIAGNOSIS — Z86.03 PERSONAL HISTORY OF NEOPLASM OF UNCERTAIN BEHAVIOR: Chronic | ICD-10-CM

## 2023-10-03 DIAGNOSIS — Z98.84 BARIATRIC SURGERY STATUS: Chronic | ICD-10-CM

## 2023-10-03 DIAGNOSIS — S68.119A COMPLETE TRAUMATIC METACARPOPHALANGEAL AMPUTATION OF UNSPECIFIED FINGER, INITIAL ENCOUNTER: Chronic | ICD-10-CM

## 2023-10-03 DIAGNOSIS — Z89.9 ACQUIRED ABSENCE OF LIMB, UNSPECIFIED: Chronic | ICD-10-CM

## 2023-10-03 DIAGNOSIS — Z89.512 ACQUIRED ABSENCE OF LEFT LEG BELOW KNEE: ICD-10-CM

## 2023-10-03 DIAGNOSIS — Z98.890 OTHER SPECIFIED POSTPROCEDURAL STATES: Chronic | ICD-10-CM

## 2023-10-03 DIAGNOSIS — Z98.891 HISTORY OF UTERINE SCAR FROM PREVIOUS SURGERY: Chronic | ICD-10-CM

## 2023-10-03 DIAGNOSIS — S91.309A UNSPECIFIED OPEN WOUND, UNSPECIFIED FOOT, INITIAL ENCOUNTER: Chronic | ICD-10-CM

## 2023-10-03 PROCEDURE — 97110 THERAPEUTIC EXERCISES: CPT | Mod: GP

## 2023-10-04 DIAGNOSIS — Z89.512 ACQUIRED ABSENCE OF LEFT LEG BELOW KNEE: ICD-10-CM

## 2023-10-05 ENCOUNTER — OUTPATIENT (OUTPATIENT)
Dept: OUTPATIENT SERVICES | Facility: HOSPITAL | Age: 48
LOS: 1 days | End: 2023-10-05

## 2023-10-05 DIAGNOSIS — Z89.512 ACQUIRED ABSENCE OF LEFT LEG BELOW KNEE: ICD-10-CM

## 2023-10-05 DIAGNOSIS — Z98.891 HISTORY OF UTERINE SCAR FROM PREVIOUS SURGERY: Chronic | ICD-10-CM

## 2023-10-05 DIAGNOSIS — Z86.03 PERSONAL HISTORY OF NEOPLASM OF UNCERTAIN BEHAVIOR: Chronic | ICD-10-CM

## 2023-10-05 DIAGNOSIS — Z98.890 OTHER SPECIFIED POSTPROCEDURAL STATES: Chronic | ICD-10-CM

## 2023-10-05 DIAGNOSIS — S91.309A UNSPECIFIED OPEN WOUND, UNSPECIFIED FOOT, INITIAL ENCOUNTER: Chronic | ICD-10-CM

## 2023-10-05 DIAGNOSIS — Z98.84 BARIATRIC SURGERY STATUS: Chronic | ICD-10-CM

## 2023-10-05 DIAGNOSIS — Z89.9 ACQUIRED ABSENCE OF LIMB, UNSPECIFIED: Chronic | ICD-10-CM

## 2023-10-05 DIAGNOSIS — S68.119A COMPLETE TRAUMATIC METACARPOPHALANGEAL AMPUTATION OF UNSPECIFIED FINGER, INITIAL ENCOUNTER: Chronic | ICD-10-CM

## 2023-10-17 ENCOUNTER — OUTPATIENT (OUTPATIENT)
Dept: OUTPATIENT SERVICES | Facility: HOSPITAL | Age: 48
LOS: 1 days | End: 2023-10-17

## 2023-10-17 DIAGNOSIS — Z98.890 OTHER SPECIFIED POSTPROCEDURAL STATES: Chronic | ICD-10-CM

## 2023-10-17 DIAGNOSIS — Z89.9 ACQUIRED ABSENCE OF LIMB, UNSPECIFIED: Chronic | ICD-10-CM

## 2023-10-17 DIAGNOSIS — Z89.512 ACQUIRED ABSENCE OF LEFT LEG BELOW KNEE: ICD-10-CM

## 2023-10-17 DIAGNOSIS — Z98.84 BARIATRIC SURGERY STATUS: Chronic | ICD-10-CM

## 2023-10-17 DIAGNOSIS — S68.119A COMPLETE TRAUMATIC METACARPOPHALANGEAL AMPUTATION OF UNSPECIFIED FINGER, INITIAL ENCOUNTER: Chronic | ICD-10-CM

## 2023-10-17 DIAGNOSIS — S91.309A UNSPECIFIED OPEN WOUND, UNSPECIFIED FOOT, INITIAL ENCOUNTER: Chronic | ICD-10-CM

## 2023-10-17 DIAGNOSIS — Z86.03 PERSONAL HISTORY OF NEOPLASM OF UNCERTAIN BEHAVIOR: Chronic | ICD-10-CM

## 2023-10-19 ENCOUNTER — OUTPATIENT (OUTPATIENT)
Dept: OUTPATIENT SERVICES | Facility: HOSPITAL | Age: 48
LOS: 1 days | End: 2023-10-19

## 2023-10-19 DIAGNOSIS — Z98.891 HISTORY OF UTERINE SCAR FROM PREVIOUS SURGERY: Chronic | ICD-10-CM

## 2023-10-19 DIAGNOSIS — Z98.84 BARIATRIC SURGERY STATUS: Chronic | ICD-10-CM

## 2023-10-19 DIAGNOSIS — S91.309A UNSPECIFIED OPEN WOUND, UNSPECIFIED FOOT, INITIAL ENCOUNTER: Chronic | ICD-10-CM

## 2023-10-19 DIAGNOSIS — Z98.890 OTHER SPECIFIED POSTPROCEDURAL STATES: Chronic | ICD-10-CM

## 2023-10-19 DIAGNOSIS — Z89.512 ACQUIRED ABSENCE OF LEFT LEG BELOW KNEE: ICD-10-CM

## 2023-10-19 DIAGNOSIS — S68.119A COMPLETE TRAUMATIC METACARPOPHALANGEAL AMPUTATION OF UNSPECIFIED FINGER, INITIAL ENCOUNTER: Chronic | ICD-10-CM

## 2023-10-19 DIAGNOSIS — Z89.9 ACQUIRED ABSENCE OF LIMB, UNSPECIFIED: Chronic | ICD-10-CM

## 2023-10-19 DIAGNOSIS — Z86.03 PERSONAL HISTORY OF NEOPLASM OF UNCERTAIN BEHAVIOR: Chronic | ICD-10-CM

## 2023-10-31 ENCOUNTER — OUTPATIENT (OUTPATIENT)
Dept: OUTPATIENT SERVICES | Facility: HOSPITAL | Age: 48
LOS: 1 days | End: 2023-10-31

## 2023-10-31 DIAGNOSIS — Z89.9 ACQUIRED ABSENCE OF LIMB, UNSPECIFIED: Chronic | ICD-10-CM

## 2023-10-31 DIAGNOSIS — Z98.890 OTHER SPECIFIED POSTPROCEDURAL STATES: Chronic | ICD-10-CM

## 2023-10-31 DIAGNOSIS — Z89.512 ACQUIRED ABSENCE OF LEFT LEG BELOW KNEE: ICD-10-CM

## 2023-10-31 DIAGNOSIS — S91.309A UNSPECIFIED OPEN WOUND, UNSPECIFIED FOOT, INITIAL ENCOUNTER: Chronic | ICD-10-CM

## 2023-10-31 DIAGNOSIS — Z86.03 PERSONAL HISTORY OF NEOPLASM OF UNCERTAIN BEHAVIOR: Chronic | ICD-10-CM

## 2023-10-31 DIAGNOSIS — Z98.891 HISTORY OF UTERINE SCAR FROM PREVIOUS SURGERY: Chronic | ICD-10-CM

## 2023-10-31 DIAGNOSIS — Z98.84 BARIATRIC SURGERY STATUS: Chronic | ICD-10-CM

## 2023-10-31 DIAGNOSIS — S68.119A COMPLETE TRAUMATIC METACARPOPHALANGEAL AMPUTATION OF UNSPECIFIED FINGER, INITIAL ENCOUNTER: Chronic | ICD-10-CM

## 2023-11-02 ENCOUNTER — OUTPATIENT (OUTPATIENT)
Dept: OUTPATIENT SERVICES | Facility: HOSPITAL | Age: 48
LOS: 1 days | End: 2023-11-02
Payer: COMMERCIAL

## 2023-11-02 DIAGNOSIS — Z98.891 HISTORY OF UTERINE SCAR FROM PREVIOUS SURGERY: Chronic | ICD-10-CM

## 2023-11-02 DIAGNOSIS — Z89.9 ACQUIRED ABSENCE OF LIMB, UNSPECIFIED: Chronic | ICD-10-CM

## 2023-11-02 DIAGNOSIS — Z98.84 BARIATRIC SURGERY STATUS: Chronic | ICD-10-CM

## 2023-11-02 DIAGNOSIS — Z86.03 PERSONAL HISTORY OF NEOPLASM OF UNCERTAIN BEHAVIOR: Chronic | ICD-10-CM

## 2023-11-02 DIAGNOSIS — Z89.512 ACQUIRED ABSENCE OF LEFT LEG BELOW KNEE: ICD-10-CM

## 2023-11-02 DIAGNOSIS — Z98.890 OTHER SPECIFIED POSTPROCEDURAL STATES: Chronic | ICD-10-CM

## 2023-11-02 DIAGNOSIS — S68.119A COMPLETE TRAUMATIC METACARPOPHALANGEAL AMPUTATION OF UNSPECIFIED FINGER, INITIAL ENCOUNTER: Chronic | ICD-10-CM

## 2023-11-02 DIAGNOSIS — S91.309A UNSPECIFIED OPEN WOUND, UNSPECIFIED FOOT, INITIAL ENCOUNTER: Chronic | ICD-10-CM

## 2023-11-02 PROCEDURE — 97112 NEUROMUSCULAR REEDUCATION: CPT | Mod: GP

## 2023-11-02 PROCEDURE — 97110 THERAPEUTIC EXERCISES: CPT | Mod: GP

## 2023-11-03 DIAGNOSIS — Z89.512 ACQUIRED ABSENCE OF LEFT LEG BELOW KNEE: ICD-10-CM

## 2023-11-07 ENCOUNTER — OUTPATIENT (OUTPATIENT)
Dept: OUTPATIENT SERVICES | Facility: HOSPITAL | Age: 48
LOS: 1 days | End: 2023-11-07

## 2023-11-07 DIAGNOSIS — Z98.890 OTHER SPECIFIED POSTPROCEDURAL STATES: Chronic | ICD-10-CM

## 2023-11-07 DIAGNOSIS — Z89.512 ACQUIRED ABSENCE OF LEFT LEG BELOW KNEE: ICD-10-CM

## 2023-11-07 DIAGNOSIS — Z98.84 BARIATRIC SURGERY STATUS: Chronic | ICD-10-CM

## 2023-11-07 DIAGNOSIS — Z89.9 ACQUIRED ABSENCE OF LIMB, UNSPECIFIED: Chronic | ICD-10-CM

## 2023-11-07 DIAGNOSIS — S68.119A COMPLETE TRAUMATIC METACARPOPHALANGEAL AMPUTATION OF UNSPECIFIED FINGER, INITIAL ENCOUNTER: Chronic | ICD-10-CM

## 2023-11-07 DIAGNOSIS — S91.309A UNSPECIFIED OPEN WOUND, UNSPECIFIED FOOT, INITIAL ENCOUNTER: Chronic | ICD-10-CM

## 2023-11-07 DIAGNOSIS — Z98.891 HISTORY OF UTERINE SCAR FROM PREVIOUS SURGERY: Chronic | ICD-10-CM

## 2023-11-07 DIAGNOSIS — Z86.03 PERSONAL HISTORY OF NEOPLASM OF UNCERTAIN BEHAVIOR: Chronic | ICD-10-CM

## 2023-11-08 NOTE — H&P PST ADULT - WEIGHT IN KG
Outgoing call to RAÚL Arauz to offer earlier same day appt of 11am, 1:15pm, or 3:30pm (slot OK'd by Dr Buck)      Garcia KESSLER    83

## 2023-11-09 ENCOUNTER — OUTPATIENT (OUTPATIENT)
Dept: OUTPATIENT SERVICES | Facility: HOSPITAL | Age: 48
LOS: 1 days | End: 2023-11-09

## 2023-11-09 DIAGNOSIS — Z89.512 ACQUIRED ABSENCE OF LEFT LEG BELOW KNEE: ICD-10-CM

## 2023-11-09 DIAGNOSIS — S68.119A COMPLETE TRAUMATIC METACARPOPHALANGEAL AMPUTATION OF UNSPECIFIED FINGER, INITIAL ENCOUNTER: Chronic | ICD-10-CM

## 2023-11-09 DIAGNOSIS — Z86.03 PERSONAL HISTORY OF NEOPLASM OF UNCERTAIN BEHAVIOR: Chronic | ICD-10-CM

## 2023-11-09 DIAGNOSIS — Z98.84 BARIATRIC SURGERY STATUS: Chronic | ICD-10-CM

## 2023-11-09 DIAGNOSIS — Z98.890 OTHER SPECIFIED POSTPROCEDURAL STATES: Chronic | ICD-10-CM

## 2023-11-09 DIAGNOSIS — Z98.891 HISTORY OF UTERINE SCAR FROM PREVIOUS SURGERY: Chronic | ICD-10-CM

## 2023-11-09 DIAGNOSIS — S91.309A UNSPECIFIED OPEN WOUND, UNSPECIFIED FOOT, INITIAL ENCOUNTER: Chronic | ICD-10-CM

## 2023-11-09 DIAGNOSIS — Z89.9 ACQUIRED ABSENCE OF LIMB, UNSPECIFIED: Chronic | ICD-10-CM

## 2023-11-14 ENCOUNTER — OUTPATIENT (OUTPATIENT)
Dept: OUTPATIENT SERVICES | Facility: HOSPITAL | Age: 48
LOS: 1 days | End: 2023-11-14

## 2023-11-14 DIAGNOSIS — Z89.512 ACQUIRED ABSENCE OF LEFT LEG BELOW KNEE: ICD-10-CM

## 2023-11-14 DIAGNOSIS — Z86.03 PERSONAL HISTORY OF NEOPLASM OF UNCERTAIN BEHAVIOR: Chronic | ICD-10-CM

## 2023-11-14 DIAGNOSIS — Z98.890 OTHER SPECIFIED POSTPROCEDURAL STATES: Chronic | ICD-10-CM

## 2023-11-14 DIAGNOSIS — Z98.891 HISTORY OF UTERINE SCAR FROM PREVIOUS SURGERY: Chronic | ICD-10-CM

## 2023-11-14 DIAGNOSIS — Z89.9 ACQUIRED ABSENCE OF LIMB, UNSPECIFIED: Chronic | ICD-10-CM

## 2023-11-14 DIAGNOSIS — Z98.84 BARIATRIC SURGERY STATUS: Chronic | ICD-10-CM

## 2023-11-14 DIAGNOSIS — S68.119A COMPLETE TRAUMATIC METACARPOPHALANGEAL AMPUTATION OF UNSPECIFIED FINGER, INITIAL ENCOUNTER: Chronic | ICD-10-CM

## 2023-11-14 DIAGNOSIS — S91.309A UNSPECIFIED OPEN WOUND, UNSPECIFIED FOOT, INITIAL ENCOUNTER: Chronic | ICD-10-CM

## 2023-11-16 ENCOUNTER — OUTPATIENT (OUTPATIENT)
Dept: OUTPATIENT SERVICES | Facility: HOSPITAL | Age: 48
LOS: 1 days | End: 2023-11-16

## 2023-11-16 DIAGNOSIS — Z89.512 ACQUIRED ABSENCE OF LEFT LEG BELOW KNEE: ICD-10-CM

## 2023-11-16 DIAGNOSIS — S68.119A COMPLETE TRAUMATIC METACARPOPHALANGEAL AMPUTATION OF UNSPECIFIED FINGER, INITIAL ENCOUNTER: Chronic | ICD-10-CM

## 2023-11-16 DIAGNOSIS — Z98.890 OTHER SPECIFIED POSTPROCEDURAL STATES: Chronic | ICD-10-CM

## 2023-11-16 DIAGNOSIS — Z98.84 BARIATRIC SURGERY STATUS: Chronic | ICD-10-CM

## 2023-11-16 DIAGNOSIS — Z89.9 ACQUIRED ABSENCE OF LIMB, UNSPECIFIED: Chronic | ICD-10-CM

## 2023-11-16 DIAGNOSIS — Z98.891 HISTORY OF UTERINE SCAR FROM PREVIOUS SURGERY: Chronic | ICD-10-CM

## 2023-11-16 DIAGNOSIS — S91.309A UNSPECIFIED OPEN WOUND, UNSPECIFIED FOOT, INITIAL ENCOUNTER: Chronic | ICD-10-CM

## 2023-11-16 DIAGNOSIS — Z86.03 PERSONAL HISTORY OF NEOPLASM OF UNCERTAIN BEHAVIOR: Chronic | ICD-10-CM

## 2023-11-28 ENCOUNTER — OUTPATIENT (OUTPATIENT)
Dept: OUTPATIENT SERVICES | Facility: HOSPITAL | Age: 48
LOS: 1 days | End: 2023-11-28

## 2023-11-28 DIAGNOSIS — Z98.891 HISTORY OF UTERINE SCAR FROM PREVIOUS SURGERY: Chronic | ICD-10-CM

## 2023-11-28 DIAGNOSIS — S68.119A COMPLETE TRAUMATIC METACARPOPHALANGEAL AMPUTATION OF UNSPECIFIED FINGER, INITIAL ENCOUNTER: Chronic | ICD-10-CM

## 2023-11-28 DIAGNOSIS — S91.309A UNSPECIFIED OPEN WOUND, UNSPECIFIED FOOT, INITIAL ENCOUNTER: Chronic | ICD-10-CM

## 2023-11-28 DIAGNOSIS — Z89.512 ACQUIRED ABSENCE OF LEFT LEG BELOW KNEE: ICD-10-CM

## 2023-11-28 DIAGNOSIS — Z86.03 PERSONAL HISTORY OF NEOPLASM OF UNCERTAIN BEHAVIOR: Chronic | ICD-10-CM

## 2023-11-28 DIAGNOSIS — Z98.890 OTHER SPECIFIED POSTPROCEDURAL STATES: Chronic | ICD-10-CM

## 2023-11-28 DIAGNOSIS — Z89.9 ACQUIRED ABSENCE OF LIMB, UNSPECIFIED: Chronic | ICD-10-CM

## 2023-11-28 DIAGNOSIS — Z98.84 BARIATRIC SURGERY STATUS: Chronic | ICD-10-CM

## 2023-11-30 ENCOUNTER — OUTPATIENT (OUTPATIENT)
Dept: OUTPATIENT SERVICES | Facility: HOSPITAL | Age: 48
LOS: 1 days | End: 2023-11-30

## 2023-11-30 DIAGNOSIS — Z86.03 PERSONAL HISTORY OF NEOPLASM OF UNCERTAIN BEHAVIOR: Chronic | ICD-10-CM

## 2023-11-30 DIAGNOSIS — Z98.890 OTHER SPECIFIED POSTPROCEDURAL STATES: Chronic | ICD-10-CM

## 2023-11-30 DIAGNOSIS — Z89.512 ACQUIRED ABSENCE OF LEFT LEG BELOW KNEE: ICD-10-CM

## 2023-11-30 DIAGNOSIS — Z89.9 ACQUIRED ABSENCE OF LIMB, UNSPECIFIED: Chronic | ICD-10-CM

## 2023-11-30 DIAGNOSIS — S91.309A UNSPECIFIED OPEN WOUND, UNSPECIFIED FOOT, INITIAL ENCOUNTER: Chronic | ICD-10-CM

## 2023-11-30 DIAGNOSIS — Z98.84 BARIATRIC SURGERY STATUS: Chronic | ICD-10-CM

## 2023-11-30 DIAGNOSIS — S68.119A COMPLETE TRAUMATIC METACARPOPHALANGEAL AMPUTATION OF UNSPECIFIED FINGER, INITIAL ENCOUNTER: Chronic | ICD-10-CM

## 2023-11-30 DIAGNOSIS — Z98.891 HISTORY OF UTERINE SCAR FROM PREVIOUS SURGERY: Chronic | ICD-10-CM

## 2023-12-01 NOTE — DISCHARGE NOTE ADULT - SECONDARY DIAGNOSIS.
Subjective   Patient ID: Lisseth Bang is a 67 y.o. adult with a history of osteoporosis, depression and anxiety GERD, hemorrhoids who presents for Annual Exam.    HPI the patient is presented for follow-up.  Her depression and anxiety are well controlled without medications.  She sleeps well through the night.  She does not take calcium or vitamin D stating that she is taking magnesium.  Declined bone density, mammogram and immunizations.  She had hemorrhoid surgery in March and is doing well since then.  Denies any abnormal bleeding.  She denies shortness of breath or chest pain.  There is no fever, chills, abdominal pain, nausea or vomiting.    Review of Systems   Constitutional:  Negative for appetite change, chills, fatigue and fever.   HENT:  Negative for congestion, ear pain, facial swelling, hearing loss, nosebleeds and sore throat.    Eyes:  Negative for pain, discharge and visual disturbance.   Respiratory:  Negative for cough, choking, chest tightness, shortness of breath and wheezing.    Cardiovascular:  Negative for chest pain, palpitations and leg swelling.   Gastrointestinal:  Negative for abdominal distention, abdominal pain, anal bleeding, constipation, diarrhea, nausea and vomiting.   Endocrine: Negative for cold intolerance, heat intolerance, polydipsia, polyphagia and polyuria.   Genitourinary:  Negative for difficulty urinating, frequency, hematuria and urgency.   Musculoskeletal:  Negative for arthralgias, gait problem, joint swelling and myalgias.   Skin:  Negative for color change and rash.   Neurological:  Negative for dizziness, tremors, syncope, weakness, numbness and headaches.   Psychiatric/Behavioral:  Negative for behavioral problems, confusion, sleep disturbance and suicidal ideas. The patient is not nervous/anxious.        Objective   /80   Temp 36.3 °C (97.3 °F)   Ht 1.524 m (5')   Wt 47.6 kg (105 lb)   Breastfeeding No   BMI 20.51 kg/m²     Physical Exam  Constitutional:        General: She is not in acute distress.     Appearance: Normal appearance.   HENT:      Head: Normocephalic and atraumatic.      Nose: Nose normal.   Eyes:      Extraocular Movements: Extraocular movements intact.      Conjunctiva/sclera: Conjunctivae normal.      Pupils: Pupils are equal, round, and reactive to light.   Cardiovascular:      Rate and Rhythm: Normal rate and regular rhythm.      Pulses: Normal pulses.      Heart sounds: Normal heart sounds.   Pulmonary:      Effort: Pulmonary effort is normal.      Breath sounds: Normal breath sounds.   Abdominal:      General: Bowel sounds are normal.      Palpations: Abdomen is soft.   Musculoskeletal:         General: Normal range of motion.      Cervical back: Normal range of motion and neck supple.   Neurological:      General: No focal deficit present.      Mental Status: She is alert and oriented to person, place, and time.   Psychiatric:         Mood and Affect: Mood normal.         Behavior: Behavior normal.         Thought Content: Thought content normal.         Judgment: Judgment normal.       Assessment/Plan     Rectal bleeding due to hemorrhoids  S/p hemorrhoid removal in March 2023  No more bleeding     Acid reflux  improved  Avoid caffeine and alcoholic beverages  Avoid spicy and acidic food, such as tomato and citrus fruits  Avoid onions, peppermint and spearmint   Eat small, frequent meals  Do not eat late at night, at least 3 hours before bed  Raise the head of the bed 6-8 inches for sleeping  Wear lose-fitting clothes around your waist      Depression and anxiety  No suicidal or homicidal  Stable without medications     Osteoporosis  Not interested in rheumatology   The patient does not calcium or vitamin D  Supplements recommended:  - Calcium 600 mg up to twice a day to get a total of 1200 mg. Each 8 oz of milk or yogurt or 1 oz of cheese, 1 Banana, 1 serving of green Leafy vegetable has about 300 mg of Calcium, so you may subtract that  amount.   - Vitamin D - 2000 IU daily      Wellness exam  colonoscopy 7/7/2022 repeat in 5 years   mammogram, declined  DEXA bone scan November 2020, declined new bone density  Tdap due on 11/03/2025  Pneumovax, Prevnar, Shingrix declined   pap smear due on 12/21/2020  Follow-up with GYN Dr. Grayson at [facility]  See dentist twice a year  Yearly eye exam needed  see dermatology once a year for a skin check.    Weight maintenance:  Normal BMI  Try to have as many home cooked meals as possible  Avoid processed foods which contain excess calories, sugar, and sodium.  Exercise recommendations:   150 minutes a week to maintain your weight   If you have to lose weight, you need a better diet and exercise plan.     Please get your Living will / Advance directive completed if you do not have one already. Please make sure our office has a copy of the latest one.      HTN (hypertension) DVT (deep venous thrombosis)

## 2023-12-05 ENCOUNTER — OUTPATIENT (OUTPATIENT)
Dept: OUTPATIENT SERVICES | Facility: HOSPITAL | Age: 48
LOS: 1 days | End: 2023-12-05
Payer: COMMERCIAL

## 2023-12-05 DIAGNOSIS — S91.309A UNSPECIFIED OPEN WOUND, UNSPECIFIED FOOT, INITIAL ENCOUNTER: Chronic | ICD-10-CM

## 2023-12-05 DIAGNOSIS — Z98.891 HISTORY OF UTERINE SCAR FROM PREVIOUS SURGERY: Chronic | ICD-10-CM

## 2023-12-05 DIAGNOSIS — S68.119A COMPLETE TRAUMATIC METACARPOPHALANGEAL AMPUTATION OF UNSPECIFIED FINGER, INITIAL ENCOUNTER: Chronic | ICD-10-CM

## 2023-12-05 DIAGNOSIS — Z98.890 OTHER SPECIFIED POSTPROCEDURAL STATES: Chronic | ICD-10-CM

## 2023-12-05 DIAGNOSIS — Z89.9 ACQUIRED ABSENCE OF LIMB, UNSPECIFIED: Chronic | ICD-10-CM

## 2023-12-05 DIAGNOSIS — Z89.512 ACQUIRED ABSENCE OF LEFT LEG BELOW KNEE: ICD-10-CM

## 2023-12-05 DIAGNOSIS — Z86.03 PERSONAL HISTORY OF NEOPLASM OF UNCERTAIN BEHAVIOR: Chronic | ICD-10-CM

## 2023-12-05 DIAGNOSIS — Z98.84 BARIATRIC SURGERY STATUS: Chronic | ICD-10-CM

## 2023-12-05 PROCEDURE — 97110 THERAPEUTIC EXERCISES: CPT | Mod: GP

## 2023-12-05 PROCEDURE — 97116 GAIT TRAINING THERAPY: CPT | Mod: GP

## 2023-12-06 DIAGNOSIS — Z89.512 ACQUIRED ABSENCE OF LEFT LEG BELOW KNEE: ICD-10-CM

## 2023-12-12 ENCOUNTER — OUTPATIENT (OUTPATIENT)
Dept: OUTPATIENT SERVICES | Facility: HOSPITAL | Age: 48
LOS: 1 days | End: 2023-12-12

## 2023-12-12 DIAGNOSIS — Z89.512 ACQUIRED ABSENCE OF LEFT LEG BELOW KNEE: ICD-10-CM

## 2023-12-12 DIAGNOSIS — S91.309A UNSPECIFIED OPEN WOUND, UNSPECIFIED FOOT, INITIAL ENCOUNTER: Chronic | ICD-10-CM

## 2023-12-12 DIAGNOSIS — S68.119A COMPLETE TRAUMATIC METACARPOPHALANGEAL AMPUTATION OF UNSPECIFIED FINGER, INITIAL ENCOUNTER: Chronic | ICD-10-CM

## 2023-12-12 DIAGNOSIS — Z98.890 OTHER SPECIFIED POSTPROCEDURAL STATES: Chronic | ICD-10-CM

## 2023-12-12 DIAGNOSIS — Z86.03 PERSONAL HISTORY OF NEOPLASM OF UNCERTAIN BEHAVIOR: Chronic | ICD-10-CM

## 2023-12-12 DIAGNOSIS — Z98.84 BARIATRIC SURGERY STATUS: Chronic | ICD-10-CM

## 2023-12-12 DIAGNOSIS — Z89.9 ACQUIRED ABSENCE OF LIMB, UNSPECIFIED: Chronic | ICD-10-CM

## 2023-12-12 DIAGNOSIS — Z98.891 HISTORY OF UTERINE SCAR FROM PREVIOUS SURGERY: Chronic | ICD-10-CM

## 2023-12-13 NOTE — ED ADULT NURSE NOTE - NS ED NOTE ABUSE SUSPICION NEGLECT YN
Used  [id number] for admission, [id number] for MD/anesthesia consents.  
Using  [id number] with spouse/ at bedside, provided post-anesthesia education.  Both patient and  expressed understanding.  
No

## 2023-12-14 ENCOUNTER — OUTPATIENT (OUTPATIENT)
Dept: OUTPATIENT SERVICES | Facility: HOSPITAL | Age: 48
LOS: 1 days | End: 2023-12-14

## 2023-12-14 DIAGNOSIS — Z98.890 OTHER SPECIFIED POSTPROCEDURAL STATES: Chronic | ICD-10-CM

## 2023-12-14 DIAGNOSIS — Z89.9 ACQUIRED ABSENCE OF LIMB, UNSPECIFIED: Chronic | ICD-10-CM

## 2023-12-14 DIAGNOSIS — Z98.891 HISTORY OF UTERINE SCAR FROM PREVIOUS SURGERY: Chronic | ICD-10-CM

## 2023-12-14 DIAGNOSIS — S91.309A UNSPECIFIED OPEN WOUND, UNSPECIFIED FOOT, INITIAL ENCOUNTER: Chronic | ICD-10-CM

## 2023-12-14 DIAGNOSIS — Z89.512 ACQUIRED ABSENCE OF LEFT LEG BELOW KNEE: ICD-10-CM

## 2023-12-14 DIAGNOSIS — S68.119A COMPLETE TRAUMATIC METACARPOPHALANGEAL AMPUTATION OF UNSPECIFIED FINGER, INITIAL ENCOUNTER: Chronic | ICD-10-CM

## 2023-12-14 DIAGNOSIS — Z98.84 BARIATRIC SURGERY STATUS: Chronic | ICD-10-CM

## 2023-12-14 DIAGNOSIS — Z86.03 PERSONAL HISTORY OF NEOPLASM OF UNCERTAIN BEHAVIOR: Chronic | ICD-10-CM

## 2023-12-21 ENCOUNTER — OUTPATIENT (OUTPATIENT)
Dept: OUTPATIENT SERVICES | Facility: HOSPITAL | Age: 48
LOS: 1 days | End: 2023-12-21

## 2023-12-21 DIAGNOSIS — S68.119A COMPLETE TRAUMATIC METACARPOPHALANGEAL AMPUTATION OF UNSPECIFIED FINGER, INITIAL ENCOUNTER: Chronic | ICD-10-CM

## 2023-12-21 DIAGNOSIS — Z89.512 ACQUIRED ABSENCE OF LEFT LEG BELOW KNEE: ICD-10-CM

## 2023-12-21 DIAGNOSIS — Z98.891 HISTORY OF UTERINE SCAR FROM PREVIOUS SURGERY: Chronic | ICD-10-CM

## 2023-12-21 DIAGNOSIS — Z98.890 OTHER SPECIFIED POSTPROCEDURAL STATES: Chronic | ICD-10-CM

## 2023-12-21 DIAGNOSIS — Z98.84 BARIATRIC SURGERY STATUS: Chronic | ICD-10-CM

## 2023-12-21 DIAGNOSIS — S91.309A UNSPECIFIED OPEN WOUND, UNSPECIFIED FOOT, INITIAL ENCOUNTER: Chronic | ICD-10-CM

## 2023-12-21 DIAGNOSIS — Z89.9 ACQUIRED ABSENCE OF LIMB, UNSPECIFIED: Chronic | ICD-10-CM

## 2023-12-21 DIAGNOSIS — Z86.03 PERSONAL HISTORY OF NEOPLASM OF UNCERTAIN BEHAVIOR: Chronic | ICD-10-CM

## 2024-01-04 ENCOUNTER — OUTPATIENT (OUTPATIENT)
Dept: OUTPATIENT SERVICES | Facility: HOSPITAL | Age: 49
LOS: 1 days | End: 2024-01-04
Payer: COMMERCIAL

## 2024-01-04 DIAGNOSIS — Z98.890 OTHER SPECIFIED POSTPROCEDURAL STATES: Chronic | ICD-10-CM

## 2024-01-04 DIAGNOSIS — Z89.512 ACQUIRED ABSENCE OF LEFT LEG BELOW KNEE: ICD-10-CM

## 2024-01-04 DIAGNOSIS — Z98.891 HISTORY OF UTERINE SCAR FROM PREVIOUS SURGERY: Chronic | ICD-10-CM

## 2024-01-04 DIAGNOSIS — S91.309A UNSPECIFIED OPEN WOUND, UNSPECIFIED FOOT, INITIAL ENCOUNTER: Chronic | ICD-10-CM

## 2024-01-04 DIAGNOSIS — Z98.84 BARIATRIC SURGERY STATUS: Chronic | ICD-10-CM

## 2024-01-04 DIAGNOSIS — S68.119A COMPLETE TRAUMATIC METACARPOPHALANGEAL AMPUTATION OF UNSPECIFIED FINGER, INITIAL ENCOUNTER: Chronic | ICD-10-CM

## 2024-01-04 DIAGNOSIS — Z89.9 ACQUIRED ABSENCE OF LIMB, UNSPECIFIED: Chronic | ICD-10-CM

## 2024-01-04 DIAGNOSIS — Z86.03 PERSONAL HISTORY OF NEOPLASM OF UNCERTAIN BEHAVIOR: Chronic | ICD-10-CM

## 2024-01-04 PROCEDURE — 97112 NEUROMUSCULAR REEDUCATION: CPT | Mod: GP

## 2024-01-04 PROCEDURE — 97110 THERAPEUTIC EXERCISES: CPT | Mod: GP

## 2024-01-05 DIAGNOSIS — Z89.512 ACQUIRED ABSENCE OF LEFT LEG BELOW KNEE: ICD-10-CM

## 2024-01-09 ENCOUNTER — OUTPATIENT (OUTPATIENT)
Dept: OUTPATIENT SERVICES | Facility: HOSPITAL | Age: 49
LOS: 1 days | End: 2024-01-09

## 2024-01-09 DIAGNOSIS — Z98.84 BARIATRIC SURGERY STATUS: Chronic | ICD-10-CM

## 2024-01-09 DIAGNOSIS — Z86.03 PERSONAL HISTORY OF NEOPLASM OF UNCERTAIN BEHAVIOR: Chronic | ICD-10-CM

## 2024-01-09 DIAGNOSIS — S91.309A UNSPECIFIED OPEN WOUND, UNSPECIFIED FOOT, INITIAL ENCOUNTER: Chronic | ICD-10-CM

## 2024-01-09 DIAGNOSIS — S68.119A COMPLETE TRAUMATIC METACARPOPHALANGEAL AMPUTATION OF UNSPECIFIED FINGER, INITIAL ENCOUNTER: Chronic | ICD-10-CM

## 2024-01-09 DIAGNOSIS — Z98.891 HISTORY OF UTERINE SCAR FROM PREVIOUS SURGERY: Chronic | ICD-10-CM

## 2024-01-09 DIAGNOSIS — Z89.9 ACQUIRED ABSENCE OF LIMB, UNSPECIFIED: Chronic | ICD-10-CM

## 2024-01-09 DIAGNOSIS — Z89.512 ACQUIRED ABSENCE OF LEFT LEG BELOW KNEE: ICD-10-CM

## 2024-01-09 DIAGNOSIS — Z98.890 OTHER SPECIFIED POSTPROCEDURAL STATES: Chronic | ICD-10-CM

## 2024-01-11 ENCOUNTER — OUTPATIENT (OUTPATIENT)
Dept: OUTPATIENT SERVICES | Facility: HOSPITAL | Age: 49
LOS: 1 days | End: 2024-01-11

## 2024-01-11 DIAGNOSIS — Z98.891 HISTORY OF UTERINE SCAR FROM PREVIOUS SURGERY: Chronic | ICD-10-CM

## 2024-01-11 DIAGNOSIS — Z89.512 ACQUIRED ABSENCE OF LEFT LEG BELOW KNEE: ICD-10-CM

## 2024-01-11 DIAGNOSIS — Z86.03 PERSONAL HISTORY OF NEOPLASM OF UNCERTAIN BEHAVIOR: Chronic | ICD-10-CM

## 2024-01-11 DIAGNOSIS — S91.309A UNSPECIFIED OPEN WOUND, UNSPECIFIED FOOT, INITIAL ENCOUNTER: Chronic | ICD-10-CM

## 2024-01-11 DIAGNOSIS — Z98.890 OTHER SPECIFIED POSTPROCEDURAL STATES: Chronic | ICD-10-CM

## 2024-01-11 DIAGNOSIS — Z98.84 BARIATRIC SURGERY STATUS: Chronic | ICD-10-CM

## 2024-01-11 DIAGNOSIS — Z89.9 ACQUIRED ABSENCE OF LIMB, UNSPECIFIED: Chronic | ICD-10-CM

## 2024-01-11 DIAGNOSIS — S68.119A COMPLETE TRAUMATIC METACARPOPHALANGEAL AMPUTATION OF UNSPECIFIED FINGER, INITIAL ENCOUNTER: Chronic | ICD-10-CM

## 2024-01-17 NOTE — ASU DISCHARGE PLAN (ADULT/PEDIATRIC) - DISCHARGE PLAN IS COMPLETE AND GIVEN TO PATIENT
Quality 226: Preventive Care And Screening: Tobacco Use: Screening And Cessation Intervention: Patient screened for tobacco use and is an ex/non-smoker Quality 47: Advance Care Plan: Advance Care Planning discussed and documented; advance care plan or surrogate decision maker documented in the medical record. Quality 431: Preventive Care And Screening: Unhealthy Alcohol Use - Screening: Patient identified as an unhealthy alcohol user when screened for unhealthy alcohol use using a systematic screening method and received brief counseling Quality 402: Tobacco Use And Help With Quitting Among Adolescents: Patient screened for tobacco and never smoked Detail Level: Simple : Yes

## 2024-01-17 NOTE — ASU PATIENT PROFILE, ADULT - VISION (WITH CORRECTIVE LENSES IF THE PATIENT USUALLY WEARS THEM):
Carmen Harrell  Obstetrics and Gynecology  [address] [place] [id number]  Phone: [phone]  Fax: [phone]  Follow Up Time: 1 month  
needs glasses/Normal vision: sees adequately in most situations; can see medication labels, newsprint

## 2024-01-18 ENCOUNTER — OUTPATIENT (OUTPATIENT)
Dept: OUTPATIENT SERVICES | Facility: HOSPITAL | Age: 49
LOS: 1 days | End: 2024-01-18

## 2024-01-18 DIAGNOSIS — S91.309A UNSPECIFIED OPEN WOUND, UNSPECIFIED FOOT, INITIAL ENCOUNTER: Chronic | ICD-10-CM

## 2024-01-18 DIAGNOSIS — Z98.890 OTHER SPECIFIED POSTPROCEDURAL STATES: Chronic | ICD-10-CM

## 2024-01-18 DIAGNOSIS — Z89.512 ACQUIRED ABSENCE OF LEFT LEG BELOW KNEE: ICD-10-CM

## 2024-01-18 DIAGNOSIS — Z86.03 PERSONAL HISTORY OF NEOPLASM OF UNCERTAIN BEHAVIOR: Chronic | ICD-10-CM

## 2024-01-18 DIAGNOSIS — Z98.891 HISTORY OF UTERINE SCAR FROM PREVIOUS SURGERY: Chronic | ICD-10-CM

## 2024-01-18 DIAGNOSIS — Z89.9 ACQUIRED ABSENCE OF LIMB, UNSPECIFIED: Chronic | ICD-10-CM

## 2024-01-18 DIAGNOSIS — Z98.84 BARIATRIC SURGERY STATUS: Chronic | ICD-10-CM

## 2024-01-18 DIAGNOSIS — S68.119A COMPLETE TRAUMATIC METACARPOPHALANGEAL AMPUTATION OF UNSPECIFIED FINGER, INITIAL ENCOUNTER: Chronic | ICD-10-CM

## 2024-01-26 NOTE — PRE-ANESTHESIA EVALUATION ADULT - WEIGHT IN LBS
Take the provided antibiotic as directed and follow-up with your PCP if you develop fevers or worsening symptoms please return to the ED or seek other medical care.  
207.2

## 2024-01-30 ENCOUNTER — OUTPATIENT (OUTPATIENT)
Dept: OUTPATIENT SERVICES | Facility: HOSPITAL | Age: 49
LOS: 1 days | End: 2024-01-30

## 2024-01-30 DIAGNOSIS — Z98.890 OTHER SPECIFIED POSTPROCEDURAL STATES: Chronic | ICD-10-CM

## 2024-01-30 DIAGNOSIS — S68.119A COMPLETE TRAUMATIC METACARPOPHALANGEAL AMPUTATION OF UNSPECIFIED FINGER, INITIAL ENCOUNTER: Chronic | ICD-10-CM

## 2024-01-30 DIAGNOSIS — Z86.03 PERSONAL HISTORY OF NEOPLASM OF UNCERTAIN BEHAVIOR: Chronic | ICD-10-CM

## 2024-01-30 DIAGNOSIS — Z98.891 HISTORY OF UTERINE SCAR FROM PREVIOUS SURGERY: Chronic | ICD-10-CM

## 2024-01-30 DIAGNOSIS — Z98.84 BARIATRIC SURGERY STATUS: Chronic | ICD-10-CM

## 2024-01-30 DIAGNOSIS — Z89.512 ACQUIRED ABSENCE OF LEFT LEG BELOW KNEE: ICD-10-CM

## 2024-01-30 DIAGNOSIS — S91.309A UNSPECIFIED OPEN WOUND, UNSPECIFIED FOOT, INITIAL ENCOUNTER: Chronic | ICD-10-CM

## 2024-01-30 DIAGNOSIS — Z89.9 ACQUIRED ABSENCE OF LIMB, UNSPECIFIED: Chronic | ICD-10-CM

## 2024-02-01 ENCOUNTER — OUTPATIENT (OUTPATIENT)
Dept: OUTPATIENT SERVICES | Facility: HOSPITAL | Age: 49
LOS: 1 days | End: 2024-02-01
Payer: COMMERCIAL

## 2024-02-01 DIAGNOSIS — Z98.891 HISTORY OF UTERINE SCAR FROM PREVIOUS SURGERY: Chronic | ICD-10-CM

## 2024-02-01 DIAGNOSIS — Z89.9 ACQUIRED ABSENCE OF LIMB, UNSPECIFIED: Chronic | ICD-10-CM

## 2024-02-01 DIAGNOSIS — Z98.84 BARIATRIC SURGERY STATUS: Chronic | ICD-10-CM

## 2024-02-01 DIAGNOSIS — S68.119A COMPLETE TRAUMATIC METACARPOPHALANGEAL AMPUTATION OF UNSPECIFIED FINGER, INITIAL ENCOUNTER: Chronic | ICD-10-CM

## 2024-02-01 DIAGNOSIS — Z89.512 ACQUIRED ABSENCE OF LEFT LEG BELOW KNEE: ICD-10-CM

## 2024-02-01 DIAGNOSIS — Z86.03 PERSONAL HISTORY OF NEOPLASM OF UNCERTAIN BEHAVIOR: Chronic | ICD-10-CM

## 2024-02-01 DIAGNOSIS — S91.309A UNSPECIFIED OPEN WOUND, UNSPECIFIED FOOT, INITIAL ENCOUNTER: Chronic | ICD-10-CM

## 2024-02-01 DIAGNOSIS — Z98.890 OTHER SPECIFIED POSTPROCEDURAL STATES: Chronic | ICD-10-CM

## 2024-02-01 PROCEDURE — 97112 NEUROMUSCULAR REEDUCATION: CPT | Mod: GP

## 2024-02-01 PROCEDURE — 97110 THERAPEUTIC EXERCISES: CPT | Mod: GP

## 2024-02-02 DIAGNOSIS — Z89.512 ACQUIRED ABSENCE OF LEFT LEG BELOW KNEE: ICD-10-CM

## 2024-02-06 ENCOUNTER — OUTPATIENT (OUTPATIENT)
Dept: OUTPATIENT SERVICES | Facility: HOSPITAL | Age: 49
LOS: 1 days | End: 2024-02-06

## 2024-02-06 DIAGNOSIS — Z98.890 OTHER SPECIFIED POSTPROCEDURAL STATES: Chronic | ICD-10-CM

## 2024-02-06 DIAGNOSIS — S68.119A COMPLETE TRAUMATIC METACARPOPHALANGEAL AMPUTATION OF UNSPECIFIED FINGER, INITIAL ENCOUNTER: Chronic | ICD-10-CM

## 2024-02-06 DIAGNOSIS — Z89.512 ACQUIRED ABSENCE OF LEFT LEG BELOW KNEE: ICD-10-CM

## 2024-02-06 DIAGNOSIS — Z86.03 PERSONAL HISTORY OF NEOPLASM OF UNCERTAIN BEHAVIOR: Chronic | ICD-10-CM

## 2024-02-06 DIAGNOSIS — S91.309A UNSPECIFIED OPEN WOUND, UNSPECIFIED FOOT, INITIAL ENCOUNTER: Chronic | ICD-10-CM

## 2024-02-06 DIAGNOSIS — Z98.891 HISTORY OF UTERINE SCAR FROM PREVIOUS SURGERY: Chronic | ICD-10-CM

## 2024-02-06 DIAGNOSIS — Z89.9 ACQUIRED ABSENCE OF LIMB, UNSPECIFIED: Chronic | ICD-10-CM

## 2024-02-06 DIAGNOSIS — Z98.84 BARIATRIC SURGERY STATUS: Chronic | ICD-10-CM

## 2024-02-08 ENCOUNTER — OUTPATIENT (OUTPATIENT)
Dept: OUTPATIENT SERVICES | Facility: HOSPITAL | Age: 49
LOS: 1 days | End: 2024-02-08

## 2024-02-08 DIAGNOSIS — Z98.890 OTHER SPECIFIED POSTPROCEDURAL STATES: Chronic | ICD-10-CM

## 2024-02-08 DIAGNOSIS — S91.309A UNSPECIFIED OPEN WOUND, UNSPECIFIED FOOT, INITIAL ENCOUNTER: Chronic | ICD-10-CM

## 2024-02-08 DIAGNOSIS — Z98.84 BARIATRIC SURGERY STATUS: Chronic | ICD-10-CM

## 2024-02-08 DIAGNOSIS — Z86.03 PERSONAL HISTORY OF NEOPLASM OF UNCERTAIN BEHAVIOR: Chronic | ICD-10-CM

## 2024-02-08 DIAGNOSIS — Z89.512 ACQUIRED ABSENCE OF LEFT LEG BELOW KNEE: ICD-10-CM

## 2024-02-08 DIAGNOSIS — Z89.9 ACQUIRED ABSENCE OF LIMB, UNSPECIFIED: Chronic | ICD-10-CM

## 2024-02-08 DIAGNOSIS — Z98.891 HISTORY OF UTERINE SCAR FROM PREVIOUS SURGERY: Chronic | ICD-10-CM

## 2024-02-08 DIAGNOSIS — S68.119A COMPLETE TRAUMATIC METACARPOPHALANGEAL AMPUTATION OF UNSPECIFIED FINGER, INITIAL ENCOUNTER: Chronic | ICD-10-CM

## 2024-02-15 ENCOUNTER — OUTPATIENT (OUTPATIENT)
Dept: OUTPATIENT SERVICES | Facility: HOSPITAL | Age: 49
LOS: 1 days | End: 2024-02-15

## 2024-02-15 DIAGNOSIS — Z86.03 PERSONAL HISTORY OF NEOPLASM OF UNCERTAIN BEHAVIOR: Chronic | ICD-10-CM

## 2024-02-15 DIAGNOSIS — Z98.84 BARIATRIC SURGERY STATUS: Chronic | ICD-10-CM

## 2024-02-15 DIAGNOSIS — Z89.512 ACQUIRED ABSENCE OF LEFT LEG BELOW KNEE: ICD-10-CM

## 2024-02-15 DIAGNOSIS — Z98.890 OTHER SPECIFIED POSTPROCEDURAL STATES: Chronic | ICD-10-CM

## 2024-02-15 DIAGNOSIS — Z89.9 ACQUIRED ABSENCE OF LIMB, UNSPECIFIED: Chronic | ICD-10-CM

## 2024-02-15 DIAGNOSIS — S91.309A UNSPECIFIED OPEN WOUND, UNSPECIFIED FOOT, INITIAL ENCOUNTER: Chronic | ICD-10-CM

## 2024-02-15 DIAGNOSIS — S68.119A COMPLETE TRAUMATIC METACARPOPHALANGEAL AMPUTATION OF UNSPECIFIED FINGER, INITIAL ENCOUNTER: Chronic | ICD-10-CM

## 2024-02-15 DIAGNOSIS — Z98.891 HISTORY OF UTERINE SCAR FROM PREVIOUS SURGERY: Chronic | ICD-10-CM

## 2024-02-20 ENCOUNTER — OUTPATIENT (OUTPATIENT)
Dept: OUTPATIENT SERVICES | Facility: HOSPITAL | Age: 49
LOS: 1 days | End: 2024-02-20

## 2024-02-20 DIAGNOSIS — Z86.03 PERSONAL HISTORY OF NEOPLASM OF UNCERTAIN BEHAVIOR: Chronic | ICD-10-CM

## 2024-02-20 DIAGNOSIS — Z89.512 ACQUIRED ABSENCE OF LEFT LEG BELOW KNEE: ICD-10-CM

## 2024-02-20 DIAGNOSIS — S68.119A COMPLETE TRAUMATIC METACARPOPHALANGEAL AMPUTATION OF UNSPECIFIED FINGER, INITIAL ENCOUNTER: Chronic | ICD-10-CM

## 2024-02-20 DIAGNOSIS — Z89.9 ACQUIRED ABSENCE OF LIMB, UNSPECIFIED: Chronic | ICD-10-CM

## 2024-02-20 DIAGNOSIS — Z98.84 BARIATRIC SURGERY STATUS: Chronic | ICD-10-CM

## 2024-08-23 NOTE — PATIENT PROFILE ADULT. - NS PRO TALK SOMEONE YN
Hi, this is to let you know that your recent results showed: Normal blood counts.  Sugar minimally elevated.  Kidney function test normal.  Liver function test normal.  Cholesterol better than before.  Continue current management.  Thyroid excellent.
no

## 2024-09-10 ENCOUNTER — APPOINTMENT (OUTPATIENT)
Dept: CARDIOLOGY | Facility: CLINIC | Age: 49
End: 2024-09-10
Payer: COMMERCIAL

## 2024-09-10 DIAGNOSIS — I10 ESSENTIAL (PRIMARY) HYPERTENSION: ICD-10-CM

## 2024-09-10 DIAGNOSIS — I42.9 CARDIOMYOPATHY, UNSPECIFIED: ICD-10-CM

## 2024-09-10 PROCEDURE — 93306 TTE W/DOPPLER COMPLETE: CPT

## 2024-09-13 ENCOUNTER — APPOINTMENT (OUTPATIENT)
Dept: CARDIOLOGY | Facility: CLINIC | Age: 49
End: 2024-09-13

## 2024-09-14 NOTE — DISCHARGE NOTE ADULT - VISION (WITH CORRECTIVE LENSES IF THE PATIENT USUALLY WEARS THEM):
Onset: 9/5       Location / description: Pt states she injured L heel on 9/5 after walking in shoes with no insoles or arch support. Saw PCP on 9/9.     Applying neosporin and cleaning wound with peroxide. Pt states there now appears to be a wound or a cut in the skin. States wound is becoming more red. Denies spreading or red-streaking. Denies fever. Denies bleeding. Denies pus-like drainage. Pt able to walk, but states it is painful.     Precipitating Factors: Questionable foot injury 9/5    Pain Scale (1-10), 10 highest: 5-6/10 L heel pain    What improves/worsens symptoms: Nothing/walking on foot    Symptom specific medications: Not taking OTC pain relievers    LMP : No LMP recorded. Patient is postmenopausal.     Are you pregnant or breast feeding: N/A    Recent visits (last 3-4 weeks) for same reason or recent surgery:  PCP OV 9/9    PLAN:    Provider's office  See Provider within 24 hour    Pt states she plans to continue to treat at home and stop using peroxide to clean skin. Pt instructed to use soap/water and apply neosporin PRN. Instructed on s/s to watch for and call back with. Pt verbalized understanding and denied further questions at this time.     Unsure if patient/caller will follow recommendations.    Reason for Disposition   [1] Redness of the skin AND [2] no fever    Protocols used: Foot Pain-A-AH     Normal vision: sees adequately in most situations; can see medication labels, newsprint

## 2024-10-14 ENCOUNTER — APPOINTMENT (OUTPATIENT)
Dept: CARDIOLOGY | Facility: CLINIC | Age: 49
End: 2024-10-14
Payer: COMMERCIAL

## 2024-10-14 VITALS
WEIGHT: 203 LBS | BODY MASS INDEX: 30.77 KG/M2 | DIASTOLIC BLOOD PRESSURE: 82 MMHG | SYSTOLIC BLOOD PRESSURE: 118 MMHG | HEIGHT: 68 IN | HEART RATE: 90 BPM

## 2024-10-14 DIAGNOSIS — I10 ESSENTIAL (PRIMARY) HYPERTENSION: ICD-10-CM

## 2024-10-14 DIAGNOSIS — I42.9 CARDIOMYOPATHY, UNSPECIFIED: ICD-10-CM

## 2024-10-14 PROCEDURE — 99213 OFFICE O/P EST LOW 20 MIN: CPT | Mod: 25

## 2024-10-14 PROCEDURE — 93000 ELECTROCARDIOGRAM COMPLETE: CPT

## 2024-12-16 NOTE — ED PROVIDER NOTE - ENMT, MLM
Airway patent, Nasal mucosa clear. Mouth with normal mucosa. Adequate: hears normal conversation without difficulty

## 2025-01-03 NOTE — PATIENT PROFILE ADULT. - FUNCTIONAL SCREEN CURRENT LEVEL: TOILETING, MLM
[General Appearance - Alert] : alert [Sclera] : the sclera and conjunctiva were normal [Neck Appearance] : the appearance of the neck was normal [Jugular Venous Distention Increased] : there was no jugular-venous distention [] : no respiratory distress [Respiration, Rhythm And Depth] : normal respiratory rhythm and effort [Exaggerated Use Of Accessory Muscles For Inspiration] : no accessory muscle use [Apical Impulse] : the apical impulse was normal [Heart Rate And Rhythm] : heart rate was normal and rhythm regular [Heart Sounds] : normal S1 and S2 [Abdomen Tenderness] : non-tender [Involuntary Movements] : no involuntary movements were seen [No Focal Deficits] : no focal deficits [Oriented To Time, Place, And Person] : oriented to person, place, and time [Impaired Insight] : insight and judgment were intact [Affect] : the affect was normal [Mood] : the mood was normal (0) independent

## 2025-04-24 ENCOUNTER — NON-APPOINTMENT (OUTPATIENT)
Age: 50
End: 2025-04-24

## 2025-05-05 NOTE — ASU PATIENT PROFILE, ADULT - TEACHING/LEARNING EDUCATIONAL LEVEL
[Joint Pain] : joint pain [Negative] : Heme/Lymph [FreeTextEntry9] : left ankle pain high school/college